# Patient Record
Sex: FEMALE | Race: WHITE | NOT HISPANIC OR LATINO | Employment: UNEMPLOYED | URBAN - METROPOLITAN AREA
[De-identification: names, ages, dates, MRNs, and addresses within clinical notes are randomized per-mention and may not be internally consistent; named-entity substitution may affect disease eponyms.]

---

## 2017-01-26 NOTE — PATIENT INSTRUCTIONS
Before Your Surgery      Call your surgeon if there is any change in your health. This includes signs of a cold or flu (such as a sore throat, runny nose, cough, rash or fever).    Do not smoke, drink alcohol or take over the counter medicine (unless your surgeon or primary care doctor tells you to) for the 24 hours before and after surgery.    If you take prescribed drugs: Follow your doctor s orders about which medicines to take and which to stop until after surgery.    Eating and drinking prior to surgery: follow the instructions from your surgeon    Take a shower or bath the night before surgery. Use the soap your surgeon gave you to gently clean your skin. If you do not have soap from your surgeon, use your regular soap. Do not shave or scrub the surgery site.  Wear clean pajamas and have clean sheets on your bed.   [facility]    If you have any questions regarding to your visit please contact your care team:       Team Purple:   Clinic Hours Telephone Number   SHERMAN William Dr., Dr.   7am-7pm  Monday - Thursday   7am-5pm  Fridays  [phone]  (Appointment scheduling available 24/7)    Questions about your Visit?   Team Line:  [phone]   Urgent Care - [place] and [facility] - 11am-9pm Monday-Friday Saturday-Sunday- 9am-5pm   [place] - 5pm-9pm Monday-Friday Saturday-Sunday- 9am-5pm  [phone] - [place]   [phone] - [place]       What options do I have for visits at the clinic other than the traditional office visit?  To expand how we care for you, many of our providers are utilizing electronic visits (e-visits) and telephone visits, when medically appropriate, for interactions with their patients rather than a visit in the clinic.   We also offer nurse visits for many medical concerns. Just like any other service, we will bill your insurance company for this type of visit based on time spent on the phone  with your provider. Not all insurance companies cover these visits. Please check with your medical insurance if this type of visit is covered. You will be responsible for any charges that are not paid by your insurance.      E-visits via [employer]:  generally incur a $35.00 fee.  Telephone visits:  Time spent on the phone: *charged based on time that is spent on the phone in increments of 10 minutes. Estimated cost:   5-10 mins $30.00   11-20 mins. $59.00   21-30 mins. $85.00     Use [employer] (secure email communication and access to your chart) to send your primary care provider a message or make an appointment. Ask someone on your Team how to sign up for [employer].  For a Price Quote for your services, please call our Consumer Price Line at [phone].  As always, Thank you for trusting us with your health care needs!    Val Du MA

## 2017-01-26 NOTE — PROGRESS NOTES
[facility]  [address] [place] [id number]  [phone]  Dept: [phone]    PRE-OP EVALUATION:  Today's date: 2017    Eden Hess (: 1970) presents for pre-operative evaluation assessment as requested by Dr. Terrance Jesus.  She requires evaluation and anesthesia risk assessment prior to undergoing surgery/procedure for treatment of Cataracts .  Proposed procedure: Cataract    Date of Surgery/ Procedure:  and 2017  Time of Surgery/ Procedure: 8 AM  Hospital/Surgical Facility:   Fax number for surgical facility:   Primary Physician: Miller Del Rosario  Type of Anesthesia Anticipated: to be determined    Patient has a Health Care Directive or Living Will:  NO    1. NO - Do you have a history of heart attack, stroke, stent, bypass or surgery on an artery in the head, neck, heart or legs?  2. NO - Do you ever have any pain or discomfort in your chest?  3. NO - Do you have a history of  Heart Failure?  4. NO - Are you troubled by shortness of breath when: walking on the level, up a slight hill or at night?  5. NO - Do you currently have a cold, bronchitis or other respiratory infection?  6. NO - Do you have a cough, shortness of breath or wheezing?  7. YES - DO YOU SOMETIMES GET PAINS IN THE CALVES OF YOUR LEGS WHEN YOU WALK? More often the shins  8. NO - Do you or anyone in your family have previous history of blood clots?  9. NO - Do you or does anyone in your family have a serious bleeding problem such as prolonged bleeding following surgeries or cuts?  10. NO - Have you ever had problems with anemia or been told to take iron pills?  11. YES - HAVE YOU HAD ANY ABNORMAL BLOOD LOSS SUCH AS BLACK, TARRY OR BLOODY STOOLS, OR ABNORMAL VAGINAL BLEEDING? Periods really heavy  12. YES - HAVE YOU EVER HAD A BLOOD TRANSFUSION? During a CS  13 years ago  13. NO - Have you or any of your relatives ever had problems with anesthesia?  14. NO - Do you have  sleep apnea, excessive snoring or daytime drowsiness?  15. NO - Do you have any prosthetic heart valves?  16. NO - Do you have prosthetic joints?  17. NO - Is there any chance that you may be pregnant?      HPI:                                                      Brief HPI related to upcoming procedure: Cataract bilateral      DIABETES - Patient has a longstanding history of DiabetesType Type I . Patient is being treated with insulin injections and denies significant side effects. Control has been good. Complicating factors include but are not limited to: None.                                                                                                             .  DEPRESSION - Patient has a long history of Depression of moderate severity requiring medication for control with recent symptoms being stable..Current symptoms of depression include none.                                                                                                                                                                                    .  LMP: 2 weeks. Vasectomy.  MEDICAL HISTORY:                                                      Patient Active Problem List    Diagnosis Date Noted     Type 1 diabetes mellitus with hyperglycemia (H) 2016     Priority: Medium     Major depression in complete remission (H) 2016     Priority: Medium      No past medical history on file.  Past Surgical History   Procedure Laterality Date      section       x2     Current Outpatient Prescriptions   Medication Sig Dispense Refill     venlafaxine (EFFEXOR-XR) 150 MG 24 hr capsule Take 150 mg by mouth 2 times daily   3     lisinopril (PRINIVIL/ZESTRIL) 20 MG tablet 20 mg daily   4     allopurinol (ZYLOPRIM) 100 MG tablet daily   4     SM ASPIRIN ADULT LOW STRENGTH 81 MG EC tablet Take 81 mg by mouth daily   2     ACCU-CHEK SMARTVIEW test strip   0     HYDROcodone-acetaminophen (NORCO) 5-325 MG per tablet as needed   0      NOVOLOG VIAL 100 UNIT/ML soln   1     LANTUS VIAL 100 UNIT/ML soln   4     blood glucose monitoring (ACCU-CHEK FASTCLIX) lancets   11     OTC products: None, except as noted above    No Known Allergies   Latex Allergy: NO    Social History   Substance Use Topics     Smoking status: Former Smoker     Quit date: 12/29/1997     Smokeless tobacco: Not on file     Alcohol Use: Yes     History   Drug Use No       REVIEW OF SYSTEMS:                                                    C: NEGATIVE for fever, chills, change in weight  E/M: NEGATIVE for ear, mouth and throat problems  R: NEGATIVE for significant cough or SOB  CV: NEGATIVE for chest pain, palpitations or peripheral edema    EXAM:                                                    There were no vitals taken for this visit.  GENERAL APPEARANCE: healthy, alert and no distress  HENT: ear canals and TM's normal and nose and mouth without ulcers or lesions  RESP: lungs clear to auscultation - no rales, rhonchi or wheezes  CV: regular rate and rhythm, normal S1 S2, no S3 or S4 and no murmur, click or rub   ABDOMEN: soft, nontender, no HSM or masses and bowel sounds normal  NEURO: Normal strength and tone, sensory exam grossly normal, mentation intact and speech normal    DIAGNOSTICS:                                                    No labs or EKG required for low risk surgery (cataract, skin procedure, breast biopsy, etc)    No results for input(s): HGB, PLT, INR, NA, POTASSIUM, CR, A1C in the last 86036 hours.     IMPRESSION:                                                    Reason for surgery/procedure: Cataract/Cataract surgery  Diagnosis/reason for consult: Cataract/Pre OP    The proposed surgical procedure is considered LOW risk.    REVISED CARDIAC RISK INDEX  The patient has the following serious cardiovascular risks for perioperative complications such as (MI, PE, VFib and 3  AV Block):  No serious cardiac risks  INTERPRETATION: 0 risks: Class I (very low  risk - 0.4% complication rate)    The patient has the following additional risks for perioperative complications:  No identified additional risks      ICD-10-CM    1. Screening for malignant neoplasm of cervix Z12.4    2. Need for prophylactic vaccination with tetanus-diphtheria (TD) Z23        RECOMMENDATIONS:                                                      --Consult hospital rounder / IM to assist post-op medical management    --Patient is to take all scheduled medications on the day of surgery EXCEPT for modifications listed below.    Diabetes Medication Use    -----Take 80% of long acting insulin (e.g. Lantus, NPH) while NPO (fasting): (will take 24 units night before the procedure).    Anticoagulant or Antiplatelet Medication Use  ASPIRIN: Discontinue ASA 7-10 days prior to procedure to reduce bleeding risk.  It should be resumed post-operatively or as recommended by the surgeon.    APPROVAL GIVEN to proceed with proposed procedure, without further diagnostic evaluation       Signed Electronically by: Miller Del Rosario MD    Copy of this evaluation report is provided to requesting physician.    [facility] Preop Guidelines

## 2017-01-31 ENCOUNTER — OFFICE VISIT (OUTPATIENT)
Dept: FAMILY MEDICINE | Facility: CLINIC | Age: 47
End: 2017-01-31
Payer: COMMERCIAL

## 2017-01-31 VITALS
SYSTOLIC BLOOD PRESSURE: 120 MMHG | HEART RATE: 94 BPM | DIASTOLIC BLOOD PRESSURE: 70 MMHG | OXYGEN SATURATION: 99 % | TEMPERATURE: 97.5 F | BODY MASS INDEX: 26.62 KG/M2 | HEIGHT: 61 IN | WEIGHT: 141 LBS

## 2017-01-31 DIAGNOSIS — Z12.4 SCREENING FOR MALIGNANT NEOPLASM OF CERVIX: Primary | ICD-10-CM

## 2017-01-31 DIAGNOSIS — Z01.818 PREOP GENERAL PHYSICAL EXAM: ICD-10-CM

## 2017-01-31 DIAGNOSIS — Z23 NEED FOR PROPHYLACTIC VACCINATION WITH TETANUS-DIPHTHERIA (TD): ICD-10-CM

## 2017-01-31 PROCEDURE — 99214 OFFICE O/P EST MOD 30 MIN: CPT | Performed by: FAMILY MEDICINE

## 2017-01-31 ASSESSMENT — ANXIETY QUESTIONNAIRES
5. BEING SO RESTLESS THAT IT IS HARD TO SIT STILL: NOT AT ALL
2. NOT BEING ABLE TO STOP OR CONTROL WORRYING: NOT AT ALL
7. FEELING AFRAID AS IF SOMETHING AWFUL MIGHT HAPPEN: NOT AT ALL
GAD7 TOTAL SCORE: 0
6. BECOMING EASILY ANNOYED OR IRRITABLE: NOT AT ALL
IF YOU CHECKED OFF ANY PROBLEMS ON THIS QUESTIONNAIRE, HOW DIFFICULT HAVE THESE PROBLEMS MADE IT FOR YOU TO DO YOUR WORK, TAKE CARE OF THINGS AT HOME, OR GET ALONG WITH OTHER PEOPLE: NOT DIFFICULT AT ALL
3. WORRYING TOO MUCH ABOUT DIFFERENT THINGS: NOT AT ALL
1. FEELING NERVOUS, ANXIOUS, OR ON EDGE: NOT AT ALL

## 2017-01-31 ASSESSMENT — PATIENT HEALTH QUESTIONNAIRE - PHQ9: 5. POOR APPETITE OR OVEREATING: NOT AT ALL

## 2017-01-31 ASSESSMENT — PAIN SCALES - GENERAL: PAINLEVEL: NO PAIN (0)

## 2017-01-31 NOTE — MR AVS SNAPSHOT
After Visit Summary   1/31/2017    Eden Hess    MRN: [id number]           Patient Information     Date Of Birth          1970        Visit Information        Provider Department      1/31/2017 11:00 AM Miller Del Rosario MD [facility]        Today's Diagnoses     Screening for malignant neoplasm of cervix    -  1     Need for prophylactic vaccination with tetanus-diphtheria (TD)         Preop general physical exam           Care Instructions      Before Your Surgery      Call your surgeon if there is any change in your health. This includes signs of a cold or flu (such as a sore throat, runny nose, cough, rash or fever).    Do not smoke, drink alcohol or take over the counter medicine (unless your surgeon or primary care doctor tells you to) for the 24 hours before and after surgery.    If you take prescribed drugs: Follow your doctor s orders about which medicines to take and which to stop until after surgery.    Eating and drinking prior to surgery: follow the instructions from your surgeon    Take a shower or bath the night before surgery. Use the soap your surgeon gave you to gently clean your skin. If you do not have soap from your surgeon, use your regular soap. Do not shave or scrub the surgery site.  Wear clean pajamas and have clean sheets on your bed.   [facility]    If you have any questions regarding to your visit please contact your care team:       Team Purple:   Clinic Hours Telephone Number   SHERMAN [address] Dr., Dr.   7am-7pm  Monday - Thursday   7am-5pm  Fridays  [phone]  (Appointment scheduling available 24/7)    Questions about your Visit?   Team Line:  [phone]   Urgent Care - [place] and [place] - 11am-9pm Monday-Friday Saturday-Sunday- 9am-5pm   [place] - 5pm-9pm Monday-Friday Saturday-Sunday- 9am-5pm  [phone] - [place]  [phone] - [place]        What options do I have for visits at the clinic other than the traditional office visit?  To expand how we care for you, many of our providers are utilizing electronic visits (e-visits) and telephone visits, when medically appropriate, for interactions with their patients rather than a visit in the clinic.   We also offer nurse visits for many medical concerns. Just like any other service, we will bill your insurance company for this type of visit based on time spent on the phone with your provider. Not all insurance companies cover these visits. Please check with your medical insurance if this type of visit is covered. You will be responsible for any charges that are not paid by your insurance.      E-visits via [employer]:  generally incur a $35.00 fee.  Telephone visits:  Time spent on the phone: *charged based on time that is spent on the phone in increments of 10 minutes. Estimated cost:   5-10 mins $30.00   11-20 mins. $59.00   21-30 mins. $85.00     Use [employer] (secure email communication and access to your chart) to send your primary care provider a message or make an appointment. Ask someone on your Team how to sign up for [employer].  For a Price Quote for your services, please call our [employer] Line at [phone].  As always, Thank you for trusting us with your health care needs!    Val Du MA          Follow-ups after your visit        Who to contact     If you have questions or need follow up information about today's clinic visit or your schedule please contact [facility] directly at [phone].  Normal or non-critical lab and imaging results will be communicated to you by MyChart, letter or phone within 4 business days after the clinic has received the results. If you do not hear from us within 7 days, please contact the clinic through [employer] or phone. If you have a critical or abnormal lab result, we will notify you by phone as soon as possible.  Submit refill requests  "through [employer] or call your pharmacy and they will forward the refill request to us. Please allow 3 business days for your refill to be completed.          Additional Information About Your Visit        [employer] Information     [employer] lets you send messages to your doctor, view your test results, renew your prescriptions, schedule appointments and more. To sign up, go to [email or web address] . Click on \"Log in\" on the left side of the screen, which will take you to the Welcome page. Then click on \"Sign up Now\" on the right side of the page.     You will be asked to enter the access code listed below, as well as some personal information. Please follow the directions to create your username and password.     Your access code is: [email or web address]  Expires: 3/29/2017  4:34 PM     Your access code will  in 90 days. If you need help or a new code, please call your [facility] clinic or [phone].        Care EveryWhere ID     This is your Care EveryWhere ID. This could be used by other organizations to access your [facility] medical records  [id number]        Your Vitals Were     Pulse Temperature Height BMI (Body Mass Index) Pulse Oximetry       94 97.5  F (36.4  C) (Oral) 5' 0.87\" (1.546 m) 26.76 kg/m2 99%        Blood Pressure from Last 3 Encounters:   17 120/70   16 142/78    Weight from Last 3 Encounters:   17 141 lb (63.957 kg)   16 141 lb (63.957 kg)              Today, you had the following     No orders found for display       Primary Care Provider Office Phone # Fax #    Miller Del Rosario [phone]       [facility] [address] [facility] [address]        Thank you!     Thank you for choosing [facility]  for your care. Our goal is always to provide you with excellent care. Hearing back from our patients is one way we can continue to improve our services. Please take a few minutes to complete the written survey that you may receive " in the mail after your visit with us. Thank you!             Your Updated Medication List - Protect others around you: Learn how to safely use, store and throw away your medicines at www.disposemymeds.org.          This list is accurate as of: 1/31/17 11:38 AM.  Always use your most recent med list.                   Brand Name Dispense Instructions for use    ACCU-CHEK SMARTVIEW test strip   Generic drug:  blood glucose monitoring          allopurinol 100 MG tablet    ZYLOPRIM     daily       blood glucose monitoring lancets          HYDROcodone-acetaminophen 5-325 MG per tablet    NORCO     as needed       LANTUS VIAL 100 UNIT/ML injection   Generic drug:  insulin glargine      30 Units At Bedtime       lisinopril 20 MG tablet    PRINIVIL/ZESTRIL     20 mg daily       NovoLOG VIAL 100 UNITS/ML injection   Generic drug:  insulin aspart          SM ASPIRIN ADULT LOW STRENGTH 81 MG EC tablet   Generic drug:  aspirin      Take 81 mg by mouth daily       venlafaxine 150 MG 24 hr capsule    EFFEXOR-XR     Take 150 mg by mouth 2 times daily

## 2017-01-31 NOTE — NURSING NOTE
"Chief Complaint   Patient presents with     Pre-Op Exam       Initial /70 mmHg  Pulse 94  Temp(Src) 97.5  F (36.4  C) (Oral)  Ht 5' 0.87\" (1.546 m)  Wt 141 lb (63.957 kg)  BMI 26.76 kg/m2  SpO2 99% Estimated body mass index is 26.76 kg/(m^2) as calculated from the following:    Height as of this encounter: 5' 0.87\" (1.546 m).    Weight as of this encounter: 141 lb (63.957 kg).  BP completed using cuff size: regular    "

## 2017-02-01 ASSESSMENT — PATIENT HEALTH QUESTIONNAIRE - PHQ9: SUM OF ALL RESPONSES TO PHQ QUESTIONS 1-9: 5

## 2017-02-01 ASSESSMENT — ANXIETY QUESTIONNAIRES: GAD7 TOTAL SCORE: 0

## 2017-02-08 ENCOUNTER — TELEPHONE (OUTPATIENT)
Dept: FAMILY MEDICINE | Facility: CLINIC | Age: 47
End: 2017-02-08

## 2017-02-08 NOTE — TELEPHONE ENCOUNTER
Patient has the diagnoses of diabetes and does not currently have a statin status listed, please update. Thank you

## 2017-07-12 ENCOUNTER — TELEPHONE (OUTPATIENT)
Dept: FAMILY MEDICINE | Facility: CLINIC | Age: 47
End: 2017-07-12

## 2017-07-12 NOTE — TELEPHONE ENCOUNTER
Panel Management Review      Patient has the following on her problem list:     Diabetes    ASA: Passed    Last A1C  No results found for: A1C  A1C tested: No results    Last LDL:    No results found for: CHOL  No results found for: HDL  No results found for: LDL  No results found for: TRIG  No results found for: CHOLHDLRATIO  No results found for: NHDL    Is the patient on a Statin? NO             Is the patient on Aspirin? YES    Medications     Salicylates    SM ASPIRIN ADULT LOW STRENGTH 81 MG EC tablet          Last three blood pressure readings:  BP Readings from Last 3 Encounters:   01/31/17 120/70   12/29/16 142/78       Date of last diabetes office visit: none     Tobacco History:     History   Smoking Status     Former Smoker     Quit date: 12/29/1997   Smokeless Tobacco     Never Used           Composite cancer screening  Chart review shows that this patient is due/due soon for the following Pap Smear  Summary:    Patient is due/failing the following:   Microalbumin,LDL, A1C, PAP and PHYSICAL    Action needed:   Patient needs office visit for Physical/Diabetic Check.    Type of outreach:    Phone, left message for patient to call back.     Questions for provider review:    None                                                                                                                                    Tamara Brooks MA       Chart routed to Care Team .

## 2017-10-11 ENCOUNTER — TELEPHONE (OUTPATIENT)
Dept: FAMILY MEDICINE | Facility: CLINIC | Age: 47
End: 2017-10-11

## 2017-10-11 NOTE — LETTER
October 11, 2017          Eden Hess,  [address]  [place] [address]        Dear Eden Hess      Monitoring and managing your preventative and chronic health conditions are very important to us. Our records indicate that you have not scheduled for Annual Female Exam and Diabetic Check  which was recommended by Dr. Del Rosario.      If you have received your health care elsewhere, please call the clinic so the information can be documented in your chart.    Please call [phone] or message us through your dotloop account to schedule an appointment or provide information for your chart.     Feel free to contact us if you have any questions or concerns!    I look forward to seeing you and working with you on your health care needs.     Sincerely,         Miller Del Rosario/ NIA

## 2017-10-11 NOTE — TELEPHONE ENCOUNTER
Panel Management Review      Patient has the following on her problem list:     Diabetes    ASA: Passed    Last A1C  No results found for: A1C  A1C tested: no records for A1c results    Last LDL:    No results found for: CHOL  No results found for: HDL  No results found for: LDL  No results found for: TRIG  No results found for: CHOLHDLRATIO  No results found for: NHDL    Is the patient on a Statin? NO             Is the patient on Aspirin? YES    Medications     Salicylates    SM ASPIRIN ADULT LOW STRENGTH 81 MG EC tablet          Last three blood pressure readings:  BP Readings from Last 3 Encounters:   01/31/17 120/70   12/29/16 142/78       Date of last diabetes office visit: none in chart     Tobacco History:     History   Smoking Status     Former Smoker     Quit date: 12/29/1997   Smokeless Tobacco     Never Used             Composite cancer screening  Chart review shows that this patient is due/due soon for the following Pap Smear  Summary:    Patient is due/failing the following:   Foot Exam, Eye Exam, Creatinine, Tsh w/free T4 Reflex, Microalbumin,Pneumovax, Tdap, A1C, LDL, PAP and STATIN    Action needed:   Patient needs office visit for physical and diabetic check.    Type of outreach:    Sent letter.    Questions for provider review:    Please add statin to patient .                                                                                                                                    Tamara Brooks MA       Chart routed to Provider .

## 2017-12-23 ENCOUNTER — TELEPHONE (OUTPATIENT)
Dept: FAMILY MEDICINE | Facility: CLINIC | Age: 47
End: 2017-12-23

## 2019-11-03 ENCOUNTER — TRANSFERRED RECORDS (OUTPATIENT)
Dept: HEALTH INFORMATION MANAGEMENT | Facility: CLINIC | Age: 49
End: 2019-11-03

## 2020-02-07 ENCOUNTER — TRANSFERRED RECORDS (OUTPATIENT)
Dept: HEALTH INFORMATION MANAGEMENT | Facility: CLINIC | Age: 50
End: 2020-02-07

## 2020-04-14 ENCOUNTER — TRANSFERRED RECORDS (OUTPATIENT)
Dept: HEALTH INFORMATION MANAGEMENT | Facility: CLINIC | Age: 50
End: 2020-04-14

## 2020-04-20 ENCOUNTER — TRANSFERRED RECORDS (OUTPATIENT)
Dept: HEALTH INFORMATION MANAGEMENT | Facility: CLINIC | Age: 50
End: 2020-04-20

## 2020-05-13 ENCOUNTER — TRANSFERRED RECORDS (OUTPATIENT)
Dept: HEALTH INFORMATION MANAGEMENT | Facility: CLINIC | Age: 50
End: 2020-05-13

## 2020-05-19 ENCOUNTER — REFERRAL (OUTPATIENT)
Dept: TRANSPLANT | Facility: CLINIC | Age: 50
End: 2020-05-19

## 2020-05-19 DIAGNOSIS — Z01.818 ENCOUNTER FOR PRE-TRANSPLANT EVALUATION FOR KIDNEY AND PANCREAS TRANSPLANT: ICD-10-CM

## 2020-05-19 DIAGNOSIS — N76.6 VULVAR ULCERATION: ICD-10-CM

## 2020-05-19 DIAGNOSIS — N18.6 ESRD (END STAGE RENAL DISEASE) (H): Primary | ICD-10-CM

## 2020-05-19 DIAGNOSIS — H35.00 RETINOPATHY: ICD-10-CM

## 2020-05-19 DIAGNOSIS — E10.9 DIABETES MELLITUS TYPE 1 (H): ICD-10-CM

## 2020-05-19 DIAGNOSIS — Z99.2 ESRD ON DIALYSIS (H): ICD-10-CM

## 2020-05-19 DIAGNOSIS — I50.9 CONGESTIVE HEART FAILURE (H): ICD-10-CM

## 2020-05-19 DIAGNOSIS — E10.9 TYPE 1 DIABETES MELLITUS (H): ICD-10-CM

## 2020-05-19 DIAGNOSIS — N18.6 ESRD ON DIALYSIS (H): ICD-10-CM

## 2020-05-19 DIAGNOSIS — I10 ESSENTIAL HYPERTENSION: ICD-10-CM

## 2020-05-19 DIAGNOSIS — N18.6 END STAGE RENAL DISEASE (H): ICD-10-CM

## 2020-05-19 DIAGNOSIS — Z76.82 ORGAN TRANSPLANT CANDIDATE: ICD-10-CM

## 2020-05-19 DIAGNOSIS — I25.10 CARDIOVASCULAR DISEASE: ICD-10-CM

## 2020-05-19 NOTE — LETTER
Eden Hess  [address]  [place] [address]                June 4, 2020        Dear Eden,       You have recently expressed interest in our Solid Organ Transplant Program and have requested to begin the evaluation process.  We have made several attempts to get in touch with you but have been unable to reach you.    If you are still interested and/or have any questions, please contact us at  [phone] or [phone]   Monday - Friday, between the hours of 8:30am and 5:00pm central time.    We look forward to hearing from you.      Regards,     Solid Organ Transplant Intake   [facility] [address]  [address], [facility] [address]  [place] [address]

## 2020-05-19 NOTE — LETTER
July 14, 2020    Eden Hess  [address]  [place] [address]    Dear Eden,    Thank you for your interest in the Transplant Center at [facility], [facility]. We look forward to being a part of your care team and assisting you through the transplant process.    As we discussed, your transplant coordinator is Kaitlyn Hurst (Pancreas, Kidney).  You may call your coordinator at any time with questions or concerns. [phone].    Please complete the following.    1. Fill out and return the enclosed forms    Authorization for Electronic Communication    Authorization to Discuss Protected Health Information    Authorization for Release of Protected Health Information    2. Sign up for:    [employer], access to your electronic medical record (see enclosed pamphlet)    Mobile [email or web address], a transplant education website    You can use these tools to learn more about your transplant, communicate with your care team, and track your medical details      Sincerely,      Solid Organ Transplant  [facility], [facility]    cc: Referring Physician

## 2020-05-19 NOTE — LETTER
"    Eden Hess  [address]  [place] [address]          Dear Eden,    Thank you for your interest in the Transplant Center at [facility], [facility]. We look forward to being a part of your care team and assisting you through the transplant process.    As we discussed, your transplant coordinator is Kaitlyn Hurst (Pancreas, Kidney).  You may call your coordinator at any time with questions or concerns, call [phone].    Please complete the following.    1. Fill out and return the enclosed forms    { SOT INTAKE ENCLOSURES:[id number]::\"Authorization for Electronic Communication\",\"Authorization to Discuss Protected Health Information\",\"Authorization for Release of Protected Health Information\"}    2. Sign up for:    [employer], access to your electronic medical record (see enclosed pamphlet)    [employer], a transplant education website    You can use these tools to learn more about your transplant, communicate with your care team, and track your medical details      Sincerely,      Solid Organ Transplant  [facility], [facility]    cc: { TRANSPLANT CC LIST:[id number]}  "

## 2020-06-12 NOTE — TELEPHONE ENCOUNTER
Patient Call: Voicemail  Date/Time: 6/12/20 @ 3:18 PM  Reason for call: Patient returned Intakes call

## 2020-06-16 ENCOUNTER — TELEPHONE (OUTPATIENT)
Dept: TRANSPLANT | Facility: CLINIC | Age: 50
End: 2020-06-16

## 2020-06-16 NOTE — TELEPHONE ENCOUNTER
Patient Call: Voicemail  Date/Time: 6/16/2020-11:56am  Reason for call: Please connect with pt regarding referral

## 2020-06-22 VITALS — WEIGHT: 140 LBS | HEIGHT: 62 IN | BODY MASS INDEX: 25.76 KG/M2

## 2020-06-22 SDOH — HEALTH STABILITY: MENTAL HEALTH: HOW OFTEN DO YOU HAVE A DRINK CONTAINING ALCOHOL?: 4 OR MORE TIMES A WEEK

## 2020-06-22 SDOH — HEALTH STABILITY: MENTAL HEALTH: HOW MANY STANDARD DRINKS CONTAINING ALCOHOL DO YOU HAVE ON A TYPICAL DAY?: 1 OR 2

## 2020-06-22 ASSESSMENT — MIFFLIN-ST. JEOR: SCORE: 1203.29

## 2020-06-22 NOTE — TELEPHONE ENCOUNTER
PCP: Kurt King   Referring Provider: Varun Parker  Referring Diagnosis: ESRD  H/o DM type 1    Is patient under the age of 65? y  Is patient diabetic? y  Is patient on insulin? y  Was patient offered a pancreas transplant referral? y    Is patient in a group home/assisted living? n  Does patient have a guardian? n    Referral intake process completed.  Patient is aware that after financial approval is received, medical records will be requested.   Patient confirmed for a callback from transplant coordinator on July 7, 2020. (within 2 weeks)  Tentative evaluation date August 13, 2020. (within 4 weeks)    Confirmed coordinator will discuss evaluation process in more detail at the time of their call.   Patient is aware of the need to arrange age appropriate cancer screening, vaccinations, and dental care.  Reminded patient to complete questionnaire, complete medical records release, and review packet prior to evaluation visit .  Assessed patient for special needs (ie--wheelchair, assistance, guardian, and ):  none   Patient instructed to call [phone] with questions.

## 2020-07-07 NOTE — TELEPHONE ENCOUNTER
Reviewed records for purpose of pre kidney/pancreas transplant evaluation planning. Pt has diabetes type 1, started dialysis this spring, history of stroke, depressive disorder, CHF.  BMI about 25-26. Does not have insurance approval to proceed with scheduling pre kidney pancreas transplant evaluation.     Called pt today and LM briefly introducing myself and that she needs to call her PFR here, [place], to resolve insurance status afterwhich we can proceed with pre kidney pancreas evaluation. Explained that her eval appts are tentatively set for August 13 but will not be confirmed until she has insur approval and after that I have spoken with her. Provided [place] and my phone numbers for patient to call.

## 2020-07-11 ENCOUNTER — TRANSFERRED RECORDS (OUTPATIENT)
Dept: HEALTH INFORMATION MANAGEMENT | Facility: CLINIC | Age: 50
End: 2020-07-11

## 2020-07-23 ENCOUNTER — DOCUMENTATION ONLY (OUTPATIENT)
Dept: TRANSPLANT | Facility: CLINIC | Age: 50
End: 2020-07-23

## 2020-07-29 PROBLEM — Z96.41 INSULIN PUMP IN PLACE: Status: ACTIVE | Noted: 2019-11-03

## 2020-07-29 PROBLEM — G45.9 TIA (TRANSIENT ISCHEMIC ATTACK): Status: ACTIVE | Noted: 2017-06-22

## 2020-07-29 PROBLEM — D63.8 ANEMIA OF CHRONIC DISEASE: Status: ACTIVE | Noted: 2018-02-28

## 2020-07-29 PROBLEM — N76.6 VULVAR ULCERATION: Status: ACTIVE | Noted: 2020-07-29

## 2020-07-29 NOTE — TELEPHONE ENCOUNTER
Pt's insurance now meets criteria to proceed with pre KP evaluations. Pt has ESRD on dialysis with start date of 04/15/2020 per 2728 form. Diabetes type 1 diagnosed at age 2 years old currently on insulin pump, follows with endocrinology - see CE. Hgb A1C 8.5 on 04/03/2020 - see CE. Other medical history includes retinopathy, CHF, TIA in 2017. Lichen sclerosus with vulvar ulceration seen by GYN 03/04/2020 and ordered treatment with clobetasol and RTC in 3 months - due now - see CE. Last echocardiogram 10/24/2018 EF 55-60 % and moderate mitral valve regurgitation - see CE. Last heart cath done 02/17/2019 for evaluation of mitral valve - Indications: mitral valve disease.Conclusions1. Moderate OM 1 disease. Otherwise no significant coronary artery disease, Recommendations * Cases referred for CV surgery for mitral valve disease. PAP and mammogram done March 2020 - see CE. No record of colonoscopy done. Remote history of smoking. Alcohol intake from 2 per day to 2 per week. BMI 27-28. All okay to proceed with kidney pancreas transplant evaluation.     Contacted patient and introduced myself as their Transplant Coordinator, also introduced the role of the Transplant Coordinator in the transplant process.  Explained the purpose of this call including reviewing next steps and answering questions.  Explained insurance now meets criteria to proceed with  evaluation - pt very agreeable to proceeding and wants to keep 08/13/2020 appt date. Pt confirmed she was diagnosed with diabetes at age 2 years old pt and is currently using a pump.  Pt stating she has not yet seen a CV surgeon for mitral valve regurge and is being monitored with echocardiograms instead. Pt stating she is over due for dental check up and has never had a colonoscopy - reviewed requirements for transplant and instructed her to make these appointments.   Confirmed Referring Provider, Dialysis Center, and Primary Care Physician. Notified patient of the  importance of continued communication with referring providers and primary care physicians.    Reviewed components of transplant evaluation process including necessary appointments, tests, and procedures.    Answered questions for patient regarding evaluation, provided my name and contact information and requested they call with any additional questions.    Determined that patient would like additional information regarding transplant by:     Drop Down choices: Mail, Email, MyChart, Phone Call   Encourage MyChart   Notified patients that they will hear from a Transplant  to schedule evaluation.  Explained we will plan for in person PKE appointments as 08/13/2020 is a Thursday. Instructed her on My Transplant Place use and to watch videos prior to PKE. Instructed her to bring someone with her to the appointments. Pt expressed very good understanding of all and was in good agreement with the plan.     Smart set orders into Epic today for pre KP evaluation - routed to

## 2020-07-31 NOTE — TELEPHONE ENCOUNTER
Called patient to schedule PKE clinic.  Reviewed equipment needed for virtual visits and she would prefer to keep her save the date in person visit.   Informed patient we would send information about appointments via MyChart or email and Transplant coordinator will call early next week to review education.

## 2020-08-12 ENCOUNTER — TELEPHONE (OUTPATIENT)
Dept: TRANSPLANT | Facility: CLINIC | Age: 50
End: 2020-08-12

## 2020-08-13 ENCOUNTER — OFFICE VISIT (OUTPATIENT)
Dept: TRANSPLANT | Facility: CLINIC | Age: 50
End: 2020-08-13
Attending: INTERNAL MEDICINE
Payer: COMMERCIAL

## 2020-08-13 ENCOUNTER — DOCUMENTATION ONLY (OUTPATIENT)
Dept: TRANSPLANT | Facility: CLINIC | Age: 50
End: 2020-08-13

## 2020-08-13 ENCOUNTER — ANCILLARY PROCEDURE (OUTPATIENT)
Dept: CT IMAGING | Facility: CLINIC | Age: 50
End: 2020-08-13
Attending: PHYSICIAN ASSISTANT
Payer: COMMERCIAL

## 2020-08-13 VITALS
BODY MASS INDEX: 26.19 KG/M2 | SYSTOLIC BLOOD PRESSURE: 161 MMHG | HEART RATE: 63 BPM | DIASTOLIC BLOOD PRESSURE: 77 MMHG | WEIGHT: 138.7 LBS | TEMPERATURE: 97.8 F | OXYGEN SATURATION: 97 % | HEIGHT: 61 IN

## 2020-08-13 VITALS
SYSTOLIC BLOOD PRESSURE: 161 MMHG | BODY MASS INDEX: 26.19 KG/M2 | HEIGHT: 61 IN | WEIGHT: 138.7 LBS | HEART RATE: 63 BPM | TEMPERATURE: 97.8 F | DIASTOLIC BLOOD PRESSURE: 77 MMHG | OXYGEN SATURATION: 97 %

## 2020-08-13 DIAGNOSIS — Z76.82 ORGAN TRANSPLANT CANDIDATE: ICD-10-CM

## 2020-08-13 DIAGNOSIS — E10.9 DIABETES MELLITUS TYPE 1 (H): ICD-10-CM

## 2020-08-13 DIAGNOSIS — Z01.818 ENCOUNTER FOR PRE-TRANSPLANT EVALUATION FOR KIDNEY AND PANCREAS TRANSPLANT: ICD-10-CM

## 2020-08-13 DIAGNOSIS — E10.9 TYPE 1 DIABETES MELLITUS (H): ICD-10-CM

## 2020-08-13 DIAGNOSIS — N18.6 END STAGE KIDNEY DISEASE (H): ICD-10-CM

## 2020-08-13 DIAGNOSIS — N18.6 ESRD (END STAGE RENAL DISEASE) (H): ICD-10-CM

## 2020-08-13 DIAGNOSIS — N18.6 TYPE 1 DIABETES MELLITUS WITH CHRONIC KIDNEY DISEASE ON CHRONIC DIALYSIS (H): ICD-10-CM

## 2020-08-13 DIAGNOSIS — I10 ESSENTIAL HYPERTENSION: ICD-10-CM

## 2020-08-13 DIAGNOSIS — N18.6 END STAGE RENAL DISEASE (H): ICD-10-CM

## 2020-08-13 DIAGNOSIS — Z99.2 TYPE 1 DIABETES MELLITUS WITH CHRONIC KIDNEY DISEASE ON CHRONIC DIALYSIS (H): ICD-10-CM

## 2020-08-13 DIAGNOSIS — H35.00 RETINOPATHY: ICD-10-CM

## 2020-08-13 DIAGNOSIS — F41.9 ANXIETY AND DEPRESSION: ICD-10-CM

## 2020-08-13 DIAGNOSIS — N18.6 ESRD ON DIALYSIS (H): ICD-10-CM

## 2020-08-13 DIAGNOSIS — E10.22 TYPE 1 DIABETES MELLITUS WITH STAGE 5 CHRONIC KIDNEY DISEASE NOT ON CHRONIC DIALYSIS (H): ICD-10-CM

## 2020-08-13 DIAGNOSIS — Z99.2 ESRD ON DIALYSIS (H): ICD-10-CM

## 2020-08-13 DIAGNOSIS — I25.10 CARDIOVASCULAR DISEASE: ICD-10-CM

## 2020-08-13 DIAGNOSIS — E10.22 TYPE 1 DIABETES MELLITUS WITH CHRONIC KIDNEY DISEASE ON CHRONIC DIALYSIS (H): ICD-10-CM

## 2020-08-13 DIAGNOSIS — I50.9 CONGESTIVE HEART FAILURE (H): ICD-10-CM

## 2020-08-13 DIAGNOSIS — F32.A ANXIETY AND DEPRESSION: ICD-10-CM

## 2020-08-13 DIAGNOSIS — N76.6 VULVAR ULCERATION: ICD-10-CM

## 2020-08-13 DIAGNOSIS — N18.5 TYPE 1 DIABETES MELLITUS WITH STAGE 5 CHRONIC KIDNEY DISEASE NOT ON CHRONIC DIALYSIS (H): ICD-10-CM

## 2020-08-13 LAB
ABO + RH BLD: NORMAL
ALBUMIN SERPL-MCNC: 3 G/DL (ref 3.4–5)
ALBUMIN UR-MCNC: >499 MG/DL
ALP SERPL-CCNC: 143 U/L (ref 40–150)
ALT SERPL W P-5'-P-CCNC: 31 U/L (ref 0–50)
ANION GAP SERPL CALCULATED.3IONS-SCNC: 10 MMOL/L (ref 3–14)
APPEARANCE UR: ABNORMAL
APTT PPP: 28 SEC (ref 22–37)
AST SERPL W P-5'-P-CCNC: 28 U/L (ref 0–45)
B-HCG SERPL-ACNC: <1 IU/L (ref 0–5)
BACTERIA #/AREA URNS HPF: ABNORMAL /HPF
BASOPHILS # BLD AUTO: 0.1 10E9/L (ref 0–0.2)
BASOPHILS NFR BLD AUTO: 1.5 %
BILIRUB SERPL-MCNC: 0.5 MG/DL (ref 0.2–1.3)
BILIRUB UR QL STRIP: NEGATIVE
BLD GP AB SCN SERPL QL: NORMAL
BLD GP AB SCN TITR SERPL: NORMAL {TITER}
BLOOD BANK CMNT PATIENT-IMP: NORMAL
BUN SERPL-MCNC: 39 MG/DL (ref 7–30)
C PEPTIDE SERPL-MCNC: <0.1 NG/ML (ref 0.9–6.9)
CALCIUM SERPL-MCNC: 8.5 MG/DL (ref 8.5–10.1)
CARDIOLIPIN ANTIBODY IGG: <1.6 GPL-U/ML (ref 0–19.9)
CARDIOLIPIN ANTIBODY IGM: <0.2 MPL-U/ML (ref 0–19.9)
CHLORIDE SERPL-SCNC: 99 MMOL/L (ref 94–109)
CMV IGG SERPL QL IA: <0.2 AI (ref 0–0.8)
CO2 SERPL-SCNC: 25 MMOL/L (ref 20–32)
COLOR UR AUTO: YELLOW
CREAT SERPL-MCNC: 3.56 MG/DL (ref 0.52–1.04)
DIFFERENTIAL METHOD BLD: ABNORMAL
EBV VCA IGG SER QL IA: >8 AI (ref 0–0.8)
EOSINOPHIL # BLD AUTO: 0.3 10E9/L (ref 0–0.7)
EOSINOPHIL NFR BLD AUTO: 4.7 %
ERYTHROCYTE [DISTWIDTH] IN BLOOD BY AUTOMATED COUNT: 16.7 % (ref 10–15)
GFR SERPL CREATININE-BSD FRML MDRD: 14 ML/MIN/{1.73_M2}
GLUCOSE SERPL-MCNC: 232 MG/DL (ref 70–99)
GLUCOSE UR STRIP-MCNC: >499 MG/DL
HBA1C MFR BLD: 7.3 % (ref 0–5.6)
HCT VFR BLD AUTO: 39.2 % (ref 35–47)
HGB BLD-MCNC: 12.8 G/DL (ref 11.7–15.7)
HGB UR QL STRIP: ABNORMAL
HYALINE CASTS #/AREA URNS LPF: 7 /LPF (ref 0–2)
IMM GRANULOCYTES # BLD: 0 10E9/L (ref 0–0.4)
IMM GRANULOCYTES NFR BLD: 0.7 %
INR PPP: 0.96 (ref 0.86–1.14)
KETONES UR STRIP-MCNC: 5 MG/DL
LEUKOCYTE ESTERASE UR QL STRIP: NEGATIVE
LYMPHOCYTES # BLD AUTO: 0.9 10E9/L (ref 0.8–5.3)
LYMPHOCYTES NFR BLD AUTO: 15.9 %
MCH RBC QN AUTO: 34.3 PG (ref 26.5–33)
MCHC RBC AUTO-ENTMCNC: 32.7 G/DL (ref 31.5–36.5)
MCV RBC AUTO: 105 FL (ref 78–100)
MONOCYTES # BLD AUTO: 0.7 10E9/L (ref 0–1.3)
MONOCYTES NFR BLD AUTO: 11.3 %
MUCOUS THREADS #/AREA URNS LPF: PRESENT /LPF
NEUTROPHILS # BLD AUTO: 3.9 10E9/L (ref 1.6–8.3)
NEUTROPHILS NFR BLD AUTO: 65.9 %
NITRATE UR QL: NEGATIVE
NRBC # BLD AUTO: 0 10*3/UL
NRBC BLD AUTO-RTO: 0 /100
PH UR STRIP: 5 PH (ref 5–7)
PLATELET # BLD AUTO: 296 10E9/L (ref 150–450)
POTASSIUM SERPL-SCNC: 4.2 MMOL/L (ref 3.4–5.3)
PROT SERPL-MCNC: 7 G/DL (ref 6.8–8.8)
RBC # BLD AUTO: 3.73 10E12/L (ref 3.8–5.2)
RBC #/AREA URNS AUTO: 3 /HPF (ref 0–2)
SODIUM SERPL-SCNC: 133 MMOL/L (ref 133–144)
SOURCE: ABNORMAL
SP GR UR STRIP: 1.02 (ref 1–1.03)
SPECIMEN EXP DATE BLD: NORMAL
SPECIMEN EXP DATE BLD: NORMAL
SQUAMOUS #/AREA URNS AUTO: 2 /HPF (ref 0–1)
T PALLIDUM AB SER QL: NONREACTIVE
UROBILINOGEN UR STRIP-MCNC: 0 MG/DL (ref 0–2)
WBC # BLD AUTO: 5.9 10E9/L (ref 4–11)
WBC #/AREA URNS AUTO: 3 /HPF (ref 0–5)

## 2020-08-13 PROCEDURE — 85025 COMPLETE CBC W/AUTO DIFF WBC: CPT | Performed by: PHYSICIAN ASSISTANT

## 2020-08-13 PROCEDURE — 86706 HEP B SURFACE ANTIBODY: CPT | Performed by: PHYSICIAN ASSISTANT

## 2020-08-13 PROCEDURE — 86147 CARDIOLIPIN ANTIBODY EA IG: CPT | Performed by: PHYSICIAN ASSISTANT

## 2020-08-13 PROCEDURE — 86780 TREPONEMA PALLIDUM: CPT | Performed by: PHYSICIAN ASSISTANT

## 2020-08-13 PROCEDURE — 85730 THROMBOPLASTIN TIME PARTIAL: CPT | Performed by: PHYSICIAN ASSISTANT

## 2020-08-13 PROCEDURE — 84681 ASSAY OF C-PEPTIDE: CPT | Performed by: PHYSICIAN ASSISTANT

## 2020-08-13 PROCEDURE — 86665 EPSTEIN-BARR CAPSID VCA: CPT | Performed by: PHYSICIAN ASSISTANT

## 2020-08-13 PROCEDURE — 85613 RUSSELL VIPER VENOM DILUTED: CPT | Performed by: PHYSICIAN ASSISTANT

## 2020-08-13 PROCEDURE — 86900 BLOOD TYPING SEROLOGIC ABO: CPT | Performed by: PHYSICIAN ASSISTANT

## 2020-08-13 PROCEDURE — 81241 F5 GENE: CPT | Performed by: PHYSICIAN ASSISTANT

## 2020-08-13 PROCEDURE — 86803 HEPATITIS C AB TEST: CPT | Performed by: PHYSICIAN ASSISTANT

## 2020-08-13 PROCEDURE — 86704 HEP B CORE ANTIBODY TOTAL: CPT | Performed by: PHYSICIAN ASSISTANT

## 2020-08-13 PROCEDURE — 81001 URINALYSIS AUTO W/SCOPE: CPT | Performed by: PHYSICIAN ASSISTANT

## 2020-08-13 PROCEDURE — 87340 HEPATITIS B SURFACE AG IA: CPT | Performed by: PHYSICIAN ASSISTANT

## 2020-08-13 PROCEDURE — 40000866 ZZHCL STATISTIC HIV 1/2 ANTIGEN/ANTIBODY PRETRANSPLANT ONLY: Performed by: PHYSICIAN ASSISTANT

## 2020-08-13 PROCEDURE — 86481 TB AG RESPONSE T-CELL SUSP: CPT | Performed by: PHYSICIAN ASSISTANT

## 2020-08-13 PROCEDURE — 86787 VARICELLA-ZOSTER ANTIBODY: CPT | Performed by: PHYSICIAN ASSISTANT

## 2020-08-13 PROCEDURE — 85610 PROTHROMBIN TIME: CPT | Performed by: PHYSICIAN ASSISTANT

## 2020-08-13 PROCEDURE — 86644 CMV ANTIBODY: CPT | Performed by: PHYSICIAN ASSISTANT

## 2020-08-13 PROCEDURE — 80053 COMPREHEN METABOLIC PANEL: CPT | Performed by: PHYSICIAN ASSISTANT

## 2020-08-13 PROCEDURE — 86905 BLOOD TYPING RBC ANTIGENS: CPT | Performed by: PHYSICIAN ASSISTANT

## 2020-08-13 PROCEDURE — 86886 COOMBS TEST INDIRECT TITER: CPT | Performed by: PHYSICIAN ASSISTANT

## 2020-08-13 PROCEDURE — 81240 F2 GENE: CPT | Performed by: PHYSICIAN ASSISTANT

## 2020-08-13 PROCEDURE — 86901 BLOOD TYPING SEROLOGIC RH(D): CPT | Performed by: PHYSICIAN ASSISTANT

## 2020-08-13 PROCEDURE — 36415 COLL VENOUS BLD VENIPUNCTURE: CPT | Performed by: PHYSICIAN ASSISTANT

## 2020-08-13 PROCEDURE — 84702 CHORIONIC GONADOTROPIN TEST: CPT | Performed by: PHYSICIAN ASSISTANT

## 2020-08-13 PROCEDURE — 85730 THROMBOPLASTIN TIME PARTIAL: CPT | Mod: 91 | Performed by: PHYSICIAN ASSISTANT

## 2020-08-13 PROCEDURE — 85670 THROMBIN TIME PLASMA: CPT | Performed by: PHYSICIAN ASSISTANT

## 2020-08-13 PROCEDURE — 86850 RBC ANTIBODY SCREEN: CPT | Performed by: PHYSICIAN ASSISTANT

## 2020-08-13 PROCEDURE — 83036 HEMOGLOBIN GLYCOSYLATED A1C: CPT | Performed by: PHYSICIAN ASSISTANT

## 2020-08-13 RX ORDER — METOPROLOL SUCCINATE 100 MG/1
100 TABLET, EXTENDED RELEASE ORAL
Status: ON HOLD | COMMUNITY
End: 2022-09-22

## 2020-08-13 RX ORDER — CALCIUM ACETATE 667 MG/1
667 CAPSULE ORAL
Status: ON HOLD | COMMUNITY
End: 2022-10-02

## 2020-08-13 RX ORDER — BUMETANIDE 2 MG/1
2 TABLET ORAL 2 TIMES DAILY
Status: ON HOLD | COMMUNITY
Start: 2019-12-03 | End: 2022-09-22

## 2020-08-13 RX ORDER — ATORVASTATIN CALCIUM 40 MG/1
40 TABLET, FILM COATED ORAL DAILY
Status: ON HOLD | COMMUNITY
Start: 2019-11-06 | End: 2022-10-02

## 2020-08-13 ASSESSMENT — MIFFLIN-ST. JEOR
SCORE: 1181.52
SCORE: 1181.52

## 2020-08-13 NOTE — NURSING NOTE
"Chief Complaint   Patient presents with     Consult     PKE EVAL     Blood pressure (!) 161/77, pulse 63, temperature 97.8  F (36.6  C), temperature source Oral, height 1.549 m (5' 1\"), weight 62.9 kg (138 lb 11.2 oz), SpO2 97 %, not currently breastfeeding.    Opal Pompa on 8/13/2020 at 7:36 AM    "

## 2020-08-13 NOTE — TELEPHONE ENCOUNTER
Spoke with pt today who said she will attend her pre kidney pancreas evaluation tomorrow. Pt stating she did receive an email from Nyasia Garcia LPN but has not received one from a  in our Office. I therefore reviewed her schedule with her and provided the address to our Clinic. Pt stating she has not yet viewed My Transplant Place - asked her to do so this doretha then. Instructed her to not sign consents in Nyasia's email for now and that her and I will sign together next week when I call her with the outcome of the Selection Committee. Pt expressed very good understanding of all and was in good agreement with the plan.

## 2020-08-13 NOTE — PROGRESS NOTES
Transplant Surgery Consult Note    Medical record number: [id number]  YOB: 1970,   Consult requested by Dr. Parker for evaluation of kidney and pancreas transplant candidacy.    Assessment and Recommendations: Ms. Hess is a good candidate for transplantation and has a good understanding of the risks and benefits of this approach to management of renal failure and diabetes. The following issues should be addressed prior to transplant:     50 yo female with type 1   On about 40-50 units/d  A1C in the 7s  No hypo unawareness  ESRD on dialysis hemo since 2020  2 C sections  Failed Fallopian tube coils with  the right one retained outside of the tube   H/O CHF on medical management  Has mild MR, being observed  Good candidate for KP   Needs detailed cardiac assessment    Risks of the surgical procedure including but not limited to the rare risk of mortality discussed in detail. Patient verbalized good understanding and had several pertinent questions which were answered satisfactorily.     Immunosuppressive regimen, management and long term risks discussed in detail.           The majority of our visit was spent in counselling, discussing the medical and surgical risks of living or  donor kidney and pancreas transplantation, either in a simultaneous or sequential fashion. I contrasted approximate wait time for SPK vs living vs  donor kidneys from normal (0-85%) or higher (%) kidney donor profile index (KDPI) donors and their associated outcomes. I would not recommend this individual to consider kidneys from high KDPI donors. The reason for this decision is best summarized as: multi-organ transplant candidate.  Access to transplant will be impacted by living donor availability and overall candidacy for SPK, as well as the influence of blood type and degree of sensitization. We discussed advantages of preemptive transplant as well as living donor kidney transplant, and graft and  patient survival outcomes associated with these options. Potential surgical complications of kidney and pancreas transplantation include bleeding, clotting, infection, wound complications, anastomotic failure and other issues such as cardiac complications, pneumonia, deep venous thrombosis, pulmonary embolism, post transplant diabetes and death. We discussed the need for protocol biopsy of the kidney and the possible need for a ureteral stent (and subsequent removal). We discussed benefits and risks associated with different approaches to exocrine drainage of pancreatic secretions. We also discussed differences in the average length of stay, recovery process, and posttransplant lab and monitoring protocol. We discussed the risk of graft rejection and recurrent diabetic nephropathy in the setting of poor glycemic control. I emphasized the need for strict immunosuppression adherence and the potential for complications of immunosuppression such as skin cancer or lymphoma, as well as a very low but not zero risk of donor-derived disease transmission risks (infection, cancer). Ms. Hess asked good questions and the patient's candidacy will be reviewed at our Multidisciplinary Selection Committee. Thank you for the opportunity to participate in Ms. Hess's care.    Total time: 45 minutes  Counselling time: 35 minutes      ----------------------------------------------------    HPI: Ms. Hess has Type 1 Diabetes. The patient has had diabetes for 47 years. Management is by Lantus per diabetic educator  Humalog per diabetic educator. The patient usually checks her blood sugar multiple times/day. Complications of diabetes include:    Retinopathy:  No  Neuropathy: No  Gastroparesis:  No    The patient is on dialysis.    Has potential kidney donors:  Doesn't know .  Interested in participation in paired exchange if a donor is willing: Doesn't know     The patient has the following pertinent history:       No    Yes  Dialysis:     []      [x] via:       Blood Transfusion                  [x]      []  Number of units:   Most recently:  Pregnancy:    []      [x] Number: 2      Previous Transplant:  [x]      [] Details:    Cancer    [x]      [] Comment:   Kidney stones   [x]      [] Comment:      Recurrent infections  [x]      []  Type:                  Bladder dysfunction  [x]      [] Cause:    Claudication   [x]      [] Distance:    Previous Amputation  [x]      [] Cause:     Chronic anticoagulation  [x]      [] Indication:  Islam  [x]      []     Past Medical History:   Diagnosis Date     Cerebral infarction (H) 2017    [facility]     Congestive heart failure (H)      Depressive disorder     [facility] [facility]     Diabetes mellitus type 1 (H)     Diagnosed at age 2 years old      ESRD on dialysis (H)      Hypertension      Retinopathy      Vulvar ulceration 2020     Past Surgical History:   Procedure Laterality Date      SECTION      x2     VASCULAR SURGERY      dialysis port, fistula-R arm     Family History   Problem Relation Age of Onset     Diabetes Type 1 Father      Hypertension Father      Cerebrovascular Disease Father      Social History     Socioeconomic History     Marital status:      Spouse name: Not on file     Number of children: Not on file     Years of education: Not on file     Highest education level: Not on file   Occupational History     Not on file   Social Needs     Financial resource strain: Not on file     Food insecurity     Worry: Not on file     Inability: Not on file     Transportation needs     Medical: Not on file     Non-medical: Not on file   Tobacco Use     Smoking status: Former Smoker     Types: Cigarettes     Last attempt to quit: 1997     Years since quittin.6     Smokeless tobacco: Never Used   Substance and Sexual Activity     Alcohol use: Yes     Alcohol/week: 0.0 standard drinks     Frequency: 4 or more times  a week     Drinks per session: 1 or 2     Drug use: No     Sexual activity: Yes     Partners: Male     Birth control/protection: Male Surgical   Lifestyle     Physical activity     Days per week: Not on file     Minutes per session: Not on file     Stress: Not on file   Relationships     Social connections     Talks on phone: Not on file     Gets together: Not on file     Attends Advent service: Not on file     Active member of club or organization: Not on file     Attends meetings of clubs or organizations: Not on file     Relationship status: Not on file     Intimate partner violence     Fear of current or ex partner: Not on file     Emotionally abused: Not on file     Physically abused: Not on file     Forced sexual activity: Not on file   Other Topics Concern     Parent/sibling w/ CABG, MI or angioplasty before 65F 55M? Not Asked   Social History Narrative     Not on file       ROS:   CONSTITUTIONAL:  No fevers or chills  EYES: negative for icterus  ENT:  negative for hearing loss, tinnitus and sore throat  RESPIRATORY:  negative for cough, sputum, dyspnea  CARDIOVASCULAR:  negative for chest pain Fatigue  GASTROINTESTINAL:  negative for nausea, vomiting, diarrhea or constipation  GENITOURINARY:  negative for incontinence, dysuria, bladder emptying problems  HEME:  No easy bruising  INTEGUMENT:  negative for rash and pruritus  NEURO:  Negative for headache, seizure disorder    Allergies:   Allergies   Allergen Reactions     Amlodipine      Other reaction(s): Edema,generalized       Medications:  Prescription Medications as of 8/13/2020       Rx Number Disp Refills Start End Last Dispensed Date Next Fill Date Owning Pharmacy    Kipo[employer]ds[employer] SMARTVIEW test strip   0 9/28/2016        Class: Historical    atorvastatin (LIPITOR) 40 MG tablet    11/6/2019    [facility][id number] - [address]    Sig: Take 40 mg by mouth    Class: Historical    Route: Oral    blood glucose monitoring  (ACCU-CHEK FASTCLIX) lancets   11 2016        Class: Historical    bumetanide (BUMEX) 0.5 MG tablet    12/3/2019    [id number] [address] [facility]    Sig: Take 2 mg by mouth    Class: Historical    Route: Oral    calcium acetate (PHOSLO) 667 MG CAPS capsule        [facility] [address] [place] [address] [address] [facility]    Sig: Take 667 mg by mouth    Class: Historical    Route: Oral    cholecalciferol (VITAMIN D3) 125 mcg (5000 units) capsule        [facility] [address] [place] [address] [address] [place] [address]    Sig: Take by mouth daily    Class: Historical    Route: Oral    LANTUS VIAL 100 UNIT/ML soln   4 2016        Si Units At Bedtime     Class: Historical    metoprolol succinate ER (TOPROL-XL) 100 MG 24 hr tablet        [facility] [address] [place] [address] [address] [facility]    Sig: Take 100 mg by mouth    Class: Historical    Route: Oral    NOVOLOG VIAL 100 UNIT/ML soln   1 2016        Class: Historical    SM ASPIRIN ADULT LOW STRENGTH 81 MG EC tablet   2 2016        Sig: Take 81 mg by mouth daily     Class: Historical    Route: Oral    venlafaxine (EFFEXOR-XR) 150 MG 24 hr capsule   3 2016        Sig: Take 150 mg by mouth 2 times daily     Class: Historical    Route: Oral    allopurinol (ZYLOPRIM) 100 MG tablet   4 2016        Sig: daily     Class: Historical          Exam:   Temp:  [97.8  F (36.6  C)] 97.8  F (36.6  C)  Pulse:  [63] 63  BP: (161)/(77) 161/77  SpO2:  [97 %] 97 %  Appearance: in no apparent distress.   Skin: normal  Head and Neck: Normal, no rashes or jaundice  Respiratory: easy respirations, no audible wheezing.  Cardiovascular: RRR  Abdomen: rounded, Surgical scars consistent with history     Diagnostics:   No results found for this or any previous visit (from the past 672 hour(s)).  No results found for: CPRA

## 2020-08-13 NOTE — PROGRESS NOTES
Outpatient MNT: Kidney Pancreas Transplant Evaluation    Current BMI: 26.2 (HT 61 in,  lbs/63 kg)  BMI is within recommendation of <35 for KP transplant    Frailty Test completed 8/13/20 (view flowsheets-->frailty score for further scoring breakdown)  Not Frail      Time Spent: 15 minutes  Visit Type: Initial  Referring Physician: Dalia   Pt accompanied by: her , Gallo     Medical dx associated with RD referral  - DM I  - ESRD     History of previous txp: none   Frequency of BG checks: has CGM, which she checks 6-8x/day   Dialysis: yes    Dialysis Modality: HD  Days/Times: MWF 6 am   Dialysis Start: 4/2020   Pt receives protein supplement with dialysis: Y    Nutrition Assessment  Pt cooks for self without added salt. She does eat some higher sodium processed foods.     Appetite: good/baseline     Vitamins, Supplements, Pertinent Meds: phoslo, vit D   Herbal Medicines/Supplements: none     Diet Recall  Breakfast HD days- chicken s/w or granola bar; non HD days- eggs and toast or a bagel    Lunch Hit or miss- may have LS canned soup or pizza    Dinner Pizza; pasta 50% of the time, has pork/chicken/steak 50% of the time, salad or veggies 3x/week    Snacks Celery or apple with PB, junk food a few times/week    Beverages 16 oz milk, water, coffee    Alcohol 7 drinks/week (wine, whiskey, or beer)    Dining out 1-2x/week      Physical Activity  Limited, but does report hiking once every 2 weeks      Anthropometrics  Height:   61 in   BMI:    26.2    Weight Status:Overweight BMI 25-29.9   Weight:  138 lbs            IBW (lb): 105  % IBW: 131 Wt Hx: Pt reports no edema. Weight typically fluctuates within 10 lbs.     Adj/dosing BW: 113 lbs/51 kg      Labs  7/1/20 K 5.1, mostly wnl  Phos 6.1, high x 6 months     Malnutrition  % Intake: No decreased intake noted  % Weight Loss: None noted  Subcutaneous Fat Loss: None  Muscle Loss: None  Fluid Accumulation/Edema: None noted  Malnutrition Diagnosis: Patient does  not meet two of the above criteria necessary for diagnosing malnutrition    Estimated Nutrition Needs  Energy  3177-2072     (25-30 kcal/kg for maintenance)     Protein  61-71    (1.2-1.4 g/kg for HD/PD needs)         Fluid  1 ml/kcal or per MD   Micronutrient   Na+: <2000 mg/day  K+: 5929-3744 mg/day  Phos: 800-1000 mg/day            Nutrition Diagnosis  Excessive Na+ intake r/t food and nutrition related knowledge deficit AEB diet recall reveals high Na+ foods.    Food and nutrition related knowledge deficit r/t pre transplant eval AEB pt verbalized not hearing pre/post transplant diet guidelines.    Nutrition Intervention  Nutrition education provided:  Discussed sodium intake (low sodium foods and drinks, seasoning food without salt and tips for low sodium diet). Reviewed wnl K level, yet elevated phos level and to take binders with snacks or beverages (ie milk) if outside of a meal or time when she takes a binder. Reviewed protein needs for dialysis as well.     Reviewed post txp diet guidelines in brief (will review in further detail post txp):  (1) Review of proper food safety measures d/t immunosuppressant therapy post-op and increased risk for food-borne illness    (2) Avoid the following post txp d/t risk for rejection, unknown effects on the organs, and/or potential interactions with immunosuppressants:  - Herbal, Chinese, holistic, chiropractic, natural, alternative medicines and supplements  - Detoxes and cleanses  - Weight loss pills  - Protein powders or other products with extracts or herbs (ie green tea extract)    (3) Med regimen and possible side effects    Patient Understanding: Pt verbalized understanding of education provided.  Expected Compliance: Good  Follow-Up Plans: PRN     Nutrition Goals  1. Limit Na+ <2000mg/day  2. Pt to verbalize understanding of 3 aspects of post txp education provided  3. Reduce intake of high phos foods and take binders consistently to reduce level to  naimal      Provided pt with contact info.   Martha Rubio RD, LD, CCTD  [facility] [phone]

## 2020-08-13 NOTE — PROGRESS NOTES
TRANSPLANT NEPHROLOGY RECIPIENT EVALUATION NOTE    Assessment and Plan:  # Kidney/Pancreas Transplant Evaluation: Patient is a good candidate overall. Benefits of a living donor transplant were discussed.    # ESKD from diabetes mellitus type 1: doing OK on dialysis since April 2020, but may benefit from a kidney transplant.    # DM Type 1: currently controlled with approximately 25 units insulin daily via pump and CGM. A1c 8.6% April 2020. Repeat pending today. Given evidence of end-organ damage, she may benefit from a pancreas transplant.    # Cardiac Risk: she will need risk assessment given mild non-obstructive CAD (angiogram February 2019), mild mitral regurgitation, HFpEF, and multiple longstanding risk factors.    # TIA 2017: will update carotid US.    # Vaginal Ulceration: thought likely lichen sclerosis and treated with topical steroids, although patient is unable to say if there has been improvement or not. Recommend follow up with OBGYN to ensure resolution.     # Malpositioned essure device: incidentally noted on recent imaging: recommend follow up with OBGYN.    # Depression: currently on venlafaxine. Appreciate social work input.    # Groundglass opacities: noted in left lung base on non-contrast abd/pelv CT. Patient asymptomatic at time of visit. Recommend follow up with PCP if symptoms develop.     # Health Maintenance: Colonoscopy: Not up to date, Mammogram: Up to date, PAP: Up to date and Dental: Up to date    Discussed the risks and benefits of a transplant, including the risk of surgery and immunosuppression medications.  Patient's overall evaluation will be discussed in the Transplant Program's regular meeting with a final recommendation on the patients suitability for transplant to be made at that time.  Patient was seen in conjunction with Dr. John Cruz as part of a shared visit.    Evaluation:  Eden Hess was seen in consultation at the request of Dr. Yousif Gómez for evaluation as a  potential kidney/pancreas transplant recipient.    Reason for Visit:  Eden Hess is a 49-year-old female with ESKD from diabetes mellitus type 1, who presents for kidney/pancreas transplant evaluation.    History of Present Illness:           Kidney Disease Hx: Type 1 diabetes since age 2, historically poorly controlled, and HTN since about 2015 with TIA in 2017 in setting of uncontrolled blood pressure. Elevated creatinine and proteinuria since at least 2017. MEERA episodes in the past few years in the setting of DKA and heart failure. No paraproteinemia identified. Renal imaging April 2020 right kidney 8.8 cm and left kidney 9.3 cm without hydro or mass. She initiated dialysis April 2020, which is going OK.        Kidney Disease Dx: Diabetic nephropathy       Biopsy Proven: No         On Dialysis: Yes, April 2020, HD       Primary Nephrologist: Dr. Parker       H/o Kidney Stones: No       H/o Recurrent/Frequent UTI: No         Diabetic Hx: Type 1        Diagnosis Date: age 2       Medication History: currently using approximately 25 units insulin daily via pump and CGM.        Diabetic Control: Borderline control (HbA1c 7-9%)   Last HbA1c: 8.6%       Hypoglycemic Unawareness: No       End-Organ Damage due to DM: Retinopathy, Nephropathy and Peripheral neuropathy          Cardiac/Vascular Disease Risk Factors:        - HFpEF       - Mitral regurgitation, mild as of late 2019 ECHO       - CAD: moderate OM1 disease on February 2019 coronary angiogram         Volume Status/Weight:        Volume status: Euvolemic       Weight:  Acceptable BMI       BMI: Body mass index is 26.21 kg/m .         Functional Capacity/Frailty:        She doesn't do anything in particular for exercise, but is able to walk several blocks and go up and down stairs without chest pain, SOB, or claudication.      Fatigue/Decreased Energy: [x] No [] Yes    Chest Pain or SOB with Exertion: [x] No [] Yes    Significant Weight Change: [x] No [] Yes     Nausea, Vomiting or Diarrhea: [x] No [] Yes    Fever, Sweats or Chills:  [x] No [] Yes    Leg Swelling [x] No [] Yes        History of Cancer: None    Other Significant Medical Issues: None    Review of Systems:  A comprehensive review of systems was obtained and negative, except as noted in the HPI or PMH.    Past Medical History:   Medical record was reviewed and PMH was discussed with patient and noted below.  Past Medical History:   Diagnosis Date     (HFpEF) heart failure with preserved ejection fraction (H)      Anxiety and depression     Adrienne and Contreras, SHERMAN Pop     Diabetes mellitus type 1 (H)     Diagnosed at age 2 years old      End stage kidney disease (H)      Hypertension      Retinopathy      TIA (transient ischemic attack)        Past Social History:   Past Surgical History:   Procedure Laterality Date      SECTION      x2     CREATE FISTULA ARTERIOVENOUS UPPER EXTREMITY      dialysis port, fistula-R arm     Personal history of bleeding or anesthesia problems: No    Family History:  Family History   Problem Relation Age of Onset     Diabetes Type 1 Father      Hypertension Father      Cerebrovascular Disease Father        Personal History:   Social History     Socioeconomic History     Marital status:      Spouse name: Not on file     Number of children: Not on file     Years of education: Not on file     Highest education level: Not on file   Occupational History     Not on file   Social Needs     Financial resource strain: Not on file     Food insecurity     Worry: Not on file     Inability: Not on file     Transportation needs     Medical: Not on file     Non-medical: Not on file   Tobacco Use     Smoking status: Former Smoker     Types: Cigarettes     Last attempt to quit: 1997     Years since quittin.6     Smokeless tobacco: Never Used   Substance and Sexual Activity     Alcohol use: Yes     Alcohol/week: 0.0 standard drinks      Frequency: 4 or more times a week     Drinks per session: 1 or 2     Drug use: No     Sexual activity: Yes     Partners: Male     Birth control/protection: Male Surgical   Lifestyle     Physical activity     Days per week: Not on file     Minutes per session: Not on file     Stress: Not on file   Relationships     Social connections     Talks on phone: Not on file     Gets together: Not on file     Attends Advent service: Not on file     Active member of club or organization: Not on file     Attends meetings of clubs or organizations: Not on file     Relationship status: Not on file     Intimate partner violence     Fear of current or ex partner: Not on file     Emotionally abused: Not on file     Physically abused: Not on file     Forced sexual activity: Not on file   Other Topics Concern     Parent/sibling w/ CABG, MI or angioplasty before 65F 55M? Not Asked   Social History Narrative     Not on file       Allergies:  Allergies   Allergen Reactions     Amlodipine      Other reaction(s): Edema,generalized       Medications:  Current Outpatient Medications   Medication Sig     ACCU-CHEK SMARTVIEW test strip      atorvastatin (LIPITOR) 40 MG tablet Take 40 mg by mouth     blood glucose monitoring (ACCU-CHEK FASTCLIX) lancets      bumetanide (BUMEX) 0.5 MG tablet Take 2 mg by mouth     calcium acetate (PHOSLO) 667 MG CAPS capsule Take 667 mg by mouth     cholecalciferol (VITAMIN D3) 125 mcg (5000 units) capsule Take by mouth daily     LANTUS VIAL 100 UNIT/ML soln 30 Units At Bedtime      metoprolol succinate ER (TOPROL-XL) 100 MG 24 hr tablet Take 100 mg by mouth     NOVOLOG VIAL 100 UNIT/ML soln      SM ASPIRIN ADULT LOW STRENGTH 81 MG EC tablet Take 81 mg by mouth daily      venlafaxine (EFFEXOR-XR) 150 MG 24 hr capsule Take 150 mg by mouth 2 times daily      allopurinol (ZYLOPRIM) 100 MG tablet daily      No current facility-administered medications for this visit.        Vitals:  BP (!) 161/77   Pulse 63    "Temp 97.8  F (36.6  C) (Oral)   Ht 1.549 m (5' 1\")   Wt 62.9 kg (138 lb 11.2 oz)   SpO2 97%   BMI 26.21 kg/m      Exam:  GENERAL APPEARANCE: alert and no distress  HENT: mouth without ulcers or lesions  LYMPHATICS: no cervical or supraclavicular nodes  RESP: lungs clear to auscultation - no rales, rhonchi or wheezes  CV: regular rhythm, normal rate, no rub, no murmur  EDEMA: no LE edema bilaterally  ABDOMEN: soft, nondistended, nontender, bowel sounds normal  MS: extremities normal - no gross deformities noted, no evidence of inflammation in joints, no muscle tenderness  SKIN: no rash  RUE AVF    Results:   Recent Results (from the past 336 hour(s))   HCG quantitative pregnancy    Collection Time: 08/13/20 10:57 AM   Result Value Ref Range    HCG Quantitative Serum <1 0 - 5 IU/L   Hemoglobin A1c [LAB90]    Collection Time: 08/13/20 10:57 AM   Result Value Ref Range    Hemoglobin A1C 7.3 (H) 0 - 5.6 %   C-peptide [ISG122]    Collection Time: 08/13/20 10:57 AM   Result Value Ref Range    C Peptide <0.1 (L) 0.9 - 6.9 ng/mL   Treponema Abs w Reflex to RPR and Titer    Collection Time: 08/13/20 10:57 AM   Result Value Ref Range    Treponema Antibodies Nonreactive NR^Nonreactive   Varicella Zoster Virus Antibody IgG [AKC2054]    Collection Time: 08/13/20 10:57 AM   Result Value Ref Range    Varicella Zoster Virus Antibody IgG 6.9 (H) 0.0 - 0.8 AI   HIV Antigen Antibody Combo Pretransplant    Collection Time: 08/13/20 10:57 AM   Result Value Ref Range    HIV Antigen Antibody Combo Pretransplant Nonreactive NR^Nonreactive   Hepatitis C antibody [IUU838]    Collection Time: 08/13/20 10:57 AM   Result Value Ref Range    Hepatitis C Antibody Nonreactive NR^Nonreactive   Hepatitis B surface antigen [JEM173]    Collection Time: 08/13/20 10:57 AM   Result Value Ref Range    Hep B Surface Agn Nonreactive NR^Nonreactive   Hepatitis B Surface Antibody [[id number]]    Collection Time: 08/13/20 10:57 AM   Result Value Ref Range    " Hepatitis B Surface Antibody >1,000.00 (H) <8.00 m[IU]/mL   Hepatitis B core antibody [[id number]]    Collection Time: 08/13/20 10:57 AM   Result Value Ref Range    Hepatitis B Core Dolores Nonreactive NR^Nonreactive   EBV Capsid Antibody IgG [[id number]]    Collection Time: 08/13/20 10:57 AM   Result Value Ref Range    EBV Capsid Antibody IgG >8.0 (H) 0.0 - 0.8 AI   CMV Antibody IgG [[id number]]    Collection Time: 08/13/20 10:57 AM   Result Value Ref Range    CMV Antibody IgG <0.2 0.0 - 0.8 AI   PRA Single Antigen IgG Antibody    Collection Time: 08/13/20 10:57 AM   Result Value Ref Range    SA1 Test Method SA FCS     SA1 Cell Class I     SA1 Hi Risk Dolores None     SA1 Mod Risk Dolores None     SA1 Comments        Test performed by modified procedure. Serum heat inactivated and tested   by a modified ([place]) protocol including fetal calf serum addition.   High-risk, mfi >3,000. Mod-risk, mfi 500-3,000.      SA2 Test Method SA FCS     SA2 Cell Class II     SA2 Hi Risk Dolores None     SA2 Mod Risk Dolores None     SA2 Comments        Test performed by modified procedure. Serum heat inactivated and tested   by a modified ([place]) protocol including fetal calf serum addition.   High-risk, mfi >3,000. Mod-risk, mfi 500-3,000.      Protocol Cutoff Plan A, 500 mfi cumulative      UNOS cPRA 0     Unacceptable Antigen None    HLA Typing Complete SOT Recipient    Collection Time: 08/13/20 10:57 AM   Result Value Ref Range    ABTest Method SSOP     A* locus A*03     A* A*32     B* locus B*07     B* B*39     C* locus C*07     Bw-1 Bw*6     Drsso Test Method SSOP     DRB1* locus DRB1*04     DRB1* DRB1*10     DRB4* locus DRB4*01     DQB1* locus DQB1*03(08)     DQB1* DQB1*05     DQA1*locus DQA1*01     DQA1* DQA1*03     DPB1* DPB1*02:01     DPB1* NMDP CDNZF     DPB1*locus DPB1*04:01     DPB1* locus NMDP CDNZH     DPA1* DPA1*01:03     DPA1* NMDP CCTGX    Thrombin time [SIQ241]    Collection Time: 08/13/20 10:57 AM   Result Value Ref Range    Thrombin Time  16.5 13.0 - 19.0 sec   Partial thromboplastin time [LAB56]    Collection Time: 08/13/20 10:57 AM   Result Value Ref Range    PTT 28 22 - 37 sec   INR [JYP2811]    Collection Time: 08/13/20 10:57 AM   Result Value Ref Range    INR 0.96 0.86 - 1.14   Lupus Anticoagulant Panel [QDF4369]    Collection Time: 08/13/20 10:57 AM   Result Value Ref Range    Lupus Result Negative NEG^Negative   Cardiolipin Dolores IgG and IgM [LAB 6836]    Collection Time: 08/13/20 10:57 AM   Result Value Ref Range    Cardiolipin Antibody IgG <1.6 0.0 - 19.9 GPL-U/mL    Cardiolipin Antibody IgM <0.2 0.0 - 19.9 MPL-U/mL   Factor 2 and 5 mutation analysis    Collection Time: 08/13/20 10:57 AM   Result Value Ref Range    Copath Report       Patient Name: SILAS HULL  MR#: [id number]  Specimen #: [id number]  Collected: 8/13/2020 10:57  Received: 8/14/2020 09:02  Reported: 8/17/2020 16:32  Ordering Phy(s): HEMAL OCONNELL  Additional Phy(s): JEANETTE DOLAN  Copy To:  Keith    For improved result formatting, select 'View Enhanced Report Format' under   Linked Documents section.  _________________________________________    TEST(S) REQUESTED:  Factor 5 Leiden and Factor 2 by PCR    SPECIMEN DESCRIPTION:  Blood    METHODOLOGY:   The regions of genomic DNA containing the [id number] Factor V   Leiden gene mutation (Factor V  Leiden) and the Factor 2(Prothrombin K68570L) gene mutation were   simultaneously amplified using the polymerase  chain reaction.  The amplified products were digested with restriction   endonuclease TaqI and products were  analyzed by gel electrophoresis.    RESULTS:    Factor V 1691G>A (Leiden)  RESULTS:  Mutation analyzed:     1691G>A  Factor V 1691G>A (Leiden)  Interpretation:      ABSENT  Factor V 1691G>A  (Leiden) mutation  genotype:      G/G    FACTOR 2/PROTHROMBIN RESULTS:  Mutation analyzed:     13912O>A  Factor 2 Mutation Interpretation:      ABSENT  Factor 2 Mutation genotype:      G/G    INTERPRETATION:  The patient is  negative for the Factor V 1691G>A (Leiden) and negative for   the Factor 2 mutation.    COMMENTS:  If a patient is the recipient of an allogeneic bone marrow transplant,   this test must be done on a  pre-transplant sample or buccal swab.  A previous allogeneic bone marrow   transplant will interfere with test  results.  Call the [facility]([phone]) for   instructions on sample collection for these  patients.    This test was developed and its performance characteristics determined by   [facility]. It has not been cleared or approved by the FDA.   The laboratory is regulated under CLIA  as qualified to perform high-complexity testing. This test is used for   clinical purposes. It should not be  regarded  as investigational or for research.    A resident/fellow in an accredited training program was involved in the   selection of testing, review of  laboratory data, and/or interpretation of this case.  I, as the senior   physician, attest that I: (i) confirmed  appropriate testing, (ii) examined the relevant raw data for the   specimen(s); and (iii) rendered or confirmed  the interpretation(s).    Electronically Signed Out By:  TRA Gray    CPT Codes:  A: 54596-H9MNHU, 40516-G3VDIJ, -JDSIEW(2)    TESTING LAB LOCATION:  [address] [id number] [phone]    COLLECTION SITE:  Client:  [facility]  Location:  [facility] (B)     CBC with platelets differential [QKO302]    Collection Time: 08/13/20 10:57 AM   Result Value Ref Range    WBC 5.9 4.0 - 11.0 10e9/L    RBC Count 3.73 (L) 3.8 - 5.2 10e12/L    Hemoglobin 12.8 11.7 - 15.7 g/dL    Hematocrit 39.2 35.0 - 47.0 %     (H) 78 - 100 fl    MCH 34.3 (H) 26.5 - 33.0 pg    MCHC 32.7 31.5 - 36.5 g/dL    RDW 16.7 (H) 10.0 - 15.0 %    Platelet Count 296 150 - 450  10e9/L    Diff Method Automated Method     % Neutrophils 65.9 %    % Lymphocytes 15.9 %    % Monocytes 11.3 %    % Eosinophils 4.7 %    % Basophils 1.5 %    % Immature Granulocytes 0.7 %    Nucleated RBCs 0 0 /100    Absolute Neutrophil 3.9 1.6 - 8.3 10e9/L    Absolute Lymphocytes 0.9 0.8 - 5.3 10e9/L    Absolute Monocytes 0.7 0.0 - 1.3 10e9/L    Absolute Eosinophils 0.3 0.0 - 0.7 10e9/L    Absolute Basophils 0.1 0.0 - 0.2 10e9/L    Abs Immature Granulocytes 0.0 0 - 0.4 10e9/L    Absolute Nucleated RBC 0.0    Quantiferon-TB Gold Plus    Collection Time: 08/13/20 10:57 AM    Specimen: Blood   Result Value Ref Range    MTB Quantiferon Result Negative NEG^Negative    TB1 Ag minus Nil 0.00 IU/mL    TB2 Ag minus Nil 0.00 IU/mL    Mitogen minus Nil 9.95 IU/mL    NIL Result 0.05 IU/mL   Comprehensive metabolic panel [LAB17]    Collection Time: 08/13/20 10:57 AM   Result Value Ref Range    Sodium 133 133 - 144 mmol/L    Potassium 4.2 3.4 - 5.3 mmol/L    Chloride 99 94 - 109 mmol/L    Carbon Dioxide 25 20 - 32 mmol/L    Anion Gap 10 3 - 14 mmol/L    Glucose 232 (H) 70 - 99 mg/dL    Urea Nitrogen 39 (H) 7 - 30 mg/dL    Creatinine 3.56 (H) 0.52 - 1.04 mg/dL    GFR Estimate 14 (L) >60 mL/min/[1.73_m2]    GFR Estimate If Black 16 (L) >60 mL/min/[1.73_m2]    Calcium 8.5 8.5 - 10.1 mg/dL    Bilirubin Total 0.5 0.2 - 1.3 mg/dL    Albumin 3.0 (L) 3.4 - 5.0 g/dL    Protein Total 7.0 6.8 - 8.8 g/dL    Alkaline Phosphatase 143 40 - 150 U/L    ALT 31 0 - 50 U/L    AST 28 0 - 45 U/L   Blood Group A Subtype [[id number]]    Collection Time: 08/13/20 10:57 AM   Result Value Ref Range    Antigen Type Canceled, Test credited     Blood Bank Comment       Patient not type A or AB. A subtyping not applicable. 8/13/20 LH   Antibody titer red cell [[id number]]    Collection Time: 08/13/20 10:57 AM   Result Value Ref Range    Antibody Titer       Anti A1: IgM >256, IgG >256  Anti B: IgM 16, IgG 64     ABO/Rh type and screen    Collection Time: 08/13/20  10:57 AM   Result Value Ref Range    ABO O     RH(D) Pos     Antibody Screen Neg     Test Valid Only At          [facility]    Specimen Expires 08/16/2020    ABO type [[id number]]    Collection Time: 08/13/20 11:04 AM   Result Value Ref Range    ABO O     RH(D) Pos     Specimen Expires 08/16/2020    UA with Microscopic reflex to Culture    Collection Time: 08/13/20 11:10 AM    Specimen: Midstream Urine   Result Value Ref Range    Color Urine Yellow     Appearance Urine Slightly Cloudy     Glucose Urine >499 (A) NEG^Negative mg/dL    Bilirubin Urine Negative NEG^Negative    Ketones Urine 5 (A) NEG^Negative mg/dL    Specific Gravity Urine 1.016 1.003 - 1.035    Blood Urine Small (A) NEG^Negative    pH Urine 5.0 5.0 - 7.0 pH    Protein Albumin Urine >499 (A) NEG^Negative mg/dL    Urobilinogen mg/dL 0.0 0.0 - 2.0 mg/dL    Nitrite Urine Negative NEG^Negative    Leukocyte Esterase Urine Negative NEG^Negative    Source Midstream Urine     WBC Urine 3 0 - 5 /HPF    RBC Urine 3 (H) 0 - 2 /HPF    Bacteria Urine Few (A) NEG^Negative /HPF    Squamous Epithelial /HPF Urine 2 (H) 0 - 1 /HPF    Mucous Urine Present (A) NEG^Negative /LPF    Hyaline Casts 7 (H) 0 - 2 /LPF       Patient was seen by myself, Dr. John Cruz, in conjunction with Emily Hernandes PA-C as part of a shared visit.    I personally reviewed past medical and surgical history, vital signs, medications and labs.  Present and past medical history, along with significant physical exam findings were all reviewed with SANDEEP.    My dyer findings:  Eden Hess is 49 year old, who presents for Kidney transplant evaluation.    Key management decisions made by me and discussed with SANDEEP:  1.  Transplant candidacy evaluation for simultaneous kidney and pancreas transplantation.  2.  Longstanding type 1 diabetes since age of 2.  3.  End-stage kidney disease is been on dialysis since April 2020.  4.  Coronary artery disease without  the need for intervention.  Patient will need formal cardiovascular risk stratification will start with a stress test and reviewing her images with 1 of our interventional cardiologist.  5.  Skin lesions in the perineum area that was not examined during our assessment but patient was encouraged to follow up with her GYN and a dermatologist for evaluation and management  6.  Age-appropriate cancer screening patient is up-to-date on the Pap smear and a mammogram but due for colonoscopy.  7.  Formal psychosocial assessment.  Discussion:  Overall Eden Hess is a delightful 49-year-old lady with longstanding diabetes who is interested in simultaneous kidney pancreas transplantation.  Patient appears to be a very reasonable candidate.  She will need to complete her work-up as delineated above.  Patient will be discussed during our multidisciplinary meeting for final candidacy decision.  Thank you for the consultation

## 2020-08-13 NOTE — PROGRESS NOTES
Summary    Team s concerns/comments: see provider consults     Candidacy category: YELLOW     Action/Plan: Continue evaluation     Expected Selection Meeting Discussion: 08-

## 2020-08-13 NOTE — NURSING NOTE
"Chief Complaint   Patient presents with     Consult     PKE EVAL     Blood pressure (!) 161/77, pulse 63, temperature 97.8  F (36.6  C), temperature source Oral, height 1.549 m (5' 1\"), weight 62.9 kg (138 lb 11.2 oz), SpO2 97 %, not currently breastfeeding.    Opal Pompa on 8/13/2020 at 7:44 AM    "

## 2020-08-13 NOTE — LETTER
8/13/2020         RE: Eden Hess  [address]  [place] [address]        Dear Colleague,    Thank you for referring your patient, Eden Hess, to the [facility]ID [facility]GAN TRANSPLANT. Please see a copy of my visit note below.    TRANSPLANT NEPHROLOGY RECIPIENT EVALUATION NOTE    Assessment and Plan:  # Kidney/Pancreas Transplant Evaluation: Patient is a good candidate overall. Benefits of a living donor transplant were discussed.    # ESKD from diabetes mellitus type 1: doing OK on dialysis since April 2020, but may benefit from a kidney transplant.    # DM Type 1: currently controlled with approximately 25 units insulin daily via pump and CGM. A1c 8.6% April 2020. Repeat pending today. Given evidence of end-organ damage, she may benefit from a pancreas transplant.    # Cardiac Risk: she will need risk assessment given mild non-obstructive CAD (angiogram February 2019), mild mitral regurgitation, HFpEF, and multiple longstanding risk factors.    # TIA 2017: will update carotid US.    # Vaginal Ulceration: thought likely lichen sclerosis and treated with topical steroids, although patient is unable to say if there has been improvement or not. Recommend follow up with OBGYN to ensure resolution.     # Malpositioned essure device: incidentally noted on recent imaging: recommend follow up with OBGYN.    # Depression: currently on venlafaxine. Appreciate social work input.    # Groundglass opacities: noted in left lung base on non-contrast abd/pelv CT. Patient asymptomatic at time of visit. Recommend follow up with PCP if symptoms develop.     # Health Maintenance: Colonoscopy: Not up to date, Mammogram: Up to date, PAP: Up to date and Dental: Up to date    Discussed the risks and benefits of a transplant, including the risk of surgery and immunosuppression medications.  Patient's overall evaluation will be discussed in the Transplant Program's regular meeting with a final recommendation on the  patients suitability for transplant to be made at that time.  Patient was seen in conjunction with Dr. John Cruz as part of a shared visit.    Evaluation:  Eden Hess was seen in consultation at the request of Dr. Yousif Gómez for evaluation as a potential kidney/pancreas transplant recipient.    Reason for Visit:  Eden Hess is a 49-year-old female with ESKD from diabetes mellitus type 1, who presents for kidney/pancreas transplant evaluation.    History of Present Illness:           Kidney Disease Hx: Type 1 diabetes since age 2, historically poorly controlled, and HTN since about 2015 with TIA in 2017 in setting of uncontrolled blood pressure. Elevated creatinine and proteinuria since at least 2017. MEERA episodes in the past few years in the setting of DKA and heart failure. No paraproteinemia identified. Renal imaging April 2020 right kidney 8.8 cm and left kidney 9.3 cm without hydro or mass. She initiated dialysis April 2020, which is going OK.        Kidney Disease Dx: Diabetic nephropathy       Biopsy Proven: No         On Dialysis: Yes, April 2020, HD       Primary Nephrologist: Dr. Parker       H/o Kidney Stones: No       H/o Recurrent/Frequent UTI: No         Diabetic Hx: Type 1        Diagnosis Date: age 2       Medication History: currently using approximately 25 units insulin daily via pump and CGM.        Diabetic Control: Borderline control (HbA1c 7-9%)   Last HbA1c: 8.6%       Hypoglycemic Unawareness: No       End-Organ Damage due to DM: Retinopathy, Nephropathy and Peripheral neuropathy          Cardiac/Vascular Disease Risk Factors:        - HFpEF       - Mitral regurgitation, mild as of late 2019 ECHO       - CAD: moderate OM1 disease on February 2019 coronary angiogram         Volume Status/Weight:        Volume status: Euvolemic       Weight:  Acceptable BMI       BMI: Body mass index is 26.21 kg/m .         Functional Capacity/Frailty:        She doesn't do anything in particular  for exercise, but is able to walk several blocks and go up and down stairs without chest pain, SOB, or claudication.      Fatigue/Decreased Energy: [x] No [] Yes    Chest Pain or SOB with Exertion: [x] No [] Yes    Significant Weight Change: [x] No [] Yes    Nausea, Vomiting or Diarrhea: [x] No [] Yes    Fever, Sweats or Chills:  [x] No [] Yes    Leg Swelling [x] No [] Yes        History of Cancer: None    Other Significant Medical Issues: None    Review of Systems:  A comprehensive review of systems was obtained and negative, except as noted in the HPI or PMH.    Past Medical History:   Medical record was reviewed and PMH was discussed with patient and noted below.  Past Medical History:   Diagnosis Date     (HFpEF) heart failure with preserved ejection fraction (H)      Anxiety and depression     [facility]ER[facility]     Diabetes mellitus type 1 (H)     Diagnosed at age 2 years old      End stage kidney disease (H)      Hypertension      Retinopathy      TIA (transient ischemic attack)        Past Social History:   Past Surgical History:   Procedure Laterality Date      SECTION      x2     CREATE FISTULA ARTERIOVENOUS UPPER EXTREMITY      dialysis port, fistula-R arm     Personal history of bleeding or anesthesia problems: No    Family History:  Family History   Problem Relation Age of Onset     Diabetes Type 1 Father      Hypertension Father      Cerebrovascular Disease Father        Personal History:   Social History     Socioeconomic History     Marital status:      Spouse name: Not on file     Number of children: Not on file     Years of education: Not on file     Highest education level: Not on file   Occupational History     Not on file   Social Needs     Financial resource strain: Not on file     Food insecurity     Worry: Not on file     Inability: Not on file     Transportation needs     Medical: Not on file     Non-medical: Not on file   Tobacco Use      Smoking status: Former Smoker     Types: Cigarettes     Last attempt to quit: 1997     Years since quittin.6     Smokeless tobacco: Never Used   Substance and Sexual Activity     Alcohol use: Yes     Alcohol/week: 0.0 standard drinks     Frequency: 4 or more times a week     Drinks per session: 1 or 2     Drug use: No     Sexual activity: Yes     Partners: Male     Birth control/protection: Male Surgical   Lifestyle     Physical activity     Days per week: Not on file     Minutes per session: Not on file     Stress: Not on file   Relationships     Social connections     Talks on phone: Not on file     Gets together: Not on file     Attends [employer] service: Not on file     Active member of club or organization: Not on file     Attends meetings of clubs or organizations: Not on file     Relationship status: Not on file     Intimate partner violence     Fear of current or ex partner: Not on file     Emotionally abused: Not on file     Physically abused: Not on file     Forced sexual activity: Not on file   Other Topics Concern     Parent/sibling w/ CABG, MI or angioplasty before 65F 55M? Not Asked   Social History Narrative     Not on file       Allergies:  Allergies   Allergen Reactions     Amlodipine      Other reaction(s): Edema,generalized       Medications:  Current Outpatient Medications   Medication Sig     ACCU-CHEK SMARTVIEW test strip      atorvastatin (LIPITOR) 40 MG tablet Take 40 mg by mouth     blood glucose monitoring (ACCU-CHEK FASTCLIX) lancets      bumetanide (BUMEX) 0.5 MG tablet Take 2 mg by mouth     calcium acetate (PHOSLO) 667 MG CAPS capsule Take 667 mg by mouth     cholecalciferol (VITAMIN D3) 125 mcg (5000 units) capsule Take by mouth daily     LANTUS VIAL 100 UNIT/ML soln 30 Units At Bedtime      metoprolol succinate ER (TOPROL-XL) 100 MG 24 hr tablet Take 100 mg by mouth     NOVOLOG VIAL 100 UNIT/ML soln      SM ASPIRIN ADULT LOW STRENGTH 81 MG EC tablet Take 81 mg by mouth  "daily      venlafaxine (EFFEXOR-XR) 150 MG 24 hr capsule Take 150 mg by mouth 2 times daily      allopurinol (ZYLOPRIM) 100 MG tablet daily      No current facility-administered medications for this visit.        Vitals:  BP (!) 161/77   Pulse 63   Temp 97.8  F (36.6  C) (Oral)   Ht 1.549 m (5' 1\")   Wt 62.9 kg (138 lb 11.2 oz)   SpO2 97%   BMI 26.21 kg/m      Exam:  GENERAL APPEARANCE: alert and no distress  HENT: mouth without ulcers or lesions  LYMPHATICS: no cervical or supraclavicular nodes  RESP: lungs clear to auscultation - no rales, rhonchi or wheezes  CV: regular rhythm, normal rate, no rub, no murmur  EDEMA: no LE edema bilaterally  ABDOMEN: soft, nondistended, nontender, bowel sounds normal  MS: extremities normal - no gross deformities noted, no evidence of inflammation in joints, no muscle tenderness  SKIN: no rash  RUE AVF    Results:   Recent Results (from the past 336 hour(s))   HCG quantitative pregnancy    Collection Time: 08/13/20 10:57 AM   Result Value Ref Range    HCG Quantitative Serum <1 0 - 5 IU/L   Hemoglobin A1c [LAB90]    Collection Time: 08/13/20 10:57 AM   Result Value Ref Range    Hemoglobin A1C 7.3 (H) 0 - 5.6 %   C-peptide [LVD342]    Collection Time: 08/13/20 10:57 AM   Result Value Ref Range    C Peptide <0.1 (L) 0.9 - 6.9 ng/mL   Treponema Abs w Reflex to RPR and Titer    Collection Time: 08/13/20 10:57 AM   Result Value Ref Range    Treponema Antibodies Nonreactive NR^Nonreactive   Varicella Zoster Virus Antibody IgG [[id number]]    Collection Time: 08/13/20 10:57 AM   Result Value Ref Range    Varicella Zoster Virus Antibody IgG 6.9 (H) 0.0 - 0.8 AI   HIV Antigen Antibody Combo Pretransplant    Collection Time: 08/13/20 10:57 AM   Result Value Ref Range    HIV Antigen Antibody Combo Pretransplant Nonreactive NR^Nonreactive   Hepatitis C antibody [VNL279]    Collection Time: 08/13/20 10:57 AM   Result Value Ref Range    Hepatitis C Antibody Nonreactive NR^Nonreactive "   Hepatitis B surface antigen [OQL174]    Collection Time: 08/13/20 10:57 AM   Result Value Ref Range    Hep B Surface Agn Nonreactive NR^Nonreactive   Hepatitis B Surface Antibody [[id number]]    Collection Time: 08/13/20 10:57 AM   Result Value Ref Range    Hepatitis B Surface Antibody >1,000.00 (H) <8.00 m[IU]/mL   Hepatitis B core antibody [[id number]]    Collection Time: 08/13/20 10:57 AM   Result Value Ref Range    Hepatitis B Core Dolores Nonreactive NR^Nonreactive   EBV Capsid Antibody IgG [[id number]]    Collection Time: 08/13/20 10:57 AM   Result Value Ref Range    EBV Capsid Antibody IgG >8.0 (H) 0.0 - 0.8 AI   CMV Antibody IgG [[id number]]    Collection Time: 08/13/20 10:57 AM   Result Value Ref Range    CMV Antibody IgG <0.2 0.0 - 0.8 AI   PRA Single Antigen IgG Antibody    Collection Time: 08/13/20 10:57 AM   Result Value Ref Range    SA1 Test Method SA FCS     SA1 Cell Class I     SA1 Hi Risk Dolores None     SA1 Mod Risk Dolores None     SA1 Comments        Test performed by modified procedure. Serum heat inactivated and tested   by a modified ([place]) protocol including fetal calf serum addition.   High-risk, mfi >3,000. Mod-risk, mfi 500-3,000.      SA2 Test Method SA FCS     SA2 Cell Class II     SA2 Hi Risk Dolores None     SA2 Mod Risk Dolores None     SA2 Comments        Test performed by modified procedure. Serum heat inactivated and tested   by a modified ([place]) protocol including fetal calf serum addition.   High-risk, mfi >3,000. Mod-risk, mfi 500-3,000.      Protocol Cutoff Plan A, 500 mfi cumulative      UNOS cPRA 0     Unacceptable Antigen None    HLA Typing Complete SOT Recipient    Collection Time: 08/13/20 10:57 AM   Result Value Ref Range    ABTest Method SSOP     A* locus A*03     A* A*32     B* locus B*07     B* B*39     C* locus C*07     Bw-1 Bw*6     Drsso Test Method SSOP     DRB1* locus DRB1*04     DRB1* DRB1*10     DRB4* locus DRB4*01     DQB1* locus DQB1*03(08)     DQB1* DQB1*05     DQA1*locus DQA1*01      DQA1* DQA1*03     DPB1* DPB1*02:01     DPB1* NMDP CDNZF     DPB1*locus DPB1*04:01     DPB1* locus NMDP CDNZH     DPA1* DPA1*01:03     DPA1* NMDP CCTGX    Thrombin time [HHU738]    Collection Time: 08/13/20 10:57 AM   Result Value Ref Range    Thrombin Time 16.5 13.0 - 19.0 sec   Partial thromboplastin time [LAB56]    Collection Time: 08/13/20 10:57 AM   Result Value Ref Range    PTT 28 22 - 37 sec   INR [[id number]]    Collection Time: 08/13/20 10:57 AM   Result Value Ref Range    INR 0.96 0.86 - 1.14   Lupus Anticoagulant Panel [[id number]]    Collection Time: 08/13/20 10:57 AM   Result Value Ref Range    Lupus Result Negative NEG^Negative   Cardiolipin Dolores IgG and IgM [LAB 6836]    Collection Time: 08/13/20 10:57 AM   Result Value Ref Range    Cardiolipin Antibody IgG <1.6 0.0 - 19.9 GPL-U/mL    Cardiolipin Antibody IgM <0.2 0.0 - 19.9 MPL-U/mL   Factor 2 and 5 mutation analysis    Collection Time: 08/13/20 10:57 AM   Result Value Ref Range    Copath Report       Patient Name: SILAS HULL  MR#: [id number]  Specimen #: [id number]  Collected: 8/13/2020 10:57  Received: 8/14/2020 09:02  Reported: 8/17/2020 16:32  Ordering Phy(s): HEMAL OCONNELL  Additional Phy(s): JEANETTE DOLAN  Copy To:  Keith    For improved result formatting, select 'View Enhanced Report Format' under   Linked Documents section.  _________________________________________    TEST(S) REQUESTED:  Factor 5 Leiden and Factor 2 by PCR    SPECIMEN DESCRIPTION:  Blood    METHODOLOGY:   The regions of genomic DNA containing the B9300Q Factor V   Leiden gene mutation (Factor V  Leiden) and the Factor 2(Prothrombin I34275T) gene mutation were   simultaneously amplified using the polymerase  chain reaction.  The amplified products were digested with restriction   endonuclease TaqI and products were  analyzed by gel electrophoresis.    RESULTS:    Factor V 1691G>A (Leiden)  RESULTS:  Mutation analyzed:     1691G>A  Factor V 1691G>A (Leiden)  Interpretation:       ABSENT  Factor V 1691G>A  (Leiden) mutation  genotype:      G/G    FACTOR 2/PROTHROMBIN RESULTS:  Mutation analyzed:     16789R>A  Factor 2 Mutation Interpretation:      ABSENT  Factor 2 Mutation genotype:      G/G    INTERPRETATION:  The patient is negative for the Factor V 1691G>A (Leiden) and negative for   the Factor 2 mutation.    COMMENTS:  If a patient is the recipient of an allogeneic bone marrow transplant,   this test must be done on a  pre-transplant sample or buccal swab.  A previous allogeneic bone marrow   transplant will interfere with test  results.  Call the [facility]([phone]) for   instructions on sample collection for these  patients.    This test was developed and its performance characteristics determined by   [facility]. It has not been cleared or approved by the FDA.   The laboratory is regulated under CLIA  as qualified to perform high-complexity testing. This test is used for   clinical purposes. It should not be  regarded  as investigational or for research.    A resident/fellow in an accredited training program was involved in the   selection of testing, review of  laboratory data, and/or interpretation of this case.  I, as the senior   physician, attest that I: (i) confirmed  appropriate testing, (ii) examined the relevant raw data for the   specimen(s); and (iii) rendered or confirmed  the interpretation(s).    Electronically Signed Out By:  TRA Gray    CPT Codes:  A: 53719-E8WULE, 20576-T7QMOK, -WZIUBF(2)    TESTING LAB LOCATION:  [address] [address] [phone]    COLLECTION SITE:  Client[facility]  [facility]  [facility]     CBC with platelets differential [[id number]]    Collection Time: 08/13/20 10:57 AM   Result Value Ref Range    WBC 5.9 4.0 - 11.0 10e9/L    RBC  Count 3.73 (L) 3.8 - 5.2 10e12/L    Hemoglobin 12.8 11.7 - 15.7 g/dL    Hematocrit 39.2 35.0 - 47.0 %     (H) 78 - 100 fl    MCH 34.3 (H) 26.5 - 33.0 pg    MCHC 32.7 31.5 - 36.5 g/dL    RDW 16.7 (H) 10.0 - 15.0 %    Platelet Count 296 150 - 450 10e9/L    Diff Method Automated Method     % Neutrophils 65.9 %    % Lymphocytes 15.9 %    % Monocytes 11.3 %    % Eosinophils 4.7 %    % Basophils 1.5 %    % Immature Granulocytes 0.7 %    Nucleated RBCs 0 0 /100    Absolute Neutrophil 3.9 1.6 - 8.3 10e9/L    Absolute Lymphocytes 0.9 0.8 - 5.3 10e9/L    Absolute Monocytes 0.7 0.0 - 1.3 10e9/L    Absolute Eosinophils 0.3 0.0 - 0.7 10e9/L    Absolute Basophils 0.1 0.0 - 0.2 10e9/L    Abs Immature Granulocytes 0.0 0 - 0.4 10e9/L    Absolute Nucleated RBC 0.0    Quantiferon-TB Gold Plus    Collection Time: 08/13/20 10:57 AM    Specimen: Blood   Result Value Ref Range    MTB Quantiferon Result Negative NEG^Negative    TB1 Ag minus Nil 0.00 IU/mL    TB2 Ag minus Nil 0.00 IU/mL    Mitogen minus Nil 9.95 IU/mL    NIL Result 0.05 IU/mL   Comprehensive metabolic panel [LAB17]    Collection Time: 08/13/20 10:57 AM   Result Value Ref Range    Sodium 133 133 - 144 mmol/L    Potassium 4.2 3.4 - 5.3 mmol/L    Chloride 99 94 - 109 mmol/L    Carbon Dioxide 25 20 - 32 mmol/L    Anion Gap 10 3 - 14 mmol/L    Glucose 232 (H) 70 - 99 mg/dL    Urea Nitrogen 39 (H) 7 - 30 mg/dL    Creatinine 3.56 (H) 0.52 - 1.04 mg/dL    GFR Estimate 14 (L) >60 mL/min/[1.73_m2]    GFR Estimate If Black 16 (L) >60 mL/min/[1.73_m2]    Calcium 8.5 8.5 - 10.1 mg/dL    Bilirubin Total 0.5 0.2 - 1.3 mg/dL    Albumin 3.0 (L) 3.4 - 5.0 g/dL    Protein Total 7.0 6.8 - 8.8 g/dL    Alkaline Phosphatase 143 40 - 150 U/L    ALT 31 0 - 50 U/L    AST 28 0 - 45 U/L   Blood Group A Subtype [[id number]]    Collection Time: 08/13/20 10:57 AM   Result Value Ref Range    Antigen Type Canceled, Test credited     Blood Bank Comment       Patient not type A or AB. A subtyping not  applicable. 8/13/20 LH   Antibody titer red cell [[id number]]    Collection Time: 08/13/20 10:57 AM   Result Value Ref Range    Antibody Titer       Anti A1: IgM >256, IgG >256  Anti B: IgM 16, IgG 64     ABO/Rh type and screen    Collection Time: 08/13/20 10:57 AM   Result Value Ref Range    ABO O     RH(D) Pos     Antibody Screen Neg     Test Valid Only At          [facility]    Specimen Expires 08/16/2020    ABO type [[id number]]    Collection Time: 08/13/20 11:04 AM   Result Value Ref Range    ABO O     RH(D) Pos     Specimen Expires 08/16/2020    UA with Microscopic reflex to Culture    Collection Time: 08/13/20 11:10 AM    Specimen: Midstream Urine   Result Value Ref Range    Color Urine Yellow     Appearance Urine Slightly Cloudy     Glucose Urine >499 (A) NEG^Negative mg/dL    Bilirubin Urine Negative NEG^Negative    Ketones Urine 5 (A) NEG^Negative mg/dL    Specific Gravity Urine 1.016 1.003 - 1.035    Blood Urine Small (A) NEG^Negative    pH Urine 5.0 5.0 - 7.0 pH    Protein Albumin Urine >499 (A) NEG^Negative mg/dL    Urobilinogen mg/dL 0.0 0.0 - 2.0 mg/dL    Nitrite Urine Negative NEG^Negative    Leukocyte Esterase Urine Negative NEG^Negative    Source Midstream Urine     WBC Urine 3 0 - 5 /HPF    RBC Urine 3 (H) 0 - 2 /HPF    Bacteria Urine Few (A) NEG^Negative /HPF    Squamous Epithelial /HPF Urine 2 (H) 0 - 1 /HPF    Mucous Urine Present (A) NEG^Negative /LPF    Hyaline Casts 7 (H) 0 - 2 /LPF       Patient was seen by myself, Dr. John Cruz, in conjunction with Emily Hernandes PA-C as part of a shared visit.    I personally reviewed past medical and surgical history, vital signs, medications and labs.  Present and past medical history, along with significant physical exam findings were all reviewed with SANDEEP.    My dyer findings:  Eden Hess is 49 year old, who presents for Kidney transplant evaluation.    Key management decisions made by me and discussed  with SANDEEP:  1.  Transplant candidacy evaluation for simultaneous kidney and pancreas transplantation.  2.  Longstanding type 1 diabetes since age of 2.  3.  End-stage kidney disease is been on dialysis since April 2020.  4.  Coronary artery disease without the need for intervention.  Patient will need formal cardiovascular risk stratification will start with a stress test and reviewing her images with 1 of our interventional cardiologist.  5.  Skin lesions in the perineum area that was not examined during our assessment but patient was encouraged to follow up with her GYN and a dermatologist for evaluation and management  6.  Age-appropriate cancer screening patient is up-to-date on the Pap smear and a mammogram but due for colonoscopy.  7.  Formal psychosocial assessment.  Discussion:  Overall Eden Hess is a delightful 49-year-old lady with longstanding diabetes who is interested in simultaneous kidney pancreas transplantation.  Patient appears to be a very reasonable candidate.  She will need to complete her work-up as delineated above.  Patient will be discussed during our multidisciplinary meeting for final candidacy decision.  Thank you for the consultation      Again, thank you for allowing me to participate in the care of your patient.        Sincerely,        PRANAV

## 2020-08-13 NOTE — PROGRESS NOTES
"Psychosocial Assessment For Kidney/Pancreas Transplantation  Patient Name/ Age: Eden \"Hermila\" Deshawn 49 year old   Medical Record #: [id number]  Duration of Interview: 30 min  Process:   Face-to-Face Interview                (counseling < 50%)   Present at Appointment: Eden and her  Gallo        : ATILIO Ferreira LICSW Date:  2020        Type of transplant: Kidney/Pancreas    Donor type:      Cadaver   Prior Transplants:    No Status of Transplant: N/A           Current Living Situation    Location:   [address] [place] [address]  [place] [facility] [address]  With Whom: lives with their family ( and 16 year old son)       Family/ Social Support:    Eden reported she has two adult children from a previous relationship and four adult stepchildren, all live in the [facility] area. She has three brothers and one sister. Two siblings live local, one lives in [place], [place] and she is not sure where one of her brothers lives at this time. She reported her parents are .    available, helpful   Committed Relationship: Eden is  to Gallo     Stable/Supportive   Other Supports: Gallo's parents and sister   available, helpful       Activities/ Functional Ability    Current Level: ambulatory, visually impaired (glasses) and independent with ADL's     Transportation drives self       Vocational/Employment/Financial     Employment   disabled   Job Description   Eden reported she applied for SSDI about 6 months ago and her case is currently under appeal. 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 Her  Gallo is employed full time.      Income   Spouse's Income    Insurance      At this time, patient can afford medication costs:  Yes  Private Insurance- BCBS through spouse employer       Medical Status    Current Mode of Treatment for ESRD Dialysis since 4/15/2020   Complications- Type 1 diabetes Eyes        Behavioral    Tobacco Use No Chemical Dependency No "   Eden denied any tobacco use.  Eden reported she has one alcoholic drink an evening, most evenings of the week. She denied any current or history of substance use or chemical dependency treatment.      Psychiatric Impairment Yes   Eden reported she is diagnosed with anxiety and depression. She is currently on medication for both, prescribed by a psychiatrist she sees every 6 months at [facility]. She reported she has seen a therapist in the past. Eden reported she feels her mental health is well managed at this time. Eden denied any other mental health concerns.     Reading Ability: Good  Education Level: Some College Recent Legal History No      Coping Style/Strategies: Listen to music, go for a walk        Ability to Adhere to Complex Medical Regime: Yes     Adherence History: Eden reported she follows her physician's recommendations, takes her medications as prescribed, and attends her appointments as scheduled.         Education  _X_ Medicare  _X_ Rehabilitation  _X_ Donor issues  _X_ Community resources  _X_ Post discharge housing  _X_ Financial resources  _X_ Medical insurance options  _X_ Psych adjustment  _X_ Family adjustment  _X_ Health Care Directive Provided Education, okay with  at this time   Psychosocial Risks of Transplant Reviewed and Discussed:  _X_ Increased stress related to emotional,            family, social, employment or financial           situation  _X_ Effect on work and/or disability benefits  _X_ Effect on future health and life           insurance  _X_ Transplant outcome expectations may           not be met  _X_ Mental Health Risks: anxiety,           depression, PTSD, guilt, grief and           chronic fatigue     Notable Items:   None noted.       Final Evaluation/Assessment   Patient seemed to process information well. Appeared well informed, motivated and able to follow post transplant requirements. Behavior was appropriate during  interview. Has adequate income and insurance coverage. Adequate social support. No major contraindications noted for transplant.  At this time patient appears to understand the risks and benefits of transplant.      Recommendation  Acceptable    Selection Criteria Met:  Plan for support Yes   Chemical Dependence Yes   Smoking Yes   Mental Health Yes   Adequate Finances Yes    Signature: ATILIO Ferreira [facility]   Title: Clinical

## 2020-08-13 NOTE — LETTER
2020         RE: Eden Hess  [address]  [place] [address]        Dear Colleague,    Thank you for referring your patient, Eden Hess, to the [facility] SOLID ORGAN TRANSPLANT. Please see a copy of my visit note below.    Transplant Surgery Consult Note    Medical record number: [id number]  YOB: 1970,   Consult requested by Dr. Parker for evaluation of kidney and pancreas transplant candidacy.    Assessment and Recommendations: Ms. Hess is a good candidate for transplantation and has a good understanding of the risks and benefits of this approach to management of renal failure and diabetes. The following issues should be addressed prior to transplant:     48 yo female with type 1   On about 40-50 units/d  A1C in the 7s  No hypo unawareness  ESRD on dialysis hemo since 2020  2 C sections  Failed Fallopian tube coils with  the right one retained outside of the tube   H/O CHF on medical management  Has mild MR, being observed  Good candidate for KP   Needs detailed cardiac assessment    Risks of the surgical procedure including but not limited to the rare risk of mortality discussed in detail. Patient verbalized good understanding and had several pertinent questions which were answered satisfactorily.     Immunosuppressive regimen, management and long term risks discussed in detail.           The majority of our visit was spent in counselling, discussing the medical and surgical risks of living or  donor kidney and pancreas transplantation, either in a simultaneous or sequential fashion. I contrasted approximate wait time for SPK vs living vs  donor kidneys from normal (0-85%) or higher (%) kidney donor profile index (KDPI) donors and their associated outcomes. I would not recommend this individual to consider kidneys from high KDPI donors. The reason for this decision is best summarized as: multi-organ transplant candidate.  Access to transplant will  be impacted by living donor availability and overall candidacy for SPK, as well as the influence of blood type and degree of sensitization. We discussed advantages of preemptive transplant as well as living donor kidney transplant, and graft and patient survival outcomes associated with these options. Potential surgical complications of kidney and pancreas transplantation include bleeding, clotting, infection, wound complications, anastomotic failure and other issues such as cardiac complications, pneumonia, deep venous thrombosis, pulmonary embolism, post transplant diabetes and death. We discussed the need for protocol biopsy of the kidney and the possible need for a ureteral stent (and subsequent removal). We discussed benefits and risks associated with different approaches to exocrine drainage of pancreatic secretions. We also discussed differences in the average length of stay, recovery process, and posttransplant lab and monitoring protocol. We discussed the risk of graft rejection and recurrent diabetic nephropathy in the setting of poor glycemic control. I emphasized the need for strict immunosuppression adherence and the potential for complications of immunosuppression such as skin cancer or lymphoma, as well as a very low but not zero risk of donor-derived disease transmission risks (infection, cancer). Ms. Hess asked good questions and the patient's candidacy will be reviewed at our Multidisciplinary Selection Committee. Thank you for the opportunity to participate in Ms. Hess's care.    Total time: 45 minutes  Counselling time: 35 minutes      ----------------------------------------------------    HPI: Ms. Hess has Type 1 Diabetes. The patient has had diabetes for 47 years. Management is by Lantus per diabetic educator  Humalog per diabetic educator. The patient usually checks her blood sugar multiple times/day. Complications of diabetes include:    Retinopathy:  No  Neuropathy: No  Gastroparesis:   No    The patient is on dialysis.    Has potential kidney donors:  Doesn't know .  Interested in participation in paired exchange if a donor is willing: Doesn't know     The patient has the following pertinent history:       No    Yes  Dialysis:    []      [x] via:       Blood Transfusion                  [x]      []  Number of units:   Most recently:  Pregnancy:    []      [x] Number: 2      Previous Transplant:  [x]      [] Details:    Cancer    [x]      [] Comment:   Kidney stones   [x]      [] Comment:      Recurrent infections  [x]      []  Type:                  Bladder dysfunction  [x]      [] Cause:    Claudication   [x]      [] Distance:    Previous Amputation  [x]      [] Cause:     Chronic anticoagulation  [x]      [] Indication:  Mandaen  [x]      []     Past Medical History:   Diagnosis Date     Cerebral infarction (H) 2017    [facility], [facility]     Congestive heart failure (H)      Depressive disorder     [facility], [facility]     Diabetes mellitus type 1 (H)     Diagnosed at age 2 years old      ESRD on dialysis (H)      Hypertension      Retinopathy      Vulvar ulceration 2020     Past Surgical History:   Procedure Laterality Date      SECTION      x2     VASCULAR SURGERY      dialysis port, fistula-R arm     Family History   Problem Relation Age of Onset     Diabetes Type 1 Father      Hypertension Father      Cerebrovascular Disease Father      Social History     Socioeconomic History     Marital status:      Spouse name: Not on file     Number of children: Not on file     Years of education: Not on file     Highest education level: Not on file   Occupational History     Not on file   Social Needs     Financial resource strain: Not on file     Food insecurity     Worry: Not on file     Inability: Not on file     Transportation needs     Medical: Not on file     Non-medical: Not on file   Tobacco Use     Smoking status: Former Smoker      Types: Cigarettes     Last attempt to quit: 1997     Years since quittin.6     Smokeless tobacco: Never Used   Substance and Sexual Activity     Alcohol use: Yes     Alcohol/week: 0.0 standard drinks     Frequency: 4 or more times a week     Drinks per session: 1 or 2     Drug use: No     Sexual activity: Yes     Partners: Male     Birth control/protection: Male Surgical   Lifestyle     Physical activity     Days per week: Not on file     Minutes per session: Not on file     Stress: Not on file   Relationships     Social connections     Talks on phone: Not on file     Gets together: Not on file     Attends Sikh service: Not on file     Active member of club or organization: Not on file     Attends meetings of clubs or organizations: Not on file     Relationship status: Not on file     Intimate partner violence     Fear of current or ex partner: Not on file     Emotionally abused: Not on file     Physically abused: Not on file     Forced sexual activity: Not on file   Other Topics Concern     Parent/sibling w/ CABG, MI or angioplasty before 65F 55M? Not Asked   Social History Narrative     Not on file       ROS:   CONSTITUTIONAL:  No fevers or chills  EYES: negative for icterus  ENT:  negative for hearing loss, tinnitus and sore throat  RESPIRATORY:  negative for cough, sputum, dyspnea  CARDIOVASCULAR:  negative for chest pain Fatigue  GASTROINTESTINAL:  negative for nausea, vomiting, diarrhea or constipation  GENITOURINARY:  negative for incontinence, dysuria, bladder emptying problems  HEME:  No easy bruising  INTEGUMENT:  negative for rash and pruritus  NEURO:  Negative for headache, seizure disorder    Allergies:   Allergies   Allergen Reactions     Amlodipine      Other reaction(s): Edema,generalized       Medications:  Prescription Medications as of 2020       Rx Number Disp Refills Start End Last Dispensed Date Next Fill Date Owning Pharmacy    Umthunzi test strip   0  2016        Class: Historical    atorvastatin (LIPITOR) 40 MG tablet    2019    [id number] [address] [facility]    Sig: Take 40 mg by mouth    Class: Historical    Route: Oral    blood glucose monitoring (ACCU-CHEK FASTCLIX) lancets   11 2016        Class: Historical    bumetanide (BUMEX) 0.5 MG tablet    12/3/2019    [facility] [address] [facility]    Sig: Take 2 mg by mouth    Class: Historical    Route: Oral    calcium acetate (PHOSLO) 667 MG CAPS capsule        [facility] [address] [facility]    Sig: Take 667 mg by mouth    Class: Historical    Route: Oral    cholecalciferol (VITAMIN D3) 125 mcg (5000 units) capsule        [facility] [address] [place] [address] [address] [facility]    Sig: Take by mouth daily    Class: Historical    Route: Oral    LANTUS VIAL 100 UNIT/ML soln   4 2016        Si Units At Bedtime     Class: Historical    metoprolol succinate ER (TOPROL-XL) 100 MG 24 hr tablet        [facility] [address] [facility]    Sig: Take 100 mg by mouth    Class: Historical    Route: Oral    NOVOLOG VIAL 100 UNIT/ML soln   1 2016        Class: Historical    SM ASPIRIN ADULT LOW STRENGTH 81 MG EC tablet   2 2016        Sig: Take 81 mg by mouth daily     Class: Historical    Route: Oral    venlafaxine (EFFEXOR-XR) 150 MG 24 hr capsule   3 2016        Sig: Take 150 mg by mouth 2 times daily     Class: Historical    Route: Oral    allopurinol (ZYLOPRIM) 100 MG tablet   4 2016        Sig: daily     Class: Historical          Exam:   Temp:  [97.8  F (36.6  C)] 97.8  F (36.6  C)  Pulse:  [63] 63  BP: (161)/(77) 161/77  SpO2:  [97 %] 97 %  Appearance: in no apparent distress.   Skin: normal  Head and Neck: Normal, no rashes or jaundice  Respiratory: easy respirations, no audible wheezing.  Cardiovascular: RRR  Abdomen: rounded, Surgical scars consistent  with history     Diagnostics:   No results found for this or any previous visit (from the past 672 hour(s)).  No results found for: CPRA    Again, thank you for allowing me to participate in the care of your patient.        Sincerely,        Yousif Gómez MD

## 2020-08-14 LAB
HBV CORE AB SERPL QL IA: NONREACTIVE
HBV SURFACE AB SERPL IA-ACNC: >1000 M[IU]/ML
HBV SURFACE AG SERPL QL IA: NONREACTIVE
HCV AB SERPL QL IA: NONREACTIVE
HIV 1+2 AB+HIV1 P24 AG SERPL QL IA: NONREACTIVE
THROMBIN TIME: 16.5 SEC (ref 13–19)
VZV IGG SER QL IA: 6.9 AI (ref 0–0.8)

## 2020-08-15 LAB — LA PPP-IMP: NEGATIVE

## 2020-08-16 LAB
GAMMA INTERFERON BACKGROUND BLD IA-ACNC: 0.05 IU/ML
M TB IFN-G CD4+ BCKGRND COR BLD-ACNC: 9.95 IU/ML
M TB TUBERC IFN-G BLD QL: NEGATIVE
MITOGEN IGNF BCKGRD COR BLD-ACNC: 0 IU/ML
MITOGEN IGNF BCKGRD COR BLD-ACNC: 0 IU/ML

## 2020-08-17 LAB
A* LOCUS: NORMAL
A*: NORMAL
ABTEST METHOD: NORMAL
B* LOCUS: NORMAL
B*: NORMAL
BW-1: NORMAL
C* LOCUS: NORMAL
COPATH REPORT: NORMAL
DPA1* NMDP: NORMAL
DPA1*: NORMAL
DPB1* LOCUS NMDP: NORMAL
DPB1* NMDP: NORMAL
DPB1*: NORMAL
DPB1*LOCUS: NORMAL
DQA1*: NORMAL
DQA1*LOCUS: NORMAL
DQB1* LOCUS: NORMAL
DQB1*: NORMAL
DRB1* LOCUS: NORMAL
DRB1*: NORMAL
DRB4* LOCUS: NORMAL
DRSSO TEST METHOD: NORMAL

## 2020-08-18 LAB
PROTOCOL CUTOFF: NORMAL
SA1 CELL: NORMAL
SA1 COMMENTS: NORMAL
SA1 HI RISK ABY: NORMAL
SA1 MOD RISK ABY: NORMAL
SA1 TEST METHOD: NORMAL
SA2 CELL: NORMAL
SA2 COMMENTS: NORMAL
SA2 HI RISK ABY UA: NORMAL
SA2 MOD RISK ABY: NORMAL
SA2 TEST METHOD: NORMAL
UNACCEPTABLE ANTIGEN: NORMAL
UNOS CPRA: 0

## 2020-08-19 PROBLEM — N76.6 VULVAR ULCERATION: Status: RESOLVED | Noted: 2020-07-29 | Resolved: 2020-08-19

## 2020-08-20 ENCOUNTER — TRANSFERRED RECORDS (OUTPATIENT)
Dept: HEALTH INFORMATION MANAGEMENT | Facility: CLINIC | Age: 50
End: 2020-08-20

## 2020-08-28 ENCOUNTER — TELEPHONE (OUTPATIENT)
Dept: TRANSPLANT | Facility: CLINIC | Age: 50
End: 2020-08-28

## 2020-08-28 DIAGNOSIS — Z01.818 PRE-TRANSPLANT EVALUATION FOR KIDNEY AND PANCREAS TRANSPLANT: Primary | ICD-10-CM

## 2020-08-28 NOTE — TELEPHONE ENCOUNTER
Contacted patient to review outcome of selection committee meeting (See selection committee encounter).  Informed patient of malpositioned essure device - pt stating she is aware that her essure device is malpositioned as it has been that way for years and is not causing problems so they are just leaving it as is.  I also informed  That she has subtle ground glass opacities in her left lung base which could represent early or resolving infection - advised her to see PCP if she has any symptoms. Pt denies symptoms today but will also proceed getting her chest x-ray done as part of her tacho;uation. Explained to patient that he/she needs to complete all components of the evaluation to be eligible for active status on the waiting list or to proceed with a live donor kidney transplant.   Reviewed next steps based on outcomes: pt will get ekg, echo, cxr done at [facility].  from our Office will call her to make appts for bilateral carotid US, aorto-iliac US, cardiologist appt. Pt to make appt with the assistance of PCP for colonoscopy. Pt will make appt for dental check up. Pt to have live donors register now to initiate their evaluations.   Patient will not be listed (patient is on dialysis and evaluation is not complete), patient will receive:    - An Evaluation Summary Letter indicating what is needed to complete evaluation-discussed with patient if they would like to have testing done with [facility] or locally  Confirmed with patient that on successful completion of outstanding components, patient is eligible for active status and they will receive a follow-up call.   Confirmed that patient has contact information for additional questions or concerns. Pt expressed excellent understanding of all and was in good agreement with the plan.   Generated letter today in Epic - routed to admin for mailing.

## 2020-08-28 NOTE — Clinical Note
Please see orders for bilateral carotid ultrasound, aorto iliac ultrasound and cardiologist appt.  Pt is scheduling cxr ,ekg and echo herself at [facility]. Pt is on dialysis. Thanks!  Kaitlyn

## 2020-08-28 NOTE — LETTER
08/28/20      Eden Hess  [address]  [place] [address]      Dear dEen,    It was a pleasure to see you recently for consideration of kidney and pancreas transplantation. Your pre-transplant evaluation results were reviewed at our Multidisciplinary Selection Committee on 08/19/2020. The Committee is requesting the following items are completed before determining your candidacy:    1. Chest x-ray, ekg and echocardiogram for pre-op assessment. These can be scheduled at [facility] if you wish. Please call [facility] to schedule at [phone].   2. Cardiologist appointment for assessment of your heart status and for a recommendation to proceed with a kidney/pancreas transplant surgery.   3. Bilateral carotid artery ultrasound to evaluate status due to your history of a TIA.   4. Aorto-iliac artery ultrasound to evaluate status for future kidney and pancreas transplant placements.   5. GYN appointment to confirm vaginal ulceration is healed. Also to confirm malposition of Essure device.   6. Health maintenance needs to remain up to date: colonoscopy, dental, PAP and mammogram. Please work with your primary care clinic to arrange for colonoscopy which is currently due. Please work with your dental clinic to arrange for a check-up if you have not had one in the last 12 months.   7. [facility] patient education review and signing of Receipt of Information and KDPI > 85 % forms.  Please let me know as soon as you have viewed the [facility] education. We can then complete all of this required review over the phone. [facility] is available at [email or web address].  The forms were sent to you in an email from Nyasia Garcia LPN in our Office.   8.  Please have the last 2 visit notes from your psychiatrist faxed to our Office at       fax # [phone].             Please call and make appointments at [facility] for item # 1.  A  will call you from  our Office to make appointments for # 2, 3 and 4.  Please follow with your current GYN provider for this appointment. Please work with your primary care provider for health maintenance appointment scheduling. Please work with your dental clinic for scheduling appointments there. Please call me when your [facility] education review is completed.     Upon successful completion of all the above items, your results will be reviewed at the Multidisciplinary Selection Committee for approval. Upon approval you will be eligible to be listed on ACTIVE status on the kidney and simultaneous kidney/pancreas transplant wait lists as well as eligible to proceed with a live donor kidney transplant. I will call you as well as send a letter with the outcome of the Committee review.     You have not been added onto the kidney or simultaneous kidney pancreas transplant wait lists at this time because you are already on dialysis. Upon listing in the future, your wait time will start with the first day of your chronic dialysis which is 04/15/2020.      Please have any potential live donors register now online with our Program to initiate their evaluations at [email or web address].  You will be notified by our Office in the event of an approved live donor.     For any questions, please contact myself at the Transplant Office at [phone].      Sincerely,      Kaitlyn Hurst RN BSN Transplant Coordinator   Solid Organ Transplant  [facility], [facility]    CC's: Dr. Kurt King, Michael WHITAKER, Katie Corinne McGrath PA, Dr. Varun Parker, [facility]

## 2020-09-09 NOTE — TELEPHONE ENCOUNTER
Action    Action Taken 9-9: requested 4 most recent EKGs from [facility]  9-9: requested echo 11-4-19 from [facility]  9-9: sent EKGs to scanning, resolved echo in PACS

## 2020-09-10 ENCOUNTER — VIRTUAL VISIT (OUTPATIENT)
Dept: CARDIOLOGY | Facility: CLINIC | Age: 50
End: 2020-09-10
Attending: INTERNAL MEDICINE
Payer: COMMERCIAL

## 2020-09-10 ENCOUNTER — PRE VISIT (OUTPATIENT)
Dept: CARDIOLOGY | Facility: CLINIC | Age: 50
End: 2020-09-10

## 2020-09-10 DIAGNOSIS — Z76.82 ORGAN TRANSPLANT CANDIDATE: Primary | ICD-10-CM

## 2020-09-10 DIAGNOSIS — Z01.810 PREOPERATIVE CARDIOVASCULAR EXAMINATION: ICD-10-CM

## 2020-09-10 DIAGNOSIS — I10 ESSENTIAL HYPERTENSION: ICD-10-CM

## 2020-09-10 PROCEDURE — 99203 OFFICE O/P NEW LOW 30 MIN: CPT | Mod: 95 | Performed by: INTERNAL MEDICINE

## 2020-09-10 NOTE — PROGRESS NOTES
"Eden Hess is a 50 year old female who is being evaluated via a billable video visit.      The patient has been notified of following:     \"This video visit will be conducted via a call between you and your physician/provider. We have found that certain health care needs can be provided without the need for an in-person physical exam.  This service lets us provide the care you need with a video conversation.  If a prescription is necessary we can send it directly to your pharmacy.  If lab work is needed we can place an order for that and you can then stop by our lab to have the test done at a later time.    Video visits are billed at different rates depending on your insurance coverage.  Please reach out to your insurance provider with any questions.    If during the course of the call the physician/provider feels a video visit is not appropriate, you will not be charged for this service.\"    Patient has given verbal consent for Video visit? Yes  How would you like to obtain your AVS? Mail a copy  If you are dropped from the video visit, the video invite should be resent to: Text to cell phone: [phone]  Will anyone else be joining your video visit? No        Video-Visit Details    Type of service:  Video Visit        "

## 2020-09-10 NOTE — LETTER
"9/10/2020      RE: Eden Hess  [address]  [place] [address]       Dear Colleague,    Thank you for the opportunity to participate in the care of your patient, Eden Hess, at the [facility] HEART [facility]. Please see a copy of my visit note below.    Eden Hess is a 50 year old female who is being evaluated via a billable video visit.      The patient has been notified of following:     \"This video visit will be conducted via a call between you and your physician/provider. We have found that certain health care needs can be provided without the need for an in-person physical exam.  This service lets us provide the care you need with a video conversation.  If a prescription is necessary we can send it directly to your pharmacy.  If lab work is needed we can place an order for that and you can then stop by our lab to have the test done at a later time.    Video visits are billed at different rates depending on your insurance coverage.  Please reach out to your insurance provider with any questions.    If during the course of the call the physician/provider feels a video visit is not appropriate, you will not be charged for this service.\"    Patient has given verbal consent for Video visit? Yes  How would you like to obtain your AVS? Mail a copy  If you are dropped from the video visit, the video invite should be resent to: Text to cell phone: [phone]  Will anyone else be joining your video visit? No        Video-Visit Details    Type of service:  Video Visit          error    Cardiology Clinic Video Virtual Visit    Eden Hess is a 50 year old female who is being evaluated via a billable video visit.      The patient has been notified of following:     \"This video visit will be conducted via a call between you and your physician/provider. We have found that certain health care needs can be provided without the need for an in-person " "physical exam.  This service lets us provide the care you need with a video conversation.  If a prescription is necessary we can send it directly to your pharmacy.  If lab work is needed we can place an order for that and you can then stop by our lab to have the test done at a later time.    If during the course of the call the physician/provider feels a video visit is not appropriate, you will not be charged for this service.\"     Physician has received verbal consent for a Video Visit from the patient? Yes    Patient would like the video invitation sent by: text    Video Start Time: 11:15 am    Video Stop Time: 11:35 am    Mode of Communication:  Doximity    Originating Location (pt. Location): Home    Distant Location (provider location): [facility]    HPI:  51 yo referred for cardiovascular evaluation in anticipation of kidney/pancreas transplant.  She has a h/o type 1 diabetes since age 4. Currently on hemodialysis. Had seen cardiology at [facility] in the past for moderate mitral regurgitation. Coronary angiogram 2/2019 showed no significant CAD. Repeat echo showed mild MR. Had prior hospitalizations for diastolic heart failure exacerbation in setting of hypertensive urgency. BP better since starting dialysis in April 2020. Some lightheadedness post dialysis upon standing but no h/o syncope.    She is , lives at home with her . No regular exercise but able to do all house hold activities, including grocery shopping, carry laundry, go up stairs, without dyspnea, chest discomfort, palpitations. She denies orthopnea, PND, has no lower extremity edema.    She took her BP just prior to our visit and it was 121/64.    Vitals  8/13/2020: /77, HR 63, BMI 26    PAST MEDICAL HISTORY:  Diabetes type I since age 2, has CGM and Medtronic insulin pump; retinopathy  Hypertension with h/o HFpEF exacerbation due to hypertensive urgency  ESRD since April 2020, hemodialysis MWF; still makes " urine. AV fistula right  TIA 2017    CURRENT MEDICATIONS:  Atorvastatin 40 every day  Bumex 2 mg  mg bid  Metoprolol succinate 100 every day  Aspirin 81 every day  Insulin  Effexor  Allopurinol      ALLERGIES:     Allergies   Allergen Reactions     Amlodipine      Other reaction(s): Edema,generalized       FAMILY HISTORY:  Family History   Problem Relation Age of Onset     Diabetes Type 1 Father      Hypertension Father      Cerebrovascular Disease Father        SOCIAL HISTORY:  Social History     Tobacco Use     Smoking status: Former Smoker     Types: Cigarettes     Last attempt to quit: 1997     Years since quittin.7     Smokeless tobacco: Never Used   Substance Use Topics     Alcohol use: Yes     Alcohol/week: 0.0 standard drinks     Frequency: 4 or more times a week     Drinks per session: 1 or 2     Drug use: No       ROS:  10 point ROS neg other than the symptoms noted above in the HPI.    Labs:  2020: K 4.2, cr 3.56, hgb 12, plt 296K, A1C 7.3    Testing/Procedures:  ECHOCARDIOGRAM: 2019 ([facility]): EF 60-65%, MARLENY 40, mild MR, normal RV and PA pressure    EK2020: sinus 80 bpm, normal intervals, nonspecific inferolateral ST depression; no change from 2020    Coronary angiogram 2019 ([facility]): moderate OM disease, otherwise normal    Exam:  The rest of a comprehensive physical examination is deferred due to public health emergency video visit restrictions.  CONSITUTIONAL: no acute distress  HEENT: no icterus, no redness or discharge, neck supple  CV: no visible edema of visualized extremities. No JVD.   RESPIRATORY: respirations nonlabored, no cough  NEURO: AA&Ox3, speech fluent/appropriate, motor grossly nonfocal  PSYCH: cooperative, affect appropriate  DERM: no rashes on visualized face/neck/upper extremities      Assessment and Plan:   1. Preoperative cardiovascular assessment. Risk factors include type 1 diabetes, hypertension, hyperlipidemia. Coronary angiogram  2/2019 with moderate OM disease. She has no symptoms at a moderate activity level. Reasonable to proceed with dobutamine stress echo prior to kidney/pancreas transplant.  2. H/o moderate mitral regurgitation. Echo less than a year ago showed mild MR.  3. Hypertension. Prior h/o HFpEF exacerbation with hypertensive urgency; BP much better now after dialysis, no recent hospitalizations.    In addition to video time documented above, I spent an additional 15 minutes on data review and documentation.    I have reviewed the note as documented above.  This accurately captures the substance of my virtual visit with the patient. The patient states understanding and is agreeable with the plan.     Moriah Harris MD  Cardiology        CC  HEMAL OCONNELL             Called and left  for patient with imaging scheduling number to schedule dobutamine stress echocardiogram: [phone].    Janna Posadas, SPENSERN, RN, PHN  Electrophysiology Nurse Coordinator

## 2020-09-10 NOTE — PROGRESS NOTES
"Cardiology Clinic Video Virtual Visit    Eden Hess is a 50 year old female who is being evaluated via a billable video visit.      The patient has been notified of following:     \"This video visit will be conducted via a call between you and your physician/provider. We have found that certain health care needs can be provided without the need for an in-person physical exam.  This service lets us provide the care you need with a video conversation.  If a prescription is necessary we can send it directly to your pharmacy.  If lab work is needed we can place an order for that and you can then stop by our lab to have the test done at a later time.    If during the course of the call the physician/provider feels a video visit is not appropriate, you will not be charged for this service.\"     Physician has received verbal consent for a Video Visit from the patient? Yes    Patient would like the video invitation sent by: text    Video Start Time: 11:15 am    Video Stop Time: 11:35 am    Mode of Communication:  Doximity    Originating Location (pt. Location): Home    Distant Location (provider location): [facility]    HPI:  49 yo referred for cardiovascular evaluation in anticipation of kidney/pancreas transplant.  She has a h/o type 1 diabetes since age 4. Currently on hemodialysis. Had seen cardiology at [facility] in the past for moderate mitral regurgitation. Coronary angiogram 2/2019 showed no significant CAD. Repeat echo showed mild MR. Had prior hospitalizations for diastolic heart failure exacerbation in setting of hypertensive urgency. BP better since starting dialysis in April 2020. Some lightheadedness post dialysis upon standing but no h/o syncope.    She is , lives at home with her . No regular exercise but able to do all house hold activities, including grocery shopping, carry laundry, go up stairs, without dyspnea, chest discomfort, palpitations. She denies orthopnea, PND, has " no lower extremity edema.    She took her BP just prior to our visit and it was 121/64.    Vitals  2020: /77, HR 63, BMI 26    PAST MEDICAL HISTORY:  Diabetes type I since age 2, has CGM and Medtronic insulin pump; retinopathy  Hypertension with h/o HFpEF exacerbation due to hypertensive urgency  ESRD since 2020, hemodialysis MWF; still makes urine. AV fistula right  TIA     CURRENT MEDICATIONS:  Atorvastatin 40 every day  Bumex 2 mg  mg bid  Metoprolol succinate 100 every day  Aspirin 81 every day  Insulin  Effexor  Allopurinol      ALLERGIES:     Allergies   Allergen Reactions     Amlodipine      Other reaction(s): Edema,generalized       FAMILY HISTORY:  Family History   Problem Relation Age of Onset     Diabetes Type 1 Father      Hypertension Father      Cerebrovascular Disease Father        SOCIAL HISTORY:  Social History     Tobacco Use     Smoking status: Former Smoker     Types: Cigarettes     Last attempt to quit: 1997     Years since quittin.7     Smokeless tobacco: Never Used   Substance Use Topics     Alcohol use: Yes     Alcohol/week: 0.0 standard drinks     Frequency: 4 or more times a week     Drinks per session: 1 or 2     Drug use: No       ROS:  10 point ROS neg other than the symptoms noted above in the HPI.    Labs:  2020: K 4.2, cr 3.56, hgb 12, plt 296K, A1C 7.3    Testing/Procedures:  ECHOCARDIOGRAM: 2019 ([facility]): EF 60-65%, MARLENY 40, mild MR, normal RV and PA pressure    EK2020: sinus 80 bpm, normal intervals, nonspecific inferolateral ST depression; no change from 2020    Coronary angiogram 2019 ([facility]): moderate OM disease, otherwise normal    Exam:  The rest of a comprehensive physical examination is deferred due to public health emergency video visit restrictions.  CONSITUTIONAL: no acute distress  HEENT: no icterus, no redness or discharge, neck supple  CV: no visible edema of visualized extremities. No JVD.   RESPIRATORY:  respirations nonlabored, no cough  NEURO: AA&Ox3, speech fluent/appropriate, motor grossly nonfocal  PSYCH: cooperative, affect appropriate  DERM: no rashes on visualized face/neck/upper extremities      Assessment and Plan:   1. Preoperative cardiovascular assessment. Risk factors include type 1 diabetes, hypertension, hyperlipidemia. Coronary angiogram 2/2019 with moderate OM disease. She has no symptoms at a moderate activity level. Reasonable to proceed with dobutamine stress echo prior to kidney/pancreas transplant.  2. H/o moderate mitral regurgitation. Echo less than a year ago showed mild MR.  3. Hypertension. Prior h/o HFpEF exacerbation with hypertensive urgency; BP much better now after dialysis, no recent hospitalizations.    In addition to video time documented above, I spent an additional 15 minutes on data review and documentation.    I have reviewed the note as documented above.  This accurately captures the substance of my virtual visit with the patient. The patient states understanding and is agreeable with the plan.     Moriah Harris MD  Cardiology        CC  HEMAL OCONNELL      Addendum:  Dobutamine stress echo 11/23/2020: baseline EF normal; only 70% MPHR achieved (to 118 bpm) - no ischemia.    Although target heart rate was unable to be achieved with maximum doses of dobutamine, at a heart rate of 118 bpm she had no symptoms, LV augmented appropriately, and there was no evidence of ischemia. Given that she had a normal coronary angiogram 2/2019, and that her current creatinine is > 3.0, I do not think repeat angiography is necessary at this time.    The patient is at moderate risk for cardiovascular events during surgery, mainly due to her diabetes and hypertension. No further cardiac testing required at this time to proceed with surgery.    Moriah Harris MD

## 2020-09-10 NOTE — PROGRESS NOTES
Called and left VM for patient with imaging scheduling number to schedule dobutamine stress echocardiogram: [phone].    Janna Posadas, SPENSERN, RN, PHN  Electrophysiology Nurse Coordinator

## 2020-09-10 NOTE — PATIENT INSTRUCTIONS
"You were evaluated today in the Cardiovascular Telemedicine Clinic at the [facility].     Cardiology Provider during your visit: Dr. Moriah Harris    Diagnosis:  Cardiovascular evaluation; hypertension    Results: discussed with patient    Orders:   None    Medication Changes:   None    Recommendations:   We will order a dobutamine stress test    Follow-up:   Pending results of stress test    Please follow up with primary care provider for medication refills         Please feel free to call me with any questions or concerns.       Janna Posadas RN     Questions and [phone].   First press #1 for the [employer] and then press #3 for \"Medical Questions\" to reach us Cardiology Nurses.      On Call Cardiologist for after hours or on weekends: [phone]   option #4 and ask to speak to the on-call Cardiologist.          If you need a medication refill please contact your pharmacy.  Please allow 3 business days for your refill to be completed.   "

## 2020-09-22 ENCOUNTER — ANCILLARY PROCEDURE (OUTPATIENT)
Dept: ULTRASOUND IMAGING | Facility: CLINIC | Age: 50
End: 2020-09-22
Attending: PHYSICIAN ASSISTANT
Payer: COMMERCIAL

## 2020-09-22 ENCOUNTER — ANCILLARY PROCEDURE (OUTPATIENT)
Dept: GENERAL RADIOLOGY | Facility: CLINIC | Age: 50
End: 2020-09-22
Attending: PHYSICIAN ASSISTANT
Payer: COMMERCIAL

## 2020-09-22 DIAGNOSIS — I10 ESSENTIAL HYPERTENSION: ICD-10-CM

## 2020-09-22 DIAGNOSIS — Z01.818 PRE-TRANSPLANT EVALUATION FOR KIDNEY AND PANCREAS TRANSPLANT: ICD-10-CM

## 2020-09-22 DIAGNOSIS — N18.6 ESRD (END STAGE RENAL DISEASE) (H): ICD-10-CM

## 2020-09-22 DIAGNOSIS — E10.9 TYPE 1 DIABETES MELLITUS (H): ICD-10-CM

## 2020-09-22 DIAGNOSIS — N18.6 ESRD ON DIALYSIS (H): ICD-10-CM

## 2020-09-22 DIAGNOSIS — H35.00 RETINOPATHY: ICD-10-CM

## 2020-09-22 DIAGNOSIS — I25.10 CARDIOVASCULAR DISEASE: ICD-10-CM

## 2020-09-22 DIAGNOSIS — Z99.2 ESRD ON DIALYSIS (H): ICD-10-CM

## 2020-09-22 DIAGNOSIS — Z01.818 ENCOUNTER FOR PRE-TRANSPLANT EVALUATION FOR KIDNEY AND PANCREAS TRANSPLANT: ICD-10-CM

## 2020-09-22 DIAGNOSIS — Z76.82 ORGAN TRANSPLANT CANDIDATE: ICD-10-CM

## 2020-09-22 DIAGNOSIS — N76.6 VULVAR ULCERATION: ICD-10-CM

## 2020-09-22 DIAGNOSIS — I50.9 CONGESTIVE HEART FAILURE (H): ICD-10-CM

## 2020-09-22 DIAGNOSIS — E10.9 DIABETES MELLITUS TYPE 1 (H): ICD-10-CM

## 2020-09-22 DIAGNOSIS — N18.6 END STAGE RENAL DISEASE (H): ICD-10-CM

## 2020-11-03 ENCOUNTER — TRANSFERRED RECORDS (OUTPATIENT)
Dept: MULTI SPECIALTY CLINIC | Facility: CLINIC | Age: 50
End: 2020-11-03

## 2020-11-23 ENCOUNTER — HOSPITAL ENCOUNTER (OUTPATIENT)
Dept: CARDIOLOGY | Facility: CLINIC | Age: 50
Discharge: HOME | End: 2020-11-23
Attending: INTERNAL MEDICINE | Admitting: INTERNAL MEDICINE
Payer: COMMERCIAL

## 2020-11-23 DIAGNOSIS — Z01.810 PREOPERATIVE CARDIOVASCULAR EXAMINATION: ICD-10-CM

## 2020-11-23 DIAGNOSIS — Z76.82 ORGAN TRANSPLANT CANDIDATE: ICD-10-CM

## 2020-11-23 DIAGNOSIS — I10 ESSENTIAL HYPERTENSION: ICD-10-CM

## 2020-11-23 PROCEDURE — 93016 CV STRESS TEST SUPVJ ONLY: CPT | Performed by: STUDENT IN AN ORGANIZED HEALTH CARE EDUCATION/TRAINING PROGRAM

## 2020-11-23 PROCEDURE — 93321 DOPPLER ECHO F-UP/LMTD STD: CPT | Mod: 26 | Performed by: STUDENT IN AN ORGANIZED HEALTH CARE EDUCATION/TRAINING PROGRAM

## 2020-11-23 PROCEDURE — 255N000002 HC RX 255 OP 636: Performed by: STUDENT IN AN ORGANIZED HEALTH CARE EDUCATION/TRAINING PROGRAM

## 2020-11-23 PROCEDURE — 93325 DOPPLER ECHO COLOR FLOW MAPG: CPT | Mod: 26 | Performed by: STUDENT IN AN ORGANIZED HEALTH CARE EDUCATION/TRAINING PROGRAM

## 2020-11-23 PROCEDURE — 93018 CV STRESS TEST I&R ONLY: CPT | Performed by: STUDENT IN AN ORGANIZED HEALTH CARE EDUCATION/TRAINING PROGRAM

## 2020-11-23 PROCEDURE — 93350 STRESS TTE ONLY: CPT | Mod: 26 | Performed by: STUDENT IN AN ORGANIZED HEALTH CARE EDUCATION/TRAINING PROGRAM

## 2020-11-23 PROCEDURE — 93350 STRESS TTE ONLY: CPT | Mod: TC

## 2020-11-23 PROCEDURE — 250N000011 HC RX IP 250 OP 636: Performed by: STUDENT IN AN ORGANIZED HEALTH CARE EDUCATION/TRAINING PROGRAM

## 2020-11-23 PROCEDURE — 250N000009 HC RX 250: Performed by: STUDENT IN AN ORGANIZED HEALTH CARE EDUCATION/TRAINING PROGRAM

## 2020-11-23 RX ORDER — DOBUTAMINE HYDROCHLORIDE 200 MG/100ML
10-50 INJECTION INTRAVENOUS CONTINUOUS
Status: SHIPPED | OUTPATIENT
Start: 2020-11-23 | End: 2020-11-23

## 2020-11-23 RX ORDER — ATROPINE SULFATE 0.4 MG/ML
.2-2 AMPUL (ML) INJECTION
Status: COMPLETED | OUTPATIENT
Start: 2020-11-23 | End: 2020-11-23

## 2020-11-23 RX ORDER — METOPROLOL TARTRATE 1 MG/ML
1-20 INJECTION, SOLUTION INTRAVENOUS
Status: ACTIVE | OUTPATIENT
Start: 2020-11-23 | End: 2020-11-23

## 2020-11-23 RX ADMIN — DOBUTAMINE HYDROCHLORIDE 10 MCG/KG/MIN: 200 INJECTION INTRAVENOUS at 15:37

## 2020-11-23 RX ADMIN — METOPROLOL TARTRATE 4 MG: 5 INJECTION INTRAVENOUS at 15:54

## 2020-11-23 RX ADMIN — PERFLUTREN 7 ML: 6.52 INJECTION, SUSPENSION INTRAVENOUS at 15:53

## 2020-11-23 RX ADMIN — ATROPINE SULFATE 2 MG: 0.4 INJECTION, SOLUTION INTRAMUSCULAR; INTRAVENOUS; SUBCUTANEOUS at 15:48

## 2020-11-23 NOTE — PROGRESS NOTES
Pt here for dobutamine stress test.  Test, meds and side effects reviewed with patient.  Did not achieve target HR due to max amount of medication given. Dobutamine was infusing at 50 mcg and a total of 2 mg IV atropine given.  Gave a total of 4 mg IV Metoprolol to bring HR back to baseline.  Post monitoring complete and VSS.  Pt escorted out to the gold waiting room.

## 2020-12-08 ENCOUNTER — TELEPHONE (OUTPATIENT)
Dept: TRANSPLANT | Facility: CLINIC | Age: 50
End: 2020-12-08

## 2020-12-13 ENCOUNTER — HEALTH MAINTENANCE LETTER (OUTPATIENT)
Age: 50
End: 2020-12-13

## 2021-02-12 ENCOUNTER — TELEPHONE (OUTPATIENT)
Dept: TRANSPLANT | Facility: CLINIC | Age: 51
End: 2021-02-12

## 2021-02-12 NOTE — TELEPHONE ENCOUNTER
Reviewed chart for completion of pre KP transplant evaluation. Remaining items are: need documentation that vulvar lesion is healed, dental work needs to be done. Will need to review abdom pelvic CT/aorto iliac artery US and cardiology clearance with Transplant Team. Need consents for KDPI > 85%, [facility] patient education review needs to be done.     Called patient today and reviewed pre tx eval status as above. Patient to make appts for GYN to check on lesion as well as dental checkup - pt instructed to call me when she is done with these and then can be reviewed by Transplant Team for approval. Patient expressed very good understanding of all and was in good agreement with the plan.

## 2021-02-21 ENCOUNTER — HEALTH MAINTENANCE LETTER (OUTPATIENT)
Age: 51
End: 2021-02-21

## 2021-06-06 ENCOUNTER — HEALTH MAINTENANCE LETTER (OUTPATIENT)
Age: 51
End: 2021-06-06

## 2021-07-29 ENCOUNTER — TELEPHONE (OUTPATIENT)
Dept: TRANSPLANT | Facility: CLINIC | Age: 51
End: 2021-07-29

## 2021-08-02 NOTE — TELEPHONE ENCOUNTER
Returned a call today to patient, reached her VM so MILEY I called and asked her to return my call.

## 2021-08-04 ENCOUNTER — DOCUMENTATION ONLY (OUTPATIENT)
Dept: TRANSPLANT | Facility: CLINIC | Age: 51
End: 2021-08-04

## 2021-08-04 NOTE — PROGRESS NOTES
Reviewed abd pelvic CT 08/13/2020, aorto iliac artery US 09/22/2020 and carotid US 09/20/2020 at the Image Review Meeting with Dr. Alexx Rader on 08/03/2021 - he said all imaging is okay to proceed with kidney/pancrea transplant surgery.

## 2021-09-26 ENCOUNTER — HEALTH MAINTENANCE LETTER (OUTPATIENT)
Age: 51
End: 2021-09-26

## 2021-10-01 ENCOUNTER — DOCUMENTATION ONLY (OUTPATIENT)
Dept: TRANSPLANT | Facility: CLINIC | Age: 51
End: 2021-10-01

## 2021-10-01 ENCOUNTER — TELEPHONE (OUTPATIENT)
Dept: TRANSPLANT | Facility: CLINIC | Age: 51
End: 2021-10-01

## 2021-10-01 DIAGNOSIS — Z76.82 AWAITING ORGAN TRANSPLANT: Primary | ICD-10-CM

## 2021-10-01 NOTE — LETTER
PHYSICIAN ORDER   ALA/PRA BLOOD    DATE & TIME ISSUED: 2021 1:47 PM  PATIENT NAME: Eden Hess   : 1970     [facility] MR#  [id number]     DIAGNOSIS/ICD-10 CODE: Awaiting Organ transplant [Z76.82}   EXPIRES: (1 YEAR AFTER DATE ISSUED)  EVERY 12 weeks / 3 months   1. Please draw 20ml of blood in red top (plain) tube for Antileukocyte Antibody (ALA or PRA).   2. Label tubes with the patient s name, complete lab slip.         3. Mailers, lab slips with instructions are sent to patient separately.      4. Call the Outreach Lab at [phone] to reorder mailers.       5. Mail blood to (this address is also on the mailers):    IMMUNOLOGY LABORATORY   [facility]   Room [phone] [address]  [address]    .

## 2021-10-01 NOTE — TELEPHONE ENCOUNTER
Called patient today and spoke with her at length. Reviewed [facility] education - see my other note. Pt signed consents for KDPI > 85% with a no response and Receipt of Info today. Pt stating her dental work is done now, instructed her to have dental clearance note sent to our Office. Explained I will ask Dr. Cruz what she needs for cardiology status - may need to update echo, will let her know. I will call dialysis to have PRA sample done. Explained once all of these items are done, will be reviewed at Multidisciplinary Selection Committee for approval. When approved will list on K, SPK. Reviewed her wait time on listing will be the same as her start date of chronic dialysis. Pt expressed very good undertanding of all and was in good agreement with the plan.     Staff message today to Dr. Cruz.     Called dialysis - requested PRA sample be drawn - faxed order to them.

## 2021-10-01 NOTE — PROGRESS NOTES
Kidney, Pancreas Transplant Evaluation - 8/13/2020  Eden Hess confirmed she has viewed pre K eval parts 1 and 2 videos on The [facility] website. Patient stating she does not have any questions at this time.   Content reviewed:    Living Donation and how to access that program    Paired exchange    Kidney Donor Profile Index (KDPI)    Waiting list issues (right to decline without penalty, high PHS risk donors, what to expect when called with an offer)    Hospital experience, length of stay, need to stay locally post-discharge (2-4 weeks)    Surgical options (with pictures)                             Post-surgery lifting and driving restrictions    Post-transplant routines, frequency of lab work and clinic visits    Need to stay locally post-discharge (2-4 weeks)    Role of Transplant Coordinator    Participants were informed of the benefits of transplant as well as potential risks such as infection, cancer, and death.  The need for total adherence with immunosuppression medications and following transplant regimens was stressed. The overall evaluation/approval/listing process was reviewed.        The patient was provided with the following documents:  What You Need to Know About a Kidney Transplant  Adult Kidney Transplant - A Guide for Patients  SRTR Data Sheet - Kidney  Brochure - Kidney Allocation  What You Need to Know About a Pancreas Transplant  Adult Pancreas Transplant-A Guide for Patients  SRTR Data Sheet - Pancreas  Brochure - Pancreas  SRTR Data Sheet - Kidney/Pancreas  Brochure - SPK  Brochure - Multiple Listing and Waiting Time Transfer  What Every Patient Needs to Know (UNOS)  UNOS Facts and Figures  Finding a Donor  [facility] - Quick Start Guide  KDPI Consent  Receipt of Information form    Patient expressed excellent understanding of all.

## 2021-10-09 ENCOUNTER — LAB (OUTPATIENT)
Dept: LAB | Facility: CLINIC | Age: 51
End: 2021-10-09
Payer: COMMERCIAL

## 2021-10-09 DIAGNOSIS — Z76.82 AWAITING ORGAN TRANSPLANT: ICD-10-CM

## 2021-10-13 ENCOUNTER — TELEPHONE (OUTPATIENT)
Dept: TRANSPLANT | Facility: CLINIC | Age: 51
End: 2021-10-13

## 2021-10-13 DIAGNOSIS — N18.6 ESRD ON DIALYSIS (H): ICD-10-CM

## 2021-10-13 DIAGNOSIS — Z01.818 PRE-TRANSPLANT EVALUATION FOR KIDNEY AND PANCREAS TRANSPLANT: Primary | ICD-10-CM

## 2021-10-13 DIAGNOSIS — E10.9 DIABETES MELLITUS TYPE 1 (H): ICD-10-CM

## 2021-10-13 DIAGNOSIS — Z99.2 ESRD ON DIALYSIS (H): ICD-10-CM

## 2021-10-13 NOTE — TELEPHONE ENCOUNTER
John Cruz MD Siers, Marci A RN  Awesome only echo then and repeat stress in 1-2 years or with symptoms      Called patient today, LM that a  will call her to make appt for echo and then just waiting for dental note.Instructed her to call me as soon as she is done with echocardiogram.

## 2021-10-14 ENCOUNTER — LAB (OUTPATIENT)
Dept: LAB | Facility: CLINIC | Age: 51
End: 2021-10-14
Payer: COMMERCIAL

## 2021-10-14 DIAGNOSIS — Z76.82 AWAITING ORGAN TRANSPLANT: ICD-10-CM

## 2021-10-14 PROCEDURE — 86832 HLA CLASS I HIGH DEFIN QUAL: CPT

## 2021-10-14 PROCEDURE — 36415 COLL VENOUS BLD VENIPUNCTURE: CPT

## 2021-10-14 PROCEDURE — 86833 HLA CLASS II HIGH DEFIN QUAL: CPT

## 2021-10-19 LAB
PROTOCOL CUTOFF: NORMAL
SA 1 CELL: NORMAL
SA 1 TEST METHOD: NORMAL
SA 2 CELL: NORMAL
SA 2 TEST METHOD: NORMAL
SA1 HI RISK ABY: NORMAL
SA1 MOD RISK ABY: NORMAL
SA2 HI RISK ABY: NORMAL
SA2 MOD RISK ABY: NORMAL
UNACCEPTABLE ANTIGENS: NORMAL
UNOS CPRA: 0
ZZZSA 1  COMMENTS: NORMAL
ZZZSA 2 COMMENTS: NORMAL

## 2021-11-03 ENCOUNTER — MEDICAL CORRESPONDENCE (OUTPATIENT)
Dept: HEALTH INFORMATION MANAGEMENT | Facility: CLINIC | Age: 51
End: 2021-11-03

## 2021-11-19 ENCOUNTER — ANCILLARY PROCEDURE (OUTPATIENT)
Dept: CARDIOLOGY | Facility: CLINIC | Age: 51
End: 2021-11-19
Attending: NURSE PRACTITIONER
Payer: COMMERCIAL

## 2021-11-19 DIAGNOSIS — Z99.2 ESRD ON DIALYSIS (H): ICD-10-CM

## 2021-11-19 DIAGNOSIS — E10.9 DIABETES MELLITUS TYPE 1 (H): ICD-10-CM

## 2021-11-19 DIAGNOSIS — Z01.818 PRE-TRANSPLANT EVALUATION FOR KIDNEY AND PANCREAS TRANSPLANT: ICD-10-CM

## 2021-11-19 DIAGNOSIS — N18.6 ESRD ON DIALYSIS (H): ICD-10-CM

## 2021-11-19 LAB — LVEF ECHO: NORMAL

## 2021-11-19 PROCEDURE — 93306 TTE W/DOPPLER COMPLETE: CPT | Performed by: INTERNAL MEDICINE

## 2022-01-13 PROCEDURE — 86832 HLA CLASS I HIGH DEFIN QUAL: CPT | Performed by: SURGERY

## 2022-01-13 PROCEDURE — 86833 HLA CLASS II HIGH DEFIN QUAL: CPT | Performed by: SURGERY

## 2022-01-16 ENCOUNTER — HEALTH MAINTENANCE LETTER (OUTPATIENT)
Age: 52
End: 2022-01-16

## 2022-01-20 ENCOUNTER — TELEPHONE (OUTPATIENT)
Dept: TRANSPLANT | Facility: CLINIC | Age: 52
End: 2022-01-20
Payer: COMMERCIAL

## 2022-01-20 NOTE — TELEPHONE ENCOUNTER
Returned a call today to patient and spoke with her. Explained I have reviewed her pre kidney pancreas transplant eval status and all appears to be completed. I will now review her status at next week's Selection Committee Meeting for decision if she can be listed on ACTIVE status on K, SPK waitlists. Explained I will call her afterwards with the outcome. Pt confirmed today she has remained in good health and has not been recently hospitalized for anything. Patient expressed very good understanding of all and was in good agreement with the plan.

## 2022-01-24 ENCOUNTER — TRANSFERRED RECORDS (OUTPATIENT)
Dept: HEALTH INFORMATION MANAGEMENT | Facility: CLINIC | Age: 52
End: 2022-01-24
Payer: COMMERCIAL

## 2022-02-03 ENCOUNTER — TELEPHONE (OUTPATIENT)
Dept: TRANSPLANT | Facility: CLINIC | Age: 52
End: 2022-02-03
Payer: COMMERCIAL

## 2022-02-03 DIAGNOSIS — N18.6 ESRD (END STAGE RENAL DISEASE) (H): ICD-10-CM

## 2022-02-03 DIAGNOSIS — E10.9 DIABETES MELLITUS TYPE 1 (H): ICD-10-CM

## 2022-02-03 DIAGNOSIS — Z01.818 PRE-TRANSPLANT EVALUATION FOR KIDNEY AND PANCREAS TRANSPLANT: Primary | ICD-10-CM

## 2022-02-03 DIAGNOSIS — Z76.82 AWAITING ORGAN TRANSPLANT: ICD-10-CM

## 2022-02-03 NOTE — LETTER
02/03/22        Eden Hess  [address]  [place] [address]        Dear Eden,    Your pre-transplant evaluation results were reviewed at our Multidisciplinary Selection Committee 01/26/2022. The Committee is requesting the following items are completed before determining your candidacy:    1. Return to clinic appointment for pre kidney pancreas transplant surgeon visit with Dr. Caden Felix. A  from our Office will call you to make this appointment.   2. Interventional cardiology at our Center to review the films of your coronary angiogram done at [facility] in 2019. I will arrange for this.     Your results from the above items will be reviewed at the Multidisciplinary Selection Committee Meeting for final approval. When approved, you will be eligible to be listed on ACTIVE status for kidney and simultaneous kidney pancreas transplant.     For any questions, please contact the Transplant Office Main Number at [phone] or at my Direct Number at [phone].      Sincerely,    Kaitlyn Hurst RN, BSN  Pre Kidney Pancreas Transplant Coordinator   [facility]  Solid Organ Transplant Care   [address] [place] [id number]  [email or web address]   Office: [phone] Direct Number: [phone]   Fax: [phone]  Employed by [employer]     CC's: Dr. Kurt King, Dr. Varun Parker, [facility] Dialysis

## 2022-02-03 NOTE — TELEPHONE ENCOUNTER
Contacted patient to review outcome of selection committee meeting (See selection committee encounter).   Explained to patient that he/she needs to complete all components of the evaluation to be eligible for active status on the waiting list or to proceed with a live donor kidney transplant.   Reviewed next steps based on outcomes:  to call patient to make a in person appt with Dr. Felix for RTC for pre KP tx surgical consult. I will have interventional cardiologist review cor angio when film arrives here - requested to be pushed on 01/27/2022. Pt expressed very good understanding of all.   Generated letter today in Epic - electronically routed to patient/providers.     Order into Press About Us for RTC surgeon appt with Dr. Felix.

## 2022-02-10 ENCOUNTER — TELEPHONE (OUTPATIENT)
Dept: TRANSPLANT | Facility: CLINIC | Age: 52
End: 2022-02-10

## 2022-02-10 NOTE — TELEPHONE ENCOUNTER
Provider Call:  Sondra Ortiz # [phone] from [facility]  Checking on status     Callback needed? Yes    Return Call Needed  Same as documented in contacts section  When to return call?: Greater than one day: Route standard priority

## 2022-02-14 NOTE — TELEPHONE ENCOUNTER
Returned call today to Sondra FOX at dialysis and LM on her VM that pt will need RTC with tx surgeon on 02/28/2022 and review of cor angiogram from 2019 to complete evaluation. Instructed to call me with further questions.

## 2022-02-28 ENCOUNTER — OFFICE VISIT (OUTPATIENT)
Dept: TRANSPLANT | Facility: CLINIC | Age: 52
End: 2022-02-28
Attending: SURGERY
Payer: COMMERCIAL

## 2022-02-28 VITALS
HEIGHT: 62 IN | OXYGEN SATURATION: 97 % | DIASTOLIC BLOOD PRESSURE: 81 MMHG | WEIGHT: 157.4 LBS | BODY MASS INDEX: 28.97 KG/M2 | HEART RATE: 90 BPM | SYSTOLIC BLOOD PRESSURE: 145 MMHG

## 2022-02-28 DIAGNOSIS — N18.6 ESRD (END STAGE RENAL DISEASE) (H): ICD-10-CM

## 2022-02-28 DIAGNOSIS — Z99.2 TYPE 1 DIABETES MELLITUS WITH CHRONIC KIDNEY DISEASE ON CHRONIC DIALYSIS (H): ICD-10-CM

## 2022-02-28 DIAGNOSIS — N18.6 TYPE 1 DIABETES MELLITUS WITH CHRONIC KIDNEY DISEASE ON CHRONIC DIALYSIS (H): ICD-10-CM

## 2022-02-28 DIAGNOSIS — E10.22 TYPE 1 DIABETES MELLITUS WITH CHRONIC KIDNEY DISEASE ON CHRONIC DIALYSIS (H): ICD-10-CM

## 2022-02-28 DIAGNOSIS — Z76.82 AWAITING ORGAN TRANSPLANT: ICD-10-CM

## 2022-02-28 DIAGNOSIS — Z01.818 PRE-TRANSPLANT EVALUATION FOR KIDNEY AND PANCREAS TRANSPLANT: ICD-10-CM

## 2022-02-28 PROCEDURE — 99203 OFFICE O/P NEW LOW 30 MIN: CPT | Performed by: SURGERY

## 2022-02-28 RX ORDER — DULAGLUTIDE 0.75 MG/.5ML
INJECTION, SOLUTION SUBCUTANEOUS
Status: ON HOLD | COMMUNITY
Start: 2022-02-16 | End: 2022-10-02

## 2022-02-28 RX ORDER — CARVEDILOL 25 MG/1
TABLET, FILM COATED ORAL
Status: ON HOLD | COMMUNITY
End: 2022-09-22

## 2022-02-28 RX ORDER — LACTOBACILLUS RHAMNOSUS GG 10B CELL
1 CAPSULE ORAL 2 TIMES DAILY
Status: ON HOLD | COMMUNITY
End: 2022-09-22

## 2022-02-28 RX ORDER — INSULIN PUMP CONTROLLER
1 EACH MISCELLANEOUS
COMMUNITY
Start: 2021-03-08 | End: 2022-11-04

## 2022-02-28 RX ORDER — MIDODRINE HYDROCHLORIDE 10 MG/1
TABLET ORAL
Status: ON HOLD | COMMUNITY
Start: 2021-11-15 | End: 2022-10-02

## 2022-02-28 RX ORDER — ONDANSETRON 4 MG/1
TABLET, ORALLY DISINTEGRATING ORAL
Status: ON HOLD | COMMUNITY
Start: 2021-05-21 | End: 2022-09-22

## 2022-02-28 NOTE — PROGRESS NOTES
Transplant Surgery Consult Note    Medical record number: [id number]  YOB: 1970,   Consult requested for evaluation of kidney and pancreas transplant candidacy.    Assessment and Recommendations: Ms. Hess is a good candidate for transplantation and has a good understanding of the risks and benefits of this approach to management of renal failure and diabetes. The following issues should be addressed prior to transplant:     Toe wound  GES  Cards clearance  Activate    She has a toe wound that she is managing right now. No spreading cellulitis or osteo. When that is healed (and below answered) we can make her active.    From a cards standpoint, she had angio in 2019 and noninvasive testing since then that has been OK. She told me she's starting to get hypotensive on dialysis and uses 10mg midodrine during her run, but is hypertensive on metoprolol otherwise. I think she needs updated noninvasive testing at a minimum, but I wouldn't mind a cath either (we can see what neph wants too).    She has some new gastroparesis type symptoms, though they also coincide with her starting dialysis. She has dry heaves a couple of mornings per week, but these pass and she is then able to keep meds down and eat and drink normally. I think it's worth getting a gastric emptying study but I wouldn't hold her up in committee or becoming active for that because it's not going to change anything right now- if she can eat/drink/take meds, she can get a transplant. The GES is more for prognosticating what's going to happen to this symptom for her in the future and since I'm not sure this is really gastroparesis. She also has some constipation and we need to keep her on bowel regimen following surgery.    Ms. Hess has End stage renal failure due to diabetes mellitus type 1 whose condition is not expected to resolve, is expected to progress, and is expected to continue to develop related comorbid conditions.  Cardiology  consult for cardiac risk stratification to be ordered: Yes  CT abdomen and pelvis without contrast to be ordered for assessment of vascular targets: Yes  Transplant listing labs ordered to include HLA, ABOx2, Cr, etc.  Dietician consult ordered: Yes  Social work consult ordered: Yes  Imaging reports reviewed:  n/a  Radiology images reviewed:n/a  Recipient suitable to move forward with work up of living donors:  Yes      The majority of our visit was spent in counselling, discussing the medical and surgical risks of living or  donor kidney and pancreas transplantation, either in a simultaneous or sequential fashion. I contrasted approximate wait time for SPK vs living vs  donor kidneys from normal (0-85%) or higher (%) kidney donor profile index (KDPI) donors and their associated outcomes. I would not recommend this individual to consider kidneys from high KDPI donors. The reason for this decision is best summarized as: multi-organ transplant candidate.  Access to transplant will be impacted by living donor availability and overall candidacy for SPK, as well as the influence of blood type and degree of sensitization. We discussed advantages of preemptive transplant as well as living donor kidney transplant, and graft and patient survival outcomes associated with these options. Potential surgical complications of kidney and pancreas transplantation include bleeding, clotting, infection, wound complications, anastomotic failure and other issues such as cardiac complications, pneumonia, deep venous thrombosis, pulmonary embolism, post transplant diabetes and death. We discussed the need for protocol biopsy of the kidney and the possible need for a ureteral stent (and subsequent removal). We discussed benefits and risks associated with different approaches to exocrine drainage of pancreatic secretions. We also discussed differences in the average length of stay, recovery process, and posttransplant  lab and monitoring protocol. We discussed the risk of graft rejection and recurrent diabetic nephropathy in the setting of poor glycemic control. I emphasized the need for strict immunosuppression adherence and the potential for complications of immunosuppression such as skin cancer or lymphoma, as well as a very low but not zero risk of donor-derived disease transmission risks (infection, cancer). Ms. Hess asked good questions and the patient's candidacy will be reviewed at our Multidisciplinary Selection Committee. Thank you for the opportunity to participate in Ms. Hess's care.    Total time: 30 minutes  Counselling time: 25 minutes      Caden Felix MD  ---------------------------------------------------------------------------------------------------    HPI: Ms. Hess has End stage renal failure due to diabetes mellitus type 1. The patient has had diabetes for 49 years. Management is by Lantus per diabetic educator, 40-50 units. The patient usually checks her blood sugar multiple times/day. Hypoglyemic unawareness is not an issue.  The diabetes is somewhat controlled.    Complications of diabetes include:    Retinopathy:  No  Neuropathy: No  Gastroparesis:  Potentially- see above    The patient is on dialysis.    Has potential kidney donors:  Doesn't know .  Interested in participation in paired exchange if a donor is willing: Doesn't know     The patient has the following pertinent history:       No    Yes  Dialysis:    []      [] via:       Blood Transfusion                  []      []  Number of units:   Most recently:  Pregnancy:    []      [] Number:       Previous Transplant:  []      [] Details:    Cancer    []      [] Comment:   Kidney stones   []      [] Comment:      Recurrent infections  []      []  Type:                  Bladder dysfunction  []      [] Cause:    Claudication   []      [] Distance:    Previous Amputation  []      [] Cause:     Chronic anticoagulation  []       [] Indication:  Hoahaoism  []      []     Past Medical History:   Diagnosis Date     (HFpEF) heart failure with preserved ejection fraction (H)      Anxiety and depression     [facility]     Diabetes mellitus type 1 (H)     Diagnosed at age 2 years old      End stage kidney disease (H)      Hypertension      Retinopathy      TIA (transient ischemic attack)      Past Surgical History:   Procedure Laterality Date      SECTION      x2     CREATE FISTULA ARTERIOVENOUS UPPER EXTREMITY      dialysis port, fistula-R arm     Family History   Problem Relation Age of Onset     Diabetes Type 1 Father      Hypertension Father      Cerebrovascular Disease Father      Social History     Socioeconomic History     Marital status:      Spouse name: Not on file     Number of children: Not on file     Years of education: Not on file     Highest education level: Not on file   Occupational History     Not on file   Tobacco Use     Smoking status: Former Smoker     Types: Cigarettes     Quit date: 1997     Years since quittin.6     Smokeless tobacco: Never Used   Substance and Sexual Activity     Alcohol use: Yes     Alcohol/week: 0.0 standard drinks     Drug use: No     Sexual activity: Yes     Partners: Male     Birth control/protection: Male Surgical   Other Topics Concern     Parent/sibling w/ CABG, MI or angioplasty before 65F 55M? Not Asked   Social History Narrative     Not on file     Social Determinants of Health     Financial Resource Strain: Not on file   Food Insecurity: Not on file   Transportation Needs: Not on file   Physical Activity: Not on file   Stress: Not on file   Social Connections: Not on file   Intimate Partner Violence: Not on file   Housing Stability: Not on file       ROS:   CONSTITUTIONAL:  No fevers or chills  EYES: negative for icterus  ENT:  negative for hearing loss, tinnitus and sore throat  RESPIRATORY:  negative for  cough, sputum, dyspnea  CARDIOVASCULAR:  negative for chest pain positive for lower extremity wounds/toe wounds  GASTROINTESTINAL:  negative for nausea, vomiting, diarrhea or constipation  GENITOURINARY:  negative for incontinence, dysuria, bladder emptying problems  HEME:  No easy bruising  INTEGUMENT:  negative for rash and pruritus  NEURO:  Negative for headache, seizure disorder    Allergies:   Allergies   Allergen Reactions     Amlodipine      Other reaction(s): Edema,generalized       Medications:  Prescription Medications as of 8/25/2022       Rx Number Disp Refills Start End Last Dispensed Date Next Fill Date Owning Pharmacy    ACCU-CHEK SMARTVIEW test strip   0 9/28/2016        Class: Historical    allopurinol (ZYLOPRIM) 100 MG tablet   4 11/23/2016        Sig: daily     Class: Historical    atorvastatin (LIPITOR) 40 MG tablet    11/6/2019    [facility] [address] [place] [address] [address] [facility]    Sig: Take 40 mg by mouth    Class: Historical    Route: Oral    blood glucose (CONTOUR TEST) test strip        [facility] [address] [place] [address] [address] [place] [address]    Sig: Use to test blood sugar PRN times daily or as directed.    Class: Historical    Route: In Vitro    blood [facility]   11 9/28/2016        Class: Historical    bumetanide (BUMEX) 2 MG tablet    12/3/2019    [facility] [address] [place] [address] [address] [facility]    Sig: Take 2 mg by mouth 2 times daily     Class: Historical    Route: Oral    calcium acetate (PHOSLO) 667 MG CAPS capsule        [facility] [address] [place] [address] [address] [facility]    Sig: Take 667 mg by mouth    Class: Historical    Route: Oral    cholecalciferol (VITAMIN D3) 125 mcg (5000 units) capsule        [facility] [address] [place] [address] [address] [place] [address]    Sig: Take by mouth daily    Class: Historical    Route: Oral    Glucagon 3 MG/DOSE POWD    5/18/2021    [facility] [address] [place] [address]  [address] [place] [address]    Sig: [place] 1 spray in nostril    Class: Historical    Route: Nasal    Insulin Disposable Pump (OMNIPOD DASH 5 PACK PODS) MISC    3/8/2021    [facility] [address] [place] [address] [address] [facility]    Sig: Inject 1 each Subcutaneous every 72 hours    Class: Historical    Route: Subcutaneous    lactobacillus rhamnosus, GG, (CULTURELL) capsule        [facility] [address] [place] [address] [address] [place] [address]    Sig: Take 1 capsule by mouth 2 times daily    Class: Historical    Route: Oral    LANTUS VIAL 100 UNIT/ML soln   4 2016        Si Units At Bedtime     Class: Historical    metoprolol succinate ER (TOPROL-XL) 100 MG 24 hr tablet        [facility] [address] [place] [address]    Sig: Take 100 mg by mouth    Class: Historical    Route: Oral    midodrine (PROAMATINE) 10 MG tablet    11/15/2021    [facility] [address] [place] [address] [address] [place] [address]    Sig: Take 1 tablet by mouth    Class: Historical    Route: Oral    NOVOLOG VIAL 100 UNIT/ML soln   1 2016        Class: Historical    ondansetron (ZOFRAN-ODT) 4 MG ODT tab    2021    [facility] [address] [place] [address]    Sig: Place 1 Tablet (4 mg) on the tongue every 6 hours if needed for Nausea/Vomiting for up to 8 doses.    Class: Historical    pyridOXINE (VITAMIN B6) 100 MG TABS    2021    [facility] [address] [place] [address]    Sig: Take 1 tablet by mouth daily    Class: Historical    Route: Oral    SM ASPIRIN ADULT LOW STRENGTH 81 MG EC tablet   2 2016        Sig: Take 81 mg by mouth daily     Class: Historical    Route: Oral    TRULICITY 0.75 MG/0.5ML pen    2022    [facility] [address] [place] [address]    Sig: Inject 0.75 mg under the skin every 7 (seven) days.    Class: Historical    venlafaxine (EFFEXOR-XR) 150 MG 24 hr capsule   3 2016        Sig: Take 150 mg by mouth 2 times daily      Class: Historical    Route: Oral          Exam:      Appearance: in no apparent distress.   Skin: normal, warm, dry  Head and Neck: Normal, no rashes or jaundice  Respiratory: easy respirations, no audible wheezing.  Cardiovascular: RRR  Abdomen: soft, rounded. No masses or hernia. Incisions c/w c section X2  Extremeties: femoral 2+/2+, Edema, none  Neuro: without deficit, cranial nerves intact     Diagnostics:   No results found for this or any previous visit (from the past 672 hour(s)).  UNOS cPRA   Date Value Ref Range Status   08/13/2020 0  Final

## 2022-02-28 NOTE — LETTER
2/28/2022         RE: Eden Hess  [address]  [place] [address]        Dear Colleague,    Thank you for referring your patient, Eden Hess, to the [facility]. Please see a copy of my visit note below.    Transplant Surgery Consult Note    Medical record number: [id number]  YOB: 1970,   Consult requested for evaluation of kidney and pancreas transplant candidacy.    Assessment and Recommendations: Ms. Hess is a good candidate for transplantation and has a good understanding of the risks and benefits of this approach to management of renal failure and diabetes. The following issues should be addressed prior to transplant:     Toe wound  GES  Cards clearance  Activate    She has a toe wound that she is managing right now. No spreading cellulitis or osteo. When that is healed (and below answered) we can make her active.    From a cards standpoint, she had angio in 2019 and noninvasive testing since then that has been OK. She told me she's starting to get hypotensive on dialysis and uses 10mg midodrine during her run, but is hypertensive on metoprolol otherwise. I think she needs updated noninvasive testing at a minimum, but I wouldn't mind a cath either (we can see what neph wants too).    She has some new gastroparesis type symptoms, though they also coincide with her starting dialysis. She has dry heaves a couple of mornings per week, but these pass and she is then able to keep meds down and eat and drink normally. I think it's worth getting a gastric emptying study but I wouldn't hold her up in committee or becoming active for that because it's not going to change anything right now- if she can eat/drink/take meds, she can get a transplant. The GES is more for prognosticating what's going to happen to this symptom for her in the future and since I'm not sure this is really gastroparesis. She also has some constipation and we need to keep her on  bowel regimen following surgery.    Ms. Hess has End stage renal failure due to diabetes mellitus type 1 whose condition is not expected to resolve, is expected to progress, and is expected to continue to develop related comorbid conditions.  Cardiology consult for cardiac risk stratification to be ordered: Yes  CT abdomen and pelvis without contrast to be ordered for assessment of vascular targets: Yes  Transplant listing labs ordered to include HLA, ABOx2, Cr, etc.  Dietician consult ordered: Yes  Social work consult ordered: Yes  Imaging reports reviewed:  n/a  Radiology images reviewed:n/a  Recipient suitable to move forward with work up of living donors:  Yes      The majority of our visit was spent in counselling, discussing the medical and surgical risks of living or  donor kidney and pancreas transplantation, either in a simultaneous or sequential fashion. I contrasted approximate wait time for SPK vs living vs  donor kidneys from normal (0-85%) or higher (%) kidney donor profile index (KDPI) donors and their associated outcomes. I would not recommend this individual to consider kidneys from high KDPI donors. The reason for this decision is best summarized as: multi-organ transplant candidate.  Access to transplant will be impacted by living donor availability and overall candidacy for SPK, as well as the influence of blood type and degree of sensitization. We discussed advantages of preemptive transplant as well as living donor kidney transplant, and graft and patient survival outcomes associated with these options. Potential surgical complications of kidney and pancreas transplantation include bleeding, clotting, infection, wound complications, anastomotic failure and other issues such as cardiac complications, pneumonia, deep venous thrombosis, pulmonary embolism, post transplant diabetes and death. We discussed the need for protocol biopsy of the kidney and the possible need for a  ureteral stent (and subsequent removal). We discussed benefits and risks associated with different approaches to exocrine drainage of pancreatic secretions. We also discussed differences in the average length of stay, recovery process, and posttransplant lab and monitoring protocol. We discussed the risk of graft rejection and recurrent diabetic nephropathy in the setting of poor glycemic control. I emphasized the need for strict immunosuppression adherence and the potential for complications of immunosuppression such as skin cancer or lymphoma, as well as a very low but not zero risk of donor-derived disease transmission risks (infection, cancer). Ms. Hess asked good questions and the patient's candidacy will be reviewed at our Multidisciplinary Selection Committee. Thank you for the opportunity to participate in Ms. Hess's care.    Total time: 30 minutes  Counselling time: 25 minutes      Caden Felix MD  ---------------------------------------------------------------------------------------------------    HPI: Ms. Hess has End stage renal failure due to diabetes mellitus type 1. The patient has had diabetes for 49 years. Management is by Lantus per diabetic educator, 40-50 units. The patient usually checks her blood sugar multiple times/day. Hypoglyemic unawareness is not an issue.  The diabetes is somewhat controlled.    Complications of diabetes include:    Retinopathy:  No  Neuropathy: No  Gastroparesis:  Potentially- see above    The patient is on dialysis.    Has potential kidney donors:  Doesn't know .  Interested in participation in paired exchange if a donor is willing: Doesn't know     The patient has the following pertinent history:       No    Yes  Dialysis:    []      [] via:       Blood Transfusion                  []      []  Number of units:   Most recently:  Pregnancy:    []      [] Number:       Previous Transplant:  []      [] Details:    Cancer    []      [] Comment:   Kidney  stones   []      [] Comment:      Recurrent infections  []      []  Type:                  Bladder dysfunction  []      [] Cause:    Claudication   []      [] Distance:    Previous Amputation  []      [] Cause:     Chronic anticoagulation  []      [] Indication:  Muslim  []      []     Past Medical History:   Diagnosis Date     (HFpEF) heart failure with preserved ejection fraction (H)      Anxiety and depression     [facility]     Diabetes mellitus type 1 (H)     Diagnosed at age 2 years old      End stage kidney disease (H)      Hypertension      Retinopathy      TIA (transient ischemic attack)      Past Surgical History:   Procedure Laterality Date      SECTION      x2     CREATE FISTULA ARTERIOVENOUS UPPER EXTREMITY      dialysis port, fistula-R arm     Family History   Problem Relation Age of Onset     Diabetes Type 1 Father      Hypertension Father      Cerebrovascular Disease Father      Social History     Socioeconomic History     Marital status:      Spouse name: Not on file     Number of children: Not on file     Years of education: Not on file     Highest education level: Not on file   Occupational History     Not on file   Tobacco Use     Smoking status: Former Smoker     Types: Cigarettes     Quit date: 1997     Years since quittin.6     Smokeless tobacco: Never Used   Substance and Sexual Activity     Alcohol use: Yes     Alcohol/week: 0.0 standard drinks     Drug use: No     Sexual activity: Yes     Partners: Male     Birth control/protection: Male Surgical   Other Topics Concern     Parent/sibling w/ CABG, MI or angioplasty before 65F 55M? Not Asked   Social History Narrative     Not on file     Social Determinants of Health     Financial Resource Strain: Not on file   Food Insecurity: Not on file   Transportation Needs: Not on file   Physical Activity: Not on file   Stress: Not on file   Social Connections: Not on  file   Intimate Partner Violence: Not on file   Housing Stability: Not on file       ROS:   CONSTITUTIONAL:  No fevers or chills  EYES: negative for icterus  ENT:  negative for hearing loss, tinnitus and sore throat  RESPIRATORY:  negative for cough, sputum, dyspnea  CARDIOVASCULAR:  negative for chest pain positive for lower extremity wounds/toe wounds  GASTROINTESTINAL:  negative for nausea, vomiting, diarrhea or constipation  GENITOURINARY:  negative for incontinence, dysuria, bladder emptying problems  HEME:  No easy bruising  INTEGUMENT:  negative for rash and pruritus  NEURO:  Negative for headache, seizure disorder    Allergies:   Allergies   Allergen Reactions     Amlodipine      Other reaction(s): Edema,generalized       Medications:  Prescription Medications as of 8/25/2022       Rx Number Disp Refills Start End Last Dispensed Date Next Fill Date Owning Pharmacy    ACCU-CHEK SMARTVIEW test strip   0 9/28/2016        Class: Historical    allopurinol (ZYLOPRIM) 100 MG tablet   4 11/23/2016        Sig: daily     Class: Historical    atorvastatin (LIPITOR) 40 MG tablet    11/6/2019    [facility] [address] [place] [address] [address] [facility]    Sig: Take 40 mg by mouth    Class: Historical    Route: Oral    blood glucose (CONTOUR TEST) test strip        [facility] [address] [place] [address] [address] [place] [address]    Sig: Use to test blood sugar PRN times daily or as directed.    Class: Historical    Route: In Vitro    blood [facility]   11 9/28/2016        Class: Historical    bumetanide (BUMEX) 2 MG tablet    12/3/2019    [facility] [address] [place] [address] [address] [facility]    Sig: Take 2 mg by mouth 2 times daily     Class: Historical    Route: Oral    calcium acetate (PHOSLO) 667 MG CAPS capsule        [facility] [address] [place] [address] [address] [facility]    Sig: Take 667 mg by mouth    Class: Historical    Route: Oral    cholecalciferol (VITAMIN D3)  125 mcg (5000 units) capsule        [facility] [address] [place] [address] [address] [facility]    Sig: Take by mouth daily    Class: Historical    Route: Oral    Glucagon 3 MG/DOSE POWD    2021    [facility] [address] [place] [address] [address] [facility]    Sig: [place] 1 spray in nostril    Class: Historical    Route: Nasal    Insulin Disposable Pump (OMNIPOD DASH 5 PACK PODS) MISC    3/8/2021    [facility] [address] [place] [address] [address] [place] [address]    Sig: Inject 1 each Subcutaneous every 72 hours    Class: Historical    Route: Subcutaneous    lactobacillus rhamnosus, GG, (CULTURELL) capsule        [facility] [address] [place] [address] [address] [place] [address]    Sig: Take 1 capsule by mouth 2 times daily    Class: Historical    Route: Oral    LANTUS VIAL 100 UNIT/ML soln   4 2016        Si Units At Bedtime     Class: Historical    metoprolol succinate ER (TOPROL-XL) 100 MG 24 hr tablet        [facility] [address] [place] [address] [address] [place] [address]    Sig: Take 100 mg by mouth    Class: Historical    Route: Oral    midodrine (PROAMATINE) 10 MG tablet    11/15/2021    [facility] [address] [place] [address] [address] [place] [address]    Sig: Take 1 tablet by mouth    Class: Historical    Route: Oral    NOVOLOG VIAL 100 UNIT/ML soln   1 2016        Class: Historical    ondansetron (ZOFRAN-ODT) 4 MG ODT tab    2021    [facility] [address] [place] [address] [address] [place] [address]    Sig: Place 1 Tablet (4 mg) on the tongue every 6 hours if needed for Nausea/Vomiting for up to 8 doses.    Class: Historical    pyridOXINE (VITAMIN B6) 100 MG TABS    2021    [facility] [address] [place] [address] [address] [place] [address]    Sig: Take 1 tablet by mouth daily    Class: Historical    Route: Oral    SM ASPIRIN ADULT LOW STRENGTH 81 MG EC tablet   2 2016        Sig: Take 81 mg by mouth daily     Class: Historical    Route: Oral    TRULICITY 0.75 MG/0.5ML pen    2022    [facility]  [id number] - [facility] [address] [facility]    Sig: Inject 0.75 mg under the skin every 7 (seven) days.    Class: Historical    venlafaxine (EFFEXOR-XR) 150 MG 24 hr capsule   3 12/27/2016        Sig: Take 150 mg by mouth 2 times daily     Class: Historical    Route: Oral          Exam:      Appearance: in no apparent distress.   Skin: normal, warm, dry  Head and Neck: Normal, no rashes or jaundice  Respiratory: easy respirations, no audible wheezing.  Cardiovascular: RRR  Abdomen: soft, rounded. No masses or hernia. Incisions c/w c section X2  Extremeties: femoral 2+/2+, Edema, none  Neuro: without deficit, cranial nerves intact     Diagnostics:   No results found for this or any previous visit (from the past 672 hour(s)).  UNOS cPRA   Date Value Ref Range Status   08/13/2020 0  Final           Again, thank you for allowing me to participate in the care of your patient.        Sincerely,        Caden Felix MD

## 2022-02-28 NOTE — NURSING NOTE
"Chief Complaint   Patient presents with     Consult     KP transplant Consult     Blood pressure (!) 145/81, pulse 90, height 1.575 m (5' 2\"), weight 71.4 kg (157 lb 6.4 oz), SpO2 97 %, not currently breastfeeding.    Opal Pompa on 2/28/2022 at 2:13 PM    "

## 2022-03-16 ENCOUNTER — TELEPHONE (OUTPATIENT)
Dept: TRANSPLANT | Facility: CLINIC | Age: 52
End: 2022-03-16
Payer: COMMERCIAL

## 2022-03-16 NOTE — TELEPHONE ENCOUNTER
Called patient today and spoke with her. Explained per Dr. Felix's consult 02/28/2022 she needs the following for pre KP eval:     1. Toe wound healed - currently has ulcer, RTC to podiatry around April 9th.   2. Gastric emptying study.   3. Cardiac status updated - getting tx nephr input at this moment to help determine most appropriate test.     Explained to pt a  from our Office will be calling her to schedule gastric emptying and cardiac testing. Instructed her to call me after she sees podiatry again. Pt expressed excellent understanding of all and was in good agreement with the plan.

## 2022-04-21 ENCOUNTER — LAB (OUTPATIENT)
Dept: LAB | Facility: CLINIC | Age: 52
End: 2022-04-21
Payer: COMMERCIAL

## 2022-04-21 DIAGNOSIS — Z76.82 AWAITING ORGAN TRANSPLANT: ICD-10-CM

## 2022-04-21 PROCEDURE — 86833 HLA CLASS II HIGH DEFIN QUAL: CPT

## 2022-04-21 PROCEDURE — 86832 HLA CLASS I HIGH DEFIN QUAL: CPT

## 2022-05-08 ENCOUNTER — HEALTH MAINTENANCE LETTER (OUTPATIENT)
Age: 52
End: 2022-05-08

## 2022-06-02 ENCOUNTER — TELEPHONE (OUTPATIENT)
Dept: TRANSPLANT | Facility: CLINIC | Age: 52
End: 2022-06-02

## 2022-06-02 NOTE — TELEPHONE ENCOUNTER
Spoke with pt today when she called, stated her foot wound is now healed. Reviewed her cardiology status needs updating and also needs gastric emptying study per Dr. Felix's consult on  02/28/2022. Explained will ask Dr. Felix what he wants for cards updating -  from our Office to call pt for both recommended cards appt and gastric emptying study ( need to place orders for both).

## 2022-06-20 ENCOUNTER — TELEPHONE (OUTPATIENT)
Dept: TRANSPLANT | Facility: CLINIC | Age: 52
End: 2022-06-20
Payer: MEDICARE

## 2022-06-20 NOTE — TELEPHONE ENCOUNTER
Called patient today to ask about her midodrine dosing. Pt explained at time she saw Dr. Felix 02/28/2022 she was taking midodrine 10 mg at start of every dialysis and then a month later added additional 10 mg during dialysis run as well. I explained to pt I will share this with Dr. Felix and get back to her with his response. Pt expressed very good understanding of all and was in good agreement with the plan.     Staff message today to Dr. Felix regarding midodrine dose as above.

## 2022-06-29 ENCOUNTER — TELEPHONE (OUTPATIENT)
Dept: TRANSPLANT | Facility: CLINIC | Age: 52
End: 2022-06-29

## 2022-06-29 DIAGNOSIS — E10.9 DIABETES MELLITUS TYPE 1 (H): ICD-10-CM

## 2022-06-29 DIAGNOSIS — N18.6 ESRD (END STAGE RENAL DISEASE) (H): ICD-10-CM

## 2022-06-29 DIAGNOSIS — Z76.82 AWAITING ORGAN TRANSPLANT: ICD-10-CM

## 2022-06-29 DIAGNOSIS — Z01.818 PRE-TRANSPLANT EVALUATION FOR KIDNEY AND PANCREAS TRANSPLANT: Primary | ICD-10-CM

## 2022-06-29 DIAGNOSIS — I10 ESSENTIAL (PRIMARY) HYPERTENSION: ICD-10-CM

## 2022-06-29 NOTE — TELEPHONE ENCOUNTER
Called patient today and explained that Dr. Caden Felix wants her to proceed with a dobutamine stress echo and gastric emptying study at this time for her pre kidney pancreas transplant evaluation. Explained a  will be calling her soon to make these appts. Instructed her to follow prep for each appt on My Chart once appts are scheduled. Instructed her to call me when appts are completed so Tx Team can review. Pt expressed excellent understanding of all and was in good agreement with the plan.     Orders into Epic today for dobutamine stress echo and gastric emptying study.

## 2022-07-03 ENCOUNTER — HEALTH MAINTENANCE LETTER (OUTPATIENT)
Age: 52
End: 2022-07-03

## 2022-07-14 ENCOUNTER — LAB (OUTPATIENT)
Dept: LAB | Facility: CLINIC | Age: 52
End: 2022-07-14

## 2022-07-14 DIAGNOSIS — Z76.82 AWAITING ORGAN TRANSPLANT: ICD-10-CM

## 2022-07-14 PROCEDURE — 86833 HLA CLASS II HIGH DEFIN QUAL: CPT | Performed by: SURGERY

## 2022-07-14 PROCEDURE — 86832 HLA CLASS I HIGH DEFIN QUAL: CPT | Performed by: SURGERY

## 2022-07-14 PROCEDURE — 36415 COLL VENOUS BLD VENIPUNCTURE: CPT

## 2022-07-15 ENCOUNTER — HOSPITAL ENCOUNTER (OUTPATIENT)
Dept: NUCLEAR MEDICINE | Facility: CLINIC | Age: 52
Setting detail: NUCLEAR MEDICINE
Discharge: HOME | End: 2022-07-15
Attending: SURGERY
Payer: COMMERCIAL

## 2022-07-15 ENCOUNTER — HOSPITAL ENCOUNTER (OUTPATIENT)
Dept: CARDIOLOGY | Facility: CLINIC | Age: 52
Discharge: HOME | End: 2022-07-15
Attending: SURGERY
Payer: COMMERCIAL

## 2022-07-15 DIAGNOSIS — E10.9 DIABETES MELLITUS TYPE 1 (H): ICD-10-CM

## 2022-07-15 DIAGNOSIS — Z01.818 PRE-TRANSPLANT EVALUATION FOR KIDNEY AND PANCREAS TRANSPLANT: ICD-10-CM

## 2022-07-15 DIAGNOSIS — Z76.82 AWAITING ORGAN TRANSPLANT: ICD-10-CM

## 2022-07-15 DIAGNOSIS — N18.6 ESRD (END STAGE RENAL DISEASE) (H): ICD-10-CM

## 2022-07-15 DIAGNOSIS — I10 ESSENTIAL (PRIMARY) HYPERTENSION: ICD-10-CM

## 2022-07-15 PROCEDURE — A9541 TC99M SULFUR COLLOID: HCPCS | Performed by: SURGERY

## 2022-07-15 PROCEDURE — 250N000009 HC RX 250: Performed by: INTERNAL MEDICINE

## 2022-07-15 PROCEDURE — G1010 CDSM STANSON: HCPCS | Mod: GC

## 2022-07-15 PROCEDURE — 93350 STRESS TTE ONLY: CPT | Mod: 26 | Performed by: INTERNAL MEDICINE

## 2022-07-15 PROCEDURE — 78264 GASTRIC EMPTYING IMG STUDY: CPT | Mod: 26

## 2022-07-15 PROCEDURE — 93321 DOPPLER ECHO F-UP/LMTD STD: CPT | Mod: 26 | Performed by: INTERNAL MEDICINE

## 2022-07-15 PROCEDURE — 250N000011 HC RX IP 250 OP 636: Performed by: INTERNAL MEDICINE

## 2022-07-15 PROCEDURE — 78264 GASTRIC EMPTYING IMG STUDY: CPT | Mod: MG

## 2022-07-15 PROCEDURE — 93018 CV STRESS TEST I&R ONLY: CPT | Performed by: INTERNAL MEDICINE

## 2022-07-15 PROCEDURE — 93016 CV STRESS TEST SUPVJ ONLY: CPT | Performed by: INTERNAL MEDICINE

## 2022-07-15 PROCEDURE — 255N000002 HC RX 255 OP 636: Performed by: INTERNAL MEDICINE

## 2022-07-15 PROCEDURE — C8928 TTE W OR W/O FOL W/CON,STRES: HCPCS

## 2022-07-15 PROCEDURE — 93325 DOPPLER ECHO COLOR FLOW MAPG: CPT | Mod: 26 | Performed by: INTERNAL MEDICINE

## 2022-07-15 PROCEDURE — 343N000001 HC RX 343: Performed by: SURGERY

## 2022-07-15 PROCEDURE — 93017 CV STRESS TEST TRACING ONLY: CPT

## 2022-07-15 RX ORDER — METOPROLOL TARTRATE 1 MG/ML
1-20 INJECTION, SOLUTION INTRAVENOUS
Status: ACTIVE | OUTPATIENT
Start: 2022-07-15 | End: 2022-07-15

## 2022-07-15 RX ORDER — DOBUTAMINE HYDROCHLORIDE 200 MG/100ML
10-50 INJECTION INTRAVENOUS CONTINUOUS
Status: SHIPPED | OUTPATIENT
Start: 2022-07-15 | End: 2022-07-15

## 2022-07-15 RX ORDER — ATROPINE SULFATE 0.4 MG/ML
.2-2 AMPUL (ML) INJECTION
Status: COMPLETED | OUTPATIENT
Start: 2022-07-15 | End: 2022-07-15

## 2022-07-15 RX ADMIN — PERFLUTREN 6 ML: 6.52 INJECTION, SUSPENSION INTRAVENOUS at 13:36

## 2022-07-15 RX ADMIN — ATROPINE SULFATE 1 MG: 0.4 INJECTION, SOLUTION INTRAMUSCULAR; INTRAVENOUS; SUBCUTANEOUS at 13:31

## 2022-07-15 RX ADMIN — METOPROLOL TARTRATE 5 MG: 5 INJECTION INTRAVENOUS at 13:36

## 2022-07-15 RX ADMIN — Medication 2 MILLICURIE: at 08:41

## 2022-07-15 RX ADMIN — DOBUTAMINE IN DEXTROSE 10 MCG/KG/MIN: 200 INJECTION, SOLUTION INTRAVENOUS at 13:21

## 2022-07-15 NOTE — PROGRESS NOTES
Pt here for dobutamine stress test.  Test, meds and side effects reviewed with patient.  Achieved target HR at 50 mcg Dobutamine and a total of 1 mg IV atropine (83%).  Gave a total of 5 mg IV Metoprolol to bring HR back to baseline.  Post monitoring complete and VSS.  Pt escorted out to the gold waiting room.

## 2022-08-01 ENCOUNTER — OFFICE VISIT (OUTPATIENT)
Dept: TRANSPLANT | Facility: CLINIC | Age: 52
End: 2022-08-01
Attending: SURGERY
Payer: COMMERCIAL

## 2022-08-01 VITALS
DIASTOLIC BLOOD PRESSURE: 72 MMHG | WEIGHT: 155 LBS | HEART RATE: 82 BPM | BODY MASS INDEX: 28.35 KG/M2 | SYSTOLIC BLOOD PRESSURE: 124 MMHG | OXYGEN SATURATION: 97 %

## 2022-08-01 DIAGNOSIS — Z76.82 AWAITING ORGAN TRANSPLANT: Primary | ICD-10-CM

## 2022-08-01 PROCEDURE — 99214 OFFICE O/P EST MOD 30 MIN: CPT | Performed by: SURGERY

## 2022-08-01 PROCEDURE — G0463 HOSPITAL OUTPT CLINIC VISIT: HCPCS | Performed by: SURGERY

## 2022-08-01 ASSESSMENT — PAIN SCALES - GENERAL: PAINLEVEL: MILD PAIN (2)

## 2022-08-01 NOTE — NURSING NOTE
Chief Complaint   Patient presents with     Pre-Op Exam     Talk about test results     Blood pressure 124/72, pulse 82, weight 70.3 kg (155 lb), SpO2 97 %, not currently breastfeeding.    Zhane Broderick, CMA

## 2022-08-01 NOTE — PROGRESS NOTES
Transplant Surgery Consult Note    Medical record number: [id number]  YOB: 1970,   Consult requested for evaluation of kidney and pancreas transplant candidacy.    Assessment and Recommendations: Ms. Hess is a good candidate for transplantation and has a good understanding of the risks and benefits of this approach to management of renal failure and diabetes. The following issues should be addressed prior to transplant:     GES- no gastroparesis. Nausea only occasionally after dialysis on non-dialysis days. Otherwise eating well    DSE- normal. Does have some fatigue when walking >4 blocks and walks slowly, but able to do without breaks. Still using midodrine during dialysis but needs antihypertensives on nondialysis days. Does not get light-headed or orthostatic but is more fatigued    Menorrhagia- fairly recent. Having increased duration and heaviness of periods. Undergoing GYN workup including US and SIS (small endometrial defect) and recently had biopsy. Will need to f/u on biopsy results. Having some ongoing anemia but trying to avoid transfusion. Pending findings and frequency of bleeds, could recommend ablation versus resection as needed if it would help avoid transfusion requirements. If getting to point of needing transfusion will need to start cellcept    Ms. Hess has End stage renal failure due to diabetes mellitus type 1 whose condition is not expected to resolve, is expected to progress, and is expected to continue to develop related comorbid conditions.  Cardiology consult for cardiac risk stratification to be ordered: Yes  CT abdomen and pelvis without contrast to be ordered for assessment of vascular targets: Yes  Transplant listing labs ordered to include HLA, ABOx2, Cr, etc.  Dietician consult ordered: Yes  Social work consult ordered: Yes  Imaging reports reviewed:  n/a  Radiology images reviewed:n/a  Recipient suitable to move forward with work up of living donors:  Yes      The  majority of our visit was spent in counselling, discussing the medical and surgical risks of living or  donor kidney and pancreas transplantation, either in a simultaneous or sequential fashion. I contrasted approximate wait time for SPK vs living vs  donor kidneys from normal (0-85%) or higher (%) kidney donor profile index (KDPI) donors and their associated outcomes. I would not recommend this individual to consider kidneys from high KDPI donors. The reason for this decision is best summarized as: multi-organ transplant candidate.  Access to transplant will be impacted by living donor availability and overall candidacy for SPK, as well as the influence of blood type and degree of sensitization. We discussed advantages of preemptive transplant as well as living donor kidney transplant, and graft and patient survival outcomes associated with these options. Potential surgical complications of kidney and pancreas transplantation include bleeding, clotting, infection, wound complications, anastomotic failure and other issues such as cardiac complications, pneumonia, deep venous thrombosis, pulmonary embolism, post transplant diabetes and death. We discussed the need for protocol biopsy of the kidney and the possible need for a ureteral stent (and subsequent removal). We discussed benefits and risks associated with different approaches to exocrine drainage of pancreatic secretions. We also discussed differences in the average length of stay, recovery process, and posttransplant lab and monitoring protocol. We discussed the risk of graft rejection and recurrent diabetic nephropathy in the setting of poor glycemic control. I emphasized the need for strict immunosuppression adherence and the potential for complications of immunosuppression such as skin cancer or lymphoma, as well as a very low but not zero risk of donor-derived disease transmission risks (infection, cancer). Ms. Hess asked good  questions and the patient's candidacy will be reviewed at our Multidisciplinary Selection Committee. Thank you for the opportunity to participate in Ms. Hess's care.    Total time: 30 minutes  Counselling time: 25 minutes      Caden Felix MD  ---------------------------------------------------------------------------------------------------    HPI: Ms. Hess has End stage renal failure due to diabetes mellitus type 1. The patient has had diabetes for 49 years. Management is by Lantus per diabetic educator, 40-50 units. The patient usually checks her blood sugar multiple times/day. Hypoglyemic unawareness is not an issue.  The diabetes is somewhat controlled.    Complications of diabetes include:    Retinopathy:  No  Neuropathy: No  Gastroparesis:  Potentially- see above    The patient is on dialysis.    Has potential kidney donors:  Doesn't know .  Interested in participation in paired exchange if a donor is willing: Doesn't know     The patient has the following pertinent history:       No    Yes  Dialysis:    []      [] via:       Blood Transfusion                  []      []  Number of units:   Most recently:  Pregnancy:    []      [] Number:       Previous Transplant:  []      [] Details:    Cancer    []      [] Comment:   Kidney stones   []      [] Comment:      Recurrent infections  []      []  Type:                  Bladder dysfunction  []      [] Cause:    Claudication   []      [] Distance:    Previous Amputation  []      [] Cause:     Chronic anticoagulation  []      [] Indication:  Muslim  []      []     Past Medical History:   Diagnosis Date     (HFpEF) heart failure with preserved ejection fraction (H)      Anxiety and depression     [facility] [place] [facility]     Diabetes mellitus type 1 (H)     Diagnosed at age 2 years old      End stage kidney disease (H)      Hypertension      Retinopathy      TIA (transient ischemic attack) 2017     Past Surgical  History:   Procedure Laterality Date     BENCH KIDNEY Left 2022    Procedure: Bench kidney;  Surgeon: Yousif Góemz MD;  Location: [facility] OR     BENCH PANCREAS N/A 2022    Procedure: Bench pancreas;  Surgeon: Yousif Gómez MD;  Location: [facility] OR      SECTION      x2     CREATE FISTULA ARTERIOVENOUS UPPER EXTREMITY      dialysis port, fistula-R arm     TRANSPLANT PANCREAS, KIDNEY  DONOR, COMBINED N/A 2022    Procedure: TRANSPLANT, KIDNEY AND PANCREAS,  DONOR;  Surgeon: Yousif Gómez MD;  Location: [facility] OR     Family History   Problem Relation Age of Onset     Diabetes Type 1 Father      Hypertension Father      Cerebrovascular Disease Father      Social History     Socioeconomic History     Marital status:      Spouse name: Not on file     Number of children: Not on file     Years of education: Not on file     Highest education level: Not on file   Occupational History     Not on file   Tobacco Use     Smoking status: Former     Types: Cigarettes     Quit date: 1997     Years since quittin.4     Smokeless tobacco: Never   Vaping Use     Vaping status: Not on file   Substance and Sexual Activity     Alcohol use: Yes     Alcohol/week: 0.0 standard drinks of alcohol     Drug use: No     Sexual activity: Yes     Partners: Male     Birth control/protection: Male Surgical   Other Topics Concern     Parent/sibling w/ CABG, MI or angioplasty before 65F 55M? Not Asked   Social History Narrative     Not on file     Social Determinants of Health     Financial Resource Strain: Not on file   Food Insecurity: Not on file   Transportation Needs: Not on file   Physical Activity: Not on file   Stress: Not on file   Social Connections: Not on file   Intimate Partner Violence: Not on file   Housing Stability: Not on file       ROS:   CONSTITUTIONAL:  No fevers or chills  EYES: negative for icterus  ENT:  negative for hearing loss, tinnitus and sore throat  RESPIRATORY:   negative for cough, sputum, dyspnea  CARDIOVASCULAR:  negative for chest pain positive for lower extremity wounds/toe wounds  GASTROINTESTINAL:  negative for nausea, vomiting, diarrhea or constipation  GENITOURINARY:  negative for incontinence, dysuria, bladder emptying problems  HEME:  No easy bruising  INTEGUMENT:  negative for rash and pruritus  NEURO:  Negative for headache, seizure disorder    Allergies:   Allergies   Allergen Reactions     Amlodipine      Other reaction(s): Edema,generalized       Medications:  Prescription Medications as of 5/22/2023       Rx Number Disp Refills Start End Last Dispensed Date Next Fill Date Owning Pharmacy    aspirin (ASA) 81 MG EC tablet  90 tablet 3 4/4/2023 7/3/2023   [id number] [address]    Sig: Take 1 tablet (81 mg) by mouth daily for 90 days    Class: E-Prescribe    Route: Oral    atorvastatin (LIPITOR) 20 MG tablet  30 tablet 11 10/2/2022    [address]    Sig: Take 1 tablet (20 mg) by mouth every evening    Class: E-Prescribe    Route: Oral    Renewals     Renewal requests to authorizing provider (Sanam Renee APRN CNP) <b>prohibited</b>          calcium carbonate-vitamin D (OS-BARBARA WITH D) 500-200 MG-UNIT tablet  60 tablet 11 10/2/2022    [address]    Sig: Take 1 tablet by mouth 2 times daily (with meals)    Class: E-Prescribe    Route: Oral    Renewals     Renewal requests to authorizing provider (Sanam Renee APRN CNP) <b>prohibited</b>          carvedilol (COREG) 12.5 MG tablet  60 tablet 11 10/2/2022    [address]    Sig: Take 1 tablet (12.5 mg) by mouth 2 times daily (with meals)    Class: E-Prescribe    Route: Oral    Renewals     Renewal requests to authorizing provider (Sanam Renee APRN CNP) <b>prohibited</b>           FIBER ADULT GUMMIES PO            Sig: Take 1 Gum by mouth daily    Class: Historical    Route: Oral    mycophenolic acid (GENERIC EQUIVALENT) 180 MG EC tablet  180 tablet 11 3/24/2023    [facility] [place] [id number]    Sig: Take 3 tablets (540 mg) by mouth 2 times daily    Class: E-Prescribe    Notes to Pharmacy: TXP DT 9/23/2022 (Kidney / Pancreas) TXP Dischg DT 10/2/2022 DX Kidney replaced by transplant Z94.0 [facility] ([place], [place])    Route: Oral    sulfamethoxazole-trimethoprim (BACTRIM) 400-80 MG tablet  45 tablet 3 4/26/2023    [facility] [place] [address]    Sig: Take 1 tablet by mouth three times a week    Class: E-Prescribe    Route: Oral    tacrolimus (GENERIC EQUIVALENT) 0.5 MG capsule  270 capsule 3 4/26/2023    [facility] [place] [address]    Sig: Take 2 capsules (1 mg) by mouth every morning AND 1 capsule (0.5 mg) every evening.    Class: E-Prescribe    Notes to Pharmacy: TXP DT 9/23/2022 (Kidney / Pancreas) TXP Dischg DT 10/2/2022 DX Kidney replaced by transplant Z94.0 [facility] ([place], [place])    Route: Oral    venlafaxine (EFFEXOR XR) 150 MG 24 hr capsule  30 capsule 0 10/4/2022    [facility] [place], [place] - [phone]    Sig: Take 1 capsule (150 mg) by mouth daily    Class: E-Prescribe    Route: Oral          Exam:      Appearance: in no apparent distress.   Skin: normal, warm, dry  Head and Neck: Normal, no rashes or jaundice  Respiratory: easy respirations, no audible wheezing.  Cardiovascular: RRR  Abdomen: soft, rounded. No masses or hernia. Incisions c/w c section X2  Extremeties: femoral 2+/2+, Edema, none  Neuro: without deficit, cranial nerves intact     Diagnostics:   Recent Results (from the past 672 hour(s))   T cell  subset profile    Collection Time: 04/25/23 10:58 AM   Result Value Ref Range    CD3% Total T Cells 50 49 - 84 %    Absolute CD3, Total T Cells 237 (L) 603 - 2,990 cells/uL    CD4% [place] T Cells 31 28 - 63 %    Absolute CD4, [place] T Cells 147 (L) 441 - 2,156 cells/uL    CD8% Suppressor T Cells 18 10 - 40 %    Absolute CD8, Suppressor T Cells 83 (L) 125 - 1,312 cells/uL    CD4:CD8 Ratio 1.77 1.40 - 2.60    T Cell Comment     Glucose 6 phosphate dehydrogenase    Collection Time: 04/25/23 10:58 AM   Result Value Ref Range    Glucose-6-PO4 Dehydrogenase 16.3 9.9 - 16.6 U/g Hb   Basic metabolic panel    Collection Time: 04/25/23 10:58 AM   Result Value Ref Range    Sodium 138 136 - 145 mmol/L    Potassium 4.4 3.4 - 5.3 mmol/L    Chloride 103 98 - 107 mmol/L    Carbon Dioxide (CO2) 23 22 - 29 mmol/L    Anion Gap 12 7 - 15 mmol/L    Urea Nitrogen 25.6 (H) 6.0 - 20.0 mg/dL    Creatinine 1.21 (H) 0.51 - 0.95 mg/dL    Calcium 9.6 8.6 - 10.0 mg/dL    Glucose 100 (H) 70 - 99 mg/dL    GFR Estimate 53 (L) >60 mL/min/1.73m2   Amylase    Collection Time: 04/25/23 10:58 AM   Result Value Ref Range    Amylase 70 28 - 100 U/L   Lipase    Collection Time: 04/25/23 10:58 AM   Result Value Ref Range    Lipase 38 13 - 60 U/L   CBC with platelets    Collection Time: 04/25/23 10:58 AM   Result Value Ref Range    WBC Count 5.2 4.0 - 11.0 10e3/uL    RBC Count 4.12 3.80 - 5.20 10e6/uL    Hemoglobin 12.7 11.7 - 15.7 g/dL    Hematocrit 40.0 35.0 - 47.0 %    MCV 97 78 - 100 fL    MCH 30.8 26.5 - 33.0 pg    MCHC 31.8 31.5 - 36.5 g/dL    RDW 14.7 10.0 - 15.0 %    Platelet Count 301 150 - 450 10e3/uL   Tacrolimus by Tandem Mass Spectrometry    Collection Time: 04/25/23 10:58 AM   Result Value Ref Range    Tacrolimus by Tandem Mass Spectrometry 9.0 5.0 - 15.0 ug/L    Tacrolimus Last Dose Date 4/24/2023     Tacrolimus Last Dose Time 10:00 PM    CBC with platelets    Collection Time: 05/11/23 10:57 AM   Result Value Ref Range    WBC Count 5.5 4.0 -  11.0 10e3/uL    RBC Count 4.13 3.80 - 5.20 10e6/uL    Hemoglobin 13.1 11.7 - 15.7 g/dL    Hematocrit 39.0 35.0 - 47.0 %    MCV 94 78 - 100 fL    MCH 31.7 26.5 - 33.0 pg    MCHC 33.6 31.5 - 36.5 g/dL    RDW 13.4 10.0 - 15.0 %    Platelet Count 279 150 - 450 10e3/uL   Basic metabolic panel    Collection Time: 05/11/23 10:57 AM   Result Value Ref Range    Sodium 137 136 - 145 mmol/L    Potassium 4.8 3.4 - 5.3 mmol/L    Chloride 105 98 - 107 mmol/L    Carbon Dioxide (CO2) 21 (L) 22 - 29 mmol/L    Anion Gap 11 7 - 15 mmol/L    Urea Nitrogen 20.1 (H) 6.0 - 20.0 mg/dL    Creatinine 1.15 (H) 0.51 - 0.95 mg/dL    Calcium 9.0 8.6 - 10.0 mg/dL    Glucose 101 (H) 70 - 99 mg/dL    GFR Estimate 56 (L) >60 mL/min/1.73m2   Amylase    Collection Time: 05/11/23 10:57 AM   Result Value Ref Range    Amylase 53 28 - 100 U/L   Lipase    Collection Time: 05/11/23 10:57 AM   Result Value Ref Range    Lipase 31 13 - 60 U/L   BK virus PCR quantitative    Collection Time: 05/11/23 10:57 AM    Specimen: Arm, Left; Blood   Result Value Ref Range    BK Virus DNA copies/mL Not Detected Not Detected copies/mL   Magnesium    Collection Time: 05/11/23 10:57 AM   Result Value Ref Range    Magnesium 1.4 (L) 1.7 - 2.3 mg/dL   Mycophenolic acid    Collection Time: 05/11/23 10:57 AM   Result Value Ref Range    Mycophenolic Acid by Tandem Mass Spectrometry 1.58 1.00 - 3.50 mg/L    MPA Glucuronide by Tandem Mass Spectrometry 50.4 30.0 - 95.0 mg/L    Mycophenolic Acid Last Dose Date 5/10/2023     Mycopheolic Acid Last Dose Time 10:00 PM    Phosphorus    Collection Time: 05/11/23 10:57 AM   Result Value Ref Range    Phosphorus 3.4 2.5 - 4.5 mg/dL   HLA Donor Specific Antibody    Collection Time: 05/11/23 10:57 AM   Result Value Ref Range    Donor Identification 09/23/2022     Organ Kidney/Pancreas     DSA Present NO     DSA Comments        Flow Single Antigen Beads assays are intended for detection/identification of IgG anti-HLA antibodies. Mfi values may  not accurately quantify donor-specific antibody levels in all instances.    DSA Test Method SA EDTA FCS    HLA Roe Class I, Single Antigen    Collection Time: 05/11/23 10:57 AM   Result Value Ref Range    SA 1 TEST METHOD SA EDTA FCS     SA 1 CELL Class I     SA1 HI RISK ROE None     SA1 MOD RISK ROE None     SA 1  COMMENTS        HLA PRA Test performed by modified testing procedure that may also include pretreatment of serum. Pretreatment may be the addition of fetal calf serum, EDTA, and/or adsorption.  High-risk, MFI > 3,000.  Mod-risk, -3,000.   HLA Roe Class II, Single Antigen    Collection Time: 05/11/23 10:57 AM   Result Value Ref Range    SA 2 TEST METHOD SA EDTA FCS     SA 2 CELL Class II     SA2 HI RISK ROE None     SA2 MOD RISK ROE None     SA 2 COMMENTS        HLA PRA Test performed by modified testing procedure that may also include pretreatment of serum. Pretreatment may be the addition of fetal calf serum, EDTA, and/or adsorption.  High-risk, MFI > 3,000.  Mod-risk, -3,000.   HLA Roe, CPRA    Collection Time: 05/11/23 10:57 AM   Result Value Ref Range    UNOS CPRA 0     UNACCEPTABLE ANTIGENS None      UNOS cPRA   Date Value Ref Range Status   08/13/2020 0  Final     UNOS CPRA   Date Value Ref Range Status   05/11/2023 0  Final

## 2022-08-01 NOTE — LETTER
8/1/2022         RE: Eden Hess  [address]  [place] [address]        Dear Colleague,    Thank you for referring your patient, Eden Hess, to the [facility]PL[facility]. Please see a copy of my visit note below.    Transplant Surgery Consult Note    Medical record number: [id number]  YOB: 1970,   Consult requested for evaluation of kidney and pancreas transplant candidacy.    Assessment and Recommendations: Ms. Hess is a good candidate for transplantation and has a good understanding of the risks and benefits of this approach to management of renal failure and diabetes. The following issues should be addressed prior to transplant:     GES- no gastroparesis. Nausea only occasionally after dialysis on non-dialysis days. Otherwise eating well    DSE- normal. Does have some fatigue when walking >4 blocks and walks slowly, but able to do without breaks. Still using midodrine during dialysis but needs antihypertensives on nondialysis days. Does not get light-headed or orthostatic but is more fatigued    Menorrhagia- fairly recent. Having increased duration and heaviness of periods. Undergoing GYN workup including US and SIS (small endometrial defect) and recently had biopsy. Will need to f/u on biopsy results. Having some ongoing anemia but trying to avoid transfusion. Pending findings and frequency of bleeds, could recommend ablation versus resection as needed if it would help avoid transfusion requirements. If getting to point of needing transfusion will need to start cellcept    Ms. Hess has End stage renal failure due to diabetes mellitus type 1 whose condition is not expected to resolve, is expected to progress, and is expected to continue to develop related comorbid conditions.  Cardiology consult for cardiac risk stratification to be ordered: Yes  CT abdomen and pelvis without contrast to be ordered for assessment of vascular targets: Yes  Transplant listing  labs ordered to include HLA, ABOx2, Cr, etc.  Dietician consult ordered: Yes  Social work consult ordered: Yes  Imaging reports reviewed:  n/a  Radiology images reviewed:n/a  Recipient suitable to move forward with work up of living donors:  Yes      The majority of our visit was spent in counselling, discussing the medical and surgical risks of living or  donor kidney and pancreas transplantation, either in a simultaneous or sequential fashion. I contrasted approximate wait time for SPK vs living vs  donor kidneys from normal (0-85%) or higher (%) kidney donor profile index (KDPI) donors and their associated outcomes. I would not recommend this individual to consider kidneys from high KDPI donors. The reason for this decision is best summarized as: multi-organ transplant candidate.  Access to transplant will be impacted by living donor availability and overall candidacy for SPK, as well as the influence of blood type and degree of sensitization. We discussed advantages of preemptive transplant as well as living donor kidney transplant, and graft and patient survival outcomes associated with these options. Potential surgical complications of kidney and pancreas transplantation include bleeding, clotting, infection, wound complications, anastomotic failure and other issues such as cardiac complications, pneumonia, deep venous thrombosis, pulmonary embolism, post transplant diabetes and death. We discussed the need for protocol biopsy of the kidney and the possible need for a ureteral stent (and subsequent removal). We discussed benefits and risks associated with different approaches to exocrine drainage of pancreatic secretions. We also discussed differences in the average length of stay, recovery process, and posttransplant lab and monitoring protocol. We discussed the risk of graft rejection and recurrent diabetic nephropathy in the setting of poor glycemic control. I emphasized the need for  strict immunosuppression adherence and the potential for complications of immunosuppression such as skin cancer or lymphoma, as well as a very low but not zero risk of donor-derived disease transmission risks (infection, cancer). Ms. Hess asked good questions and the patient's candidacy will be reviewed at our Multidisciplinary Selection Committee. Thank you for the opportunity to participate in Ms. Hess's care.    Total time: 30 minutes  Counselling time: 25 minutes      Caden Felix MD  ---------------------------------------------------------------------------------------------------    HPI: Ms. Hess has End stage renal failure due to diabetes mellitus type 1. The patient has had diabetes for 49 years. Management is by Lantus per diabetic educator, 40-50 units. The patient usually checks her blood sugar multiple times/day. Hypoglyemic unawareness is not an issue.  The diabetes is somewhat controlled.    Complications of diabetes include:    Retinopathy:  No  Neuropathy: No  Gastroparesis:  Potentially- see above    The patient is on dialysis.    Has potential kidney donors:  Doesn't know .  Interested in participation in paired exchange if a donor is willing: Doesn't know     The patient has the following pertinent history:       No    Yes  Dialysis:    []      [] via:       Blood Transfusion                  []      []  Number of units:   Most recently:  Pregnancy:    []      [] Number:       Previous Transplant:  []      [] Details:    Cancer    []      [] Comment:   Kidney stones   []      [] Comment:      Recurrent infections  []      []  Type:                  Bladder dysfunction  []      [] Cause:    Claudication   []      [] Distance:    Previous Amputation  []      [] Cause:     Chronic anticoagulation  []      [] Indication:  Faith  []      []     Past Medical History:   Diagnosis Date    (HFpEF) heart failure with preserved ejection fraction (H)     Anxiety and depression      [facility]    Diabetes mellitus type 1 (H)     Diagnosed at age 2 years old     End stage kidney disease (H)     Hypertension     Retinopathy     TIA (transient ischemic attack)      Past Surgical History:   Procedure Laterality Date    BENCH KIDNEY Left 2022    Procedure: Bench kidney;  Surgeon: Yousif Gómez MD;  Location: [facility] OR    BENCH PANCREAS N/A 2022    Procedure: Bench pancreas;  Surgeon: Yousif Gómez MD;  Location: [facility] OR     SECTION      x2    CREATE FISTULA ARTERIOVENOUS UPPER EXTREMITY      dialysis port, fistula-R arm    TRANSPLANT PANCREAS, KIDNEY  DONOR, COMBINED N/A 2022    Procedure: TRANSPLANT, KIDNEY AND PANCREAS,  DONOR;  Surgeon: Yousif Gómez MD;  Location: [facility] OR     Family History   Problem Relation Age of Onset    Diabetes Type 1 Father     Hypertension Father     Cerebrovascular Disease Father      Social History     Socioeconomic History    Marital status:      Spouse name: Not on file    Number of children: Not on file    Years of education: Not on file    Highest education level: Not on file   Occupational History    Not on file   Tobacco Use    Smoking status: Former     Types: Cigarettes     Quit date: 1997     Years since quittin.4    Smokeless tobacco: Never   Vaping Use    Vaping status: Not on file   Substance and Sexual Activity    Alcohol use: Yes     Alcohol/week: 0.0 standard drinks of alcohol    Drug use: No    Sexual activity: Yes     Partners: Male     Birth control/protection: Male Surgical   Other Topics Concern    Parent/sibling w/ CABG, MI or angioplasty before 65F 55M? Not Asked   Social History Narrative    Not on file     Social Determinants of Health     Financial Resource Strain: Not on file   Food Insecurity: Not on file   Transportation Needs: Not on file   Physical Activity: Not on file   Stress: Not on file   Social Connections: Not on file    Intimate Partner Violence: Not on file   Housing Stability: Not on file       ROS:   CONSTITUTIONAL:  No fevers or chills  EYES: negative for icterus  ENT:  negative for hearing loss, tinnitus and sore throat  RESPIRATORY:  negative for cough, sputum, dyspnea  CARDIOVASCULAR:  negative for chest pain positive for lower extremity wounds/toe wounds  GASTROINTESTINAL:  negative for nausea, vomiting, diarrhea or constipation  GENITOURINARY:  negative for incontinence, dysuria, bladder emptying problems  HEME:  No easy bruising  INTEGUMENT:  negative for rash and pruritus  NEURO:  Negative for headache, seizure disorder    Allergies:   Allergies   Allergen Reactions    Amlodipine      Other reaction(s): Edema,generalized       Medications:  Prescription Medications as of 5/22/2023         Rx Number Disp Refills Start End Last Dispensed Date Next Fill Date Owning Pharmacy    aspirin (ASA) 81 MG EC tablet  90 tablet 3 4/4/2023 7/3/2023   [id number] [address]    Sig: Take 1 tablet (81 mg) by mouth daily for 90 days    Class: E-Prescribe    Route: Oral    atorvastatin (LIPITOR) 20 MG tablet  30 tablet 11 10/2/2022    [address]    Sig: Take 1 tablet (20 mg) by mouth every evening    Class: E-Prescribe    Route: Oral    Renewals       Renewal requests to authorizing provider (Sanam Renee APRN CNP) <b>prohibited</b>            calcium carbonate-vitamin D (OS-BARBARA WITH D) 500-200 MG-UNIT tablet  60 tablet 11 10/2/2022    [address]    Sig: Take 1 tablet by mouth 2 times daily (with meals)    Class: E-Prescribe    Route: Oral    Renewals       Renewal requests to authorizing provider (Sanam Renee APRN CNP) <b>prohibited</b>            carvedilol (COREG) 12.5 MG tablet  60 tablet 11 10/2/2022    [place] [address]  [facility]    Sig: Take 1 tablet (12.5 mg) by mouth 2 times daily (with meals)    Class: E-Prescribe    Route: Oral    Renewals       Renewal requests to authorizing provider (Sanam Renee, MELINDA CNP) <b>prohibited</b>            FIBER ADULT GUMMIES PO            Sig: Take 1 Gum by mouth daily    Class: Historical    Route: Oral    mycophenolic acid (GENERIC EQUIVALENT) 180 MG EC tablet  180 tablet 11 3/24/2023    [facility] [place] [address] [address]    Sig: Take 3 tablets (540 mg) by mouth 2 times daily    Class: E-Prescribe    Notes to Pharmacy: TXP DT 9/23/2022 (Kidney / Pancreas) TXP Dischg DT 10/2/2022 DX Kidney replaced by transplant Z94.0 TX [facility] ([place], [place])    Route: Oral    sulfamethoxazole-trimethoprim (BACTRIM) 400-80 MG tablet  45 tablet 3 4/26/2023    [facility] [place] [address] [address]     Sig: Take 1 tablet by mouth three times a week    Class: E-Prescribe    Route: Oral    tacrolimus (GENERIC EQUIVALENT) 0.5 MG capsule  270 capsule 3 4/26/2023    [facility] [place] [address] [address]     Sig: Take 2 capsules (1 mg) by mouth every morning AND 1 capsule (0.5 mg) every evening.    Class: E-Prescribe    Notes to Pharmacy: TXP DT 9/23/2022 (Kidney / Pancreas) TXP Dischg DT 10/2/2022 DX Kidney replaced by transplant Z94.0 TX [facility] ([place], [place])    Route: Oral    venlafaxine (EFFEXOR XR) 150 MG 24 hr capsule  30 capsule 0 10/4/2022    [facility] [place], [place] - [address]    Sig: Take 1 capsule (150 mg) by mouth daily    Class: E-Prescribe    Route: Oral            Exam:      Appearance: in no apparent distress.   Skin: normal, warm, dry  Head and Neck: Normal, no rashes or jaundice  Respiratory: easy respirations, no audible  wheezing.  Cardiovascular: RRR  Abdomen: soft, rounded. No masses or hernia. Incisions c/w c section X2  Extremeties: femoral 2+/2+, Edema, none  Neuro: without deficit, cranial nerves intact     Diagnostics:   Recent Results (from the past 672 hour(s))   T cell subset profile    Collection Time: 04/25/23 10:58 AM   Result Value Ref Range    CD3% Total T Cells 50 49 - 84 %    Absolute CD3, Total T Cells 237 (L) 603 - 2,990 cells/uL    CD4% [place] T Cells 31 28 - 63 %    Absolute CD4, [place] T Cells 147 (L) 441 - 2,156 cells/uL    CD8% Suppressor T Cells 18 10 - 40 %    Absolute CD8, Suppressor T Cells 83 (L) 125 - 1,312 cells/uL    CD4:CD8 Ratio 1.77 1.40 - 2.60    T Cell Comment     Glucose 6 phosphate dehydrogenase    Collection Time: 04/25/23 10:58 AM   Result Value Ref Range    Glucose-6-PO4 Dehydrogenase 16.3 9.9 - 16.6 U/g Hb   Basic metabolic panel    Collection Time: 04/25/23 10:58 AM   Result Value Ref Range    Sodium 138 136 - 145 mmol/L    Potassium 4.4 3.4 - 5.3 mmol/L    Chloride 103 98 - 107 mmol/L    Carbon Dioxide (CO2) 23 22 - 29 mmol/L    Anion Gap 12 7 - 15 mmol/L    Urea Nitrogen 25.6 (H) 6.0 - 20.0 mg/dL    Creatinine 1.21 (H) 0.51 - 0.95 mg/dL    Calcium 9.6 8.6 - 10.0 mg/dL    Glucose 100 (H) 70 - 99 mg/dL    GFR Estimate 53 (L) >60 mL/min/1.73m2   Amylase    Collection Time: 04/25/23 10:58 AM   Result Value Ref Range    Amylase 70 28 - 100 U/L   Lipase    Collection Time: 04/25/23 10:58 AM   Result Value Ref Range    Lipase 38 13 - 60 U/L   CBC with platelets    Collection Time: 04/25/23 10:58 AM   Result Value Ref Range    WBC Count 5.2 4.0 - 11.0 10e3/uL    RBC Count 4.12 3.80 - 5.20 10e6/uL    Hemoglobin 12.7 11.7 - 15.7 g/dL    Hematocrit 40.0 35.0 - 47.0 %    MCV 97 78 - 100 fL    MCH 30.8 26.5 - 33.0 pg    MCHC 31.8 31.5 - 36.5 g/dL    RDW 14.7 10.0 - 15.0 %    Platelet Count 301 150 - 450 10e3/uL   Tacrolimus by Tandem Mass Spectrometry    Collection Time: 04/25/23 10:58 AM   Result  Value Ref Range    Tacrolimus by Tandem Mass Spectrometry 9.0 5.0 - 15.0 ug/L    Tacrolimus Last Dose Date 4/24/2023     Tacrolimus Last Dose Time 10:00 PM    CBC with platelets    Collection Time: 05/11/23 10:57 AM   Result Value Ref Range    WBC Count 5.5 4.0 - 11.0 10e3/uL    RBC Count 4.13 3.80 - 5.20 10e6/uL    Hemoglobin 13.1 11.7 - 15.7 g/dL    Hematocrit 39.0 35.0 - 47.0 %    MCV 94 78 - 100 fL    MCH 31.7 26.5 - 33.0 pg    MCHC 33.6 31.5 - 36.5 g/dL    RDW 13.4 10.0 - 15.0 %    Platelet Count 279 150 - 450 10e3/uL   Basic metabolic panel    Collection Time: 05/11/23 10:57 AM   Result Value Ref Range    Sodium 137 136 - 145 mmol/L    Potassium 4.8 3.4 - 5.3 mmol/L    Chloride 105 98 - 107 mmol/L    Carbon Dioxide (CO2) 21 (L) 22 - 29 mmol/L    Anion Gap 11 7 - 15 mmol/L    Urea Nitrogen 20.1 (H) 6.0 - 20.0 mg/dL    Creatinine 1.15 (H) 0.51 - 0.95 mg/dL    Calcium 9.0 8.6 - 10.0 mg/dL    Glucose 101 (H) 70 - 99 mg/dL    GFR Estimate 56 (L) >60 mL/min/1.73m2   Amylase    Collection Time: 05/11/23 10:57 AM   Result Value Ref Range    Amylase 53 28 - 100 U/L   Lipase    Collection Time: 05/11/23 10:57 AM   Result Value Ref Range    Lipase 31 13 - 60 U/L   BK virus PCR quantitative    Collection Time: 05/11/23 10:57 AM    Specimen: Arm, Left; Blood   Result Value Ref Range    BK Virus DNA copies/mL Not Detected Not Detected copies/mL   Magnesium    Collection Time: 05/11/23 10:57 AM   Result Value Ref Range    Magnesium 1.4 (L) 1.7 - 2.3 mg/dL   Mycophenolic acid    Collection Time: 05/11/23 10:57 AM   Result Value Ref Range    Mycophenolic Acid by Tandem Mass Spectrometry 1.58 1.00 - 3.50 mg/L    MPA Glucuronide by Tandem Mass Spectrometry 50.4 30.0 - 95.0 mg/L    Mycophenolic Acid Last Dose Date 5/10/2023     Mycopheolic Acid Last Dose Time 10:00 PM    Phosphorus    Collection Time: 05/11/23 10:57 AM   Result Value Ref Range    Phosphorus 3.4 2.5 - 4.5 mg/dL   HLA Donor Specific Antibody    Collection Time:  05/11/23 10:57 AM   Result Value Ref Range    Donor Identification 09/23/2022     Organ Kidney/Pancreas     DSA Present NO     DSA Comments        Flow Single Antigen Beads assays are intended for detection/identification of IgG anti-HLA antibodies. Mfi values may not accurately quantify donor-specific antibody levels in all instances.    DSA Test Method SA EDTA FCS    HLA Roe Class I, Single Antigen    Collection Time: 05/11/23 10:57 AM   Result Value Ref Range    SA 1 TEST METHOD SA EDTA FCS     SA 1 CELL Class I     SA1 HI RISK ROE None     SA1 MOD RISK ROE None     SA 1  COMMENTS        HLA PRA Test performed by modified testing procedure that may also include pretreatment of serum. Pretreatment may be the addition of fetal calf serum, EDTA, and/or adsorption.  High-risk, MFI > 3,000.  Mod-risk, -3,000.   HLA Roe Class II, Single Antigen    Collection Time: 05/11/23 10:57 AM   Result Value Ref Range    SA 2 TEST METHOD SA EDTA FCS     SA 2 CELL Class II     SA2 HI RISK ROE None     SA2 MOD RISK ROE None     SA 2 COMMENTS        HLA PRA Test performed by modified testing procedure that may also include pretreatment of serum. Pretreatment may be the addition of fetal calf serum, EDTA, and/or adsorption.  High-risk, MFI > 3,000.  Mod-risk, -3,000.   HLA Roe, CPRA    Collection Time: 05/11/23 10:57 AM   Result Value Ref Range    UNOS CPRA 0     UNACCEPTABLE ANTIGENS None      UNOS cPRA   Date Value Ref Range Status   08/13/2020 0  Final     UNOS CPRA   Date Value Ref Range Status   05/11/2023 0  Final       Caden Felix MD

## 2022-08-11 ENCOUNTER — TELEPHONE (OUTPATIENT)
Dept: TRANSPLANT | Facility: CLINIC | Age: 52
End: 2022-08-11

## 2022-08-11 NOTE — LETTER
PHYSICIAN ORDER   ALA/PRA BLOOD    DATE & TIME ISSUED: 2022 2:17 PM  PATIENT NAME: Eden Hess   : 1970     [facility] MR#  [id number]     DIAGNOSIS/ICD-10 CODE: Awaiting Organ transplant [Z76.82}   EXPIRES: (1 YEAR AFTER DATE ISSUED)  EVERY 12 weeks / 3 months   1. Please draw 20ml of blood in red top (plain) tube for Antileukocyte Antibody (ALA or PRA).   2. Label tubes with the patient s name, complete lab slip.         3. Mailers, lab slips with instructions are sent to patient separately.      4. Call the Outreach Lab at [phone] to reorder mailers.       5. Mail blood to (this address is also on the mailers):    IMMUNOLOGY LABORATORY   [facility]   Room [phone] [address]  [id number]    .

## 2022-08-11 NOTE — Clinical Note
Forgot to add my note on earlier message I sent to you both. Pt is now active listed on K, SPK waitlists. Likely to get called at any time due to long time on dialysis. Thanks, Kaitlyn

## 2022-08-11 NOTE — TELEPHONE ENCOUNTER
Called patient to inform them of eligibility of listing to active status.    -Verified contact information as documented in EPIC  -Informed patient that a  donor offer can happen at any time (24 hours/day, 7 days/week)  -Instructed patient about importance of having PRAs drawn every 3 months via: (Mailers/[facility])  -Reviewed organ offer process including request to accept or deny offer, informing coordinator of any recent infections  -Reviewed timeframe after call with organ offer (process for admission to hospital and pre-op testing and work and timeframe)  -Discussed expected length of surgical procedure, expected inpatient length of stay post transplant, and potential to stay locally post transplant.    Provided name and contact information for additional questions or concerns.      Listed pt on K, SPK waitlists active status. Generated listing letter/ PRA order in Epic - elecronically sent to pt/providers.

## 2022-08-11 NOTE — LETTER
2022    Eden Hess  [address]  [place] [address]      Dear Ms. Hess,    This letter is sent to confirm that you have completed your transplant work-up and you are a candidate in the kidney and pancreas transplant program at the [facility].  You were placed on the kidney and simultaneous kidney pancreas ACTIVE waitlist on 2022.  Your wait time start date on the lists will be the same as the start date of your chronic dialysis which is 04/15/2020.    Per our routine Office workflow, you will now be transferred to the kidney pancreas transplant waitlist transplant coordinator team. Your new coordinator is Dona LEDBETTER assisted by Nyasia MANRIQUEZ. Either can be reached by calling our Main Office Number at [phone].     You are now ACTIVE on the waitlists and an organ becomes available, a coordinator will need to speak to you immediately.  You could be contacted at any time during the day or night as an organ could become available at any time.  Please make certain our office always has your current telephone numbers and address.      Items we will need from you:      We have received approval from your insurance company for the transplant procedure.  It is critical that you notify us if there is any change in your insurance.  It is also important that you familiarize yourself with the details of your specific insurance policy.  Our patient  is available to assist you if you should have any questions regarding your coverage.      An ALA or PRA blood sample needs to be sent here every 3 months to match you with  donors or any potential living donors. Your next sample is due here by 10/14/2022.  Special mailing boxes (called mailers) will be sent to you directly from the Outreach Department.  You should take the physician order form and the  to your dialysis unit when it is time to for this testing to be done.   Additional mailers can be obtained by calling the Transplant Office and asking to speak to a kidney and pancreas .      During this waiting period, we may request additional periodic laboratory tests with your primary physician.  It will be your responsibility to remind your physician to forward your results to the Transplant Office.      We need to be kept informed of any changes in your medical condition such as:    o changes in your medications,   o significant changes in your health  o significant infections (such as pneumonia or abscesses)  o blood transfusions  o any condition which requires hospitalization  o any surgery      Remember to complete any routine cancer screening tests required before your transplant.  This includes colonoscopy; prostate screening for men, and mammogram and gynecologic testing for women, as well as dental work.  Your primary care clinic can assist you with arranging for these exams.  Remind your caregivers to forward copies of the records and final reports.    We want you to know that our program has physician and surgeon coverage 24 hours a day, 365 days a year. If this coverage changes or there are substantial program changes, you will be notified in writing by letter.     Attached is a letter from the [employer] for Organ Sharing (UNOS). It describes the services and information offered to patients by UNOS and the Organ Procurement and Transplantation Network.    We appreciate having had the opportunity to participate in your care.  If you have questions, please feel free to call the Transplant Office at [phone] or [phone].      Sincerely,    Kaitlyn Hurst, RN, BSN  Pre Kidney Pancreas Transplant Coordinator   [facility]  Solid Organ Transplant Care   [address] [place] [address] 310  [address] [address]  [email or web address]   Office: [phone] Direct Number: [phone]   Fax:  [phone]  Employed by [facility]     Enclosures: ALA/PRA Physician Order, Telephone Contact List, Travel Resources, UNOS Letter, Waitlist Information Update and While You Are Waiting  cc: Dr. Kurt King, Dr. Varun Parker, [facility]                           The Organ Procurement and Transplantation Network  Toll-free patient services line:     Your resource for organ transplant information    If you have a question regarding your own medical care, you always should call your transplant hospital first. However, for general organ transplant-related information, you can call the Organ Procurement and Transplantation Network (OPTN) toll-free patient services line at [phone]- 5303. Anyone, including potential transplant candidates, candidates, recipients, family members, friends, living donors, and donor family members, can call this number to:          Talk about organ donation, living donation, the transplant process, the donation process, and transplant policies.    Get a free patient information kit with helpful booklets, waiting list and transplant information, and a list of all transplant hospitals.    Ask questions about the OPTN website (https://optn.transplant.hrsa.gov/), the [employer] Network for Organ Sharing s (UNOS) website (https://unos.org/), or the UNOS website for living donors and transplant recipients. (https://www.transplantliving.org/).    Learn how the OPTN can help you.    Talk about any concerns that you may have with a transplant hospital.    The [facility] s transplant system, the OPTN, is managed under federal contract by the [employer] for Organ Sharing (UNOS), which is a non-profit charitable organization. The OPTN helps create and define organ sharing policies that make the best use of donated organs. This process continuously evaluating new advances and discoveries so policies can be adapted to best serve patients waiting for transplants. To do so, the OPTN  works closely with transplant professionals, transplant patients, transplant candidates, donor families, living donors, and the public. All transplant programs and organ procurement organizations throughout the country are OPTN members and are obligated to follow the policies the OPTN creates for allocating organs.    The OPTN also is responsible for:      Providing educational material for patients, the public, and professionals.    Raising awareness of the need for donated organs and tissue.    Coordinating organ procurement, matching, and placement.    Collecting information about every organ transplant and donation that occurs in the [place].    Remember, you should contact your transplant hospital directly if you have questions or concerns about your own medical care including medical records, work-up progress, and test results.    We are not your transplant hospital, and our staff will not be able to answer questions about your case, so please keep your transplant hospital s phone number handy.    However, while you research your transplant needs and learn as much as you can about transplantation and donation, we welcome your call to our toll-free patient services line at [phone].          Updated 4/1/2019

## 2022-08-12 ENCOUNTER — DOCUMENTATION ONLY (OUTPATIENT)
Dept: TRANSPLANT | Facility: CLINIC | Age: 52
End: 2022-08-12

## 2022-08-15 ENCOUNTER — TELEPHONE (OUTPATIENT)
Dept: TRANSPLANT | Facility: CLINIC | Age: 52
End: 2022-08-15

## 2022-08-15 ENCOUNTER — ORGAN (OUTPATIENT)
Dept: TRANSPLANT | Facility: CLINIC | Age: 52
End: 2022-08-15

## 2022-08-15 DIAGNOSIS — Z76.82 AWAITING ORGAN TRANSPLANT: Primary | ICD-10-CM

## 2022-08-15 NOTE — TELEPHONE ENCOUNTER
Organ Offer Encounter Information    Organ Offer Information  Organ offer date & time: 8/15/2022 12:02 PM  Coordinator/Fellow/Attending name: Catie Estrada RN   Organ(s):  Organ UNOS ID Match Run ID Comment Organ Laterality   Kidney [id number] MNOP    Pancreas [id number] MNOP       Recent infections?: No    New medications?: Yes (Comment: antidepressant drug study- awaiting records) Recent pregnancy?: No   Angicoagulation medications?: Yes (Comment: 81mg ASA daily) Recent vaccinations?: No   Recent blood transfusions?: No Recent hospitalizations?: No   Has your insurance changed in the last 6-12 months?: Neg    Patient last dialyzed: 8/15/2022 12:52 PM  Dialysis type: Hemo  Discussed organ offer with: Patient  Discussed risk category with Patient/Other: DCD, PHS Risk Criteria Met  Understood donor criteria, verbalized understanding  Patient/Other asked to speak to a surgeon?: No  Discussed program-specific outcomes: Did not have questions regarding SRTR  Right to decline organ offer without penalty, Patient/Other: Aware of option to decline without penalty  Organ offer decision status Patient/Other: Accepted Offer  Organ disposition: Case Cancelled - Other (specify)  Additional Comments: 8/15/2022 12:07 PM  Called pt regarding KP offer. Pt will call back with  on the line at 1230.   Catie Estrada RN  Donor     8/15/2022 12:08 PM  KP/Panc: primary  MD: Elvira/Nisa  OPO Contact: Rosio TUCKER Results: complete  XM Plan (FXM must be done with serum no older than 10 days from transplant):    Plan (Admission, NPO, Donor OR): Called pt &  Gallo- discussed offer, DCD & risk criteria. Pt is not interested in this offer due to risk criteria, DCD & endometrial ablation scheduled next week. Dr. Paula updated. Coordinator will update Dona Estes with this information. Pt stated that she has been involved in an antidepressant drug trial- pt will have records sent.   - - -   COVID  Screening  In the past month, have you:  Or anyone close to you had a positive COVID test or suspected to have COVID:  no  Had any COVID symptoms (Fever, Cough, Short of Breath, Loss of Taste/Smell, Rash): no     8/15/2022 3:32 PM  Pt called back. Pt stated she has changed her mind & is now interested in the offer. Dr. Paula aware.   Catie Estrada RN  Donor     8/15/2022 6:34 PM  Pt called and updated that case is being aborted. Pt is understanding. Will await next offer.   Catie Estrada RN  Donor           Attestation I have discussed all of the above with the Patient/Legal Guardian/Caregiver regarding this organ offer.: Yes  Coordinator/Fellow/Attending name: Catie Estrada, ANATOLY

## 2022-08-15 NOTE — TELEPHONE ENCOUNTER
Eden called stated she received an offer and declined it and would like to know if the offer was still available to her, and would like a call

## 2022-08-15 NOTE — TELEPHONE ENCOUNTER
"Patient reports she would like to change her mind regarding earlier organ offer and accept.  She reports although scheduled for an endometrial ablation next week, but notes that current cycle (bleeding) started on 08/09/2022, but now is \"residual\" and not heavy.  She notes if the surgeons feel she should have the ablation before transplant, she will have the ablation.  Will send update information to on-call coordinator and surgeon.   "

## 2022-08-15 NOTE — TELEPHONE ENCOUNTER
Some donors have risk factors or behaviors that increase their chance of having an infection such as human immunodeficiency virus (HIV), hepatitis B virus (HBV), and/or hepatitis C virus (HCV). This donor has met one or more of the risk criteria in the last 30 days for these infections.  This donor was tested for HIV, HBV, and HCV, which resulted as negative, but as with every test, there is a small chance that the test result was negative even if the virus is present.     A negative test might happen if the donor had a very recent infection.  For this reason, we identify and discuss donors who have potential exposures for HIV, HBV, and/or HCV in the last month that might be missed by testing. The risk for undetected infections is very low but not zero.      Studies show that transplant candidates may have a higher chance of survival by accepting organs from donors with risk factors for these infections compared to waiting for an organ from a donor without these risk factors.      All transplant recipients are tested for HIV, HCV, and HBV after transplant.  In the very rare event that transmission would occur, there are effective therapies available, your medical team would collaborate with a team of infectious disease experts to treat you accordingly.    List of Risk Criteria:    Person who has had sex (including vaginal, anal, and oral) with a person known or suspected to have HIV, HBV, or HCV infections in the preceding 30 days.    Men ho have had sex with men in the preceding 30 days    Per who has had sex in exchange for money or drugs in the preceding 30 days    Person who has had sex with a person who has sex in exchange for money or drugs in the preceding 30 days    Person who has injected drugs for non-medical reasons in the preceding 30 days    Person who has had sex with a person that has injected drugs for non-medical reasons in the preceding 30 days    Person who has been incarcerated (confined in penitentiary,  [facility], or juvenile correctional facility) for greater than/equal to 72 consecutive hours in the preceding 30 days    Child who has been  within the preceding 30 days by a mother with HIV infection    Child born in the preceding 30 days to a mother known to be infected with HIV, HBV, or HCV    Person with an unknown medical or social history for the preceding 30 days (a Donor Risk Assessment Interview - DRAI - cannot be obtained or risk factors cannot be determined)

## 2022-08-15 NOTE — TELEPHONE ENCOUNTER
Called to introduce myself and the waitlist team.  Reviewed patient will be due for return appointments with cardiology in July 2023 and transplant providers in August 2023. Patient is currently awaiting a return call from our on-call coordinator regarding an organ offer.  Encouraged patient to contact me should she have questions. Patient verbalizes agreement with this plan.

## 2022-08-15 NOTE — TELEPHONE ENCOUNTER
Organ Offer Encounter Information    Organ Offer Information  Organ offer date & time: 8/15/2022 12:02 PM  Coordinator/Fellow/Attending name: Catie Estrada RN   Organ(s):  Organ UNOS ID Match Run ID Comment Organ Laterality   Kidney [id number] [facility]OP    Pancreas [id number] MNOP       Recent infections?: No    New medications?: Yes (Comment: antidepressant drug study- awaiting records) Recent pregnancy?: No   Angicoagulation medications?: Yes (Comment: 81mg ASA daily) Recent vaccinations?: No   Recent blood transfusions?: No Recent hospitalizations?: No   Has your insurance changed in the last 6-12 months?: Neg    Patient last dialyzed: 8/15/2022 12:52 PM  Dialysis type: Hemo  Discussed organ offer with: Patient  Discussed risk category with Patient/Other: DCD, PHS Risk Criteria Met  Understood donor criteria, verbalized understanding  Patient/Other asked to speak to a surgeon?: No  Discussed program-specific outcomes: Did not have questions regarding SRTR  Right to decline organ offer without penalty, Patient/Other: Aware of option to decline without penalty  Organ offer decision status Patient/Other: Declined Offer  Organ disposition: Case Cancelled - Other (specify)  Additional Comments: 8/15/2022 12:07 PM  Called pt regarding KP offer. Pt will call back with  on the line at 1230.   Catie Estrada RN  Donor     8/15/2022 12:08 PM  KP/Panc: primary  MD: Elvira/Nisa  OPO Contact: Rosio TUCKER Results:   XM Plan (FXM must be done with serum no older than 10 days from transplant):    Plan (Admission, NPO, Donor OR): Called pt &  Gallo- discussed offer, DCD & risk criteria. Pt is not interested in this offer due to risk criteria, DCD & endometrial ablation scheduled next week. Dr. Paula updated. Coordinator will update Dona Estes with this information. Pt stated that she has been involved in an antidepressant drug trial- pt will have records sent.  - - -   COVID  Screening  In the past month, have you:  Or anyone close to you had a positive COVID test or suspected to have COVID:  no  Had any COVID symptoms (Fever, Cough, Short of Breath, Loss of Taste/Smell, Rash): no       Attestation I have discussed all of the above with the Patient/Legal Guardian/Caregiver regarding this organ offer.: Yes  Coordinator/Fellow/Attending name: Catie Estrada RN

## 2022-08-17 ENCOUNTER — TELEPHONE (OUTPATIENT)
Dept: TRANSPLANT | Facility: CLINIC | Age: 52
End: 2022-08-17

## 2022-08-17 NOTE — TELEPHONE ENCOUNTER
Update to patient that Dr. Felix noted patient can remain Active status on the kidney and kidney/pancreas lists and have the endometrial ablation that is scheduled next week.  Patient reports she has no additional questions at this time.

## 2022-09-09 DIAGNOSIS — Z76.82 AWAITING ORGAN TRANSPLANT: Primary | ICD-10-CM

## 2022-09-11 ENCOUNTER — ORGAN (OUTPATIENT)
Dept: TRANSPLANT | Facility: CLINIC | Age: 52
End: 2022-09-11

## 2022-09-11 ENCOUNTER — HOSPITAL ENCOUNTER (OUTPATIENT)
Age: 52
End: 2022-09-11
Attending: SURGERY | Admitting: SURGERY
Payer: COMMERCIAL

## 2022-09-11 ENCOUNTER — LAB (OUTPATIENT)
Dept: LAB | Facility: CLINIC | Age: 52
End: 2022-09-11
Attending: FAMILY MEDICINE
Payer: COMMERCIAL

## 2022-09-11 DIAGNOSIS — Z76.82 AWAITING ORGAN TRANSPLANT: Primary | ICD-10-CM

## 2022-09-11 DIAGNOSIS — Z76.82 AWAITING ORGAN TRANSPLANT: ICD-10-CM

## 2022-09-11 LAB — SARS-COV-2 RNA RESP QL NAA+PROBE: NEGATIVE

## 2022-09-11 PROCEDURE — 86825 HLA X-MATH NON-CYTOTOXIC: CPT

## 2022-09-11 PROCEDURE — 36415 COLL VENOUS BLD VENIPUNCTURE: CPT

## 2022-09-11 PROCEDURE — 86832 HLA CLASS I HIGH DEFIN QUAL: CPT

## 2022-09-11 PROCEDURE — 86833 HLA CLASS II HIGH DEFIN QUAL: CPT

## 2022-09-11 PROCEDURE — U0005 INFEC AGEN DETEC AMPLI PROBE: HCPCS

## 2022-09-11 NOTE — TELEPHONE ENCOUNTER
Organ Offer Encounter Information    Organ Offer Information  Organ offer date & time: 9/11/2022  6:44 AM  Coordinator/Fellow/Attending name: Sondra Saul RN   Organ(s):  Organ UNOS ID Match Run ID Comment Organ Laterality   Kidney [id number] MNOP    Pancreas [id number] MNOP       Recent infections?: No    New medications?: No Recent pregnancy?: No   Angicoagulation medications?: No Recent vaccinations?: No   Recent blood transfusions?: No Recent hospitalizations?: No   Has your insurance changed in the last 6-12 months?: Neg    Patient last dialyzed: 9/9/2022 12:30 PM  Dialysis type: Hemo  Discussed organ offer with: Patient  Patient/Caregiver name: Eden  Discussed risk category with Patient/Other: N/A  Patient/Other asked to speak to a surgeon?: No  Right to decline organ offer without penalty, Patient/Other: Aware of option to decline without penalty  Organ offer decision status Patient/Other: Accepted Offer  Organ disposition: Case Cancelled - Donor quality - Other (specify) (Comment: insulin requirements)  Additional Comments: 9/11/2022 6:45 AM  Kidney/Pancreas: local  Called patient to discuss local KP offer. Reviewed offer and also that there were some questions and concerns the MD's were still working through, so this was not 100% yet. Patient understands and is willing to be NPO until a decision can be made, which may be based on visual in OR. Contact information provided for questions/concerns.   MD: Elvira  OPO Contact: Allison TUCKER Results:   XM Plan (FXM must be done with serum no older than 10 days from transplant): FXM to be drawn at 1000 at [facility] along with covid test  Plan (Admission, NPO, Donor OR): NPO starting now, 0700  - - -   COVID Screening  In the past month, have you:  Or anyone close to you had a positive COVID test or suspected to have COVID: no  Had any COVID symptoms (Fever, Cough, Short of Breath, Loss of Taste/Smell, Rash): no  Sondra Saul RN    Donor OR Time:  1300  Procuring MD: Nisa  Contact in the OR: Rosio  Organs Being Procured: liver/KP/kidney  Flush Solution: UW  Biopsy: no  Pump: no  Special Requests (Special blood tubes, nodes, waivers): none  MD for Visualization: Elvira  Transportation Details:     7:08 AM  Appointment made at [facility] lab for HLA final XM and covid test at 1000. Eden notified of appt timing.   Sondra Saul RN    7:31 AM  Notified Taniya in immunology of recipient blood draw timing/ tentative ETA of blood for FXM.   Sondra Saul RN    7:58 AM  Unit [facility]a Bernice given heads up of potential need for admission later today. Will tentatively plan admit for 1500 and will expect an update around that time.   9:00 AM  Admissions- Jana, notified of admission with tentative ETA 1500. Patient information provided with intended unit 7a.   Sondra Saul RN    10:32 AM  After reviewing insulin requirement trends, Dr. Felix declined offer. Patient updated that case will not move forward. She is currently at the [facility] lab. Plan to have patient drawn to help evaluate other offers. Patient agreeable with plan. Patient aware she can eat and drink now. No further questions or concerns.   Sondra Saul RN    10:35 AM  Admissions, Jana, updated that admit is cancelled. claire Hernandez updated of cancelled admission.   Sondra Saul RN                Attestation I have discussed all of the above with the Patient/Legal Guardian/Caregiver regarding this organ offer.: Yes  Coordinator/Fellow/Attending name: Sondra Saul RN

## 2022-09-11 NOTE — NURSING NOTE
Chief Complaint   Patient presents with     Blood Draw     Labs drawn via  by rn in lab.     Gilbert Guajardo RN

## 2022-09-13 DIAGNOSIS — Z76.82 AWAITING ORGAN TRANSPLANT: Primary | ICD-10-CM

## 2022-09-14 LAB
CELL TYPE ALLO: NORMAL
CELL TYPE AUTO: NORMAL
CHANNELSHIFTALLOB1: 4
CHANNELSHIFTALLOB2: 9
CHANNELSHIFTALLOT1: -19
CHANNELSHIFTALLOT2: -17
CHANNELSHIFTAUTOB1: -14
CHANNELSHIFTAUTOB2: -8
CHANNELSHIFTAUTOT1: -22
CHANNELSHIFTAUTOT2: -19
CROSSMATCHDATEALLO: NORMAL
CROSSMATCHDATEAUTO: NORMAL
DONOR ALLO: NORMAL
DONOR AUTO: NORMAL
DONORCELLDATE ALLO: NORMAL
DONORCELLDATE AUTO: NORMAL
POS CUT OFF ALLO B: >93
POS CUT OFF ALLO T: >79
POS CUT OFF AUTO B: >93
POS CUT OFF AUTO T: >79
RESULT ALLO B1: NORMAL
RESULT ALLO B2: NORMAL
RESULT ALLO T1: NORMAL
RESULT ALLO T2: NORMAL
RESULT AUTO B1: NORMAL
RESULT AUTO B2: NORMAL
RESULT AUTO T1: NORMAL
RESULT AUTO T2: NORMAL
SERUM DATE ALLO B1: NORMAL
SERUM DATE ALLO B2: NORMAL
SERUM DATE ALLO T1: NORMAL
SERUM DATE ALLO T2: NORMAL
SERUM DATE AUTO B1: NORMAL
SERUM DATE AUTO B2: NORMAL
SERUM DATE AUTO T1: NORMAL
SERUM DATE AUTO T2: NORMAL
TESTMETHODALLO: NORMAL
TESTMETHODAUTO: NORMAL
TREATMENT ALLO B1: NORMAL
TREATMENT ALLO B2: NORMAL
TREATMENT ALLO T1: NORMAL
TREATMENT ALLO T2: NORMAL
TREATMENT AUTO B1: NORMAL
TREATMENT AUTO B2: NORMAL
TREATMENT AUTO T1: NORMAL
TREATMENT AUTO T2: NORMAL
ZZZCOMMENT ALLOB2: NORMAL

## 2022-09-21 ENCOUNTER — ORGAN (OUTPATIENT)
Dept: TRANSPLANT | Facility: CLINIC | Age: 52
End: 2022-09-21

## 2022-09-21 DIAGNOSIS — Z76.82 AWAITING ORGAN TRANSPLANT: Primary | ICD-10-CM

## 2022-09-22 ENCOUNTER — ANESTHESIA EVENT (OUTPATIENT)
Dept: SURGERY | Facility: CLINIC | Age: 52
DRG: 008 | End: 2022-09-22
Payer: COMMERCIAL

## 2022-09-22 ENCOUNTER — HOSPITAL ENCOUNTER (INPATIENT)
Facility: CLINIC | Age: 52
LOS: 10 days | Discharge: HOME HEALTH | DRG: 008 | End: 2022-10-02
Attending: SURGERY | Admitting: SURGERY
Payer: COMMERCIAL

## 2022-09-22 ENCOUNTER — APPOINTMENT (OUTPATIENT)
Dept: GENERAL RADIOLOGY | Facility: CLINIC | Age: 52
DRG: 008 | End: 2022-09-22
Attending: PHYSICIAN ASSISTANT
Payer: COMMERCIAL

## 2022-09-22 DIAGNOSIS — Z94.0 KIDNEY REPLACED BY TRANSPLANT: ICD-10-CM

## 2022-09-22 DIAGNOSIS — R19.7 DIARRHEA, UNSPECIFIED TYPE: ICD-10-CM

## 2022-09-22 DIAGNOSIS — Z99.2 TYPE 1 DIABETES MELLITUS WITH CHRONIC KIDNEY DISEASE ON CHRONIC DIALYSIS (H): ICD-10-CM

## 2022-09-22 DIAGNOSIS — E10.22 TYPE 1 DIABETES MELLITUS WITH CHRONIC KIDNEY DISEASE ON CHRONIC DIALYSIS (H): ICD-10-CM

## 2022-09-22 DIAGNOSIS — E83.42 HYPOMAGNESEMIA: ICD-10-CM

## 2022-09-22 DIAGNOSIS — E83.39 HYPOPHOSPHATEMIA: ICD-10-CM

## 2022-09-22 DIAGNOSIS — Z76.82 PANCREAS TRANSPLANT CANDIDATE: Primary | ICD-10-CM

## 2022-09-22 DIAGNOSIS — I10 PRIMARY HYPERTENSION: ICD-10-CM

## 2022-09-22 DIAGNOSIS — Z94.83 PANCREAS REPLACED BY TRANSPLANT (H): ICD-10-CM

## 2022-09-22 DIAGNOSIS — E87.79 OTHER HYPERVOLEMIA: ICD-10-CM

## 2022-09-22 DIAGNOSIS — N18.6 TYPE 1 DIABETES MELLITUS WITH CHRONIC KIDNEY DISEASE ON CHRONIC DIALYSIS (H): ICD-10-CM

## 2022-09-22 LAB
ABO/RH(D): NORMAL
ALBUMIN MFR UR ELPH: 124 MG/DL
ALBUMIN SERPL BCG-MCNC: 4.1 G/DL (ref 3.5–5.2)
ALBUMIN UR-MCNC: 200 MG/DL
ALP SERPL-CCNC: 89 U/L (ref 35–104)
ALT SERPL W P-5'-P-CCNC: 18 U/L (ref 10–35)
ANION GAP SERPL CALCULATED.3IONS-SCNC: 10 MMOL/L (ref 7–15)
ANTIBODY SCREEN: NEGATIVE
APPEARANCE UR: CLEAR
APTT PPP: 26 SECONDS (ref 22–38)
AST SERPL W P-5'-P-CCNC: 29 U/L (ref 10–35)
BASOPHILS # BLD AUTO: 0.1 10E3/UL (ref 0–0.2)
BASOPHILS NFR BLD AUTO: 1 %
BILIRUB SERPL-MCNC: 0.3 MG/DL
BILIRUB UR QL STRIP: NEGATIVE
BUN SERPL-MCNC: 33.4 MG/DL (ref 6–20)
CALCIUM SERPL-MCNC: 9.9 MG/DL (ref 8.6–10)
CHLORIDE SERPL-SCNC: 93 MMOL/L (ref 98–107)
CHOLEST SERPL-MCNC: 142 MG/DL
COLOR UR AUTO: ABNORMAL
CREAT SERPL-MCNC: 4 MG/DL (ref 0.51–0.95)
CREAT UR-MCNC: 73.3 MG/DL
DEPRECATED HCO3 PLAS-SCNC: 29 MMOL/L (ref 22–29)
EOSINOPHIL # BLD AUTO: 0.3 10E3/UL (ref 0–0.7)
EOSINOPHIL NFR BLD AUTO: 5 %
ERYTHROCYTE [DISTWIDTH] IN BLOOD BY AUTOMATED COUNT: 14.7 % (ref 10–15)
GFR SERPL CREATININE-BSD FRML MDRD: 13 ML/MIN/1.73M2
GLUCOSE BLDC GLUCOMTR-MCNC: 170 MG/DL (ref 70–99)
GLUCOSE SERPL-MCNC: 139 MG/DL (ref 70–99)
GLUCOSE UR STRIP-MCNC: 300 MG/DL
HBA1C MFR BLD: 6.9 %
HCG INTACT+B SERPL-ACNC: <1 MIU/ML
HCT VFR BLD AUTO: 32.5 % (ref 35–47)
HDLC SERPL-MCNC: 88 MG/DL
HGB BLD-MCNC: 10.9 G/DL (ref 11.7–15.7)
HGB UR QL STRIP: ABNORMAL
IMM GRANULOCYTES # BLD: 0 10E3/UL
IMM GRANULOCYTES NFR BLD: 0 %
INR PPP: 0.91 (ref 0.85–1.15)
KETONES UR STRIP-MCNC: NEGATIVE MG/DL
LDLC SERPL CALC-MCNC: 40 MG/DL
LEUKOCYTE ESTERASE UR QL STRIP: NEGATIVE
LYMPHOCYTES # BLD AUTO: 1.1 10E3/UL (ref 0.8–5.3)
LYMPHOCYTES NFR BLD AUTO: 20 %
MCH RBC QN AUTO: 34.8 PG (ref 26.5–33)
MCHC RBC AUTO-ENTMCNC: 33.5 G/DL (ref 31.5–36.5)
MCV RBC AUTO: 104 FL (ref 78–100)
MONOCYTES # BLD AUTO: 0.8 10E3/UL (ref 0–1.3)
MONOCYTES NFR BLD AUTO: 14 %
NEUTROPHILS # BLD AUTO: 3.3 10E3/UL (ref 1.6–8.3)
NEUTROPHILS NFR BLD AUTO: 60 %
NITRATE UR QL: NEGATIVE
NONHDLC SERPL-MCNC: 54 MG/DL
NRBC # BLD AUTO: 0 10E3/UL
NRBC BLD AUTO-RTO: 0 /100
PH UR STRIP: 8 [PH] (ref 5–7)
PLATELET # BLD AUTO: 258 10E3/UL (ref 150–450)
POTASSIUM SERPL-SCNC: 3.9 MMOL/L (ref 3.4–5.3)
PROT SERPL-MCNC: 6.9 G/DL (ref 6.4–8.3)
PROT/CREAT 24H UR: 1.69 MG/MG CR (ref 0–0.2)
RBC # BLD AUTO: 3.13 10E6/UL (ref 3.8–5.2)
RBC URINE: 1 /HPF
SARS-COV-2 RNA RESP QL NAA+PROBE: NEGATIVE
SODIUM SERPL-SCNC: 132 MMOL/L (ref 136–145)
SP GR UR STRIP: 1.01 (ref 1–1.03)
SPECIMEN EXPIRATION DATE: NORMAL
SQUAMOUS EPITHELIAL: 3 /HPF
TRANSITIONAL EPI: <1 /HPF
TRIGL SERPL-MCNC: 70 MG/DL
UROBILINOGEN UR STRIP-MCNC: NORMAL MG/DL
WBC # BLD AUTO: 5.5 10E3/UL (ref 4–11)
WBC URINE: 6 /HPF

## 2022-09-22 PROCEDURE — 81001 URINALYSIS AUTO W/SCOPE: CPT | Performed by: PHYSICIAN ASSISTANT

## 2022-09-22 PROCEDURE — 80053 COMPREHEN METABOLIC PANEL: CPT | Performed by: PHYSICIAN ASSISTANT

## 2022-09-22 PROCEDURE — 85730 THROMBOPLASTIN TIME PARTIAL: CPT | Performed by: PHYSICIAN ASSISTANT

## 2022-09-22 PROCEDURE — 86825 HLA X-MATH NON-CYTOTOXIC: CPT | Performed by: SURGERY

## 2022-09-22 PROCEDURE — 83036 HEMOGLOBIN GLYCOSYLATED A1C: CPT | Performed by: PHYSICIAN ASSISTANT

## 2022-09-22 PROCEDURE — U0003 INFECTIOUS AGENT DETECTION BY NUCLEIC ACID (DNA OR RNA); SEVERE ACUTE RESPIRATORY SYNDROME CORONAVIRUS 2 (SARS-COV-2) (CORONAVIRUS DISEASE [COVID-19]), AMPLIFIED PROBE TECHNIQUE, MAKING USE OF HIGH THROUGHPUT TECHNOLOGIES AS DESCRIBED BY CMS-2020-01-R: HCPCS | Performed by: PHYSICIAN ASSISTANT

## 2022-09-22 PROCEDURE — 86706 HEP B SURFACE ANTIBODY: CPT | Performed by: PHYSICIAN ASSISTANT

## 2022-09-22 PROCEDURE — 86923 COMPATIBILITY TEST ELECTRIC: CPT

## 2022-09-22 PROCEDURE — 71046 X-RAY EXAM CHEST 2 VIEWS: CPT | Mod: 26 | Performed by: RADIOLOGY

## 2022-09-22 PROCEDURE — 86790 VIRUS ANTIBODY NOS: CPT | Performed by: PHYSICIAN ASSISTANT

## 2022-09-22 PROCEDURE — 86665 EPSTEIN-BARR CAPSID VCA: CPT | Performed by: PHYSICIAN ASSISTANT

## 2022-09-22 PROCEDURE — 999N000128 HC STATISTIC PERIPHERAL IV START W/O US GUIDANCE

## 2022-09-22 PROCEDURE — 86704 HEP B CORE ANTIBODY TOTAL: CPT | Performed by: PHYSICIAN ASSISTANT

## 2022-09-22 PROCEDURE — 71046 X-RAY EXAM CHEST 2 VIEWS: CPT

## 2022-09-22 PROCEDURE — 82962 GLUCOSE BLOOD TEST: CPT

## 2022-09-22 PROCEDURE — 86832 HLA CLASS I HIGH DEFIN QUAL: CPT | Performed by: SURGERY

## 2022-09-22 PROCEDURE — 84702 CHORIONIC GONADOTROPIN TEST: CPT | Performed by: PHYSICIAN ASSISTANT

## 2022-09-22 PROCEDURE — 36415 COLL VENOUS BLD VENIPUNCTURE: CPT | Performed by: SURGERY

## 2022-09-22 PROCEDURE — 80061 LIPID PANEL: CPT | Performed by: PHYSICIAN ASSISTANT

## 2022-09-22 PROCEDURE — 87389 HIV-1 AG W/HIV-1&-2 AB AG IA: CPT | Performed by: PHYSICIAN ASSISTANT

## 2022-09-22 PROCEDURE — 86901 BLOOD TYPING SEROLOGIC RH(D): CPT | Performed by: SURGERY

## 2022-09-22 PROCEDURE — 86833 HLA CLASS II HIGH DEFIN QUAL: CPT | Performed by: SURGERY

## 2022-09-22 PROCEDURE — 86644 CMV ANTIBODY: CPT | Performed by: PHYSICIAN ASSISTANT

## 2022-09-22 PROCEDURE — 87340 HEPATITIS B SURFACE AG IA: CPT | Performed by: PHYSICIAN ASSISTANT

## 2022-09-22 PROCEDURE — 250N000013 HC RX MED GY IP 250 OP 250 PS 637: Performed by: PHYSICIAN ASSISTANT

## 2022-09-22 PROCEDURE — 36415 COLL VENOUS BLD VENIPUNCTURE: CPT | Performed by: PHYSICIAN ASSISTANT

## 2022-09-22 PROCEDURE — 84156 ASSAY OF PROTEIN URINE: CPT | Performed by: PHYSICIAN ASSISTANT

## 2022-09-22 PROCEDURE — 87521 HEPATITIS C PROBE&RVRS TRNSC: CPT | Performed by: PHYSICIAN ASSISTANT

## 2022-09-22 PROCEDURE — 999N000054 HC STATISTIC EKG NON-CHARGEABLE

## 2022-09-22 PROCEDURE — 85610 PROTHROMBIN TIME: CPT | Performed by: PHYSICIAN ASSISTANT

## 2022-09-22 PROCEDURE — 93010 ELECTROCARDIOGRAM REPORT: CPT | Mod: 59 | Performed by: INTERNAL MEDICINE

## 2022-09-22 PROCEDURE — 86803 HEPATITIS C AB TEST: CPT | Performed by: PHYSICIAN ASSISTANT

## 2022-09-22 PROCEDURE — 85025 COMPLETE CBC W/AUTO DIFF WBC: CPT | Performed by: PHYSICIAN ASSISTANT

## 2022-09-22 RX ORDER — LIDOCAINE 40 MG/G
CREAM TOPICAL
Status: DISCONTINUED | OUTPATIENT
Start: 2022-09-22 | End: 2022-09-23 | Stop reason: HOSPADM

## 2022-09-22 RX ORDER — AMOXICILLIN 250 MG
1-2 CAPSULE ORAL DAILY PRN
Status: DISCONTINUED | OUTPATIENT
Start: 2022-09-22 | End: 2022-09-23

## 2022-09-22 RX ORDER — CEFUROXIME SODIUM 1.5 G/16ML
1.5 INJECTION, POWDER, FOR SOLUTION INTRAVENOUS ONCE
Status: COMPLETED | OUTPATIENT
Start: 2022-09-22 | End: 2022-09-23

## 2022-09-22 RX ORDER — METOPROLOL SUCCINATE 25 MG/1
TABLET, EXTENDED RELEASE ORAL
Status: ON HOLD | COMMUNITY
End: 2022-10-02

## 2022-09-22 RX ORDER — CEFUROXIME SODIUM 1.5 G/16ML
1.5 INJECTION, POWDER, FOR SOLUTION INTRAVENOUS SEE ADMIN INSTRUCTIONS
Status: DISCONTINUED | OUTPATIENT
Start: 2022-09-22 | End: 2022-09-23 | Stop reason: HOSPADM

## 2022-09-22 RX ORDER — VENLAFAXINE HYDROCHLORIDE 150 MG/1
150 CAPSULE, EXTENDED RELEASE ORAL DAILY
Status: DISCONTINUED | OUTPATIENT
Start: 2022-09-22 | End: 2022-09-23

## 2022-09-22 RX ORDER — ONDANSETRON 4 MG/1
4 TABLET, ORALLY DISINTEGRATING ORAL EVERY 6 HOURS PRN
Status: DISCONTINUED | OUTPATIENT
Start: 2022-09-22 | End: 2022-09-23

## 2022-09-22 RX ORDER — BISACODYL 10 MG
10 SUPPOSITORY, RECTAL RECTAL DAILY PRN
Status: DISCONTINUED | OUTPATIENT
Start: 2022-09-22 | End: 2022-09-23

## 2022-09-22 RX ADMIN — Medication 12.5 MG: at 22:25

## 2022-09-22 ASSESSMENT — COLUMBIA-SUICIDE SEVERITY RATING SCALE - C-SSRS
2. HAVE YOU ACTUALLY HAD ANY THOUGHTS OF KILLING YOURSELF IN THE PAST MONTH?: NO
1. IN THE PAST MONTH, HAVE YOU WISHED YOU WERE DEAD OR WISHED YOU COULD GO TO SLEEP AND NOT WAKE UP?: NO
4. HAVE YOU HAD THESE THOUGHTS AND HAD SOME INTENTION OF ACTING ON THEM?: NO
6. HAVE YOU EVER DONE ANYTHING, STARTED TO DO ANYTHING, OR PREPARED TO DO ANYTHING TO END YOUR LIFE?: NO
3. HAVE YOU BEEN THINKING ABOUT HOW YOU MIGHT KILL YOURSELF?: NO
5. HAVE YOU STARTED TO WORK OUT OR WORKED OUT THE DETAILS OF HOW TO KILL YOURSELF? DO YOU INTEND TO CARRY OUT THIS PLAN?: NO

## 2022-09-22 ASSESSMENT — ACTIVITIES OF DAILY LIVING (ADL)
ADLS_ACUITY_SCORE: 31
ADLS_ACUITY_SCORE: 35
ADLS_ACUITY_SCORE: 31
ADLS_ACUITY_SCORE: 35
ADLS_ACUITY_SCORE: 31

## 2022-09-22 ASSESSMENT — LIFESTYLE VARIABLES: TOBACCO_USE: 1

## 2022-09-22 ASSESSMENT — COPD QUESTIONNAIRES: COPD: 0

## 2022-09-22 NOTE — H&P
[facility]    History and Physical  Solid Organ Transplant     Date of Admission: 2022      Assessment & Plan   Eden Hess is a 52 year old female with a past medical history significant for end stage kidney disease secondary to diabetic nephropathy.  Other past medical history includes diabetic neuropathy, diabetic retinopathy, hypertension, non obstructive CAD, anxiety and depression, diastolic HFpEF (prior to starting HD, in setting of hypertensive urgency, and TIA (2017)  Patient was notified as an acceptable  donor organ became available and presented for further pre-operative work-up.  Patient was informed of the risks and benefits regarding  donor kidney/pancreas transplantation, and has elected to proceed.    -[place] to outpatient for pre-op work-up  -Pre-op labs, including BMP, CBC, coag panel, viral serologies  -EKG, CXR  -COVID PCR screen  -Surgeon will sign the consent  -Renal diet, NPO after midnight   -Cardiac clearance:  7/15/22 normal dobutamine stress echo. 2019 coronary angiogram showed non obstructive CAD ([facility])    Code Status   Full Code  Disposition Plan   Expected discharge in 7-10 days to prior living arrangement once stable.     Entered: Ladi Lozano PA-C, MARCELINO 2022, 11:34 AM       Ladi Lozano PA-C    Primary Care Physician   Kurt King    Chief Complaint   Kidney and pancreas transplant candidate    History is obtained from the patient    History of Present Illness   Eden Hess is a 52 year old female with a past medical history significant for end stage kidney disease secondary to diabetic nephropathy.  Other past medical history includes diabetic neuropathy, diabetic retinopathy, hypertension, anxiety and depression, diastolic HFpEF (in setting of hypertensive urgency, and TIA (2017).     The patient is on dialysis.  Modality: HD, via LUE AV fistula.   Dialysis days: Monday,  Wednesday, Friday.  Last dialysis run: 22. Weight after HD 68kg  Makes about a cup of urine/day  prior to transplantation.    H/o blood transfusion: no  Glycemic management:  HA1c 6.9%. Ambulatory insulin pump. Pt uses ~ 25 units of insulin per day.     At the time of admission, the patient denies fever, weight loss, HA, tremor, CP, dysuria, or abdominal pain. She has dyspnea with activities which she thinks is due to deconditioning. She was able to go dancing within the last week and she tolerated this activity. Her bowel movements have been normal. She tends to have constipation intermittently. She has nausea after dialysis sometimes. Denies nausea today. She currently is constipated.    She injured her left 4th finger and was seen today by her PCP. She is wearing a splint for treatment of mallet finger.     She has not had any recent infections. She reports having at least 1 UTI in the last year. Last year she had a toe wound that has since healed.     Past Medical History    I have reviewed this patient's medical history and updated it with pertinent information if needed.   Past Medical History:   Diagnosis Date     (HFpEF) heart failure with preserved ejection fraction (H)      Anxiety and depression     [facility]     Diabetes mellitus type 1 (H)     Diagnosed at age 2 years old      End stage kidney disease (H)      Hypertension      Retinopathy      TIA (transient ischemic attack)        Past Surgical History   I have reviewed this patient's surgical history and updated it with pertinent information if needed.  Past Surgical History:   Procedure Laterality Date      SECTION      x2     CREATE FISTULA ARTERIOVENOUS UPPER EXTREMITY      dialysis port, fistula-R arm       Prior to Admission Medications   Cannot display prior to admission medications because the patient has not been admitted in this contact.     Allergies   Allergies   Allergen  Reactions     Amlodipine      Other reaction(s): Edema,generalized       Social History   I have reviewed this patient's social history and updated it with pertinent information if needed. Eden Hess  reports that she quit smoking about 24 years ago. Her smoking use included cigarettes. She has never used smokeless tobacco. She reports current alcohol use. She reports that she does not use drugs.    Family History   I have reviewed this patient's family history and updated it with pertinent information if needed.   Family History   Problem Relation Age of Onset     Diabetes Type 1 Father      Hypertension Father      Cerebrovascular Disease Father        Review of Systems   REVIEW OF SYSTEMS:  General: negative, fever, chills, night sweats  Skin: negative, lesions, rash  Eyes: negative, double vision, eye pain and photophobia  Ears/Nose/Throat: negative, sore throat, hoarseness, nasal congestion and purulent rhinorrhea  Respiratory: No cough, No hemoptysis. POSITIVE: Dyspnea with activities.  Cardiovascular: negative, negative for, as above, palpitations, tachycardia, chest pain, exertional chest pain or pressure and paroxysmal nocturnal dyspnea  : negative for dysuria, frequency or urgency. No change in urine output amount  GI: no nausea, vomiting, constipation, diarrhea, change in bowel movements, blood or mucus in stool  Neuro/psych: negative for depressed mood, anxiety, neuropathy, confusion     Physical Exam                      BP (!) 160/77 (BP Location: Left arm)   Pulse 78   Temp 98.1  F (36.7  C) (Oral)   Resp 16   SpO2 94%   Exam:  Constitutional: alert and no distress  HEENT: no ulcerations  Cardiovascular: RRR  Respiratory: BCTA  Gastrointestinal: soft, non tender, non distended  Ext: no BLE edema  Skin: no suspicious lesions or rashes.   Neurologic: CN grossly intact, no focal deficits   Psychiatric: mentation appears normal and affect normal/bright        I have reviewed history, examined  patient and discussed plan with the fellow/resident/SANDEEP.    I concur with the findings in this note.    Time spent on admission activities: 45 minutes.         '

## 2022-09-22 NOTE — TELEPHONE ENCOUNTER
"TRANSPLANT OR REPORT    Organ: Kidney/Pancreas  Laterality (if known): unknown  Organ Location: Local    UNOS ID: [id number]  Donor OR Time: 0400 9/23/22  Expected/Actual Cross Clamp Time: 0600  Expected Organ Arrival Time: 0700    Surgeon: Dr. Gómez  Time in OR: 0700  Time in 3C (N/A for [facility]): 0600    Recipient Details  Admission ETA: 1200  Unit: 7A  Isolation: none  Latex Allergy: no  : no  Diagnosis: DM     Liver Transplants  Bypass/Perfusion: na  Cellsaver: na  Hemodialysis: na  ~ \"RENAL STAFF TEACHING SERVICE MEDICINE\" : Remind [facility] Fellow to discuss with nephrology on call.  ~ [facility] Resource Nurse: bill  (Telephone Number for CRRT [phone])    Kidney/Panc Transplants  XM Status (Need to wait for XM?): yes    Liver or KP/PA Recipients - Vessel Banking:  Donor has positive serologies for HIV/HCV/HBV: no  Donor has risk criteria for HIV/HCV/HBV: no      Transplant Coordinator Contact Info: Pauline      Vessel Bank Information  Transplant hospitals must not store a donor s extra vessels if the donor has tested positive for any of the following:   - HIV by antibody, antigen, or nucleic acid test (MIGUEL)   - Hepatitis B surface antigen (HBsAg)   - Hepatitis B (HBV) by MIGUEL   - Hepatitis C (HCV) by antibody or MIGUEL     Extra vessels from donors that do not test positive for HIV, HBV, or HCV as above may be stored    "

## 2022-09-22 NOTE — ANESTHESIA PREPROCEDURE EVALUATION
Anesthesia Pre-Procedure Evaluation    Patient: Eden Hess   MRN: [id number] : 1970        Procedure : Procedure(s):  TRANSPLANT, KIDNEY AND PANCREAS,  DONOR          Past Medical History:   Diagnosis Date     (HFpEF) heart failure with preserved ejection fraction (H)      Anxiety and depression     [facility]     Diabetes mellitus type 1 (H)     Diagnosed at age 2 years old      End stage kidney disease (H)      Hypertension      Retinopathy      TIA (transient ischemic attack)       Past Surgical History:   Procedure Laterality Date      SECTION      x2     CREATE FISTULA ARTERIOVENOUS UPPER EXTREMITY      dialysis port, fistula-R arm      Allergies   Allergen Reactions     Amlodipine      Other reaction(s): Edema,generalized      Social History     Tobacco Use     Smoking status: Former Smoker     Types: Cigarettes     Quit date: 1997     Years since quittin.7     Smokeless tobacco: Never Used   Substance Use Topics     Alcohol use: Yes     Alcohol/week: 0.0 standard drinks      Wt Readings from Last 1 Encounters:   22 70.3 kg (155 lb)        Anesthesia Evaluation   Pt has had prior anesthetic. Type: MAC.    No history of anesthetic complications       ROS/MED HX  ENT/Pulmonary:     (+) tobacco use, Past use,  (-) asthma and COPD   Neurologic:     (+) TIA, date: ,     Cardiovascular:     (+) hypertension--CAD ---CHF (HFpEF prior to starting hemodialysis) Previous cardiac testing   Echo: Date: 2021 Results:  Interpretation Summary  Global and regional left ventricular function is normal with an EF of 60-65%.  Global right ventricular function is normal.  No significant valvular abnormalities present.  Pulmonary artery systolic pressure is normal.  The inferior vena cava was normal in size with preserved respiratory  variability.  No pericardial effusion is present.  Stress Test:  Date: 7/15/22  Results:  Normal, low-risk dobutamine stress echocardiogram.  The target heart rate was achieved. Normal heart rate and BP response to  dobutamine.  Normal biventricular size, thickness, and global systolic function at  baseline, LVEF=55-60%.  With dobutamine LVEF increased to >70% and LV cavity size decreased  appropriately.  No regional wall motion abnormalities are present at rest or with dobutamine.  No angina was elicited.  No ECG evidence of ischemia.  No significant valvular abnormalities are noted on screening Doppler exam.  The aortic root and visualized ascending aorta are normal.  ECG Reviewed:  Date: 9/22/22 Results:  NSR, poor r wave progression  Cath:  Date: 2/07/2019 Results:  Conclusions     1. Moderate OM 1 disease. Otherwise no significant coronary artery disease.       METS/Exercise Tolerance:     Hematologic:     (+) anemia,     Musculoskeletal:       GI/Hepatic:       Renal/Genitourinary:     (+) renal disease, type: ESRD, Pt requires dialysis, type: Hemodialysis,     Endo:     (+) type I DM, Last HgA1c: 6.9, date: 9/22/2022, Using insulin, - using insulin pump. Previously admitted for DM/DKA. Diabetic complications: nephropathy cardiac problems.     Psychiatric/Substance Use:     (+) psychiatric history depression     Infectious Disease:       Malignancy:       Other:            Physical Exam    Airway        Mallampati: II   TM distance: > 3 FB   Neck ROM: full   Mouth opening: > 3 cm    Respiratory Devices and Support         Dental  no notable dental history         Cardiovascular          Rhythm and rate: regular and normal     Pulmonary   pulmonary exam normal        breath sounds clear to auscultation           OUTSIDE LABS:  CBC:   Lab Results   Component Value Date    WBC 5.5 09/22/2022    WBC 5.9 08/13/2020    HGB 10.9 (L) 09/22/2022    HGB 12.8 08/13/2020    HCT 32.5 (L) 09/22/2022    HCT 39.2 08/13/2020     09/22/2022     08/13/2020     BMP:   Lab Results   Component  Value Date     (L) 09/22/2022     08/13/2020    POTASSIUM 3.9 09/22/2022    POTASSIUM 4.2 08/13/2020    CHLORIDE 93 (L) 09/22/2022    CHLORIDE 99 08/13/2020    CO2 29 09/22/2022    CO2 25 08/13/2020    BUN 33.4 (H) 09/22/2022    BUN 39 (H) 08/13/2020    CR 4.00 (H) 09/22/2022    CR 3.56 (H) 08/13/2020     (H) 09/22/2022     (H) 08/13/2020     COAGS:   Lab Results   Component Value Date    PTT 26 09/22/2022    INR 0.91 09/22/2022     POC: No results found for: BGM, HCG, HCGS  HEPATIC:   Lab Results   Component Value Date    ALBUMIN 4.1 09/22/2022    PROTTOTAL 6.9 09/22/2022    ALT 18 09/22/2022    AST 29 09/22/2022    ALKPHOS 89 09/22/2022    BILITOTAL 0.3 09/22/2022     OTHER:   Lab Results   Component Value Date    A1C 6.9 (H) 09/22/2022    BARBARA 9.9 09/22/2022       Anesthesia Plan    ASA Status:  3   NPO Status:  NPO Appropriate    Anesthesia Type: General.     - Airway: ETT   Induction: Intravenous.   Maintenance: Balanced.   Techniques and Equipment:     - Lines/Monitors: 2nd IV, Arterial Line, Central Line, BIS, CVP     - Blood: PRBC, T&C     Consents    Anesthesia Plan(s) and associated risks, benefits, and realistic alternatives discussed. Questions answered and patient/representative(s) expressed understanding.    - Discussed:     - Discussed with:  Patient      - Extended Intubation/Ventilatory Support Discussed: No.      - Patient is DNR/DNI Status: No    Use of blood products discussed: No .     Postoperative Care    Pain management: IV analgesics, Multi-modal analgesia.   PONV prophylaxis: Ondansetron (or other 5HT-3), Dexamethasone or Solumedrol     Comments:                Pee Cummins MD

## 2022-09-22 NOTE — CONSULTS
NEPHROLOGY TRANSPLANT NOTE    Please refer to Dr. Moore's note.     Abiola Cross MD  Nephrology Fellow   [phone]

## 2022-09-22 NOTE — PROGRESS NOTES
Admission/Transfer from: home  2 RN skin assessment completed. Yes  Significant findings include: old healed wound on R big toe, scab on L forearm, insulin pump and sensor on abdomen  WOC Nurse Consult Ordered?  No  Completed with Basia COONEY RN

## 2022-09-22 NOTE — PHARMACY-ADMISSION MEDICATION HISTORY
Admission Medication History Completed by Pharmacy    See [facility] Admission Navigator for allergy information, preferred outpatient pharmacy, prior to admission medications and immunization status.     Medication History Sources:     Patient    Dispense Report    Care Everywhere    Changes made to PTA medication list (reason):    Added:    Patient taking, per patient:  o Metoprolol succinate ER 25 mg tablet  o Benefiber Drink Mix PO    Deleted:     Patient no longer taking, per patient and dispense history:  o Allopurinol 100 mg oral tablet  o Bumetanide 2 mg tablet  o Culturelle capsule  o Ondansetron 4 mg ODT tablet    Changed:     Patient taking differently per patient:  o Calcium acetate 667 mg tablet: Take 667 mg by mouth --> Take 667 mg by mouth 3 times daily with meals  o Midodrine 10 mg tablet: Take 1 tablet by mouth --> On dialysis days, take 1 tablet by mouth before dialysis and 1 tablet 60-90 minutes before the end of dialysis.  o Novolog 100U/ml vial: No instructions --> Inject by subQ route via pump: 00:00-06:00 0.4u/hour, 06:00-12:00 0.6u/hour, 12:00-00:00 0.8u/hour. Addn'l bolus dosin.75u/15g carbs. Max 50u/day    Additional Information:    Patient is knowledgeable about her medications.    Patient reports she keeps Lantus insulin on hand in case pump fails, but is not currently taking.    Patient's last dose of Trulicity was Monday, .    Prior to Admission medications    Medication Sig Last Dose Taking? Auth Provider Long Term End Date   atorvastatin (LIPITOR) 40 MG tablet Take 40 mg by mouth daily 2022 at Unknown time Yes Reported, Patient     calcium acetate (PHOSLO) 667 MG CAPS capsule Take 667 mg by mouth 3 times daily (with meals) 2022 at AM Yes Reported, Patient     cholecalciferol (VITAMIN D3) 125 mcg (5000 units) capsule Take by mouth daily 2022 Yes Reported, Patient     Insulin Disposable Pump (OMNIPOD DASH 5 PACK PODS) MISC Inject 1 each Subcutaneous every 72 hours  2022 Yes Reported, Patient     metoprolol succinate ER (TOPROL XL) 25 MG 24 hr tablet Take one-half tablet (12.5 mg) by mouth on , Tuesday, Thursday, and Saturday (days without dialysis). Unknown Yes Reported, Patient No    midodrine (PROAMATINE) 10 MG tablet On dialysis days, take 1 tablet (10 mg) by mouth before dialysis and 1 tablet (10 mg) 60-90 minutes before the end of dialysis. Past Week at Unknown time Yes Reported, Patient     NOVOLOG VIAL 100 UNIT/ML soln Inject by subcutaneous route via pump: 00: 0.4 units/hour, 06:00-12:00 0.6 units/hour, 12:00-00:00 0.8 units/hour. Addn'l bolus dosin.75 units/15g carbs. Max 50 units/day 2022 Yes Reported, Patient Yes    pyridOXINE (VITAMIN B6) 100 MG TABS Take 1 tablet by mouth daily 2022 Yes Reported, Patient     SM ASPIRIN ADULT LOW STRENGTH 81 MG EC tablet Take 81 mg by mouth daily  2022 Yes Reported, Patient     TRULICITY 0.75 MG/0.5ML pen Inject 0.75 mg under the skin every 7 days (on Sundays). Past Week Yes Reported, Patient     venlafaxine (EFFEXOR-XR) 150 MG 24 hr capsule Take 150 mg by mouth daily 2022 at Unknown time Yes Reported, Patient Yes    Wheat Dextrin (BENEFIBER DRINK MIX PO) Take 1 teaspoonful by mouth as needed 2022 at AM Yes Reported, Patient     Glucagon 3 MG/DOSE POWD Spray 1 spray in nostril  Patient not taking: Reported on 2022 Not Taking  Reported, Patient Yes    LANTUS VIAL 100 UNIT/ML soln 30 Units At Bedtime   Patient not taking: Reported on 2022 Not Taking at Unknown time  Reported, Patient Yes          Date completed: 22    Medication history completed by: Sondra Cruz, Pharmacist Intern

## 2022-09-22 NOTE — LETTER
[facility] [address] [address]  [phone]    FACSIMILE TRANSMITTAL SHEET    TO: [facility]  COMPANY: [facility]  FAX NUMBER: [phone]       FROM: Brodie [facility]  PHONE: [phone]  DATE: 09/29/22    _____URGENT _____REVIEW ONLY _____PLEASE COMMENT____PLEASE REPLY    NOTES/COMMENTS:     Post transplant patient looking for twice weekly lab draws in the morning. Please review and let me know if you can accept.                                      IF YOU DID NOT RECEIVE THE CORRECT NUMBER OF PAGES OR THE FAX DID NOT COME THROUGH CLEARLY, PLEASE CALL THE SENDER     CONFIDENTIALITY STATEMENT: Confidential information that may accompany this transmission contains protected health information under state and federal law and is legally privileged. This information is intended only for the use of the individual or entity named above and may be used only for carrying out treatment, payment or other healthcare operations. The recipient or person responsible for delivering this information is prohibited by law from disclosing this information without proper authorization to any other party, unless required to do so by law or regulation. If you are not the intended recipient, you are hereby notified that any review, dissemination, distribution, or copying of this message is strictly prohibited. If you have received this communication in error, please destroy the materials and contact us immediately by calling the number listed above. No response indicates that the information was received by the appropriate authorized party

## 2022-09-23 ENCOUNTER — APPOINTMENT (OUTPATIENT)
Dept: ULTRASOUND IMAGING | Facility: CLINIC | Age: 52
DRG: 008 | End: 2022-09-23
Attending: SURGERY
Payer: COMMERCIAL

## 2022-09-23 ENCOUNTER — ANESTHESIA (OUTPATIENT)
Dept: SURGERY | Facility: CLINIC | Age: 52
DRG: 008 | End: 2022-09-23
Payer: COMMERCIAL

## 2022-09-23 ENCOUNTER — APPOINTMENT (OUTPATIENT)
Dept: GENERAL RADIOLOGY | Facility: CLINIC | Age: 52
DRG: 008 | End: 2022-09-23
Attending: SURGERY
Payer: COMMERCIAL

## 2022-09-23 ENCOUNTER — DOCUMENTATION ONLY (OUTPATIENT)
Dept: TRANSPLANT | Facility: CLINIC | Age: 52
End: 2022-09-23

## 2022-09-23 PROBLEM — Z76.82 PANCREAS TRANSPLANT CANDIDATE: Status: ACTIVE | Noted: 2022-09-23

## 2022-09-23 LAB
AMYLASE SERPL-CCNC: 386 U/L (ref 28–100)
ANION GAP SERPL CALCULATED.3IONS-SCNC: 12 MMOL/L (ref 7–15)
APTT PPP: 27 SECONDS (ref 22–38)
ATRIAL RATE - MUSE: 76 BPM
BASE EXCESS BLDA CALC-SCNC: -0.7 MMOL/L (ref -9.6–2)
BASE EXCESS BLDA CALC-SCNC: -5.2 MMOL/L (ref -9.6–2)
BASE EXCESS BLDA CALC-SCNC: -5.3 MMOL/L (ref -9.6–2)
BASE EXCESS BLDA CALC-SCNC: -6 MMOL/L (ref -9.6–2)
BASE EXCESS BLDA CALC-SCNC: -6.8 MMOL/L (ref -9.6–2)
BASE EXCESS BLDA CALC-SCNC: -7.4 MMOL/L (ref -9.6–2)
BASOPHILS # BLD AUTO: 0 10E3/UL (ref 0–0.2)
BASOPHILS NFR BLD AUTO: 0 %
BLD PROD TYP BPU: NORMAL
BLD PROD TYP BPU: NORMAL
BLOOD COMPONENT TYPE: NORMAL
BLOOD COMPONENT TYPE: NORMAL
BUN SERPL-MCNC: 38.1 MG/DL (ref 6–20)
CA-I BLD-MCNC: 4.5 MG/DL (ref 4.4–5.2)
CA-I BLD-MCNC: 4.6 MG/DL (ref 4.4–5.2)
CA-I BLD-MCNC: 4.7 MG/DL (ref 4.4–5.2)
CA-I BLD-MCNC: 4.9 MG/DL (ref 4.4–5.2)
CA-I BLD-MCNC: 5 MG/DL (ref 4.4–5.2)
CA-I BLD-MCNC: 5.1 MG/DL (ref 4.4–5.2)
CALCIUM SERPL-MCNC: 8.3 MG/DL (ref 8.6–10)
CHLORIDE SERPL-SCNC: 104 MMOL/L (ref 98–107)
CMV IGG SERPL IA-ACNC: <0.2 U/ML
CMV IGG SERPL IA-ACNC: NORMAL
CODING SYSTEM: NORMAL
CODING SYSTEM: NORMAL
CREAT SERPL-MCNC: 4.01 MG/DL (ref 0.51–0.95)
CROSSMATCH: NORMAL
CROSSMATCH: NORMAL
DEPRECATED HCO3 PLAS-SCNC: 20 MMOL/L (ref 22–29)
DIASTOLIC BLOOD PRESSURE - MUSE: NORMAL MMHG
EBV VCA IGG SER IA-ACNC: >750 U/ML
EBV VCA IGG SER IA-ACNC: POSITIVE
EBV VCA IGM SER IA-ACNC: <10 U/ML
EBV VCA IGM SER IA-ACNC: NORMAL
EOSINOPHIL # BLD AUTO: 0 10E3/UL (ref 0–0.7)
EOSINOPHIL NFR BLD AUTO: 0 %
ERYTHROCYTE [DISTWIDTH] IN BLOOD BY AUTOMATED COUNT: 14.9 % (ref 10–15)
ERYTHROCYTE [DISTWIDTH] IN BLOOD BY AUTOMATED COUNT: 15.1 % (ref 10–15)
GFR SERPL CREATININE-BSD FRML MDRD: 13 ML/MIN/1.73M2
GLUCOSE BLD-MCNC: 149 MG/DL (ref 70–99)
GLUCOSE BLD-MCNC: 230 MG/DL (ref 70–99)
GLUCOSE BLD-MCNC: 297 MG/DL (ref 70–99)
GLUCOSE BLD-MCNC: 398 MG/DL (ref 70–99)
GLUCOSE BLD-MCNC: 429 MG/DL (ref 70–99)
GLUCOSE BLD-MCNC: 79 MG/DL (ref 70–99)
GLUCOSE BLDC GLUCOMTR-MCNC: 118 MG/DL (ref 70–99)
GLUCOSE BLDC GLUCOMTR-MCNC: 150 MG/DL (ref 70–99)
GLUCOSE BLDC GLUCOMTR-MCNC: 172 MG/DL (ref 70–99)
GLUCOSE BLDC GLUCOMTR-MCNC: 74 MG/DL (ref 70–99)
GLUCOSE BLDC GLUCOMTR-MCNC: 83 MG/DL (ref 70–99)
GLUCOSE SERPL-MCNC: 66 MG/DL (ref 70–99)
HBV CORE AB SERPL QL IA: NONREACTIVE
HBV SURFACE AB SERPL IA-ACNC: 508.82 M[IU]/ML
HBV SURFACE AB SERPL IA-ACNC: REACTIVE M[IU]/ML
HBV SURFACE AG SERPL QL IA: NONREACTIVE
HCO3 BLDA-SCNC: 18 MMOL/L (ref 21–28)
HCO3 BLDA-SCNC: 19 MMOL/L (ref 21–28)
HCO3 BLDA-SCNC: 19 MMOL/L (ref 21–28)
HCO3 BLDA-SCNC: 20 MMOL/L (ref 21–28)
HCO3 BLDA-SCNC: 20 MMOL/L (ref 21–28)
HCO3 BLDA-SCNC: 24 MMOL/L (ref 21–28)
HCT VFR BLD AUTO: 26.8 % (ref 35–47)
HCT VFR BLD AUTO: 28.4 % (ref 35–47)
HCV AB SERPL QL IA: NONREACTIVE
HGB BLD-MCNC: 10.6 G/DL (ref 11.7–15.7)
HGB BLD-MCNC: 7.9 G/DL (ref 11.7–15.7)
HGB BLD-MCNC: 8.4 G/DL (ref 11.7–15.7)
HGB BLD-MCNC: 8.8 G/DL (ref 11.7–15.7)
HGB BLD-MCNC: 8.9 G/DL (ref 11.7–15.7)
HGB BLD-MCNC: 9.1 G/DL (ref 11.7–15.7)
HGB BLD-MCNC: 9.6 G/DL (ref 11.7–15.7)
HIV 1+2 AB+HIV1 P24 AG SERPL QL IA: NONREACTIVE
IMM GRANULOCYTES # BLD: 0 10E3/UL
IMM GRANULOCYTES NFR BLD: 0 %
INR PPP: 1.09 (ref 0.85–1.15)
INTERPRETATION ECG - MUSE: NORMAL
LACTATE BLD-SCNC: 0.5 MMOL/L
LACTATE BLD-SCNC: 1.7 MMOL/L
LACTATE BLD-SCNC: 1.7 MMOL/L
LACTATE BLD-SCNC: 2.2 MMOL/L
LACTATE BLD-SCNC: 3.2 MMOL/L
LACTATE BLD-SCNC: 3.7 MMOL/L
LIPASE SERPL-CCNC: 931 U/L (ref 13–60)
LYMPHOCYTES # BLD AUTO: 0 10E3/UL (ref 0.8–5.3)
LYMPHOCYTES NFR BLD AUTO: 0 %
MAGNESIUM SERPL-MCNC: 1.8 MG/DL (ref 1.7–2.3)
MCH RBC QN AUTO: 34.1 PG (ref 26.5–33)
MCH RBC QN AUTO: 34.5 PG (ref 26.5–33)
MCHC RBC AUTO-ENTMCNC: 31.3 G/DL (ref 31.5–36.5)
MCHC RBC AUTO-ENTMCNC: 32 G/DL (ref 31.5–36.5)
MCV RBC AUTO: 108 FL (ref 78–100)
MCV RBC AUTO: 109 FL (ref 78–100)
MONOCYTES # BLD AUTO: 0.1 10E3/UL (ref 0–1.3)
MONOCYTES NFR BLD AUTO: 1 %
NEUTROPHILS # BLD AUTO: 5.8 10E3/UL (ref 1.6–8.3)
NEUTROPHILS NFR BLD AUTO: 99 %
NRBC # BLD AUTO: 0 10E3/UL
NRBC BLD AUTO-RTO: 0 /100
O2/TOTAL GAS SETTING VFR VENT: 50 %
P AXIS - MUSE: 20 DEGREES
PCO2 BLDA: 35 MM HG (ref 35–45)
PCO2 BLDA: 35 MM HG (ref 35–45)
PCO2 BLDA: 36 MM HG (ref 35–45)
PCO2 BLDA: 37 MM HG (ref 35–45)
PCO2 BLDA: 37 MM HG (ref 35–45)
PCO2 BLDA: 38 MM HG (ref 35–45)
PH BLDA: 7.31 [PH] (ref 7.35–7.45)
PH BLDA: 7.32 [PH] (ref 7.35–7.45)
PH BLDA: 7.33 [PH] (ref 7.35–7.45)
PH BLDA: 7.34 [PH] (ref 7.35–7.45)
PH BLDA: 7.36 [PH] (ref 7.35–7.45)
PH BLDA: 7.41 [PH] (ref 7.35–7.45)
PHOSPHATE SERPL-MCNC: 1.3 MG/DL (ref 2.5–4.5)
PLATELET # BLD AUTO: 186 10E3/UL (ref 150–450)
PLATELET # BLD AUTO: 190 10E3/UL (ref 150–450)
PO2 BLDA: 113 MM HG (ref 80–105)
PO2 BLDA: 116 MM HG (ref 80–105)
PO2 BLDA: 120 MM HG (ref 80–105)
PO2 BLDA: 131 MM HG (ref 80–105)
PO2 BLDA: 134 MM HG (ref 80–105)
PO2 BLDA: 152 MM HG (ref 80–105)
POTASSIUM BLD-SCNC: 3.3 MMOL/L (ref 3.5–5)
POTASSIUM BLD-SCNC: 3.3 MMOL/L (ref 3.5–5)
POTASSIUM BLD-SCNC: 3.4 MMOL/L (ref 3.5–5)
POTASSIUM BLD-SCNC: 3.8 MMOL/L (ref 3.5–5)
POTASSIUM BLD-SCNC: 4.2 MMOL/L (ref 3.5–5)
POTASSIUM BLD-SCNC: 5.4 MMOL/L (ref 3.5–5)
POTASSIUM SERPL-SCNC: 3.8 MMOL/L (ref 3.4–5.3)
POTASSIUM SERPL-SCNC: 3.8 MMOL/L (ref 3.4–5.3)
PR INTERVAL - MUSE: 146 MS
QRS DURATION - MUSE: 80 MS
QT - MUSE: 392 MS
QTC - MUSE: 441 MS
R AXIS - MUSE: -10 DEGREES
RADIOLOGIST FLAGS: ABNORMAL
RBC # BLD AUTO: 2.46 10E6/UL (ref 3.8–5.2)
RBC # BLD AUTO: 2.64 10E6/UL (ref 3.8–5.2)
SODIUM BLD-SCNC: 129 MMOL/L (ref 133–144)
SODIUM BLD-SCNC: 131 MMOL/L (ref 133–144)
SODIUM BLD-SCNC: 132 MMOL/L (ref 133–144)
SODIUM BLD-SCNC: 133 MMOL/L (ref 133–144)
SODIUM BLD-SCNC: 134 MMOL/L (ref 133–144)
SODIUM BLD-SCNC: 135 MMOL/L (ref 133–144)
SODIUM SERPL-SCNC: 136 MMOL/L (ref 136–145)
SYSTOLIC BLOOD PRESSURE - MUSE: NORMAL MMHG
T AXIS - MUSE: 15 DEGREES
UNIT ABO/RH: NORMAL
UNIT ABO/RH: NORMAL
UNIT NUMBER: NORMAL
UNIT NUMBER: NORMAL
UNIT STATUS: NORMAL
UNIT STATUS: NORMAL
UNIT TYPE ISBT: 5100
UNIT TYPE ISBT: 5100
VENTRICULAR RATE- MUSE: 76 BPM
WBC # BLD AUTO: 5.9 10E3/UL (ref 4–11)
WBC # BLD AUTO: 7.1 10E3/UL (ref 4–11)

## 2022-09-23 PROCEDURE — 258N000003 HC RX IP 258 OP 636: Performed by: PHYSICIAN ASSISTANT

## 2022-09-23 PROCEDURE — 812N000002 HC ACQUISITION KIDNEY CADAVER OF SPK

## 2022-09-23 PROCEDURE — 82150 ASSAY OF AMYLASE: CPT | Performed by: SURGERY

## 2022-09-23 PROCEDURE — P9045 ALBUMIN (HUMAN), 5%, 250 ML: HCPCS | Performed by: SURGERY

## 2022-09-23 PROCEDURE — 250N000011 HC RX IP 250 OP 636: Performed by: SURGERY

## 2022-09-23 PROCEDURE — 258N000002 HC RX IP 258 OP 250

## 2022-09-23 PROCEDURE — 250N000011 HC RX IP 250 OP 636

## 2022-09-23 PROCEDURE — 50360 RNL ALTRNSPLJ W/O RCP NFRCT: CPT | Mod: LT | Performed by: SURGERY

## 2022-09-23 PROCEDURE — 999N000141 HC STATISTIC PRE-PROCEDURE NURSING ASSESSMENT: Performed by: SURGERY

## 2022-09-23 PROCEDURE — 93975 VASCULAR STUDY: CPT | Mod: 26 | Performed by: STUDENT IN AN ORGANIZED HEALTH CARE EDUCATION/TRAINING PROGRAM

## 2022-09-23 PROCEDURE — 93975 VASCULAR STUDY: CPT | Mod: 76

## 2022-09-23 PROCEDURE — 88304 TISSUE EXAM BY PATHOLOGIST: CPT | Mod: TC | Performed by: SURGERY

## 2022-09-23 PROCEDURE — 258N000003 HC RX IP 258 OP 636: Performed by: ANESTHESIOLOGY

## 2022-09-23 PROCEDURE — 710N000011 HC RECOVERY PHASE 1, LEVEL 3, PER MIN: Performed by: SURGERY

## 2022-09-23 PROCEDURE — 76776 US EXAM K TRANSPL W/DOPPLER: CPT | Mod: 26 | Performed by: STUDENT IN AN ORGANIZED HEALTH CARE EDUCATION/TRAINING PROGRAM

## 2022-09-23 PROCEDURE — 93975 VASCULAR STUDY: CPT

## 2022-09-23 PROCEDURE — 250N000009 HC RX 250

## 2022-09-23 PROCEDURE — 83735 ASSAY OF MAGNESIUM: CPT | Performed by: SURGERY

## 2022-09-23 PROCEDURE — 71045 X-RAY EXAM CHEST 1 VIEW: CPT | Mod: 26 | Performed by: STUDENT IN AN ORGANIZED HEALTH CARE EDUCATION/TRAINING PROGRAM

## 2022-09-23 PROCEDURE — 250N000009 HC RX 250: Performed by: PHYSICIAN ASSISTANT

## 2022-09-23 PROCEDURE — 76776 US EXAM K TRANSPL W/DOPPLER: CPT

## 2022-09-23 PROCEDURE — 0TY10Z0 TRANSPLANTATION OF LEFT KIDNEY, ALLOGENEIC, OPEN APPROACH: ICD-10-PCS | Performed by: SURGERY

## 2022-09-23 PROCEDURE — 82803 BLOOD GASES ANY COMBINATION: CPT

## 2022-09-23 PROCEDURE — 85027 COMPLETE CBC AUTOMATED: CPT | Performed by: SURGERY

## 2022-09-23 PROCEDURE — 250N000011 HC RX IP 250 OP 636: Performed by: ANESTHESIOLOGY

## 2022-09-23 PROCEDURE — 258N000003 HC RX IP 258 OP 636: Performed by: SURGERY

## 2022-09-23 PROCEDURE — 120N000003 HC R&B IMCU UMMC

## 2022-09-23 PROCEDURE — 250N000011 HC RX IP 250 OP 636: Performed by: PHYSICIAN ASSISTANT

## 2022-09-23 PROCEDURE — 85730 THROMBOPLASTIN TIME PARTIAL: CPT | Performed by: SURGERY

## 2022-09-23 PROCEDURE — 0FYG0Z0 TRANSPLANTATION OF PANCREAS, ALLOGENEIC, OPEN APPROACH: ICD-10-PCS | Performed by: SURGERY

## 2022-09-23 PROCEDURE — 82330 ASSAY OF CALCIUM: CPT

## 2022-09-23 PROCEDURE — 250N000009 HC RX 250: Performed by: ANESTHESIOLOGY

## 2022-09-23 PROCEDURE — 84295 ASSAY OF SERUM SODIUM: CPT | Performed by: SURGERY

## 2022-09-23 PROCEDURE — 250N000009 HC RX 250: Performed by: SURGERY

## 2022-09-23 PROCEDURE — 360N000078 HC SURGERY LEVEL 5, PER MIN: Performed by: SURGERY

## 2022-09-23 PROCEDURE — 370N000017 HC ANESTHESIA TECHNICAL FEE, PER MIN: Performed by: SURGERY

## 2022-09-23 PROCEDURE — 48554 TRANSPL ALLOGRAFT PANCREAS: CPT | Mod: GC | Performed by: SURGERY

## 2022-09-23 PROCEDURE — 88304 TISSUE EXAM BY PATHOLOGIST: CPT | Mod: 26 | Performed by: PATHOLOGY

## 2022-09-23 PROCEDURE — 85610 PROTHROMBIN TIME: CPT | Performed by: SURGERY

## 2022-09-23 PROCEDURE — 3E033PZ INTRODUCTION OF PLATELET INHIBITOR INTO PERIPHERAL VEIN, PERCUTANEOUS APPROACH: ICD-10-PCS | Performed by: SURGERY

## 2022-09-23 PROCEDURE — 812N000013 HC ACQUISITION PANCREAS CADAVER OF SPK

## 2022-09-23 PROCEDURE — 272N000001 HC OR GENERAL SUPPLY STERILE: Performed by: SURGERY

## 2022-09-23 PROCEDURE — 250N000025 HC SEVOFLURANE, PER MIN: Performed by: SURGERY

## 2022-09-23 PROCEDURE — 84100 ASSAY OF PHOSPHORUS: CPT | Performed by: SURGERY

## 2022-09-23 PROCEDURE — 83690 ASSAY OF LIPASE: CPT | Performed by: SURGERY

## 2022-09-23 PROCEDURE — 0DTJ0ZZ RESECTION OF APPENDIX, OPEN APPROACH: ICD-10-PCS | Performed by: SURGERY

## 2022-09-23 PROCEDURE — 999N000065 XR CHEST PORT 1 VIEW

## 2022-09-23 PROCEDURE — 84132 ASSAY OF SERUM POTASSIUM: CPT | Performed by: SURGERY

## 2022-09-23 PROCEDURE — 85025 COMPLETE CBC W/AUTO DIFF WBC: CPT | Performed by: SURGERY

## 2022-09-23 PROCEDURE — 82962 GLUCOSE BLOOD TEST: CPT

## 2022-09-23 RX ORDER — LIDOCAINE HYDROCHLORIDE 20 MG/ML
INJECTION, SOLUTION INFILTRATION; PERINEURAL PRN
Status: DISCONTINUED | OUTPATIENT
Start: 2022-09-23 | End: 2022-09-23

## 2022-09-23 RX ORDER — MIDODRINE HYDROCHLORIDE 10 MG/1
5 TABLET ORAL
Status: ON HOLD | COMMUNITY
Start: 2022-06-21 | End: 2022-09-26

## 2022-09-23 RX ORDER — ONDANSETRON 4 MG/1
4 TABLET, ORALLY DISINTEGRATING ORAL EVERY 30 MIN PRN
Status: DISCONTINUED | OUTPATIENT
Start: 2022-09-23 | End: 2022-09-23 | Stop reason: HOSPADM

## 2022-09-23 RX ORDER — OCTREOTIDE ACETATE 100 UG/ML
100 INJECTION, SOLUTION INTRAVENOUS; SUBCUTANEOUS EVERY 12 HOURS
Status: COMPLETED | OUTPATIENT
Start: 2022-09-24 | End: 2022-09-28

## 2022-09-23 RX ORDER — ONDANSETRON 2 MG/ML
4 INJECTION INTRAMUSCULAR; INTRAVENOUS EVERY 6 HOURS PRN
Status: DISCONTINUED | OUTPATIENT
Start: 2022-09-23 | End: 2022-10-02 | Stop reason: HOSPADM

## 2022-09-23 RX ORDER — MAGNESIUM OXIDE 400 MG/1
400 TABLET ORAL EVERY 24 HOURS
Status: DISCONTINUED | OUTPATIENT
Start: 2022-09-25 | End: 2022-09-28

## 2022-09-23 RX ORDER — HYDROMORPHONE HCL IN WATER/PF 6 MG/30 ML
0.4 PATIENT CONTROLLED ANALGESIA SYRINGE INTRAVENOUS
Status: DISCONTINUED | OUTPATIENT
Start: 2022-09-23 | End: 2022-09-28

## 2022-09-23 RX ORDER — HEPARIN SODIUM 1000 [USP'U]/ML
INJECTION, SOLUTION INTRAVENOUS; SUBCUTANEOUS PRN
Status: DISCONTINUED | OUTPATIENT
Start: 2022-09-23 | End: 2022-09-23

## 2022-09-23 RX ORDER — ACETAMINOPHEN 325 MG/1
650 TABLET ORAL EVERY 4 HOURS PRN
Status: DISCONTINUED | OUTPATIENT
Start: 2022-09-26 | End: 2022-10-02 | Stop reason: HOSPADM

## 2022-09-23 RX ORDER — MANNITOL 20 G/100ML
INJECTION, SOLUTION INTRAVENOUS PRN
Status: DISCONTINUED | OUTPATIENT
Start: 2022-09-23 | End: 2022-09-23

## 2022-09-23 RX ORDER — BISACODYL 10 MG
10 SUPPOSITORY, RECTAL RECTAL DAILY PRN
Status: DISCONTINUED | OUTPATIENT
Start: 2022-09-23 | End: 2022-10-02 | Stop reason: HOSPADM

## 2022-09-23 RX ORDER — DEXTROSE, SODIUM CHLORIDE, SODIUM LACTATE, POTASSIUM CHLORIDE, AND CALCIUM CHLORIDE 5; .6; .31; .03; .02 G/100ML; G/100ML; G/100ML; G/100ML; G/100ML
500 INJECTION, SOLUTION INTRAVENOUS ONCE
Status: COMPLETED | OUTPATIENT
Start: 2022-09-23 | End: 2022-09-23

## 2022-09-23 RX ORDER — ONDANSETRON 4 MG/1
4 TABLET, ORALLY DISINTEGRATING ORAL EVERY 6 HOURS PRN
Status: DISCONTINUED | OUTPATIENT
Start: 2022-09-23 | End: 2022-10-02 | Stop reason: HOSPADM

## 2022-09-23 RX ORDER — SODIUM CHLORIDE, SODIUM GLUCONATE, SODIUM ACETATE, POTASSIUM CHLORIDE AND MAGNESIUM CHLORIDE 526; 502; 368; 37; 30 MG/100ML; MG/100ML; MG/100ML; MG/100ML; MG/100ML
INJECTION, SOLUTION INTRAVENOUS CONTINUOUS PRN
Status: DISCONTINUED | OUTPATIENT
Start: 2022-09-23 | End: 2022-09-23

## 2022-09-23 RX ORDER — CALCIUM CHLORIDE 100 MG/ML
INJECTION INTRAVENOUS; INTRAVENTRICULAR PRN
Status: DISCONTINUED | OUTPATIENT
Start: 2022-09-23 | End: 2022-09-23

## 2022-09-23 RX ORDER — HYDROMORPHONE HCL IN WATER/PF 6 MG/30 ML
0.2 PATIENT CONTROLLED ANALGESIA SYRINGE INTRAVENOUS
Status: DISCONTINUED | OUTPATIENT
Start: 2022-09-23 | End: 2022-09-28

## 2022-09-23 RX ORDER — ONDANSETRON 2 MG/ML
4 INJECTION INTRAMUSCULAR; INTRAVENOUS EVERY 30 MIN PRN
Status: DISCONTINUED | OUTPATIENT
Start: 2022-09-23 | End: 2022-09-23 | Stop reason: HOSPADM

## 2022-09-23 RX ORDER — FUROSEMIDE 10 MG/ML
INJECTION INTRAMUSCULAR; INTRAVENOUS PRN
Status: DISCONTINUED | OUTPATIENT
Start: 2022-09-23 | End: 2022-09-23

## 2022-09-23 RX ORDER — MYCOPHENOLATE MOFETIL 200 MG/ML
1000 POWDER, FOR SUSPENSION ORAL
Status: DISCONTINUED | OUTPATIENT
Start: 2022-09-24 | End: 2022-09-24

## 2022-09-23 RX ORDER — HYDROMORPHONE HYDROCHLORIDE 1 MG/ML
0.4 INJECTION, SOLUTION INTRAMUSCULAR; INTRAVENOUS; SUBCUTANEOUS EVERY 5 MIN PRN
Status: DISCONTINUED | OUTPATIENT
Start: 2022-09-23 | End: 2022-09-23 | Stop reason: HOSPADM

## 2022-09-23 RX ORDER — NALOXONE HYDROCHLORIDE 0.4 MG/ML
0.2 INJECTION, SOLUTION INTRAMUSCULAR; INTRAVENOUS; SUBCUTANEOUS
Status: DISCONTINUED | OUTPATIENT
Start: 2022-09-23 | End: 2022-10-02 | Stop reason: HOSPADM

## 2022-09-23 RX ORDER — PROCHLORPERAZINE MALEATE 10 MG
10 TABLET ORAL EVERY 6 HOURS PRN
Status: DISCONTINUED | OUTPATIENT
Start: 2022-09-23 | End: 2022-10-02 | Stop reason: HOSPADM

## 2022-09-23 RX ORDER — ASPIRIN 81 MG/1
1 TABLET, CHEWABLE ORAL
Status: ON HOLD | COMMUNITY
Start: 2020-04-20 | End: 2022-10-02

## 2022-09-23 RX ORDER — POLYETHYLENE GLYCOL 3350 17 G/17G
17 POWDER, FOR SOLUTION ORAL DAILY
Status: DISCONTINUED | OUTPATIENT
Start: 2022-09-24 | End: 2022-09-28

## 2022-09-23 RX ORDER — SODIUM CHLORIDE 9 MG/ML
INJECTION, SOLUTION INTRAVENOUS CONTINUOUS PRN
Status: DISCONTINUED | OUTPATIENT
Start: 2022-09-23 | End: 2022-09-23

## 2022-09-23 RX ORDER — FENTANYL CITRATE 50 UG/ML
INJECTION, SOLUTION INTRAMUSCULAR; INTRAVENOUS PRN
Status: DISCONTINUED | OUTPATIENT
Start: 2022-09-23 | End: 2022-09-23

## 2022-09-23 RX ORDER — NALOXONE HYDROCHLORIDE 0.4 MG/ML
0.4 INJECTION, SOLUTION INTRAMUSCULAR; INTRAVENOUS; SUBCUTANEOUS
Status: DISCONTINUED | OUTPATIENT
Start: 2022-09-23 | End: 2022-10-02 | Stop reason: HOSPADM

## 2022-09-23 RX ORDER — AMOXICILLIN 250 MG
1 CAPSULE ORAL 2 TIMES DAILY
Status: DISCONTINUED | OUTPATIENT
Start: 2022-09-23 | End: 2022-09-28

## 2022-09-23 RX ORDER — OXYCODONE HYDROCHLORIDE 5 MG/1
5 TABLET ORAL EVERY 4 HOURS PRN
Status: DISCONTINUED | OUTPATIENT
Start: 2022-09-23 | End: 2022-09-23 | Stop reason: HOSPADM

## 2022-09-23 RX ORDER — ACETAMINOPHEN 325 MG/1
975 TABLET ORAL EVERY 8 HOURS
Status: COMPLETED | OUTPATIENT
Start: 2022-09-24 | End: 2022-09-26

## 2022-09-23 RX ORDER — CLOTRIMAZOLE 10 MG/1
10 LOZENGE ORAL 4 TIMES DAILY
Status: DISCONTINUED | OUTPATIENT
Start: 2022-09-30 | End: 2022-10-02 | Stop reason: HOSPADM

## 2022-09-23 RX ORDER — FENTANYL CITRATE 50 UG/ML
50 INJECTION, SOLUTION INTRAMUSCULAR; INTRAVENOUS EVERY 5 MIN PRN
Status: DISCONTINUED | OUTPATIENT
Start: 2022-09-23 | End: 2022-09-23 | Stop reason: HOSPADM

## 2022-09-23 RX ORDER — PROPOFOL 10 MG/ML
INJECTION, EMULSION INTRAVENOUS PRN
Status: DISCONTINUED | OUTPATIENT
Start: 2022-09-23 | End: 2022-09-23

## 2022-09-23 RX ORDER — ASPIRIN 81 MG/1
81 TABLET, CHEWABLE ORAL DAILY
Status: DISCONTINUED | OUTPATIENT
Start: 2022-09-24 | End: 2022-09-27

## 2022-09-23 RX ORDER — ONDANSETRON 2 MG/ML
INJECTION INTRAMUSCULAR; INTRAVENOUS PRN
Status: DISCONTINUED | OUTPATIENT
Start: 2022-09-23 | End: 2022-09-23

## 2022-09-23 RX ORDER — SODIUM CHLORIDE, SODIUM LACTATE, POTASSIUM CHLORIDE, CALCIUM CHLORIDE 600; 310; 30; 20 MG/100ML; MG/100ML; MG/100ML; MG/100ML
INJECTION, SOLUTION INTRAVENOUS CONTINUOUS PRN
Status: DISCONTINUED | OUTPATIENT
Start: 2022-09-23 | End: 2022-09-23

## 2022-09-23 RX ORDER — OXYCODONE HYDROCHLORIDE 10 MG/1
10 TABLET ORAL EVERY 4 HOURS PRN
Status: DISCONTINUED | OUTPATIENT
Start: 2022-09-23 | End: 2022-10-02 | Stop reason: HOSPADM

## 2022-09-23 RX ORDER — HEPARIN SODIUM 10000 [USP'U]/100ML
500 INJECTION, SOLUTION INTRAVENOUS CONTINUOUS
Status: DISCONTINUED | OUTPATIENT
Start: 2022-09-23 | End: 2022-09-28

## 2022-09-23 RX ORDER — OXYCODONE HYDROCHLORIDE 5 MG/1
5 TABLET ORAL EVERY 4 HOURS PRN
Status: DISCONTINUED | OUTPATIENT
Start: 2022-09-23 | End: 2022-10-02 | Stop reason: HOSPADM

## 2022-09-23 RX ORDER — SODIUM CHLORIDE 450 MG/100ML
INJECTION, SOLUTION INTRAVENOUS CONTINUOUS PRN
Status: DISCONTINUED | OUTPATIENT
Start: 2022-09-23 | End: 2022-09-23

## 2022-09-23 RX ORDER — PIPERACILLIN SODIUM, TAZOBACTAM SODIUM 2; .25 G/10ML; G/10ML
2.25 INJECTION, POWDER, LYOPHILIZED, FOR SOLUTION INTRAVENOUS EVERY 8 HOURS
Status: COMPLETED | OUTPATIENT
Start: 2022-09-24 | End: 2022-09-26

## 2022-09-23 RX ORDER — VALGANCICLOVIR 450 MG/1
450 TABLET, FILM COATED ORAL
Status: DISCONTINUED | OUTPATIENT
Start: 2022-09-26 | End: 2022-10-02 | Stop reason: HOSPADM

## 2022-09-23 RX ORDER — DEXTROSE MONOHYDRATE 25 G/50ML
25-50 INJECTION, SOLUTION INTRAVENOUS
Status: DISCONTINUED | OUTPATIENT
Start: 2022-09-23 | End: 2022-09-23

## 2022-09-23 RX ORDER — SODIUM CHLORIDE, SODIUM LACTATE, POTASSIUM CHLORIDE, CALCIUM CHLORIDE 600; 310; 30; 20 MG/100ML; MG/100ML; MG/100ML; MG/100ML
INJECTION, SOLUTION INTRAVENOUS CONTINUOUS
Status: DISCONTINUED | OUTPATIENT
Start: 2022-09-23 | End: 2022-09-23 | Stop reason: HOSPADM

## 2022-09-23 RX ORDER — NICOTINE POLACRILEX 4 MG
15-30 LOZENGE BUCCAL
Status: DISCONTINUED | OUTPATIENT
Start: 2022-09-23 | End: 2022-09-23

## 2022-09-23 RX ADMIN — CALCIUM CHLORIDE 500 MG: 100 INJECTION INTRAVENOUS; INTRAVENTRICULAR at 15:49

## 2022-09-23 RX ADMIN — FENTANYL CITRATE 50 MCG: 50 INJECTION, SOLUTION INTRAMUSCULAR; INTRAVENOUS at 15:21

## 2022-09-23 RX ADMIN — MIDAZOLAM 2 MG: 1 INJECTION INTRAMUSCULAR; INTRAVENOUS at 11:29

## 2022-09-23 RX ADMIN — SODIUM CHLORIDE: 9 INJECTION, SOLUTION INTRAVENOUS at 14:57

## 2022-09-23 RX ADMIN — CALCIUM CHLORIDE 350 MG: 100 INJECTION INTRAVENOUS; INTRAVENTRICULAR at 14:46

## 2022-09-23 RX ADMIN — CEFUROXIME 1.5 G: 1.5 INJECTION, POWDER, FOR SOLUTION INTRAVENOUS at 15:51

## 2022-09-23 RX ADMIN — CALCIUM CHLORIDE 500 MG: 100 INJECTION INTRAVENOUS; INTRAVENTRICULAR at 14:45

## 2022-09-23 RX ADMIN — HEPARIN SODIUM 400 UNITS/HR: 10000 INJECTION, SOLUTION INTRAVENOUS at 20:46

## 2022-09-23 RX ADMIN — ONDANSETRON 4 MG: 2 INJECTION INTRAMUSCULAR; INTRAVENOUS at 18:41

## 2022-09-23 RX ADMIN — SODIUM CHLORIDE, SODIUM LACTATE, POTASSIUM CHLORIDE, CALCIUM CHLORIDE AND DEXTROSE MONOHYDRATE 500 ML: 5; 600; 310; 30; 20 INJECTION, SOLUTION INTRAVENOUS at 19:27

## 2022-09-23 RX ADMIN — FUROSEMIDE 40 MG: 10 INJECTION, SOLUTION INTRAVENOUS at 14:14

## 2022-09-23 RX ADMIN — MANNITOL 25 G: 20 INJECTION, SOLUTION INTRAVENOUS at 16:25

## 2022-09-23 RX ADMIN — Medication 50 MG: at 11:43

## 2022-09-23 RX ADMIN — MANNITOL 25 G: 20 INJECTION, SOLUTION INTRAVENOUS at 14:14

## 2022-09-23 RX ADMIN — Medication 20 MG: at 12:30

## 2022-09-23 RX ADMIN — FUROSEMIDE 60 MG: 10 INJECTION, SOLUTION INTRAVENOUS at 17:35

## 2022-09-23 RX ADMIN — ONDANSETRON 4 MG: 2 INJECTION INTRAMUSCULAR; INTRAVENOUS at 17:22

## 2022-09-23 RX ADMIN — Medication 10 MG: at 16:45

## 2022-09-23 RX ADMIN — SODIUM CHLORIDE, POTASSIUM CHLORIDE, SODIUM LACTATE AND CALCIUM CHLORIDE: 600; 310; 30; 20 INJECTION, SOLUTION INTRAVENOUS at 11:37

## 2022-09-23 RX ADMIN — SODIUM CHLORIDE 500 MG: 9 INJECTION, SOLUTION INTRAVENOUS at 11:46

## 2022-09-23 RX ADMIN — CEFUROXIME 1.5 G: 1.5 INJECTION, POWDER, FOR SOLUTION INTRAVENOUS at 11:46

## 2022-09-23 RX ADMIN — SODIUM CHLORIDE, SODIUM LACTATE, POTASSIUM CHLORIDE, CALCIUM CHLORIDE AND DEXTROSE MONOHYDRATE: 5; 600; 310; 30; 20 INJECTION, SOLUTION INTRAVENOUS at 18:26

## 2022-09-23 RX ADMIN — Medication 30 MG: at 12:59

## 2022-09-23 RX ADMIN — ALBUMIN (HUMAN) 25 G: 12.5 SOLUTION INTRAVENOUS at 20:05

## 2022-09-23 RX ADMIN — SODIUM CHLORIDE, SODIUM GLUCONATE, SODIUM ACETATE, POTASSIUM CHLORIDE AND MAGNESIUM CHLORIDE: 526; 502; 368; 37; 30 INJECTION, SOLUTION INTRAVENOUS at 17:00

## 2022-09-23 RX ADMIN — SODIUM CHLORIDE: 4.5 INJECTION, SOLUTION INTRAVENOUS at 17:15

## 2022-09-23 RX ADMIN — SUGAMMADEX 200 MG: 100 INJECTION, SOLUTION INTRAVENOUS at 17:51

## 2022-09-23 RX ADMIN — LIDOCAINE HYDROCHLORIDE 60 MG: 20 INJECTION, SOLUTION INFILTRATION; PERINEURAL at 11:43

## 2022-09-23 RX ADMIN — MYCOPHENOLATE MOFETIL 1000 MG: 500 INJECTION, POWDER, LYOPHILIZED, FOR SOLUTION INTRAVENOUS at 12:43

## 2022-09-23 RX ADMIN — CALCIUM CHLORIDE 150 MG: 100 INJECTION INTRAVENOUS; INTRAVENTRICULAR at 14:17

## 2022-09-23 RX ADMIN — SODIUM CHLORIDE, POTASSIUM CHLORIDE, SODIUM LACTATE AND CALCIUM CHLORIDE: 600; 310; 30; 20 INJECTION, SOLUTION INTRAVENOUS at 17:40

## 2022-09-23 RX ADMIN — Medication 0.01 UNITS/KG/HR: at 12:55

## 2022-09-23 RX ADMIN — FENTANYL CITRATE 50 MCG: 50 INJECTION, SOLUTION INTRAMUSCULAR; INTRAVENOUS at 15:37

## 2022-09-23 RX ADMIN — SODIUM CHLORIDE, SODIUM GLUCONATE, SODIUM ACETATE, POTASSIUM CHLORIDE AND MAGNESIUM CHLORIDE: 526; 502; 368; 37; 30 INJECTION, SOLUTION INTRAVENOUS at 16:09

## 2022-09-23 RX ADMIN — FUROSEMIDE 40 MG: 10 INJECTION, SOLUTION INTRAVENOUS at 16:21

## 2022-09-23 RX ADMIN — PROPOFOL 150 MG: 10 INJECTION, EMULSION INTRAVENOUS at 11:43

## 2022-09-23 RX ADMIN — SODIUM CHLORIDE, PRESERVATIVE FREE: 5 INJECTION INTRAVENOUS at 22:23

## 2022-09-23 RX ADMIN — ANTI-THYMOCYTE GLOBULIN (RABBIT) 140.5 MG: 5 INJECTION, POWDER, LYOPHILIZED, FOR SOLUTION INTRAVENOUS at 12:40

## 2022-09-23 RX ADMIN — HEPARIN SODIUM 2500 UNITS: 1000 INJECTION INTRAVENOUS; SUBCUTANEOUS at 13:30

## 2022-09-23 RX ADMIN — FENTANYL CITRATE 100 MCG: 50 INJECTION, SOLUTION INTRAMUSCULAR; INTRAVENOUS at 12:54

## 2022-09-23 RX ADMIN — SODIUM CHLORIDE: 9 INJECTION, SOLUTION INTRAVENOUS at 11:45

## 2022-09-23 RX ADMIN — FENTANYL CITRATE 50 MCG: 50 INJECTION, SOLUTION INTRAMUSCULAR; INTRAVENOUS at 18:52

## 2022-09-23 RX ADMIN — HYDROMORPHONE HYDROCHLORIDE 0.5 MG: 1 INJECTION, SOLUTION INTRAMUSCULAR; INTRAVENOUS; SUBCUTANEOUS at 13:54

## 2022-09-23 RX ADMIN — HYDROMORPHONE HYDROCHLORIDE 0.5 MG: 1 INJECTION, SOLUTION INTRAMUSCULAR; INTRAVENOUS; SUBCUTANEOUS at 16:57

## 2022-09-23 RX ADMIN — SODIUM CHLORIDE: 4.5 INJECTION, SOLUTION INTRAVENOUS at 17:17

## 2022-09-23 RX ADMIN — PROPOFOL 30 MG: 10 INJECTION, EMULSION INTRAVENOUS at 12:57

## 2022-09-23 ASSESSMENT — ACTIVITIES OF DAILY LIVING (ADL)
ADLS_ACUITY_SCORE: 31
ADLS_ACUITY_SCORE: 18
ADLS_ACUITY_SCORE: 18
ADLS_ACUITY_SCORE: 31
ADLS_ACUITY_SCORE: 18
ADLS_ACUITY_SCORE: 31
ADLS_ACUITY_SCORE: 18
ADLS_ACUITY_SCORE: 18
ADLS_ACUITY_SCORE: 31
ADLS_ACUITY_SCORE: 18

## 2022-09-23 NOTE — PROGRESS NOTES
Patient removed from UNOS waitlist after  donor left kidney/pancreas transplant. OS ID [id number].    Donor Has Risk Criteria for Transmission of HIV/HCV/HBV: no  Recipient Notified of Risk Criteria: n/a    Sondra Saul RN

## 2022-09-23 NOTE — BRIEF OP NOTE
[facility]    Brief Operative Note    Pre-operative diagnosis: Diabetes mellitus (H) [E11.9]  Post-operative diagnosis Same as pre-operative diagnosis    Procedure: Procedure(s):  TRANSPLANT, KIDNEY AND PANCREAS,  DONOR  Bench pancreas  Bench kidney  Surgeon: Surgeon(s) and Role:     * Yousif Gómez MD - Primary     * Kaveh Ambrocio MD - Resident - Assisting     * Rodo Paula MD - Fellow - Assisting  Anesthesia: General   Estimated Blood Loss: 200 ml    Drains: None  Specimens:   ID Type Source Tests Collected by Time Destination   1 : appendix Tissue Appendix SURGICAL PATHOLOGY EXAM Yousif Gómez MD 2022  3:26 PM      Findings:   None.  Complications: None.  Implants: * No implants in log *

## 2022-09-23 NOTE — PLAN OF CARE
Goal Outcome Evaluation:    Plan of Care Reviewed With: patient     Overall Patient Progress: no change    Outcome Evaluation: Pt is up ad ricci, denies pain. Pre-op shower completed. BP elevated, metoprolol given.

## 2022-09-23 NOTE — ANESTHESIA PROCEDURE NOTES
Arterial Line Procedure Note    Pre-Procedure   Staff -        Anesthesiologist:  Jeison Pride DO       CRNA: Anitra Kruse APRN CRNA       Other Anesthesia Staff: Claudia Judd       Performed By: anesthesiologist and SRNA       Location: OR       Pre-Anesthestic Checklist: patient identified, IV checked, risks and benefits discussed, informed consent, monitors and equipment checked, pre-op evaluation and at physician/surgeon's request  Timeout:       Correct Patient: Yes        Correct Procedure: Yes        Correct Site: Yes        Correct Position: Yes   Line Placement:   This line was placed Post Induction  Procedure   Procedure: arterial line       Laterality: left       Insertion Site: brachial.  Sterile Prep        Standard elements of sterile barrier followed       Skin prep: Chloraprep  Insertion/Injection        Technique: ultrasound guided        1. Ultrasound was used to evaluate the access site.       2. Artery evaluated via ultrasound for patency/adequacy.       3. Using real-time ultrasound the needle/catheter was observed entering the artery/vein.       Catheter Type/Size: 20 G, 1.75 in/4.5 cm quick cath (integral wire)  Narrative        Tegaderm dressing used.       Complications: None apparent,        Arterial waveform: Yes        IBP within 10% of NIBP: Yes

## 2022-09-23 NOTE — PROGRESS NOTES
Transplant Surgery  Inpatient Daily Progress Note  09/23/2022    Assessment & Plan: Eden Hess is a 52 year old female with a past medical history significant for end stage kidney disease secondary to diabetic nephropathy.  Other past medical history includes DM1 c/b neuropathy and retinopathy, hypertension, non obstructive CAD, anxiety and depression, diastolic HFpEF (prior to starting HD, in setting of hypertensive urgency), and TIA (2017). She is now s/p SPK on 9/23/22 with Dr. Gómez.     S/p SPK 9/23/22: POD#1  Pancreas: Lipase 289 from 931. BG 70-140s, insulin gtt as needed. Continue Octreotide.   Post-op US:  1.  Low resistance arterial waveforms within the transplant, though  with continued increased resistive indices in the arteries outside the  pancreas. Recommend attention on follow-up.  2.  Patent venous evaluation.  3.  No appreciable fluid collections.  Kidney: Cr 4.3(from 4.0). Good UOP.   Post op US:  1. Left lower quadrant renal transplant with increased arterial  resistive indices throughout, nonspecific, though can be seen with  rejection. Attention on follow-up.  2. Patent Doppler evaluation of the renal transplant vasculature.    Immunosuppression management:   Induction: via intermediate protocol (cPRA 0) with Thymoglobulin 2 mg/kg, basiliximab POD#1 and #5, and steroid pulse with four week taper.   Maintenance:  -MMF 1000 mg BID  -Tacrolimus  2 mg BID. Goal level 8-10.     Neuro/Psych:   Acute post op pain: Scheduled Tylenol, Oxycodone prn and Dilaudid IV prn   Hx Depression: PTA Effexor, restart.     Hematology:   Anemia of chronic disease: Hgb stable ~8. Monitor.   Vascular prophylaxis: Heparin gtt, increase to 500units/hour.    Cardiorespiratory:   Hx CAD: PTA ASA 81 mg daily, atorvastatin 40 mg daily.   HTN: SBP stable.     GI/Nutrition: NPO with NG tube to LIS. Bowel regimen: Senokot-S BID, Miralax daily.     Endocrine: See above.     Fluid/Electrolytes: MIVF: D5LR@100/hr  "    : [place] to remain due to new surgical anastomosis until POD 3    Infectious disease: No acute issues. Zosyn and Lisseth per protocol.     Prophylaxis: DVT (mechanical, heparin gtt), fall, GI, fungal (Micafungin), viral (Valcyte), pneumocystis (Bactrim), periop (Zosyn)    Disposition: 6B     Medical Decision Making: Medium  Subsequent visit 64639 (moderate level decision making)    SANDEEP/Fellow/Resident Provider: Ximena Bosch PA-C    Faculty: Yousif Gómez M.D.  _________________________________________________________________  Transplant History:   9/23/2022 (Kidney / Pancreas), Postoperative day: 0     Interval History: History is obtained from the patient  Overnight events: No complaints.     ROS:   A 10-point review of systems was negative except as noted above.    Meds:    amphotericin B 10 mg in sterile water 1000 mL   Irrigation Once     cefuroxime  1.5 g Intravenous See Admin Instructions     [Auto Hold] insulin regular  0.1 Units/kg Intravenous Once     [Auto Hold] metoprolol succinate ER  12.5 mg Oral Once per day on Sun Tue Thu Sat     sodium chloride (PF)  3 mL Intracatheter Q8H     [Auto Hold] venlafaxine  150 mg Oral Daily       Physical Exam:     Admit Weight: 69.8 kg (153 lb 14.1 oz)    Current vitals:   BP (!) 167/78   Pulse 71   Temp 98.3  F (36.8  C) (Oral)   Resp 15   Ht 1.549 m (5' 1\")   Wt 69.8 kg (153 lb 14.1 oz)   LMP 09/18/2022   SpO2 96%   BMI 29.08 kg/m      Vital sign ranges:    Temp:  [98.1  F (36.7  C)-98.3  F (36.8  C)] 98.3  F (36.8  C)  Pulse:  [71-79] 71  Resp:  [15-17] 15  BP: (124-174)/(62-78) 167/78  SpO2:  [94 %-96 %] 96 %    General Appearance: in no apparent distress, somnolent   Skin: Warm, perfused  Heart: RRR  Lungs: NLB on 2 L oxygen  Abdomen: The abdomen is soft, incision covered with minimal staining  : angeles is present.  Urine with mild hematuria.  Extremities: edema: none, strength  Neurologic: A&Ox3, Tremor absent.     Data:   CMP  Recent Labs   Lab " 09/23/22  1252 09/23/22  0854 09/22/22 2237 09/22/22  1629   *  --   --  132*   POTASSIUM 5.4*  --   --  3.9   CHLORIDE  --   --   --  93*   CO2  --   --   --  29   * 172*   < > 139*   BUN  --   --   --  33.4*   CR  --   --   --  4.00*   GFRESTIMATED  --   --   --  13*   BARBARA  --   --   --  9.9   ICAW 4.7  --   --   --    ALBUMIN  --   --   --  4.1   BILITOTAL  --   --   --  0.3   ALKPHOS  --   --   --  89   AST  --   --   --  29   ALT  --   --   --  18    < > = values in this interval not displayed.     CBC  Recent Labs   Lab 09/23/22 1252 09/22/22  1629   HGB 10.6* 10.9*   WBC  --  5.5   PLT  --  258   A1C  --  6.9*

## 2022-09-23 NOTE — ANESTHESIA PROCEDURE NOTES
Central Line/PA Catheter Placement    Pre-Procedure   Staff -        Anesthesiologist:  Jeison Pride DO       Resident/Fellow: Alexx Siu MD       Performed By: resident       Location: OR       Pre-Anesthestic Checklist: patient identified, IV checked, site marked, risks and benefits discussed, informed consent, monitors and equipment checked, pre-op evaluation and at physician/surgeon's request  Timeout:       Correct Patient: Yes        Correct Procedure: Yes        Correct Site: Yes        Correct Position: Yes        Correct Laterality: Yes   Line Placement:   This line was placed Post Induction    Procedure   Procedure: central line       Laterality: left       Insertion Site: internal jugular.       Patient Position: Trendelenburg  Sterile Prep        All elements of maximal sterile barrier technique followed       Patient Prep/Sterile Barriers: draped, hand hygiene, gloves , hat , mask , draped, gown, sterile gel and probe cover       Skin prep: Chloraprep  Insertion/Injection        Technique: ultrasound guided and Seldinger Technique        1. Ultrasound was used to evaluate the access site.       2. Vein evaluated via ultrasound for patency/adequacy.       3. Using real-time ultrasound the needle/catheter was observed entering the artery/vein.       Type: CVC       Catheter Length: 20       Number of Lumens: triple lumen  Narrative         Secured by: suture and anchor securement device       Tegaderm dressing used.       Complications: None apparent,        blood aspirated from all lumens,        All lumens flushed: Yes       Verification method: Ultrasound   Comments:  First attempt on right side internal jugular, was unable to pass wire with confirmed needle placement. Successful placement in L internal jugular vein on second attempt.

## 2022-09-23 NOTE — PLAN OF CARE
Goal Outcome Evaluation:    Plan of Care Reviewed With: patient     Overall Patient Progress: no change    Outcome Evaluation: Arrived on 7A. Pre Op checklist started. here for Kidney and Pancreas transplant. A&Ox4. VSS on room air.

## 2022-09-23 NOTE — ANESTHESIA PROCEDURE NOTES
Airway       Patient location during procedure: OR       Procedure Start/Stop Times: 9/23/2022 11:45 AM  Staff -        Anesthesiologist:  Jeison Pride DO       CRNA: Anitra Kruse APRN CRNA       Other Anesthesia Staff: Claudia Judd       Performed By: SRNA  Consent for Airway        Urgency: elective  Indications and Patient Condition       Indications for airway management: elian-procedural       Induction type:intravenous       Mask difficulty assessment: 2 - vent by mask + OA or adjuvant +/- NMBA    Final Airway Details       Final airway type: endotracheal airway       Successful airway: ETT - single  Endotracheal Airway Details        ETT size (mm): 7.0       Cuffed: yes       Successful intubation technique: direct laryngoscopy       DL Blade Type: Hodgson 2       Grade View of Cords: 1       Adjucts: stylet       Position: Right       Measured from: gums/teeth       Secured at (cm): 22       Bite block used: None    Post intubation assessment        Placement verified by: capnometry, equal breath sounds and chest rise        Number of attempts at approach: 1       Number of other approaches attempted: 0       Secured with: silk tape       Ease of procedure: easy       Dentition: Intact and Unchanged    Medication(s) Administered   Medication Administration Time: 9/23/2022 11:45 AM

## 2022-09-23 NOTE — OP NOTE
Transplant Surgery  Operative Note     Procedure Date:  09/23/22    Preoperative Diagnosis:  End Stage renal failure due to diabetes mellitus type 1    Postoperative Diagnosis:  Same,     Procedure:  1. Left Kidney Donation after Brain Death Kidney transplant, Left iliac fossa, without vascular reconstruction. A J-J stent was not placed.   2. Kidney allograft preparation on Back Table   3. Appendectomy   4. Whole Pancreas Donation after Brain Death Pancreas Transplant   5. Pancreas allograft preparation on Back Table    Surgeon:  Surgeon(s) and Role:     * Yousif Gómez MD - Primary     * Kaveh Ambrocio MD - Resident - Assisting     * Rodo Paula MD - Fellow - Assisting    Fellow/Assistant:  maicol Richmond, assisted with all dictated portions of the procedure    Anesthesia:  General    Specimen:  appendix    Drains:  no drain    Urine Output:  300 mls    Estimated Blood Loss:  200 mL    Fluids Administered:        Intraoperative Events: None    Complications: None.    Findings: standard anatomy, normal pancreas      None.    Indication: The patient has Type 1 diabetes with End Stage kidney failure. The patient received an organ offer for a Donation after Brain Death kidney and Donation after Brain Death pancreas. After discussing the risks and benefits of proceeding, the patient agreed to proceed with surgery and provided informed consent.    Final ABO/Crossmatch Verification: After the donor organ arrived to the operating room and prior to anastomosis, I participated in the transplant pre-verification upon organ receipt timeout by visually verifying the donor ID, organ and laterality, donor blood type, recipient unique identifier, recipient blood type, and that the donor and recipient are blood type compatible. The crossmatch was done prospectively; and the T cell crossmatch result was negative and B cell Flow crossmatch result was negative. The patient received Thymoglobulin on induction.    Pancreas  Donor Organ Information:   Donor UNOS ID: [id number]  Donor ABO: [id number]  Donor Arterial Clamp on: 9/23/2022  8:05 AM  Vessels with organ: Yes    Ischemic time:  Total:  369  Cold: 331  Warm: 38     Pancreas Back Table Details:   Procedure:  Bench preparation of the pancreas allograft for transplantation without vascular reconstruction    Preoperative Diagnosis:  End stage renal failure due to diabetes mellitus type 1    Postoperative Diagnosis:  Same,     Surgeon:  Surgeon(s) and Role:     * Yousif Crain MD - Primary     * Kaveh Ambrocio MD - Resident - Assisting     * Rodo Paula MD - Fellow - Assisting    Faculty Co-Surgeon:  YOUSIF CRAIN    Fellow/Assistant:  Rodo Paula fellow, participated in all dictated portions of the procedure    Anesthesia:  None    Graft Injury:  No    Donor Arrival to Recipient Room:  9/23/2022 10:11 AM    Portal Venous Extension:  No    Donor Iliac Vessel Quality:  Mild atherosclerosis     Findings: as stated    Pancreas Back Table Preparation: The donor pancreas was received and inspected. It was flushed with UW. We removed the spleen by doubly ligating vessels between the tail of the pancreas and the spleen. The peripancreatic fat was ligated with 3-0 silk ties and removed. The proximal duodenum staple line was inverted and oversewn using running 4-0 Prolene suture. The bile duct and GDA were re-secured. The previously stapled root of the mesentery was oversewn using 4-0 Prolene forward and back. The third and fourth portions of the duodenum were freed away from the pancreas by ligating and dividing the intervening tissue with a series of 3-0 and 4-0 silk ties. Ganglionectomy was performed with 3-0 and 4-0 silk ties.    Y-Graft to SPA and SMA per standard technique. The pancreas was transferred to a new bowl with fresh iced cold preservation solution. Faculty was present for key portions of the procedure.    Operative Procedure:   Arterial Anastomosis Start:  9/23/2022  1:36 PM     Recipient Arterial Unclamp:  9/23/2022  2:14 PM    Extra Vessels Used:    UNOS ID Type Placement Comments   [id number] Donor Iliac Bifurcation Graft Artery         Extra Vessels Banked:  Yes     Kidney Donor Organ Information:    Donor UNOS ID: [id number]  Donor ABO: O  Donor Arterial Clamp on: 9/23/2022  8:05 AM  Vessels with organ: No    Ischemic time:  Total:  495  Cold: 466  Warm: 29     Kidney Back Table Details:    Preoperative Procedure:  Bench preparation of the kidney allograft for transplantation without vascular End stage renal failure due to diabetes mellitus type 1 reconstruction    Diagnosis:  End stage renal failure due to diabetes mellitus type 1    Postoperative Diagnosis:  Same,     Surgeon:  Surgeon(s) and Role:     * Yousif Gómez MD - Primary     * Kaveh Ambrocio MD - Resident - Assisting     * Rodo Cerda MD - Fellow - Assisting    Faculty Co-Surgeon:  RODO CERDA    Fellow/Assistant:  Rodo Cerda fellow, participated in entirety of dictated procedure    Anesthesia:  None    Donor Arrival to Recipient Room:  9/23/2022  1:24 PM      Graft Injury: No  Graft Biopsy: yes      Organ Received On: Ice  Pump Resistance:    Pump Flow:     Ureteral Anatomy: Single  Arterial Anatomy: Single  Venous Anatomy: Single      Any Reconstruction:  No    Artery:   single    Vein:   single     Findings: Normal     Kidney Back Table Preparation: The donor kidney was received and inspected. It was flushed with UW. The graft was prepared on the back table by removing perinephric fat and ligating venous tributaries and lymphatics. The ureter was also cleaned of excess tissue. If required, reconstruction was performed as detailed above. The kidney was stored in iced cold preservation solution until ready for transplantation. Faculty was present for the critical portions of the procedure.    Operative Procedure:   Arterial Anastomosis Start:  9/23/2022  3:51 PM    Recipient Arterial Unclamp:  9/23/2022  4:20 PM     Extra Vessels Used:      Extra Vessels Banked:         The patient was brought to the operating room, placed in a supine position, and a time out was performed. Sequential compression devices were placed on both lower extremities and general endotracheal anesthesia was induced. The patient was given IV antibiotics, Thymoglobulin, and a Glez catheter. A central line and arterial lines were placed by Anesthesia service. The abdomen was then shaved, prepped, and draped in the usual sterile fashion. We performed a lower midline incision, divided the linea alba and opened the peritoneum sharply under direct vision. Retractors were placed.    Pancreas Transplant: We mobilized the right colon and the ureter medially and proceeded to circumferentially dissect the right iliac vessels. The internal iliac vein was not ligated and divided. We obtained vascular control, performed a venotomy, and performed an anastomosis between the donor portal vein and the recipient's IVC. We then obtained proximal and distal control of the right common iliac artery . Arteriotomy and irrigation ensued, and the donor Y graft was anastomosed to the common iliac artery  in an end to side fashion. After both anastomoses were completed, we opened the clamps. The pancreas consistency and reperfusion quality was Pink throughout, the graft duodenum was Pink throughout. There was no pancreatitis. Overall the graft was rated [place] grade B. The exocrine secretions of the pancreas were drained via Enteric w/o [id number]-y employing  a side to side hand sewn 2-layered. The pancreas placement was Intra-Peritoneal. Faculty was present for key portions of the procedure.    Kidney Transplant: The patient was heparinized. We applied atraumatic vascular clamps and the donor kidney was brought to the operative field. We made a venotomy and the renal vein was anastomosed to the recipient left External Iliac vein in an end-to-side fashion. An arteriotomy was made  and the donor renal artery was anastomosed to the recipient left in an end to side fashion. The patient was simultaneously loaded with IV mannitol, Lasix and volume. The renal artery was protected and the clamps were removed. After several cardiac cycles, we opened the renal artery and the kidney had Good reperfusion and was soft, pink  and normal.    The transplant ureter was managed by creating a Liche (anterior multistitch) anastomosis with absorbable suture. No The kidney made Yes urine prior to implantation.    Hemostasis was obtained, the anastomoses inspected, and the kidney placed in the iliac fossa. After placement, the vessel lay was inspected and found to be acceptable. The kidney position was Intra-peritoneal. The field was irrigated with antibiotic solution. No drain was placed. The retractor was removed and the abdominal wall fascia reapproximated. Subcutaneous tissues were irrigated and hemostasis obtained. The skin was reapproximated with staples and a dry dressing was applied. Faculty was present for key portions of the procedure.    The order of the organ reperfusion was: pancreas then kidney.    The appendix was excised using standard open technique..    All needle, sponge and instrument counts were correct x 2. The patient was awakened, extubated, and transferred to the PACU for post-op monitoring.    I was present during the key portions of the procedure, and I was immediately available for the entire procedure. There was no qualified resident available to assist with the entire procedure. The fellow noted above participated as the first assistant in all parts of the dictated procedure and was the primary in the opening and closure with assistance from me as needed.

## 2022-09-23 NOTE — TELEPHONE ENCOUNTER
Organ Offer Encounter Information    Organ Offer Information  Organ offer date & time: 9/21/2022 10:32 PM  Coordinator/Fellow/Attending name: Rachel Lambert RN   Organ(s):  Organ UNOS ID Match Run ID Comment Organ Laterality   Pancreas [id number] MNOP    Kidney [id number] MNOP       Recent infections?: No    New medications?: No Recent pregnancy?: No   Angicoagulation medications?: Yes (Comment: baby aspirin) Recent vaccinations?: No   Recent blood transfusions?: No Recent hospitalizations?: No   Has your insurance changed in the last 6-12 months?: Neg    Patient last dialyzed: 9/21/2022  4:00 PM  Dialysis type: Hemo  Discussed organ offer with: Patient  Patient/Caregiver name: Hermila  Discussed risk category with Patient/Other: N/A  Understood donor criteria, verbalized understanding  Patient/Other asked to speak to a surgeon?: No  Discussed program-specific outcomes: Did not have questions regarding SRTR  Right to decline organ offer without penalty, Patient/Other: Aware of option to decline without penalty  Organ offer decision status Patient/Other: Accepted Offer  Organ disposition: Transplanted  Additional Comments: 9/21/2022 10:41 PM  KP/Panc: Primary KP, local  MD: Dalia/Nisa  OPO Contact: Dona [phone]  VXM Results: Compatible, no DSA  XM Plan (FXM must be done with serum no older than 10 days from transplant): Pt's blood will be 11 days old tomorrow, appt made for lab and COVID swab tomorrow morning, pt will go around 0930 when she's done with a clinic appt in [place].   Plan (Admission, NPO, Donor OR): Donor OR not set at this time, no need for NPO or admission. Will have patient drop off blood and get COVID swab tomorrow morning and then wait at home for plan. Earliest lab appt was for 2pm, FXM & COVID orders placed.   - - -   COVID Screening  In the past month, have you:  Or anyone close to you had a positive COVID test or suspected to have COVID: No  Had any COVID symptoms  (Fever, Cough, Short of Breath, Loss of Taste/Smell, Rash): No    9/22/2022 8:03 AM:  Called to give Hermila (Eden goes by Hermila) my contact information and confirm she was aware to go to the  [facility] lab at 0930; she had no further questions at this time and I let her know I will call her with any updates and to call me with any questions.   Pauline Caraballo RN  Donor      Admissions: Iam @ 0930  Unit: 7A ETA Noon; Fatemeh (charge nurse) aware and will have a bed available  Update Provider Entering Orders (XM Plan & COVID Testing):  Ladi Lozano @ 0930; plan for FXM and STAT covid and the usually pre-op labs; likely NPO after midnight for AM OR  Immunology: Catie @ 0930  Inpatient Lab (COVID Testing [phone], Option 2): Will order STAT  Vessel Storage Confirmation (PA/KP/LI): Ok to bank  Blood Bank: Fela @ [facility]  Research: ON HOLD  Book OR: Skye @ [facility]    Donor OR Time: 0400 9/23/22  Procuring MD: Dr. Paula and Dr. Zheng  Contact in the OR: Kiara [phone]  Organs Being Procured: Lungs, liver, pancreas, kidney  Flush Solution: UW  Biopsy: plan to biopsy kidneys   Pump: no  Special Requests (Special blood tubes, nodes, waivers): no  MD for Visualization: Dr. Gómez  Transportation Details: Pickup at [facility] at 0300    9/22/2022 10:29 PM:  Immunology called with FXM results. FXM is negative. Dr. Paula notified and results emailed to Dr. Paula and Dr. Dalia Caraballo RN  Donor      9/23/2022 5:44 AM:  Dr Paula called and ask for the recipients OR to be moved to 0800; organs look good on first visual, waiting on back table evaluation. Called Lacie in the OR to have her move the OR to 0800.  Pauline Caraballo RN  Donor      11:57 AM  Reviewed biopsy results with Dr. Paula. LEFT kidney accepted per DR. Paula.   TransNet/ABO Verification:   Add Organ: Pancreas added @ 0611, 1159 LEFT kidney added  Attestation I have discussed  all of the above with the Patient/Legal Guardian/Caregiver regarding this organ offer.: Yes  Coordinator/Fellow/Attending name: Rachel Lambert RN

## 2022-09-23 NOTE — ANESTHESIA CARE TRANSFER NOTE
Patient: Eden Hess    Procedure: Procedure(s):  TRANSPLANT, KIDNEY AND PANCREAS,  DONOR  Bench pancreas  Bench kidney       Diagnosis: Diabetes mellitus (H) [E11.9]  Diagnosis Additional Information: No value filed.    Anesthesia Type:   General     Note:    Oropharynx: oropharynx clear of all foreign objects and spontaneously breathing  Level of Consciousness: drowsy  Oxygen Supplementation: face mask  Level of Supplemental Oxygen (L/min / FiO2): 6 LPM  Independent Airway: airway patency satisfactory and stable  Dentition: dentition unchanged  Vital Signs Stable: post-procedure vital signs reviewed and stable  Report to RN Given: handoff report given  Patient transferred to: PACU    Handoff Report: Identifed the Patient, Identified the Reponsible Provider, Reviewed the pertinent medical history, Discussed the surgical course, Reviewed Intra-OP anesthesia mangement and issues during anesthesia, Set expectations for post-procedure period and Allowed opportunity for questions and acknowledgement of understanding      Vitals:  Vitals Value Taken Time   /63 22 1809   Temp     Pulse 89 22 1815   Resp 11 22 1815   SpO2 100 % 22 1815   Vitals shown include unvalidated device data.    Electronically Signed By: MELINDA Elena CRNA  2022  6:16 PM

## 2022-09-23 NOTE — PLAN OF CARE
Goal Outcome Evaluation:  Rec'd call from PACU that they will be putting in for transport. Report was given and Hermila did her last shower. Hermila had brought her medications with. She stated she will have her   her meds and that we have locked them up in the [facility] office along with her book bag that has her street clothes, eye glasses and phone. She did try x2 to reach him, but their was no answer. Was taken down to periop shortly afterwards. Also Hermila is aware that she will be going to 6B following surgery.

## 2022-09-24 ENCOUNTER — APPOINTMENT (OUTPATIENT)
Dept: ULTRASOUND IMAGING | Facility: CLINIC | Age: 52
DRG: 008 | End: 2022-09-24
Attending: PHYSICIAN ASSISTANT
Payer: COMMERCIAL

## 2022-09-24 ENCOUNTER — DOCUMENTATION ONLY (OUTPATIENT)
Dept: TRANSPLANT | Facility: CLINIC | Age: 52
End: 2022-09-24

## 2022-09-24 ENCOUNTER — APPOINTMENT (OUTPATIENT)
Dept: PHYSICAL THERAPY | Facility: CLINIC | Age: 52
DRG: 008 | End: 2022-09-24
Attending: SURGERY
Payer: COMMERCIAL

## 2022-09-24 ENCOUNTER — RESULTS ONLY (OUTPATIENT)
Dept: TRANSPLANT | Facility: CLINIC | Age: 52
End: 2022-09-24

## 2022-09-24 LAB
AMYLASE SERPL-CCNC: 297 U/L (ref 28–100)
ANION GAP SERPL CALCULATED.3IONS-SCNC: 13 MMOL/L (ref 7–15)
APTT PPP: 69 SECONDS (ref 22–38)
ATRIAL RATE - MUSE: 104 BPM
BASOPHILS # BLD AUTO: 0 10E3/UL (ref 0–0.2)
BASOPHILS NFR BLD AUTO: 0 %
BUN SERPL-MCNC: 42.7 MG/DL (ref 6–20)
CALCIUM SERPL-MCNC: 9 MG/DL (ref 8.6–10)
CHLORIDE SERPL-SCNC: 101 MMOL/L (ref 98–107)
CMV DNA SPEC NAA+PROBE-ACNC: NOT DETECTED IU/ML
CREAT SERPL-MCNC: 4.33 MG/DL (ref 0.51–0.95)
DEPRECATED HCO3 PLAS-SCNC: 22 MMOL/L (ref 22–29)
DIASTOLIC BLOOD PRESSURE - MUSE: NORMAL MMHG
EOSINOPHIL # BLD AUTO: 0 10E3/UL (ref 0–0.7)
EOSINOPHIL NFR BLD AUTO: 0 %
ERYTHROCYTE [DISTWIDTH] IN BLOOD BY AUTOMATED COUNT: 15.3 % (ref 10–15)
GFR SERPL CREATININE-BSD FRML MDRD: 11 ML/MIN/1.73M2
GLUCOSE BLDC GLUCOMTR-MCNC: 104 MG/DL (ref 70–99)
GLUCOSE BLDC GLUCOMTR-MCNC: 109 MG/DL (ref 70–99)
GLUCOSE BLDC GLUCOMTR-MCNC: 116 MG/DL (ref 70–99)
GLUCOSE BLDC GLUCOMTR-MCNC: 118 MG/DL (ref 70–99)
GLUCOSE BLDC GLUCOMTR-MCNC: 122 MG/DL (ref 70–99)
GLUCOSE BLDC GLUCOMTR-MCNC: 128 MG/DL (ref 70–99)
GLUCOSE BLDC GLUCOMTR-MCNC: 128 MG/DL (ref 70–99)
GLUCOSE BLDC GLUCOMTR-MCNC: 132 MG/DL (ref 70–99)
GLUCOSE BLDC GLUCOMTR-MCNC: 133 MG/DL (ref 70–99)
GLUCOSE BLDC GLUCOMTR-MCNC: 140 MG/DL (ref 70–99)
GLUCOSE BLDC GLUCOMTR-MCNC: 145 MG/DL (ref 70–99)
GLUCOSE BLDC GLUCOMTR-MCNC: 146 MG/DL (ref 70–99)
GLUCOSE BLDC GLUCOMTR-MCNC: 148 MG/DL (ref 70–99)
GLUCOSE BLDC GLUCOMTR-MCNC: 148 MG/DL (ref 70–99)
GLUCOSE BLDC GLUCOMTR-MCNC: 152 MG/DL (ref 70–99)
GLUCOSE BLDC GLUCOMTR-MCNC: 155 MG/DL (ref 70–99)
GLUCOSE BLDC GLUCOMTR-MCNC: 166 MG/DL (ref 70–99)
GLUCOSE BLDC GLUCOMTR-MCNC: 169 MG/DL (ref 70–99)
GLUCOSE SERPL-MCNC: 164 MG/DL (ref 70–99)
HCT VFR BLD AUTO: 26.8 % (ref 35–47)
HGB BLD-MCNC: 8 G/DL (ref 11.7–15.7)
HGB BLD-MCNC: 8.2 G/DL (ref 11.7–15.7)
HGB BLD-MCNC: 8.3 G/DL (ref 11.7–15.7)
HGB BLD-MCNC: 8.5 G/DL (ref 11.7–15.7)
HGB BLD-MCNC: 8.5 G/DL (ref 11.7–15.7)
HGB BLD-MCNC: 8.7 G/DL (ref 11.7–15.7)
IMM GRANULOCYTES # BLD: 0 10E3/UL
IMM GRANULOCYTES NFR BLD: 0 %
INTERPRETATION ECG - MUSE: NORMAL
LACTATE SERPL-SCNC: 0.5 MMOL/L (ref 0.7–2)
LDH SERPL L TO P-CCNC: 327 U/L (ref 0–250)
LIPASE SERPL-CCNC: 289 U/L (ref 13–60)
LYMPHOCYTES # BLD AUTO: 0 10E3/UL (ref 0.8–5.3)
LYMPHOCYTES NFR BLD AUTO: 1 %
MAGNESIUM SERPL-MCNC: 2 MG/DL (ref 1.7–2.3)
MCH RBC QN AUTO: 34.4 PG (ref 26.5–33)
MCHC RBC AUTO-ENTMCNC: 31.7 G/DL (ref 31.5–36.5)
MCV RBC AUTO: 109 FL (ref 78–100)
MONOCYTES # BLD AUTO: 0.4 10E3/UL (ref 0–1.3)
MONOCYTES NFR BLD AUTO: 5 %
NEUTROPHILS # BLD AUTO: 8 10E3/UL (ref 1.6–8.3)
NEUTROPHILS NFR BLD AUTO: 94 %
NRBC # BLD AUTO: 0 10E3/UL
NRBC BLD AUTO-RTO: 0 /100
P AXIS - MUSE: 48 DEGREES
PHOSPHATE SERPL-MCNC: 2.4 MG/DL (ref 2.5–4.5)
PLATELET # BLD AUTO: 200 10E3/UL (ref 150–450)
POTASSIUM SERPL-SCNC: 4.1 MMOL/L (ref 3.4–5.3)
POTASSIUM SERPL-SCNC: 4.4 MMOL/L (ref 3.4–5.3)
POTASSIUM SERPL-SCNC: 4.7 MMOL/L (ref 3.4–5.3)
POTASSIUM SERPL-SCNC: 4.8 MMOL/L (ref 3.4–5.3)
PR INTERVAL - MUSE: 150 MS
QRS DURATION - MUSE: 86 MS
QT - MUSE: 356 MS
QTC - MUSE: 468 MS
R AXIS - MUSE: -6 DEGREES
RBC # BLD AUTO: 2.47 10E6/UL (ref 3.8–5.2)
SODIUM SERPL-SCNC: 136 MMOL/L (ref 136–145)
SYSTOLIC BLOOD PRESSURE - MUSE: NORMAL MMHG
T AXIS - MUSE: -65 DEGREES
UFH PPP CHRO-ACNC: <0.1 IU/ML
VENTRICULAR RATE- MUSE: 104 BPM
WBC # BLD AUTO: 8.4 10E3/UL (ref 4–11)

## 2022-09-24 PROCEDURE — 250N000011 HC RX IP 250 OP 636: Performed by: SURGERY

## 2022-09-24 PROCEDURE — 97530 THERAPEUTIC ACTIVITIES: CPT | Mod: GP

## 2022-09-24 PROCEDURE — 258N000003 HC RX IP 258 OP 636: Performed by: PHYSICIAN ASSISTANT

## 2022-09-24 PROCEDURE — 250N000013 HC RX MED GY IP 250 OP 250 PS 637: Performed by: SURGERY

## 2022-09-24 PROCEDURE — 120N000003 HC R&B IMCU UMMC

## 2022-09-24 PROCEDURE — 84132 ASSAY OF SERUM POTASSIUM: CPT | Performed by: SURGERY

## 2022-09-24 PROCEDURE — P9045 ALBUMIN (HUMAN), 5%, 250 ML: HCPCS | Performed by: STUDENT IN AN ORGANIZED HEALTH CARE EDUCATION/TRAINING PROGRAM

## 2022-09-24 PROCEDURE — 85018 HEMOGLOBIN: CPT | Performed by: SURGERY

## 2022-09-24 PROCEDURE — 76776 US EXAM K TRANSPL W/DOPPLER: CPT | Mod: 26 | Performed by: RADIOLOGY

## 2022-09-24 PROCEDURE — 85025 COMPLETE CBC W/AUTO DIFF WBC: CPT

## 2022-09-24 PROCEDURE — 97162 PT EVAL MOD COMPLEX 30 MIN: CPT | Mod: GP

## 2022-09-24 PROCEDURE — 258N000003 HC RX IP 258 OP 636: Performed by: SURGERY

## 2022-09-24 PROCEDURE — 250N000012 HC RX MED GY IP 250 OP 636 PS 637: Performed by: SURGERY

## 2022-09-24 PROCEDURE — 83615 LACTATE (LD) (LDH) ENZYME: CPT | Performed by: PHYSICIAN ASSISTANT

## 2022-09-24 PROCEDURE — 93975 VASCULAR STUDY: CPT

## 2022-09-24 PROCEDURE — 82150 ASSAY OF AMYLASE: CPT | Performed by: SURGERY

## 2022-09-24 PROCEDURE — 83735 ASSAY OF MAGNESIUM: CPT | Performed by: SURGERY

## 2022-09-24 PROCEDURE — 250N000011 HC RX IP 250 OP 636: Performed by: PHYSICIAN ASSISTANT

## 2022-09-24 PROCEDURE — 36592 COLLECT BLOOD FROM PICC: CPT | Performed by: SURGERY

## 2022-09-24 PROCEDURE — 36592 COLLECT BLOOD FROM PICC: CPT

## 2022-09-24 PROCEDURE — 84100 ASSAY OF PHOSPHORUS: CPT | Performed by: SURGERY

## 2022-09-24 PROCEDURE — 85520 HEPARIN ASSAY: CPT | Performed by: SURGERY

## 2022-09-24 PROCEDURE — 83690 ASSAY OF LIPASE: CPT | Performed by: SURGERY

## 2022-09-24 PROCEDURE — 250N000011 HC RX IP 250 OP 636: Performed by: STUDENT IN AN ORGANIZED HEALTH CARE EDUCATION/TRAINING PROGRAM

## 2022-09-24 PROCEDURE — 85730 THROMBOPLASTIN TIME PARTIAL: CPT

## 2022-09-24 PROCEDURE — 93975 VASCULAR STUDY: CPT | Mod: 26 | Performed by: RADIOLOGY

## 2022-09-24 PROCEDURE — 83605 ASSAY OF LACTIC ACID: CPT | Performed by: PHYSICIAN ASSISTANT

## 2022-09-24 PROCEDURE — 93010 ELECTROCARDIOGRAM REPORT: CPT | Mod: 76 | Performed by: INTERNAL MEDICINE

## 2022-09-24 PROCEDURE — 250N000009 HC RX 250: Performed by: PHYSICIAN ASSISTANT

## 2022-09-24 PROCEDURE — 76776 US EXAM K TRANSPL W/DOPPLER: CPT

## 2022-09-24 PROCEDURE — 99223 1ST HOSP IP/OBS HIGH 75: CPT | Performed by: NURSE PRACTITIONER

## 2022-09-24 PROCEDURE — 250N000013 HC RX MED GY IP 250 OP 250 PS 637

## 2022-09-24 PROCEDURE — 93005 ELECTROCARDIOGRAM TRACING: CPT

## 2022-09-24 RX ORDER — DEXTROSE MONOHYDRATE 100 MG/ML
INJECTION, SOLUTION INTRAVENOUS CONTINUOUS PRN
Status: DISCONTINUED | OUTPATIENT
Start: 2022-09-24 | End: 2022-09-27

## 2022-09-24 RX ORDER — PANTOPRAZOLE SODIUM 40 MG/1
40 TABLET, DELAYED RELEASE ORAL
Status: DISCONTINUED | OUTPATIENT
Start: 2022-09-25 | End: 2022-10-02 | Stop reason: HOSPADM

## 2022-09-24 RX ORDER — DEXTROSE MONOHYDRATE 25 G/50ML
25-50 INJECTION, SOLUTION INTRAVENOUS
Status: DISCONTINUED | OUTPATIENT
Start: 2022-09-24 | End: 2022-09-27

## 2022-09-24 RX ORDER — SULFAMETHOXAZOLE AND TRIMETHOPRIM 400; 80 MG/1; MG/1
1 TABLET ORAL
Status: DISCONTINUED | OUTPATIENT
Start: 2022-09-26 | End: 2022-10-02 | Stop reason: HOSPADM

## 2022-09-24 RX ORDER — DEXTROSE MONOHYDRATE 25 G/50ML
25-50 INJECTION, SOLUTION INTRAVENOUS
Status: DISCONTINUED | OUTPATIENT
Start: 2022-09-24 | End: 2022-09-26

## 2022-09-24 RX ORDER — TACROLIMUS 1 MG/1
2 CAPSULE ORAL
Status: DISCONTINUED | OUTPATIENT
Start: 2022-09-24 | End: 2022-09-28

## 2022-09-24 RX ORDER — VENLAFAXINE HYDROCHLORIDE 150 MG/1
150 CAPSULE, EXTENDED RELEASE ORAL DAILY
Status: DISCONTINUED | OUTPATIENT
Start: 2022-09-24 | End: 2022-10-02 | Stop reason: HOSPADM

## 2022-09-24 RX ORDER — NICOTINE POLACRILEX 4 MG
15-30 LOZENGE BUCCAL
Status: DISCONTINUED | OUTPATIENT
Start: 2022-09-24 | End: 2022-09-27

## 2022-09-24 RX ORDER — MYCOPHENOLATE MOFETIL 500 MG/1
1000 TABLET, FILM COATED ORAL
Status: DISCONTINUED | OUTPATIENT
Start: 2022-09-24 | End: 2022-09-26

## 2022-09-24 RX ORDER — NICOTINE POLACRILEX 4 MG
15-30 LOZENGE BUCCAL
Status: DISCONTINUED | OUTPATIENT
Start: 2022-09-24 | End: 2022-09-26

## 2022-09-24 RX ORDER — METHYLPREDNISOLONE SODIUM SUCCINATE 125 MG/2ML
100 INJECTION, POWDER, LYOPHILIZED, FOR SOLUTION INTRAMUSCULAR; INTRAVENOUS ONCE
Status: COMPLETED | OUTPATIENT
Start: 2022-09-25 | End: 2022-09-25

## 2022-09-24 RX ADMIN — TACROLIMUS 2 MG: 1 CAPSULE ORAL at 17:11

## 2022-09-24 RX ADMIN — MICAFUNGIN 100 MG: 10 INJECTION, POWDER, LYOPHILIZED, FOR SOLUTION INTRAVENOUS at 02:22

## 2022-09-24 RX ADMIN — HUMAN INSULIN 1 UNITS/HR: 100 INJECTION, SOLUTION SUBCUTANEOUS at 18:12

## 2022-09-24 RX ADMIN — SENNOSIDES AND DOCUSATE SODIUM 1 TABLET: 50; 8.6 TABLET ORAL at 20:25

## 2022-09-24 RX ADMIN — ACETAMINOPHEN 975 MG: 325 TABLET, FILM COATED ORAL at 00:18

## 2022-09-24 RX ADMIN — OXYCODONE HYDROCHLORIDE 5 MG: 5 TABLET ORAL at 01:22

## 2022-09-24 RX ADMIN — SENNOSIDES AND DOCUSATE SODIUM 1 TABLET: 50; 8.6 TABLET ORAL at 08:09

## 2022-09-24 RX ADMIN — ACETAMINOPHEN 975 MG: 325 TABLET, FILM COATED ORAL at 17:11

## 2022-09-24 RX ADMIN — HUMAN INSULIN 0 UNITS/HR: 100 INJECTION, SOLUTION SUBCUTANEOUS at 10:09

## 2022-09-24 RX ADMIN — OXYCODONE HYDROCHLORIDE 5 MG: 5 TABLET ORAL at 05:26

## 2022-09-24 RX ADMIN — SODIUM CHLORIDE, PRESERVATIVE FREE: 5 INJECTION INTRAVENOUS at 05:53

## 2022-09-24 RX ADMIN — OXYCODONE HYDROCHLORIDE 5 MG: 5 TABLET ORAL at 00:19

## 2022-09-24 RX ADMIN — TACROLIMUS 2 MG: 5 CAPSULE ORAL at 08:11

## 2022-09-24 RX ADMIN — Medication 40 MG: at 08:11

## 2022-09-24 RX ADMIN — POLYETHYLENE GLYCOL 3350 17 G: 17 POWDER, FOR SOLUTION ORAL at 08:10

## 2022-09-24 RX ADMIN — SODIUM CHLORIDE 250 MG: 9 INJECTION, SOLUTION INTRAVENOUS at 10:57

## 2022-09-24 RX ADMIN — ACETAMINOPHEN 975 MG: 325 TABLET, FILM COATED ORAL at 08:10

## 2022-09-24 RX ADMIN — SODIUM CHLORIDE, SODIUM LACTATE, POTASSIUM CHLORIDE, CALCIUM CHLORIDE AND DEXTROSE MONOHYDRATE: 5; 600; 310; 30; 20 INJECTION, SOLUTION INTRAVENOUS at 10:01

## 2022-09-24 RX ADMIN — ASPIRIN 81 MG CHEWABLE TABLET 81 MG: 81 TABLET CHEWABLE at 08:08

## 2022-09-24 RX ADMIN — MYCOPHENOLATE MOFETIL 1000 MG: 500 TABLET, FILM COATED ORAL at 17:11

## 2022-09-24 RX ADMIN — OCTREOTIDE ACETATE 100 MCG: 100 INJECTION, SOLUTION INTRAVENOUS; SUBCUTANEOUS at 12:24

## 2022-09-24 RX ADMIN — VENLAFAXINE HYDROCHLORIDE 150 MG: 150 CAPSULE, EXTENDED RELEASE ORAL at 10:58

## 2022-09-24 RX ADMIN — PIPERACILLIN AND TAZOBACTAM 2.25 G: 2; .25 INJECTION, POWDER, LYOPHILIZED, FOR SOLUTION INTRAVENOUS at 00:19

## 2022-09-24 RX ADMIN — OXYCODONE HYDROCHLORIDE 5 MG: 5 TABLET ORAL at 15:27

## 2022-09-24 RX ADMIN — OCTREOTIDE ACETATE 100 MCG: 100 INJECTION, SOLUTION INTRAVENOUS; SUBCUTANEOUS at 01:20

## 2022-09-24 RX ADMIN — Medication 1 TABLET: at 20:25

## 2022-09-24 RX ADMIN — ALBUMIN (HUMAN) 25 G: 12.5 SOLUTION INTRAVENOUS at 20:13

## 2022-09-24 RX ADMIN — PIPERACILLIN AND TAZOBACTAM 2.25 G: 2; .25 INJECTION, POWDER, LYOPHILIZED, FOR SOLUTION INTRAVENOUS at 08:11

## 2022-09-24 RX ADMIN — OXYCODONE HYDROCHLORIDE 10 MG: 10 TABLET ORAL at 20:54

## 2022-09-24 RX ADMIN — SENNOSIDES AND DOCUSATE SODIUM 1 TABLET: 50; 8.6 TABLET ORAL at 00:19

## 2022-09-24 RX ADMIN — MYCOPHENOLATE MOFETIL 1000 MG: 200 POWDER, FOR SUSPENSION ORAL at 08:11

## 2022-09-24 RX ADMIN — SODIUM CHLORIDE, SODIUM LACTATE, POTASSIUM CHLORIDE, CALCIUM CHLORIDE AND DEXTROSE MONOHYDRATE: 5; 600; 310; 30; 20 INJECTION, SOLUTION INTRAVENOUS at 00:18

## 2022-09-24 RX ADMIN — Medication 1 TABLET: at 08:11

## 2022-09-24 RX ADMIN — SODIUM CHLORIDE 20 MG: 9 INJECTION, SOLUTION INTRAVENOUS at 12:23

## 2022-09-24 RX ADMIN — PIPERACILLIN AND TAZOBACTAM 2.25 G: 2; .25 INJECTION, POWDER, LYOPHILIZED, FOR SOLUTION INTRAVENOUS at 15:18

## 2022-09-24 ASSESSMENT — ACTIVITIES OF DAILY LIVING (ADL)
ADLS_ACUITY_SCORE: 26
ADLS_ACUITY_SCORE: 26
ADLS_ACUITY_SCORE: 20
ADLS_ACUITY_SCORE: 24
ADLS_ACUITY_SCORE: 20
ADLS_ACUITY_SCORE: 24
ADLS_ACUITY_SCORE: 26
ADLS_ACUITY_SCORE: 26
ADLS_ACUITY_SCORE: 24
ADLS_ACUITY_SCORE: 26
ADLS_ACUITY_SCORE: 20
ADLS_ACUITY_SCORE: 24

## 2022-09-24 NOTE — PROVIDER NOTIFICATION
Notified Dr. Paula of patient's low BP's and low UOP. See MAR for fluid orders.     BP's and UOP improved for 2000 hour, MD notified, MD okay with patient transferring to the floor.

## 2022-09-24 NOTE — PLAN OF CARE
Goal Outcome Evaluation:    Plan of Care Reviewed With: patient, spouse     Overall Patient Progress: improving    Outcome Evaluation: Pt remains NPO and likely will remains so for coming days given s/p SPK. Writer provided post-transplant diet handouts and brief verbal review.

## 2022-09-24 NOTE — PLAN OF CARE
Neuro: A&Ox4. Drowsy throughout the day. Arouses to voice. Tearful at times.  Cardiac: SR. VSS. CVPs 3-12.   Respiratory: Sating >95% on RA.  GI/: Glez. Hourly checks. Pink/Light/Clear color urine. NG to low continuous suction and clamped for meds. No BM this shift.  Diet/appetite: NPO except meds and ice chips. Intermittent nausea.  Activity:  Assist of 2, up to chair and walked x1 today. Refused walk in the afternoon, wanted to rest.  Pain: Intermittent incisional pain that increases with activity. Oxy x1 given this shift.  Skin: L forearm bruise. R shin scab.  LDA's: 2 L peripheral IV with the distal IV linked to the insulin drip. L triple lumen internal jugular, blue port running Heparin 500U/hr. Brown transduced. White running D5LR at 100mL/hr. R AV Fistula.    Insulin drip started at 1800 on algorithm #1.     Plan: Monitor hourly BS, urine output, CVC. Adjust insulin drip per orders. Continue with POC. Notify primary team with changes.

## 2022-09-24 NOTE — PROGRESS NOTES
"Surgery Post-Operative Check    2022    Eden Hess is a 52-year old woman with PMHx significant for ESRD 2/2 DM2 who is now POD-0 s/p  donor kidney and pancreas transplant.     Subjective: Patient seen lying in bed, holding pillow to chest. She reports moderate-severe abdominal pain, minimally improved on current pain regimen. She denies fever, chills, chest pain, SOB, nausea, vomiting, light-headedness, dizziness. She denies passing gas or BMs. She has adequate UOP with angeles in place.     BP (!) 152/76   Pulse 81   Temp 98.9  F (37.2  C) (Axillary)   Resp 14   Ht 1.549 m (5' 1\")   Wt 69.8 kg (153 lb 14.1 oz)   LMP 2022   SpO2 97%   BMI 29.08 kg/m    Body mass index is 29.08 kg/m .    Gen: NAD but appears to be in pain  Chest: NLB on minimal O2  Abdomen: Soft, Non-tender to palpation, Non-distended, Midline incision covered with gauze dressing and has minimal strike through  Extremities: WWP, Moving independently    Assessment/Plan: No acute post-op issues  - Continue q4H Hgb/K checks  - Continue monitoring I/Os, Presently making good urine  - Continue multimodal pain control  - Other cares per primary      Mariama Paredes MD  General Surgery, PGY-1    "

## 2022-09-24 NOTE — PROGRESS NOTES
Final positive donor sputum culture results have been uploaded to [facility].  Donor ID [id number].  Dr. Gómez notified of results.

## 2022-09-24 NOTE — ANESTHESIA POSTPROCEDURE EVALUATION
Patient: Eden Hess    Procedure: Procedure(s):  TRANSPLANT, KIDNEY AND PANCREAS,  DONOR  Bench pancreas  Bench kidney       Anesthesia Type:  General    Note:  Disposition: Admission   Postop Pain Control: Uneventful            Sign Out: Well controlled pain   PONV: No   Neuro/Psych: Uneventful            Sign Out: Acceptable/Baseline neuro status   Airway/Respiratory: Uneventful            Sign Out: Acceptable/Baseline resp. status   CV/Hemodynamics: Uneventful            Sign Out: Acceptable CV status; No obvious hypovolemia; No obvious fluid overload   Other NRE: NONE   DID A NON-ROUTINE EVENT OCCUR? No           Last vitals:  Vitals Value Taken Time   /66 22   Temp 37.1  C (98.7  F) 22   Pulse 81 22   Resp 14 22   SpO2 98 % 22   Vitals shown include unvalidated device data.    Electronically Signed By: Santana Robles MD  2022  10:36 PM

## 2022-09-24 NOTE — DISCHARGE INSTRUCTIONS
Nutrition  Diet: Regular diet as tolerated, or as recommended by your team/doctors  *Post-Transplant Diet Guidelines - Follow recommendations on the Guide to Your Diet after Transplant handout (summary below).    -  Maintain a healthy weight.  -  Eat a heart-healthy diet (low sodium, low saturated and trans-fat) once your appetite improves to normal.  -  Control blood sugar.  -  Limit sodium (salt).  -  Take calcium and vitamin D supplement if your doctor or team orders.  -  Eat more protein for six to eight weeks after transplant.    -  Prevent food poisoning, store and prepare foods to the proper temperature, practice good handwashing, heat all deli meat (to 165 degrees Fahrenheit), avoid raw fish and meats (including uncooked eggs, non-pasteurized or raw milk, and non-pasteurized or raw cheeses including brie, camembert, blue-veined cheese, and Mexican queso fresco), and throw out leftovers older than two days.   -  In some cases (but not in all cases), adjust potassium intake.

## 2022-09-24 NOTE — PROVIDER NOTIFICATION
ALEXANDRA Robles notified that pt's BP was 86/52. ALEXANDRA Robles said to give a 500mL bolus of maintenance fluids (D5LR)

## 2022-09-24 NOTE — PHARMACY-TRANSPLANT NOTE
Adult Kidney/Pancreas Transplant Post Operative Note    52 year old female s/p SPK  transplant on 9/23/22 for Type I diabetes.      Planned immunosuppression regimen per kidney/pancreas transplant protocol:  INDUCTION: thymoglobulin 2 mg/kg/dose IV for 1 dose on POD#0 , basiliximab 20 mg on POD #1 and #5, and methylprednisolone IV daily x 3 doses followed by prednisone taper.    MAINTENANCE:  mycophenolate and tacrolimus with goal trough levels of 8-10 mcg/L for the first 6 months post-transplant.      Opportunistic pathogen prophylaxis includes: trimethoprim/sulfamethoxazole, Clotrimazole and valganciclovir.    Post-op antibiotic/antifungal surgical prophylaxis includes: piperacillin-tazobactam for 3 days post-procedure and micafungin IV for 6 days post-procedure.    Patient is not enrolled in medication study.    Pharmacy will monitor for medication interactions and immunosuppression levels in conjunction with the team.  Medication therapy needs for discharge planning will continue to be addressed throughout the current admission via multidisciplinary rounds and order review.   Pharmacy will make recommendations as appropriate.    Sundar SmallsD., BCPS

## 2022-09-24 NOTE — PLAN OF CARE
Neuro: A&Ox4.  Drowsy most of shift.  Arouses to voice.  Will awaken for questions as needed.   Cardiac: SR.  Bps 140-150s/70s.  CVPs 6-12 throughout shift.  Respiratory: Sating above 94% on 2L NC.  Oxygen adjusted throughout the night.  GI/: Adequate pink and clear urine output via Glez.  NG to low continuous suction.  No BM this shift.  Diet/appetite: NPO except for meds and ice chips.  Denies nausea.  Activity:  Assist of 2, up standing this AM for weight.  Pt able to move self independently just needs help moving with all equipment.  Pain: Oxy given x2 for abdominal pain.  Ice packs with some improvement.  Skin: Abdominal incision with small marked drainage.  LDA's: L Triple lumen internal jugular, blue port running Heparin 400 U/hr.  Brown transduced.  White SL.  2 L PIVs.  1 PIV running D5LR at 100 mL/hr.  R AV Fistula.    Plan: Evaluate need for insulin drip.  Adjust fluids as needed and encourage activity as tolerated.

## 2022-09-24 NOTE — PROGRESS NOTES
Transfer  Transferred from: PACU  Via:bed  Reason for transfer: Pt appropriate for 6B- improved patient condition  Family: Aware of transfer  Belongings: Received with pt  Chart: Received with pt  Medications: Meds received from old unit with pt  Code Status verified on armband: yes  2 RN Skin Assessment Completed By: Michelle WINN RN and Rosita GILLETTE RN  Med rec completed: yes  Bed surface reassessed with algorithm and charted: yes  New bed surface ordered: no  Suction/Ambu bag/Flowmeter at bedside: yes

## 2022-09-24 NOTE — CONSULTS
[facility]  Transplant Nephrology Consult  Date of Admission:  9/22/2022  Today's Date: 09/24/2022  Requesting physician: Yousif Gómez MD    Recommendations:  - No new recommendations at this time.    Assessment & Plan   # DDKT (SPK): Trend up   - Baseline Creatinine: ~ TBD   - Proteinuria: Moderate (1-3 grams)   - Date DSA Last Checked: Sep/2022      Latest DSA: Not checked recently due to time from transplant   - BK Viremia: No   - Kidney Tx Biopsy: No    # Pancreas Tx (SPK):    - Pancreatic Exocrine Drainage: Enteric drained     - Blood glucose: Euglycemia      On insulin: No   - HbA1c: Stable      Latest HbA1c: 6.9%   - Pancreatic enzymes: Trend down   - Date DSA Last Checked: Sep/2022  Latest DSA: Not checked recently due to time from transplant   - Pancreas Tx Biopsy: No    # Immunosuppression: Tacrolimus immediate release (goal 8-10) and Mycophenolate mofetil (dose 1000 mg every 12 hours)   - Changes: Not at this time    # Infection Prophylaxis:   - PJP: Sulfa/TMP (Bactrim)  - CMV: Valganciclovir (Valcyte)  - Fungal: Micafungin (Mycamine)    # Hypertension: Controlled;  Goal BP: < 150/90   - Volume status: Mildly hypervolemic     - Changes: Not at this time    # Anemia in Chronic Renal Disease: Hgb: Stable      SHANEKA: No   - Iron studies: Not checked recently    # Mineral Bone Disorder:   - Secondary renal hyperparathyroidism; PTH level: Not checked recently        On treatment: None  - Vitamin D; level: Not checked recently        On supplement: Yes  - Calcium; level: Normal        On supplement: Yes  - Phosphorus; level: Low        On supplement: No    # Electrolytes:   - Potassium; level: Normal        On supplement: No  - Magnesium; level: Normal        On supplement: Yes  - Bicarbonate; level: Normal        On supplement: No  - Sodium; level: Normal    # TIA 2017: No residual deficients     # Transplant History:  Etiology of Kidney Failure: Diabetes  mellitus type 1  Tx: DDKT (SPK)  Transplant: 9/23/2022 (Kidney / Pancreas)  Crossmatch at time of Tx: negative  Significant changes in immunosuppression: None  Significant transplant-related complications: None    Recommendations were communicated to the primary team verbally.    Seen and discussed with Dr. Teresa Islas NP  Pager: [phone]    Physician Attestation     I saw and evaluated Eden Hess as part of a shared APRN/PA visit.     I personally reviewed the vital signs, medications and labs.    I personally performed the substantive portion of the medical decision making for this visit - please see the SANDEEP's documentation for full details.    Key management decisions made by me and carried out under my direction: Will continue on present immunosuppression.  Agree with kidney transplant ultrasound.  Will follow.    I personally performed the substantive portion of the history for this visit - please see the SANDEEP's documentation for full details.  Key additional history findings made by me: Feels okay.  Slight increase in creatinine, but okay urine output.  Decrease in lipase and off insulin.  No chest pain or shortness of breath.  No nausea or vomiting.  Not passing gas yet.  Did ambulate on the floor.  Some incisional pain.    Bertrand Moore MD  Date of Service (when I saw the patient): 09/24/22    REASON FOR CONSULT   Kidney/pancreas tranplant      History of Present Illness   Eden Hess is a 52 year old female with a past medical history significant for end stage kidney disease secondary to diabetic nephropathy.  Other past medical history includes DM1 c/b neuropathy and retinopathy, hypertension, non obstructive CAD, anxiety and depression, diastolic HFpEF (prior to starting HD, in setting of hypertensive urgency), and TIA (2017). She is now s/p SPK on 9/23/22 with Dr. Gómez.     States her abdomen is sore, but overall feels better.  No fevers or chills.  She has not  passed gas or had a BM.    Review of Systems    The 10 point Review of Systems is negative other than noted in the HPI or here.     Past Medical History    I have reviewed this patient's medical history and updated it with pertinent information if needed.   Past Medical History:   Diagnosis Date     (HFpEF) heart failure with preserved ejection fraction (H)      Anxiety and depression     Adrienne and Contreras, SHERMAN Pop     Diabetes mellitus type 1 (H)     Diagnosed at age 2 years old      End stage kidney disease (H)      Hypertension      Retinopathy      TIA (transient ischemic attack)        Past Surgical History   I have reviewed this patient's surgical history and updated it with pertinent information if needed.  Past Surgical History:   Procedure Laterality Date      SECTION      x2     CREATE FISTULA ARTERIOVENOUS UPPER EXTREMITY      dialysis port, fistula-R arm       Family History   I have reviewed this patient's family history and updated it with pertinent information if needed.   Family History   Problem Relation Age of Onset     Diabetes Type 1 Father      Hypertension Father      Cerebrovascular Disease Father        Social History   I have reviewed this patient's social history and updated it with pertinent information if needed. Eden Hess  reports that she quit smoking about 24 years ago. Her smoking use included cigarettes. She has never used smokeless tobacco. She reports current alcohol use. She reports that she does not use drugs.    Allergies   Allergies   Allergen Reactions     Amlodipine      Other reaction(s): Edema,generalized     Prior to Admission Medications     acetaminophen  975 mg Oral Q8H     aspirin  81 mg Oral Daily     basiliximab (SIMULECT) infusion  20 mg Intravenous Once     [START ON 2022] basiliximab (SIMULECT) infusion  20 mg Intravenous Once     calcium carbonate-vitamin D  1 tablet Oral BID w/meals     [START ON 2022]  "clotrimazole  10 mg Buccal 4x Daily     [START ON 2022] magnesium oxide  400 mg Oral Q24H     [START ON 2022] methylPREDNISolone  100 mg Intravenous Once     micafungin  100 mg Intravenous Q24H     mycophenolate  1,000 mg Oral BID IS     octreotide  100 mcg Subcutaneous Q12H     pantoprazole  40 mg Per NG tube Daily     piperacillin-tazobactam  2.25 g Intravenous Q8H     polyethylene glycol  17 g Oral Daily     senna-docusate  1 tablet Oral BID     sodium chloride (PF)  3 mL Intravenous Q8H     tacrolimus  2 mg Oral or NG Tube BID IS     [START ON 2022] valGANciclovir  450 mg Oral Once per day on      venlafaxine  150 mg Oral Daily       dextrose       dextrose       IV fluid REPLACEMENT ONLY 100 mL/hr at 22 1100     heparin 500 Units/hr (22 1002)     insulin regular 0 Units/hr (22 1116)     IV fluid REPLACEMENT ONLY Stopped (22)     IV fluid REPLACEMENT ONLY 0 mL/hr at 22 0303       Physical Exam   Temp  Av.2  F (36.8  C)  Min: 97.8  F (36.6  C)  Max: 98.9  F (37.2  C)  Arterial Line BP  Min: 39/39  Max: 153/64  Arterial Line MAP (mmHg)  Av.6 mmHg  Min: 39 mmHg  Max: 95 mmHg      Pulse  Av.3  Min: 71  Max: 89 Resp  Av.9  Min: 9  Max: 17  SpO2  Av.4 %  Min: 94 %  Max: 100 %    CVP (mmHg): 11 mmHgBP 133/66   Pulse 80   Temp 98.2  F (36.8  C)   Resp 16   Ht 1.549 m (5' 1\")   Wt 75.4 kg (166 lb 3.6 oz)   LMP 2022   SpO2 98%   BMI 31.41 kg/m     Date 22 07 - 22 0659   Shift 2492-9766 1163-6126 4072-4455 24 Hour Total   INTAKE   I.V. 426   426   NG/GT 30   30   Shift Total(mL/kg) 456(6.05)   456(6.05)   OUTPUT   Urine 235   235   Emesis/NG output 250   250   Shift Total(mL/kg) 485(6.43)   485(6.43)   Weight (kg) 75.4 75.4 75.4 75.4      Admit Weight: 69.8 kg (153 lb 14.1 oz)     GENERAL APPEARANCE: alert and no distress  HENT: mouth without ulcers or lesions  LYMPHATICS: no cervical or supraclavicular " nodes  RESP: lungs clear to auscultation - no rales, rhonchi or wheezes  CV: regular rhythm, normal rate, no rub, no murmur  EDEMA: no LE edema bilaterally  ABDOMEN: soft, nondistended, nontender, bowel sounds normal  MS: extremities normal - no gross deformities noted, no evidence of inflammation in joints, no muscle tenderness  SKIN: no rash    Data   CMP  Recent Labs   Lab 09/24/22  1115 09/24/22  1004 09/24/22  0902 09/24/22  0714 09/24/22  0525 09/24/22  0514 09/24/22  0309 09/24/22  0238 09/23/22  2311 09/23/22  1820 09/23/22  1815 09/23/22  1753 09/23/22  1703 09/22/22  2237 09/22/22  1629   NA  --   --   --   --   --  136  --   --   --  136  --  135 132*   < > 132*   POTASSIUM  --   --   --   --   --  4.8  --  4.7  --  3.8  3.8  --  3.4* 3.8   < > 3.9   CHLORIDE  --   --   --   --   --  101  --   --   --  104  --   --   --   --  93*   CO2  --   --   --   --   --  22  --   --   --  20*  --   --   --   --  29   ANIONGAP  --   --   --   --   --  13  --   --   --  12  --   --   --   --  10   * 133* 128* 145*   < > 164*   < >  --    < > 66*   < > 79 149*   < > 139*   BUN  --   --   --   --   --  42.7*  --   --   --  38.1*  --   --   --   --  33.4*   CR  --   --   --   --   --  4.33*  --   --   --  4.01*  --   --   --   --  4.00*   GFRESTIMATED  --   --   --   --   --  11*  --   --   --  13*  --   --   --   --  13*   BARBARA  --   --   --   --   --  9.0  --   --   --  8.3*  --   --   --   --  9.9   MAG  --   --   --   --   --  2.0  --   --   --  1.8  --   --   --   --   --    PHOS  --   --   --   --   --  2.4*  --   --   --  1.3*  --   --   --   --   --    PROTTOTAL  --   --   --   --   --   --   --   --   --   --   --   --   --   --  6.9   ALBUMIN  --   --   --   --   --   --   --   --   --   --   --   --   --   --  4.1   BILITOTAL  --   --   --   --   --   --   --   --   --   --   --   --   --   --  0.3   ALKPHOS  --   --   --   --   --   --   --   --   --   --   --   --   --   --  89   AST  --   --   --    --   --   --   --   --   --   --   --   --   --   --  29   ALT  --   --   --   --   --   --   --   --   --   --   --   --   --   --  18    < > = values in this interval not displayed.     CBC  Recent Labs   Lab 09/24/22  1021 09/24/22  0514 09/24/22  0235 09/23/22 2008 09/23/22  1820 09/23/22  1252 09/22/22  1629   HGB 8.2* 8.5*  8.5* 8.7* 8.4*  8.4* 9.1*   < > 10.9*   WBC  --  8.4  --  7.1 5.9  --  5.5   RBC  --  2.47*  --  2.46* 2.64*  --  3.13*   HCT  --  26.8*  --  26.8* 28.4*  --  32.5*   MCV  --  109*  --  109* 108*  --  104*   MCH  --  34.4*  --  34.1* 34.5*  --  34.8*   MCHC  --  31.7  --  31.3* 32.0  --  33.5   RDW  --  15.3*  --  14.9 15.1*  --  14.7   PLT  --  200  --  186 190  --  258    < > = values in this interval not displayed.     INR  Recent Labs   Lab 09/23/22  1820 09/22/22  1629   INR 1.09 0.91   PTT 27 26     ABG  Recent Labs   Lab 09/23/22  1753 09/23/22  1703 09/23/22  1606 09/23/22  1518   PH 7.36 7.33* 7.34* 7.32*   PCO2 35 38 36 35   PO2 152* 134* 116* 120*   HCO3 20* 20* 19* 18*   O2PER 50.0 50.0 50.0 50.0      Urine Studies  Recent Labs   Lab Test 09/22/22  1714 08/13/20  1110   COLOR Light Yellow Yellow   APPEARANCE Clear Slightly Cloudy   URINEGLC 300 * >499*   URINEBILI Negative Negative   URINEKETONE Negative 5*   SG 1.011 1.016   UBLD Small* Small*   URINEPH 8.0* 5.0   PROTEIN 200 * >499*   NITRITE Negative Negative   LEUKEST Negative Negative   RBCU 1 3*   WBCU 6* 3     No lab results found.  PTH  No lab results found.  Iron Studies  No lab results found.    IMAGING:  All imaging studies reviewed by me.

## 2022-09-24 NOTE — PROGRESS NOTES
CLINICAL NUTRITION SERVICES - ASSESSMENT NOTE     Nutrition Prescription    RECOMMENDATIONS FOR MDs/PROVIDERS TO ORDER:  Recommend diet advancement as soon as medically appropriate.     Malnutrition Status:    Patient does not meet two of the established criteria necessary for diagnosing malnutrition but is at risk for malnutrition    Recommendations already ordered by Registered Dietitian (RD):  Discharge diet order written.     Future/Additional Recommendations:  Once diet advanced and if suspect eating poorly, would offer supplements and start on kcal cnts.     Provide verbal review of post-transplant diet guidelines - pt too lethargic to meaningfully participate in education today    If TPN becomes nutrition POC, rec:  --Use dosing weight 53 kg  --Begin TPN (via CENTRAL LINE), goal volume 1320 ml/day (or per PharmD discretion) with initial 115g Dex daily (391 kcal, GIR 1.5), 105g AA daily (420 kcal), and 250 ml 20% IV lipids 5x/wk (M-F).  Micro/Rx: Infuvite + MTE   --ONLY once pt receives ~100% of initial continuous PN volume with K+/Mg++/Phos WNL, advance PN dex by 35 g every 1-2 days (pending lytes/Glu and Pharm D/RD discretion) to initial goal of 185g Dex (629 kcal) to increase provisions to 1406 kcals (27 kcal/kg/day), 1.98 g PRO/kg/day, GIR 2.4 with 25.4% kcals from Fat.    If EN support becomes nutrition POC, rec:  Vital 1.5 Min @ goal of  45ml/hr  (1080ml/day) + 1 pkt ProSource BID (2 pkts) will provide: 1620 kcals, 72 g PRO, 825 ml free H20, 201 g CHO, and 6 g fiber daily.       REASON FOR ASSESSMENT  Eden Hess is a 52 year old female seen by the dietitian for MD Order- Assess & Educate post-op SOT    NUTRITION HISTORY  Pt seen by outpatient SOT RD in 8/2020 for kidney pancreas transplant evaluation - at that time, pt did not meet criteria for malnutrition.    No food allergies noted.    Met with pt at bedside. She was very lethargic and fell asleep during visit, so majority of information was  "obtained from pt's  at bedside, Gallo. He shares that pt has been eating well PTA and has maintained her weight.    CURRENT NUTRITION ORDERS  Diet: NPO    LABS  Labs reviewed  -Phos 2.4 (L)  -TG 70 (WNL)    MEDICATIONS  Medications reviewed  -Scheduled Miralax & Senokot-S  -D5W in LR - 1000 mL infusion    ANTHROPOMETRICS  Height: 154.9 cm (5' 1\")  Most Recent Weight: 75.4 kg (166 lb 3.6 oz)    IBW: 47.7 kg   BMI: 29.09 kg/m2; Overweight BMI 25-29.9  Weight History: No significant recent wt loss noted per wt hx below.  Wt Readings from Last 20 Encounters:   09/24/22 75.4 kg (166 lb 3.6 oz)   08/01/22 70.3 kg (155 lb)   02/28/22 71.4 kg (157 lb 6.4 oz)   08/13/20 62.9 kg (138 lb 11.2 oz)   08/13/20 62.9 kg (138 lb 11.2 oz)   06/22/20 63.5 kg (140 lb)   01/31/17 64 kg (141 lb)   12/29/16 64 kg (141 lb)   Per Care Everywhere:  69.9 kg (154 lb 3.2 oz) 07/20/2022 2:32 PM CDT     68.9 kg (152 lb) 06/03/2022 3:46 PM CDT     70.8 kg (156 lb) 05/18/2022 4:30 PM CDT     68.9 kg (152 lb) 04/06/2022 2:29 PM CDT     Dosing Weight: 53 kg (adjusted, based on lowest wt this admit of 69.8 kg on 9/23 and IBW of 47.7 kg)    ASSESSED NUTRITION NEEDS:  Estimated Energy Needs: 0024-6027 kcals (30-35 Kcal/Kg)  Justification: increased needs post-op SOT  Estimated Protein Needs:  grams protein (1.5-2 gm/Kg)  Justification: increased needs post op SOT  Estimated Fluid Needs: 5791-8712 mL (25-30 mL/Kg)  Justification: maintenance, or per MD pending fluid status and adequate UOP    PHYSICAL FINDINGS  See malnutrition section below.  -NGT (CXR) to LIS  -Pt laying completely flat in bed    MALNUTRITION  % Intake: Decreased intake does not meet criteria  % Weight Loss: None noted  Subcutaneous Fat Loss: None observed per visual of upper body  Muscle Loss: None observed per visual of upper body  Fluid Accumulation/Edema: None noted per chart  Malnutrition Diagnosis: Patient does not meet two of the established criteria necessary for " diagnosing malnutrition but is at risk for malnutrition    NUTRITION DIAGNOSIS:  Food and nutrition-related knowledge deficit r/t length of time since previous post-transplant education AEB patient verbal report, review of chart record, and MD consult for nutrition education.     INTERVENTIONS  Implementation  Nutrition Education:  1. Provided brief verbal overview of post-transplant diet regarding high protein intake post-op, heart healthy diet long-term and food safety precautions to prevent food borne illness.    2. Provided handouts: Guide to Your Diet after Transplant, Sources of Protein, and Food Safety Nutrition Therapy. Patient receptive to information provided. Expected diet compliance is good.     TF and TPN recs    Goals  1. Patient will verbalize understanding of 3 important aspects of post-transplant diet guidelines.   2. PO intake >50% meals TID.    Monitoring/Evaluation  Progress toward goals will be monitored and evaluated per protocol.    Angie Xiong RD, LD  Emergency Department/Weekend Pager: [phone]

## 2022-09-24 NOTE — PROGRESS NOTES
22 1100   Quick Adds   Type of Visit Initial PT Evaluation   Living Environment   People in Home child(jaya), adult;spouse  (17 yo son, )   Current Living Arrangements house   Home Accessibility stairs to enter home;stairs within home   Number of Stairs, Main Entrance 3   Stair Railings, Main Entrance none   Number of Stairs, Within Home, Primary other (see comments)  (number not given, stairs to basement;  able to do laundry in basemenet)   Transportation Anticipated family or friend will provide   Living Environment Comments Pt lives in house with bed and bath on main level with  and 17 yo son. Stairs down to laundry but  can handle. 3 stairs to enter front, 1 step to enter via garage.   Self-Care   Usual Activity Tolerance good   Current Activity Tolerance fair   Regular Exercise No  (10 min walk 1x/week)   Equipment Currently Used at Home none   Fall history within last six months no   Activity/Exercise/Self-Care Comment Previously IND   General Information   Onset of Illness/Injury or Date of Surgery 22   Referring Physician Yousif Gómez MD   Patient/Family Therapy Goals Statement (PT) Go home   Pertinent History of Current Problem (include personal factors and/or comorbidities that impact the POC) Eden Hess is a 52-year old woman with PMHx significant for ESRD 2/2 DM2 who is now POD-0 s/p  donor kidney and pancreas transplant. Other past medical history includes diabetic neuropathy, diabetic retinopathy, hypertension, non obstructive CAD, anxiety and depression, diastolic HFpEF (prior to starting HD, in setting of hypertensive urgency, and TIA (2017)  Patient was notified as an acceptable  donor organ became available and presented for further pre-operative work-up.  Patient was informed of the risks and benefits regarding  donor kidney/pancreas transplantation, and has elected to proceed.   Existing Precautions/Restrictions  abdominal   Weight-Bearing Status - LUE full weight-bearing   Weight-Bearing Status - RUE full weight-bearing   Weight-Bearing Status - LLE full weight-bearing   Weight-Bearing Status - RLE full weight-bearing   General Observations Pt previously IND, needs Ax1 for line management   Cognition   Affect/Mental Status (Cognition) WNL   Follows Commands (Cognition) over 90% accuracy;follows multi-step commands   Cognitive Status Comments Pt on/off alert and oriented, intermittently falls asleep. Once up in bed/chair fully alert   Pain Assessment   Patient Currently in Pain Yes, see Vital Sign flowsheet  (6/10 in abdomen, most painful in transitions, STS)   Integumentary/Edema   Integumentary/Edema Comments Abd incision, no redness or signs of infection   Posture    Posture Protracted shoulders   Posture Comments Slight forward posture in standing/seated   Range of Motion (ROM)   Range of Motion ROM is WNL   Strength (Manual Muscle Testing)   Strength (Manual Muscle Testing) strength is WNL   Bed Mobility   Comment, (Bed Mobility) Pt moving fairly well, max cues to roll   Transfers   Comment, (Transfers) Ax2 supine<>sit   Gait/Stairs (Locomotion)   [place] Level (Gait) modified independence   Assistive Device (Gait) walker, front-wheeled   Distance in Feet (Required for LE Total Joints) 10'   Pattern (Gait) step-through   Deviations/Abnormal Patterns (Gait) stride length decreased;gait speed decreased   Maintains Weight-bearing Status (Gait) able to maintain   Comment, (Gait/Stairs) Amb well with walker and Ax1 for line managment   Balance   Balance Comments Seated and standing w/o concerns   Sensory Examination   Sensory Perception Comments Stated no numbess, tingling, or changes in sensation since surgery   Coordination   Coordination no deficits were identified   Muscle Tone   Muscle Tone no deficits were identified   Clinical Impression   Criteria for Skilled Therapeutic Intervention Yes, treatment indicated    PT Diagnosis (PT) Impaired functional mobility   Influenced by the following impairments abdominal pain, medical status, weakness   Functional limitations due to impairments bed mobilty, transfers, amb   Clinical Presentation (PT Evaluation Complexity) Evolving/Changing   Clinical Presentation Rationale Clinical judgment, chart review, [facility]   Clinical Decision Making (Complexity) moderate complexity   Planned Therapy Interventions (PT) bed mobility training;gait training;joint mobilization;strengthening;transfer training   Anticipated Equipment Needs at Discharge (PT) walker, rolling  (FWW)   Risk & Benefits of therapy have been explained evaluation/treatment results reviewed;care plan/treatment goals reviewed;risks/benefits reviewed;participants included;patient;participants voiced agreement with care plan;spouse/significant other   Clinical Impression Comments Pt mobilizing well despite moderate weakness and pain from surgery   PT Discharge Planning   PT Discharge Recommendation (DC Rec) home with assist;home;home with outpatient physical therapy   PT Rationale for DC Rec Pt fairly IND with modifications, able to move freely once medically cleared   PT Brief overview of current status Ax1 for line management, FWW modIND, safe to amb to bathroom bed and chair as needed   Plan of Care Review   Plan of Care Reviewed With patient;spouse   Total Evaluation Time   Total Evaluation Time (Minutes) 15   Physical Therapy Goals   PT Frequency 5x/week   PT Predicted Duration/Target Date for Goal Attainment 10/01/22   PT Goals Bed Mobility;Transfers;Gait   PT: Bed Mobility Modified independent;Independent  (with abd prec)   PT: Transfers Modified independent;Sit to/from stand;Bed to/from chair  (Ax1 for line management)   PT: Gait Modified independent;Rolling walker;Greater than 200 feet

## 2022-09-25 LAB
AMYLASE SERPL-CCNC: 113 U/L (ref 28–100)
ANION GAP SERPL CALCULATED.3IONS-SCNC: 8 MMOL/L (ref 7–15)
APTT PPP: 37 SECONDS (ref 22–38)
BASOPHILS # BLD AUTO: 0 10E3/UL (ref 0–0.2)
BASOPHILS # BLD AUTO: 0 10E3/UL (ref 0–0.2)
BASOPHILS NFR BLD AUTO: 0 %
BASOPHILS NFR BLD AUTO: 0 %
BUN SERPL-MCNC: 40.2 MG/DL (ref 6–20)
CALCIUM SERPL-MCNC: 8.6 MG/DL (ref 8.6–10)
CHLORIDE SERPL-SCNC: 106 MMOL/L (ref 98–107)
CREAT SERPL-MCNC: 4.14 MG/DL (ref 0.51–0.95)
DEPRECATED HCO3 PLAS-SCNC: 25 MMOL/L (ref 22–29)
EOSINOPHIL # BLD AUTO: 0 10E3/UL (ref 0–0.7)
EOSINOPHIL # BLD AUTO: 0 10E3/UL (ref 0–0.7)
EOSINOPHIL NFR BLD AUTO: 0 %
EOSINOPHIL NFR BLD AUTO: 0 %
ERYTHROCYTE [DISTWIDTH] IN BLOOD BY AUTOMATED COUNT: 15.3 % (ref 10–15)
ERYTHROCYTE [DISTWIDTH] IN BLOOD BY AUTOMATED COUNT: 15.7 % (ref 10–15)
GFR SERPL CREATININE-BSD FRML MDRD: 12 ML/MIN/1.73M2
GLUCOSE BLDC GLUCOMTR-MCNC: 102 MG/DL (ref 70–99)
GLUCOSE BLDC GLUCOMTR-MCNC: 107 MG/DL (ref 70–99)
GLUCOSE BLDC GLUCOMTR-MCNC: 107 MG/DL (ref 70–99)
GLUCOSE BLDC GLUCOMTR-MCNC: 114 MG/DL (ref 70–99)
GLUCOSE BLDC GLUCOMTR-MCNC: 117 MG/DL (ref 70–99)
GLUCOSE BLDC GLUCOMTR-MCNC: 125 MG/DL (ref 70–99)
GLUCOSE BLDC GLUCOMTR-MCNC: 125 MG/DL (ref 70–99)
GLUCOSE BLDC GLUCOMTR-MCNC: 130 MG/DL (ref 70–99)
GLUCOSE BLDC GLUCOMTR-MCNC: 131 MG/DL (ref 70–99)
GLUCOSE BLDC GLUCOMTR-MCNC: 134 MG/DL (ref 70–99)
GLUCOSE BLDC GLUCOMTR-MCNC: 135 MG/DL (ref 70–99)
GLUCOSE BLDC GLUCOMTR-MCNC: 135 MG/DL (ref 70–99)
GLUCOSE BLDC GLUCOMTR-MCNC: 139 MG/DL (ref 70–99)
GLUCOSE BLDC GLUCOMTR-MCNC: 143 MG/DL (ref 70–99)
GLUCOSE BLDC GLUCOMTR-MCNC: 147 MG/DL (ref 70–99)
GLUCOSE BLDC GLUCOMTR-MCNC: 153 MG/DL (ref 70–99)
GLUCOSE SERPL-MCNC: 119 MG/DL (ref 70–99)
HCT VFR BLD AUTO: 22.9 % (ref 35–47)
HCT VFR BLD AUTO: 23 % (ref 35–47)
HGB BLD-MCNC: 7.1 G/DL (ref 11.7–15.7)
HGB BLD-MCNC: 7.1 G/DL (ref 11.7–15.7)
HGB BLD-MCNC: 7.2 G/DL (ref 11.7–15.7)
HGB BLD-MCNC: 7.3 G/DL (ref 11.7–15.7)
HGB BLD-MCNC: 7.5 G/DL (ref 11.7–15.7)
IMM GRANULOCYTES # BLD: 0 10E3/UL
IMM GRANULOCYTES # BLD: 0.1 10E3/UL
IMM GRANULOCYTES NFR BLD: 0 %
IMM GRANULOCYTES NFR BLD: 1 %
LIPASE SERPL-CCNC: 76 U/L (ref 13–60)
LYMPHOCYTES # BLD AUTO: 0.1 10E3/UL (ref 0.8–5.3)
LYMPHOCYTES # BLD AUTO: 0.1 10E3/UL (ref 0.8–5.3)
LYMPHOCYTES NFR BLD AUTO: 1 %
LYMPHOCYTES NFR BLD AUTO: 1 %
MAGNESIUM SERPL-MCNC: 1.9 MG/DL (ref 1.7–2.3)
MCH RBC QN AUTO: 34.3 PG (ref 26.5–33)
MCH RBC QN AUTO: 34.6 PG (ref 26.5–33)
MCHC RBC AUTO-ENTMCNC: 30.9 G/DL (ref 31.5–36.5)
MCHC RBC AUTO-ENTMCNC: 31.4 G/DL (ref 31.5–36.5)
MCV RBC AUTO: 110 FL (ref 78–100)
MCV RBC AUTO: 111 FL (ref 78–100)
MONOCYTES # BLD AUTO: 0.3 10E3/UL (ref 0–1.3)
MONOCYTES # BLD AUTO: 0.6 10E3/UL (ref 0–1.3)
MONOCYTES NFR BLD AUTO: 4 %
MONOCYTES NFR BLD AUTO: 7 %
NEUTROPHILS # BLD AUTO: 7.7 10E3/UL (ref 1.6–8.3)
NEUTROPHILS # BLD AUTO: 8.5 10E3/UL (ref 1.6–8.3)
NEUTROPHILS NFR BLD AUTO: 91 %
NEUTROPHILS NFR BLD AUTO: 95 %
NRBC # BLD AUTO: 0 10E3/UL
NRBC # BLD AUTO: 0 10E3/UL
NRBC BLD AUTO-RTO: 0 /100
NRBC BLD AUTO-RTO: 0 /100
PHOSPHATE SERPL-MCNC: 3.2 MG/DL (ref 2.5–4.5)
PLATELET # BLD AUTO: 169 10E3/UL (ref 150–450)
PLATELET # BLD AUTO: 171 10E3/UL (ref 150–450)
POTASSIUM SERPL-SCNC: 3.4 MMOL/L (ref 3.4–5.3)
POTASSIUM SERPL-SCNC: 3.5 MMOL/L (ref 3.4–5.3)
POTASSIUM SERPL-SCNC: 3.7 MMOL/L (ref 3.4–5.3)
POTASSIUM SERPL-SCNC: 3.8 MMOL/L (ref 3.4–5.3)
POTASSIUM SERPL-SCNC: 3.8 MMOL/L (ref 3.4–5.3)
POTASSIUM SERPL-SCNC: 4.1 MMOL/L (ref 3.4–5.3)
RBC # BLD AUTO: 2.07 10E6/UL (ref 3.8–5.2)
RBC # BLD AUTO: 2.08 10E6/UL (ref 3.8–5.2)
SODIUM SERPL-SCNC: 139 MMOL/L (ref 136–145)
UFH PPP CHRO-ACNC: <0.1 IU/ML
WBC # BLD AUTO: 8.1 10E3/UL (ref 4–11)
WBC # BLD AUTO: 9.3 10E3/UL (ref 4–11)

## 2022-09-25 PROCEDURE — 250N000013 HC RX MED GY IP 250 OP 250 PS 637

## 2022-09-25 PROCEDURE — 250N000009 HC RX 250: Performed by: PHYSICIAN ASSISTANT

## 2022-09-25 PROCEDURE — 258N000003 HC RX IP 258 OP 636: Performed by: SURGERY

## 2022-09-25 PROCEDURE — 250N000011 HC RX IP 250 OP 636: Mod: JB | Performed by: SURGERY

## 2022-09-25 PROCEDURE — 85730 THROMBOPLASTIN TIME PARTIAL: CPT

## 2022-09-25 PROCEDURE — 250N000013 HC RX MED GY IP 250 OP 250 PS 637: Performed by: SURGERY

## 2022-09-25 PROCEDURE — 85520 HEPARIN ASSAY: CPT | Performed by: SURGERY

## 2022-09-25 PROCEDURE — 84100 ASSAY OF PHOSPHORUS: CPT | Performed by: SURGERY

## 2022-09-25 PROCEDURE — 250N000011 HC RX IP 250 OP 636: Performed by: PHYSICIAN ASSISTANT

## 2022-09-25 PROCEDURE — 85018 HEMOGLOBIN: CPT | Performed by: SURGERY

## 2022-09-25 PROCEDURE — 82150 ASSAY OF AMYLASE: CPT | Performed by: SURGERY

## 2022-09-25 PROCEDURE — 85018 HEMOGLOBIN: CPT | Performed by: INTERNAL MEDICINE

## 2022-09-25 PROCEDURE — 84132 ASSAY OF SERUM POTASSIUM: CPT | Performed by: SURGERY

## 2022-09-25 PROCEDURE — 36592 COLLECT BLOOD FROM PICC: CPT | Performed by: SURGERY

## 2022-09-25 PROCEDURE — 120N000003 HC R&B IMCU UMMC

## 2022-09-25 PROCEDURE — 250N000012 HC RX MED GY IP 250 OP 636 PS 637: Performed by: SURGERY

## 2022-09-25 PROCEDURE — 83690 ASSAY OF LIPASE: CPT | Performed by: SURGERY

## 2022-09-25 PROCEDURE — 83735 ASSAY OF MAGNESIUM: CPT | Performed by: SURGERY

## 2022-09-25 PROCEDURE — 99233 SBSQ HOSP IP/OBS HIGH 50: CPT | Performed by: NURSE PRACTITIONER

## 2022-09-25 RX ADMIN — HUMAN INSULIN 0.5 UNITS/HR: 100 INJECTION, SOLUTION SUBCUTANEOUS at 13:02

## 2022-09-25 RX ADMIN — METHYLPREDNISOLONE SODIUM SUCCINATE 100 MG: 125 INJECTION, POWDER, FOR SOLUTION INTRAMUSCULAR; INTRAVENOUS at 08:22

## 2022-09-25 RX ADMIN — SODIUM CHLORIDE, SODIUM LACTATE, POTASSIUM CHLORIDE, CALCIUM CHLORIDE AND DEXTROSE MONOHYDRATE: 5; 600; 310; 30; 20 INJECTION, SOLUTION INTRAVENOUS at 23:19

## 2022-09-25 RX ADMIN — MYCOPHENOLATE MOFETIL 1000 MG: 500 TABLET, FILM COATED ORAL at 17:03

## 2022-09-25 RX ADMIN — POLYETHYLENE GLYCOL 3350 17 G: 17 POWDER, FOR SOLUTION ORAL at 08:25

## 2022-09-25 RX ADMIN — SODIUM CHLORIDE, SODIUM LACTATE, POTASSIUM CHLORIDE, CALCIUM CHLORIDE AND DEXTROSE MONOHYDRATE: 5; 600; 310; 30; 20 INJECTION, SOLUTION INTRAVENOUS at 05:06

## 2022-09-25 RX ADMIN — OCTREOTIDE ACETATE 100 MCG: 100 INJECTION, SOLUTION INTRAVENOUS; SUBCUTANEOUS at 23:25

## 2022-09-25 RX ADMIN — ACETAMINOPHEN 975 MG: 325 TABLET, FILM COATED ORAL at 00:02

## 2022-09-25 RX ADMIN — TACROLIMUS 2 MG: 1 CAPSULE ORAL at 17:03

## 2022-09-25 RX ADMIN — MYCOPHENOLATE MOFETIL 1000 MG: 500 TABLET, FILM COATED ORAL at 08:43

## 2022-09-25 RX ADMIN — OXYCODONE HYDROCHLORIDE 10 MG: 10 TABLET ORAL at 20:48

## 2022-09-25 RX ADMIN — OCTREOTIDE ACETATE 100 MCG: 100 INJECTION, SOLUTION INTRAVENOUS; SUBCUTANEOUS at 00:19

## 2022-09-25 RX ADMIN — ACETAMINOPHEN 975 MG: 325 TABLET, FILM COATED ORAL at 23:20

## 2022-09-25 RX ADMIN — VENLAFAXINE HYDROCHLORIDE 150 MG: 150 CAPSULE, EXTENDED RELEASE ORAL at 08:24

## 2022-09-25 RX ADMIN — ACETAMINOPHEN 975 MG: 325 TABLET, FILM COATED ORAL at 08:24

## 2022-09-25 RX ADMIN — Medication 1 TABLET: at 13:02

## 2022-09-25 RX ADMIN — PANTOPRAZOLE SODIUM 40 MG: 40 TABLET, DELAYED RELEASE ORAL at 08:24

## 2022-09-25 RX ADMIN — SENNOSIDES AND DOCUSATE SODIUM 1 TABLET: 50; 8.6 TABLET ORAL at 20:48

## 2022-09-25 RX ADMIN — ACETAMINOPHEN 975 MG: 325 TABLET, FILM COATED ORAL at 15:05

## 2022-09-25 RX ADMIN — OCTREOTIDE ACETATE 100 MCG: 100 INJECTION, SOLUTION INTRAVENOUS; SUBCUTANEOUS at 13:02

## 2022-09-25 RX ADMIN — MICAFUNGIN 100 MG: 10 INJECTION, POWDER, LYOPHILIZED, FOR SOLUTION INTRAVENOUS at 02:03

## 2022-09-25 RX ADMIN — PIPERACILLIN AND TAZOBACTAM 2.25 G: 2; .25 INJECTION, POWDER, LYOPHILIZED, FOR SOLUTION INTRAVENOUS at 23:19

## 2022-09-25 RX ADMIN — HEPARIN SODIUM 500 UNITS/HR: 10000 INJECTION, SOLUTION INTRAVENOUS at 22:06

## 2022-09-25 RX ADMIN — PIPERACILLIN AND TAZOBACTAM 2.25 G: 2; .25 INJECTION, POWDER, LYOPHILIZED, FOR SOLUTION INTRAVENOUS at 00:03

## 2022-09-25 RX ADMIN — TACROLIMUS 2 MG: 1 CAPSULE ORAL at 08:24

## 2022-09-25 RX ADMIN — MAGNESIUM OXIDE TAB 400 MG (241.3 MG ELEMENTAL MG) 400 MG: 400 (241.3 MG) TAB at 13:02

## 2022-09-25 RX ADMIN — PIPERACILLIN AND TAZOBACTAM 2.25 G: 2; .25 INJECTION, POWDER, LYOPHILIZED, FOR SOLUTION INTRAVENOUS at 08:22

## 2022-09-25 RX ADMIN — SODIUM CHLORIDE, SODIUM LACTATE, POTASSIUM CHLORIDE, CALCIUM CHLORIDE AND DEXTROSE MONOHYDRATE: 5; 600; 310; 30; 20 INJECTION, SOLUTION INTRAVENOUS at 14:31

## 2022-09-25 RX ADMIN — PIPERACILLIN AND TAZOBACTAM 2.25 G: 2; .25 INJECTION, POWDER, LYOPHILIZED, FOR SOLUTION INTRAVENOUS at 15:06

## 2022-09-25 RX ADMIN — Medication 1 TABLET: at 20:49

## 2022-09-25 RX ADMIN — SENNOSIDES AND DOCUSATE SODIUM 1 TABLET: 50; 8.6 TABLET ORAL at 08:25

## 2022-09-25 RX ADMIN — ASPIRIN 81 MG CHEWABLE TABLET 81 MG: 81 TABLET CHEWABLE at 08:25

## 2022-09-25 ASSESSMENT — ACTIVITIES OF DAILY LIVING (ADL)
ADLS_ACUITY_SCORE: 25
ADLS_ACUITY_SCORE: 26
ADLS_ACUITY_SCORE: 25
ADLS_ACUITY_SCORE: 26
ADLS_ACUITY_SCORE: 25
ADLS_ACUITY_SCORE: 26

## 2022-09-25 NOTE — PROGRESS NOTES
Transplant Surgery  Inpatient Daily Progress Note  09/25/2022    Assessment & Plan: Eden Hess is a 52 year old female with a past medical history significant for end stage kidney disease secondary to diabetic nephropathy. Other past medical history includes DM1 c/b neuropathy and retinopathy, hypertension, non obstructive CAD, anxiety and depression, diastolic HFpEF (prior to starting HD, in setting of hypertensive urgency), and TIA (2017). She is now s/p SPK on 9/23/22 with Dr. Gómez.     S/p SPK 9/23/22: POD#2  Pancreas: Lipase 76<-289<-931. -140s, insulin gtt as needed (0.2-3 units/hour overnight). Continue Octreotide.   Post-op US:  1.  Low resistance arterial waveforms within the transplant, though  with continued increased resistive indices in the arteries outside the  pancreas. Recommend attention on follow-up.  2.  Patent venous evaluation.  3.  No appreciable fluid collections.  Kidney: Cr 4.1<-4.3<-4.0. 1.3 L UOP over last 24 hours. Low CVP overnight, improved with albumin bolus.   Post op US:  1. Left lower quadrant renal transplant with increased arterial  resistive indices throughout, nonspecific, though can be seen with  rejection. Attention on follow-up.  2. Patent Doppler evaluation of the renal transplant vasculature.    Immunosuppression management:   Induction: via intermediate protocol (cPRA 0) with Thymoglobulin 2 mg/kg, basiliximab POD#1 and #5, and steroid pulse with four week taper.   Maintenance:  -MMF 1000 mg BID  -Tacrolimus  2 mg BID. Goal level 8-10.     Neuro/Psych:   Acute post op pain: Scheduled Tylenol, Oxycodone prn and Dilaudid IV prn   Hx Depression: PTA Effexor, restarted.     Hematology:   Anemia of chronic disease: Hgb stable 7-8. Monitor.   Vascular prophylaxis: Heparin gtt, increased to 500units/hour.    Cardiorespiratory:   Hx CAD: PTA ASA 81 mg daily, atorvastatin 40 mg daily.   HTN: SBP stable.   Chest pressure: overnight, positional and resolved without  intervention. EKG WNL.     GI/Nutrition: NPO with NG tube to LIS. Bowel regimen: Senokot-S BID, Miralax daily.     Endocrine: See above.     Fluid/Electrolytes: MIVF: D5LR@100/hr     : Glez to remain due to new surgical anastomosis until POD 3    Infectious disease: No acute issues. Zosyn and Lisseth per protocol.     Prophylaxis: DVT (mechanical, heparin gtt), fall, GI, fungal (Micafungin), viral (Valcyte), pneumocystis (Bactrim), periop (Zosyn)    Disposition: 6B, transfer to 7A    Medical Decision Making: Medium  Subsequent visit 95153 (moderate level decision making)    SANDEEP/Fellow/Resident Provider: Ximena Bosch PA-C    Faculty: Yousif Gómez M.D.  _________________________________________________________________  Transplant History:   9/23/2022 (Kidney / Pancreas), Postoperative day: 2     Interval History: History is obtained from the patient  Overnight events: No complaints.     ROS:   A 10-point review of systems was negative except as noted above.    Meds:    acetaminophen  975 mg Oral Q8H     aspirin  81 mg Oral Daily     [START ON 9/28/2022] basiliximab (SIMULECT) infusion  20 mg Intravenous Once     calcium carbonate-vitamin D  1 tablet Oral BID w/meals     [START ON 9/30/2022] clotrimazole  10 mg Buccal 4x Daily     magnesium oxide  400 mg Oral Q24H     micafungin  100 mg Intravenous Q24H     mycophenolate  1,000 mg Oral BID IS     octreotide  100 mcg Subcutaneous Q12H     pantoprazole  40 mg Oral QAM AC     piperacillin-tazobactam  2.25 g Intravenous Q8H     polyethylene glycol  17 g Oral Daily     senna-docusate  1 tablet Oral BID     sodium chloride (PF)  3 mL Intravenous Q8H     [START ON 9/26/2022] sulfamethoxazole-trimethoprim  1 tablet Oral Once per day on Mon Wed Fri     tacrolimus  2 mg Oral BID IS     [START ON 9/26/2022] valGANciclovir  450 mg Oral Once per day on Mon Thu     venlafaxine  150 mg Oral Daily       Physical Exam:     Admit Weight: 69.8 kg (153 lb 14.1 oz)    Current vitals:  "  BP (!) 143/71   Pulse 82   Temp 98.5  F (36.9  C)   Resp 16   Ht 1.549 m (5' 1\")   Wt 77.1 kg (169 lb 15.6 oz)   LMP 09/18/2022   SpO2 99%   BMI 32.12 kg/m      Vital sign ranges:    Temp:  [97.7  F (36.5  C)-98.5  F (36.9  C)] 98.5  F (36.9  C)  Pulse:  [] 82  Resp:  [12-18] 16  BP: (129-169)/(60-90) 143/71  SpO2:  [92 %-100 %] 99 %    General Appearance: in no apparent distress  Skin: Warm, perfused  Heart: RRR  Lungs: NLB on 2 L oxygen  Abdomen: The abdomen is soft, incision covered with minimal staining  : angeles is present.  Urine with mild hematuria.  Extremities: edema: none, strength  Neurologic: A&Ox3, Tremor absent.     Data:   CMP  Recent Labs   Lab 09/25/22  0814 09/25/22  0654 09/25/22  0502 09/25/22  0438 09/25/22  0102 09/25/22  0001 09/24/22  0525 09/24/22  0514 09/23/22  1815 09/23/22  1753 09/23/22  1703 09/22/22  2237 09/22/22  1629   NA  --   --   --  139  --   --   --  136   < > 135 132*   < > 132*   POTASSIUM  --   --   --  3.8  --  4.1   < > 4.8   < > 3.4* 3.8   < > 3.9   CHLORIDE  --   --   --  106  --   --   --  101   < >  --   --   --  93*   CO2  --   --   --  25  --   --   --  22   < >  --   --   --  29   * 107*   < > 119*   < >  --    < > 164*   < > 79 149*   < > 139*   BUN  --   --   --  40.2*  --   --   --  42.7*   < >  --   --   --  33.4*   CR  --   --   --  4.14*  --   --   --  4.33*   < >  --   --   --  4.00*   GFRESTIMATED  --   --   --  12*  --   --   --  11*   < >  --   --   --  13*   BARBARA  --   --   --  8.6  --   --   --  9.0   < >  --   --   --  9.9   ICAW  --   --   --   --   --   --   --   --   --  4.6 5.0   < >  --    MAG  --   --   --  1.9  --   --   --  2.0   < >  --   --   --   --    PHOS  --   --   --  3.2  --   --   --  2.4*   < >  --   --   --   --    AMYLASE  --   --   --  113*  --   --   --  297*   < >  --   --   --   --    LIPASE  --   --   --  76*  --   --   --  289*   < >  --   --   --   --    ALBUMIN  --   --   --   --   --   --   --   --   " --   --   --   --  4.1   BILITOTAL  --   --   --   --   --   --   --   --   --   --   --   --  0.3   ALKPHOS  --   --   --   --   --   --   --   --   --   --   --   --  89   AST  --   --   --   --   --   --   --   --   --   --   --   --  29   ALT  --   --   --   --   --   --   --   --   --   --   --   --  18    < > = values in this interval not displayed.     CBC  Recent Labs   Lab 09/25/22  0405 09/25/22  0059 09/24/22  1021 09/24/22  0514 09/23/22  1252 09/22/22  1629   HGB 7.2* 7.5*   < > 8.5*  8.5*   < > 10.9*   WBC 8.1  --   --  8.4   < > 5.5     --   --  200   < > 258   A1C  --   --   --   --   --  6.9*    < > = values in this interval not displayed.

## 2022-09-25 NOTE — PLAN OF CARE
Neuro: A&Ox4. Labile mood.  Cardiac: SR/ST, -160. Afebrile. CVP improved after albumin 6-11  Respiratory: Sating 98% 2-3L O2. SOB w/ activities  GI/: Urine output /hr. Pink to Yellow. Abdomen soft . Tender around incision. Not passing gas. Denied nausea.   Diet/appetite: NPO.   Activity:  Assist of X2. Walked outside of room and in halls.  Pain: At acceptable level on current regimen. Pt complained of chest pain. Notified MD. EKG was done X2. Chest pain improved when she sat up and changed position.   Skin: + 2 Facial and upper extremity edema. Abdominal incision covered with dressing has dried blood. L ring finger has splint on it.   LDA's: NG to LCS. L TLC internal jugular.L PIV. R arm fistula. Heparin gtt @ 500unit/hr. Insulin gtt 0.5-3 unit/hr. -160    Plan: K+ level is in 4's. Hgb trending down to 7.2 plan to recheck at 0800 am. Weight is up       Problem: Bleeding (Kidney Transplant)  Goal: Absence of Bleeding  Outcome: Ongoing, Progressing     Problem: Bowel Motility Impaired (Kidney Transplant)  Goal: Effective Bowel Elimination  Outcome: Ongoing, Progressing     Problem: Fluid and Electrolyte Imbalance (Kidney Transplant)  Goal: Fluid and Electrolyte Balance  Outcome: Ongoing, Progressing     Problem: Glycemic Control Impaired (Kidney Transplant)  Goal: Blood Glucose Level Within Targeted Range  Outcome: Ongoing, Progressing     Problem: Pain (Kidney Transplant)  Goal: Acceptable Pain Control  Outcome: Ongoing, Progressing  Intervention: Prevent or Manage Pain  Recent Flowsheet Documentation  Taken 9/25/2022 0400 by Francisca Castorena RN  Pain Management Interventions: declines  Taken 9/24/2022 2200 by Francisca Castorena, RN  Pain Management Interventions: medication (see MAR)    Problem: Pancreas Transplantation  Goal: Pancreas Transplantation  Description: Signs and symptoms of listed problems will be absent or manageable.  Outcome: Ongoing, Progressing     Problem: Postoperative Nausea and  Vomiting (Kidney Transplant)  Goal: Nausea and Vomiting Relief  Intervention: Prevent or Manage Nausea and Vomiting  Recent Flowsheet Documentation  Taken 9/25/2022 0400 by Francisca Castorena RN  Nausea/Vomiting Interventions: stimuli minimized           Keep NG tube patent.

## 2022-09-25 NOTE — PROGRESS NOTES
"Paged by bedside RN about pt having an episode of chest pain ass'd with max HR of 114. Spoke with pt at bedside who said chest pain started after head of bed being lowered. Said chest pain was sternal and radiated \"bilaterally\". Says it felt like \"a 3 year old sitting on my chest\" and was ass'd w/ SOB. Pt stated chest pain improved with head of bed being raised up. Says had similar pain when previously hospitalized for HFpEF.     Don't suspect acute coronary syndrome given description of symptoms above. Will obtain EKG.     Pt re-examined at 2230 in which she was sleeping and denied having any chest pain or SOB.     Repeat EKG @ 23:20 w/o ROSE MARIE, STD, or new TWI.     Redd Maynard,   General Surgery Resident        "

## 2022-09-25 NOTE — PLAN OF CARE
Neuro: A&Ox4.   Cardiac: SR. VSS. CVP 10-13   Respiratory: Sating >95% on RA, needs 2 L NC when sleeping   GI/: Glez, 50 ml/hr, yellow/pink tinged   Diet/appetite: NPO, NG LCS, bile green output   Activity:  Assist of SBA and walker, up to chair and in halls.  Pain: At acceptable level on current regimen.   Skin: No new deficits noted. abd dressing removed, staples CDI, abd binder on   LDA's: PIV, CVC, insulin gtt, hep gtt  Plan: Continue with POC. Notify primary team with changes.    Transfer to  when bed available

## 2022-09-25 NOTE — PROGRESS NOTES
[facility]   Transplant Nephrology Progress Note  Date of Admission:  9/22/2022  Today's Date: 09/25/2022    Recommendations:  - No new recommendations at this time.    Assessment & Plan   # DDKT (SPK): Trend down   - Baseline Creatinine: ~ TBD   - Proteinuria: Moderate (1-3 grams)   - Date DSA Last Checked: Sep/2022      Latest DSA: Not checked recently due to time from transplant   - BK Viremia: No   - Kidney Tx Biopsy: No    # Pancreas Tx (SPK):    - Pancreatic Exocrine Drainage: Enteric drained     - Blood glucose: Euglycemia      On insulin: No   - HbA1c: Stable      Latest HbA1c: 6.9%   - Pancreatic enzymes: Trend down   - Date DSA Last Checked: Sep/2022  Latest DSA: Not checked recently due to time from transplant   - Pancreas Tx Biopsy: No    # Immunosuppression: Tacrolimus immediate release (goal 8-10) and Mycophenolate mofetil (dose 1000 mg every 12 hours)   - Changes: No    # Infection Prophylaxis:   - PJP: Sulfa/TMP (Bactrim)  - CMV: Valganciclovir (Valcyte)  - Fungal: Micafungin (Mycamine)    # Hypertension: Controlled;  Goal BP: < 150/90   - Volume status: Mildly hypervolemic     - Changes: Not at this time    # Anemia in Chronic Renal Disease: Hgb: Stable      SHANEKA: No   - Iron studies: Unknown at this time, but checked with dialysis    # Mineral Bone Disorder:   - Secondary renal hyperparathyroidism; PTH level: Not checked recently        On treatment: None  - Vitamin D; level: Not checked recently        On supplement: Yes  - Calcium; level: Normal        On supplement: Yes  - Phosphorus; level: Normal        On supplement: No    # Electrolytes:   - Potassium; level: Normal        On supplement: No  - Magnesium; level: Normal        On supplement: No  - Bicarbonate; level: Normal        On supplement: No  - Sodium; level: Normal    # TIA 2017: No residual deficients     # Transplant History:  Etiology of Kidney Failure: Diabetes mellitus type 1  Tx: DDKT  (SPK)  Transplant: 9/23/2022 (Kidney / Pancreas)  Significant changes in immunosuppression: None  Significant transplant-related complications: None    Recommendations were communicated to the primary team verbally.    Seen and discussed with Dr. Teresa Islas NP   Pager: [phone]    Physician Attestation     I saw and evaluated Eden Hess as part of a shared APRN/PA visit.     I personally reviewed the vital signs, medications and labs.    I personally performed the substantive portion of the medical decision making for this visit - please see the SANDEEP's documentation for full details.    Key management decisions made by me and carried out under my direction: Would continue present immunosuppression.  No acute indications for dialysis.  Agree with patient ambulating on floor as much as possible to help with bowel function.    Bertrand Moore MD  Date of Service (when I saw the patient): 09/25/22    Interval History   Ms. Hess's creatinine is 4.14 (09/25 0438); Trend down.  Excellent urine output.  Other significant labs/tests/vitals: VSS  No events overnight.  No chest pain or shortness of breath.  +1 leg swelling.  No nausea and vomiting.  Bowel movements are None.  Has not passed flatus.  NO fever, sweats or chills.    Review of Systems   4 point ROS was obtained and negative except as noted in the Interval History.    MEDICATIONS:    acetaminophen  975 mg Oral Q8H     aspirin  81 mg Oral Daily     [START ON 9/28/2022] basiliximab (SIMULECT) infusion  20 mg Intravenous Once     calcium carbonate-vitamin D  1 tablet Oral BID w/meals     [START ON 9/30/2022] clotrimazole  10 mg Buccal 4x Daily     magnesium oxide  400 mg Oral Q24H     micafungin  100 mg Intravenous Q24H     mycophenolate  1,000 mg Oral BID IS     octreotide  100 mcg Subcutaneous Q12H     pantoprazole  40 mg Oral QAM AC     piperacillin-tazobactam  2.25 g Intravenous Q8H     polyethylene glycol  17 g Oral Daily      "senna-docusate  1 tablet Oral BID     sodium chloride (PF)  3 mL Intravenous Q8H     [START ON 2022] sulfamethoxazole-trimethoprim  1 tablet Oral Once per day on      tacrolimus  2 mg Oral BID IS     [START ON 2022] valGANciclovir  450 mg Oral Once per day on      venlafaxine  150 mg Oral Daily       dextrose       dextrose       IV fluid REPLACEMENT ONLY 100 mL/hr at 22 0700     heparin 500 Units/hr (22 0700)     insulin regular 0.5 Units/hr (22 1017)     IV fluid REPLACEMENT ONLY Stopped (22)     IV fluid REPLACEMENT ONLY 0 mL/hr at 22 0303       Physical Exam   Temp  Av.3  F (36.8  C)  Min: 97.7  F (36.5  C)  Max: 98.9  F (37.2  C)  Arterial Line BP  Min: 39/39  Max: 153/64  Arterial Line MAP (mmHg)  Av.6 mmHg  Min: 39 mmHg  Max: 95 mmHg      Pulse  Av.1  Min: 71  Max: 106 Resp  Av.1  Min: 9  Max: 18  SpO2  Av.2 %  Min: 92 %  Max: 100 %    CVP (mmHg):  (abx infusing)BP (!) 141/76   Pulse 95   Temp 98.5  F (36.9  C)   Resp 16   Ht 1.549 m (5' 1\")   Wt 77.1 kg (169 lb 15.6 oz)   LMP 2022   SpO2 100%   BMI 32.12 kg/m     Date 22 07 - 22 0659   Shift 0835-9599 6175-9922 2601-5159 24 Hour Total   INTAKE   I.V. 108.5   108.5   NG/GT 15   15   Shift Total(mL/kg) 123.5(1.6)   123.5(1.6)   OUTPUT   Urine 195   195   Emesis/NG output 50   50   Shift Total(mL/kg) 245(3.18)   245(3.18)   Weight (kg) 77.1 77.1 77.1 77.1      Admit Weight: 69.8 kg (153 lb 14.1 oz)     GENERAL APPEARANCE: alert and no distress  HENT: mouth without ulcers or lesions  RESP: lungs clear to auscultation - no rales, rhonchi or wheezes  CV: regular rhythm, normal rate, no rub, no murmur  EDEMA: 1+ LE edema bilaterally  ABDOMEN: soft, nondistended, nontender, bowel sounds normal  MS: extremities normal - no gross deformities noted, no evidence of inflammation in joints, no muscle tenderness  SKIN: no rash    Data   All labs reviewed by " me.  [facility]  Recent Labs   Lab 09/25/22  1010 09/25/22  0912 09/25/22  0817 09/25/22  0814 09/25/22  0654 09/25/22  0502 09/25/22  0438 09/25/22  0102 09/25/22  0001 09/24/22 1956 09/24/22  1947 09/24/22  0525 09/24/22  0514 09/23/22  2311 09/23/22  1820 09/23/22  1815 09/23/22  1753 09/22/22  2237 09/22/22  1629   NA  --   --   --   --   --   --  139  --   --   --   --   --  136  --  136  --  135   < > 132*   POTASSIUM  --   --  3.8  --   --   --  3.8  --  4.1  --  4.1   < > 4.8   < > 3.8  3.8  --  3.4*   < > 3.9   CHLORIDE  --   --   --   --   --   --  106  --   --   --   --   --  101  --  104  --   --   --  93*   CO2  --   --   --   --   --   --  25  --   --   --   --   --  22  --  20*  --   --   --  29   ANIONGAP  --   --   --   --   --   --  8  --   --   --   --   --  13  --  12  --   --   --  10   * 117*  --  114* 107*   < > 119*   < >  --    < >  --    < > 164*   < > 66*   < > 79   < > 139*   BUN  --   --   --   --   --   --  40.2*  --   --   --   --   --  42.7*  --  38.1*  --   --   --  33.4*   CR  --   --   --   --   --   --  4.14*  --   --   --   --   --  4.33*  --  4.01*  --   --   --  4.00*   GFRESTIMATED  --   --   --   --   --   --  12*  --   --   --   --   --  11*  --  13*  --   --   --  13*   BARBARA  --   --   --   --   --   --  8.6  --   --   --   --   --  9.0  --  8.3*  --   --   --  9.9   MAG  --   --   --   --   --   --  1.9  --   --   --   --   --  2.0  --  1.8  --   --   --   --    PHOS  --   --   --   --   --   --  3.2  --   --   --   --   --  2.4*  --  1.3*  --   --   --   --    PROTTOTAL  --   --   --   --   --   --   --   --   --   --   --   --   --   --   --   --   --   --  6.9   ALBUMIN  --   --   --   --   --   --   --   --   --   --   --   --   --   --   --   --   --   --  4.1   BILITOTAL  --   --   --   --   --   --   --   --   --   --   --   --   --   --   --   --   --   --  0.3   ALKPHOS  --   --   --   --   --   --   --   --   --   --   --   --   --   --   --   --   --   --  89    AST  --   --   --   --   --   --   --   --   --   --   --   --   --   --   --   --   --   --  29   ALT  --   --   --   --   --   --   --   --   --   --   --   --   --   --   --   --   --   --  18    < > = values in this interval not displayed.     CBC  Recent Labs   Lab 09/25/22  0817 09/25/22  0405 09/25/22  0059 09/25/22  0001 09/24/22  1021 09/24/22  0514 09/24/22  0235 09/23/22 2008   HGB 7.1*  7.1* 7.2* 7.5* 7.3*   < > 8.5*  8.5*   < > 8.4*  8.4*   WBC 9.3 8.1  --   --   --  8.4  --  7.1   RBC 2.07* 2.08*  --   --   --  2.47*  --  2.46*   HCT 23.0* 22.9*  --   --   --  26.8*  --  26.8*   * 110*  --   --   --  109*  --  109*   MCH 34.3* 34.6*  --   --   --  34.4*  --  34.1*   MCHC 30.9* 31.4*  --   --   --  31.7  --  31.3*   RDW 15.7* 15.3*  --   --   --  15.3*  --  14.9    171  --   --   --  200  --  186    < > = values in this interval not displayed.     INR  Recent Labs   Lab 09/25/22  0405 09/24/22  1400 09/23/22  1820 09/22/22  1629   INR  --   --  1.09 0.91   PTT 37 69* 27 26     ABG  Recent Labs   Lab 09/23/22  1753 09/23/22  1703 09/23/22  1606 09/23/22  1518   PH 7.36 7.33* 7.34* 7.32*   PCO2 35 38 36 35   PO2 152* 134* 116* 120*   HCO3 20* 20* 19* 18*   O2PER 50.0 50.0 50.0 50.0      Urine Studies  Recent Labs   Lab Test 09/22/22  1714 08/13/20  1110   COLOR Light Yellow Yellow   APPEARANCE Clear Slightly Cloudy   URINEGLC 300 * >499*   URINEBILI Negative Negative   URINEKETONE Negative 5*   SG 1.011 1.016   UBLD Small* Small*   URINEPH 8.0* 5.0   PROTEIN 200 * >499*   NITRITE Negative Negative   LEUKEST Negative Negative   RBCU 1 3*   WBCU 6* 3     No lab results found.  PTH  No lab results found.  Iron Studies  No lab results found.    IMAGING:  All imaging studies reviewed by me.

## 2022-09-25 NOTE — PROVIDER NOTIFICATION
Time of notification: 2200  Notified provider regarding pt's EKG showing depressed ST segments, tachycardia, and pt previously reporting a feeling of 'heaviness' on their chest while lying flat.   Patient status: Pt pain relieved with sitting up. Currently on 3L oxygen.

## 2022-09-25 NOTE — PROVIDER NOTIFICATION
Notified Dr Dwight Maynard about trending hgb to 7.2 this morning.No signs of active bleeding. Pt hemodynamically stable. Plan to recheck hgb this morning.

## 2022-09-26 ENCOUNTER — APPOINTMENT (OUTPATIENT)
Dept: PHYSICAL THERAPY | Facility: CLINIC | Age: 52
DRG: 008 | End: 2022-09-26
Attending: SURGERY
Payer: COMMERCIAL

## 2022-09-26 ENCOUNTER — APPOINTMENT (OUTPATIENT)
Dept: ULTRASOUND IMAGING | Facility: CLINIC | Age: 52
DRG: 008 | End: 2022-09-26
Attending: PHYSICIAN ASSISTANT
Payer: COMMERCIAL

## 2022-09-26 LAB
ABO/RH(D): NORMAL
AMYLASE SERPL-CCNC: 99 U/L (ref 28–100)
ANION GAP SERPL CALCULATED.3IONS-SCNC: 7 MMOL/L (ref 7–15)
ANTIBODY SCREEN: NEGATIVE
APTT PPP: 39 SECONDS (ref 22–38)
ATRIAL RATE - MUSE: 104 BPM
ATRIAL RATE - MUSE: 89 BPM
BASOPHILS # BLD AUTO: 0 10E3/UL (ref 0–0.2)
BASOPHILS NFR BLD AUTO: 0 %
BLD PROD TYP BPU: NORMAL
BLOOD COMPONENT TYPE: NORMAL
BUN SERPL-MCNC: 36.8 MG/DL (ref 6–20)
CALCIUM SERPL-MCNC: 8.5 MG/DL (ref 8.6–10)
CHLORIDE SERPL-SCNC: 110 MMOL/L (ref 98–107)
CODING SYSTEM: NORMAL
CREAT SERPL-MCNC: 3.61 MG/DL (ref 0.51–0.95)
CROSSMATCH: NORMAL
DEPRECATED HCO3 PLAS-SCNC: 25 MMOL/L (ref 22–29)
DIASTOLIC BLOOD PRESSURE - MUSE: NORMAL MMHG
DIASTOLIC BLOOD PRESSURE - MUSE: NORMAL MMHG
EOSINOPHIL # BLD AUTO: 0 10E3/UL (ref 0–0.7)
EOSINOPHIL NFR BLD AUTO: 0 %
ERYTHROCYTE [DISTWIDTH] IN BLOOD BY AUTOMATED COUNT: 15.4 % (ref 10–15)
ERYTHROCYTE [DISTWIDTH] IN BLOOD BY AUTOMATED COUNT: 15.5 % (ref 10–15)
GFR SERPL CREATININE-BSD FRML MDRD: 14 ML/MIN/1.73M2
GLUCOSE BLDC GLUCOMTR-MCNC: 104 MG/DL (ref 70–99)
GLUCOSE BLDC GLUCOMTR-MCNC: 107 MG/DL (ref 70–99)
GLUCOSE BLDC GLUCOMTR-MCNC: 108 MG/DL (ref 70–99)
GLUCOSE BLDC GLUCOMTR-MCNC: 109 MG/DL (ref 70–99)
GLUCOSE BLDC GLUCOMTR-MCNC: 113 MG/DL (ref 70–99)
GLUCOSE BLDC GLUCOMTR-MCNC: 116 MG/DL (ref 70–99)
GLUCOSE BLDC GLUCOMTR-MCNC: 121 MG/DL (ref 70–99)
GLUCOSE BLDC GLUCOMTR-MCNC: 122 MG/DL (ref 70–99)
GLUCOSE BLDC GLUCOMTR-MCNC: 130 MG/DL (ref 70–99)
GLUCOSE BLDC GLUCOMTR-MCNC: 131 MG/DL (ref 70–99)
GLUCOSE BLDC GLUCOMTR-MCNC: 134 MG/DL (ref 70–99)
GLUCOSE BLDC GLUCOMTR-MCNC: 135 MG/DL (ref 70–99)
GLUCOSE SERPL-MCNC: 134 MG/DL (ref 70–99)
HBV DNA SERPL QL NAA+PROBE: NORMAL
HCT VFR BLD AUTO: 21.8 % (ref 35–47)
HCT VFR BLD AUTO: 22.1 % (ref 35–47)
HCV RNA SERPL QL NAA+PROBE: NORMAL
HGB BLD-MCNC: 6.8 G/DL (ref 11.7–15.7)
HGB BLD-MCNC: 6.9 G/DL (ref 11.7–15.7)
HIV1+2 RNA SERPL QL NAA+PROBE: NORMAL
IMM GRANULOCYTES # BLD: 0.1 10E3/UL
IMM GRANULOCYTES NFR BLD: 1 %
INTERPRETATION ECG - MUSE: NORMAL
INTERPRETATION ECG - MUSE: NORMAL
ISSUE DATE AND TIME: NORMAL
LIPASE SERPL-CCNC: 78 U/L (ref 13–60)
LYMPHOCYTES # BLD AUTO: 0.1 10E3/UL (ref 0.8–5.3)
LYMPHOCYTES NFR BLD AUTO: 1 %
MAGNESIUM SERPL-MCNC: 1.8 MG/DL (ref 1.7–2.3)
MCH RBC QN AUTO: 33.8 PG (ref 26.5–33)
MCH RBC QN AUTO: 34.5 PG (ref 26.5–33)
MCHC RBC AUTO-ENTMCNC: 30.8 G/DL (ref 31.5–36.5)
MCHC RBC AUTO-ENTMCNC: 31.7 G/DL (ref 31.5–36.5)
MCV RBC AUTO: 109 FL (ref 78–100)
MCV RBC AUTO: 110 FL (ref 78–100)
MONOCYTES # BLD AUTO: 0.7 10E3/UL (ref 0–1.3)
MONOCYTES NFR BLD AUTO: 7 %
NEUTROPHILS # BLD AUTO: 8.5 10E3/UL (ref 1.6–8.3)
NEUTROPHILS NFR BLD AUTO: 91 %
NRBC # BLD AUTO: 0 10E3/UL
NRBC BLD AUTO-RTO: 0 /100
P AXIS - MUSE: 49 DEGREES
P AXIS - MUSE: 52 DEGREES
PHOSPHATE SERPL-MCNC: 2.7 MG/DL (ref 2.5–4.5)
PLATELET # BLD AUTO: 168 10E3/UL (ref 150–450)
PLATELET # BLD AUTO: 191 10E3/UL (ref 150–450)
POTASSIUM SERPL-SCNC: 3.6 MMOL/L (ref 3.4–5.3)
PR INTERVAL - MUSE: 148 MS
PR INTERVAL - MUSE: 150 MS
QRS DURATION - MUSE: 88 MS
QRS DURATION - MUSE: 90 MS
QT - MUSE: 368 MS
QT - MUSE: 388 MS
QTC - MUSE: 472 MS
QTC - MUSE: 483 MS
R AXIS - MUSE: -2 DEGREES
R AXIS - MUSE: 5 DEGREES
RBC # BLD AUTO: 2 10E6/UL (ref 3.8–5.2)
RBC # BLD AUTO: 2.01 10E6/UL (ref 3.8–5.2)
SA 1 CELL: NORMAL
SA 1 TEST METHOD: NORMAL
SA 2 CELL: NORMAL
SA 2 TEST METHOD: NORMAL
SA1 HI RISK ABY: NORMAL
SA1 MOD RISK ABY: NORMAL
SA2 HI RISK ABY: NORMAL
SA2 MOD RISK ABY: NORMAL
SODIUM SERPL-SCNC: 142 MMOL/L (ref 136–145)
SPECIMEN EXPIRATION DATE: NORMAL
SYSTOLIC BLOOD PRESSURE - MUSE: NORMAL MMHG
SYSTOLIC BLOOD PRESSURE - MUSE: NORMAL MMHG
T AXIS - MUSE: 143 DEGREES
T AXIS - MUSE: 36 DEGREES
TACROLIMUS BLD-MCNC: 9.6 UG/L (ref 5–15)
TME LAST DOSE: NORMAL H
TME LAST DOSE: NORMAL H
UFH PPP CHRO-ACNC: 0.11 IU/ML
UNACCEPTABLE ANTIGENS: NORMAL
UNIT ABO/RH: NORMAL
UNIT NUMBER: NORMAL
UNIT STATUS: NORMAL
UNIT TYPE ISBT: 5100
UNOS CPRA: 0
VENTRICULAR RATE- MUSE: 104 BPM
VENTRICULAR RATE- MUSE: 89 BPM
WBC # BLD AUTO: 9.4 10E3/UL (ref 4–11)
WBC # BLD AUTO: 9.6 10E3/UL (ref 4–11)
ZZZSA 1  COMMENTS: NORMAL
ZZZSA 2 COMMENTS: NORMAL

## 2022-09-26 PROCEDURE — 93975 VASCULAR STUDY: CPT | Mod: 26 | Performed by: STUDENT IN AN ORGANIZED HEALTH CARE EDUCATION/TRAINING PROGRAM

## 2022-09-26 PROCEDURE — 99232 SBSQ HOSP IP/OBS MODERATE 35: CPT | Mod: 24 | Performed by: SURGERY

## 2022-09-26 PROCEDURE — 85027 COMPLETE CBC AUTOMATED: CPT | Performed by: SURGERY

## 2022-09-26 PROCEDURE — 99233 SBSQ HOSP IP/OBS HIGH 50: CPT | Mod: GC | Performed by: INTERNAL MEDICINE

## 2022-09-26 PROCEDURE — P9016 RBC LEUKOCYTES REDUCED: HCPCS

## 2022-09-26 PROCEDURE — 80197 ASSAY OF TACROLIMUS: CPT | Performed by: SURGERY

## 2022-09-26 PROCEDURE — 86901 BLOOD TYPING SEROLOGIC RH(D): CPT

## 2022-09-26 PROCEDURE — 250N000011 HC RX IP 250 OP 636: Performed by: PHYSICIAN ASSISTANT

## 2022-09-26 PROCEDURE — 76776 US EXAM K TRANSPL W/DOPPLER: CPT

## 2022-09-26 PROCEDURE — 250N000013 HC RX MED GY IP 250 OP 250 PS 637: Performed by: SURGERY

## 2022-09-26 PROCEDURE — 80048 BASIC METABOLIC PNL TOTAL CA: CPT | Performed by: SURGERY

## 2022-09-26 PROCEDURE — 250N000011 HC RX IP 250 OP 636: Performed by: SURGERY

## 2022-09-26 PROCEDURE — 250N000013 HC RX MED GY IP 250 OP 250 PS 637

## 2022-09-26 PROCEDURE — 86923 COMPATIBILITY TEST ELECTRIC: CPT

## 2022-09-26 PROCEDURE — 99207 US RENAL TRANSPLANT WITH DOPPLER: CPT | Mod: 26 | Performed by: RADIOLOGY

## 2022-09-26 PROCEDURE — 97116 GAIT TRAINING THERAPY: CPT | Mod: GP

## 2022-09-26 PROCEDURE — 93975 VASCULAR STUDY: CPT

## 2022-09-26 PROCEDURE — 85730 THROMBOPLASTIN TIME PARTIAL: CPT

## 2022-09-26 PROCEDURE — 85520 HEPARIN ASSAY: CPT | Performed by: SURGERY

## 2022-09-26 PROCEDURE — 83735 ASSAY OF MAGNESIUM: CPT | Performed by: SURGERY

## 2022-09-26 PROCEDURE — 120N000011 HC R&B TRANSPLANT UMMC

## 2022-09-26 PROCEDURE — 258N000003 HC RX IP 258 OP 636: Performed by: SURGERY

## 2022-09-26 PROCEDURE — 84100 ASSAY OF PHOSPHORUS: CPT | Performed by: SURGERY

## 2022-09-26 PROCEDURE — 250N000013 HC RX MED GY IP 250 OP 250 PS 637: Performed by: PHYSICIAN ASSISTANT

## 2022-09-26 PROCEDURE — 250N000012 HC RX MED GY IP 250 OP 636 PS 637: Performed by: SURGERY

## 2022-09-26 PROCEDURE — 250N000012 HC RX MED GY IP 250 OP 636 PS 637: Performed by: PHYSICIAN ASSISTANT

## 2022-09-26 PROCEDURE — 82150 ASSAY OF AMYLASE: CPT | Performed by: SURGERY

## 2022-09-26 PROCEDURE — 83690 ASSAY OF LIPASE: CPT | Performed by: SURGERY

## 2022-09-26 RX ORDER — HYDRALAZINE HYDROCHLORIDE 20 MG/ML
10 INJECTION INTRAMUSCULAR; INTRAVENOUS EVERY 6 HOURS PRN
Status: DISCONTINUED | OUTPATIENT
Start: 2022-09-26 | End: 2022-10-02 | Stop reason: HOSPADM

## 2022-09-26 RX ORDER — PREDNISONE 20 MG/1
20 TABLET ORAL DAILY
Status: DISCONTINUED | OUTPATIENT
Start: 2022-09-30 | End: 2022-10-02 | Stop reason: HOSPADM

## 2022-09-26 RX ORDER — PREDNISONE 20 MG/1
40 TABLET ORAL DAILY
Status: COMPLETED | OUTPATIENT
Start: 2022-09-28 | End: 2022-09-29

## 2022-09-26 RX ORDER — PREDNISONE 5 MG/1
5 TABLET ORAL DAILY
Status: DISCONTINUED | OUTPATIENT
Start: 2022-10-21 | End: 2022-10-02 | Stop reason: HOSPADM

## 2022-09-26 RX ORDER — MYCOPHENOLATE MOFETIL 250 MG/1
1000 CAPSULE ORAL
Status: DISCONTINUED | OUTPATIENT
Start: 2022-09-26 | End: 2022-10-02 | Stop reason: HOSPADM

## 2022-09-26 RX ORDER — PREDNISONE 10 MG/1
10 TABLET ORAL DAILY
Status: DISCONTINUED | OUTPATIENT
Start: 2022-10-14 | End: 2022-10-02 | Stop reason: HOSPADM

## 2022-09-26 RX ORDER — PREDNISONE 20 MG/1
60 TABLET ORAL DAILY
Status: COMPLETED | OUTPATIENT
Start: 2022-09-26 | End: 2022-09-27

## 2022-09-26 RX ADMIN — PANTOPRAZOLE SODIUM 40 MG: 40 TABLET, DELAYED RELEASE ORAL at 09:00

## 2022-09-26 RX ADMIN — MAGNESIUM OXIDE TAB 400 MG (241.3 MG ELEMENTAL MG) 400 MG: 400 (241.3 MG) TAB at 12:38

## 2022-09-26 RX ADMIN — PIPERACILLIN AND TAZOBACTAM 2.25 G: 2; .25 INJECTION, POWDER, LYOPHILIZED, FOR SOLUTION INTRAVENOUS at 09:04

## 2022-09-26 RX ADMIN — TACROLIMUS 2 MG: 1 CAPSULE ORAL at 18:11

## 2022-09-26 RX ADMIN — VENLAFAXINE HYDROCHLORIDE 150 MG: 150 CAPSULE, EXTENDED RELEASE ORAL at 09:00

## 2022-09-26 RX ADMIN — SENNOSIDES AND DOCUSATE SODIUM 1 TABLET: 50; 8.6 TABLET ORAL at 09:01

## 2022-09-26 RX ADMIN — PREDNISONE 60 MG: 20 TABLET ORAL at 09:02

## 2022-09-26 RX ADMIN — ACETAMINOPHEN 975 MG: 325 TABLET, FILM COATED ORAL at 16:17

## 2022-09-26 RX ADMIN — SODIUM CHLORIDE, SODIUM LACTATE, POTASSIUM CHLORIDE, CALCIUM CHLORIDE AND DEXTROSE MONOHYDRATE: 5; 600; 310; 30; 20 INJECTION, SOLUTION INTRAVENOUS at 21:54

## 2022-09-26 RX ADMIN — POLYETHYLENE GLYCOL 3350 17 G: 17 POWDER, FOR SOLUTION ORAL at 09:04

## 2022-09-26 RX ADMIN — OCTREOTIDE ACETATE 100 MCG: 100 INJECTION, SOLUTION INTRAVENOUS; SUBCUTANEOUS at 12:39

## 2022-09-26 RX ADMIN — Medication 1 TABLET: at 19:42

## 2022-09-26 RX ADMIN — SULFAMETHOXAZOLE AND TRIMETHOPRIM 1 TABLET: 400; 80 TABLET ORAL at 09:07

## 2022-09-26 RX ADMIN — MYCOPHENOLATE MOFETIL 1000 MG: 250 CAPSULE ORAL at 18:11

## 2022-09-26 RX ADMIN — Medication 1 TABLET: at 12:38

## 2022-09-26 RX ADMIN — HYDRALAZINE HYDROCHLORIDE 10 MG: 20 INJECTION INTRAMUSCULAR; INTRAVENOUS at 12:57

## 2022-09-26 RX ADMIN — ACETAMINOPHEN 975 MG: 325 TABLET, FILM COATED ORAL at 09:00

## 2022-09-26 RX ADMIN — MICAFUNGIN 100 MG: 10 INJECTION, POWDER, LYOPHILIZED, FOR SOLUTION INTRAVENOUS at 02:02

## 2022-09-26 RX ADMIN — VALGANCICLOVIR 450 MG: 450 TABLET, FILM COATED ORAL at 09:07

## 2022-09-26 RX ADMIN — TACROLIMUS 2 MG: 1 CAPSULE ORAL at 09:03

## 2022-09-26 RX ADMIN — ASPIRIN 81 MG CHEWABLE TABLET 81 MG: 81 TABLET CHEWABLE at 09:00

## 2022-09-26 RX ADMIN — SODIUM CHLORIDE, SODIUM LACTATE, POTASSIUM CHLORIDE, CALCIUM CHLORIDE AND DEXTROSE MONOHYDRATE: 5; 600; 310; 30; 20 INJECTION, SOLUTION INTRAVENOUS at 08:51

## 2022-09-26 RX ADMIN — PIPERACILLIN AND TAZOBACTAM 2.25 G: 2; .25 INJECTION, POWDER, LYOPHILIZED, FOR SOLUTION INTRAVENOUS at 16:17

## 2022-09-26 RX ADMIN — MYCOPHENOLATE MOFETIL 1000 MG: 250 CAPSULE ORAL at 09:02

## 2022-09-26 ASSESSMENT — ACTIVITIES OF DAILY LIVING (ADL)
ADLS_ACUITY_SCORE: 25

## 2022-09-26 NOTE — PROGRESS NOTES
Transplant Surgery  Inpatient Daily Progress Note  09/26/2022    Assessment & Plan: Eden Hess is a 52 year old female with a past medical history significant for end stage kidney disease secondary to diabetic nephropathy. Other past medical history includes DM1 c/b neuropathy and retinopathy, hypertension, non obstructive CAD, anxiety and depression, diastolic HFpEF (prior to starting HD, in setting of hypertensive urgency), and TIA (2017). She is now s/p SPK with no ureteral stent on 9/23/22 with Dr. Gómez.     S/p SPK 9/23/22: POD#3  Pancreas: Lipase 78<-76<-289<-931. -130s, insulin gtt as needed (0.5 units/hour ). Continue Octreotide. Repeat US today pending.   Post-op US:  1.  Low resistance arterial waveforms within the transplant, though  with continued increased resistive indices in the arteries outside the  pancreas. Recommend attention on follow-up.  2.  Patent venous evaluation.  3.  No appreciable fluid collections.  Kidney: Cr 3.6<-4.1<-4.3<-4.0. 1.3 L UOP over last 24 hours. Repeat US today pending.   Post op US:  1. Left lower quadrant renal transplant with increased arterial  resistive indices throughout, nonspecific, though can be seen with  rejection. Attention on follow-up.  2. Patent Doppler evaluation of the renal transplant vasculature.    Immunosuppression management:   Induction: via intermediate protocol (cPRA 0) with Thymoglobulin 2 mg/kg, basiliximab POD#1 and #5, and steroid pulse with four week taper.   Maintenance:  -MMF 1000 mg BID  -Tacrolimus  2 mg BID. Goal level 8-10. 9/26 level pending.     Neuro/Psych:   Acute post op pain: Scheduled Tylenol, Oxycodone prn and Dilaudid IV prn   Hx Depression: PTA Effexor, restarted.     Hematology:   Anemia of chronic disease: Hgb 6.8 this AM, will transfer 1 unit PRBC   Vascular prophylaxis: Heparin gtt, increased to 500units/hour.    Cardiorespiratory:   Hx CAD: PTA ASA 81 mg daily, atorvastatin 40 mg daily.   HTN: SBP stable  140-160.   Chest pressure: resolved without intervention. EKG WNL.     GI/Nutrition: NPO with NG tube to LIS. Bowel regimen: Senokot-S BID, Miralax daily.     Endocrine: See above.     Fluid/Electrolytes: MIVF: D5LR@ 75/hr, discontinued urine replacements     : Glez to remain due to new surgical anastomosis until POD 3, check PVR today    Infectious disease: No acute issues. Zosyn and Lisseth per protocol.     Prophylaxis: DVT (mechanical, heparin gtt), fall, GI, fungal (Micafungin), viral (Valcyte), pneumocystis (Bactrim), periop (Zosyn)    Disposition: 6B, transfer to     Medical Decision Making: Medium  Subsequent visit 37173 (moderate level decision making)    SANDEEP/Fellow/Resident Provider: Ximena Bosch PA-C    Faculty: Caden Felix M.D.    Attestation: I saw and examined the patient with Ximena Bosch PA-C, and the transplant team. I independently reviewed all pertinent laboratory and imaging information and made independent management decisions including immunosuppression adjustment. I agree with the findings and plan as documented in this note.  Caden Felix MD  _________________________________________________________________  Transplant History:   9/23/2022 (Kidney / Pancreas), Postoperative day: 3     Interval History: History is obtained from the patient  Overnight events: HGB 6.8 this AM, no flatus at this point.     ROS:   A 10-point review of systems was negative except as noted above.    Meds:    acetaminophen  975 mg Oral Q8H     aspirin  81 mg Oral Daily     [START ON 9/28/2022] basiliximab (SIMULECT) infusion  20 mg Intravenous Once     calcium carbonate-vitamin D  1 tablet Oral BID w/meals     [START ON 9/30/2022] clotrimazole  10 mg Buccal 4x Daily     magnesium oxide  400 mg Oral Q24H     micafungin  100 mg Intravenous Q24H     mycophenolate  1,000 mg Oral BID IS     octreotide  100 mcg Subcutaneous Q12H     pantoprazole  40 mg Oral QAM AC     piperacillin-tazobactam  2.25 g  "Intravenous Q8H     polyethylene glycol  17 g Oral Daily     predniSONE  60 mg Oral Daily    Followed by     [START ON 9/28/2022] predniSONE  40 mg Oral Daily    Followed by     [START ON 9/30/2022] predniSONE  20 mg Oral Daily    Followed by     [START ON 10/7/2022] predniSONE  15 mg Oral Daily    Followed by     [START ON 10/14/2022] predniSONE  10 mg Oral Daily    Followed by     [START ON 10/21/2022] predniSONE  5 mg Oral Daily     senna-docusate  1 tablet Oral BID     sodium chloride (PF)  3 mL Intravenous Q8H     sulfamethoxazole-trimethoprim  1 tablet Oral Once per day on Mon Wed Fri     tacrolimus  2 mg Oral BID IS     valGANciclovir  450 mg Oral Once per day on Mon Thu     venlafaxine  150 mg Oral Daily       Physical Exam:     Admit Weight: 69.8 kg (153 lb 14.1 oz)    Current vitals:   BP (!) 187/97   Pulse 107   Temp 97.8  F (36.6  C) (Oral)   Resp 18   Ht 1.549 m (5' 1\")   Wt 78.8 kg (173 lb 11.6 oz)   LMP 09/18/2022   SpO2 94%   BMI 32.82 kg/m      Vital sign ranges:    Temp:  [97.5  F (36.4  C)-98.5  F (36.9  C)] 97.8  F (36.6  C)  Pulse:  [] 107  Resp:  [16-18] 18  BP: (144-187)/(78-97) 187/97  SpO2:  [90 %-100 %] 94 %    General Appearance: in no apparent distress  Skin: Warm, perfused  Heart: RRR  Lungs: NLB on 2 L oxygen  Abdomen: The abdomen is soft, incision c/d/i  : angeles is present. Urine with mild hematuria.  Extremities: edema: none, strength  Neurologic: A&Ox3, Tremor absent.     Data:   CMP  Recent Labs   Lab 09/26/22  1002 09/26/22  0753 09/26/22  0411 09/26/22  0319 09/25/22  2100 09/25/22  2040 09/25/22  0502 09/25/22  0438 09/23/22  1815 09/23/22  1753 09/23/22  1703 09/22/22  2237 09/22/22  1629   NA  --   --   --  142  --   --   --  139   < > 135 132*   < > 132*   POTASSIUM  --   --   --  3.6  --  3.4   < > 3.8   < > 3.4* 3.8   < > 3.9   CHLORIDE  --   --   --  110*  --   --   --  106   < >  --   --   --  93*   CO2  --   --   --  25  --   --   --  25   < >  --   --   " --  29   *  104*  104* 113*   < > 134*   < >  --    < > 119*   < > 79 149*   < > 139*   BUN  --   --   --  36.8*  --   --   --  40.2*   < >  --   --   --  33.4*   CR  --   --   --  3.61*  --   --   --  4.14*   < >  --   --   --  4.00*   GFRESTIMATED  --   --   --  14*  --   --   --  12*   < >  --   --   --  13*   BARBARA  --   --   --  8.5*  --   --   --  8.6   < >  --   --   --  9.9   ICAW  --   --   --   --   --   --   --   --   --  4.6 5.0   < >  --    MAG  --   --   --  1.8  --   --   --  1.9   < >  --   --   --   --    PHOS  --   --   --  2.7  --   --   --  3.2   < >  --   --   --   --    AMYLASE  --   --   --  99  --   --   --  113*   < >  --   --   --   --    LIPASE  --   --   --  78*  --   --   --  76*   < >  --   --   --   --    ALBUMIN  --   --   --   --   --   --   --   --   --   --   --   --  4.1   BILITOTAL  --   --   --   --   --   --   --   --   --   --   --   --  0.3   ALKPHOS  --   --   --   --   --   --   --   --   --   --   --   --  89   AST  --   --   --   --   --   --   --   --   --   --   --   --  29   ALT  --   --   --   --   --   --   --   --   --   --   --   --  18    < > = values in this interval not displayed.     CBC  Recent Labs   Lab 09/26/22  0319 09/26/22  0012 09/23/22  1252 09/22/22  1629   HGB 6.8* 6.9*   < > 10.9*   WBC 9.4 9.6   < > 5.5    191   < > 258   A1C  --   --   --  6.9*    < > = values in this interval not displayed.

## 2022-09-26 NOTE — PROVIDER NOTIFICATION
Time of notification: 11:05 AM  Provider notified: Panc tx NP/PA  Patient status: Pt /93, do you want to order any BP meds?  Orders received: No return call received.       Time of notification: 12:19 PM  Provider notified: Panc Tx NP/PA   Patient status: Pt /86. Do you want any PRN meds? Orders are to notify team if BP greater than 160.  Temp:  [97.5  F (36.4  C)-98.5  F (36.9  C)] 97.6  F (36.4  C)  Pulse:  [] 86  Resp:  [16-20] 20  BP: (153-187)/(78-97) 178/86  SpO2:  [90 %-100 %] 95 %  Orders received:

## 2022-09-26 NOTE — PLAN OF CARE
"BP (!) 157/78 (BP Location: Left arm)   Pulse 97   Temp 97.6  F (36.4  C) (Oral)   Resp 16   Ht 1.549 m (5' 1\")   Wt 78.8 kg (173 lb 11.6 oz)   LMP 09/18/2022   SpO2 98%   BMI 32.82 kg/m     Pt was transferred to  from  around 1400  Neuro A/Ox3  VS: VSS on RA except elevated BP within parameters  Pain: Denies pain  GI: NPO, NG tube to low intermittent suction. Denies nausea. Not passing flatus. No BM yet since surgery  : Glez removed before transferring to . Pt hasn't voided yet  Skin: Abdominal incision intact VICTORINA. Head to toe skin check not completed yet  PIV SL  L internal jugular line infusing hep gtt 500 unit/h, insulin gtt Alg 1 0.2unit/h, and MIF 75ml/h  Activity - SBA with walker  Plan of Care - Continue with POC    "

## 2022-09-26 NOTE — PROGRESS NOTES
[facility]   Transplant Nephrology Progress Note  Date of Admission:  9/22/2022  Today's Date: 09/26/2022    Recommendations:  - Agree with abdominal ultrasound given drop in Hb   - No acute indication for dialysis.    Assessment & Plan   # DDKT (SPK): Trend down in creatinine.  Good urine output.  No acute indications for dialysis.   - Baseline Creatinine: ~ TBD   - Proteinuria: Moderate (1-3 grams)   - Date DSA Last Checked: Sep/2022      Latest DSA: Not checked recently due to time from transplant   - BK Viremia: No   - Kidney Tx Biopsy: No    # Pancreas Tx (SPK):    - Pancreatic Exocrine Drainage: Enteric drained     - Blood glucose: Euglycemia      On insulin: No   - HbA1c: Stable      Latest HbA1c: 6.9%   - Pancreatic enzymes: Trend down   - Date DSA Last Checked: Sep/2022  Latest DSA: Not checked recently due to time from transplant   - Pancreas Tx Biopsy: No    # Immunosuppression: Tacrolimus immediate release (goal 8-10) and Mycophenolate mofetil (dose 1000 mg every 12 hours)   - Changes: No    # Infection Prophylaxis:   - PJP: Sulfa/TMP (Bactrim)  - CMV: Valganciclovir (Valcyte)  - Fungal: Micafungin (Mycamine)    # Hypertension: Inadequate control;  Goal BP: < 150/90   - Volume status: Moderately hypervolemic     - Changes: Not at this time    # Anemia in Chronic Renal Disease: Hgb: Stable      SHANEKA: No   - Iron studies: Unknown at this time, but checked with dialysis    # Mineral Bone Disorder:   - Secondary renal hyperparathyroidism; PTH level: Not checked recently        On treatment: None  - Vitamin D; level: Not checked recently        On supplement: Yes  - Calcium; level: Normal        On supplement: Yes  - Phosphorus; level: Normal        On supplement: No    # Electrolytes:   - Potassium; level: Normal        On supplement: No  - Magnesium; level: Normal        On supplement: No  - Bicarbonate; level: Normal        On supplement: No    # TIA 2017: No  residual deficients     # Transplant History:  Etiology of Kidney Failure: Diabetes mellitus type 1  Tx: DDKT (SPK)  Transplant: 9/23/2022 (Kidney / Pancreas)  Significant changes in immunosuppression: None  Significant transplant-related complications: None    Recommendations were communicated to the primary team verbally.    Seen and discussed with Dr. Teresa Esquivel MD   Pager: [phone]    Attestation:  This patient has been seen and evaluated by me, Bertrand Moore MD.  I have reviewed the note and agree with plan of care as documented by the fellow.     Interval History   Ms. Hess's creatinine is 3.61 (09/26 0319); Trend down.  1.2 L urine output.  Other significant labs/tests/vitals: stable  No acute events overnight.  No chest pain or shortness of breath.  Positive leg swelling.  Denies nausea and vomiting.  Bowel movements not since this morning.  No fever, sweats or chills.    Review of Systems   4 point ROS was obtained and negative except as noted in the Interval History.    MEDICATIONS:    acetaminophen  975 mg Oral Q8H     aspirin  81 mg Oral Daily     [START ON 9/28/2022] basiliximab (SIMULECT) infusion  20 mg Intravenous Once     calcium carbonate-vitamin D  1 tablet Oral BID w/meals     [START ON 9/30/2022] clotrimazole  10 mg Buccal 4x Daily     magnesium oxide  400 mg Oral Q24H     micafungin  100 mg Intravenous Q24H     mycophenolate  1,000 mg Oral BID IS     octreotide  100 mcg Subcutaneous Q12H     pantoprazole  40 mg Oral QAM AC     piperacillin-tazobactam  2.25 g Intravenous Q8H     polyethylene glycol  17 g Oral Daily     predniSONE  60 mg Oral Daily    Followed by     [START ON 9/28/2022] predniSONE  40 mg Oral Daily    Followed by     [START ON 9/30/2022] predniSONE  20 mg Oral Daily    Followed by     [START ON 10/7/2022] predniSONE  15 mg Oral Daily    Followed by     [START ON 10/14/2022] predniSONE  10 mg Oral Daily    Followed by     [START ON 10/21/2022] predniSONE  5  "mg Oral Daily     senna-docusate  1 tablet Oral BID     sodium chloride (PF)  3 mL Intravenous Q8H     sulfamethoxazole-trimethoprim  1 tablet Oral Once per day on      tacrolimus  2 mg Oral BID IS     valGANciclovir  450 mg Oral Once per day on      venlafaxine  150 mg Oral Daily       dextrose       dextrose       IV fluid REPLACEMENT ONLY 75 mL/hr at 22 0851     heparin 500 Units/hr (22 0330)     insulin regular 0.2 Units/hr (22 1145)       Physical Exam   Temp  Av.3  F (36.8  C)  Min: 97.7  F (36.5  C)  Max: 98.9  F (37.2  C)  Arterial Line BP  Min: 39/39  Max: 153/64  Arterial Line MAP (mmHg)  Av.6 mmHg  Min: 39 mmHg  Max: 95 mmHg      Pulse  Av.1  Min: 71  Max: 106 Resp  Av.1  Min: 9  Max: 18  SpO2  Av.2 %  Min: 92 %  Max: 100 %    CVP (mmHg):  (abx infusing)BP (!) 172/88   Pulse 86   Temp 97.7  F (36.5  C)   Resp 20   Ht 1.549 m (5' 1\")   Wt 78.8 kg (173 lb 11.6 oz)   LMP 2022   SpO2 95%   BMI 32.82 kg/m     Date 22 0700 - 22 0659   Shift 0669-7615 1167-6891 2587-8690 24 Hour Total   INTAKE   I.V. 108.5   108.5   NG/GT 15   15   Shift Total(mL/kg) 123.5(1.6)   123.5(1.6)   OUTPUT   Urine 195   195   Emesis/NG output 50   50   Shift Total(mL/kg) 245(3.18)   245(3.18)   Weight (kg) 77.1 77.1 77.1 77.1      Admit Weight: 69.8 kg (153 lb 14.1 oz)     GENERAL APPEARANCE: alert and no distress  HENT: mouth without ulcers or lesions  RESP: lungs clear to auscultation - no rales, rhonchi or wheezes  CV: regular rhythm, normal rate, no rub, no murmur  EDEMA: 1+ LE edema bilaterally  ABDOMEN: soft, nondistended, nontender, bowel sounds normal  MS: extremities normal - no gross deformities noted, no evidence of inflammation in joints, no muscle tenderness  SKIN: no rash    Data   All labs reviewed by me.  CMP  Recent Labs   Lab 22  1144 22  1002 22  0753 22  0559 22  0411 22  0319 22  2100 " 09/25/22  2040 09/25/22  1636 09/25/22  1555 09/25/22  1402 09/25/22  1313 09/25/22  0502 09/25/22  0438 09/24/22  0525 09/24/22  0514 09/23/22  2311 09/23/22  1820 09/22/22  2237 09/22/22  1629   NA  --   --   --   --   --  142  --   --   --   --   --   --   --  139  --  136  --  136   < > 132*   POTASSIUM  --   --   --   --   --  3.6  --  3.4  --  3.5  --  3.7   < > 3.8   < > 4.8   < > 3.8  3.8   < > 3.9   CHLORIDE  --   --   --   --   --  110*  --   --   --   --   --   --   --  106  --  101  --  104  --  93*   CO2  --   --   --   --   --  25  --   --   --   --   --   --   --  25  --  22  --  20*  --  29   ANIONGAP  --   --   --   --   --  7  --   --   --   --   --   --   --  8  --  13  --  12  --  10   * 104*  104*  104* 113* 130*   < > 134*   < >  --    < >  --    < >  --    < > 119*   < > 164*   < > 66*   < > 139*   BUN  --   --   --   --   --  36.8*  --   --   --   --   --   --   --  40.2*  --  42.7*  --  38.1*  --  33.4*   CR  --   --   --   --   --  3.61*  --   --   --   --   --   --   --  4.14*  --  4.33*  --  4.01*  --  4.00*   GFRESTIMATED  --   --   --   --   --  14*  --   --   --   --   --   --   --  12*  --  11*  --  13*  --  13*   BARBARA  --   --   --   --   --  8.5*  --   --   --   --   --   --   --  8.6  --  9.0  --  8.3*  --  9.9   MAG  --   --   --   --   --  1.8  --   --   --   --   --   --   --  1.9  --  2.0  --  1.8  --   --    PHOS  --   --   --   --   --  2.7  --   --   --   --   --   --   --  3.2  --  2.4*  --  1.3*  --   --    PROTTOTAL  --   --   --   --   --   --   --   --   --   --   --   --   --   --   --   --   --   --   --  6.9   ALBUMIN  --   --   --   --   --   --   --   --   --   --   --   --   --   --   --   --   --   --   --  4.1   BILITOTAL  --   --   --   --   --   --   --   --   --   --   --   --   --   --   --   --   --   --   --  0.3   ALKPHOS  --   --   --   --   --   --   --   --   --   --   --   --   --   --   --   --   --   --   --  89   AST  --   --   --   --    --   --   --   --   --   --   --   --   --   --   --   --   --   --   --  29   ALT  --   --   --   --   --   --   --   --   --   --   --   --   --   --   --   --   --   --   --  18    < > = values in this interval not displayed.     CBC  Recent Labs   Lab 09/26/22 0319 09/26/22  0012 09/25/22  0817 09/25/22  0405   HGB 6.8* 6.9* 7.1*  7.1* 7.2*   WBC 9.4 9.6 9.3 8.1   RBC 2.01* 2.00* 2.07* 2.08*   HCT 22.1* 21.8* 23.0* 22.9*   * 109* 111* 110*   MCH 33.8* 34.5* 34.3* 34.6*   MCHC 30.8* 31.7 30.9* 31.4*   RDW 15.4* 15.5* 15.7* 15.3*    191 169 171     INR  Recent Labs   Lab 09/26/22 0319 09/25/22  0405 09/24/22  1400 09/23/22  1820 09/22/22  1629   INR  --   --   --  1.09 0.91   PTT 39* 37 69* 27 26     ABG  Recent Labs   Lab 09/23/22  1753 09/23/22  1703 09/23/22  1606 09/23/22  1518   PH 7.36 7.33* 7.34* 7.32*   PCO2 35 38 36 35   PO2 152* 134* 116* 120*   HCO3 20* 20* 19* 18*   O2PER 50.0 50.0 50.0 50.0      Urine Studies  Recent Labs   Lab Test 09/22/22  1714 08/13/20  1110   COLOR Light Yellow Yellow   APPEARANCE Clear Slightly Cloudy   URINEGLC 300 * >499*   URINEBILI Negative Negative   URINEKETONE Negative 5*   SG 1.011 1.016   UBLD Small* Small*   URINEPH 8.0* 5.0   PROTEIN 200 * >499*   NITRITE Negative Negative   LEUKEST Negative Negative   RBCU 1 3*   WBCU 6* 3     No lab results found.  PTH  No lab results found.  Iron Studies  No lab results found.    IMAGING:  All imaging studies reviewed by me.

## 2022-09-26 NOTE — PLAN OF CARE
Goal Outcome Evaluation: Ongoing, Progressing    [id number]    Neuro: A&Ox4. Calm and cooperative with neelima LOMAX.  Cardiac: Not on telemetry monitoring. HR ranging between 80-90s. BP ranging between 150-160s/70-80s.  Respiratory: Sating >95% on NC 2 LPM.  GI/: NG tube in place, on LCS. Adequate urine output via angeles catheter. No BM this shift.  Diet/appetite: NPO diet. Tolerates PO medications with small sips of water.  Activity: Stand-by Assist, up to chair and in halls.  Pain: At acceptable level on current regimen.   Skin: No new deficits noted.  LDA's: L-internal jugular triple lumen, white infusing D5LR, blue infusing NS+insulin, brown infusing Heparin. L-PIV, SL. R-AVF. NG tube on LCS. Angeles catheter in place.    Plan: Continue with POC. Notify primary team with changes. Orders in to transfer to .

## 2022-09-26 NOTE — PLAN OF CARE
Transfer  Transferred to: 7A  Via: wheelchair  Reason for transfer:Pt no longer appropriate for 6B- improved patient condition  Family: Pt updated family   Belongings: Packed and sent with pt  Chart: Delivered with pt to next unit  Medications: Meds sent to new unit with pt  Report given to: Garcia   Pt status: alert     Neuro: A&Ox4.   Cardiac: SR. VSS. BP >160, give hydralazine    Respiratory: Sating 96 on RA.  GI/: Angeles removed ~1300, monitor for retention. BM X1 9/26  Diet/appetite: NPO, ice chips & water with meds  Activity:  Stand-by assist up to chair   Pain: Intermittent pain around abdomen, pt refuses any pain meds.  Skin: No new deficits noted. Midline incision (abdominal binder on), stapled and open to air, bruising BUE  LDA's: L internal jugular triple lumen, Fistula AV Right, L PIV     Plan: Continue with POC. Notify primary team with changes. Monitor for urinary retention, angeles removed today. Monitor H&H, transfusion completed

## 2022-09-27 ENCOUNTER — APPOINTMENT (OUTPATIENT)
Dept: PHYSICAL THERAPY | Facility: CLINIC | Age: 52
DRG: 008 | End: 2022-09-27
Attending: SURGERY
Payer: COMMERCIAL

## 2022-09-27 ENCOUNTER — DOCUMENTATION ONLY (OUTPATIENT)
Dept: MEDSURG UNIT | Facility: CLINIC | Age: 52
End: 2022-09-27

## 2022-09-27 LAB
AMYLASE SERPL-CCNC: 121 U/L (ref 28–100)
ANION GAP SERPL CALCULATED.3IONS-SCNC: 8 MMOL/L (ref 7–15)
APTT PPP: 35 SECONDS (ref 22–38)
BUN SERPL-MCNC: 35.2 MG/DL (ref 6–20)
CALCIUM SERPL-MCNC: 8.9 MG/DL (ref 8.6–10)
CHLORIDE SERPL-SCNC: 110 MMOL/L (ref 98–107)
CREAT SERPL-MCNC: 3.13 MG/DL (ref 0.51–0.95)
DEPRECATED HCO3 PLAS-SCNC: 25 MMOL/L (ref 22–29)
DONOR IDENTIFICATION: NORMAL
DSA COMMENTS: NORMAL
DSA PRESENT: NO
DSA TEST METHOD: NORMAL
ERYTHROCYTE [DISTWIDTH] IN BLOOD BY AUTOMATED COUNT: 17.6 % (ref 10–15)
ERYTHROCYTE [DISTWIDTH] IN BLOOD BY AUTOMATED COUNT: 18.1 % (ref 10–15)
GFR SERPL CREATININE-BSD FRML MDRD: 17 ML/MIN/1.73M2
GLUCOSE BLDC GLUCOMTR-MCNC: 107 MG/DL (ref 70–99)
GLUCOSE BLDC GLUCOMTR-MCNC: 109 MG/DL (ref 70–99)
GLUCOSE BLDC GLUCOMTR-MCNC: 111 MG/DL (ref 70–99)
GLUCOSE BLDC GLUCOMTR-MCNC: 113 MG/DL (ref 70–99)
GLUCOSE BLDC GLUCOMTR-MCNC: 85 MG/DL (ref 70–99)
GLUCOSE BLDC GLUCOMTR-MCNC: 85 MG/DL (ref 70–99)
GLUCOSE BLDC GLUCOMTR-MCNC: 92 MG/DL (ref 70–99)
GLUCOSE BLDC GLUCOMTR-MCNC: 94 MG/DL (ref 70–99)
GLUCOSE BLDC GLUCOMTR-MCNC: 98 MG/DL (ref 70–99)
GLUCOSE BLDC GLUCOMTR-MCNC: 99 MG/DL (ref 70–99)
GLUCOSE SERPL-MCNC: 97 MG/DL (ref 70–99)
HCT VFR BLD AUTO: 25.6 % (ref 35–47)
HCT VFR BLD AUTO: 28.4 % (ref 35–47)
HGB BLD-MCNC: 8.2 G/DL (ref 11.7–15.7)
HGB BLD-MCNC: 9.2 G/DL (ref 11.7–15.7)
HOLD SPECIMEN: NORMAL
LIPASE SERPL-CCNC: 72 U/L (ref 13–60)
MAGNESIUM SERPL-MCNC: 1.7 MG/DL (ref 1.7–2.3)
MCH RBC QN AUTO: 33.3 PG (ref 26.5–33)
MCH RBC QN AUTO: 33.8 PG (ref 26.5–33)
MCHC RBC AUTO-ENTMCNC: 32 G/DL (ref 31.5–36.5)
MCHC RBC AUTO-ENTMCNC: 32.4 G/DL (ref 31.5–36.5)
MCV RBC AUTO: 104 FL (ref 78–100)
MCV RBC AUTO: 104 FL (ref 78–100)
ORGAN: NORMAL
PHOSPHATE SERPL-MCNC: 2 MG/DL (ref 2.5–4.5)
PLATELET # BLD AUTO: 183 10E3/UL (ref 150–450)
PLATELET # BLD AUTO: 221 10E3/UL (ref 150–450)
POTASSIUM SERPL-SCNC: 3.2 MMOL/L (ref 3.4–5.3)
POTASSIUM SERPL-SCNC: 4.1 MMOL/L (ref 3.4–5.3)
RBC # BLD AUTO: 2.46 10E6/UL (ref 3.8–5.2)
RBC # BLD AUTO: 2.72 10E6/UL (ref 3.8–5.2)
SODIUM SERPL-SCNC: 143 MMOL/L (ref 136–145)
UFH PPP CHRO-ACNC: 0.1 IU/ML
WBC # BLD AUTO: 10.6 10E3/UL (ref 4–11)
WBC # BLD AUTO: 9.1 10E3/UL (ref 4–11)

## 2022-09-27 PROCEDURE — 250N000011 HC RX IP 250 OP 636: Performed by: PHYSICIAN ASSISTANT

## 2022-09-27 PROCEDURE — 80048 BASIC METABOLIC PNL TOTAL CA: CPT | Performed by: SURGERY

## 2022-09-27 PROCEDURE — 120N000011 HC R&B TRANSPLANT UMMC

## 2022-09-27 PROCEDURE — 84100 ASSAY OF PHOSPHORUS: CPT | Performed by: SURGERY

## 2022-09-27 PROCEDURE — 250N000013 HC RX MED GY IP 250 OP 250 PS 637

## 2022-09-27 PROCEDURE — 250N000011 HC RX IP 250 OP 636: Performed by: SURGERY

## 2022-09-27 PROCEDURE — 250N000012 HC RX MED GY IP 250 OP 636 PS 637: Performed by: SURGERY

## 2022-09-27 PROCEDURE — 97116 GAIT TRAINING THERAPY: CPT | Mod: GP

## 2022-09-27 PROCEDURE — 97530 THERAPEUTIC ACTIVITIES: CPT | Mod: GP

## 2022-09-27 PROCEDURE — 85014 HEMATOCRIT: CPT | Performed by: PHYSICIAN ASSISTANT

## 2022-09-27 PROCEDURE — 258N000003 HC RX IP 258 OP 636: Performed by: SURGERY

## 2022-09-27 PROCEDURE — 99233 SBSQ HOSP IP/OBS HIGH 50: CPT | Mod: GC | Performed by: INTERNAL MEDICINE

## 2022-09-27 PROCEDURE — 83735 ASSAY OF MAGNESIUM: CPT | Performed by: SURGERY

## 2022-09-27 PROCEDURE — 82150 ASSAY OF AMYLASE: CPT | Performed by: SURGERY

## 2022-09-27 PROCEDURE — 85730 THROMBOPLASTIN TIME PARTIAL: CPT

## 2022-09-27 PROCEDURE — 99232 SBSQ HOSP IP/OBS MODERATE 35: CPT | Mod: 24 | Performed by: SURGERY

## 2022-09-27 PROCEDURE — 36415 COLL VENOUS BLD VENIPUNCTURE: CPT | Performed by: SURGERY

## 2022-09-27 PROCEDURE — 84132 ASSAY OF SERUM POTASSIUM: CPT | Performed by: PHYSICIAN ASSISTANT

## 2022-09-27 PROCEDURE — 250N000013 HC RX MED GY IP 250 OP 250 PS 637: Performed by: PHYSICIAN ASSISTANT

## 2022-09-27 PROCEDURE — 83690 ASSAY OF LIPASE: CPT | Performed by: SURGERY

## 2022-09-27 PROCEDURE — 85520 HEPARIN ASSAY: CPT | Performed by: SURGERY

## 2022-09-27 PROCEDURE — 250N000013 HC RX MED GY IP 250 OP 250 PS 637: Performed by: SURGERY

## 2022-09-27 PROCEDURE — 36592 COLLECT BLOOD FROM PICC: CPT | Performed by: PHYSICIAN ASSISTANT

## 2022-09-27 PROCEDURE — 250N000012 HC RX MED GY IP 250 OP 636 PS 637: Performed by: PHYSICIAN ASSISTANT

## 2022-09-27 RX ORDER — METHOCARBAMOL 500 MG/1
500 TABLET, FILM COATED ORAL EVERY 4 HOURS PRN
Status: DISCONTINUED | OUTPATIENT
Start: 2022-09-27 | End: 2022-10-02 | Stop reason: HOSPADM

## 2022-09-27 RX ORDER — ACETAMINOPHEN 325 MG/1
975 TABLET ORAL EVERY 8 HOURS
Status: DISCONTINUED | OUTPATIENT
Start: 2022-09-27 | End: 2022-09-28

## 2022-09-27 RX ORDER — CARVEDILOL 6.25 MG/1
6.25 TABLET ORAL 2 TIMES DAILY WITH MEALS
Status: DISCONTINUED | OUTPATIENT
Start: 2022-09-27 | End: 2022-09-28

## 2022-09-27 RX ORDER — NICOTINE POLACRILEX 4 MG
15-30 LOZENGE BUCCAL
Status: DISCONTINUED | OUTPATIENT
Start: 2022-09-27 | End: 2022-10-02 | Stop reason: HOSPADM

## 2022-09-27 RX ORDER — ATORVASTATIN CALCIUM 20 MG/1
20 TABLET, FILM COATED ORAL EVERY EVENING
Status: DISCONTINUED | OUTPATIENT
Start: 2022-09-27 | End: 2022-10-02 | Stop reason: HOSPADM

## 2022-09-27 RX ORDER — DEXTROSE MONOHYDRATE 25 G/50ML
25-50 INJECTION, SOLUTION INTRAVENOUS
Status: DISCONTINUED | OUTPATIENT
Start: 2022-09-27 | End: 2022-10-02 | Stop reason: HOSPADM

## 2022-09-27 RX ORDER — POTASSIUM CHLORIDE 29.8 MG/ML
20 INJECTION INTRAVENOUS
Status: COMPLETED | OUTPATIENT
Start: 2022-09-27 | End: 2022-09-27

## 2022-09-27 RX ORDER — FUROSEMIDE 10 MG/ML
40 INJECTION INTRAMUSCULAR; INTRAVENOUS ONCE
Status: COMPLETED | OUTPATIENT
Start: 2022-09-27 | End: 2022-09-27

## 2022-09-27 RX ADMIN — ACETAMINOPHEN 650 MG: 325 TABLET, FILM COATED ORAL at 22:42

## 2022-09-27 RX ADMIN — POTASSIUM CHLORIDE 20 MEQ: 29.8 INJECTION, SOLUTION INTRAVENOUS at 09:46

## 2022-09-27 RX ADMIN — METHOCARBAMOL 500 MG: 500 TABLET ORAL at 15:05

## 2022-09-27 RX ADMIN — Medication 1 TABLET: at 19:42

## 2022-09-27 RX ADMIN — OXYCODONE HYDROCHLORIDE 5 MG: 5 TABLET ORAL at 00:07

## 2022-09-27 RX ADMIN — PREDNISONE 60 MG: 20 TABLET ORAL at 08:27

## 2022-09-27 RX ADMIN — ATORVASTATIN CALCIUM 20 MG: 20 TABLET, FILM COATED ORAL at 19:43

## 2022-09-27 RX ADMIN — ACETAMINOPHEN 650 MG: 325 TABLET, FILM COATED ORAL at 00:07

## 2022-09-27 RX ADMIN — TACROLIMUS 2 MG: 1 CAPSULE ORAL at 08:28

## 2022-09-27 RX ADMIN — CARVEDILOL 6.25 MG: 6.25 TABLET, FILM COATED ORAL at 11:43

## 2022-09-27 RX ADMIN — ACETAMINOPHEN 975 MG: 325 TABLET ORAL at 15:05

## 2022-09-27 RX ADMIN — FUROSEMIDE 40 MG: 10 INJECTION, SOLUTION INTRAVENOUS at 17:40

## 2022-09-27 RX ADMIN — POTASSIUM CHLORIDE 20 MEQ: 29.8 INJECTION, SOLUTION INTRAVENOUS at 11:55

## 2022-09-27 RX ADMIN — ACETAMINOPHEN 975 MG: 325 TABLET ORAL at 07:16

## 2022-09-27 RX ADMIN — FUROSEMIDE 40 MG: 10 INJECTION, SOLUTION INTRAMUSCULAR; INTRAVENOUS at 09:46

## 2022-09-27 RX ADMIN — CARVEDILOL 6.25 MG: 6.25 TABLET, FILM COATED ORAL at 17:45

## 2022-09-27 RX ADMIN — PANTOPRAZOLE SODIUM 40 MG: 40 TABLET, DELAYED RELEASE ORAL at 08:27

## 2022-09-27 RX ADMIN — SENNOSIDES AND DOCUSATE SODIUM 1 TABLET: 50; 8.6 TABLET ORAL at 08:27

## 2022-09-27 RX ADMIN — DIBASIC SODIUM PHOSPHATE, MONOBASIC POTASSIUM PHOSPHATE AND MONOBASIC SODIUM PHOSPHATE 250 MG: 852; 155; 130 TABLET ORAL at 19:42

## 2022-09-27 RX ADMIN — TACROLIMUS 2 MG: 1 CAPSULE ORAL at 17:46

## 2022-09-27 RX ADMIN — DIBASIC SODIUM PHOSPHATE, MONOBASIC POTASSIUM PHOSPHATE AND MONOBASIC SODIUM PHOSPHATE 250 MG: 852; 155; 130 TABLET ORAL at 09:46

## 2022-09-27 RX ADMIN — VENLAFAXINE HYDROCHLORIDE 150 MG: 150 CAPSULE, EXTENDED RELEASE ORAL at 08:27

## 2022-09-27 RX ADMIN — MICAFUNGIN 100 MG: 10 INJECTION, POWDER, LYOPHILIZED, FOR SOLUTION INTRAVENOUS at 04:08

## 2022-09-27 RX ADMIN — OCTREOTIDE ACETATE 100 MCG: 100 INJECTION, SOLUTION INTRAVENOUS; SUBCUTANEOUS at 11:52

## 2022-09-27 RX ADMIN — POTASSIUM CHLORIDE 20 MEQ: 29.8 INJECTION, SOLUTION INTRAVENOUS at 10:57

## 2022-09-27 RX ADMIN — Medication 1 TABLET: at 11:43

## 2022-09-27 RX ADMIN — OXYCODONE HYDROCHLORIDE 5 MG: 5 TABLET ORAL at 07:16

## 2022-09-27 RX ADMIN — OCTREOTIDE ACETATE 100 MCG: 100 INJECTION, SOLUTION INTRAVENOUS; SUBCUTANEOUS at 00:07

## 2022-09-27 RX ADMIN — ASPIRIN 81 MG CHEWABLE TABLET 81 MG: 81 TABLET CHEWABLE at 08:27

## 2022-09-27 RX ADMIN — MYCOPHENOLATE MOFETIL 1000 MG: 250 CAPSULE ORAL at 17:46

## 2022-09-27 RX ADMIN — HYDRALAZINE HYDROCHLORIDE 10 MG: 20 INJECTION INTRAMUSCULAR; INTRAVENOUS at 11:49

## 2022-09-27 RX ADMIN — HEPARIN SODIUM 500 UNITS/HR: 10000 INJECTION, SOLUTION INTRAVENOUS at 22:23

## 2022-09-27 RX ADMIN — MAGNESIUM OXIDE TAB 400 MG (241.3 MG ELEMENTAL MG) 400 MG: 400 (241.3 MG) TAB at 11:43

## 2022-09-27 RX ADMIN — MYCOPHENOLATE MOFETIL 1000 MG: 250 CAPSULE ORAL at 08:28

## 2022-09-27 ASSESSMENT — ACTIVITIES OF DAILY LIVING (ADL)
ADLS_ACUITY_SCORE: 25
ADLS_ACUITY_SCORE: 25
ADLS_ACUITY_SCORE: 24
ADLS_ACUITY_SCORE: 24
ADLS_ACUITY_SCORE: 25
ADLS_ACUITY_SCORE: 24
ADLS_ACUITY_SCORE: 25
ADLS_ACUITY_SCORE: 24
ADLS_ACUITY_SCORE: 22
ADLS_ACUITY_SCORE: 24
ADLS_ACUITY_SCORE: 25
DEPENDENT_IADLS:: INDEPENDENT
ADLS_ACUITY_SCORE: 24

## 2022-09-27 NOTE — PROGRESS NOTES
Care Management Follow Up    Length of Stay (days): 5  Expected Discharge Date: 09/29/2022  Concerns to be Addressed:   Discharge planning  Patient plan of care discussed at interdisciplinary rounds: Yes  Anticipated Discharge Disposition:  Home  Anticipated Discharge Services:  Home care  Anticipated Discharge DME:  NA   Education Provided on the Discharge Plan:  yes  Patient/Family in Agreement with the Plan:  yes      Additional Information:  Pt s/p PKT and Ladi, Transplant PA states pt will need post transplant labs Monday and Thursday when medically ready for discharge. RNCC send following referrals below per insurance restrictions.      Pending:  [facility]  [address], [place] [address]  Ph: [phone] fx:[phone]    [facility]   [address] [place] [address]  Ph:[phone] fx:[phone]    [facility]   [address] [place], [address]  Ph:[phone] fx:[phone]    [facility]   [address]  [address], [place] [address] Ph:[phone] Fx:[phone]     [facility]  [address] [address]  Ph:[phone] fx: [phone]    [place]  [address] [address] Ph:[phone] fx:[phone]    [facility]   [address] [address]  Ph:[phone] fx:[phone]    [facility]   [address] [address]  Ph:[phone] fx:[phone]    [facility]   [address] [place] [address] Ph:[phone] fx:[phone]    Denied:  [facility]  [facility]: admissions stated out of network    Brodie Zafar, RN BSN  RN Care Coordinator [place]

## 2022-09-27 NOTE — PLAN OF CARE
"BP (!) 143/68 (BP Location: Left arm)   Pulse 80   Temp 97.8  F (36.6  C) (Oral)   Resp 16   Ht 1.549 m (5' 1\")   Wt 80 kg (176 lb 4.8 oz)   LMP 09/18/2022   SpO2 96%   BMI 33.31 kg/m     Neuro: A/Ox3   VS: Elevated BP in 187/81 treated by Hydralazine IV 10mg and Coreg 6.5mg, the rest of VSS on RA  Pain: c/o abdominal discomfort treated and Robaxin was added to pain management  GI: on clear liquid diet, NG tube removed. Denies nausea. BM4x (please hold bowel meds)   : voiding without difficulty. Pt received Lasix 40mg IV  BG: Between 86 and 99, insulin gtt stopped  Skin: Abdominal incision noted to have serosanguinous drainage and provider was notified  PIV SL  L neck CVC infusing hep gtt 500unit/h  Activity - SBA walking to bathroom frequently  Education - pt has not feeling good today and most of education not completed  Plan of Care - Continue with POC    "

## 2022-09-27 NOTE — PROGRESS NOTES
Admitted/transferred from: 6b  Time of arrival on unit 14:00  2 RN full  skin assessment completed by Gilbert RN and Oneida RN  Skin assessment finding: issues found stapled midline incision with moderate serosanguinous drainage, left IJ dressing , one left hand PIV. Pale skin.   Interventions/actions: skin interventions incision cleaned with Microcleanse and dressed with ABD pad. Ab dressing replaced     Will continue to monitor.

## 2022-09-27 NOTE — PROGRESS NOTES
Transplant Surgery  Inpatient Daily Progress Note  09/27/2022    Assessment & Plan: Eden Hess is a 52 year old female with a past medical history significant for end stage kidney disease secondary to diabetic nephropathy. Other past medical history includes DM1 c/b neuropathy and retinopathy, hypertension, non obstructive CAD, anxiety and depression, diastolic HFpEF (prior to starting HD, in setting of hypertensive urgency), and TIA (2017). She is now s/p SPK with no ureteral stent on 9/23/22 with Dr. Gómez.     S/p SPK 9/23/22: POD#4  Pancreas: Lipase 72<-78<-76<-289<-931. -130s, insulin gtt as needed (0.5 units/hour ). Continue Octreotide. Repeat US today pending.   Post-op US:  1.  Low resistance arterial waveforms within the transplant, though  with continued increased resistive indices in the arteries outside the  pancreas. Recommend attention on follow-up.  2.  Patent venous evaluation.  3.  No appreciable fluid collections.  Kidney: Cr 3.6<-4.1<-4.3<-4.0. 1.3 L UOP over last 24 hours. Repeat US today pending.   Post op US:  1. Left lower quadrant renal transplant with increased arterial  resistive indices throughout, nonspecific, though can be seen with  rejection. Attention on follow-up.  2. Patent Doppler evaluation of the renal transplant vasculature.    Immunosuppression management:   Induction: via intermediate protocol (cPRA 0) with Thymoglobulin 2 mg/kg, basiliximab POD#1 and #5, and steroid pulse with four week taper.   Maintenance:  -MMF 1000 mg BID  -Tacrolimus  2 mg BID. Goal level 8-10. 9/26 level pending.     Neuro/Psych:   Acute post op pain: Scheduled Tylenol, Oxycodone prn and Dilaudid IV prn   Hx Depression: PTA Effexor, restarted.     Hematology:   Anemia of chronic disease: Hgb 6.8 this AM, will transfer 1 unit PRBC   Vascular prophylaxis: Heparin gtt, increased to 500units/hour.    Cardiorespiratory:   Hx CAD: PTA ASA 81 mg daily, atorvastatin 40 mg daily.   HTN: SBP  stable 140-160.   Chest pressure: resolved without intervention. EKG WNL.     GI/Nutrition: NPO with NG tube to LIS. Bowel regimen: Senokot-S BID, Miralax daily.     Endocrine: See above.     Fluid/Electrolytes: MIVF: D5LR@ 75/hr, discontinued urine replacements     : Glez to remain due to new surgical anastomosis until POD 3, check PVR today    Infectious disease: No acute issues. Zosyn and Lisseth per protocol.     Prophylaxis: DVT (mechanical, heparin gtt), fall, GI, fungal (Micafungin), viral (Valcyte), pneumocystis (Bactrim), periop (Zosyn)    Disposition: 6B, transfer to     Medical Decision Making: Medium  Subsequent visit 33464 (moderate level decision making)    SANDEEP/Fellow/Resident Provider: Ximena Bosch PA-C    Faculty: Caden Felix M.D.  _________________________________________________________________  Transplant History:   9/23/2022 (Kidney / Pancreas), Postoperative day: 4     Interval History: History is obtained from the patient  Overnight events: HGB 6.8 this AM, no flatus at this point.     ROS:   A 10-point review of systems was negative except as noted above.    Meds:    acetaminophen  975 mg Oral Q8H     aspirin  81 mg Oral Daily     [START ON 9/28/2022] basiliximab (SIMULECT) infusion  20 mg Intravenous Once     calcium carbonate-vitamin D  1 tablet Oral BID w/meals     [START ON 9/30/2022] clotrimazole  10 mg Buccal 4x Daily     magnesium oxide  400 mg Oral Q24H     micafungin  100 mg Intravenous Q24H     mycophenolate  1,000 mg Oral BID IS     octreotide  100 mcg Subcutaneous Q12H     pantoprazole  40 mg Oral QAM AC     phosphorus tablet 250 mg  250 mg Oral BID     polyethylene glycol  17 g Oral Daily     potassium chloride  20 mEq Intravenous Q1H     [START ON 9/28/2022] predniSONE  40 mg Oral Daily    Followed by     [START ON 9/30/2022] predniSONE  20 mg Oral Daily    Followed by     [START ON 10/7/2022] predniSONE  15 mg Oral Daily    Followed by     [START ON 10/14/2022]  "predniSONE  10 mg Oral Daily    Followed by     [START ON 10/21/2022] predniSONE  5 mg Oral Daily     senna-docusate  1 tablet Oral BID     sodium chloride (PF)  3 mL Intravenous Q8H     sulfamethoxazole-trimethoprim  1 tablet Oral Once per day on Mon Wed Fri     tacrolimus  2 mg Oral BID IS     valGANciclovir  450 mg Oral Once per day on Mon Thu     venlafaxine  150 mg Oral Daily       Physical Exam:     Admit Weight: 69.8 kg (153 lb 14.1 oz)    Current vitals:   BP (!) 182/88 (BP Location: Left arm)   Pulse 87   Temp 97.4  F (36.3  C) (Oral)   Resp 16   Ht 1.549 m (5' 1\")   Wt 80 kg (176 lb 4.8 oz)   LMP 09/18/2022   SpO2 96%   BMI 33.31 kg/m      Vital sign ranges:    Temp:  [97.4  F (36.3  C)-98.2  F (36.8  C)] 97.4  F (36.3  C)  Pulse:  [84-97] 87  Resp:  [16-20] 16  BP: (156-182)/(78-91) 182/88  SpO2:  [91 %-98 %] 96 %    General Appearance: in no apparent distress  Skin: Warm, perfused  Heart: RRR  Lungs: NLB on 2 L oxygen  Abdomen: The abdomen is soft, incision c/d/i  : angeles is present. Urine with mild hematuria.  Extremities: edema: none, strength  Neurologic: A&Ox3, Tremor absent.     Data:   CMP  Recent Labs   Lab 09/27/22  0858 09/27/22  0741 09/27/22  0734 09/26/22  0411 09/26/22  0319 09/23/22  1815 09/23/22  1753 09/23/22  1703 09/22/22  2237 09/22/22  1629   NA  --   --  143  --  142   < > 135 132*   < > 132*   POTASSIUM  --   --  3.2*  --  3.6   < > 3.4* 3.8   < > 3.9   CHLORIDE  --   --  110*  --  110*   < >  --   --   --  93*   CO2  --   --  25  --  25   < >  --   --   --  29   GLC 85  85 92 97   < > 134*   < > 79 149*   < > 139*   BUN  --   --  35.2*  --  36.8*   < >  --   --   --  33.4*   CR  --   --  3.13*  --  3.61*   < >  --   --   --  4.00*   GFRESTIMATED  --   --  17*  --  14*   < >  --   --   --  13*   BARBARA  --   --  8.9  --  8.5*   < >  --   --   --  9.9   ICAW  --   --   --   --   --   --  4.6 5.0   < >  --    MAG  --   --  1.7  --  1.8   < >  --   --   --   --    PHOS  --   " --  2.0*  --  2.7   < >  --   --   --   --    AMYLASE  --   --  121*  --  99   < >  --   --   --   --    LIPASE  --   --  72*  --  78*   < >  --   --   --   --    ALBUMIN  --   --   --   --   --   --   --   --   --  4.1   BILITOTAL  --   --   --   --   --   --   --   --   --  0.3   ALKPHOS  --   --   --   --   --   --   --   --   --  89   AST  --   --   --   --   --   --   --   --   --  29   ALT  --   --   --   --   --   --   --   --   --  18    < > = values in this interval not displayed.     CBC  Recent Labs   Lab 09/26/22  0319 09/26/22  0012 09/23/22  1252 09/22/22  1629   HGB 6.8* 6.9*   < > 10.9*   WBC 9.4 9.6   < > 5.5    191   < > 258   A1C  --   --   --  6.9*    < > = values in this interval not displayed.

## 2022-09-27 NOTE — PROGRESS NOTES
[facility]   Transplant Nephrology Progress Note  Date of Admission:  9/22/2022  Today's Date: 09/27/2022    Recommendations:  - Start Coreg 6.25 mg BID   - Agree with lasix for hypervolemia  - Replace potassium    Assessment & Plan   # DDKT (SPK): Trend down in creatinine.  Good urine output.  No acute indications for dialysis.   - Baseline Creatinine: ~ TBD   - Proteinuria: Moderate (1-3 grams)   - Date DSA Last Checked: Sep/2022      Latest DSA: Not checked recently due to time from transplant   - BK Viremia: No   - Kidney Tx Biopsy: No    # Pancreas Tx (SPK):    - Pancreatic Exocrine Drainage: Enteric drained     - Blood glucose: Euglycemia      On insulin: No   - HbA1c: Stable      Latest HbA1c: 6.9%   - Pancreatic enzymes: Trend down   - Date DSA Last Checked: Sep/2022  Latest DSA: Not checked recently due to time from transplant   - Pancreas Tx Biopsy: No    # Immunosuppression: Tacrolimus immediate release (goal 8-10) and Mycophenolate mofetil (dose 1000 mg every 12 hours)   - Changes: No    # Infection Prophylaxis:   - PJP: Sulfa/TMP (Bactrim)  - CMV: Valganciclovir (Valcyte)  - Fungal: Micafungin (Mycamine)    # Hypertension: Inadequate control;  Goal BP: < 150/90   - Volume status: Moderately hypervolemic     - Changes: Not at this time    # Anemia in Chronic Renal Disease: Hgb: Stable      SHANEKA: No   - Iron studies: Unknown at this time, but checked with dialysis    # Mineral Bone Disorder:   - Secondary renal hyperparathyroidism; PTH level: Not checked recently        On treatment: None  - Vitamin D; level: Not checked recently        On supplement: Yes  - Calcium; level: Normal        On supplement: Yes  - Phosphorus; level: Low        On supplement: No    # Electrolytes:   - Potassium; level: Normal        On supplement: No  - Magnesium; level: Normal        On supplement: No  - Bicarbonate; level: Normal        On supplement: No    # TIA 2017: No residual  deficients     # Transplant History:  Etiology of Kidney Failure: Diabetes mellitus type 1  Tx: DDKT (SPK)  Transplant: 9/23/2022 (Kidney / Pancreas)  Significant changes in immunosuppression: None  Significant transplant-related complications: None    Recommendations were communicated to the primary team verbally.    Seen and discussed with Dr. Teresa Esquivel MD   Pager: [phone]    Attestation:  This patient has been seen and evaluated by me, Bertrand Moore MD.  I have reviewed the note and agree with plan of care as documented by the fellow.     Interval History   Ms. Hess's creatinine is 3.13 (09/27 0734); Trend down.  975 ml urine output.  Other significant labs/tests/vitals: borderline elevation in blood pressure  No acute events overnight.  No chest pain or shortness of breath.  Positive for leg swelling.  Denies nausea and vomiting.  Bowel movements are +.  No fever, sweats or chills.        Review of Systems   4 point ROS was obtained and negative except as noted in the Interval History.    MEDICATIONS:    acetaminophen  975 mg Oral Q8H     aspirin  81 mg Oral Daily     [START ON 9/28/2022] basiliximab (SIMULECT) infusion  20 mg Intravenous Once     calcium carbonate-vitamin D  1 tablet Oral BID w/meals     [START ON 9/30/2022] clotrimazole  10 mg Buccal 4x Daily     magnesium oxide  400 mg Oral Q24H     micafungin  100 mg Intravenous Q24H     mycophenolate  1,000 mg Oral BID IS     octreotide  100 mcg Subcutaneous Q12H     pantoprazole  40 mg Oral QAM AC     polyethylene glycol  17 g Oral Daily     predniSONE  60 mg Oral Daily    Followed by     [START ON 9/28/2022] predniSONE  40 mg Oral Daily    Followed by     [START ON 9/30/2022] predniSONE  20 mg Oral Daily    Followed by     [START ON 10/7/2022] predniSONE  15 mg Oral Daily    Followed by     [START ON 10/14/2022] predniSONE  10 mg Oral Daily    Followed by     [START ON 10/21/2022] predniSONE  5 mg Oral Daily     senna-docusate  1  "tablet Oral BID     sodium chloride (PF)  3 mL Intravenous Q8H     sulfamethoxazole-trimethoprim  1 tablet Oral Once per day on      tacrolimus  2 mg Oral BID IS     valGANciclovir  450 mg Oral Once per day on      venlafaxine  150 mg Oral Daily       dextrose       dextrose       IV fluid REPLACEMENT ONLY 75 mL/hr at 22 2154     heparin 500 Units/hr (22 1813)     insulin regular 0.2 Units/hr (22 0015)       Physical Exam   Temp  Av.3  F (36.8  C)  Min: 97.7  F (36.5  C)  Max: 98.9  F (37.2  C)  Arterial Line BP  Min: 39/39  Max: 153/64  Arterial Line MAP (mmHg)  Av.6 mmHg  Min: 39 mmHg  Max: 95 mmHg      Pulse  Av.1  Min: 71  Max: 106 Resp  Av.1  Min: 9  Max: 18  SpO2  Av.2 %  Min: 92 %  Max: 100 %    CVP (mmHg):  (abx infusing)BP (!) 156/80 (BP Location: Left arm)   Pulse 84   Temp 98.2  F (36.8  C) (Oral)   Resp 16   Ht 1.549 m (5' 1\")   Wt 80 kg (176 lb 4.8 oz)   LMP 2022   SpO2 93%   BMI 33.31 kg/m     Date 22 0700 - 22 0659   Shift 7565-6481 7104-5141 2057-1524 24 Hour Total   INTAKE   I.V. 108.5   108.5   NG/GT 15   15   Shift Total(mL/kg) 123.5(1.6)   123.5(1.6)   OUTPUT   Urine 195   195   Emesis/NG output 50   50   Shift Total(mL/kg) 245(3.18)   245(3.18)   Weight (kg) 77.1 77.1 77.1 77.1      Admit Weight: 69.8 kg (153 lb 14.1 oz)     GENERAL APPEARANCE: alert and no distress  HENT: mouth without ulcers or lesions  RESP: lungs clear to auscultation - no rales, rhonchi or wheezes  CV: regular rhythm, normal rate, no rub, no murmur  EDEMA: 1+ LE edema bilaterally  ABDOMEN: soft, nondistended, nontender, bowel sounds normal  MS: extremities normal - no gross deformities noted, no evidence of inflammation in joints, no muscle tenderness  SKIN: no rash    Data   All labs reviewed by me.  CMP  Recent Labs   Lab 22  0741 22  0549 22  0415 22  0149 22  0411 22  0319 22  2100 " 09/25/22  2040 09/25/22  1636 09/25/22  1555 09/25/22  1402 09/25/22  1313 09/25/22  0502 09/25/22  0438 09/24/22  0525 09/24/22  0514 09/23/22  2311 09/23/22  1820 09/22/22  2237 09/22/22  1629   NA  --   --   --   --   --  142  --   --   --   --   --   --   --  139  --  136  --  136   < > 132*   POTASSIUM  --   --   --   --   --  3.6  --  3.4  --  3.5  --  3.7   < > 3.8   < > 4.8   < > 3.8  3.8   < > 3.9   CHLORIDE  --   --   --   --   --  110*  --   --   --   --   --   --   --  106  --  101  --  104  --  93*   CO2  --   --   --   --   --  25  --   --   --   --   --   --   --  25  --  22  --  20*  --  29   ANIONGAP  --   --   --   --   --  7  --   --   --   --   --   --   --  8  --  13  --  12  --  10   GLC 92 99 113* 109*   < > 134*   < >  --    < >  --    < >  --    < > 119*   < > 164*   < > 66*   < > 139*   BUN  --   --   --   --   --  36.8*  --   --   --   --   --   --   --  40.2*  --  42.7*  --  38.1*  --  33.4*   CR  --   --   --   --   --  3.61*  --   --   --   --   --   --   --  4.14*  --  4.33*  --  4.01*  --  4.00*   GFRESTIMATED  --   --   --   --   --  14*  --   --   --   --   --   --   --  12*  --  11*  --  13*  --  13*   BARBARA  --   --   --   --   --  8.5*  --   --   --   --   --   --   --  8.6  --  9.0  --  8.3*  --  9.9   MAG  --   --   --   --   --  1.8  --   --   --   --   --   --   --  1.9  --  2.0  --  1.8  --   --    PHOS  --   --   --   --   --  2.7  --   --   --   --   --   --   --  3.2  --  2.4*  --  1.3*  --   --    PROTTOTAL  --   --   --   --   --   --   --   --   --   --   --   --   --   --   --   --   --   --   --  6.9   ALBUMIN  --   --   --   --   --   --   --   --   --   --   --   --   --   --   --   --   --   --   --  4.1   BILITOTAL  --   --   --   --   --   --   --   --   --   --   --   --   --   --   --   --   --   --   --  0.3   ALKPHOS  --   --   --   --   --   --   --   --   --   --   --   --   --   --   --   --   --   --   --  89   AST  --   --   --   --   --   --   --   --    --   --   --   --   --   --   --   --   --   --   --  29   ALT  --   --   --   --   --   --   --   --   --   --   --   --   --   --   --   --   --   --   --  18    < > = values in this interval not displayed.     CBC  Recent Labs   Lab 09/26/22 0319 09/26/22  0012 09/25/22  0817 09/25/22  0405   HGB 6.8* 6.9* 7.1*  7.1* 7.2*   WBC 9.4 9.6 9.3 8.1   RBC 2.01* 2.00* 2.07* 2.08*   HCT 22.1* 21.8* 23.0* 22.9*   * 109* 111* 110*   MCH 33.8* 34.5* 34.3* 34.6*   MCHC 30.8* 31.7 30.9* 31.4*   RDW 15.4* 15.5* 15.7* 15.3*    191 169 171     INR  Recent Labs   Lab 09/26/22 0319 09/25/22  0405 09/24/22  1400 09/23/22  1820 09/22/22  1629   INR  --   --   --  1.09 0.91   PTT 39* 37 69* 27 26     ABG  Recent Labs   Lab 09/23/22  1753 09/23/22  1703 09/23/22  1606 09/23/22  1518   PH 7.36 7.33* 7.34* 7.32*   PCO2 35 38 36 35   PO2 152* 134* 116* 120*   HCO3 20* 20* 19* 18*   O2PER 50.0 50.0 50.0 50.0      Urine Studies  Recent Labs   Lab Test 09/22/22  1714 08/13/20  1110   COLOR Light Yellow Yellow   APPEARANCE Clear Slightly Cloudy   URINEGLC 300 * >499*   URINEBILI Negative Negative   URINEKETONE Negative 5*   SG 1.011 1.016   UBLD Small* Small*   URINEPH 8.0* 5.0   PROTEIN 200 * >499*   NITRITE Negative Negative   LEUKEST Negative Negative   RBCU 1 3*   WBCU 6* 3     No lab results found.  PTH  No lab results found.  Iron Studies  No lab results found.    IMAGING:  All imaging studies reviewed by me.

## 2022-09-27 NOTE — DISCHARGE SUMMARY
[facility]    Discharge Summary  Transplant Surgery    Date of Admission:  2022  Date of Discharge:  10/2/2022  Discharging Provider: Caden Felix MD / Sanam Renee NP  Date of Service (when I saw the patient): 10/02/22    Attestation: I saw and examined the patient with Sanam Renee NP, and the transplant team. I independently reviewed all pertinent laboratory and imaging information and made independent management decisions including immunosuppression adjustment. I agree with the findings and plan as documented in this note.  Caden Felix MD    Discharge Diagnoses     Principal Problem:    Pancreas replaced by transplant (H)  Active Problems:    Hypertension    Kidney replaced by transplant    Hypophosphatemia    Hypomagnesemia    Hypervolemia    Diarrhea    Immunosuppressed status (H)      Procedure/Surgery Information   Procedure: Procedure(s):  TRANSPLANT, KIDNEY AND PANCREAS,  DONOR  Bench pancreas  Bench kidney   Surgeon(s): Surgeon(s) and Role:     * Yousif Gómez MD - Primary     * Kaveh Ambrocio MD - Resident - Assisting     * Rodo Paula MD - Fellow - Assisting             History of Present Illness   Eden Hess is a 52 year old female with a past medical history significant for end stage kidney disease secondary to diabetic nephropathy. Other past medical history includes DM1 c/b neuropathy and retinopathy, hypertension, non obstructive CAD, anxiety and depression, diastolic HFpEF (prior to starting HD, in setting of hypertensive urgency), and TIA (2017). She is now s/p pancreas transplant, kidney transplant without ureteral stent, and open appendectomy on 22 with Dr. Gómez.     Hospital Course   Kidney transplant: Creatinine slow to trend down. US with patent flow, elevated RI up to 1 decreased to ~ 0.8 on follow up US. 10/1 renogram consistent with graft dysfunction. Creatinine 3.2 on the day of discharge.  Urine output 1850ml over the past 24 hours. Remains hypervolemic. Discharge on lasix.    Pancreas transplant: Lipase 95, trending down. Octreotide stopped 9/29.      Immunosuppression:  Induction immunosuppression with thymoglobulin 2mg/kg, basiliximab x2 doses, and steroid taper. Maintenance immunosuppression with tacrolimus and mycophenolate.  Tacrolimus goal level 8-10 (12 hour trough).  Infectious prophylaxis with valganciclovir (x 12 weeks) and bactrim (indefinitely).     Transplant coordinator: Codie Kovacs, phone: [phone]  Donor type: DBD  DSA at time of transplant: No  Ureteral stent: No  CMV:  Donor - / Recipient -  EBV:  Donor + / Recipient +  Induction: Intermediate risk protocol induction. cPRA 0.     CAD: PTA ASA 81 mg daily, atorvastatin 40 mg daily. Continue  mg daily and atorvastatin 20 mg daily.    HTN: Continue carvedilol 12.5 mg BID. BP should improve with diuresis as well.    Chest pressure: Post-op. Resolved without intervention. EKG WNL.     Diarrhea: Improved. Discharge on fiber. Bowel regimen changed to PRN.       Fluid/Electrolytes:    Hypervolemia: Wt up ~ 10 kg. Lasix BID on discharge.  Hypomagnesemia: Secondary to tacrolimus. Continue Mg Oxide supplement.  Hypophosphatemia: Discharge on Phospha.    Discharge Disposition   Discharged to home   Condition at discharge: Stable    Primary Care Physician   Kurt King    Physical Exam   Temp: 97.2  F (36.2  C) Temp src: Oral BP: (!) 151/85 Pulse: 84   Resp: 16 SpO2: 96 % O2 Device: None (Room air)    Vitals:    09/30/22 0557 10/01/22 0554 10/02/22 0117   Weight: 80.2 kg (176 lb 12.8 oz) 80.3 kg (177 lb) 79.8 kg (175 lb 14.4 oz)     Vital Signs with Ranges  Temp:  [97.2  F (36.2  C)-99  F (37.2  C)] 97.2  F (36.2  C)  Pulse:  [72-84] 84  Resp:  [16-18] 16  BP: (135-152)/(66-85) 151/85  SpO2:  [96 %-98 %] 96 %  I/O last 3 completed shifts:  In: 300 [P.O.:300]  Out: 2750 [Urine:2750]    General Appearance: in no apparent distress.  General edema  Skin: Warm, dry  Heart: well perfused  Lungs: NLB on RA  Abdomen: soft, non tender.  incision c/d/i, serous drainage on briefs, ecchymosis. No drain.   : no angeles  Extremities: generalized edema +2  Neurologic: A&Ox4, tremor absent.     Consultations This Hospital Stay   NEPHROLOGY KIDNEY/PANCREAS TRANSPLANT ADULT IP CONSULT  NURSING TO CONSULT FOR VASCULAR ACCESS CARE IP CONSULT  NEPHROLOGY KIDNEY/PANCREAS TRANSPLANT ADULT IP CONSULT  SOCIAL WORK IP CONSULT  PHARMACY IP CONSULT  SOT MEDICATION HISTORY IP PHARMACY CONSULT  NUTRITION SERVICES ADULT IP CONSULT  PHYSICAL THERAPY ADULT IP CONSULT  PHARMACY IP CONSULT  PHARMACY IP CONSULT    Time Spent on this Encounter   I have spent greater than 30 minutes on this discharge.    Discharge Orders       Medication Therapy Management Referral      Reason for your hospital stay    Kidney & pancreas transplant     Activity    Your activity upon discharge: No lifting greater than 10 pounds for at least 8 weeks, no driving while taking narcotic pain medications, avoid hot tubs and swimming for at least 3 weeks.     [facility]ult [facility] Follow-up and recommended labs and tests    1. [facility] ([address] [place] [address])  Starting Monday 10/3.  Please bring all medications, lab book, and medication card with you.  Do not take your morning dose of tacrolimus until after labs are drawn.     2. Labs daily in AT[facility] and then every Monday, Wednesday, Friday: BMP, CBC, Mag, Phos, amylase, lipase, tacrolimus level  3. Dr. Gómez, Transplant Clinic, 1-2 weeks.     Monitor and record    weight every day     When to contact your care team    Notify your coordinator if you have pain over your kidney or pancreas, fever greater than 100.5F, redness or drainage at incision, new or worsening blood in urine, or decreased urine output.    Notify your coordinator immediately if you are ever unable to take your immunosuppressive medications for any  reason.    Transplant Coordinator Codie Kovacs [phone]  If you have an urgent need and are unable to reach anyone at the Transplant Center, please call [phone] and ask to have the Pancreas Transplant Surgery fellow paged.     Wound care and dressings    Instructions to care for your wound at home: keep wound clean and dry and may get incision wet in shower but do not soak or scrub.     Walker Order    I, the undersigned, certify that the above prescribed supplies are medically necessary for this patient and is both reasonable and necessary in reference to accepted standards of medical and necessary in reference to accepted standards of medical practice in the treatment of this patient's condition and is not prescribed as a convenience.      Diet    Follow this diet upon discharge: Regular. Avoid undercooked meat or fish.     Discharge Medications   Current Discharge Medication List      START taking these medications    Details   acetaminophen (TYLENOL) 325 MG tablet Take 2 tablets (650 mg) by mouth every 4 hours as needed for pain  Qty: 100 tablet, Refills: 0    Associated Diagnoses: Pancreas replaced by transplant (H)      calcium carbonate-vitamin D (OS-BARBARA WITH D) 500-200 MG-UNIT tablet Take 1 tablet by mouth 2 times daily (with meals)  Qty: 60 tablet, Refills: 11    Associated Diagnoses: Pancreas replaced by transplant (H)      carvedilol (COREG) 12.5 MG tablet Take 1 tablet (12.5 mg) by mouth 2 times daily (with meals)  Qty: 60 tablet, Refills: 11    Associated Diagnoses: Primary hypertension      clotrimazole (MYCELEX) 10 MG lozenge Place 1 lozenge (10 mg) inside cheek 4 times daily for 77 days  Qty: 308 lozenge, Refills: 0    Associated Diagnoses: Pancreas replaced by transplant (H)      furosemide (LASIX) 40 MG tablet Take 1 tablet (40 mg) by mouth 2 times daily  Qty: 30 tablet, Refills: 0    Associated Diagnoses: Other hypervolemia      Guar Gum (FIBER MODULAR, NUTRISOURCE FIBER,) packet Take  1 packet by mouth 2 times daily  Qty: 60 packet, Refills: 0    Associated Diagnoses: Diarrhea, unspecified type      magnesium oxide (MAG-OX) 400 MG tablet Take 1 tablet (400 mg) by mouth 2 times daily  Qty: 60 tablet, Refills: 11    Associated Diagnoses: Hypomagnesemia      mycophenolate (GENERIC EQUIVALENT) 250 MG capsule Take 4 capsules (1,000 mg) by mouth 2 times daily  Qty: 240 capsule, Refills: 11    Associated Diagnoses: Pancreas replaced by transplant (H)      pantoprazole (PROTONIX) 40 MG EC tablet Take 1 tablet (40 mg) by mouth every morning (before breakfast)  Qty: 30 tablet, Refills: 0    Associated Diagnoses: Pancreas replaced by transplant (H)      phosphorus tablet 250 mg (PHOSPHA 250 NEUTRAL) 250 MG per tablet Take 2 tablets (500 mg) by mouth 2 times daily  Qty: 60 tablet, Refills: 1    Associated Diagnoses: Hypophosphatemia      predniSONE (DELTASONE) 10 MG tablet Take 2 tablets (20 mg) by mouth daily for 4 days, THEN 1.5 tablets (15 mg) daily for 7 days, THEN 1 tablet (10 mg) daily for 7 days, THEN 0.5 tablets (5 mg) daily for 7 days.  Qty: 29 tablet, Refills: 0    Associated Diagnoses: Pancreas replaced by transplant (H); Kidney replaced by transplant      sulfamethoxazole-trimethoprim (BACTRIM) 400-80 MG tablet Take 1 tablet by mouth three times a week  Qty: 13 tablet, Refills: 11    Associated Diagnoses: Pancreas replaced by transplant (H); Kidney replaced by transplant      tacrolimus (GENERIC EQUIVALENT) 0.5 MG capsule Take 1 cap by mouth twice daily as directed by Transplant Center for dose changes  Qty: 60 capsule, Refills: 0    Associated Diagnoses: Pancreas replaced by transplant (H); Kidney replaced by transplant      tacrolimus (GENERIC EQUIVALENT) 1 MG capsule Take 1 capsule (1 mg) by mouth 2 times daily  Qty: 60 capsule, Refills: 11    Associated Diagnoses: Pancreas replaced by transplant (H); Kidney replaced by transplant      valGANciclovir (VALCYTE) 450 MG tablet Take 450mg (1 tab)  twice weekly. Increase dose to maximum 900mg daily as directed by Transplant Center.  Qty: 60 tablet, Refills: 2    Associated Diagnoses: Pancreas replaced by transplant (H); Kidney replaced by transplant         CONTINUE these medications which have CHANGED    Details   aspirin (ASA) 325 MG EC tablet Take 1 tablet (325 mg) by mouth daily  Qty: 30 tablet, Refills: 11    Associated Diagnoses: Pancreas replaced by transplant (H)      atorvastatin (LIPITOR) 20 MG tablet Take 1 tablet (20 mg) by mouth every evening  Qty: 30 tablet, Refills: 11    Associated Diagnoses: Type 1 diabetes mellitus with chronic kidney disease on chronic dialysis (H)         CONTINUE these medications which have NOT CHANGED    Details   Insulin Disposable Pump (OMNIPOD DASH 5 PACK PODS) MISC Inject 1 each Subcutaneous every 72 hours      study - methoxy PEG-epoetin beta, MIRCERA, (IDS# 5254) 100 MCG/0.3ML injection 75 mcg      venlafaxine (EFFEXOR-XR) 150 MG 24 hr capsule Take 150 mg by mouth daily  Refills: 3      Wheat Dextrin (BENEFIBER DRINK MIX PO) Take 1 teaspoonful by mouth as needed         STOP taking these medications       aspirin (ASA) 81 MG chewable tablet Comments:   Reason for Stopping:         CALCITRIOL PO Comments:   Reason for Stopping:         calcium acetate (PHOSLO) 667 MG CAPS capsule Comments:   Reason for Stopping:         cholecalciferol (VITAMIN D3) 125 mcg (5000 units) capsule Comments:   Reason for Stopping:         Glucagon 3 MG/DOSE POWD Comments:   Reason for Stopping:         Heparin, Porcine, in NaCl (HEPARIN 1000 UNITS IN 1000 ML NS ADULT CCL) Comments:   Reason for Stopping:         iron sucrose Comments:   Reason for Stopping:         LANTUS VIAL 100 UNIT/ML soln Comments:   Reason for Stopping:         metoprolol succinate ER (TOPROL XL) 25 MG 24 hr tablet Comments:   Reason for Stopping:         midodrine (PROAMATINE) 10 MG tablet Comments:   Reason for Stopping:         NOVOLOG VIAL 100 UNIT/ML soln  Comments:   Reason for Stopping:         pyridOXINE (VITAMIN B6) 100 MG TABS Comments:   Reason for Stopping:         TRULICITY 0.75 MG/0.5ML pen Comments:   Reason for Stopping:         UNKNOWN TO PATIENT Comments:   Reason for Stopping:         UNKNOWN TO PATIENT Comments:   Reason for Stopping:                     Data   Most Recent 3 CBC's:Recent Labs   Lab Test 10/02/22  0543 10/01/22  0550 09/30/22  0540   WBC 9.1 10.2 7.9   HGB 8.0* 8.5* 7.9*   * 104* 102*    268 221     Most Recent 3 BMP's:Recent Labs   Lab Test 10/02/22  0800 10/02/22  0543 10/02/22  0115 10/01/22  0752 10/01/22  0550 09/30/22 2223 09/30/22 2043 09/30/22  0559 09/30/22  0540   NA  --  139  --   --  139  --   --   --  136   POTASSIUM  --  3.7  --   --  4.0  --   --   --  4.0   CHLORIDE  --  105  --   --  106  --   --   --  105   CO2  --  26  --   --  24  --   --   --  24   BUN  --  62.5*  --   --  59.0*  --   --   --  46.6*   CR  --  3.15*  --   --  3.43*  --  3.49*  --  3.26*   ANIONGAP  --  8  --   --  9  --   --   --  7   BARBARA  --  8.4*  --   --  8.8  --   --   --  8.2*   GLC 94 107* 106*   < > 100*   < >  --    < > 101*    < > = values in this interval not displayed.       Lab Results   Component Value Date    LIPASE 72 (H) 09/27/2022    LIPASE 78 (H) 09/26/2022    LIPASE 76 (H) 09/25/2022    LIPASE 289 (H) 09/24/2022    LIPASE 931 (H) 09/23/2022     Lab Results   Component Value Date    AMYLASE 121 (H) 09/27/2022    AMYLASE 99 09/26/2022    AMYLASE 113 (H) 09/25/2022    AMYLASE 297 (H) 09/24/2022    AMYLASE 386 (H) 09/23/2022

## 2022-09-27 NOTE — CONSULTS
Care Management Initial Consult    General Information  Assessment completed with: Patient,    Type of CM/SW Visit: Other (comment) (Post transplant assessment)    Primary Care Provider verified and updated as needed:     Readmission within the last 30 days:           Advance Care Planning: Advance Care Planning Reviewed: no concerns identified          Communication Assessment  Patient's communication style: spoken language (English or Bilingual)    Hearing Difficulty or Deaf: no   Wear Glasses or Blind: no    Cognitive  Cognitive/Neuro/Behavioral: WDL  Level of Consciousness: alert        Mood/Behavior: anxious (at times)          Living Environment:   People in home: child(jaya), adult, spouse  Gallo and 18 year old son  Current living Arrangements: house      Able to return to prior arrangements: yes       Family/Social Support:  Care provided by: self  Provides care for: no one  Marital Status:   , Children  Gallo       Description of Support System: Supportive    Support Assessment: Adequate family and caregiver support    Current Resources:   Patient receiving home care services: No     Community Resources: None  Equipment currently used at home: none  Supplies currently used at home: None    Employment/Financial:  Employment Status: disabled        Financial Concerns: No concerns identified           Lifestyle & Psychosocial Needs:  Social Determinants of Health     Tobacco Use: Medium Risk     Smoking Tobacco Use: Former Smoker     Smokeless Tobacco Use: Never Used   Alcohol Use: Not on file   Financial Resource Strain: Not on file   Food Insecurity: Not on file   Transportation Needs: Not on file   Physical Activity: Not on file   Stress: Not on file   Social Connections: Not on file   Intimate Partner Violence: Not on file   Depression: Not at risk     PHQ-2 Score: 2   Housing Stability: Not on file       Functional Status:  Prior to admission patient needed assistance:   Dependent ADLs::  Independent  Dependent IADLs:: Independent  Assesssment of Functional Status: At functional baseline    Mental Health Status:  Mental Health Status: No Current Concerns       Chemical Dependency Status:  Chemical Dependency Status: No Current Concerns           Additional Information:  Patient underwent DD Kidney/Pancreas transplant on 9/23/2022.  Met with patient to update psychosocial assessment and provide brief education about SW role while inpatient, as well as expectations/requirements and follow up needs post-transplant. SW also provided education about need for compliance with transplant medications, and explained ESRD Medicare benefits and medication coverage under Medicare part B.  Medicare 2728 forms completed and signed by patient.    Patient's post transplant caregivers:  and adult children  If not local, plans for short term stay post transplant:  N/A    Financial concerns/resources provided: None indicated at this time.    Insurance and Medication: Medicare Parts A and B and [employer] through 's employer.  HEALTH BENEFIT: Frequency   ID# [id number] (EFFECTIVE (01/01/22) )      PHARMACY BENEFIT: [employer]                  PROCESSING INFO: ID# [id number] [facility]# [id number] PCN# [id number] BIN# [id number](EFFECTIVE (01/01/22) ).   DEDUCTIBLE (0) & MAX OUT-OF-POCKET ($3,000)  COPAY STRUCTURE:  $10 min & $125 max FOR GENERIC  $25min & $375 max FOR BRAND    TEST CLAIM SPECIALTY #28  MYCOPHENOLATE 250mg (#240/30DS)---$0  PROGRAF 1mg (#180/30DS)--Prior Auth Required  TACROLIMUS 1mg (#60/30DS)--$0  CYCLOSPORINE 100mg (#60/30DS)--$0  VALGANCICLOVIR 450mg (#60/30DS)--Prior Auth Required  VALACYCLOVIR 1gm (#90=30DS) - $0      Mental and chemical health services needed: None indicated at this time.    Education provided by SW: Social Work role inpatient setting, availability of support groups, parking information and provided this writer's contact this information.    Assessment and recommendations and  plan:  Patient was willing to talk to this writer.  She was tearful at times due to the pain and discomfort.  Discussed her feelings and she indicated she was grateful both of her transplanted organs were working well but was currently feeling uncomfortable.  Encouraged patient to contact this writer with any concerns or questions.      Writer informed bedside nurse of patient's feelings.      Phyllis Goldberg, [facility] Psych[facility] CenterSW

## 2022-09-27 NOTE — PLAN OF CARE
Goal Outcome Evaluation:  [id number] Nursing Shift Report:  Eden at start of shift had been moving back and forth between her bed and the chair and standing as well. Did encourage her to take oxycodone along with her scheduled tylenol and in about an hour she did lay down in her bed and get a couple of hours sleep. Did have 1 stool tonight and has been up to the bathroom x3 voiding each time. Ngt to low continuous suction draining a bile green. Seems to be tolerating without difficulty. Just noted an order to pull her angeles and will do by the end of shift. Abdominal incision CDI, wearing binder when up. Finger sticks have been  and on a consistent 0.2u throughout the night. Will continue to monitor and report any significant changes.

## 2022-09-27 NOTE — PLAN OF CARE
Goal Outcome Evaluation:    Hypertensive (150s/70-90s), other vital signs within normal limits. Up with assist of one to bathroom, 2 looser BMs this shift, evening senokot held. 200 mL measured void this shift, one post-void residual measured zero. Blood sugars ranged from 109-122 on insulin drip using algorithm #1.

## 2022-09-28 ENCOUNTER — APPOINTMENT (OUTPATIENT)
Dept: PHYSICAL THERAPY | Facility: CLINIC | Age: 52
DRG: 008 | End: 2022-09-28
Attending: SURGERY
Payer: COMMERCIAL

## 2022-09-28 ENCOUNTER — APPOINTMENT (OUTPATIENT)
Dept: ULTRASOUND IMAGING | Facility: CLINIC | Age: 52
DRG: 008 | End: 2022-09-28
Attending: PHYSICIAN ASSISTANT
Payer: COMMERCIAL

## 2022-09-28 LAB
AMYLASE SERPL-CCNC: 93 U/L (ref 28–100)
ANION GAP SERPL CALCULATED.3IONS-SCNC: 9 MMOL/L (ref 7–15)
APTT PPP: 46 SECONDS (ref 22–38)
BASOPHILS # BLD AUTO: 0 10E3/UL (ref 0–0.2)
BASOPHILS NFR BLD AUTO: 0 %
BK VIRUS IGG ANTIBODY: ABNORMAL
BUN SERPL-MCNC: 36.8 MG/DL (ref 6–20)
CALCIUM SERPL-MCNC: 8.5 MG/DL (ref 8.6–10)
CELL TYPE ALLO: NORMAL
CELL TYPE AUTO: NORMAL
CHANNELSHIFTALLOB1: -2
CHANNELSHIFTALLOB2: 1
CHANNELSHIFTALLOT1: -23
CHANNELSHIFTALLOT2: -22
CHANNELSHIFTAUTOB1: -21
CHANNELSHIFTAUTOB2: -15
CHANNELSHIFTAUTOT1: -19
CHANNELSHIFTAUTOT2: -20
CHLORIDE SERPL-SCNC: 108 MMOL/L (ref 98–107)
CREAT SERPL-MCNC: 3.16 MG/DL (ref 0.51–0.95)
CROSSMATCHDATEALLO: NORMAL
CROSSMATCHDATEAUTO: NORMAL
DEPRECATED HCO3 PLAS-SCNC: 23 MMOL/L (ref 22–29)
DONOR ALLO: NORMAL
DONOR AUTO: NORMAL
DONORCELLDATE ALLO: NORMAL
DONORCELLDATE AUTO: NORMAL
EOSINOPHIL # BLD AUTO: 0.1 10E3/UL (ref 0–0.7)
EOSINOPHIL NFR BLD AUTO: 1 %
ERYTHROCYTE [DISTWIDTH] IN BLOOD BY AUTOMATED COUNT: 17.5 % (ref 10–15)
GFR SERPL CREATININE-BSD FRML MDRD: 17 ML/MIN/1.73M2
GLUCOSE BLDC GLUCOMTR-MCNC: 120 MG/DL (ref 70–99)
GLUCOSE BLDC GLUCOMTR-MCNC: 124 MG/DL (ref 70–99)
GLUCOSE BLDC GLUCOMTR-MCNC: 129 MG/DL (ref 70–99)
GLUCOSE BLDC GLUCOMTR-MCNC: 89 MG/DL (ref 70–99)
GLUCOSE BLDC GLUCOMTR-MCNC: 99 MG/DL (ref 70–99)
GLUCOSE SERPL-MCNC: 96 MG/DL (ref 70–99)
HCT VFR BLD AUTO: 24.5 % (ref 35–47)
HGB BLD-MCNC: 8 G/DL (ref 11.7–15.7)
IMM GRANULOCYTES # BLD: 0.1 10E3/UL
IMM GRANULOCYTES NFR BLD: 2 %
LIPASE SERPL-CCNC: 51 U/L (ref 13–60)
LYMPHOCYTES # BLD AUTO: 0.2 10E3/UL (ref 0.8–5.3)
LYMPHOCYTES NFR BLD AUTO: 4 %
MAGNESIUM SERPL-MCNC: 1.6 MG/DL (ref 1.7–2.3)
MCH RBC QN AUTO: 33.6 PG (ref 26.5–33)
MCHC RBC AUTO-ENTMCNC: 32.7 G/DL (ref 31.5–36.5)
MCV RBC AUTO: 103 FL (ref 78–100)
MONOCYTES # BLD AUTO: 0.6 10E3/UL (ref 0–1.3)
MONOCYTES NFR BLD AUTO: 9 %
NEUTROPHILS # BLD AUTO: 5.7 10E3/UL (ref 1.6–8.3)
NEUTROPHILS NFR BLD AUTO: 84 %
NRBC # BLD AUTO: 0 10E3/UL
NRBC BLD AUTO-RTO: 0 /100
PATH REPORT.COMMENTS IMP SPEC: NORMAL
PATH REPORT.COMMENTS IMP SPEC: NORMAL
PATH REPORT.FINAL DX SPEC: NORMAL
PATH REPORT.GROSS SPEC: NORMAL
PATH REPORT.MICROSCOPIC SPEC OTHER STN: NORMAL
PATH REPORT.RELEVANT HX SPEC: NORMAL
PHOSPHATE SERPL-MCNC: 2.6 MG/DL (ref 2.5–4.5)
PHOTO IMAGE: NORMAL
PLATELET # BLD AUTO: 180 10E3/UL (ref 150–450)
POS CUT OFF ALLO B: >93
POS CUT OFF ALLO T: >79
POS CUT OFF AUTO B: >93
POS CUT OFF AUTO T: >79
POTASSIUM SERPL-SCNC: 4 MMOL/L (ref 3.4–5.3)
RBC # BLD AUTO: 2.38 10E6/UL (ref 3.8–5.2)
RESULT ALLO B1: NORMAL
RESULT ALLO B2: NORMAL
RESULT ALLO T1: NORMAL
RESULT ALLO T2: NORMAL
RESULT AUTO B1: NORMAL
RESULT AUTO B2: NORMAL
RESULT AUTO T1: NORMAL
RESULT AUTO T2: NORMAL
SERUM DATE ALLO B1: NORMAL
SERUM DATE ALLO B2: NORMAL
SERUM DATE ALLO T1: NORMAL
SERUM DATE ALLO T2: NORMAL
SERUM DATE AUTO B1: NORMAL
SERUM DATE AUTO B2: NORMAL
SERUM DATE AUTO T1: NORMAL
SERUM DATE AUTO T2: NORMAL
SODIUM SERPL-SCNC: 140 MMOL/L (ref 136–145)
TACROLIMUS BLD-MCNC: 11.4 UG/L (ref 5–15)
TESTMETHODALLO: NORMAL
TESTMETHODAUTO: NORMAL
TME LAST DOSE: NORMAL H
TME LAST DOSE: NORMAL H
TREATMENT ALLO B1: NORMAL
TREATMENT ALLO B2: NORMAL
TREATMENT ALLO T1: NORMAL
TREATMENT ALLO T2: NORMAL
TREATMENT AUTO B1: NORMAL
TREATMENT AUTO B2: NORMAL
TREATMENT AUTO T1: NORMAL
TREATMENT AUTO T2: NORMAL
UFH PPP CHRO-ACNC: 0.13 IU/ML
WBC # BLD AUTO: 6.7 10E3/UL (ref 4–11)
ZZZCOMMENT ALLOB2: NORMAL

## 2022-09-28 PROCEDURE — 97530 THERAPEUTIC ACTIVITIES: CPT | Mod: GP | Performed by: REHABILITATION PRACTITIONER

## 2022-09-28 PROCEDURE — 99232 SBSQ HOSP IP/OBS MODERATE 35: CPT | Mod: 24 | Performed by: SURGERY

## 2022-09-28 PROCEDURE — 258N000003 HC RX IP 258 OP 636: Performed by: PHYSICIAN ASSISTANT

## 2022-09-28 PROCEDURE — 85520 HEPARIN ASSAY: CPT | Performed by: SURGERY

## 2022-09-28 PROCEDURE — 82150 ASSAY OF AMYLASE: CPT | Performed by: SURGERY

## 2022-09-28 PROCEDURE — 80197 ASSAY OF TACROLIMUS: CPT | Performed by: PHYSICIAN ASSISTANT

## 2022-09-28 PROCEDURE — 36592 COLLECT BLOOD FROM PICC: CPT | Performed by: SURGERY

## 2022-09-28 PROCEDURE — 76776 US EXAM K TRANSPL W/DOPPLER: CPT | Mod: 26 | Performed by: RADIOLOGY

## 2022-09-28 PROCEDURE — 258N000003 HC RX IP 258 OP 636: Performed by: SURGERY

## 2022-09-28 PROCEDURE — 83735 ASSAY OF MAGNESIUM: CPT | Performed by: SURGERY

## 2022-09-28 PROCEDURE — 250N000011 HC RX IP 250 OP 636: Performed by: PHYSICIAN ASSISTANT

## 2022-09-28 PROCEDURE — 120N000011 HC R&B TRANSPLANT UMMC

## 2022-09-28 PROCEDURE — 80048 BASIC METABOLIC PNL TOTAL CA: CPT | Performed by: SURGERY

## 2022-09-28 PROCEDURE — 250N000013 HC RX MED GY IP 250 OP 250 PS 637: Performed by: PHYSICIAN ASSISTANT

## 2022-09-28 PROCEDURE — 250N000011 HC RX IP 250 OP 636: Mod: JB | Performed by: SURGERY

## 2022-09-28 PROCEDURE — 250N000013 HC RX MED GY IP 250 OP 250 PS 637: Performed by: SURGERY

## 2022-09-28 PROCEDURE — 99233 SBSQ HOSP IP/OBS HIGH 50: CPT | Mod: GC | Performed by: INTERNAL MEDICINE

## 2022-09-28 PROCEDURE — 84100 ASSAY OF PHOSPHORUS: CPT | Performed by: SURGERY

## 2022-09-28 PROCEDURE — 83690 ASSAY OF LIPASE: CPT | Performed by: SURGERY

## 2022-09-28 PROCEDURE — 250N000012 HC RX MED GY IP 250 OP 636 PS 637: Performed by: PHYSICIAN ASSISTANT

## 2022-09-28 PROCEDURE — 85730 THROMBOPLASTIN TIME PARTIAL: CPT

## 2022-09-28 PROCEDURE — 250N000013 HC RX MED GY IP 250 OP 250 PS 637

## 2022-09-28 PROCEDURE — 250N000012 HC RX MED GY IP 250 OP 636 PS 637: Performed by: SURGERY

## 2022-09-28 PROCEDURE — 85025 COMPLETE CBC W/AUTO DIFF WBC: CPT | Performed by: PHYSICIAN ASSISTANT

## 2022-09-28 PROCEDURE — 76776 US EXAM K TRANSPL W/DOPPLER: CPT

## 2022-09-28 PROCEDURE — 97116 GAIT TRAINING THERAPY: CPT | Mod: GP | Performed by: REHABILITATION PRACTITIONER

## 2022-09-28 RX ORDER — MAGNESIUM OXIDE 400 MG/1
400 TABLET ORAL 2 TIMES DAILY
Status: DISCONTINUED | OUTPATIENT
Start: 2022-09-28 | End: 2022-10-02 | Stop reason: HOSPADM

## 2022-09-28 RX ORDER — POLYETHYLENE GLYCOL 3350 17 G/17G
17 POWDER, FOR SOLUTION ORAL DAILY PRN
Status: DISCONTINUED | OUTPATIENT
Start: 2022-09-28 | End: 2022-10-02 | Stop reason: HOSPADM

## 2022-09-28 RX ORDER — CARVEDILOL 12.5 MG/1
12.5 TABLET ORAL 2 TIMES DAILY WITH MEALS
Status: DISCONTINUED | OUTPATIENT
Start: 2022-09-28 | End: 2022-10-02 | Stop reason: HOSPADM

## 2022-09-28 RX ORDER — HEPARIN SODIUM 10000 [USP'U]/100ML
500 INJECTION, SOLUTION INTRAVENOUS CONTINUOUS
Status: DISCONTINUED | OUTPATIENT
Start: 2022-09-28 | End: 2022-09-28

## 2022-09-28 RX ORDER — AMOXICILLIN 250 MG
1 CAPSULE ORAL 2 TIMES DAILY PRN
Status: DISCONTINUED | OUTPATIENT
Start: 2022-09-28 | End: 2022-10-02 | Stop reason: HOSPADM

## 2022-09-28 RX ORDER — GUAR GUM
1 PACKET (EA) ORAL 2 TIMES DAILY
Status: DISCONTINUED | OUTPATIENT
Start: 2022-09-28 | End: 2022-10-02 | Stop reason: HOSPADM

## 2022-09-28 RX ADMIN — Medication 1 TABLET: at 19:53

## 2022-09-28 RX ADMIN — SODIUM CHLORIDE 20 MG: 9 INJECTION, SOLUTION INTRAVENOUS at 10:29

## 2022-09-28 RX ADMIN — VENLAFAXINE HYDROCHLORIDE 150 MG: 150 CAPSULE, EXTENDED RELEASE ORAL at 08:28

## 2022-09-28 RX ADMIN — TACROLIMUS 1.5 MG: 1 CAPSULE ORAL at 18:17

## 2022-09-28 RX ADMIN — TACROLIMUS 2 MG: 1 CAPSULE ORAL at 08:28

## 2022-09-28 RX ADMIN — OCTREOTIDE ACETATE 100 MCG: 100 INJECTION, SOLUTION INTRAVENOUS; SUBCUTANEOUS at 00:41

## 2022-09-28 RX ADMIN — MYCOPHENOLATE MOFETIL 1000 MG: 250 CAPSULE ORAL at 08:28

## 2022-09-28 RX ADMIN — MICAFUNGIN 100 MG: 10 INJECTION, POWDER, LYOPHILIZED, FOR SOLUTION INTRAVENOUS at 02:15

## 2022-09-28 RX ADMIN — MAGNESIUM OXIDE TAB 400 MG (241.3 MG ELEMENTAL MG) 400 MG: 400 (241.3 MG) TAB at 19:53

## 2022-09-28 RX ADMIN — OCTREOTIDE ACETATE 100 MCG: 100 INJECTION, SOLUTION INTRAVENOUS; SUBCUTANEOUS at 12:17

## 2022-09-28 RX ADMIN — ATORVASTATIN CALCIUM 20 MG: 20 TABLET, FILM COATED ORAL at 19:53

## 2022-09-28 RX ADMIN — SULFAMETHOXAZOLE AND TRIMETHOPRIM 1 TABLET: 400; 80 TABLET ORAL at 08:26

## 2022-09-28 RX ADMIN — MAGNESIUM OXIDE TAB 400 MG (241.3 MG ELEMENTAL MG) 400 MG: 400 (241.3 MG) TAB at 08:28

## 2022-09-28 RX ADMIN — PREDNISONE 40 MG: 20 TABLET ORAL at 08:28

## 2022-09-28 RX ADMIN — ACETAMINOPHEN 975 MG: 325 TABLET ORAL at 05:36

## 2022-09-28 RX ADMIN — CARVEDILOL 12.5 MG: 12.5 TABLET, FILM COATED ORAL at 18:17

## 2022-09-28 RX ADMIN — PANTOPRAZOLE SODIUM 40 MG: 40 TABLET, DELAYED RELEASE ORAL at 08:26

## 2022-09-28 RX ADMIN — Medication 1 TABLET: at 12:17

## 2022-09-28 RX ADMIN — CARVEDILOL 12.5 MG: 12.5 TABLET, FILM COATED ORAL at 08:26

## 2022-09-28 RX ADMIN — ASPIRIN 325 MG: 325 TABLET, COATED ORAL at 08:26

## 2022-09-28 RX ADMIN — HYDRALAZINE HYDROCHLORIDE 10 MG: 20 INJECTION INTRAMUSCULAR; INTRAVENOUS at 22:20

## 2022-09-28 RX ADMIN — MYCOPHENOLATE MOFETIL 1000 MG: 250 CAPSULE ORAL at 18:17

## 2022-09-28 RX ADMIN — DIBASIC SODIUM PHOSPHATE, MONOBASIC POTASSIUM PHOSPHATE AND MONOBASIC SODIUM PHOSPHATE 250 MG: 852; 155; 130 TABLET ORAL at 08:26

## 2022-09-28 ASSESSMENT — ACTIVITIES OF DAILY LIVING (ADL)
ADLS_ACUITY_SCORE: 22

## 2022-09-28 NOTE — PROGRESS NOTES
Transplant Surgery  Inpatient Daily Progress Note  09/28/2022    Assessment & Plan: Eden Hess is a 52 year old female with a past medical history significant for end stage kidney disease secondary to diabetic nephropathy. Other past medical history includes DM1 c/b neuropathy and retinopathy, hypertension, non obstructive CAD, anxiety and depression, diastolic HFpEF (prior to starting HD, in setting of hypertensive urgency), and TIA (2017). She is now s/p SPK with no ureteral stent on 9/23/22 with Dr. Gómez.     S/p SPK 9/23/22: POD#5. No drain or stent placed.  Pancreas: Lipase 51. Trending down from 931 POD 0. BG . No exogenous insulin given. Stop Octreotide today.     POD 0 US:                                                              Impression:   1.  No evidence of arterial or venous stenosis. Stable, persistent  elevated resistive indices external to the pancreas transplant.  Attention on follow-up.  2.  No abnormal peripancreatic fluid collection.   POD 3 US  IMPRESSION:   1. Normal grayscale appearance of the renal transplant without fluid  collection or hydronephrosis.  2. Decreasing though persistently elevated resistive indices, up to  0.89 previously up to 1.0.  3. Patent renal artery with improved forward diastolic flow compared  to the previous study.    Kidney: Cr 3.2 (3.1<-3.6<-4.1<-4.3<-4.0). Good UO. Repeat US kidney. Tacrolimus level in process. Recheck weight. Hold lasix until US and Tac level reviewed.     POD 0 US:  1. Left lower quadrant renal transplant with increased arterial  resistive indices throughout, nonspecific, though can be seen with  rejection. Attention on follow-up.  2. Patent Doppler evaluation of the renal transplant vasculature.  POD 3 US:  IMPRESSION:   1. Normal grayscale appearance of the renal transplant without fluid  collection or hydronephrosis.  2. Decreasing though persistently elevated resistive indices, up to  0.89 previously up to 1.0.  3.  Patent renal artery with improved forward diastolic flow compared  to the previous study.    Immunosuppression management:   Induction: via intermediate protocol (cPRA 0) with Thymoglobulin 2 mg/kg, basiliximab POD#1 and #5, and steroid pulse with four week taper.   Maintenance:  -MMF 1000 mg BID  -Tacrolimus  2 mg BID. Goal level 8-10. 9/26 level 9.6 (10 hour trough). Check level every 48 hours. Level in process.     Neuro/Psych:   Acute post op pain: Oxycodone prn - minimal use. Change acetaminophen to PRN. Stop IV dilaudid.    Hx Depression: PTA Effexor, restarted.     Hematology:   Anemia of chronic disease: Tranfused 1 unit PRBC on 9/26. No signs of bleeding. Hgb 8, stable.   Vascular prophylaxis: Heparin gtt, continue 500units/hour x 5 days, stop today. ASA increase to 325 mg daily today. Continue  mg daily x 6 months then reduce to 81 mg daily.     Cardiorespiratory:   Hx CAD: PTA ASA 81 mg daily, atorvastatin 40 mg daily.  mg daily and atorvastatin 20 mg daily ordered.  HTN: SBP 150s-180s. Start carvedilol 6.25 mg BID. Will get lasix x 1 dose today.  Chest pressure: resolved without intervention. EKG WNL.     GI/Nutrition:   Diet: NGT removed POD 4. Tolerating CLD. Advance to regular diet.   Diarrhea: Bowel regimen changed to PRN yesterday AM. Start fiber BID today.      Endocrine: See above.     Fluid/Electrolytes:    Hypervolemia: Wt up ~ 10 kg. Stopped MIV. Received Lasix 40 mg IV x 2 doses yesterday. AM we Recheck weight this AM.   Hypokalemia: Resolved. K 4.   Hypomagnesemia: Mg 1.6. Increase Mg Oxide 400 mg BID.   Hypophosphatemia: Resolved with oral phos supplement. No scheduled supplement started. Monitor phos daily.     : Glez removed POD 3. PVR 0.    Infectious disease: No acute issues. Zosyn and Lisseth per protocol.     Prophylaxis: DVT (mechanical, heparin gtt), fall, GI, fungal (Micafungin x 7 days), viral (Valcyte x 12 weeks), pneumocystis (Bactrim), periop (Zosyn  completed)    Disposition: 7A. Possible discharge end of this week.     Medical Decision Making: Medium  Subsequent visit 19285 (moderate level decision making)    SANDEEP/Fellow/Resident Provider: Ladi Lozano PA-C [id number]    Faculty: Caden Felix M.D.    Attestation: I saw and examined the patient with Ladi Lozano PA-C, and the transplant team. I independently reviewed all pertinent laboratory and imaging information and made independent management decisions including immunosuppression adjustment. I agree with the findings and plan as documented in this note.  Caden Felix MD  _________________________________________________________________  Transplant History:   9/23/2022 (Kidney / Pancreas), Postoperative day: 5     Interval History: History is obtained from the patient  Overnight events: Uncomfortable due to edema. Denies nausea. Tolerated clear liquid diet. Continues to have multiple loose stools. Bowel regimen held since yesterday AM. Pain control adequate - not requiring oxycodone.     ROS:   A 10-point review of systems was negative except as noted above.    Meds:    aspirin  325 mg Oral Daily     atorvastatin  20 mg Oral QPM     basiliximab (SIMULECT) infusion  20 mg Intravenous Once     calcium carbonate-vitamin D  1 tablet Oral BID w/meals     carvedilol  12.5 mg Oral BID w/meals     [START ON 9/30/2022] clotrimazole  10 mg Buccal 4x Daily     fiber modular (NUTRISOURCE FIBER)  1 packet Per Feeding Tube BID     insulin aspart  1-7 Units Subcutaneous TID AC     insulin aspart  1-5 Units Subcutaneous At Bedtime     magnesium oxide  400 mg Oral BID     micafungin  100 mg Intravenous Q24H     mycophenolate  1,000 mg Oral BID IS     octreotide  100 mcg Subcutaneous Q12H     pantoprazole  40 mg Oral QAM AC     phosphorus tablet 250 mg  250 mg Oral BID     predniSONE  40 mg Oral Daily    Followed by     [START ON 9/30/2022] predniSONE  20 mg Oral Daily    Followed by     [START ON 10/7/2022] predniSONE  " 15 mg Oral Daily    Followed by     [START ON 10/14/2022] predniSONE  10 mg Oral Daily    Followed by     [START ON 10/21/2022] predniSONE  5 mg Oral Daily     sodium chloride (PF)  3 mL Intravenous Q8H     sulfamethoxazole-trimethoprim  1 tablet Oral Once per day on Mon Wed Fri     tacrolimus  2 mg Oral BID IS     valGANciclovir  450 mg Oral Once per day on Mon Thu     venlafaxine  150 mg Oral Daily       Physical Exam:     Admit Weight: 69.8 kg (153 lb 14.1 oz)    Current vitals:   BP (!) 153/85   Pulse 78   Temp 97.8  F (36.6  C) (Oral)   Resp 16   Ht 1.549 m (5' 1\")   Wt 72.5 kg (159 lb 13.3 oz)   LMP 09/18/2022   SpO2 97%   BMI 30.20 kg/m      Vital sign ranges:    Temp:  [97.4  F (36.3  C)-97.8  F (36.6  C)] 97.8  F (36.6  C)  Pulse:  [76-87] 78  Resp:  [16] 16  BP: (143-187)/(68-88) 153/85  SpO2:  [96 %-98 %] 97 %    General Appearance: in no apparent distress  Skin: Warm, dry  Heart: well perfused  Lungs: NLB on RA  Abdomen: soft, non tender.  incision c/d/i, serous drainage on gown. No drain.   : no angeles  Extremities: BLE edema, BLE NT.   Neurologic: A&Ox3, tremor absent.     Data:   CMP  Recent Labs   Lab 09/28/22  0825 09/28/22  0623 09/27/22  1652 09/27/22  1339 09/27/22  0741 09/27/22  0734 09/23/22  1815 09/23/22  1753 09/23/22  1703 09/22/22  2237 09/22/22  1629   NA  --  140  --   --   --  143   < > 135 132*   < > 132*   POTASSIUM  --  4.0  --  4.1  --  3.2*   < > 3.4* 3.8   < > 3.9   CHLORIDE  --  108*  --   --   --  110*   < >  --   --   --  93*   CO2  --  23  --   --   --  25   < >  --   --   --  29   GLC 89 96   < >  --    < > 97   < > 79 149*   < > 139*   BUN  --  36.8*  --   --   --  35.2*   < >  --   --   --  33.4*   CR  --  3.16*  --   --   --  3.13*   < >  --   --   --  4.00*   GFRESTIMATED  --  17*  --   --   --  17*   < >  --   --   --  13*   BARBARA  --  8.5*  --   --   --  8.9   < >  --   --   --  9.9   ICAW  --   --   --   --   --   --   --  4.6 5.0   < >  --    MAG  --  1.6*  " --   --   --  1.7   < >  --   --   --   --    PHOS  --  2.6  --   --   --  2.0*   < >  --   --   --   --    AMYLASE  --  93  --   --   --  121*   < >  --   --   --   --    LIPASE  --  51  --   --   --  72*   < >  --   --   --   --    ALBUMIN  --   --   --   --   --   --   --   --   --   --  4.1   BILITOTAL  --   --   --   --   --   --   --   --   --   --  0.3   ALKPHOS  --   --   --   --   --   --   --   --   --   --  89   AST  --   --   --   --   --   --   --   --   --   --  29   ALT  --   --   --   --   --   --   --   --   --   --  18    < > = values in this interval not displayed.     CBC  Recent Labs   Lab 09/28/22  0623 09/27/22  1453 09/23/22  1252 09/22/22  1629   HGB 8.0* 8.2*   < > 10.9*   WBC 6.7 9.1   < > 5.5    183   < > 258   A1C  --   --   --  6.9*    < > = values in this interval not displayed.

## 2022-09-28 NOTE — PLAN OF CARE
Goal Outcome Evaluation:     VS: hypertensive 157/78. On room Air   BG: Q 4 H. 99    Pain/Nausea/PRNs: no complaints. Scheduled tylenol given  GI/: one  loose BM this shift. Voiding without difficulty    Nutrition: clear liquid diet    Lines: Left PIV SL. Tripple Lumen internal jugular    Skin: Abdominal incision open to air, no drainage at site   Activity: stand by assist with walker      Plan of Care Reviewed With: patient

## 2022-09-28 NOTE — PROGRESS NOTES
Care Management Follow Up    Length of Stay (days): 6  Expected Discharge Date: 2022  Concerns to be Addressed:   Discharge planning  Patient plan of care discussed at interdisciplinary rounds: Yes  Anticipated Discharge Disposition:  Home  Anticipated Discharge Services:  Home care  Anticipated Discharge DME:  ZUHAIR   Education Provided on the Discharge Plan:  yes  Patient/Family in Agreement with the Plan:  yes      Additional Information:  RNCC followed up on current home care referrals and sent new home care referrals.       Pending:  [facility]   [address] [place] [address]  Ph:[phone] fx:[phone]  : Left message for admissions for return call    [facility]   [address]  [address], [facility] [address] Ph:[phone] Fx:[phone]  : Left message for admissions for return call     [facility]   [address], [facility] [address]  Ph: [phone] fx:[phone]  : Left message for admissions for return call     [facility]  [address] [address]  Ph:[phone]   Fax:[phone]  : sent referral     [facility]  [address] [address]  Ph:[phone]   Fax: [phone]  : sent referral    [facility]  [address] [address]   Phone: [phone]  Fax: [phone]  : sent referral    [facility]  [address] [facility] [address] [address]  Phone: [phone]  Fax: [phone]  : sent referral    [facility]  [address] [facility] [address] [facility] [address] Ph:[phone]   Fax: [phone]  : sent referral    [facility]   [address] [address]  Ph:[phone]  Fax:[phone]  : sent referral    Denied:  [facility]/28: [facility]  [facility]: : Not taking referrals currently  [facility]: denied, no availability  [facility] : denied OON  [facility]: admissions stated out of network  [facility]: : denied, does not do lab draws  [facility]:  denied, cant take lab draws now  [facility]: : denied  [facility]: : denied, no availability  [facility] [place]: : denied due to staffing  [facility]: : ONN      Brodie Zafar, RN BSN  RN Care Coordinator [facility]

## 2022-09-28 NOTE — PLAN OF CARE
"BP (!) 153/74 (BP Location: Left arm)   Pulse 77   Temp 98.3  F (36.8  C) (Oral)   Resp 16   Ht 1.549 m (5' 1\")   Wt 80.5 kg (177 lb 6.4 oz)   LMP 09/18/2022   SpO2 99%   BMI 33.52 kg/m     Neuro: A/Ox3  VS: VSS on RA except elevated BP within parameters  Pain: c/o abdominal discomfort tolerable and declined pain meds  GI: On regular diet and tolerating good. Denies nausea. BMx3 this shift and no bowel meds was given  : Voiding without difficulty  BG 89 and 124 no insuline  Skin: Abdominal incision intact VICTORINA  PIV SL  L CVC line SL  Activity - SBA  Education - Watching videos on my transplant place. Specialty pharmacy education scheduled tomorrow 10:30am and pt will contact her  to ask him to be available  Plan of Care - Continue with POC    "

## 2022-09-28 NOTE — PLAN OF CARE
Goal Outcome Evaluation:    Blood glucose monitoring Q4H ranged from  covered with sliding scale Novolog. BP in the 150s/70s-80s, Coreg started today. Urinary output 950 mL total void during shift and Lasix given per orders. Several small loose bowel movements and Senokot withheld. Up with standby assist. Fair appetite and on clear liquid diet. Triple lumen internal jugular and left PIV.

## 2022-09-28 NOTE — PROGRESS NOTES
Prior Authorization Approval    valGANciclovir 450mg tabs  Date Initiated: 9/28/2022  Date Completed: 9/28/2022  Prior Auth Type: Clinical                Status: Approved    Effective Date: 09/28/2022 - 01/27/2023  Copay: 0.00     Filling Pharmacy: [facility] [place], [place] - [address] [place] [address]    Insurance: [employer] - Phone [phone] Fax [phone]  ID: [id number]  Submitted Via: Fax    Sondra Jung  [facility] Liaison  Ph: [phone] Pager: [phone]

## 2022-09-28 NOTE — PROGRESS NOTES
[id number]:  Neuro: A/Ox3  VS: VSS on RA except elevated BP-above parameters at 1800, scheduled carvedilol given. Will reassess.   Pain: c/o abdominal discomfort tolerable and declined pain meds  GI: On regular diet and tolerating good. Denies nausea. BMx3 this shift and no bowel meds was given  : Voiding without difficulty  BG  no insulin needed  Skin: Abdominal incision intact VICTORINA, abd binder on  PIV SL  L CVC line SL  Activity - SBA  Plan of Care - Continue with POC

## 2022-09-28 NOTE — PROGRESS NOTES
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  Good urine output.  No acute indications for dialysis.   - Baseline Creatinine: ~ TBD   - Proteinuria: Moderate (1-3 grams)   - Date DSA Last Checked: Sep/2022      Latest DSA: Not checked recently due to time from transplant   - BK Viremia: No   - Kidney Tx Biopsy: No    # Pancreas Tx (SPK):    - Pancreatic Exocrine Drainage: Enteric drained     - Blood glucose: Euglycemia      On insulin: No   - HbA1c: Stable      Latest HbA1c: 6.9%   - Pancreatic enzymes: Trend down   - Date DSA Last Checked: Sep/2022  Latest DSA: Not checked recently due to time from transplant   - Pancreas Tx Biopsy: No    # Immunosuppression: Tacrolimus immediate release (goal 8-10) and Mycophenolate mofetil (dose 1000 mg every 12 hours)   - Changes: No    # Infection Prophylaxis:   - PJP: Sulfa/TMP (Bactrim)  - CMV: Valganciclovir (Valcyte)  - Fungal: Micafungin (Mycamine)    # Hypertension: Inadequate control;  Goal BP: < 150/90   - Volume status: Moderately hypervolemic     - Changes: Not at this time    # Anemia in Chronic Renal Disease: Hgb: Stable      SHANEKA: No   - Iron studies: Unknown at this time, but checked with dialysis    # Mineral Bone Disorder:   - Secondary renal hyperparathyroidism; PTH level: Not checked recently        On treatment: None  - Vitamin D; level: Not checked recently        On supplement: Yes  - Calcium; level: Normal        On supplement: Yes  - Phosphorus; level: Low        On supplement: No    # Electrolytes:   - Potassium; level: Normal        On supplement: No  - Magnesium; level: Normal        On supplement: No  - Bicarbonate; level: Normal        On supplement: No    # TIA 2017: No residual deficients     #  Transplant History:  Etiology of Kidney Failure: Diabetes mellitus type 1  Tx: DDKT (SPK)  Transplant: 9/23/2022 (Kidney / Pancreas)  Significant changes in immunosuppression: None  Significant transplant-related complications: None    Recommendations were communicated to the primary team verbally.    Seen and discussed with Dr. Teresa Esquivel MD   Pager: [phone]    Attestation:  This patient has been seen and evaluated by me, Bertrand Moore MD.  I have reviewed the note and agree with plan of care as documented by the fellow.       Interval History   Ms. Hess's creatinine is 3.16 (09/28 0623); Stable.  1.8  L urine output.  Other significant labs/tests/vitals: borderline blood pressure control  No acute events overnight.  No chest pain or shortness of breath.  Positive for leg swelling.  Denies nausea and vomiting.  Bowel movements are regular.  No fever, sweats or chills.    Review of Systems   4 point ROS was obtained and negative except as noted in the Interval History.    MEDICATIONS:    aspirin  325 mg Oral Daily     atorvastatin  20 mg Oral QPM     calcium carbonate-vitamin D  1 tablet Oral BID w/meals     carvedilol  12.5 mg Oral BID w/meals     [START ON 9/30/2022] clotrimazole  10 mg Buccal 4x Daily     fiber modular (NUTRISOURCE FIBER)  1 packet Per Feeding Tube BID     insulin aspart  1-7 Units Subcutaneous TID AC     insulin aspart  1-5 Units Subcutaneous At Bedtime     magnesium oxide  400 mg Oral BID     micafungin  100 mg Intravenous Q24H     mycophenolate  1,000 mg Oral BID IS     pantoprazole  40 mg Oral QAM AC     predniSONE  40 mg Oral Daily    Followed by     [START ON 9/30/2022] predniSONE  20 mg Oral Daily    Followed by     [START ON 10/7/2022] predniSONE  15 mg Oral Daily    Followed by     [START ON 10/14/2022] predniSONE  10 mg Oral Daily    Followed by     [START ON 10/21/2022] predniSONE  5 mg Oral Daily     sodium chloride (PF)  3 mL Intravenous Q8H      "sulfamethoxazole-trimethoprim  1 tablet Oral Once per day on      tacrolimus  1.5 mg Oral BID IS     valGANciclovir  450 mg Oral Once per day on      venlafaxine  150 mg Oral Daily         Physical Exam   Temp  Av.3  F (36.8  C)  Min: 97.7  F (36.5  C)  Max: 98.9  F (37.2  C)  Arterial Line BP  Min: 39/39  Max: 153/64  Arterial Line MAP (mmHg)  Av.6 mmHg  Min: 39 mmHg  Max: 95 mmHg      Pulse  Av.1  Min: 71  Max: 106 Resp  Av.1  Min: 9  Max: 18  SpO2  Av.2 %  Min: 92 %  Max: 100 %    CVP (mmHg):  (abx infusing)BP (!) 153/74 (BP Location: Left arm)   Pulse 77   Temp 98.3  F (36.8  C) (Oral)   Resp 16   Ht 1.549 m (5' 1\")   Wt 80.5 kg (177 lb 6.4 oz)   LMP 2022   SpO2 99%   BMI 33.52 kg/m     Date 22 0700 - 22 0659   Shift 5029-6822 4236-5736 0000-2740 24 Hour Total   INTAKE   I.V. 108.5   108.5   NG/GT 15   15   Shift Total(mL/kg) 123.5(1.6)   123.5(1.6)   OUTPUT   Urine 195   195   Emesis/NG output 50   50   Shift Total(mL/kg) 245(3.18)   245(3.18)   Weight (kg) 77.1 77.1 77.1 77.1      Admit Weight: 69.8 kg (153 lb 14.1 oz)     GENERAL APPEARANCE: alert and no distress  HENT: mouth without ulcers or lesions  RESP: lungs clear to auscultation - no rales, rhonchi or wheezes  CV: regular rhythm, normal rate, no rub, no murmur  EDEMA: 1+ LE edema bilaterally  ABDOMEN: soft, nondistended, nontender, bowel sounds normal  MS: extremities normal - no gross deformities noted, no evidence of inflammation in joints, no muscle tenderness  SKIN: no rash    Data   All labs reviewed by me.  CMP  Recent Labs   Lab 22  1221 22  0825 22  0623 22  0433 22  1652 22  1339 22  0741 22  0734 22  0411 22  0319 22  0502 22  0438 22  2237 22  1629   NA  --   --  140  --   --   --   --  143  --  142  --  139   < > 132*   POTASSIUM  --   --  4.0  --   --  4.1  --  3.2*  --  3.6   < > 3.8   < " > 3.9   CHLORIDE  --   --  108*  --   --   --   --  110*  --  110*  --  106   < > 93*   CO2  --   --  23  --   --   --   --  25  --  25  --  25   < > 29   ANIONGAP  --   --  9  --   --   --   --  8  --  7  --  8   < > 10   * 89 96 99   < >  --    < > 97   < > 134*   < > 119*   < > 139*   BUN  --   --  36.8*  --   --   --   --  35.2*  --  36.8*  --  40.2*   < > 33.4*   CR  --   --  3.16*  --   --   --   --  3.13*  --  3.61*  --  4.14*   < > 4.00*   GFRESTIMATED  --   --  17*  --   --   --   --  17*  --  14*  --  12*   < > 13*   BARBARA  --   --  8.5*  --   --   --   --  8.9  --  8.5*  --  8.6   < > 9.9   MAG  --   --  1.6*  --   --   --   --  1.7  --  1.8  --  1.9   < >  --    PHOS  --   --  2.6  --   --   --   --  2.0*  --  2.7  --  3.2   < >  --    PROTTOTAL  --   --   --   --   --   --   --   --   --   --   --   --   --  6.9   ALBUMIN  --   --   --   --   --   --   --   --   --   --   --   --   --  4.1   BILITOTAL  --   --   --   --   --   --   --   --   --   --   --   --   --  0.3   ALKPHOS  --   --   --   --   --   --   --   --   --   --   --   --   --  89   AST  --   --   --   --   --   --   --   --   --   --   --   --   --  29   ALT  --   --   --   --   --   --   --   --   --   --   --   --   --  18    < > = values in this interval not displayed.     CBC  Recent Labs   Lab 09/28/22  0623 09/27/22  1453 09/27/22  0734 09/26/22  0319   HGB 8.0* 8.2* 9.2* 6.8*   WBC 6.7 9.1 10.6 9.4   RBC 2.38* 2.46* 2.72* 2.01*   HCT 24.5* 25.6* 28.4* 22.1*   * 104* 104* 110*   MCH 33.6* 33.3* 33.8* 33.8*   MCHC 32.7 32.0 32.4 30.8*   RDW 17.5* 17.6* 18.1* 15.4*    183 221 168     INR  Recent Labs   Lab 09/28/22  0623 09/27/22  0734 09/26/22  0319 09/25/22  0405 09/24/22  1400 09/23/22  1820 09/22/22  1629   INR  --   --   --   --   --  1.09 0.91   PTT 46* 35 39* 37   < > 27 26    < > = values in this interval not displayed.     ABG  Recent Labs   Lab 09/23/22  1753 09/23/22  1703 09/23/22  1606 09/23/22  1518    PH 7.36 7.33* 7.34* 7.32*   PCO2 35 38 36 35   PO2 152* 134* 116* 120*   HCO3 20* 20* 19* 18*   O2PER 50.0 50.0 50.0 50.0      Urine Studies  Recent Labs   Lab Test 09/22/22  1714 08/13/20  1110   COLOR Light Yellow Yellow   APPEARANCE Clear Slightly Cloudy   URINEGLC 300 * >499*   URINEBILI Negative Negative   URINEKETONE Negative 5*   SG 1.011 1.016   UBLD Small* Small*   URINEPH 8.0* 5.0   PROTEIN 200 * >499*   NITRITE Negative Negative   LEUKEST Negative Negative   RBCU 1 3*   WBCU 6* 3     No lab results found.  PTH  No lab results found.  Iron Studies  No lab results found.    IMAGING:  All imaging studies reviewed by me.

## 2022-09-29 ENCOUNTER — APPOINTMENT (OUTPATIENT)
Dept: ULTRASOUND IMAGING | Facility: CLINIC | Age: 52
DRG: 008 | End: 2022-09-29
Attending: PHYSICIAN ASSISTANT
Payer: COMMERCIAL

## 2022-09-29 LAB
AMYLASE SERPL-CCNC: 116 U/L (ref 28–100)
ANION GAP SERPL CALCULATED.3IONS-SCNC: 11 MMOL/L (ref 7–15)
APTT PPP: 39 SECONDS (ref 22–38)
BASOPHILS # BLD MANUAL: 0 10E3/UL (ref 0–0.2)
BASOPHILS NFR BLD MANUAL: 0 %
BUN SERPL-MCNC: 40.4 MG/DL (ref 6–20)
CALCIUM SERPL-MCNC: 8.7 MG/DL (ref 8.6–10)
CHLORIDE SERPL-SCNC: 106 MMOL/L (ref 98–107)
CREAT SERPL-MCNC: 3.28 MG/DL (ref 0.51–0.95)
DEPRECATED HCO3 PLAS-SCNC: 23 MMOL/L (ref 22–29)
EOSINOPHIL # BLD MANUAL: 0.4 10E3/UL (ref 0–0.7)
EOSINOPHIL NFR BLD MANUAL: 4 %
ERYTHROCYTE [DISTWIDTH] IN BLOOD BY AUTOMATED COUNT: 17 % (ref 10–15)
GFR SERPL CREATININE-BSD FRML MDRD: 16 ML/MIN/1.73M2
GLUCOSE BLDC GLUCOMTR-MCNC: 141 MG/DL (ref 70–99)
GLUCOSE BLDC GLUCOMTR-MCNC: 146 MG/DL (ref 70–99)
GLUCOSE BLDC GLUCOMTR-MCNC: 97 MG/DL (ref 70–99)
GLUCOSE SERPL-MCNC: 96 MG/DL (ref 70–99)
HCT VFR BLD AUTO: 26.8 % (ref 35–47)
HGB BLD-MCNC: 8.7 G/DL (ref 11.7–15.7)
LIPASE SERPL-CCNC: 86 U/L (ref 13–60)
LYMPHOCYTES # BLD MANUAL: 0.3 10E3/UL (ref 0.8–5.3)
LYMPHOCYTES NFR BLD MANUAL: 3 %
MAGNESIUM SERPL-MCNC: 1.7 MG/DL (ref 1.7–2.3)
MCH RBC QN AUTO: 33.3 PG (ref 26.5–33)
MCHC RBC AUTO-ENTMCNC: 32.5 G/DL (ref 31.5–36.5)
MCV RBC AUTO: 103 FL (ref 78–100)
MONOCYTES # BLD MANUAL: 0.5 10E3/UL (ref 0–1.3)
MONOCYTES NFR BLD MANUAL: 5 %
MYELOCYTES # BLD MANUAL: 0.5 10E3/UL
MYELOCYTES NFR BLD MANUAL: 5 %
NEUTROPHILS # BLD MANUAL: 7.6 10E3/UL (ref 1.6–8.3)
NEUTROPHILS NFR BLD MANUAL: 83 %
NRBC # BLD AUTO: 0.2 10E3/UL
NRBC BLD MANUAL-RTO: 2 %
PHOSPHATE SERPL-MCNC: 2.2 MG/DL (ref 2.5–4.5)
PLAT MORPH BLD: ABNORMAL
PLATELET # BLD AUTO: 241 10E3/UL (ref 150–450)
POTASSIUM SERPL-SCNC: 3.6 MMOL/L (ref 3.4–5.3)
RBC # BLD AUTO: 2.61 10E6/UL (ref 3.8–5.2)
RBC MORPH BLD: ABNORMAL
SODIUM SERPL-SCNC: 140 MMOL/L (ref 136–145)
TACROLIMUS BLD-MCNC: 12.8 UG/L (ref 5–15)
TME LAST DOSE: NORMAL H
TME LAST DOSE: NORMAL H
WBC # BLD AUTO: 9.2 10E3/UL (ref 4–11)

## 2022-09-29 PROCEDURE — 82435 ASSAY OF BLOOD CHLORIDE: CPT | Performed by: SURGERY

## 2022-09-29 PROCEDURE — 99233 SBSQ HOSP IP/OBS HIGH 50: CPT | Mod: GC | Performed by: INTERNAL MEDICINE

## 2022-09-29 PROCEDURE — 80197 ASSAY OF TACROLIMUS: CPT | Performed by: SURGERY

## 2022-09-29 PROCEDURE — 76776 US EXAM K TRANSPL W/DOPPLER: CPT | Mod: 26 | Performed by: RADIOLOGY

## 2022-09-29 PROCEDURE — 76776 US EXAM K TRANSPL W/DOPPLER: CPT

## 2022-09-29 PROCEDURE — 250N000013 HC RX MED GY IP 250 OP 250 PS 637

## 2022-09-29 PROCEDURE — 250N000013 HC RX MED GY IP 250 OP 250 PS 637: Performed by: SURGERY

## 2022-09-29 PROCEDURE — 250N000013 HC RX MED GY IP 250 OP 250 PS 637: Performed by: PHYSICIAN ASSISTANT

## 2022-09-29 PROCEDURE — 120N000011 HC R&B TRANSPLANT UMMC

## 2022-09-29 PROCEDURE — 85730 THROMBOPLASTIN TIME PARTIAL: CPT | Performed by: SURGERY

## 2022-09-29 PROCEDURE — 85014 HEMATOCRIT: CPT | Performed by: PHYSICIAN ASSISTANT

## 2022-09-29 PROCEDURE — 85007 BL SMEAR W/DIFF WBC COUNT: CPT | Performed by: PHYSICIAN ASSISTANT

## 2022-09-29 PROCEDURE — 250N000012 HC RX MED GY IP 250 OP 636 PS 637: Performed by: SURGERY

## 2022-09-29 PROCEDURE — 250N000011 HC RX IP 250 OP 636: Performed by: SURGERY

## 2022-09-29 PROCEDURE — 250N000012 HC RX MED GY IP 250 OP 636 PS 637: Performed by: PHYSICIAN ASSISTANT

## 2022-09-29 PROCEDURE — 84100 ASSAY OF PHOSPHORUS: CPT | Performed by: SURGERY

## 2022-09-29 PROCEDURE — 83735 ASSAY OF MAGNESIUM: CPT | Performed by: SURGERY

## 2022-09-29 PROCEDURE — 258N000003 HC RX IP 258 OP 636: Performed by: SURGERY

## 2022-09-29 PROCEDURE — 36592 COLLECT BLOOD FROM PICC: CPT | Performed by: SURGERY

## 2022-09-29 PROCEDURE — 82150 ASSAY OF AMYLASE: CPT | Performed by: SURGERY

## 2022-09-29 PROCEDURE — 250N000011 HC RX IP 250 OP 636: Performed by: PHYSICIAN ASSISTANT

## 2022-09-29 PROCEDURE — 83690 ASSAY OF LIPASE: CPT | Performed by: SURGERY

## 2022-09-29 RX ORDER — FUROSEMIDE 10 MG/ML
60 INJECTION INTRAMUSCULAR; INTRAVENOUS ONCE
Status: DISCONTINUED | OUTPATIENT
Start: 2022-09-29 | End: 2022-09-29

## 2022-09-29 RX ORDER — FUROSEMIDE 10 MG/ML
80 INJECTION INTRAMUSCULAR; INTRAVENOUS ONCE
Status: COMPLETED | OUTPATIENT
Start: 2022-09-29 | End: 2022-09-29

## 2022-09-29 RX ORDER — TACROLIMUS 0.5 MG/1
0.5 CAPSULE ORAL ONCE
Status: COMPLETED | OUTPATIENT
Start: 2022-09-29 | End: 2022-09-29

## 2022-09-29 RX ORDER — TACROLIMUS 1 MG/1
1 CAPSULE ORAL
Status: DISCONTINUED | OUTPATIENT
Start: 2022-09-30 | End: 2022-10-02 | Stop reason: HOSPADM

## 2022-09-29 RX ADMIN — MYCOPHENOLATE MOFETIL 1000 MG: 250 CAPSULE ORAL at 17:36

## 2022-09-29 RX ADMIN — VALGANCICLOVIR 450 MG: 450 TABLET, FILM COATED ORAL at 07:55

## 2022-09-29 RX ADMIN — HYDRALAZINE HYDROCHLORIDE 10 MG: 20 INJECTION INTRAMUSCULAR; INTRAVENOUS at 06:11

## 2022-09-29 RX ADMIN — TACROLIMUS 0.5 MG: 0.5 CAPSULE ORAL at 17:36

## 2022-09-29 RX ADMIN — Medication 1 TABLET: at 19:48

## 2022-09-29 RX ADMIN — INSULIN ASPART 1 UNITS: 100 INJECTION, SOLUTION INTRAVENOUS; SUBCUTANEOUS at 17:23

## 2022-09-29 RX ADMIN — MAGNESIUM OXIDE TAB 400 MG (241.3 MG ELEMENTAL MG) 400 MG: 400 (241.3 MG) TAB at 07:50

## 2022-09-29 RX ADMIN — Medication 1 TABLET: at 11:46

## 2022-09-29 RX ADMIN — CARVEDILOL 12.5 MG: 12.5 TABLET, FILM COATED ORAL at 07:50

## 2022-09-29 RX ADMIN — FUROSEMIDE 80 MG: 10 INJECTION, SOLUTION INTRAMUSCULAR; INTRAVENOUS at 07:55

## 2022-09-29 RX ADMIN — PREDNISONE 40 MG: 20 TABLET ORAL at 07:50

## 2022-09-29 RX ADMIN — CARVEDILOL 12.5 MG: 12.5 TABLET, FILM COATED ORAL at 17:36

## 2022-09-29 RX ADMIN — TACROLIMUS 1.5 MG: 1 CAPSULE ORAL at 07:50

## 2022-09-29 RX ADMIN — VENLAFAXINE HYDROCHLORIDE 150 MG: 150 CAPSULE, EXTENDED RELEASE ORAL at 07:50

## 2022-09-29 RX ADMIN — MYCOPHENOLATE MOFETIL 1000 MG: 250 CAPSULE ORAL at 07:55

## 2022-09-29 RX ADMIN — ASPIRIN 325 MG: 325 TABLET, COATED ORAL at 07:50

## 2022-09-29 RX ADMIN — MICAFUNGIN 100 MG: 10 INJECTION, POWDER, LYOPHILIZED, FOR SOLUTION INTRAVENOUS at 02:01

## 2022-09-29 RX ADMIN — PANTOPRAZOLE SODIUM 40 MG: 40 TABLET, DELAYED RELEASE ORAL at 07:50

## 2022-09-29 RX ADMIN — MAGNESIUM OXIDE TAB 400 MG (241.3 MG ELEMENTAL MG) 400 MG: 400 (241.3 MG) TAB at 19:48

## 2022-09-29 RX ADMIN — ATORVASTATIN CALCIUM 20 MG: 20 TABLET, FILM COATED ORAL at 19:48

## 2022-09-29 ASSESSMENT — ACTIVITIES OF DAILY LIVING (ADL)
ADLS_ACUITY_SCORE: 22
ADLS_ACUITY_SCORE: 21
ADLS_ACUITY_SCORE: 22
ADLS_ACUITY_SCORE: 21
ADLS_ACUITY_SCORE: 22
ADLS_ACUITY_SCORE: 21
ADLS_ACUITY_SCORE: 22
ADLS_ACUITY_SCORE: 22
ADLS_ACUITY_SCORE: 21
ADLS_ACUITY_SCORE: 21

## 2022-09-29 NOTE — PROGRESS NOTES
Care Management Follow Up    Length of Stay (days): 7  Expected Discharge Date: 09/29/2022  Concerns to be Addressed:   Discharge planning  Patient plan of care discussed at interdisciplinary rounds: Yes  Anticipated Discharge Disposition:  Home  Anticipated Discharge Services:  Home care  Anticipated Discharge DME:  [facility]   Education Provided on the Discharge Plan:  yes  Patient/Family in Agreement with the Plan:  yes      Additional Information:  RNCC spoke with pt due to most home care referrals being rejected. Pt states that she would like home care if able but that she would be willing to drive to clinic twice weekly to get lab draws if needed. RNCC reached out to OP transplant CC to assist with this if pt does not get accepted to a home care agency. RNCC followed up on current home care referrals and sent new home care referrals. RNCC to notify OP transplant CC if no home care agency at discharge.       Pending:  [facility]   [address], [facility] [address] Ph:[phone]   Fax:[phone]    [facility]   [address], [facility] [address] Ph:[phone] Fx:[phone]  9/28: Reviewing, will call back with decision    [facility]   [address] [facility] [address] Ph:[phone]   Fax:[phone]    [facility]   [address] [facility] [address]  Ph:[phone]  Fax: [phone]    [facility]   [address] [facility] [address]  Ph:[phone]   Fax:[phone]    [facility]   [address] [facility] [address]  Ph:[phone]   Fax:[phone]    [facility]  [address] [facility] [address] Ph:[phone]   Fax: [phone]  9/28: sent referral  9/29: Left message for admissions for call back    [facility]   [address] [place] [address]  Ph:[phone]  Fax:[phone]  9/28: sent referral  9/29: Left message for admissions for call  back      Denied:  [facility] L/28: OON  [facility]  [facility]: : Not taking referrals currently  [facility]: denied, no availability  [facility] : denied OON  [facility]: admissions stated out of network  [facility]: : denied, does not do lab draws  [facility]:  denied, cant take lab draws now  [facility]: : denied  [facility]: : denied, no availability  [facility]: : denied due to staffing  [facility]: : ONN  [facility]: : denied, OON  [facility]: : SN does not do lab draws  [facility]: : Thuy, admissions, stating not taking any new referrals.  [facility]: : denied, OON  [facility]: : denied  [facility]: : denied  [facility]: : denied OON  [facility]  : denied, not able to do lab draws  [facility]: no labs through rest of )    Brodie Zafar, RN BSN  RN Care Coordinator [facility]

## 2022-09-29 NOTE — PLAN OF CARE
"Shift: 1937-1682  BP (!) 168/84 (BP Location: Left arm)   Pulse 82   Temp 97.8  F (36.6  C) (Oral)   Resp 18   Ht 1.549 m (5' 1\")   Wt 80.5 kg (177 lb 6.4 oz)   LMP 09/18/2022   SpO2 97%   BMI 33.52 kg/m       VS: hypertensive, 171/85 prn hydralazine given  Given. on room Air    Pain/nausea:no complaints this shift   GI/: 1 small loose BM this shift. urinating without difficulties    Activity: stand by assist to bathroom w/ walker    Plan of Care Reviewed With: patient                 "

## 2022-09-29 NOTE — PROGRESS NOTES
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  Good urine output.  No acute indications for dialysis.   - Baseline Creatinine: ~ TBD   - Proteinuria: Moderate (1-3 grams)   - Date DSA Last Checked: Sep/2022      Latest DSA: Not checked recently due to time from transplant   - BK Viremia: No   - Kidney Tx Biopsy: No    # Pancreas Tx (SPK):    - Pancreatic Exocrine Drainage: Enteric drained     - Blood glucose: Euglycemia      On insulin: No   - HbA1c: Stable      Latest HbA1c: 6.9%   - Pancreatic enzymes: Trend down   - Date DSA Last Checked: Sep/2022  Latest DSA: Not checked recently due to time from transplant   - Pancreas Tx Biopsy: No    # Immunosuppression: Tacrolimus immediate release (goal 8-10) and Mycophenolate mofetil (dose 1000 mg every 12 hours)   - Changes: No    # Infection Prophylaxis:   - PJP: Sulfa/TMP (Bactrim)  - CMV: Valganciclovir (Valcyte)  - Fungal: Micafungin (Mycamine)    # Hypertension: Inadequate control;  Goal BP: < 150/90   - Volume status: Moderately hypervolemic     - Changes: Not at this time    # Anemia in Chronic Renal Disease: Hgb: Stable      SHANEKA: No   - Iron studies: Unknown at this time, but checked with dialysis    # Mineral Bone Disorder:   - Secondary renal hyperparathyroidism; PTH level: Not checked recently        On treatment: None  - Vitamin D; level: Not checked recently        On supplement: Yes  - Calcium; level: Normal        On supplement: Yes  - Phosphorus; level: Low        On supplement: No    # Electrolytes:   - Potassium; level: Normal        On supplement: No  - Magnesium; level: Normal        On supplement:  No  - Bicarbonate; level: Normal        On supplement: No    # TIA 2017: No residual deficients     # Transplant History:  Etiology of Kidney Failure: Diabetes mellitus type 1  Tx: DDKT (SPK)  Transplant: 9/23/2022 (Kidney / Pancreas)  Significant changes in immunosuppression: None  Significant transplant-related complications: None    Recommendations were communicated to the primary team verbally.    Seen and discussed with Dr. Teresa Esquivel MD   Pager: [phone]    Attestation:  This patient has been seen and evaluated by me, Bertrand Moore MD.  I have reviewed the note and agree with plan of care as documented by the fellow.     Interval History   Ms. Hess's creatinine is 3.28 (09/29 0602); Trend up.  900 ml urine output.  Other significant labs/tests/vitals: elevated blood pressure  No acute events overnight.  No chest pain or shortness of breath.  Positive for leg swelling.  Denies nausea and vomiting.  Bowel movements are present.  No fever, sweats or chills.      Review of Systems   4 point ROS was obtained and negative except as noted in the Interval History.    MEDICATIONS:    aspirin  325 mg Oral Daily     atorvastatin  20 mg Oral QPM     calcium carbonate-vitamin D  1 tablet Oral BID w/meals     carvedilol  12.5 mg Oral BID w/meals     [START ON 9/30/2022] clotrimazole  10 mg Buccal 4x Daily     fiber modular (NUTRISOURCE FIBER)  1 packet Per Feeding Tube BID     insulin aspart  1-7 Units Subcutaneous TID AC     insulin aspart  1-5 Units Subcutaneous At Bedtime     magnesium oxide  400 mg Oral BID     mycophenolate  1,000 mg Oral BID IS     pantoprazole  40 mg Oral QAM AC     [START ON 9/30/2022] predniSONE  20 mg Oral Daily    Followed by     [START ON 10/7/2022] predniSONE  15 mg Oral Daily    Followed by     [START ON 10/14/2022] predniSONE  10 mg Oral Daily    Followed by     [START ON 10/21/2022] predniSONE  5 mg Oral Daily     sodium chloride (PF)  3 mL Intravenous Q8H      "sulfamethoxazole-trimethoprim  1 tablet Oral Once per day on      tacrolimus  0.5 mg Oral Once     [START ON 2022] tacrolimus  1 mg Oral BID IS     valGANciclovir  450 mg Oral Once per day on      venlafaxine  150 mg Oral Daily         Physical Exam   Temp  Av.3  F (36.8  C)  Min: 97.7  F (36.5  C)  Max: 98.9  F (37.2  C)  Arterial Line BP  Min: 39/39  Max: 153/64  Arterial Line MAP (mmHg)  Av.6 mmHg  Min: 39 mmHg  Max: 95 mmHg      Pulse  Av.1  Min: 71  Max: 106 Resp  Av.1  Min: 9  Max: 18  SpO2  Av.2 %  Min: 92 %  Max: 100 %    CVP (mmHg):  (abx infusing)BP (!) 149/73 (BP Location: Left arm)   Pulse 72   Temp 97.4  F (36.3  C) (Oral)   Resp 18   Ht 1.549 m (5' 1\")   Wt 81.3 kg (179 lb 3.2 oz)   LMP 2022   SpO2 98%   BMI 33.86 kg/m     Date 22 0700 - 22 0659   Shift 3156-9388 1389-1043 6706-4136 24 Hour Total   INTAKE   I.V. 108.5   108.5   NG/GT 15   15   Shift Total(mL/kg) 123.5(1.6)   123.5(1.6)   OUTPUT   Urine 195   195   Emesis/NG output 50   50   Shift Total(mL/kg) 245(3.18)   245(3.18)   Weight (kg) 77.1 77.1 77.1 77.1      Admit Weight: 69.8 kg (153 lb 14.1 oz)     GENERAL APPEARANCE: alert and no distress  HENT: mouth without ulcers or lesions  RESP: lungs clear to auscultation - no rales, rhonchi or wheezes  CV: regular rhythm, normal rate, no rub, no murmur  EDEMA: 1+ LE edema bilaterally  ABDOMEN: soft, nondistended, nontender, bowel sounds normal  MS: extremities normal - no gross deformities noted, no evidence of inflammation in joints, no muscle tenderness  SKIN: no rash    Data   All labs reviewed by me.  CMP  Recent Labs   Lab 22  0746 22  0602 22  2156 22  1731 22  0825 22  0623 22  1652 22  1339 22  0741 22  0734 22  0411 22  0319 22  2237 22  1629   NA  --  140  --   --   --  140  --   --   --  143  --  142   < > 132*   POTASSIUM  --  3.6  " --   --   --  4.0  --  4.1  --  3.2*  --  3.6   < > 3.9   CHLORIDE  --  106  --   --   --  108*  --   --   --  110*  --  110*   < > 93*   CO2  --  23  --   --   --  23  --   --   --  25  --  25   < > 29   ANIONGAP  --  11  --   --   --  9  --   --   --  8  --  7   < > 10   GLC 97 96 120* 129*   < > 96   < >  --    < > 97   < > 134*   < > 139*   BUN  --  40.4*  --   --   --  36.8*  --   --   --  35.2*  --  36.8*   < > 33.4*   CR  --  3.28*  --   --   --  3.16*  --   --   --  3.13*  --  3.61*   < > 4.00*   GFRESTIMATED  --  16*  --   --   --  17*  --   --   --  17*  --  14*   < > 13*   BARBARA  --  8.7  --   --   --  8.5*  --   --   --  8.9  --  8.5*   < > 9.9   MAG  --  1.7  --   --   --  1.6*  --   --   --  1.7  --  1.8   < >  --    PHOS  --  2.2*  --   --   --  2.6  --   --   --  2.0*  --  2.7   < >  --    PROTTOTAL  --   --   --   --   --   --   --   --   --   --   --   --   --  6.9   ALBUMIN  --   --   --   --   --   --   --   --   --   --   --   --   --  4.1   BILITOTAL  --   --   --   --   --   --   --   --   --   --   --   --   --  0.3   ALKPHOS  --   --   --   --   --   --   --   --   --   --   --   --   --  89   AST  --   --   --   --   --   --   --   --   --   --   --   --   --  29   ALT  --   --   --   --   --   --   --   --   --   --   --   --   --  18    < > = values in this interval not displayed.     CBC  Recent Labs   Lab 09/29/22  0602 09/28/22  0623 09/27/22  1453 09/27/22  0734   HGB 8.7* 8.0* 8.2* 9.2*   WBC 9.2 6.7 9.1 10.6   RBC 2.61* 2.38* 2.46* 2.72*   HCT 26.8* 24.5* 25.6* 28.4*   * 103* 104* 104*   MCH 33.3* 33.6* 33.3* 33.8*   MCHC 32.5 32.7 32.0 32.4   RDW 17.0* 17.5* 17.6* 18.1*    180 183 221     INR  Recent Labs   Lab 09/29/22  0602 09/28/22  0623 09/27/22  0734 09/26/22  0319 09/24/22  1400 09/23/22  1820 09/22/22  1629   INR  --   --   --   --   --  1.09 0.91   PTT 39* 46* 35 39*   < > 27 26    < > = values in this interval not displayed.     ABG  Recent Labs   Lab  09/23/22  1753 09/23/22  1703 09/23/22  1606 09/23/22  1518   PH 7.36 7.33* 7.34* 7.32*   PCO2 35 38 36 35   PO2 152* 134* 116* 120*   HCO3 20* 20* 19* 18*   O2PER 50.0 50.0 50.0 50.0      Urine Studies  Recent Labs   Lab Test 09/22/22  1714 08/13/20  1110   COLOR Light Yellow Yellow   APPEARANCE Clear Slightly Cloudy   URINEGLC 300 * >499*   URINEBILI Negative Negative   URINEKETONE Negative 5*   SG 1.011 1.016   UBLD Small* Small*   URINEPH 8.0* 5.0   PROTEIN 200 * >499*   NITRITE Negative Negative   LEUKEST Negative Negative   RBCU 1 3*   WBCU 6* 3     No lab results found.  PTH  No lab results found.  Iron Studies  No lab results found.    IMAGING:  All imaging studies reviewed by me.

## 2022-09-29 NOTE — PLAN OF CARE
Vitals: stable room air, 150/85.   Blood glucose: achs 97, 146. Sliding scale.   Pain/nausea: denies.   Diet: regular, fair appetite.   Lines: PIV L SL. AVF R WDL. TLIJ SL.   : voiding well, lasix x 1.   GI: bm x 1.   Drains: NA  Skin: incision staples VICTORINA.   Mobility: up ad ricci.   Education : med/lab book updated. Needs SPT. MTP Started.

## 2022-09-30 ENCOUNTER — TELEPHONE (OUTPATIENT)
Dept: TRANSPLANT | Facility: CLINIC | Age: 52
End: 2022-09-30

## 2022-09-30 ENCOUNTER — APPOINTMENT (OUTPATIENT)
Dept: PHYSICAL THERAPY | Facility: CLINIC | Age: 52
DRG: 008 | End: 2022-09-30
Attending: SURGERY
Payer: COMMERCIAL

## 2022-09-30 LAB
AMYLASE SERPL-CCNC: 101 U/L (ref 28–100)
ANION GAP SERPL CALCULATED.3IONS-SCNC: 7 MMOL/L (ref 7–15)
BASOPHILS # BLD MANUAL: 0 10E3/UL (ref 0–0.2)
BASOPHILS NFR BLD MANUAL: 0 %
BUN SERPL-MCNC: 46.6 MG/DL (ref 6–20)
CALCIUM SERPL-MCNC: 8.2 MG/DL (ref 8.6–10)
CHLORIDE SERPL-SCNC: 105 MMOL/L (ref 98–107)
CREAT SERPL-MCNC: 3.26 MG/DL (ref 0.51–0.95)
CREAT SERPL-MCNC: 3.49 MG/DL (ref 0.51–0.95)
DEPRECATED HCO3 PLAS-SCNC: 24 MMOL/L (ref 22–29)
EOSINOPHIL # BLD MANUAL: 0.1 10E3/UL (ref 0–0.7)
EOSINOPHIL NFR BLD MANUAL: 1 %
ERYTHROCYTE [DISTWIDTH] IN BLOOD BY AUTOMATED COUNT: 17.2 % (ref 10–15)
GFR SERPL CREATININE-BSD FRML MDRD: 15 ML/MIN/1.73M2
GFR SERPL CREATININE-BSD FRML MDRD: 16 ML/MIN/1.73M2
GLUCOSE BLDC GLUCOMTR-MCNC: 102 MG/DL (ref 70–99)
GLUCOSE BLDC GLUCOMTR-MCNC: 108 MG/DL (ref 70–99)
GLUCOSE BLDC GLUCOMTR-MCNC: 112 MG/DL (ref 70–99)
GLUCOSE BLDC GLUCOMTR-MCNC: 128 MG/DL (ref 70–99)
GLUCOSE BLDC GLUCOMTR-MCNC: 142 MG/DL (ref 70–99)
GLUCOSE BLDC GLUCOMTR-MCNC: 99 MG/DL (ref 70–99)
GLUCOSE SERPL-MCNC: 101 MG/DL (ref 70–99)
HCT VFR BLD AUTO: 23.8 % (ref 35–47)
HGB BLD-MCNC: 7.9 G/DL (ref 11.7–15.7)
LIPASE SERPL-CCNC: 75 U/L (ref 13–60)
LYMPHOCYTES # BLD MANUAL: 0 10E3/UL (ref 0.8–5.3)
LYMPHOCYTES NFR BLD MANUAL: 0 %
MAGNESIUM SERPL-MCNC: 1.7 MG/DL (ref 1.7–2.3)
MCH RBC QN AUTO: 33.8 PG (ref 26.5–33)
MCHC RBC AUTO-ENTMCNC: 33.2 G/DL (ref 31.5–36.5)
MCV RBC AUTO: 102 FL (ref 78–100)
METAMYELOCYTES # BLD MANUAL: 0.1 10E3/UL
METAMYELOCYTES NFR BLD MANUAL: 1 %
MONOCYTES # BLD MANUAL: 0.7 10E3/UL (ref 0–1.3)
MONOCYTES NFR BLD MANUAL: 9 %
NEUTROPHILS # BLD MANUAL: 7 10E3/UL (ref 1.6–8.3)
NEUTROPHILS NFR BLD MANUAL: 89 %
PHOSPHATE SERPL-MCNC: 2.4 MG/DL (ref 2.5–4.5)
PLAT MORPH BLD: ABNORMAL
PLATELET # BLD AUTO: 221 10E3/UL (ref 150–450)
POTASSIUM SERPL-SCNC: 4 MMOL/L (ref 3.4–5.3)
RBC # BLD AUTO: 2.34 10E6/UL (ref 3.8–5.2)
RBC MORPH BLD: ABNORMAL
SODIUM SERPL-SCNC: 136 MMOL/L (ref 136–145)
TACROLIMUS BLD-MCNC: 8.4 UG/L (ref 5–15)
TME LAST DOSE: NORMAL H
TME LAST DOSE: NORMAL H
WBC # BLD AUTO: 7.9 10E3/UL (ref 4–11)

## 2022-09-30 PROCEDURE — 250N000013 HC RX MED GY IP 250 OP 250 PS 637

## 2022-09-30 PROCEDURE — 250N000013 HC RX MED GY IP 250 OP 250 PS 637: Performed by: PHYSICIAN ASSISTANT

## 2022-09-30 PROCEDURE — 83690 ASSAY OF LIPASE: CPT | Performed by: SURGERY

## 2022-09-30 PROCEDURE — 85007 BL SMEAR W/DIFF WBC COUNT: CPT | Performed by: PHYSICIAN ASSISTANT

## 2022-09-30 PROCEDURE — 250N000012 HC RX MED GY IP 250 OP 636 PS 637: Performed by: SURGERY

## 2022-09-30 PROCEDURE — 80197 ASSAY OF TACROLIMUS: CPT | Performed by: PHYSICIAN ASSISTANT

## 2022-09-30 PROCEDURE — 84100 ASSAY OF PHOSPHORUS: CPT | Performed by: SURGERY

## 2022-09-30 PROCEDURE — 83735 ASSAY OF MAGNESIUM: CPT | Performed by: SURGERY

## 2022-09-30 PROCEDURE — 250N000012 HC RX MED GY IP 250 OP 636 PS 637: Performed by: PHYSICIAN ASSISTANT

## 2022-09-30 PROCEDURE — 36592 COLLECT BLOOD FROM PICC: CPT | Performed by: PHYSICIAN ASSISTANT

## 2022-09-30 PROCEDURE — 250N000011 HC RX IP 250 OP 636: Performed by: PHYSICIAN ASSISTANT

## 2022-09-30 PROCEDURE — 80048 BASIC METABOLIC PNL TOTAL CA: CPT | Performed by: SURGERY

## 2022-09-30 PROCEDURE — 85027 COMPLETE CBC AUTOMATED: CPT | Performed by: PHYSICIAN ASSISTANT

## 2022-09-30 PROCEDURE — 97530 THERAPEUTIC ACTIVITIES: CPT | Mod: GP

## 2022-09-30 PROCEDURE — 82150 ASSAY OF AMYLASE: CPT | Performed by: SURGERY

## 2022-09-30 PROCEDURE — 250N000013 HC RX MED GY IP 250 OP 250 PS 637: Performed by: SURGERY

## 2022-09-30 PROCEDURE — 36592 COLLECT BLOOD FROM PICC: CPT

## 2022-09-30 PROCEDURE — 99232 SBSQ HOSP IP/OBS MODERATE 35: CPT | Mod: 24 | Performed by: SURGERY

## 2022-09-30 PROCEDURE — 120N000011 HC R&B TRANSPLANT UMMC

## 2022-09-30 PROCEDURE — 82565 ASSAY OF CREATININE: CPT

## 2022-09-30 PROCEDURE — 99233 SBSQ HOSP IP/OBS HIGH 50: CPT | Mod: GC | Performed by: INTERNAL MEDICINE

## 2022-09-30 PROCEDURE — 97116 GAIT TRAINING THERAPY: CPT | Mod: GP

## 2022-09-30 RX ORDER — FUROSEMIDE 10 MG/ML
40 INJECTION INTRAMUSCULAR; INTRAVENOUS ONCE
Status: COMPLETED | OUTPATIENT
Start: 2022-09-30 | End: 2022-09-30

## 2022-09-30 RX ADMIN — ATORVASTATIN CALCIUM 20 MG: 20 TABLET, FILM COATED ORAL at 20:13

## 2022-09-30 RX ADMIN — INSULIN ASPART 1 UNITS: 100 INJECTION, SOLUTION INTRAVENOUS; SUBCUTANEOUS at 12:38

## 2022-09-30 RX ADMIN — SULFAMETHOXAZOLE AND TRIMETHOPRIM 1 TABLET: 400; 80 TABLET ORAL at 07:30

## 2022-09-30 RX ADMIN — MYCOPHENOLATE MOFETIL 1000 MG: 250 CAPSULE ORAL at 07:26

## 2022-09-30 RX ADMIN — TACROLIMUS 1 MG: 1 CAPSULE ORAL at 17:59

## 2022-09-30 RX ADMIN — CLOTRIMAZOLE 10 MG: 10 LOZENGE ORAL at 11:11

## 2022-09-30 RX ADMIN — VENLAFAXINE HYDROCHLORIDE 150 MG: 150 CAPSULE, EXTENDED RELEASE ORAL at 07:27

## 2022-09-30 RX ADMIN — Medication 1 TABLET: at 11:11

## 2022-09-30 RX ADMIN — PREDNISONE 20 MG: 20 TABLET ORAL at 07:27

## 2022-09-30 RX ADMIN — CLOTRIMAZOLE 10 MG: 10 LOZENGE ORAL at 20:14

## 2022-09-30 RX ADMIN — PANTOPRAZOLE SODIUM 40 MG: 40 TABLET, DELAYED RELEASE ORAL at 07:28

## 2022-09-30 RX ADMIN — MAGNESIUM OXIDE TAB 400 MG (241.3 MG ELEMENTAL MG) 400 MG: 400 (241.3 MG) TAB at 07:27

## 2022-09-30 RX ADMIN — FUROSEMIDE 40 MG: 10 INJECTION, SOLUTION INTRAMUSCULAR; INTRAVENOUS at 09:13

## 2022-09-30 RX ADMIN — TACROLIMUS 1 MG: 1 CAPSULE ORAL at 07:26

## 2022-09-30 RX ADMIN — Medication 1 TABLET: at 20:13

## 2022-09-30 RX ADMIN — CLOTRIMAZOLE 10 MG: 10 LOZENGE ORAL at 17:59

## 2022-09-30 RX ADMIN — MAGNESIUM OXIDE TAB 400 MG (241.3 MG ELEMENTAL MG) 400 MG: 400 (241.3 MG) TAB at 20:14

## 2022-09-30 RX ADMIN — CLOTRIMAZOLE 10 MG: 10 LOZENGE ORAL at 07:27

## 2022-09-30 RX ADMIN — MYCOPHENOLATE MOFETIL 1000 MG: 250 CAPSULE ORAL at 17:59

## 2022-09-30 RX ADMIN — CARVEDILOL 12.5 MG: 12.5 TABLET, FILM COATED ORAL at 07:27

## 2022-09-30 RX ADMIN — ASPIRIN 325 MG: 325 TABLET, COATED ORAL at 07:28

## 2022-09-30 RX ADMIN — CARVEDILOL 12.5 MG: 12.5 TABLET, FILM COATED ORAL at 17:59

## 2022-09-30 ASSESSMENT — ACTIVITIES OF DAILY LIVING (ADL)
ADLS_ACUITY_SCORE: 21

## 2022-09-30 NOTE — PROGRESS NOTES
Transplant Surgery  Inpatient Daily Progress Note  09/30/2022    Assessment & Plan: Eden Hess is a 52 year old female with a past medical history significant for end stage kidney disease secondary to diabetic nephropathy. Other past medical history includes DM1 c/b neuropathy and retinopathy, hypertension, non obstructive CAD, anxiety and depression, diastolic HFpEF (prior to starting HD, in setting of hypertensive urgency), and TIA (2017). She is now s/p SPK with no ureteral stent on 9/23/22 with Dr. Gómez.     S/p SPK 9/23/22: POD#7. No drain or stent placed.  Pancreas: Lipase 75 (86). Trended down from 931 on POD 0. Octreotide stopped 9/29. BG . FBG 99. 1 unit of Novolog insulin given yesterday.      POD 0 US:                                                              Impression:   1.  No evidence of arterial or venous stenosis. Stable, persistent  elevated resistive indices external to the pancreas transplant.  Attention on follow-up.  2.  No abnormal peripancreatic fluid collection.   POD 3 US  IMPRESSION:   1. Normal grayscale appearance of the renal transplant without fluid  collection or hydronephrosis.  2. Decreasing though persistently elevated resistive indices, up to  0.89 previously up to 1.0.  3. Patent renal artery with improved forward diastolic flow compared  to the previous study.    Kidney: Cr slowly trending down. Slight increase with elevated tac level. US with patent flow, elevated RI up to 1 decreased to ~ 0.8 on follow up US. Good UO after lasix given. Repeat lasix x 1 dose today.    POD 0 US:  1. Left lower quadrant renal transplant with increased arterial  resistive indices throughout, nonspecific, though can be seen with  rejection. Attention on follow-up.  2. Patent Doppler evaluation of the renal transplant vasculature.    POD 3 US:  IMPRESSION:   1. Normal grayscale appearance of the renal transplant without fluid  collection or hydronephrosis.  2. Decreasing though  persistently elevated resistive indices, up to  0.89 previously up to 1.0.  3. Patent renal artery with improved forward diastolic flow compared  to the previous study.    POD 6 US  IMPRESSION:   1. No renal transplant arterial or venous stenosis demonstrated.  2. Arcuate artery resistive indices remain elevated.  3. Perinephric fluid collections. Differential includes hematoma,  seroma, and urinoma.  4. Otherwise negative grayscale appearance of renal transplant.       Immunosuppression management:   Induction: via intermediate protocol (cPRA 0) with Thymoglobulin 2 mg/kg, basiliximab POD#1 and #5, and steroid pulse with four week taper.   Maintenance:  -MMF 1000 mg BID  -Tacrolimus 1 mg BID. Goal level 8-10. Dose decreased 9/29. Repeat level tomorrow.     Neuro/Psych:   Acute post op pain: Not needed pain medications. acetaminophen to PRN. Oxycodone PRN - not using.    Hx Depression: PTA Effexor, restarted.     Hematology:   Anemia of chronic disease: Tranfused 1 unit PRBC on 9/26. No signs of bleeding.  Hgb ~8, stable.   Vascular prophylaxis: Heparin gtt, continue 500units/hour x 5 days. Continue  mg daily x 6 months then reduce to 81 mg daily.     Cardiorespiratory:   Hx CAD: PTA ASA 81 mg daily, atorvastatin 40 mg daily.  mg daily and atorvastatin 20 mg daily ordered.  HTN: SBP 130s-150s. Carvedilol 12.5 mg BID. Will get lasix x 1 dose today.  Chest pressure: resolved without intervention. EKG WNL.     GI/Nutrition:   Diet: NGT removed POD 4. Regular diet. Good appetite  Diarrhea: Improved. Bowel regimen changed to PRN.      Endocrine: See above.     Fluid/Electrolytes:    Hypervolemia: Wt up ~ 10 kg. Repeat lasix 40 mg IV x 1.   Hypokalemia: Resolved. .   Hypomagnesemia: Continue Mg Oxide 400 mg BID.   Hypophosphatemia: Phos 2.4. Not on supplement.     : Glez removed POD 3. PVR 0.    Infectious disease: No acute issues. Zosyn and Lisseth per protocol.     Prophylaxis: DVT (mechanical, heparin  gtt), fall, GI, fungal (Micafungin x 7 days, completed), viral (Valcyte x 12 weeks), pneumocystis (Bactrim), periop (Zosyn completed)    Disposition: 7A. Discharge delayed today due to recent increase in Cr. Possible discharge end of this weekend.     Medical Decision Making: Medium  Subsequent visit 94471 (moderate level decision making)    SANDEEP/Fellow/Resident Provider: Ladi Lozano PA-C [id number]    Faculty: Caden Felix M.D.    Attestation: I saw and examined the patient with Ladi Lozano PA-C, and the transplant team. I independently reviewed all pertinent laboratory and imaging information and made independent management decisions including immunosuppression adjustment. I agree with the findings and plan as documented in this note.  Caden Felix MD  _________________________________________________________________  Transplant History:   9/23/2022 (Kidney / Pancreas), Postoperative day: 7     Interval History: History is obtained from the patient  Overnight events: Uncomfortable with edema. Good intake. BMs less frequent. No significant incisional pain, not needing pain medications. Mostly uncomfortable d/t general edema.      ROS:   A 10-point review of systems was negative except as noted above.    Meds:    aspirin  325 mg Oral Daily     atorvastatin  20 mg Oral QPM     calcium carbonate-vitamin D  1 tablet Oral BID w/meals     carvedilol  12.5 mg Oral BID w/meals     clotrimazole  10 mg Buccal 4x Daily     fiber modular (NUTRISOURCE FIBER)  1 packet Per Feeding Tube BID     insulin aspart  1-7 Units Subcutaneous TID AC     insulin aspart  1-5 Units Subcutaneous At Bedtime     magnesium oxide  400 mg Oral BID     mycophenolate  1,000 mg Oral BID IS     pantoprazole  40 mg Oral QAM AC     predniSONE  20 mg Oral Daily    Followed by     [START ON 10/7/2022] predniSONE  15 mg Oral Daily    Followed by     [START ON 10/14/2022] predniSONE  10 mg Oral Daily    Followed by     [START ON 10/21/2022]  "predniSONE  5 mg Oral Daily     sodium chloride (PF)  3 mL Intravenous Q8H     sulfamethoxazole-trimethoprim  1 tablet Oral Once per day on Mon Wed Fri     tacrolimus  1 mg Oral BID IS     valGANciclovir  450 mg Oral Once per day on Mon Thu     venlafaxine  150 mg Oral Daily       Physical Exam:     Admit Weight: 69.8 kg (153 lb 14.1 oz)    Current vitals:   /72 (BP Location: Left arm)   Pulse 71   Temp 97.4  F (36.3  C) (Oral)   Resp 18   Ht 1.549 m (5' 1\")   Wt 80.2 kg (176 lb 12.8 oz)   LMP 09/18/2022   SpO2 99%   BMI 33.41 kg/m      Vital sign ranges:    Temp:  [97.4  F (36.3  C)-97.9  F (36.6  C)] 97.4  F (36.3  C)  Pulse:  [71-75] 71  Resp:  [18] 18  BP: (134-159)/(72-85) 134/72  SpO2:  [97 %-99 %] 99 %    General Appearance: in no apparent distress. General edema  Skin: Warm, dry  Heart: well perfused  Lungs: NLB on RA  Abdomen: soft, non tender.  incision c/d/i, serous drainage on briefs. No drain.   : no angeles  Extremities: BLE edema, BLE NT.   Neurologic: A&Ox3, tremor absent.     Data:   CMP  Recent Labs   Lab 09/30/22  0733 09/30/22  0559 09/30/22  0540 09/29/22  0746 09/29/22  0602 09/23/22  1815 09/23/22  1753 09/23/22  1703   NA  --   --  136  --  140   < > 135 132*   POTASSIUM  --   --  4.0  --  3.6   < > 3.4* 3.8   CHLORIDE  --   --  105  --  106   < >  --   --    CO2  --   --  24  --  23   < >  --   --    * 99 101*   < > 96   < > 79 149*   BUN  --   --  46.6*  --  40.4*   < >  --   --    CR  --   --  3.26*  --  3.28*   < >  --   --    GFRESTIMATED  --   --  16*  --  16*   < >  --   --    BARBARA  --   --  8.2*  --  8.7   < >  --   --    ICAW  --   --   --   --   --   --  4.6 5.0   MAG  --   --  1.7  --  1.7   < >  --   --    PHOS  --   --  2.4*  --  2.2*   < >  --   --    AMYLASE  --   --  101*  --  116*   < >  --   --    LIPASE  --   --  75*  --  86*   < >  --   --     < > = values in this interval not displayed.     CBC  Recent Labs   Lab 09/30/22  0540 09/29/22  0602   HGB " 7.9* 8.7*   WBC 7.9 9.2    241

## 2022-09-30 NOTE — TELEPHONE ENCOUNTER
A pharmacist spent 30 minutes providing medication teaching with Eden Hess for discharge with a focus on new medications/dose changes.  The discharge medication list was reviewed with the patient/family and the following points were discussed, as applicable: Name, description, purpose, dose/strength, duration of medications, common side effects, food/medications to avoid, action to be taken if dose is missed and how to obtain refills.  The patient will be responsible for managing medications. Additionally, the following transplant related education was covered: Purpose of medication card, Timing of medications and day of lab draw considerations , Emesis or missed dose, Prescription Insurance  and Discharge process for receiving meds   Patient will  transplant supplies including 7 day pill organizer, thermometer, and BP monitor at the discharge pharmacy along with medications.  Patient chooses to receive medications from [facility].   Clinical Pharmacy Consult:                                                      Transplant Specific:   Date of Transplant: 09/23/2022  Type of Transplant: kidney and pancreas  First Transplant: yes  History of rejection: no    Immunosuppression Regimen   TAC 1 mg qAM & 1 mg qPM and MMF 1,000 mg qAM & 1,000 mg qPMl; prednisone taper  Patient specific goal: Tac 8 to 10  Most recent level: 8.4, date 09/30  Immunosuppressant Levels:  Therapeutic  Pt adherent to lab draws: yes  Scr:   Lab Results   Component Value Date    CR 3.26 09/30/2022    CR 3.56 08/13/2020     Side effects: no side effects    Prophylactic Medications  Antibacterial:  Bactrim 400/80 mg three times weekly   Scheduled Discontinue Date: Lifelong    Antifungal: Clotrimazole 10 mg four times daily  Scheduled Discontinue Date: 3 months    Antiviral: CrCl 10 to 24 mL/minute: Valcyte 450 mg twice weekly   Scheduled Discontinue Date: 3 months    Acid Reducer: Protonix (pantoprazole) 40 mg once  daily  Scheduled Reviewed Date: review in clinic    Thrombosis Prevention: Aspirin 325 mg PO daily  Scheduled Discontinue Date: review in clinic    Blood Pressure Management  Frequency of home Blood Pressure checks: once daily  Most recent home BP: 115/62  Patient Blood pressure goal: <150/90  Patient blood pressure at goal:  yes  Hospitalizations/ER visits since last assessment: 0      Med rec/DUR performed: yes  Med Rec Discrepancies: no    Reminders:    1. Bring to first clinic appt: med box, med card, bp monitor, all medications being taken, and lab book.  2.   MTM pharmacist visit on first clinic appt and if ok, again in 3 to 4 months during follow up appt.  3.   Avoid Grapefruit and Grapefruit juice.   4.   Avoid herbal supplements. If wish to take other medications or supplements, call your coordinator.   5.   Keep lab appts.   6.   Can use apps on phone like [employer] to help manage medication lists and reminders.   7.   Make sure you are protecting your skin by wearing long sleeves and applying sunscreen to exposed skin, for any significant time in the sun.     Transplant Coordinator is Codie Vegas, Pharm.D.  [facility]  [phone]

## 2022-09-30 NOTE — PROGRESS NOTES
Care Management Follow Up    Length of Stay (days): 8    Expected Discharge Date: 10/01/2022     Concerns to be Addressed:     Home Care  Patient plan of care discussed at interdisciplinary rounds: Yes    Anticipated Discharge Disposition:  Home     Anticipated Discharge Services:  Home Care  Anticipated Discharge DME:  N/A    Patient/family educated on Medicare website which has current facility and service quality ratings:  Yes  Education Provided on the Discharge Plan:  Yes  Patient/Family in Agreement with the Plan:    Patient    Referrals Placed by CM/SW:  Home Care      Additional Information:  Per discussion with [facility] anticipate discharge over the weekend.   ATC appointments requested.    Writer following up on home care referrals:    Pending:  [facility]   [address] [facility] [address]  Ph:[phone]  Fax: [phone]  : Agency reviewing      [facility]   [address] [place] [address]  Ph:[phone]  Fax:[phone]  : sent referral  : Left message for admissions for call back   VM left requesting return call        Denied:  [facility] L/28: OON  [facility]  [facility]: : Not taking referrals currently  [facility]: denied, no availability  [facility] : denied OON  [facility]: admissions stated out of network  [facility]: : denied, does not do lab draws  [facility]:  denied, cant take lab draws now  [facility]: : denied  [facility]: : denied, no availability  [facility]: : denied due to staffing  [facility]: : ONN  [facility]: : denied, OON  [facility]: : SN does not do lab draws  [facility]: : Thuy, admissions, stating not taking any new referrals.  [facility]: : denied, OON  [facility]:  9/28: denied  [facility]: 9/29: denied  [facility]: 9/28: denied OON  [facility]  9/29: denied, not able to do lab draws  [facility]: no labs through rest of 2022)  [facility]   Unable to accept as out of network with pt insurance.  [facility] unable to accept d/t capacity  [facility] unable to accept d/t capacity  [facility] unable to accept d/t capacity  [facility] Unable to accept d/t pt insurance  [facility] Unable to accept d/t capacity        1350:  Met with pt.  Reviewed anticipated plan for discharge.  Reviewed ATC appointments.  Reviewed/discussed that home care has not been confirmed.  Awaiting response from  Three agencies.  Pt voiced understanding that if we are unable to secure home care plan would be for her to go into clinic for transplant labs. Pt notes no concerns regarding anticipated plan for discharge.  Per pt her spouse, adult children and in laws are available to assist her as needed.    Hermila Archer RN BSN, PHN, ACM-RN  7A RN Care Coordinator  Phone: [phone]  Pager [phone]    To contact the weekend RNCC  [place] (0800 - 1630) Saturday and Sunday    Units: 4A, 4C, 4E, 5A and 5B- Pager 1: [phone]    Units: 6A, 6B, 6C, 6D- Pager 2: [phone]    Units: 7A, 7B, 7C, 7D, and 5C-Pager 3: [phone]    [facility] ([phone]) Saturday and Sunday    Units: 5 Ortho, 8A, 10 ICU, & Pediatric Units-Pager 4: [phone]    9/30/2022 1:56 PM

## 2022-09-30 NOTE — PLAN OF CARE
"/72 (BP Location: Left arm)   Pulse 71   Temp 97.4  F (36.3  C) (Oral)   Resp 18   Ht 1.549 m (5' 1\")   Wt 80.2 kg (176 lb 12.8 oz)   LMP 2022   SpO2 99%   BMI 33.41 kg/m      Shift: 4537-0411  Isolation Status: NA  Diet: Regular diet  LDA: R IJ SL, PIV SL, R Fistula.  Infusion(s): NA.  VS: AVSS on RA. HTN reported on previous shifts. PRN Hydralazine ordered for SBPs > 160.  Pain/Nausea/PRN: soreness reported in lower back, nothing requested for it.  Lab: 1. No renal transplant arterial or venous stenosis demonstrated.  2. Arcuate artery resistive indices remain elevated.  3. Perinephric fluid collections. Differential includes hematoma,  seroma, and urinoma.  4. Otherwise negative grayscale appearance of renal transplant.  B @ bedtime. 102 @ 0200.  Neuro: A&O x4. Calls appropriately.  Behaviors: Calm, cooperative, and pleasant  Respiratory: Regular depth and pattern; unlabored; expansion symmetrical; breath sounds clear and equal bilaterally; no cough  Cardiac: Regular rhythm, S1, S2; no reported chest pain  GI/: LBM: . Voiding adequately and without difficulty.  Skin: Abdominal incision stapled w/ no drainage.  Mobility: UAL.  Plan: - Continue current antihypertensives   - Agree with diuresis to help with volume overload and elevated blood pressure  - Will follow renal ultrasound, can pursue renogram      "

## 2022-09-30 NOTE — PLAN OF CARE
Vitals: stable room air.  Blood glucose: achs, , 142. Sliding scale.   Pain/nausea: denies.   Diet: regular, fair appetite.   Lines: PIV L SL. AVF R WDL. TLIJ SL.   : voiding well, lasix x 1.   GI: bm x 1.   Drains: NA  Skin: incision staples VICTORINA.   Mobility: up ad ricci.   Education : med/lab book updated. seen SPT. MTP Started.

## 2022-09-30 NOTE — PROVIDER NOTIFICATION
7a 8922 Eden roa   pt requesting a one time dose of Lasix, feels extra bloated and diff breathing when layng down.   [phone] RN. damien       On call added creatinine and will go make after result of creatinine.

## 2022-09-30 NOTE — PROGRESS NOTES
CLINICAL NUTRITION SERVICES - REASSESSMENT/DISCHARGE NOTE     Nutrition Prescription    RECOMMENDATIONS FOR MDs/PROVIDERS TO ORDER:  -None at this time.     Malnutrition Status:    -Patient does not meet two of the established criteria necessary for diagnosing malnutrition but is at risk for malnutrition    Recommendations already ordered by Registered Dietitian (RD):  -Providing SF Gelatein @ 8pm, and Special K bar @ 2pm. Also provided pt with supplements list and allowing additional snacks / supplements PRN.   -Provided menu with protein content listed and discussed appropriate protein intake for pt at this time.   -Discussed post-txp diet education and provided relevant handouts.      Future/Additional Recommendations:  Minimize diet restrictions as able d/t high calorie/protein needs post-transplant.  Oral supplements as needed to help meet nutritional needs.     High protein food choices with meals to help meet high needs post-transplant over the next 6-8 weeks.     Heart-healthy diet (low saturated fat, low sodium, high fiber) and food safety precautions long term due to immunosuppression regimen post-transplant         EVALUATION OF THE PROGRESS TOWARD GOALS   Diet: Regular  Nutrition Support:  Had been receiving EN, NGT pulled POD 4.   Intake: Per [employer], ordering 2-3 meals / day with good intake / tolerance.     NEW FINDINGS   -Pt states PO intake is going well - appetite not yet back to baseline, but is continuously increasing. Pt tolerating meals well, had just finished all of her lunch tray while writer was in room with her ([place], corn, SF Jello, meal total of 575 kcal and 23g protein)    -Patient s discharge needs assessed and discharge planning has been conducted with the multidisciplinary transplant care team including physicians, pharmacy, social work and transplant coordinator.    -Wt trends this admission:   09/30/22 0557 80.2 kg (176 lb 12.8 oz) Standing scale   09/29/22 0523 81.3 kg  (179 lb 3.2 oz) --   09/28/22 1106 80.5 kg (177 lb 6.4 oz) Standing scale   09/28/22 0553 72.5 kg (159 lb 13.3 oz) Standing scale   09/27/22 0013 80 kg (176 lb 4.8 oz) Standing scale   09/26/22 0330 78.8 kg (173 lb 11.6 oz) Standing scale   09/25/22 0600 77.1 kg (169 lb 15.6 oz) Standing scale   09/24/22 0500 75.4 kg (166 lb 3.6 oz) Standing scale   09/23/22 0800 69.8 kg (153 lb 14.1 oz)      MALNUTRITION  % Intake: </= 50% for >/= 5 days (severe), had been NPO from 9/23-9/27, CLD from 9/27-9/28.   % Weight Loss: None noted  Subcutaneous Fat Loss: None observed  Muscle Loss: None observed  Fluid Accumulation/Edema: None noted  Malnutrition Diagnosis: Patient does not meet two of the established criteria necessary for diagnosing malnutrition but is at risk for malnutrition    Previous Goals   1. Patient will verbalize understanding of 3 important aspects of post-transplant diet guidelines.   2. PO intake >50% meals TID.  Evaluation: Met    Previous Nutrition Diagnosis  Food and nutrition-related knowledge deficit r/t length of time since previous post-transplant education AEB patient verbal report, review of chart record, and MD consult for nutrition education.   Evaluation: Resolved    CURRENT NUTRITION DIAGNOSIS  Increased nutrient needs (kcal, protein) related to recent SOT as evidenced by kidney / pancreas txp on 9/23/22.       INTERVENTIONS  Implementation  Nutrition Education: Provided education on post-SOT diet modifications.    Medical food supplement therapy  Nutrition education for nutrition relationship to health/disease     Goals  Patient to consume % of nutritionally adequate meal trays TID, or the equivalent with supplements/snacks.    Monitoring/Evaluation  Progress toward goals will be monitored and evaluated per protocol.    Once discharged, place outpatient nutrition consult via the transplant team if nutrition concerns arise.    Kevin Carlos, RD, LD  7A/Obs RD pager:  [phone]  Weekend/Holiday RD pager: [phone]

## 2022-09-30 NOTE — PROGRESS NOTES
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  Good urine output.  No acute indications for dialysis.   - Baseline Creatinine: ~ TBD   - Proteinuria: Moderate (1-3 grams)   - Date DSA Last Checked: Sep/2022      Latest DSA: Not checked recently due to time from transplant   - BK Viremia: No   - Kidney Tx Biopsy: No    # Pancreas Tx (SPK):    - Pancreatic Exocrine Drainage: Enteric drained     - Blood glucose: Euglycemia      On insulin: No   - HbA1c: Stable      Latest HbA1c: 6.9%   - Pancreatic enzymes: Trend down   - Date DSA Last Checked: Sep/2022  Latest DSA: Not checked recently due to time from transplant   - Pancreas Tx Biopsy: No    # Immunosuppression: Tacrolimus immediate release (goal 8-10) and Mycophenolate mofetil (dose 1000 mg every 12 hours)   - Changes: No    # Infection Prophylaxis:   - PJP: Sulfa/TMP (Bactrim)  - CMV: Valganciclovir (Valcyte)  - Fungal: Micafungin (Mycamine)    # Hypertension: Inadequate control;  Goal BP: < 150/90   - Volume status: Moderately hypervolemic     - Changes: Not at this time    # Anemia in Chronic Renal Disease: Hgb: Stable      SHANEKA: No   - Iron studies: Unknown at this time, but checked with dialysis    # Mineral Bone Disorder:   - Secondary renal hyperparathyroidism; PTH level: Not checked recently        On treatment: None  - Vitamin D; level: Not checked recently        On supplement: Yes  - Calcium; level: Normal        On supplement: Yes  - Phosphorus; level: Low        On supplement: No    # Electrolytes:   - Potassium; level: Normal        On supplement: No  - Magnesium; level: Normal        On supplement: No  - Bicarbonate; level: Normal        On supplement:  No    # TIA 2017: No residual deficients     # Transplant History:  Etiology of Kidney Failure: Diabetes mellitus type 1  Tx: DDKT (SPK)  Transplant: 9/23/2022 (Kidney / Pancreas)  Significant changes in immunosuppression: None  Significant transplant-related complications: None    Recommendations were communicated to the primary team verbally.    Seen and discussed with Dr. Teresa Esquivel MD   Pager: [phone]    Attestation:  This patient has been seen and evaluated by me, Bertrand Moore MD.  I have reviewed the note and agree with plan of care as documented by the fellow.     Interval History   Ms. Hinson creatinine is 3.26 (09/30); Stable, elevated.  1.3 L urine output.  Other significant labs/tests/vitals: elevated blood pressure  No acute events overnight.  No chest pain or shortness of breath.  Positive for leg swelling.  Denies nausea and vomiting.  Bowel movements are present.  No fever, sweats or chills.      Review of Systems   4 point ROS was obtained and negative except as noted in the Interval History.    MEDICATIONS:    aspirin  325 mg Oral Daily     atorvastatin  20 mg Oral QPM     calcium carbonate-vitamin D  1 tablet Oral BID w/meals     carvedilol  12.5 mg Oral BID w/meals     clotrimazole  10 mg Buccal 4x Daily     fiber modular (NUTRISOURCE FIBER)  1 packet Per Feeding Tube BID     insulin aspart  1-7 Units Subcutaneous TID AC     insulin aspart  1-5 Units Subcutaneous At Bedtime     magnesium oxide  400 mg Oral BID     mycophenolate  1,000 mg Oral BID IS     pantoprazole  40 mg Oral QAM AC     predniSONE  20 mg Oral Daily    Followed by     [START ON 10/7/2022] predniSONE  15 mg Oral Daily    Followed by     [START ON 10/14/2022] predniSONE  10 mg Oral Daily    Followed by     [START ON 10/21/2022] predniSONE  5 mg Oral Daily     sodium chloride (PF)  3 mL Intravenous Q8H     sulfamethoxazole-trimethoprim  1 tablet Oral Once per day on Mon Wed Fri     tacrolimus  1 mg Oral BID IS  "    valGANciclovir  450 mg Oral Once per day on      venlafaxine  150 mg Oral Daily         Physical Exam   Temp  Av.3  F (36.8  C)  Min: 97.7  F (36.5  C)  Max: 98.9  F (37.2  C)  Arterial Line BP  Min: 39/39  Max: 153/64  Arterial Line MAP (mmHg)  Av.6 mmHg  Min: 39 mmHg  Max: 95 mmHg      Pulse  Av.1  Min: 71  Max: 106 Resp  Av.1  Min: 9  Max: 18  SpO2  Av.2 %  Min: 92 %  Max: 100 %    CVP (mmHg):  (abx infusing)/62 (BP Location: Left arm)   Pulse 70   Temp 97.8  F (36.6  C) (Oral)   Resp 18   Ht 1.549 m (5' 1\")   Wt 80.2 kg (176 lb 12.8 oz)   LMP 2022   SpO2 99%   BMI 33.41 kg/m     Date 22 07 - 22 0659   Shift 2817-3609 9088-0283 2668-5627 24 Hour Total   INTAKE   I.V. 108.5   108.5   NG/GT 15   15   Shift Total(mL/kg) 123.5(1.6)   123.5(1.6)   OUTPUT   Urine 195   195   Emesis/NG output 50   50   Shift Total(mL/kg) 245(3.18)   245(3.18)   Weight (kg) 77.1 77.1 77.1 77.1      Admit Weight: 69.8 kg (153 lb 14.1 oz)     GENERAL APPEARANCE: alert and no distress  HENT: mouth without ulcers or lesions  RESP: lungs clear to auscultation - no rales, rhonchi or wheezes  CV: regular rhythm, normal rate, no rub, no murmur  EDEMA: 1+ LE edema bilaterally  ABDOMEN: soft, nondistended, nontender, bowel sounds normal  MS: extremities normal - no gross deformities noted, no evidence of inflammation in joints, no muscle tenderness  SKIN: no rash    Data   All labs reviewed by me.  CMP  Recent Labs   Lab 22  1237 22  0733 22  0559 22  0540 22  0746 22  0602 22  0825 22  0623 22  1652 22  1339 22  0741 22  0734   NA  --   --   --  136  --  140  --  140  --   --   --  143   POTASSIUM  --   --   --  4.0  --  3.6  --  4.0  --  4.1  --  3.2*   CHLORIDE  --   --   --  105  --  106  --  108*  --   --   --  110*   CO2  --   --   --  24  --  23  --  23  --   --   --  25   ANIONGAP  --   --   --  7  " --  11  --  9  --   --   --  8   * 108* 99 101*   < > 96   < > 96   < >  --    < > 97   BUN  --   --   --  46.6*  --  40.4*  --  36.8*  --   --   --  35.2*   CR  --   --   --  3.26*  --  3.28*  --  3.16*  --   --   --  3.13*   GFRESTIMATED  --   --   --  16*  --  16*  --  17*  --   --   --  17*   BARBARA  --   --   --  8.2*  --  8.7  --  8.5*  --   --   --  8.9   MAG  --   --   --  1.7  --  1.7  --  1.6*  --   --   --  1.7   PHOS  --   --   --  2.4*  --  2.2*  --  2.6  --   --   --  2.0*    < > = values in this interval not displayed.     CBC  Recent Labs   Lab 09/30/22  0540 09/29/22  0602 09/28/22  0623 09/27/22  1453   HGB 7.9* 8.7* 8.0* 8.2*   WBC 7.9 9.2 6.7 9.1   RBC 2.34* 2.61* 2.38* 2.46*   HCT 23.8* 26.8* 24.5* 25.6*   * 103* 103* 104*   MCH 33.8* 33.3* 33.6* 33.3*   MCHC 33.2 32.5 32.7 32.0   RDW 17.2* 17.0* 17.5* 17.6*    241 180 183     INR  Recent Labs   Lab 09/29/22  0602 09/28/22  0623 09/27/22  0734 09/26/22  0319 09/24/22  1400 09/23/22  1820   INR  --   --   --   --   --  1.09   PTT 39* 46* 35 39*   < > 27    < > = values in this interval not displayed.     ABG  Recent Labs   Lab 09/23/22  1753 09/23/22  1703   PH 7.36 7.33*   PCO2 35 38   PO2 152* 134*   HCO3 20* 20*   O2PER 50.0 50.0      Urine Studies  Recent Labs   Lab Test 09/22/22  1714 08/13/20  1110   COLOR Light Yellow Yellow   APPEARANCE Clear Slightly Cloudy   URINEGLC 300 * >499*   URINEBILI Negative Negative   URINEKETONE Negative 5*   SG 1.011 1.016   UBLD Small* Small*   URINEPH 8.0* 5.0   PROTEIN 200 * >499*   NITRITE Negative Negative   LEUKEST Negative Negative   RBCU 1 3*   WBCU 6* 3     No lab results found.  PTH  No lab results found.  Iron Studies  No lab results found.    IMAGING:  All imaging studies reviewed by me.

## 2022-09-30 NOTE — PLAN OF CARE
Problem: Plan of Care - These are the overarching goals to be used throughout the patient stay.    Goal: Plan of Care Review/Shift Note  Outcome: Ongoing, Progressing  Flowsheets (Taken 9/30/2022 1514)  Plan of Care Reviewed With: patient  Overall Patient Progress: improving     Problem: Oral Intake Inadequate (Kidney Transplant)  Goal: Optimal Nutrition Intake  Outcome: Ongoing, Progressing   Goal Outcome Evaluation:  Pt tolerating PO diet well, appetite continues to improve. Agreeable to take ONS BID. Discussed post-txp diet education today, pt acknowledges understanding.  Plan of Care Reviewed With: patient     Overall Patient Progress: improving

## 2022-10-01 ENCOUNTER — APPOINTMENT (OUTPATIENT)
Dept: NUCLEAR MEDICINE | Facility: CLINIC | Age: 52
DRG: 008 | End: 2022-10-01
Attending: NURSE PRACTITIONER
Payer: COMMERCIAL

## 2022-10-01 LAB
AMYLASE SERPL-CCNC: 142 U/L (ref 28–100)
ANION GAP SERPL CALCULATED.3IONS-SCNC: 9 MMOL/L (ref 7–15)
BASOPHILS # BLD MANUAL: 0 10E3/UL (ref 0–0.2)
BASOPHILS NFR BLD MANUAL: 0 %
BUN SERPL-MCNC: 59 MG/DL (ref 6–20)
CALCIUM SERPL-MCNC: 8.8 MG/DL (ref 8.6–10)
CHLORIDE SERPL-SCNC: 106 MMOL/L (ref 98–107)
CREAT SERPL-MCNC: 3.43 MG/DL (ref 0.51–0.95)
DEPRECATED HCO3 PLAS-SCNC: 24 MMOL/L (ref 22–29)
EOSINOPHIL # BLD MANUAL: 0.4 10E3/UL (ref 0–0.7)
EOSINOPHIL NFR BLD MANUAL: 4 %
ERYTHROCYTE [DISTWIDTH] IN BLOOD BY AUTOMATED COUNT: 17.1 % (ref 10–15)
GFR SERPL CREATININE-BSD FRML MDRD: 15 ML/MIN/1.73M2
GLUCOSE BLDC GLUCOMTR-MCNC: 113 MG/DL (ref 70–99)
GLUCOSE BLDC GLUCOMTR-MCNC: 114 MG/DL (ref 70–99)
GLUCOSE BLDC GLUCOMTR-MCNC: 136 MG/DL (ref 70–99)
GLUCOSE BLDC GLUCOMTR-MCNC: 91 MG/DL (ref 70–99)
GLUCOSE SERPL-MCNC: 100 MG/DL (ref 70–99)
HCT VFR BLD AUTO: 26.2 % (ref 35–47)
HGB BLD-MCNC: 8.5 G/DL (ref 11.7–15.7)
LIPASE SERPL-CCNC: 110 U/L (ref 13–60)
LYMPHOCYTES # BLD MANUAL: 0 10E3/UL (ref 0.8–5.3)
LYMPHOCYTES NFR BLD MANUAL: 0 %
MAGNESIUM SERPL-MCNC: 1.9 MG/DL (ref 1.7–2.3)
MCH RBC QN AUTO: 33.6 PG (ref 26.5–33)
MCHC RBC AUTO-ENTMCNC: 32.4 G/DL (ref 31.5–36.5)
MCV RBC AUTO: 104 FL (ref 78–100)
MONOCYTES # BLD MANUAL: 0.6 10E3/UL (ref 0–1.3)
MONOCYTES NFR BLD MANUAL: 6 %
NEUTROPHILS # BLD MANUAL: 9.2 10E3/UL (ref 1.6–8.3)
NEUTROPHILS NFR BLD MANUAL: 90 %
NEUTS HYPERSEG BLD QL SMEAR: PRESENT
NRBC # BLD AUTO: 0.1 10E3/UL
NRBC BLD MANUAL-RTO: 1 %
PHOSPHATE SERPL-MCNC: 2 MG/DL (ref 2.5–4.5)
PLAT MORPH BLD: ABNORMAL
PLATELET # BLD AUTO: 268 10E3/UL (ref 150–450)
POTASSIUM SERPL-SCNC: 4 MMOL/L (ref 3.4–5.3)
RBC # BLD AUTO: 2.53 10E6/UL (ref 3.8–5.2)
RBC MORPH BLD: ABNORMAL
SODIUM SERPL-SCNC: 139 MMOL/L (ref 136–145)
TACROLIMUS BLD-MCNC: 10.3 UG/L (ref 5–15)
TME LAST DOSE: NORMAL H
TME LAST DOSE: NORMAL H
WBC # BLD AUTO: 10.2 10E3/UL (ref 4–11)

## 2022-10-01 PROCEDURE — 250N000012 HC RX MED GY IP 250 OP 636 PS 637: Performed by: PHYSICIAN ASSISTANT

## 2022-10-01 PROCEDURE — 99233 SBSQ HOSP IP/OBS HIGH 50: CPT | Performed by: INTERNAL MEDICINE

## 2022-10-01 PROCEDURE — 36592 COLLECT BLOOD FROM PICC: CPT | Performed by: SURGERY

## 2022-10-01 PROCEDURE — 250N000013 HC RX MED GY IP 250 OP 250 PS 637: Performed by: PHYSICIAN ASSISTANT

## 2022-10-01 PROCEDURE — 83735 ASSAY OF MAGNESIUM: CPT | Performed by: SURGERY

## 2022-10-01 PROCEDURE — 250N000011 HC RX IP 250 OP 636: Performed by: NURSE PRACTITIONER

## 2022-10-01 PROCEDURE — 85007 BL SMEAR W/DIFF WBC COUNT: CPT | Performed by: PHYSICIAN ASSISTANT

## 2022-10-01 PROCEDURE — 250N000013 HC RX MED GY IP 250 OP 250 PS 637: Performed by: NURSE PRACTITIONER

## 2022-10-01 PROCEDURE — 250N000013 HC RX MED GY IP 250 OP 250 PS 637: Performed by: SURGERY

## 2022-10-01 PROCEDURE — 250N000012 HC RX MED GY IP 250 OP 636 PS 637: Performed by: SURGERY

## 2022-10-01 PROCEDURE — 80197 ASSAY OF TACROLIMUS: CPT | Performed by: SURGERY

## 2022-10-01 PROCEDURE — 120N000011 HC R&B TRANSPLANT UMMC

## 2022-10-01 PROCEDURE — 78707 K FLOW/FUNCT IMAGE W/O DRUG: CPT | Mod: 26 | Performed by: RADIOLOGY

## 2022-10-01 PROCEDURE — 82150 ASSAY OF AMYLASE: CPT | Performed by: SURGERY

## 2022-10-01 PROCEDURE — 82310 ASSAY OF CALCIUM: CPT | Performed by: SURGERY

## 2022-10-01 PROCEDURE — A9562 TC99M MERTIATIDE: HCPCS | Performed by: SURGERY

## 2022-10-01 PROCEDURE — 250N000013 HC RX MED GY IP 250 OP 250 PS 637

## 2022-10-01 PROCEDURE — 83690 ASSAY OF LIPASE: CPT | Performed by: SURGERY

## 2022-10-01 PROCEDURE — 343N000001 HC RX 343: Performed by: SURGERY

## 2022-10-01 PROCEDURE — 99232 SBSQ HOSP IP/OBS MODERATE 35: CPT | Mod: 24 | Performed by: SURGERY

## 2022-10-01 PROCEDURE — 84100 ASSAY OF PHOSPHORUS: CPT | Performed by: SURGERY

## 2022-10-01 PROCEDURE — 78707 K FLOW/FUNCT IMAGE W/O DRUG: CPT

## 2022-10-01 PROCEDURE — 85027 COMPLETE CBC AUTOMATED: CPT | Performed by: PHYSICIAN ASSISTANT

## 2022-10-01 RX ORDER — FUROSEMIDE 10 MG/ML
40 INJECTION INTRAMUSCULAR; INTRAVENOUS ONCE
Status: COMPLETED | OUTPATIENT
Start: 2022-10-01 | End: 2022-10-01

## 2022-10-01 RX ADMIN — MAGNESIUM OXIDE TAB 400 MG (241.3 MG ELEMENTAL MG) 400 MG: 400 (241.3 MG) TAB at 19:57

## 2022-10-01 RX ADMIN — CLOTRIMAZOLE 10 MG: 10 LOZENGE ORAL at 08:15

## 2022-10-01 RX ADMIN — ASPIRIN 325 MG: 325 TABLET, COATED ORAL at 08:15

## 2022-10-01 RX ADMIN — ACETAMINOPHEN 650 MG: 325 TABLET, FILM COATED ORAL at 06:57

## 2022-10-01 RX ADMIN — PANTOPRAZOLE SODIUM 40 MG: 40 TABLET, DELAYED RELEASE ORAL at 06:57

## 2022-10-01 RX ADMIN — TECHNESCAN TC 99M MERTIATIDE 10.1 MILLICURIE: 1 INJECTION, POWDER, LYOPHILIZED, FOR SOLUTION INTRAVENOUS at 15:01

## 2022-10-01 RX ADMIN — Medication 1 TABLET: at 19:57

## 2022-10-01 RX ADMIN — CLOTRIMAZOLE 10 MG: 10 LOZENGE ORAL at 11:49

## 2022-10-01 RX ADMIN — CLOTRIMAZOLE 10 MG: 10 LOZENGE ORAL at 19:57

## 2022-10-01 RX ADMIN — CLOTRIMAZOLE 10 MG: 10 LOZENGE ORAL at 15:47

## 2022-10-01 RX ADMIN — MYCOPHENOLATE MOFETIL 1000 MG: 250 CAPSULE ORAL at 08:19

## 2022-10-01 RX ADMIN — TACROLIMUS 1 MG: 1 CAPSULE ORAL at 08:15

## 2022-10-01 RX ADMIN — CARVEDILOL 12.5 MG: 12.5 TABLET, FILM COATED ORAL at 08:15

## 2022-10-01 RX ADMIN — SODIUM PHOSPHATE, DIBASIC, ANHYDROUS, POTASSIUM PHOSPHATE, MONOBASIC, AND SODIUM PHOSPHATE, MONOBASIC, MONOHYDRATE 250 MG: 852; 155; 130 TABLET, COATED ORAL at 19:57

## 2022-10-01 RX ADMIN — SODIUM PHOSPHATE, DIBASIC, ANHYDROUS, POTASSIUM PHOSPHATE, MONOBASIC, AND SODIUM PHOSPHATE, MONOBASIC, MONOHYDRATE 250 MG: 852; 155; 130 TABLET, COATED ORAL at 11:49

## 2022-10-01 RX ADMIN — VENLAFAXINE HYDROCHLORIDE 150 MG: 150 CAPSULE, EXTENDED RELEASE ORAL at 08:15

## 2022-10-01 RX ADMIN — Medication 1 TABLET: at 11:49

## 2022-10-01 RX ADMIN — TACROLIMUS 1 MG: 1 CAPSULE ORAL at 17:50

## 2022-10-01 RX ADMIN — CARVEDILOL 12.5 MG: 12.5 TABLET, FILM COATED ORAL at 17:50

## 2022-10-01 RX ADMIN — MYCOPHENOLATE MOFETIL 1000 MG: 250 CAPSULE ORAL at 17:50

## 2022-10-01 RX ADMIN — FUROSEMIDE 40 MG: 10 INJECTION, SOLUTION INTRAMUSCULAR; INTRAVENOUS at 13:26

## 2022-10-01 RX ADMIN — ATORVASTATIN CALCIUM 20 MG: 20 TABLET, FILM COATED ORAL at 19:57

## 2022-10-01 RX ADMIN — PREDNISONE 20 MG: 20 TABLET ORAL at 08:15

## 2022-10-01 RX ADMIN — MAGNESIUM OXIDE TAB 400 MG (241.3 MG ELEMENTAL MG) 400 MG: 400 (241.3 MG) TAB at 11:49

## 2022-10-01 ASSESSMENT — ACTIVITIES OF DAILY LIVING (ADL)
ADLS_ACUITY_SCORE: 21

## 2022-10-01 NOTE — PLAN OF CARE
"BP (!) 144/72 (BP Location: Left arm)   Pulse 56   Temp 98.1  F (36.7  C) (Oral)   Resp 16   Ht 1.549 m (5' 1\")   Wt 80.2 kg (176 lb 12.8 oz)   LMP 2022   SpO2 100%   BMI 33.41 kg/m      Shift: 6934-7401  Isolation Status: NA  Diet: Regular diet.  LDA: PIV SL  Infusion(s): NA  VS: AVSS on RA.  Pain/Nausea/PRN: No reported pain or nausea.  Lab: Cr of 3.49  B @ bedtime, 113 @ 0200  Neuro: A&O x4. Calls appropriately.  Behaviors: Calm, cooperative, and pleasant  Respiratory: Regular depth and pattern; unlabored; expansion symmetrical; breath sounds clear and equal bilaterally; no cough  Cardiac: Regular rhythm, S1, S2; no reported chest pain  GI/: LBM: . Voiding adequately and without difficulty.  Skin: Incision stapled and VICTORINA  Mobility: UAL  Plan: - Continue current antihypertensives   - Agree with diuresis to help with volume overload and elevated blood pressure      "

## 2022-10-01 NOTE — PROGRESS NOTES
[facility]   Transplant Nephrology Progress Note  Date of Admission:  9/22/2022  Today's Date: 10/01/2022    Recommendations:  - Recommend diuresis with furosemide 80 mg bid.   - Would consider renogram.  - If no improvement in function in the next couple of days, would consider kidney transplant biopsy.    Assessment & Plan   # DDKT (SPK): Stable creatinine.  Good urine output.  No acute indications for dialysis.   - Baseline Creatinine: ~ TBD   - Proteinuria: Not checked post transplant   - Date DSA Last Checked: Sep/2022      Latest DSA: No DSA at time of transplant   - BK Viremia: Not checked post transplant   - Kidney Tx Biopsy: No    # Pancreas Tx (SPK):    - Pancreatic Exocrine Drainage: Enteric drained     - Blood glucose: Euglycemia      On insulin: No   - HbA1c: Last checked at time of transplant      Latest HbA1c: 6.9%   - Pancreatic enzymes: Trend up   - Date DSA Last Checked: Sep/2022  Latest DSA: No DSA at time of transplant   - Pancreas Tx Biopsy: No    # Immunosuppression: Tacrolimus immediate release (goal 8-10), Mycophenolate mofetil (dose 1000 mg every 12 hours) and Prednisone (dose taper)    - Induction with Recent Transplant:  High Intensity   - Changes: No    # Infection Prophylaxis:   - PJP: Sulfa/TMP (Bactrim)  - CMV: Valganciclovir (Valcyte); CMV IgG Ab recipient and donor negative  - Thrush: Clotrimazole gene (Mycelex) and Micafungin (Mycamine)    # Hypertension: Borderline control;  Goal BP: < 150/90   - Volume status: Moderately hypervolemic   - Changes: Yes - Will continue with diuresis.    # Anemia in Chronic Renal Disease: Hgb: Stable      SHANEKA: No   - Iron studies: Unknown at this time, but checked with dialysis    # Mineral Bone Disorder:   - Secondary renal hyperparathyroidism; PTH level: Unknown at this time, but checked with dialysis        On treatment: None  - Vitamin D; level: Unknown at this time, but checked with dialysis         On supplement: Yes  - Calcium; level: Normal        On supplement: Yes  - Phosphorus; level: Stable low        On supplement: No    # Electrolytes:   - Potassium; level: Normal        On supplement: No  - Magnesium; level: Normal        On supplement: Yes  - Bicarbonate; level: Normal        On supplement: No     # GERD: Occasional symptoms on PPI.    # H/o TIA (2017): No residual deficients     # Transplant History:  Etiology of Kidney Failure: Diabetes mellitus type 1  Tx: DDKT (SPK)  Transplant: 9/23/2022 (Kidney / Pancreas)  Significant changes in immunosuppression: None  Significant transplant-related complications: None    Recommendations were communicated to the primary team via this note.    Bertrand Moore MD   Pager: [phone]    Interval History   Ms. Hinson creatinine is 3.43 (10/01 0550); Stable.  Slight trend up in lipase.  Good urine output.  Other significant labs/tests/vitals: Stable electrolytes.  Stable hemoglobin.  No new events overnight.  No chest pain, but some shortness of breath, both with exertion and lying down.  Continued leg swelling, maybe slightly better.  No nausea and vomiting.  Bowel movements are loose to soft, but firming up.  Some heartburn symptoms for awhile yesterday.  No fever, sweats or chills, although did feel warm for a period.    Review of Systems   4 point ROS was obtained and negative except as noted in the Interval History.    MEDICATIONS:    aspirin  325 mg Oral Daily     atorvastatin  20 mg Oral QPM     calcium carbonate-vitamin D  1 tablet Oral BID w/meals     carvedilol  12.5 mg Oral BID w/meals     clotrimazole  10 mg Buccal 4x Daily     fiber modular (NUTRISOURCE FIBER)  1 packet Per Feeding Tube BID     insulin aspart  1-7 Units Subcutaneous TID AC     insulin aspart  1-5 Units Subcutaneous At Bedtime     magnesium oxide  400 mg Oral BID     mycophenolate  1,000 mg Oral BID IS     pantoprazole  40 mg Oral QAM AC     predniSONE  20 mg Oral Daily     "Followed by     [START ON 10/7/2022] predniSONE  15 mg Oral Daily    Followed by     [START ON 10/14/2022] predniSONE  10 mg Oral Daily    Followed by     [START ON 10/21/2022] predniSONE  5 mg Oral Daily     sodium chloride (PF)  3 mL Intravenous Q8H     sulfamethoxazole-trimethoprim  1 tablet Oral Once per day on      tacrolimus  1 mg Oral BID IS     valGANciclovir  450 mg Oral Once per day on      venlafaxine  150 mg Oral Daily         Physical Exam   Temp  Av.3  F (36.8  C)  Min: 97.7  F (36.5  C)  Max: 98.9  F (37.2  C)  Arterial Line BP  Min: 39/39  Max: 153/64  Arterial Line MAP (mmHg)  Av.6 mmHg  Min: 39 mmHg  Max: 95 mmHg      Pulse  Av.1  Min: 71  Max: 106 Resp  Av.1  Min: 9  Max: 18  SpO2  Av.2 %  Min: 92 %  Max: 100 %    CVP (mmHg):  (abx infusing)BP (!) 157/76 (BP Location: Left arm)   Pulse 82   Temp 98.1  F (36.7  C) (Oral)   Resp 16   Ht 1.549 m (5' 1\")   Wt 80.3 kg (177 lb)   LMP 2022   SpO2 97%   BMI 33.44 kg/m     Date 22 0700 - 22 0659   Shift 3562-4482 0313-5298 2179-2386 24 Hour Total   INTAKE   I.V. 108.5   108.5   NG/GT 15   15   Shift Total(mL/kg) 123.5(1.6)   123.5(1.6)   OUTPUT   Urine 195   195   Emesis/NG output 50   50   Shift Total(mL/kg) 245(3.18)   245(3.18)   Weight (kg) 77.1 77.1 77.1 77.1      Admit Weight: 69.8 kg (153 lb 14.1 oz)     GENERAL APPEARANCE: alert and no distress  HENT: mouth without ulcers or lesions  RESP: rare fine crackles, otherwise lungs clear to auscultation - no rales, rhonchi or wheezes  CV: regular rhythm, normal rate, no rub, no murmur  EDEMA: 1+ LE edema bilaterally  ABDOMEN: soft, nondistended, nontender, bowel sounds normal  MS: extremities normal - no gross deformities noted, no evidence of inflammation in joints, no muscle tenderness  SKIN: no rash  TX KIDNEY: minimal TTP  DIALYSIS ACCESS:  RUE AV fistula with good thrill    Data   All labs reviewed by me.  CMP  Recent Labs   Lab " 10/01/22  0752 10/01/22  0550 10/01/22  0202 09/30/22  2223 09/30/22  2043 09/30/22  0559 09/30/22  0540 09/29/22  0746 09/29/22  0602 09/28/22  0825 09/28/22 0623   NA  --  139  --   --   --   --  136  --  140  --  140   POTASSIUM  --  4.0  --   --   --   --  4.0  --  3.6  --  4.0   CHLORIDE  --  106  --   --   --   --  105  --  106  --  108*   CO2  --  24  --   --   --   --  24  --  23  --  23   ANIONGAP  --  9  --   --   --   --  7  --  11  --  9   GLC 91 100* 113* 112*  --    < > 101*   < > 96   < > 96   BUN  --  59.0*  --   --   --   --  46.6*  --  40.4*  --  36.8*   CR  --  3.43*  --   --  3.49*  --  3.26*  --  3.28*  --  3.16*   GFRESTIMATED  --  15*  --   --  15*  --  16*  --  16*  --  17*   BARBARA  --  8.8  --   --   --   --  8.2*  --  8.7  --  8.5*   MAG  --  1.9  --   --   --   --  1.7  --  1.7  --  1.6*   PHOS  --  2.0*  --   --   --   --  2.4*  --  2.2*  --  2.6    < > = values in this interval not displayed.     CBC  Recent Labs   Lab 10/01/22  0550 09/30/22  0540 09/29/22  0602 09/28/22 0623   HGB 8.5* 7.9* 8.7* 8.0*   WBC 10.2 7.9 9.2 6.7   RBC 2.53* 2.34* 2.61* 2.38*   HCT 26.2* 23.8* 26.8* 24.5*   * 102* 103* 103*   MCH 33.6* 33.8* 33.3* 33.6*   MCHC 32.4 33.2 32.5 32.7   RDW 17.1* 17.2* 17.0* 17.5*    221 241 180     INR  Recent Labs   Lab 09/29/22  0602 09/28/22  0623 09/27/22  0734 09/26/22  0319   PTT 39* 46* 35 39*     ABG  No lab results found in last 7 days.   Urine Studies  Recent Labs   Lab Test 09/22/22  1714 08/13/20  1110   COLOR Light Yellow Yellow   APPEARANCE Clear Slightly Cloudy   URINEGLC 300 * >499*   URINEBILI Negative Negative   URINEKETONE Negative 5*   SG 1.011 1.016   UBLD Small* Small*   URINEPH 8.0* 5.0   PROTEIN 200 * >499*   NITRITE Negative Negative   LEUKEST Negative Negative   RBCU 1 3*   WBCU 6* 3     No lab results found.  PTH  No lab results found.  Iron Studies  No lab results found.    IMAGING:  All imaging studies reviewed by me.

## 2022-10-01 NOTE — PROGRESS NOTES
Transplant Surgery  Inpatient Daily Progress Note  10/01/2022    Assessment & Plan: Eden Hess is a 52 year old female with a past medical history significant for end stage kidney disease secondary to diabetic nephropathy. Other past medical history includes DM1 c/b neuropathy and retinopathy, hypertension, non obstructive CAD, anxiety and depression, diastolic HFpEF (prior to starting HD, in setting of hypertensive urgency), and TIA (2017). She is now s/p SPK with no ureteral stent on 9/23/22 with Dr. Gómez.     S/p SPK 9/23/22: POD#8. No drain or stent placed.  Pancreas: Lipase 75->110. Trended down from 931 on POD 0. Octreotide stopped 9/29. -142.     POD 0 US:                                                              Impression:   1.  No evidence of arterial or venous stenosis. Stable, persistent  elevated resistive indices external to the pancreas transplant.  Attention on follow-up.  2.  No abnormal peripancreatic fluid collection.   POD 3 US  IMPRESSION:   1. Normal grayscale appearance of the renal transplant without fluid  collection or hydronephrosis.  2. Decreasing though persistently elevated resistive indices, up to  0.89 previously up to 1.0.  3. Patent renal artery with improved forward diastolic flow compared  to the previous study.    Kidney: Cr 3.4, stable. Slight increase with elevated tac level. US with patent flow, elevated RI up to 1 decreased to ~ 0.8 on follow up US. Good UO after lasix given. Renogram today. Possible biopsy tomorrow.    POD 0 US:  1. Left lower quadrant renal transplant with increased arterial  resistive indices throughout, nonspecific, though can be seen with  rejection. Attention on follow-up.  2. Patent Doppler evaluation of the renal transplant vasculature.    POD 3 US:  IMPRESSION:   1. Normal grayscale appearance of the renal transplant without fluid  collection or hydronephrosis.  2. Decreasing though persistently elevated resistive indices, up  to  0.89 previously up to 1.0.  3. Patent renal artery with improved forward diastolic flow compared  to the previous study.    POD 6 US  IMPRESSION:   1. No renal transplant arterial or venous stenosis demonstrated.  2. Arcuate artery resistive indices remain elevated.  3. Perinephric fluid collections. Differential includes hematoma,  seroma, and urinoma.  4. Otherwise negative grayscale appearance of renal transplant.     Immunosuppression management:   Induction: via intermediate protocol (cPRA 0) with Thymoglobulin 2 mg/kg, basiliximab POD#1 and #5, and steroid pulse with four week taper.   Maintenance:  -MMF 1000 mg BID  -Tacrolimus 1 mg BID. Goal level 8-10. Level 10.3.    Neuro/Psych:   Acute post op pain: Not needed pain medications. acetaminophen to PRN. Oxycodone PRN - not using.    Hx Depression: PTA Effexor, restarted.     Hematology:   Anemia of chronic disease: Tranfused 1 unit PRBC on 9/26. No signs of bleeding.  Hgb ~8, stable.   Vascular prophylaxis: Heparin gtt, 500units/hour x 5 days completed. Continue  mg daily x 6 months then reduce to 81 mg daily.     Cardiorespiratory:   Hx CAD: PTA ASA 81 mg daily, atorvastatin 40 mg daily.  mg daily and atorvastatin 20 mg daily ordered.  HTN: SBP 110s-150s. Carvedilol 12.5 mg BID. Will get lasix x 1 dose today.  Chest pressure: Resolved without intervention. EKG WNL.     GI/Nutrition:   Diet: NGT removed POD 4. Regular diet. Good appetite  Diarrhea: Improved. Bowel regimen changed to PRN.      Endocrine: See above.     Fluid/Electrolytes:    Hypervolemia: Wt up ~ 10 kg. Repeat lasix 40 mg IV x 1.   Hypokalemia: Resolved. .   Hypomagnesemia: Continue Mg Oxide 400 mg BID.   Hypophosphatemia: Phos 2. Start Phospha.    : Glez removed POD 3. PVR 0.    Infectious disease: No acute issues. Zosyn and Lisseth per protocol.     Prophylaxis: DVT (mechanical, heparin gtt), fall, GI, fungal (Micafungin x 7 days, completed), viral (Valcyte x 12 weeks),  pneumocystis (Bactrim), periop (Zosyn completed)    Disposition: 7A. Discharge delayed today due to recent increase in Cr.    Medical Decision Making: Medium  Subsequent visit 80804 (moderate level decision making)    SANDEEP/Fellow/Resident Provider: Sanam Renee NP     Faculty: Caden Felix M.D.    Attestation: I saw and examined the patient with Sanam Renee NP, and the transplant team. I independently reviewed all pertinent laboratory and imaging information and made independent management decisions including immunosuppression adjustment. I agree with the findings and plan as documented in this note.  Caden Felix MD  _________________________________________________________________  Transplant History:   9/23/2022 (Kidney / Pancreas), Postoperative day: 8     Interval History: History is obtained from the patient  Overnight events: Uncomfortable with edema.     ROS:   A 10-point review of systems was negative except as noted above.    Meds:    aspirin  325 mg Oral Daily     atorvastatin  20 mg Oral QPM     calcium carbonate-vitamin D  1 tablet Oral BID w/meals     carvedilol  12.5 mg Oral BID w/meals     clotrimazole  10 mg Buccal 4x Daily     fiber modular (NUTRISOURCE FIBER)  1 packet Per Feeding Tube BID     insulin aspart  1-7 Units Subcutaneous TID AC     insulin aspart  1-5 Units Subcutaneous At Bedtime     magnesium oxide  400 mg Oral BID     mycophenolate  1,000 mg Oral BID IS     pantoprazole  40 mg Oral QAM AC     phosphorus tablet 250 mg  250 mg Oral BID     predniSONE  20 mg Oral Daily    Followed by     [START ON 10/7/2022] predniSONE  15 mg Oral Daily    Followed by     [START ON 10/14/2022] predniSONE  10 mg Oral Daily    Followed by     [START ON 10/21/2022] predniSONE  5 mg Oral Daily     sodium chloride (PF)  3 mL Intravenous Q8H     sulfamethoxazole-trimethoprim  1 tablet Oral Once per day on Mon Wed Fri     tacrolimus  1 mg Oral BID IS     valGANciclovir  450 mg Oral Once per day  "on Mon Thu     venlafaxine  150 mg Oral Daily       Physical Exam:     Admit Weight: 69.8 kg (153 lb 14.1 oz)    Current vitals:   BP (!) 153/73 (BP Location: Left arm)   Pulse 77   Temp 98.6  F (37  C) (Oral)   Resp 18   Ht 1.549 m (5' 1\")   Wt 80.3 kg (177 lb)   LMP 09/18/2022   SpO2 97%   BMI 33.44 kg/m      Vital sign ranges:    Temp:  [97.8  F (36.6  C)-98.6  F (37  C)] 98.6  F (37  C)  Pulse:  [56-82] 77  Resp:  [16-18] 18  BP: (115-157)/(62-76) 153/73  SpO2:  [96 %-100 %] 97 %    General Appearance: in no apparent distress. General edema  Skin: Warm, dry  Heart: well perfused  Lungs: NLB on RA  Abdomen: soft, non tender.  incision c/d/i, serous drainage on briefs, echhymosis. No drain.   : no angeles  Extremities: BLE edema +2  Neurologic: A&Ox4, tremor absent.     Data:   CMP  Recent Labs   Lab 10/01/22  0752 10/01/22  0550 09/30/22  2223 09/30/22  2043 09/30/22  0559 09/30/22  0540   NA  --  139  --   --   --  136   POTASSIUM  --  4.0  --   --   --  4.0   CHLORIDE  --  106  --   --   --  105   CO2  --  24  --   --   --  24   GLC 91 100*   < >  --    < > 101*   BUN  --  59.0*  --   --   --  46.6*   CR  --  3.43*  --  3.49*  --  3.26*   GFRESTIMATED  --  15*  --  15*  --  16*   BARBARA  --  8.8  --   --   --  8.2*   MAG  --  1.9  --   --   --  1.7   PHOS  --  2.0*  --   --   --  2.4*   AMYLASE  --  142*  --   --   --  101*   LIPASE  --  110*  --   --   --  75*    < > = values in this interval not displayed.     CBC  Recent Labs   Lab 10/01/22  0550 09/30/22  0540   HGB 8.5* 7.9*   WBC 10.2 7.9    221       "

## 2022-10-01 NOTE — PROGRESS NOTES
Follow up on the pending Home care agencies below-    [facility]   [address]  [facility] [address]  Ph:[phone]  Fax: [phone]  9/30: Agency reviewing      [facility]   [address] [facility] [address]  Ph:[phone]  Fax:[phone]  9/28: sent referral  9/29: Left message for admissions for call back  9/30 VM left requesting return call    VM were left with weekend pager for return calls.    Lisa RN Care Coordinator- Kiara MILLS RN 10/1/2022 11:19 AM    Unit RNCC pager: [phone]     For Weekend & Holiday on call RN Care Coordinator:  (Tasks: Home care, home infusion, medical equipment/oxygen, transportation, IMM & MOON forms, etc.)     Text Paging in [employer] Smart Web is the preferred method of contact for these teams     [place] [employer] [place] ([id number]) Saturday & Sunday; ([phone])  Recognized Holidays  Pager #1: [phone] Units: 4A, 4C, 4E, 5A & 5B   Pager #2: [phone] Units: 6A, 6B, 6C, 6D  Pager #3: [phone] Units: 7A, 7B, 7C, 7D & 5C   Pager #4: [phone] Units: 5 Ortho, 5 Med/Surg, 6 Med/Surg, 8A, 10 ICU, & [facility]      After hours for all units everyday- (only the  is available after hours until midnight)  Pager [phone]

## 2022-10-01 NOTE — PLAN OF CARE
Vitals: stable room air.  Labs : creatinine 3.43 from 3.49.   Blood glucose: achs, BG 92. Sliding scale.   Pain/nausea: denies.   Diet: NPO @ MN for possible renal biopsy.    Lines: PIV L SL. AVF R WDL. TLIJ SL.   : voiding well, lasix x 1.   GI: bm x 1.   Drains: NA  Skin: incision staples VICTORINA.   Mobility: up ad ricci.   Education : med/lab book updated. seen SPT. MTP Started.   Renogram pending.

## 2022-10-02 VITALS
SYSTOLIC BLOOD PRESSURE: 103 MMHG | HEIGHT: 61 IN | OXYGEN SATURATION: 97 % | RESPIRATION RATE: 16 BRPM | WEIGHT: 175.9 LBS | HEART RATE: 72 BPM | DIASTOLIC BLOOD PRESSURE: 55 MMHG | BODY MASS INDEX: 33.21 KG/M2 | TEMPERATURE: 98 F

## 2022-10-02 PROBLEM — Z94.0 KIDNEY REPLACED BY TRANSPLANT: Status: ACTIVE | Noted: 2022-10-02

## 2022-10-02 PROBLEM — D84.9 IMMUNOSUPPRESSED STATUS (H): Chronic | Status: ACTIVE | Noted: 2022-10-02

## 2022-10-02 PROBLEM — E87.70 HYPERVOLEMIA: Status: ACTIVE | Noted: 2022-10-02

## 2022-10-02 PROBLEM — E83.39 HYPOPHOSPHATEMIA: Status: ACTIVE | Noted: 2022-10-02

## 2022-10-02 PROBLEM — D84.9 IMMUNOSUPPRESSED STATUS (H): Status: ACTIVE | Noted: 2022-10-02

## 2022-10-02 PROBLEM — Z76.82 PANCREAS TRANSPLANT CANDIDATE: Status: RESOLVED | Noted: 2022-09-23 | Resolved: 2022-10-02

## 2022-10-02 PROBLEM — Z94.83 PANCREAS REPLACED BY TRANSPLANT (H): Status: ACTIVE | Noted: 2022-10-02

## 2022-10-02 PROBLEM — R19.7 DIARRHEA: Status: ACTIVE | Noted: 2022-10-02

## 2022-10-02 PROBLEM — Z94.0 KIDNEY REPLACED BY TRANSPLANT: Chronic | Status: ACTIVE | Noted: 2022-10-02

## 2022-10-02 PROBLEM — Z94.83 PANCREAS REPLACED BY TRANSPLANT (H): Chronic | Status: ACTIVE | Noted: 2022-10-02

## 2022-10-02 PROBLEM — E83.42 HYPOMAGNESEMIA: Status: ACTIVE | Noted: 2022-10-02

## 2022-10-02 LAB
AMYLASE SERPL-CCNC: 138 U/L (ref 28–100)
ANION GAP SERPL CALCULATED.3IONS-SCNC: 8 MMOL/L (ref 7–15)
BASOPHILS # BLD AUTO: 0 10E3/UL (ref 0–0.2)
BASOPHILS NFR BLD AUTO: 0 %
BUN SERPL-MCNC: 62.5 MG/DL (ref 6–20)
CALCIUM SERPL-MCNC: 8.4 MG/DL (ref 8.6–10)
CHLORIDE SERPL-SCNC: 105 MMOL/L (ref 98–107)
CREAT SERPL-MCNC: 3.15 MG/DL (ref 0.51–0.95)
DEPRECATED HCO3 PLAS-SCNC: 26 MMOL/L (ref 22–29)
EOSINOPHIL # BLD AUTO: 0.2 10E3/UL (ref 0–0.7)
EOSINOPHIL NFR BLD AUTO: 2 %
ERYTHROCYTE [DISTWIDTH] IN BLOOD BY AUTOMATED COUNT: 17.1 % (ref 10–15)
GFR SERPL CREATININE-BSD FRML MDRD: 17 ML/MIN/1.73M2
GLUCOSE BLDC GLUCOMTR-MCNC: 106 MG/DL (ref 70–99)
GLUCOSE BLDC GLUCOMTR-MCNC: 112 MG/DL (ref 70–99)
GLUCOSE BLDC GLUCOMTR-MCNC: 94 MG/DL (ref 70–99)
GLUCOSE SERPL-MCNC: 107 MG/DL (ref 70–99)
HCT VFR BLD AUTO: 24.5 % (ref 35–47)
HGB BLD-MCNC: 8 G/DL (ref 11.7–15.7)
IMM GRANULOCYTES # BLD: 0.2 10E3/UL
IMM GRANULOCYTES NFR BLD: 2 %
LIPASE SERPL-CCNC: 95 U/L (ref 13–60)
LYMPHOCYTES # BLD AUTO: 0.3 10E3/UL (ref 0.8–5.3)
LYMPHOCYTES NFR BLD AUTO: 3 %
MAGNESIUM SERPL-MCNC: 1.9 MG/DL (ref 1.7–2.3)
MCH RBC QN AUTO: 33.3 PG (ref 26.5–33)
MCHC RBC AUTO-ENTMCNC: 32.7 G/DL (ref 31.5–36.5)
MCV RBC AUTO: 102 FL (ref 78–100)
MONOCYTES # BLD AUTO: 0.9 10E3/UL (ref 0–1.3)
MONOCYTES NFR BLD AUTO: 10 %
NEUTROPHILS # BLD AUTO: 7.5 10E3/UL (ref 1.6–8.3)
NEUTROPHILS NFR BLD AUTO: 83 %
NRBC # BLD AUTO: 0 10E3/UL
NRBC BLD AUTO-RTO: 0 /100
PHOSPHATE SERPL-MCNC: 1.9 MG/DL (ref 2.5–4.5)
PLATELET # BLD AUTO: 251 10E3/UL (ref 150–450)
POTASSIUM SERPL-SCNC: 3.7 MMOL/L (ref 3.4–5.3)
RBC # BLD AUTO: 2.4 10E6/UL (ref 3.8–5.2)
SODIUM SERPL-SCNC: 139 MMOL/L (ref 136–145)
TACROLIMUS BLD-MCNC: 11 UG/L (ref 5–15)
TME LAST DOSE: NORMAL H
TME LAST DOSE: NORMAL H
WBC # BLD AUTO: 9.1 10E3/UL (ref 4–11)

## 2022-10-02 PROCEDURE — 99024 POSTOP FOLLOW-UP VISIT: CPT | Performed by: SURGERY

## 2022-10-02 PROCEDURE — 250N000011 HC RX IP 250 OP 636: Performed by: NURSE PRACTITIONER

## 2022-10-02 PROCEDURE — 83735 ASSAY OF MAGNESIUM: CPT | Performed by: SURGERY

## 2022-10-02 PROCEDURE — 250N000012 HC RX MED GY IP 250 OP 636 PS 637: Performed by: PHYSICIAN ASSISTANT

## 2022-10-02 PROCEDURE — 250N000013 HC RX MED GY IP 250 OP 250 PS 637: Performed by: PHYSICIAN ASSISTANT

## 2022-10-02 PROCEDURE — 83690 ASSAY OF LIPASE: CPT | Performed by: SURGERY

## 2022-10-02 PROCEDURE — 36592 COLLECT BLOOD FROM PICC: CPT | Performed by: PHYSICIAN ASSISTANT

## 2022-10-02 PROCEDURE — 84100 ASSAY OF PHOSPHORUS: CPT | Performed by: SURGERY

## 2022-10-02 PROCEDURE — 250N000013 HC RX MED GY IP 250 OP 250 PS 637: Performed by: SURGERY

## 2022-10-02 PROCEDURE — 85025 COMPLETE CBC W/AUTO DIFF WBC: CPT | Performed by: PHYSICIAN ASSISTANT

## 2022-10-02 PROCEDURE — 250N000013 HC RX MED GY IP 250 OP 250 PS 637

## 2022-10-02 PROCEDURE — 80048 BASIC METABOLIC PNL TOTAL CA: CPT | Performed by: SURGERY

## 2022-10-02 PROCEDURE — 80197 ASSAY OF TACROLIMUS: CPT | Performed by: PHYSICIAN ASSISTANT

## 2022-10-02 PROCEDURE — 99233 SBSQ HOSP IP/OBS HIGH 50: CPT | Performed by: INTERNAL MEDICINE

## 2022-10-02 PROCEDURE — 82150 ASSAY OF AMYLASE: CPT | Performed by: SURGERY

## 2022-10-02 PROCEDURE — 250N000013 HC RX MED GY IP 250 OP 250 PS 637: Performed by: NURSE PRACTITIONER

## 2022-10-02 PROCEDURE — 250N000012 HC RX MED GY IP 250 OP 636 PS 637: Performed by: SURGERY

## 2022-10-02 RX ORDER — PREDNISONE 10 MG/1
TABLET ORAL
Qty: 29 TABLET | Refills: 0 | Status: SHIPPED | OUTPATIENT
Start: 2022-10-03 | End: 2022-10-21

## 2022-10-02 RX ORDER — MYCOPHENOLATE MOFETIL 250 MG/1
1000 CAPSULE ORAL 2 TIMES DAILY
Qty: 240 CAPSULE | Refills: 11 | Status: SHIPPED | OUTPATIENT
Start: 2022-10-02 | End: 2022-10-21

## 2022-10-02 RX ORDER — CARVEDILOL 12.5 MG/1
12.5 TABLET ORAL 2 TIMES DAILY WITH MEALS
Qty: 60 TABLET | Refills: 11 | Status: SHIPPED | OUTPATIENT
Start: 2022-10-02 | End: 2023-09-20

## 2022-10-02 RX ORDER — TACROLIMUS 0.5 MG/1
CAPSULE ORAL
Qty: 60 CAPSULE | Refills: 0 | Status: SHIPPED | OUTPATIENT
Start: 2022-10-02 | End: 2022-10-14

## 2022-10-02 RX ORDER — TACROLIMUS 1 MG/1
1 CAPSULE ORAL 2 TIMES DAILY
Qty: 60 CAPSULE | Refills: 11 | Status: SHIPPED | OUTPATIENT
Start: 2022-10-02 | End: 2022-10-14

## 2022-10-02 RX ORDER — VALGANCICLOVIR 450 MG/1
TABLET, FILM COATED ORAL
Qty: 60 TABLET | Refills: 2 | Status: SHIPPED | OUTPATIENT
Start: 2022-10-02 | End: 2022-10-14

## 2022-10-02 RX ORDER — ATORVASTATIN CALCIUM 20 MG/1
20 TABLET, FILM COATED ORAL EVERY EVENING
Qty: 30 TABLET | Refills: 11 | Status: SHIPPED | OUTPATIENT
Start: 2022-10-02 | End: 2023-09-20

## 2022-10-02 RX ORDER — MAGNESIUM OXIDE 400 MG/1
400 TABLET ORAL 2 TIMES DAILY
Qty: 60 TABLET | Refills: 11 | Status: SHIPPED | OUTPATIENT
Start: 2022-10-02 | End: 2023-02-06

## 2022-10-02 RX ORDER — ACETAMINOPHEN 325 MG/1
650 TABLET ORAL EVERY 4 HOURS PRN
Qty: 100 TABLET | Refills: 0 | Status: SHIPPED | OUTPATIENT
Start: 2022-10-02 | End: 2023-02-06

## 2022-10-02 RX ORDER — FUROSEMIDE 40 MG
40 TABLET ORAL
Qty: 30 TABLET | Refills: 0 | Status: SHIPPED | OUTPATIENT
Start: 2022-10-02 | End: 2022-10-07

## 2022-10-02 RX ORDER — ASPIRIN 325 MG
325 TABLET, DELAYED RELEASE (ENTERIC COATED) ORAL DAILY
Qty: 30 TABLET | Refills: 11 | Status: SHIPPED | OUTPATIENT
Start: 2022-10-03 | End: 2023-04-04

## 2022-10-02 RX ORDER — FUROSEMIDE 10 MG/ML
40 INJECTION INTRAMUSCULAR; INTRAVENOUS ONCE
Status: COMPLETED | OUTPATIENT
Start: 2022-10-02 | End: 2022-10-02

## 2022-10-02 RX ORDER — FUROSEMIDE 40 MG
40 TABLET ORAL
Status: DISCONTINUED | OUTPATIENT
Start: 2022-10-02 | End: 2022-10-02 | Stop reason: HOSPADM

## 2022-10-02 RX ORDER — CLOTRIMAZOLE 10 MG/1
10 LOZENGE ORAL 4 TIMES DAILY
Qty: 308 LOZENGE | Refills: 0 | Status: SHIPPED | OUTPATIENT
Start: 2022-10-02 | End: 2022-12-18

## 2022-10-02 RX ORDER — SULFAMETHOXAZOLE AND TRIMETHOPRIM 400; 80 MG/1; MG/1
1 TABLET ORAL
Qty: 13 TABLET | Refills: 11 | Status: SHIPPED | OUTPATIENT
Start: 2022-10-03 | End: 2022-10-27

## 2022-10-02 RX ORDER — PANTOPRAZOLE SODIUM 40 MG/1
40 TABLET, DELAYED RELEASE ORAL
Qty: 30 TABLET | Refills: 0 | Status: SHIPPED | OUTPATIENT
Start: 2022-10-03 | End: 2022-11-01

## 2022-10-02 RX ORDER — GUAR GUM
1 PACKET (EA) ORAL 2 TIMES DAILY
Qty: 60 PACKET | Refills: 0 | Status: SHIPPED | OUTPATIENT
Start: 2022-10-02 | End: 2022-11-04

## 2022-10-02 RX ADMIN — TACROLIMUS 1 MG: 1 CAPSULE ORAL at 08:53

## 2022-10-02 RX ADMIN — VENLAFAXINE HYDROCHLORIDE 150 MG: 150 CAPSULE, EXTENDED RELEASE ORAL at 08:50

## 2022-10-02 RX ADMIN — PANTOPRAZOLE SODIUM 40 MG: 40 TABLET, DELAYED RELEASE ORAL at 08:51

## 2022-10-02 RX ADMIN — CARVEDILOL 12.5 MG: 12.5 TABLET, FILM COATED ORAL at 08:51

## 2022-10-02 RX ADMIN — PREDNISONE 20 MG: 20 TABLET ORAL at 08:52

## 2022-10-02 RX ADMIN — MAGNESIUM OXIDE TAB 400 MG (241.3 MG ELEMENTAL MG) 400 MG: 400 (241.3 MG) TAB at 13:33

## 2022-10-02 RX ADMIN — MYCOPHENOLATE MOFETIL 1000 MG: 250 CAPSULE ORAL at 08:52

## 2022-10-02 RX ADMIN — Medication 1 TABLET: at 13:34

## 2022-10-02 RX ADMIN — MAGNESIUM HYDROXIDE 30 ML: 400 SUSPENSION ORAL at 01:54

## 2022-10-02 RX ADMIN — FUROSEMIDE 40 MG: 10 INJECTION, SOLUTION INTRAMUSCULAR; INTRAVENOUS at 11:08

## 2022-10-02 RX ADMIN — CLOTRIMAZOLE 10 MG: 10 LOZENGE ORAL at 13:33

## 2022-10-02 RX ADMIN — SODIUM PHOSPHATE, DIBASIC, ANHYDROUS, POTASSIUM PHOSPHATE, MONOBASIC, AND SODIUM PHOSPHATE, MONOBASIC, MONOHYDRATE 500 MG: 852; 155; 130 TABLET, COATED ORAL at 08:50

## 2022-10-02 RX ADMIN — CLOTRIMAZOLE 10 MG: 10 LOZENGE ORAL at 08:51

## 2022-10-02 RX ADMIN — ASPIRIN 325 MG: 325 TABLET, COATED ORAL at 08:51

## 2022-10-02 ASSESSMENT — ACTIVITIES OF DAILY LIVING (ADL)
ADLS_ACUITY_SCORE: 21

## 2022-10-02 NOTE — PLAN OF CARE
Physical Therapy Discharge Summary    Reason for therapy discharge:    Discharged to home.    Progress towards therapy goal(s). See goals on Care Plan in [facility] electronic health record for goal details.  Goals partially met.  Barriers to achieving goals:   discharge from facility.    Therapy recommendation(s):    No further therapy is recommended. FWW vended for discharge.

## 2022-10-02 NOTE — PROGRESS NOTES
"VS: BP (!) 142/73 (BP Location: Left arm, Patient Position: Semi-Alejandro's, Cuff Size: Adult Regular)   Pulse 72   Temp 99  F (37.2  C) (Oral)   Resp 18   Ht 1.549 m (5' 1\")   Wt 80.3 kg (177 lb)   LMP 09/18/2022   SpO2 98%   BMI 33.44 kg/m      Cares: 1900 - 2300     Current condition: stable and lying in bed  Neuro: AOx4  Cardio: slightly hypertensive in the 140/70s   Respiratory: dyspnea on exertion, LS - clear on RA  GI/: voiding adequately without issues, LBM 10-1   Skin: midline abdominal incision - staple, approximated, VICTORINA  - bruising around incision site  Diet: NPO @ 0000 for possible renal biopsy 10-2   Labs: creatinine: 3.43, phosphorus: 2.0, amylase and lipase labs elevated   BG: ACHS (114), sliding scale insulin   LDA: Left PIV - SL, triple lumen internal jugular - SL, Right fistula   Mobility: Ind.   Pain: reports mild pain at ankles - denied intervention  PRN medications: none given   Plan of Care: plan for possible renal biospy 10-2, continue with current POC and update MD with any changes      "

## 2022-10-02 NOTE — PLAN OF CARE
Goal Outcome Evaluation:    Plan of Care Reviewed With: patient     Overall Patient Progress: improving     Vitals stable. On room air. Voided adequately. Small bowel movement. Ambulating independently. Regular diet. Eating adequate. BG stable and within defined parameters. Not requiring insulin. Discharge education and teaching. Discharge orders written and reviewed by patient. Updated lab book and Medcard. Left facility via foot with walker at 3:30pm accompanied by . To follow up in the morning with ATC.

## 2022-10-02 NOTE — PLAN OF CARE
"BP (!) 143/66 (BP Location: Left arm, Patient Position: Sitting, Cuff Size: Adult Regular)   Pulse 77   Temp 97.3  F (36.3  C) (Oral)   Resp 16   Ht 1.549 m (5' 1\")   Wt 79.8 kg (175 lb 14.4 oz)   LMP 09/18/2022   SpO2 98%   BMI 33.24 kg/m      Independent in room. Vitally stable. NPO for procedure today. Denies pain/nausea. Small BM with little relief. Voiding adequately.                             "

## 2022-10-02 NOTE — CONSULTS
Care Management Follow Up    Length of Stay (days): 10    Expected Discharge Date: 10/02/2022     Concerns to be Addressed: Home Care       Patient plan of care discussed at interdisciplinary rounds: Yes    Anticipated Discharge Disposition:  Home with labs at infusion center      Anticipated Discharge Services:  Infusion Center (ATC)  Anticipated Discharge DME:  Walker       Education Provided on the Discharge Plan:  Yes  Patient/Family in Agreement with the Plan:  Yes    Referrals Placed by CM/SW:  Home Health       Additional Information:  Pending referral for the following home health agencies:     [facility]   [address] [facility] [address] [facility] [address]  Ph:[phone]  Fax: [phone]  9/30: Agency reviewing      [facility]   [address] [facility] [address]  Ph:[phone]  Fax:[phone]  9/28: sent referral  9/29: Left message for admissions for call back  9/30 VM left requesting return call    [facility]   [phone] ()  Has referral from 9/27  Re-assessing staffing capacity on 10/3.       Patient will discharge with pending referrals and if they call and get set up, RNCC weekday can get this arranged. Patient will, at this time, get labs at ATC and DME at store after discharge. Patient is understanding and agreeable to this plan as well as provider at this time.     SPENSER Alcocer., BSN., RN  Nurse Care Coordinator    [address] [place] [phone]  [email or web address]  [email or web address]  Employed by [facility]    To get in touch with the Weekend & Holiday on call RN Care Coordinator:  Pager:  [phone] [place] Care Coordinator job code/pager [phone]  To contact the weekend RNCC  [place] (0800 - 1630) Saturday and Sunday    Units: 4A, 4C, 4E, 5A and 5B- Pager 1: [phone]    Units: 6A, 6B, 6C, 6D- Pager 2: [phone]    Units: 7A, 7B, 7C, 7D, and 5C-Pager 3:  [phone]

## 2022-10-02 NOTE — PHARMACY-TRANSPLANT NOTE
Solid Organ Transplant Recipient Prior to Discharge Note    52 year old female s/p SPK transplant on 9/23/2022.    Immunosuppression plan at discharge:  -Mycophenolate mofetil 1000mg by mouth twice daily about 12 hours apart  -Tacrolimus 1mg by mouth twice daily about 12 hours apart, goal trough level 8-10 mcg/L   Tacrolimus level on 10/2 was 11 mcg/L, 12 hour trough on a regimen of 1mg by mouth twice daily for 3 days.   -Prednisone taper: Take 2 tablets (20 mg) by mouth daily for 4 days (10/3-10/6), THEN 1.5 tablets (15 mg) daily for 7 days (10/7-10/13), THEN 1 tablet (10 mg) daily for 7 days (10/14-10/20), THEN 0.5 tablets (5 mg) daily for 7 days (10/21-10/27).    Prophylaxis:  -Clotrimazole 10mg trohe four times daily for 3 months  -Sulfamethoxazole-trimethoprim 400-80mg by mouth three times weekly for lifetime (adjust to 400-80mg by mouth daily when creatinine clearance > 30 ml/min)  -Valganciclovir 450mg by mouth twice weekly for 3 months (CMV: donor- recipient - ; adjust to 900mg by mouth daily when creatinine clearance > 60 ml/min)    Pharmacy has monitored for medication interactions and immunosuppression levels in conjunction with the multidisciplinary team. In anticipation for discharge, medication therapy needs have been addressed daily throughout the current admission via multidisciplinary rounds and/or discussions, order verification, daily clinical pharmacy review, and communication with prescribers.  Mame Phillips, Pharm.D., [facility], B[facility]P  Pager [phone]

## 2022-10-02 NOTE — PROGRESS NOTES
[facility]   Transplant Nephrology Progress Note  Date of Admission:  9/22/2022  Today's Date: 10/02/2022    Recommendations:  - Recommend continued diuresis with furosemide 40 mg bid.    Assessment & Plan   # DDKT (SPK): Trend down in creatinine after a plateau for several days.  Good urine output.  Renogram showed good flow, but likely ATN.  No acute indications for dialysis.   - Baseline Creatinine: ~ TBD   - Proteinuria: Not checked post transplant   - Date DSA Last Checked: Sep/2022      Latest DSA: No DSA at time of transplant   - BK Viremia: Not checked post transplant   - Kidney Tx Biopsy: No    # Pancreas Tx (SPK):    - Pancreatic Exocrine Drainage: Enteric drained     - Blood glucose: Euglycemia      On insulin: No   - HbA1c: Last checked at time of transplant      Latest HbA1c: 6.9%   - Pancreatic enzymes: Trend down   - Date DSA Last Checked: Sep/2022  Latest DSA: No DSA at time of transplant   - Pancreas Tx Biopsy: No    # Immunosuppression: Tacrolimus immediate release (goal 8-10), Mycophenolate mofetil (dose 1000 mg every 12 hours) and Prednisone (dose taper)    - Induction with Recent Transplant:  High Intensity   - Changes: No    # Infection Prophylaxis:   - PJP: Sulfa/TMP (Bactrim)  - CMV: Valganciclovir (Valcyte); CMV IgG Ab recipient and donor negative  - Thrush: Clotrimazole gene (Mycelex) and Micafungin (Mycamine)    # Hypertension: Controlled;  Goal BP: < 150/90   - Volume status: Moderately hypervolemic   - Changes: Yes - Will continue with diuresis.    # Anemia in Chronic Renal Disease: Hgb: Stable      SHANEKA: No   - Iron studies: Unknown at this time, but checked with dialysis    # Mineral Bone Disorder:   - Secondary renal hyperparathyroidism; PTH level: Unknown at this time, but checked with dialysis        On treatment: None  - Vitamin D; level: Unknown at this time, but checked with dialysis        On supplement: Yes  - Calcium; level:  Normal        On supplement: Yes  - Phosphorus; level: Stable low        On supplement: No    # Electrolytes:   - Potassium; level: Normal        On supplement: No  - Magnesium; level: Normal        On supplement: Yes  - Bicarbonate; level: Normal        On supplement: No     # GERD: Occasional symptoms on PPI.    # H/o TIA (2017): No residual deficients     # Transplant History:  Etiology of Kidney Failure: Diabetes mellitus type 1  Tx: DDKT (SPK)  Transplant: 9/23/2022 (Kidney / Pancreas)  Significant changes in immunosuppression: None  Significant transplant-related complications: None    Recommendations were communicated to the primary team via this note.    Bertrand Moore MD   Pager: [phone]    Interval History   Ms. Hess's creatinine is 3.15 (10/02 0543); Trend down.  Serum lipase trend down.  Good urine output and weight is down ~ 1 lb.  Other significant labs/tests/vitals: Stable electrolytes.  Stable hemoglobin.  No new events overnight.  No chest pain, but still some shortness of breath.  Stable leg swelling.  No nausea and vomiting.  Bowel movements are loose.  No fever, sweats or chills.    Review of Systems   4 point ROS was obtained and negative except as noted in the Interval History.    MEDICATIONS:    aspirin  325 mg Oral Daily     atorvastatin  20 mg Oral QPM     calcium carbonate-vitamin D  1 tablet Oral BID w/meals     carvedilol  12.5 mg Oral BID w/meals     clotrimazole  10 mg Buccal 4x Daily     fiber modular (NUTRISOURCE FIBER)  1 packet Per Feeding Tube BID     insulin aspart  1-7 Units Subcutaneous TID AC     insulin aspart  1-5 Units Subcutaneous At Bedtime     magnesium oxide  400 mg Oral BID     mycophenolate  1,000 mg Oral BID IS     pantoprazole  40 mg Oral QAM AC     phosphorus tablet 250 mg  500 mg Oral BID     predniSONE  20 mg Oral Daily    Followed by     [START ON 10/7/2022] predniSONE  15 mg Oral Daily    Followed by     [START ON 10/14/2022] predniSONE  10 mg Oral  "Daily    Followed by     [START ON 10/21/2022] predniSONE  5 mg Oral Daily     sodium chloride (PF)  3 mL Intravenous Q8H     sulfamethoxazole-trimethoprim  1 tablet Oral Once per day on      tacrolimus  1 mg Oral BID IS     valGANciclovir  450 mg Oral Once per day on      venlafaxine  150 mg Oral Daily         Physical Exam   Temp  Av.3  F (36.8  C)  Min: 97.7  F (36.5  C)  Max: 98.9  F (37.2  C)  Arterial Line BP  Min: 39/39  Max: 153/64  Arterial Line MAP (mmHg)  Av.6 mmHg  Min: 39 mmHg  Max: 95 mmHg      Pulse  Av.1  Min: 71  Max: 106 Resp  Av.1  Min: 9  Max: 18  SpO2  Av.2 %  Min: 92 %  Max: 100 %    CVP (mmHg):  (abx infusing)BP (!) 151/85 (BP Location: Left arm, Patient Position: Left side, Cuff Size: Adult Regular)   Pulse 84   Temp 97.2  F (36.2  C) (Oral)   Resp 16   Ht 1.549 m (5' 1\")   Wt 79.8 kg (175 lb 14.4 oz)   LMP 2022   SpO2 96%   BMI 33.24 kg/m     Date 22 07 - 22 0659   Shift 0892-1838 7347-3645 1326-9966 24 Hour Total   INTAKE   I.V. 108.5   108.5   NG/GT 15   15   Shift Total(mL/kg) 123.5(1.6)   123.5(1.6)   OUTPUT   Urine 195   195   Emesis/NG output 50   50   Shift Total(mL/kg) 245(3.18)   245(3.18)   Weight (kg) 77.1 77.1 77.1 77.1      Admit Weight: 69.8 kg (153 lb 14.1 oz)     GENERAL APPEARANCE: alert and no distress  HENT: mouth without ulcers or lesions  RESP: lungs clear to auscultation - no rales, rhonchi or wheezes  CV: regular rhythm, normal rate, no rub, no murmur  EDEMA: 1+ LE edema bilaterally  ABDOMEN: soft, nondistended, nontender, bowel sounds normal  MS: extremities normal - no gross deformities noted, no evidence of inflammation in joints, no muscle tenderness  SKIN: no rash  TX KIDNEY: minimal TTP  DIALYSIS ACCESS:  RUE AV fistula with good thrill    Data   All labs reviewed by me.  CMP  Recent Labs   Lab 10/02/22  0800 10/02/22  0543 10/02/22  0115 10/01/22  2159 10/01/22  0752 10/01/22  0550 " 09/30/22  2223 09/30/22  2043 09/30/22  0559 09/30/22  0540 09/29/22  0746 09/29/22  0602   NA  --  139  --   --   --  139  --   --   --  136  --  140   POTASSIUM  --  3.7  --   --   --  4.0  --   --   --  4.0  --  3.6   CHLORIDE  --  105  --   --   --  106  --   --   --  105  --  106   CO2  --  26  --   --   --  24  --   --   --  24  --  23   ANIONGAP  --  8  --   --   --  9  --   --   --  7  --  11   GLC 94 107* 106* 114*   < > 100*   < >  --    < > 101*   < > 96   BUN  --  62.5*  --   --   --  59.0*  --   --   --  46.6*  --  40.4*   CR  --  3.15*  --   --   --  3.43*  --  3.49*  --  3.26*  --  3.28*   GFRESTIMATED  --  17*  --   --   --  15*  --  15*  --  16*  --  16*   BARBARA  --  8.4*  --   --   --  8.8  --   --   --  8.2*  --  8.7   MAG  --  1.9  --   --   --  1.9  --   --   --  1.7  --  1.7   PHOS  --  1.9*  --   --   --  2.0*  --   --   --  2.4*  --  2.2*    < > = values in this interval not displayed.     CBC  Recent Labs   Lab 10/02/22  0543 10/01/22  0550 09/30/22  0540 09/29/22  0602   HGB 8.0* 8.5* 7.9* 8.7*   WBC 9.1 10.2 7.9 9.2   RBC 2.40* 2.53* 2.34* 2.61*   HCT 24.5* 26.2* 23.8* 26.8*   * 104* 102* 103*   MCH 33.3* 33.6* 33.8* 33.3*   MCHC 32.7 32.4 33.2 32.5   RDW 17.1* 17.1* 17.2* 17.0*    268 221 241     INR  Recent Labs   Lab 09/29/22  0602 09/28/22  0623 09/27/22  0734 09/26/22  0319   PTT 39* 46* 35 39*     ABG  No lab results found in last 7 days.   Urine Studies  Recent Labs   Lab Test 09/22/22  1714 08/13/20  1110   COLOR Light Yellow Yellow   APPEARANCE Clear Slightly Cloudy   URINEGLC 300 * >499*   URINEBILI Negative Negative   URINEKETONE Negative 5*   SG 1.011 1.016   UBLD Small* Small*   URINEPH 8.0* 5.0   PROTEIN 200 * >499*   NITRITE Negative Negative   LEUKEST Negative Negative   RBCU 1 3*   WBCU 6* 3     No lab results found.  PTH  No lab results found.  Iron Studies  No lab results found.    IMAGING:  All imaging studies reviewed by me.

## 2022-10-03 ENCOUNTER — APPOINTMENT (OUTPATIENT)
Dept: LAB | Facility: CLINIC | Age: 52
End: 2022-10-03
Attending: NURSE PRACTITIONER
Payer: COMMERCIAL

## 2022-10-03 ENCOUNTER — TELEPHONE (OUTPATIENT)
Dept: TRANSPLANT | Facility: CLINIC | Age: 52
End: 2022-10-03

## 2022-10-03 ENCOUNTER — TELEPHONE (OUTPATIENT)
Dept: PHARMACY | Facility: OTHER | Age: 52
End: 2022-10-03

## 2022-10-03 ENCOUNTER — INFUSION THERAPY VISIT (OUTPATIENT)
Dept: INFUSION THERAPY | Facility: CLINIC | Age: 52
End: 2022-10-03
Attending: NURSE PRACTITIONER
Payer: COMMERCIAL

## 2022-10-03 VITALS
SYSTOLIC BLOOD PRESSURE: 146 MMHG | HEART RATE: 90 BPM | TEMPERATURE: 97.9 F | RESPIRATION RATE: 16 BRPM | WEIGHT: 174.2 LBS | BODY MASS INDEX: 32.91 KG/M2 | DIASTOLIC BLOOD PRESSURE: 69 MMHG

## 2022-10-03 DIAGNOSIS — Z94.83 PANCREAS REPLACED BY TRANSPLANT (H): Primary | ICD-10-CM

## 2022-10-03 DIAGNOSIS — N18.6 END STAGE RENAL DISEASE (H): ICD-10-CM

## 2022-10-03 DIAGNOSIS — Z48.298 AFTERCARE FOLLOWING ORGAN TRANSPLANT: Primary | ICD-10-CM

## 2022-10-03 DIAGNOSIS — N19 UNSPECIFIED KIDNEY FAILURE: ICD-10-CM

## 2022-10-03 LAB
AMYLASE SERPL-CCNC: 136 U/L (ref 28–100)
ANION GAP SERPL CALCULATED.3IONS-SCNC: 11 MMOL/L (ref 7–15)
BASOPHILS # BLD AUTO: 0 10E3/UL (ref 0–0.2)
BASOPHILS NFR BLD AUTO: 0 %
BUN SERPL-MCNC: 58.5 MG/DL (ref 6–20)
CALCIUM SERPL-MCNC: 8.9 MG/DL (ref 8.6–10)
CHLORIDE SERPL-SCNC: 105 MMOL/L (ref 98–107)
CREAT SERPL-MCNC: 2.95 MG/DL (ref 0.51–0.95)
DEPRECATED CALCIDIOL+CALCIFEROL SERPL-MC: 60 UG/L (ref 20–75)
DEPRECATED HCO3 PLAS-SCNC: 25 MMOL/L (ref 22–29)
EOSINOPHIL # BLD AUTO: 0.2 10E3/UL (ref 0–0.7)
EOSINOPHIL NFR BLD AUTO: 2 %
ERYTHROCYTE [DISTWIDTH] IN BLOOD BY AUTOMATED COUNT: 17 % (ref 10–15)
FERRITIN SERPL-MCNC: 923 NG/ML (ref 11–328)
GFR SERPL CREATININE-BSD FRML MDRD: 18 ML/MIN/1.73M2
GLUCOSE SERPL-MCNC: 93 MG/DL (ref 70–99)
HCT VFR BLD AUTO: 23 % (ref 35–47)
HGB BLD-MCNC: 7.4 G/DL (ref 11.7–15.7)
IMM GRANULOCYTES # BLD: 0.2 10E3/UL
IMM GRANULOCYTES NFR BLD: 2 %
IRON BINDING CAPACITY (ROCHE): 197 UG/DL (ref 240–430)
IRON SATN MFR SERPL: 17 % (ref 15–46)
IRON SERPL-MCNC: 34 UG/DL (ref 37–145)
LIPASE SERPL-CCNC: 118 U/L (ref 13–60)
LYMPHOCYTES # BLD AUTO: 0.3 10E3/UL (ref 0.8–5.3)
LYMPHOCYTES NFR BLD AUTO: 3 %
MAGNESIUM SERPL-MCNC: 1.8 MG/DL (ref 1.7–2.3)
MCH RBC QN AUTO: 32.6 PG (ref 26.5–33)
MCHC RBC AUTO-ENTMCNC: 32.2 G/DL (ref 31.5–36.5)
MCV RBC AUTO: 101 FL (ref 78–100)
MONOCYTES # BLD AUTO: 1 10E3/UL (ref 0–1.3)
MONOCYTES NFR BLD AUTO: 10 %
NEUTROPHILS # BLD AUTO: 8.4 10E3/UL (ref 1.6–8.3)
NEUTROPHILS NFR BLD AUTO: 83 %
NRBC # BLD AUTO: 0 10E3/UL
NRBC BLD AUTO-RTO: 0 /100
PHOSPHATE SERPL-MCNC: 2.7 MG/DL (ref 2.5–4.5)
PLATELET # BLD AUTO: 243 10E3/UL (ref 150–450)
POTASSIUM SERPL-SCNC: 3.7 MMOL/L (ref 3.4–5.3)
PTH-INTACT SERPL-MCNC: 268 PG/ML (ref 15–65)
RBC # BLD AUTO: 2.27 10E6/UL (ref 3.8–5.2)
SODIUM SERPL-SCNC: 141 MMOL/L (ref 136–145)
TACROLIMUS BLD-MCNC: 10.1 UG/L (ref 5–15)
TME LAST DOSE: NORMAL H
TME LAST DOSE: NORMAL H
WBC # BLD AUTO: 10.1 10E3/UL (ref 4–11)

## 2022-10-03 PROCEDURE — 82728 ASSAY OF FERRITIN: CPT

## 2022-10-03 PROCEDURE — 83550 IRON BINDING TEST: CPT

## 2022-10-03 PROCEDURE — G0463 HOSPITAL OUTPT CLINIC VISIT: HCPCS

## 2022-10-03 PROCEDURE — 85025 COMPLETE CBC W/AUTO DIFF WBC: CPT

## 2022-10-03 PROCEDURE — 82150 ASSAY OF AMYLASE: CPT

## 2022-10-03 PROCEDURE — 36415 COLL VENOUS BLD VENIPUNCTURE: CPT

## 2022-10-03 PROCEDURE — 82306 VITAMIN D 25 HYDROXY: CPT

## 2022-10-03 PROCEDURE — 83735 ASSAY OF MAGNESIUM: CPT

## 2022-10-03 PROCEDURE — 83970 ASSAY OF PARATHORMONE: CPT

## 2022-10-03 PROCEDURE — 999N000248 HC STATISTIC IV INSERT WITH US BY RN

## 2022-10-03 PROCEDURE — 80051 ELECTROLYTE PANEL: CPT

## 2022-10-03 PROCEDURE — 82947 ASSAY GLUCOSE BLOOD QUANT: CPT

## 2022-10-03 PROCEDURE — 99215 OFFICE O/P EST HI 40 MIN: CPT | Mod: 24 | Performed by: NURSE PRACTITIONER

## 2022-10-03 PROCEDURE — 84100 ASSAY OF PHOSPHORUS: CPT

## 2022-10-03 PROCEDURE — 83690 ASSAY OF LIPASE: CPT

## 2022-10-03 PROCEDURE — 80197 ASSAY OF TACROLIMUS: CPT

## 2022-10-03 NOTE — TELEPHONE ENCOUNTER
Eden is a recent kidney/pancreas transplant who discharged on 10/02/2022.    The patient will be responsible for managing medications.    Patient will  transplant supplies including 7 day pill organizer, thermometer, and BP monitor at the discharge pharmacy along with medications.      Patient chooses to receive medications from  specialty pharmacy.     **CAN PATIENT FILL AT [facility] FOR MEDICATIONS LISTED? Yes    PHARMACY BENEFIT: [facility]  PROCESSING INFO: ID# [id number] [facility]# [id number] PCN# [id number] BIN# [id number](EFFECTIVE (01/01/22)    DEDUCTIBLE (0) & MAX OUT-OF-POCKET ($3,000)  COPAY STRUCTURE:  $10 min & $125 max FOR GENERIC  $25min & $375 max FOR BRAND    TEST CLAIM SPECIALTY #28  MYCOPHENOLATE 250mg (#240/30DS)---$0  PROGRAF 1mg (#180/30DS)--Prior Auth Required  TACROLIMUS 1mg (#60/30DS)--$0  CYCLOSPORINE 100mg (#60/30DS)--$0  VALGANCICLOVIR 450mg (#60/30DS)--Prior Auth Required  VALACYCLOVIR 1gm (#90=30DS) - $0          Yaniv Vegas, Pharm.D.  [facility]  [phone]

## 2022-10-03 NOTE — PROGRESS NOTES
"Eden Hess came to [facility] today for a lab and assess following a kidney/pancreas transplant on 9/23/22.      Discharge date: 10/2/22  Transplant coordinator: Codie Kovacs RN  Phone number patient can be reached at: [phone]      Physical Assessment:  See physical assessment located under \"Document Flowsheets\".  Incision site: Bruised, staples, small amount of drainage  Lines: NA  Glez: NA  Urine clarity: Clear, no specks  Hydration: 2-3 liters education done on importance of drinking  Nutrition: slowly starting to get appetite   Last BM: diarrhea, started in the last few days, maybe check CDiff if still persistent on Tuesday  Pain: 4-5 lowback, incision, declined narcotics, will use the tylenol  My transplant place videos watched: I did not ask, please check on Tuesday  Labs drawn by [facility] staff: yes, patient was late, PIV remained in due to difficulty in getting in PIV, [facility]    Plan of care for today:   1. [facility] due to patient being late  2. Medication box filled with patient and SO Gallo, both Hermila and Gallo were engaged and asking great questions    Medication changes: none    Medications administered: none    Patient education: The following teaching topics were addressed: Importance of drinking 2L of non-caffeinated fluids daily, Incisional care, Signs/symptoms of infection, Good handwashing and Medications (purposes, doses and times of administration)   Patient and and SO Gallo verbalized understanding and all questions answered.    Drug level: Patient took 1mg of prograf/tacrolimus last evening at 2130.  Care coordinator to follow up with the result.    Face to face time: 60 minutes    Discharge Plan  Pt will follow up with [facility] on Tuesday morning at 0700, no lab appt, patient had PIV placed by vascular, received verbal order from Jacqueline Garcia NP to leave in overnight today  Discharge instructions reviewed with patient: YES  Patient/Representative " verbalized understanding, all questions answered: YES    Discharged from unit at 1000 with whom: Gallo to home.    Kary Wells, RN, BSN, CRNI

## 2022-10-03 NOTE — LETTER
10/3/2022         RE: Eden Hess  [address]  [place] [address]        Dear Colleague,    Thank you for referring your patient, Eden Hess, to the [facility]. Please see a copy of my visit note below.    TRANSPLANT NEPHROLOGY EARLY POST TRANSPLANT VISIT    Assessment & Plan   # DDKT (SPK): Trend down   - Baseline Creatinine: ~ tbd   - Proteinuria: Not checked post transplant   - Date DSA Last Checked: Sep/2022      Latest DSA: No DSA at time of transplant   - BK Viremia: Not checked recently   - Kidney Tx Biopsy: No   - Transplant Ureteral Stent: No     # Pancreas Tx (SPK):    - Pancreatic Exocrine Drainage: Enteric drained     - Blood glucose: Euglycemia      On insulin: No   - HbA1c: Last checked at time of transplant      Latest HbA1c: 6.9%   - Pancreatic enzymes: Increased    - Date DSA Last Checked: Sep/2022  Latest DSA: No DSA at time of transplant   - Pancreas Tx Biopsy: No    # Immunosuppression: Tacrolimus immediate release (goal 8-10), Mycophenolate mofetil (dose 1000 mg every 12 hours) and Prednisone (dose taper)   - Induction with Recent Transplant:  High Intensity   - Continue with intensive monitoring of immunosuppression for efficacy and toxicity.   - Changes: No    # Infection Prophylaxis:   - PJP: Sulfa/TMP (Bactrim)  - CMV: Valganciclovir (Valcyte)  - Fungal: Clotrimazole gene (Mycelex) and Micafungin (Mycamine)    # Hypertension: Controlled;  Goal BP: < 150/90   - Volume status: Mildly hypervolemic     - Changes: No changes, continue lasix.     # Diabetes: Controlled (HbA1c <7%) Last HbA1c: 6.9%   - Management as per primary team.    # Anemia in Chronic Renal Disease: Hgb: Decreased  -asymptomatic.     SHANEKA: No   - Iron studies: Low iron saturation, but high ferritin    # Mineral Bone Disorder:   - Secondary renal hyperparathyroidism; PTH level: Mildly elevated (151-300 pg/ml)        On treatment: None  - Vitamin D; level:  Normal        On supplement: No  - Calcium; level: Normal        On supplement: No  - Phosphorus; level: Normal        On supplement: Yes    # Electrolytes:   - Potassium; level: Normal        On supplement: No  - Magnesium; level: Normal        On supplement: Yes  - Bicarbonate; level: Normal        On supplement: No  - Sodium; level: Normal    # GERD: Occasional symptoms on PPI.     # H/o TIA (2017): No residual deficients     # COVID-19 Virus Review: Discussed COVID-19 virus and the potential medical risks.  Reviewed preventative health recommendations, including wearing a mask where appropriate.  Recommended COVID vaccination should be up to date with either an initial vaccination or booster shot when appropriate.  Asked the patient to inform the transplant center if they are exposed or diagnosed with this virus.    # Transplant History:  Etiology of Kidney Failure: Diabetes mellitus type 1  Tx: DDKT (SPK)  Transplant: 9/23/2022 (Kidney / Pancreas)  Donor Type: Donation after Brain Death Donor Class: Standard Criteria Donor  Crossmatch at time of Tx: negative  DSA at time of Tx: No  Significant changes in immunosuppression: None  CMV IgG Ab High Risk Discordance (D+/R-): No  EBV IgG Ab High Risk Discordance (D+/R-): No  Significant transplant-related complications: None    Transplant Office Phone Number: [phone]    Assessment and plan was discussed with the patient and she voiced her understanding and agreement.    Return visit: [facility] for labs and wound check tomorrow.   MELINDA Overton CNP    Chief Complaint   Ms. Hess is a 52 year old here for hospital follow up after kidney transplant.     History of Present Illness    Eden Hess is a 52 year old female with a past medical history significant for end stage kidney disease secondary to diabetic nephropathy. Other past medical history includes DM1 c/b neuropathy and retinopathy, hypertension, non obstructive CAD, anxiety and depression,  diastolic HFpEF (prior to starting HD, in setting of hypertensive urgency), and TIA (2017). She is now s/p pancreas transplant, kidney transplant without ureteral stent, and open appendectomy on 9/23/22 with Dr. Gómez.     Doing well. C/O loose stool x 1 day. Admits her fluid intake could be better. Not measuring urine output but feels it has been adequate. Fasting blood glucose 93 this AM.        Problem List   Patient Active Problem List   Diagnosis     Major depression in complete remission (H)     Diabetes mellitus type 1 (H)     Anemia of chronic disease     Insulin pump in place     TIA (transient ischemic attack)     Hypertension     End stage kidney disease (H)     Anxiety and depression     (HFpEF) heart failure with preserved ejection fraction (H)     Pancreas replaced by transplant (H)     Kidney replaced by transplant     Hypophosphatemia     Hypomagnesemia     Hypervolemia     Diarrhea     Immunosuppressed status (H)       Allergies   Allergies   Allergen Reactions     Amlodipine      Other reaction(s): Edema,generalized       Medications   Current Outpatient Medications   Medication Sig     acetaminophen (TYLENOL) 325 MG tablet Take 2 tablets (650 mg) by mouth every 4 hours as needed for pain     aspirin (ASA) 325 MG EC tablet Take 1 tablet (325 mg) by mouth daily     atorvastatin (LIPITOR) 20 MG tablet Take 1 tablet (20 mg) by mouth every evening     calcium carbonate-vitamin D (OS-BARBARA WITH D) 500-200 MG-UNIT tablet Take 1 tablet by mouth 2 times daily (with meals)     carvedilol (COREG) 12.5 MG tablet Take 1 tablet (12.5 mg) by mouth 2 times daily (with meals)     clotrimazole (MYCELEX) 10 MG lozenge Place 1 lozenge (10 mg) inside cheek 4 times daily for 77 days     furosemide (LASIX) 40 MG tablet Take 1 tablet (40 mg) by mouth 2 times daily     Guar Gum (FIBER MODULAR, NUTRISOURCE FIBER,) packet Take 1 packet by mouth 2 times daily     Insulin Disposable Pump (OMNIPOD DASH 5 PACK PODS) MISC  Inject 1 each Subcutaneous every 72 hours     magnesium oxide (MAG-OX) 400 MG tablet Take 1 tablet (400 mg) by mouth 2 times daily     mycophenolate (GENERIC EQUIVALENT) 250 MG capsule Take 4 capsules (1,000 mg) by mouth 2 times daily     pantoprazole (PROTONIX) 40 MG EC tablet Take 1 tablet (40 mg) by mouth every morning (before breakfast)     phosphorus tablet 250 mg (PHOSPHA 250 NEUTRAL) 250 MG per tablet Take 2 tablets (500 mg) by mouth 2 times daily     predniSONE (DELTASONE) 10 MG tablet Take 2 tablets (20 mg) by mouth daily for 4 days, THEN 1.5 tablets (15 mg) daily for 7 days, THEN 1 tablet (10 mg) daily for 7 days, THEN 0.5 tablets (5 mg) daily for 7 days.     study - methoxy PEG-epoetin beta, MIRCERA, (IDS# 5254) 100 MCG/0.3ML injection 75 mcg     sulfamethoxazole-trimethoprim (BACTRIM) 400-80 MG tablet Take 1 tablet by mouth three times a week     tacrolimus (GENERIC EQUIVALENT) 0.5 MG capsule Take 1 cap by mouth twice daily as directed by Transplant Center for dose changes     tacrolimus (GENERIC EQUIVALENT) 1 MG capsule Take 1 capsule (1 mg) by mouth 2 times daily     valGANciclovir (VALCYTE) 450 MG tablet Take 450mg (1 tab) twice weekly. Increase dose to maximum 900mg daily as directed by Transplant Center.     venlafaxine (EFFEXOR-XR) 150 MG 24 hr capsule Take 150 mg by mouth daily     Wheat Dextrin (BENEFIBER DRINK MIX PO) Take 1 teaspoonful by mouth as needed     No current facility-administered medications for this visit.     There are no discontinued medications.    Physical Exam   Vital Signs: [facility] 09/18/2022     GENERAL APPEARANCE: alert and no distress  HENT: mouth without ulcers or lesions  LYMPHATICS: no cervical or supraclavicular nodes  RESP: lungs clear to auscultation - no rales, rhonchi or wheezes  CV: regular rhythm, normal rate, no rub, no murmur  EDEMA: no LE edema bilaterally  ABDOMEN: soft, nondistended, nontender, bowel sounds normal  MS: extremities normal - no gross deformities  noted, no evidence of inflammation in joints, no muscle tenderness  SKIN: no rash  SKIN: abdomen incision C/D/I. No erythema or drainage.     Data     Renal Latest Ref Rng & Units 10/3/2022 10/2/2022 10/2/2022   Na 136 - 145 mmol/L 141 - -   K 3.4 - 5.3 mmol/L 3.7 - -   Cl 98 - 107 mmol/L 105 - -   CO2 22 - 29 mmol/L 25 - -   BUN 6.0 - 20.0 mg/dL 58.5(H) - -   Cr 0.51 - 0.95 mg/dL 2.95(H) - -   Glucose 70 - 99 mg/dL 93 112(H) 94   Ca  8.6 - 10.0 mg/dL 8.9 - -   Mg 1.7 - 2.3 mg/dL 1.8 - -     Bone Health Latest Ref Rng & Units 10/3/2022 10/2/2022 10/1/2022   Phos 2.5 - 4.5 mg/dL 2.7 1.9(L) 2.0(L)   PTHi 15 - 65 pg/mL 268(H) - -   Vit D Def 20 - 75 ug/L 60 - -     Heme Latest Ref Rng & Units 10/3/2022 10/2/2022 10/1/2022   WBC 4.0 - 11.0 10e3/uL 10.1 9.1 10.2   Hgb 11.7 - 15.7 g/dL 7.4(L) 8.0(L) 8.5(L)   Plt 150 - 450 10e3/uL 243 251 268   ABSOLUTE NEUTROPHIL 1.6 - 8.3 10e3/uL - - 9.2(H)   ABSOLUTE LYMPHOCYTES 0.8 - 5.3 10e3/uL - - 0.0(L)   ABSOLUTE MONOCYTES 0.0 - 1.3 10e3/uL - - 0.6   ABSOLUTE EOSINOPHILS 0.0 - 0.7 10e3/uL - - 0.4   ABSOLUTE BASOPHILS 0.0 - 0.2 10e9/L - - -   ABS IMMATURE GRANULOCYTES 0 - 0.4 10e9/L - - -   ABSOLUTE NUCLEATED RBC - - - -     Liver Latest Ref Rng & Units 9/22/2022 8/13/2020   AP 35 - 104 U/L 89 143   TBili <=1.2 mg/dL 0.3 0.5   ALT 10 - 35 U/L 18 31   AST 10 - 35 U/L 29 28   Tot Protein 6.4 - 8.3 g/dL 6.9 7.0   Albumin 3.5 - 5.2 g/dL 4.1 3.0(L)     Pancreas Latest Ref Rng & Units 10/3/2022 10/2/2022 10/1/2022   A1C <5.7 % - - -   Amylase 28 - 100 U/L 136(H) 138(H) 142(H)   Lipase 13 - 60 U/L 118(H) 95(H) 110(H)     Iron studies Latest Ref Rng & Units 10/3/2022   Iron 37 - 145 ug/dL 34(L)   Iron sat 15 - 46 % 17   Ferritin 11 - 328 ng/mL 923(H)     [facility] Txp Virology Latest Ref Rng & Units 9/22/2022 8/13/2020   CMV QUANT IU/ML Not Detected IU/mL Not Detected -   EBV CAPSID ANTIBODY IGG No detectable antibody. Positive(A) >8.0(H)   Hep B Core NR:Nonreactive - Nonreactive        Recent  Labs   Lab Test 09/26/22  0319 09/28/22  0623 09/30/22  0540 10/01/22  0550 10/02/22  0543   DOSTAC 9/25/2022  --   --   --   --    TACROL 9.6   < > 8.4 10.3 11.0    < > = values in this interval not displayed.           Again, thank you for allowing me to participate in the care of your patient.      Sincerely,    MELINDA Overton CNP

## 2022-10-03 NOTE — PROGRESS NOTES
TRANSPLANT NEPHROLOGY EARLY POST TRANSPLANT VISIT    Assessment & Plan   # DDKT (SPK): Trend down   - Baseline Creatinine: ~ tbd   - Proteinuria: Not checked post transplant   - Date DSA Last Checked: Sep/2022      Latest DSA: No DSA at time of transplant   - BK Viremia: Not checked recently   - Kidney Tx Biopsy: No   - Transplant Ureteral Stent: No     # Pancreas Tx (SPK):    - Pancreatic Exocrine Drainage: Enteric drained     - Blood glucose: Euglycemia      On insulin: No   - HbA1c: Last checked at time of transplant      Latest HbA1c: 6.9%   - Pancreatic enzymes: Increased    - Date DSA Last Checked: Sep/2022  Latest DSA: No DSA at time of transplant   - Pancreas Tx Biopsy: No    # Immunosuppression: Tacrolimus immediate release (goal 8-10), Mycophenolate mofetil (dose 1000 mg every 12 hours) and Prednisone (dose taper)   - Induction with Recent Transplant:  High Intensity   - Continue with intensive monitoring of immunosuppression for efficacy and toxicity.   - Changes: No    # Infection Prophylaxis:   - PJP: Sulfa/TMP (Bactrim)  - CMV: Valganciclovir (Valcyte)  - Fungal: Clotrimazole gene (Mycelex) and Micafungin (Mycamine)    # Hypertension: Controlled;  Goal BP: < 150/90   - Volume status: Mildly hypervolemic     - Changes: No changes, continue lasix.     # Diabetes: Controlled (HbA1c <7%) Last HbA1c: 6.9%   - Management as per primary team.    # Anemia in Chronic Renal Disease: Hgb: Decreased  -asymptomatic.     SHANEKA: No   - Iron studies: Low iron saturation, but high ferritin    # Mineral Bone Disorder:   - Secondary renal hyperparathyroidism; PTH level: Mildly elevated (151-300 pg/ml)        On treatment: None  - Vitamin D; level: Normal        On supplement: No  - Calcium; level: Normal        On supplement: No  - Phosphorus; level: Normal        On supplement: Yes    # Electrolytes:   - Potassium; level: Normal        On supplement: No  - Magnesium; level: Normal        On supplement: Yes  -  Bicarbonate; level: Normal        On supplement: No  - Sodium; level: Normal    # GERD: Occasional symptoms on PPI.     # H/o TIA (2017): No residual deficients     # COVID-19 Virus Review: Discussed COVID-19 virus and the potential medical risks.  Reviewed preventative health recommendations, including wearing a mask where appropriate.  Recommended COVID vaccination should be up to date with either an initial vaccination or booster shot when appropriate.  Asked the patient to inform the transplant center if they are exposed or diagnosed with this virus.    # Transplant History:  Etiology of Kidney Failure: Diabetes mellitus type 1  Tx: DDKT (SPK)  Transplant: 9/23/2022 (Kidney / Pancreas)  Donor Type: Donation after Brain Death Donor Class: Standard Criteria Donor  Crossmatch at time of Tx: negative  DSA at time of Tx: No  Significant changes in immunosuppression: None  CMV IgG Ab High Risk Discordance (D+/R-): No  EBV IgG Ab High Risk Discordance (D+/R-): No  Significant transplant-related complications: None    Transplant Office Phone Number: [phone]    Assessment and plan was discussed with the patient and she voiced her understanding and agreement.    Return visit: [facility] for labs and wound check tomorrow.   MELINDA Overton CNP    Chief Complaint   Ms. Hess is a 52 year old here for hospital follow up after kidney transplant.     History of Present Illness    Eden Hess is a 52 year old female with a past medical history significant for end stage kidney disease secondary to diabetic nephropathy. Other past medical history includes DM1 c/b neuropathy and retinopathy, hypertension, non obstructive CAD, anxiety and depression, diastolic HFpEF (prior to starting HD, in setting of hypertensive urgency), and TIA (2017). She is now s/p pancreas transplant, kidney transplant without ureteral stent, and open appendectomy on 9/23/22 with Dr. Gómez.     Doing well. C/O loose stool x 1 day. Admits her  fluid intake could be better. Not measuring urine output but feels it has been adequate. Fasting blood glucose 93 this AM.        Problem List   Patient Active Problem List   Diagnosis     Major depression in complete remission (H)     Diabetes mellitus type 1 (H)     Anemia of chronic disease     Insulin pump in place     TIA (transient ischemic attack)     Hypertension     End stage kidney disease (H)     Anxiety and depression     (HFpEF) heart failure with preserved ejection fraction (H)     Pancreas replaced by transplant (H)     Kidney replaced by transplant     Hypophosphatemia     Hypomagnesemia     Hypervolemia     Diarrhea     Immunosuppressed status (H)       Allergies   Allergies   Allergen Reactions     Amlodipine      Other reaction(s): Edema,generalized       Medications   Current Outpatient Medications   Medication Sig     acetaminophen (TYLENOL) 325 MG tablet Take 2 tablets (650 mg) by mouth every 4 hours as needed for pain     aspirin (ASA) 325 MG EC tablet Take 1 tablet (325 mg) by mouth daily     atorvastatin (LIPITOR) 20 MG tablet Take 1 tablet (20 mg) by mouth every evening     calcium carbonate-vitamin D (OS-BARBARA WITH D) 500-200 MG-UNIT tablet Take 1 tablet by mouth 2 times daily (with meals)     carvedilol (COREG) 12.5 MG tablet Take 1 tablet (12.5 mg) by mouth 2 times daily (with meals)     clotrimazole (MYCELEX) 10 MG lozenge Place 1 lozenge (10 mg) inside cheek 4 times daily for 77 days     furosemide (LASIX) 40 MG tablet Take 1 tablet (40 mg) by mouth 2 times daily     Guar Gum (FIBER MODULAR, NUTRISOURCE FIBER,) packet Take 1 packet by mouth 2 times daily     Insulin Disposable Pump (OMNIPOD DASH 5 PACK PODS) MISC Inject 1 each Subcutaneous every 72 hours     magnesium oxide (MAG-OX) 400 MG tablet Take 1 tablet (400 mg) by mouth 2 times daily     mycophenolate (GENERIC EQUIVALENT) 250 MG capsule Take 4 capsules (1,000 mg) by mouth 2 times daily     pantoprazole (PROTONIX) 40 MG EC tablet  Take 1 tablet (40 mg) by mouth every morning (before breakfast)     phosphorus tablet 250 mg (PHOSPHA 250 NEUTRAL) 250 MG per tablet Take 2 tablets (500 mg) by mouth 2 times daily     predniSONE (DELTASONE) 10 MG tablet Take 2 tablets (20 mg) by mouth daily for 4 days, THEN 1.5 tablets (15 mg) daily for 7 days, THEN 1 tablet (10 mg) daily for 7 days, THEN 0.5 tablets (5 mg) daily for 7 days.     study - methoxy PEG-epoetin beta, MIRCERA, (IDS# 5254) 100 MCG/0.3ML injection 75 mcg     sulfamethoxazole-trimethoprim (BACTRIM) 400-80 MG tablet Take 1 tablet by mouth three times a week     tacrolimus (GENERIC EQUIVALENT) 0.5 MG capsule Take 1 cap by mouth twice daily as directed by Transplant Center for dose changes     tacrolimus (GENERIC EQUIVALENT) 1 MG capsule Take 1 capsule (1 mg) by mouth 2 times daily     valGANciclovir (VALCYTE) 450 MG tablet Take 450mg (1 tab) twice weekly. Increase dose to maximum 900mg daily as directed by Transplant Center.     venlafaxine (EFFEXOR-XR) 150 MG 24 hr capsule Take 150 mg by mouth daily     Wheat Dextrin (BENEFIBER DRINK MIX PO) Take 1 teaspoonful by mouth as needed     No current facility-administered medications for this visit.     There are no discontinued medications.    Physical Exam   Vital Signs: LMP 09/18/2022     GENERAL APPEARANCE: alert and no distress  HENT: mouth without ulcers or lesions  LYMPHATICS: no cervical or supraclavicular nodes  RESP: lungs clear to auscultation - no rales, rhonchi or wheezes  CV: regular rhythm, normal rate, no rub, no murmur  EDEMA: no LE edema bilaterally  ABDOMEN: soft, nondistended, nontender, bowel sounds normal  MS: extremities normal - no gross deformities noted, no evidence of inflammation in joints, no muscle tenderness  SKIN: no rash  SKIN: abdomen incision C/D/I. No erythema or drainage.     Data     Renal Latest Ref Rng & Units 10/3/2022 10/2/2022 10/2/2022   Na 136 - 145 mmol/L 141 - -   K 3.4 - 5.3 mmol/L 3.7 - -   Cl 98 -  107 mmol/L 105 - -   CO2 22 - 29 mmol/L 25 - -   BUN 6.0 - 20.0 mg/dL 58.5(H) - -   Cr 0.51 - 0.95 mg/dL 2.95(H) - -   Glucose 70 - 99 mg/dL 93 112(H) 94   Ca  8.6 - 10.0 mg/dL 8.9 - -   Mg 1.7 - 2.3 mg/dL 1.8 - -     Bone Health Latest Ref Rng & Units 10/3/2022 10/2/2022 10/1/2022   Phos 2.5 - 4.5 mg/dL 2.7 1.9(L) 2.0(L)   PTHi 15 - 65 pg/mL 268(H) - -   Vit D Def 20 - 75 ug/L 60 - -     Heme Latest Ref Rng & Units 10/3/2022 10/2/2022 10/1/2022   WBC 4.0 - 11.0 10e3/uL 10.1 9.1 10.2   Hgb 11.7 - 15.7 g/dL 7.4(L) 8.0(L) 8.5(L)   Plt 150 - 450 10e3/uL 243 251 268   ABSOLUTE NEUTROPHIL 1.6 - 8.3 10e3/uL - - 9.2(H)   ABSOLUTE LYMPHOCYTES 0.8 - 5.3 10e3/uL - - 0.0(L)   ABSOLUTE MONOCYTES 0.0 - 1.3 10e3/uL - - 0.6   ABSOLUTE EOSINOPHILS 0.0 - 0.7 10e3/uL - - 0.4   ABSOLUTE BASOPHILS 0.0 - 0.2 10e9/L - - -   ABS IMMATURE GRANULOCYTES 0 - 0.4 10e9/L - - -   ABSOLUTE NUCLEATED RBC - - - -     Liver Latest Ref Rng & Units 9/22/2022 8/13/2020   AP 35 - 104 U/L 89 143   TBili <=1.2 mg/dL 0.3 0.5   ALT 10 - 35 U/L 18 31   AST 10 - 35 U/L 29 28   Tot Protein 6.4 - 8.3 g/dL 6.9 7.0   Albumin 3.5 - 5.2 g/dL 4.1 3.0(L)     Pancreas Latest Ref Rng & Units 10/3/2022 10/2/2022 10/1/2022   A1C <5.7 % - - -   Amylase 28 - 100 U/L 136(H) 138(H) 142(H)   Lipase 13 - 60 U/L 118(H) 95(H) 110(H)     Iron studies Latest Ref Rng & Units 10/3/2022   Iron 37 - 145 ug/dL 34(L)   Iron sat 15 - 46 % 17   Ferritin 11 - 328 ng/mL 923(H)     [facility] Txp Virology Latest Ref Rng & Units 9/22/2022 8/13/2020   CMV QUANT IU/ML Not Detected IU/mL Not Detected -   EBV CAPSID ANTIBODY IGG No detectable antibody. Positive(A) >8.0(H)   Hep B Core NR:Nonreactive - Nonreactive        Recent Labs   Lab Test 09/26/22  0319 09/28/22  0623 09/30/22  0540 10/01/22  0550 10/02/22  0543   DOSTAC 9/25/2022  --   --   --   --    TACROL 9.6   < > 8.4 10.3 11.0    < > = values in this interval not displayed.

## 2022-10-03 NOTE — TELEPHONE ENCOUNTER
MTM referral from: Transitions of Care (recent hospital discharge or ED visit)    MTM referral outreach attempt on October 3, 2022 at 12:23 PM      Outcome: Patient is not interested at this time because she had a follow up with provider and medication review were done, will route to MTM Pharmacist/Provider as an FYI. Thank you for the referral.     [facility]

## 2022-10-03 NOTE — PATIENT INSTRUCTIONS
Dear Eden Hess    Thank you for choosing [facility] ([facility]) for your transplant cares.  The following information is a summary of our appointment as well as important reminders.      Please make sure your phone is available today because your coordinator will call to update you with your anti-rejection drug levels and possibly make changes to your anti-rejection dosages., Please refer to your hospital discharge instructions for details on home care services, future appointments, phone numbers, and diet/activity levels.    We look forward in seeing you on your next appointment here at [facility] ([facility]).  Please don t hesitate to call us at [phone] to reschedule any of your appointments or to speak with one of the [facility] registered nurses.  It was a pleasure taking care of you today.    Sincerely,    [facility]  [address] [place] [address]  [address]  Phone:  [phone]     Return to [facility] tomorrow morning at 0655, labs will be drawn here  Drink 2-3 liters daily ( oz)  Bring pill box and pill bottles one more day, also bring BP cuff if they gave you one  Rest, rest rest

## 2022-10-03 NOTE — LETTER
"    10/3/2022         RE: Eden Hess  [address]  [place] [address]        Dear Colleague,    Thank you for referring your patient, Eden Hess, to the [facility]. Please see a copy of my visit note below.    Eden Hess came to [facility] today for a lab and assess following a kidney/pancreas transplant on 9/23/22.      Discharge date: 10/2/22  Transplant coordinator: Codie Kovacs RN  Phone number patient can be reached at: [phone]      Physical Assessment:  See physical assessment located under \"Document Flowsheets\".  Incision site: Bruised, staples, small amount of drainage  Lines: NA  Glez: NA  Urine clarity: Clear, no specks  Hydration: 2-3 liters education done on importance of drinking  Nutrition: slowly starting to get appetite   Last BM: diarrhea, started in the last few days, maybe check CDiff if still persistent on Tuesday  Pain: 4-5 lowback, incision, declined narcotics, will use the tylenol  My transplant place videos watched: I did not ask, please check on Tuesday  Labs drawn by [facility] staff: yes, patient was late, PIV remained in due to difficulty in getting in PIV, [facility]    Plan of care for today:   1. [facility] due to [facility] being [facility]  2. Medication box filled with patient and SO Gallo, both Hermila and Gallo were engaged and asking great questions    Medication changes: none    Medications administered: none    Patient education: The following teaching topics were addressed: Importance of drinking 2L of non-caffeinated fluids daily, Incisional care, Signs/symptoms of infection, Good handwashing and Medications (purposes, doses and times of administration)   Patient and and SO Gallo verbalized understanding and all questions answered.    Drug level: Patient took 1mg of prograf/tacrolimus last evening at 2130.  Care coordinator to follow up with the result.    Face to face time: 60 " minutes    Discharge Plan  [facility], no lab appt, patient had PIV placed by vascular, received verbal order from Jacqueline Garcia NP to leave in overnight today  Discharge instructions reviewed with patient: YES  Patient/Representative verbalized understanding, all questions answered: YES    Discharged from unit at 1000 with whom: Gallo to home.    Kary Wells, RN, BSN, CRNI      Again, thank you for allowing me to participate in the care of your patient.        Sincerely,        [facility]

## 2022-10-04 ENCOUNTER — INFUSION THERAPY VISIT (OUTPATIENT)
Dept: INFUSION THERAPY | Facility: CLINIC | Age: 52
End: 2022-10-04
Attending: NURSE PRACTITIONER
Payer: COMMERCIAL

## 2022-10-04 ENCOUNTER — TELEPHONE (OUTPATIENT)
Dept: TRANSPLANT | Facility: CLINIC | Age: 52
End: 2022-10-04

## 2022-10-04 VITALS
SYSTOLIC BLOOD PRESSURE: 154 MMHG | HEART RATE: 78 BPM | BODY MASS INDEX: 32.48 KG/M2 | WEIGHT: 171.9 LBS | TEMPERATURE: 98.4 F | DIASTOLIC BLOOD PRESSURE: 79 MMHG | RESPIRATION RATE: 18 BRPM | OXYGEN SATURATION: 98 %

## 2022-10-04 DIAGNOSIS — R79.89 ELEVATED SERUM CREATININE: ICD-10-CM

## 2022-10-04 DIAGNOSIS — Z94.0 KIDNEY REPLACED BY TRANSPLANT: Primary | ICD-10-CM

## 2022-10-04 DIAGNOSIS — F32.A ANXIETY AND DEPRESSION: Primary | ICD-10-CM

## 2022-10-04 DIAGNOSIS — R74.8 ELEVATED LIPASE: Primary | ICD-10-CM

## 2022-10-04 DIAGNOSIS — Z94.0 KIDNEY TRANSPLANT STATUS: ICD-10-CM

## 2022-10-04 DIAGNOSIS — Z94.83 PANCREAS REPLACED BY TRANSPLANT (H): Primary | ICD-10-CM

## 2022-10-04 DIAGNOSIS — Z20.828 CONTACT WITH AND (SUSPECTED) EXPOSURE TO OTHER VIRAL COMMUNICABLE DISEASES: ICD-10-CM

## 2022-10-04 DIAGNOSIS — Z48.298 AFTERCARE FOLLOWING ORGAN TRANSPLANT: ICD-10-CM

## 2022-10-04 DIAGNOSIS — R19.7 DIARRHEA: ICD-10-CM

## 2022-10-04 DIAGNOSIS — Z94.83 PANCREAS TRANSPLANTED (H): ICD-10-CM

## 2022-10-04 DIAGNOSIS — F41.9 ANXIETY AND DEPRESSION: Primary | ICD-10-CM

## 2022-10-04 DIAGNOSIS — Z79.899 ENCOUNTER FOR LONG-TERM CURRENT USE OF MEDICATION: ICD-10-CM

## 2022-10-04 LAB
AMYLASE SERPL-CCNC: 142 U/L (ref 28–100)
ANION GAP SERPL CALCULATED.3IONS-SCNC: 11 MMOL/L (ref 7–15)
BASOPHILS # BLD AUTO: 0 10E3/UL (ref 0–0.2)
BASOPHILS NFR BLD AUTO: 0 %
BUN SERPL-MCNC: 56 MG/DL (ref 6–20)
CALCIUM SERPL-MCNC: 8.9 MG/DL (ref 8.6–10)
CHLORIDE SERPL-SCNC: 102 MMOL/L (ref 98–107)
CREAT SERPL-MCNC: 2.88 MG/DL (ref 0.51–0.95)
DEPRECATED HCO3 PLAS-SCNC: 25 MMOL/L (ref 22–29)
EOSINOPHIL # BLD AUTO: 0.2 10E3/UL (ref 0–0.7)
EOSINOPHIL NFR BLD AUTO: 2 %
ERYTHROCYTE [DISTWIDTH] IN BLOOD BY AUTOMATED COUNT: 16.9 % (ref 10–15)
GFR SERPL CREATININE-BSD FRML MDRD: 19 ML/MIN/1.73M2
GLUCOSE SERPL-MCNC: 100 MG/DL (ref 70–99)
HCT VFR BLD AUTO: 23.3 % (ref 35–47)
HGB BLD-MCNC: 7.8 G/DL (ref 11.7–15.7)
IMM GRANULOCYTES # BLD: 0.1 10E3/UL
IMM GRANULOCYTES NFR BLD: 1 %
LIPASE SERPL-CCNC: 134 U/L (ref 13–60)
LYMPHOCYTES # BLD AUTO: 0.5 10E3/UL (ref 0.8–5.3)
LYMPHOCYTES NFR BLD AUTO: 4 %
MAGNESIUM SERPL-MCNC: 1.7 MG/DL (ref 1.7–2.3)
MCH RBC QN AUTO: 33.8 PG (ref 26.5–33)
MCHC RBC AUTO-ENTMCNC: 33.5 G/DL (ref 31.5–36.5)
MCV RBC AUTO: 101 FL (ref 78–100)
MONOCYTES # BLD AUTO: 1 10E3/UL (ref 0–1.3)
MONOCYTES NFR BLD AUTO: 8 %
NEUTROPHILS # BLD AUTO: 10.8 10E3/UL (ref 1.6–8.3)
NEUTROPHILS NFR BLD AUTO: 85 %
NRBC # BLD AUTO: 0 10E3/UL
NRBC BLD AUTO-RTO: 0 /100
PHOSPHATE SERPL-MCNC: 2.7 MG/DL (ref 2.5–4.5)
PLATELET # BLD AUTO: 286 10E3/UL (ref 150–450)
POTASSIUM SERPL-SCNC: 3.9 MMOL/L (ref 3.4–5.3)
RBC # BLD AUTO: 2.31 10E6/UL (ref 3.8–5.2)
SODIUM SERPL-SCNC: 138 MMOL/L (ref 136–145)
TACROLIMUS BLD-MCNC: 8.5 UG/L (ref 5–15)
TME LAST DOSE: NORMAL H
TME LAST DOSE: NORMAL H
WBC # BLD AUTO: 12.6 10E3/UL (ref 4–11)

## 2022-10-04 PROCEDURE — G0463 HOSPITAL OUTPT CLINIC VISIT: HCPCS

## 2022-10-04 PROCEDURE — 83690 ASSAY OF LIPASE: CPT

## 2022-10-04 PROCEDURE — 83735 ASSAY OF MAGNESIUM: CPT

## 2022-10-04 PROCEDURE — 80197 ASSAY OF TACROLIMUS: CPT

## 2022-10-04 PROCEDURE — 99215 OFFICE O/P EST HI 40 MIN: CPT | Mod: 24 | Performed by: NURSE PRACTITIONER

## 2022-10-04 PROCEDURE — 85025 COMPLETE CBC W/AUTO DIFF WBC: CPT

## 2022-10-04 PROCEDURE — 82150 ASSAY OF AMYLASE: CPT

## 2022-10-04 PROCEDURE — 36415 COLL VENOUS BLD VENIPUNCTURE: CPT

## 2022-10-04 PROCEDURE — 84100 ASSAY OF PHOSPHORUS: CPT

## 2022-10-04 PROCEDURE — 80048 BASIC METABOLIC PNL TOTAL CA: CPT

## 2022-10-04 RX ORDER — VENLAFAXINE HYDROCHLORIDE 150 MG/1
150 CAPSULE, EXTENDED RELEASE ORAL DAILY
Qty: 30 CAPSULE | Refills: 0 | Status: SHIPPED | OUTPATIENT
Start: 2022-10-04

## 2022-10-04 NOTE — PATIENT INSTRUCTIONS
Dear Eden Hess    Thank you for choosing [facility] ([facility]) for your transplant cares.  The following information is a summary of our appointment as well as important reminders.      Please make sure your phone is available today because your coordinator will call to update you with your anti-rejection drug levels and possibly make changes to your anti-rejection dosages.    Transplant Coordinator: Codie Kovacs  Transplant Office:  [phone]  [facility]:  [phone]  Ask for physician on call for kidney transplant.  Unit [facility] (Transplant Unit):  [phone]  [facility]:  [phone]    We look forward in seeing you on your next appointment here at [facility] ([facility]).  Please don t hesitate to call us at [phone] to reschedule any of your appointments or to speak with one of the [facility] registered nurses.  It was a pleasure taking care of you today.    Sincerely,    [facility]  [address] [place] [address]  [address]  Phone:  [phone]

## 2022-10-04 NOTE — LETTER
"    10/4/2022         RE: Eden Hess  [address]  [place] [address]        Dear Colleague,    Thank you for referring your patient, Eden Hess, to the [facility]. Please see a copy of my visit note below.    Chief Complaint   Patient presents with     Eval/Assessment     LBA day 2- kidney/ pancreas txp     Eden Hess came to [facility] today for a lab and assess following a kidney/pancreas transplant on 9/23/22.       Discharge date: 10/2/22  Transplant coordinator: Codie Kovacs, RN  Phone number patient can be reached at: [phone]       Physical Assessment:  See physical assessment located under \"Document Flowsheets\".  Incision site: midline abdominal incision with staples. CDI  Lines: n/a  Glez: n/a  Urine clarity: clear, yellow  Hydration: at least 1.5 liters of water   Nutrition: good appetie, denies n/v   Last BM: still having numerous BM's daily however states it is improving and bulking up.    Pain: 5/10 abdominal. Took home Tylenol during appt.    My transplant place videos watched: yes  Sitting 184/81  81, standing 174/100  82.  Rechecked laying 154/79  78      Labs drawn by [facility] staff Yes- PIV placed yesterday patent. Copy of labs given to patient.     Plan of care for today:   -Labs and assessment reviewed with Jacqueline Garcia NP.  -Care completed in [facility].  -Reiterated 2 L of water daily. To call transplant coordinator if BP unstable, increase in BM's daily or any changes.   Medication changes: n/a    Medications administered: n/a      Patient education:  The following teaching topics were addressed: Importance of drinking 2L of non-caffeinated fluids daily, Incisional care, Signs/symptoms of infection, Good handwashing, Medications (purposes, doses and times of administration), Phone numbers to call with ladariuss (Transplant coordinator, Unit 6- and [facility]) and Plan of care   Patient and spouse verbalized understanding " "and all questions answered.    Drug level:  Patient took 1 mg of Tacrolimus last evening at 9 PM.  Care coordinator to follow up with the result.    Face to face time: 30 minutes    Discharge Plan:  Pt will follow up with - labs Thursday at [facility], follow up with transplant coordinator.   Discharge instructions reviewed with patient: YES  Patient/Representative verbalized understanding, all questions answered: YES    Discharged from unit at 1005 with whom:  to home.    Vital signs:  Temp: 98.4  F (36.9  C) Temp src: Oral       Resp: 18 SpO2: 98 % O2 Device: None (Room air)     Weight: 78 kg (171 lb 14.4 oz)  Estimated body mass index is 32.48 kg/m  as calculated from the following:    Height as of 9/23/22: 1.549 m (5' 1\").    Weight as of this encounter: 78 kg (171 lb 14.4 oz).              Again, thank you for allowing me to participate in the care of your patient.        Sincerely,        [facility]    "

## 2022-10-04 NOTE — LETTER
10/4/2022         RE: Eden Hess  [address]  [place] [address]        Dear Colleague,    Thank you for referring your patient, Eden Hess, to the [facility]. Please see a copy of my visit note below.    TRANSPLANT NEPHROLOGY EARLY POST TRANSPLANT VISIT    Assessment & Plan   # DDKT (SPK): Trend down   - Baseline Creatinine: ~ tbd   - Proteinuria: Not checked post transplant   - Date DSA Last Checked: Sep/2022      Latest DSA: No DSA at time of transplant   - BK Viremia: Not checked recently   - Kidney Tx Biopsy: No   - Transplant Ureteral Stent: No      # Pancreas Tx (SPK):    - Pancreatic Exocrine Drainage: Enteric drained     - Blood glucose: Euglycemia      On insulin: No   - HbA1c: Last checked at time of transplant      Latest HbA1c: 6.9%   - Pancreatic enzymes: Increased    - Date DSA Last Checked: Sep/2022  Latest DSA: No DSA at time of transplant   - Pancreas Tx Biopsy: No    # Immunosuppression: Tacrolimus immediate release (goal 8-10), Mycophenolate mofetil (dose 1000 mg every 12 hours) and Prednisone (dose taper)   - Induction with Recent Transplant:  High Intensity   - Continue with intensive monitoring of immunosuppression for efficacy and toxicity.   - Changes: No    # Infection Prophylaxis:   - PJP: Sulfa/TMP (Bactrim)  - CMV: Valganciclovir (Valcyte)  - Fungal: Clotrimazole gene (Mycelex) and Micafungin (Mycamine)    # Hypertension: Controlled;  Goal BP: < 150/90   - Volume status: Mildly hypervolemic     - Changes: No changes, continue lasix.     # Diabetes: Controlled (HbA1c <7%) Last HbA1c: 6.9%   - Management as per primary team.    # Anemia in Chronic Renal Disease: Hgb: Increased  -asymptomatic.     SHANEKA: No   - Iron studies: Low iron saturation, but high ferritin    # Mineral Bone Disorder:   - Secondary renal hyperparathyroidism; PTH level: Mildly elevated (151-300 pg/ml)        On treatment: None  - Vitamin D; level:  Normal        On supplement: No  - Calcium; level: Normal        On supplement: No  - Phosphorus; level: Normal        On supplement: Yes    # Electrolytes:   - Potassium; level: Normal        On supplement: No  - Magnesium; level: Normal        On supplement: Yes  - Bicarbonate; level: Normal        On supplement: No  - Sodium; level: Normal    # GERD: Occasional symptoms on PPI.     # H/o TIA (2017): No residual deficients     # COVID-19 Virus Review: Discussed COVID-19 virus and the potential medical risks.  Reviewed preventative health recommendations, including wearing a mask where appropriate.  Recommended COVID vaccination should be up to date with either an initial vaccination or booster shot when appropriate.  Asked the patient to inform the transplant center if they are exposed or diagnosed with this virus.    # Transplant History:  Etiology of Kidney Failure: Diabetes mellitus type 1  Tx: DDKT (SPK)  Transplant: 9/23/2022 (Kidney / Pancreas)  Donor Type: Donation after Brain Death Donor Class: Standard Criteria Donor  Crossmatch at time of Tx: negative  DSA at time of Tx: No  Significant changes in immunosuppression: None  CMV IgG Ab High Risk Discordance (D+/R-): No  EBV IgG Ab High Risk Discordance (D+/R-): No  Significant transplant-related complications: None    Transplant Office Phone Number: [phone]    Assessment and plan was discussed with the patient and she voiced her understanding and agreement.    Return visit: Labs Thursday.   MELINDA Overton CNP    Chief Complaint   Ms. Hess is a 52 year old here for hospital follow up after kidney transplant.     History of Present Illness    Eden Hess is a 52 year old female with a past medical history significant for end stage kidney disease secondary to diabetic nephropathy. Other past medical history includes DM1 c/b neuropathy and retinopathy, hypertension, non obstructive CAD, anxiety and depression, diastolic HFpEF (prior to  starting HD, in setting of hypertensive urgency), and TIA (2017). She is now s/p pancreas transplant, kidney transplant without ureteral stent, and open appendectomy on 9/23/22 with Dr. Gómez.     Doing well. Had 3 loose stools overnight but consistency improving. Drank close to 2 liters of fluid yesterday. No F/C. No N/V. No urinary s/s.        Problem List   Patient Active Problem List   Diagnosis     Major depression in complete remission (H)     Diabetes mellitus type 1 (H)     Anemia of chronic disease     Insulin pump in place     TIA (transient ischemic attack)     Hypertension     End stage kidney disease (H)     Anxiety and depression     (HFpEF) heart failure with preserved ejection fraction (H)     Pancreas replaced by transplant (H)     Kidney replaced by transplant     Hypophosphatemia     Hypomagnesemia     Hypervolemia     Diarrhea     Immunosuppressed status (H)       Allergies   Allergies   Allergen Reactions     Amlodipine      Other reaction(s): Edema,generalized       Medications   Current Outpatient Medications   Medication Sig     venlafaxine (EFFEXOR XR) 150 MG 24 hr capsule Take 1 capsule (150 mg) by mouth daily     acetaminophen (TYLENOL) 325 MG tablet Take 2 tablets (650 mg) by mouth every 4 hours as needed for pain     aspirin (ASA) 325 MG EC tablet Take 1 tablet (325 mg) by mouth daily     atorvastatin (LIPITOR) 20 MG tablet Take 1 tablet (20 mg) by mouth every evening     calcium carbonate-vitamin D (OS-BABRARA WITH D) 500-200 MG-UNIT tablet Take 1 tablet by mouth 2 times daily (with meals)     carvedilol (COREG) 12.5 MG tablet Take 1 tablet (12.5 mg) by mouth 2 times daily (with meals)     clotrimazole (MYCELEX) 10 MG lozenge Place 1 lozenge (10 mg) inside cheek 4 times daily for 77 days     furosemide (LASIX) 40 MG tablet Take 1 tablet (40 mg) by mouth 2 times daily     Guar Gum (FIBER MODULAR, NUTRISOURCE FIBER,) packet Take 1 packet by mouth 2 times daily     Insulin Disposable Pump  (OMNIPOD DASH 5 PACK PODS) MISC Inject 1 each Subcutaneous every 72 hours     magnesium oxide (MAG-OX) 400 MG tablet Take 1 tablet (400 mg) by mouth 2 times daily     mycophenolate (GENERIC EQUIVALENT) 250 MG capsule Take 4 capsules (1,000 mg) by mouth 2 times daily     pantoprazole (PROTONIX) 40 MG EC tablet Take 1 tablet (40 mg) by mouth every morning (before breakfast)     phosphorus tablet 250 mg (PHOSPHA 250 NEUTRAL) 250 MG per tablet Take 2 tablets (500 mg) by mouth 2 times daily     predniSONE (DELTASONE) 10 MG tablet Take 2 tablets (20 mg) by mouth daily for 4 days, THEN 1.5 tablets (15 mg) daily for 7 days, THEN 1 tablet (10 mg) daily for 7 days, THEN 0.5 tablets (5 mg) daily for 7 days.     study - methoxy PEG-epoetin beta, MIRCERA, (IDS# [id number]) 100 MCG/0.3ML injection 75 mcg     sulfamethoxazole-trimethoprim (BACTRIM) 400-80 MG tablet Take 1 tablet by mouth three times a week     tacrolimus (GENERIC EQUIVALENT) 0.5 MG capsule Take 1 cap by mouth twice daily as directed by Transplant Center for dose changes     tacrolimus (GENERIC EQUIVALENT) 1 MG capsule Take 1 capsule (1 mg) by mouth 2 times daily     valGANciclovir (VALCYTE) 450 MG tablet Take 450mg (1 tab) twice weekly. Increase dose to maximum 900mg daily as directed by Transplant Center.     Wheat Dextrin (BENEFIBER DRINK MIX PO) Take 1 teaspoonful by mouth as needed     No current facility-administered medications for this visit.     Medications Discontinued During This Encounter   Medication Reason     venlafaxine (EFFEXOR-XR) 150 MG 24 hr capsule Reorder       Physical Exam   Vital Signs: [facility] 09/18/2022     GENERAL APPEARANCE: alert and no distress  HENT: mouth without ulcers or lesions  LYMPHATICS: no cervical or supraclavicular nodes  RESP: lungs clear to auscultation - no rales, rhonchi or wheezes  CV: regular rhythm, normal rate, no rub, no murmur  EDEMA: no LE edema bilaterally  ABDOMEN: soft, nondistended, nontender, bowel sounds normal  MS:  extremities normal - no gross deformities noted, no evidence of inflammation in joints, no muscle tenderness  SKIN: no rash  SKIN: abdomen incision C/D/I. No erythema or drainage.     Data     Renal Latest Ref Rng & Units 10/4/2022 10/3/2022 10/2/2022   Na 136 - 145 mmol/L 138 141 -   K 3.4 - 5.3 mmol/L 3.9 3.7 -   Cl 98 - 107 mmol/L 102 105 -   CO2 22 - 29 mmol/L 25 25 -   BUN 6.0 - 20.0 mg/dL 56.0(H) 58.5(H) -   Cr 0.51 - 0.95 mg/dL 2.88(H) 2.95(H) -   Glucose 70 - 99 mg/dL 100(H) 93 112(H)   Ca  8.6 - 10.0 mg/dL 8.9 8.9 -   Mg 1.7 - 2.3 mg/dL 1.7 1.8 -     Bone Health Latest Ref Rng & Units 10/4/2022 10/3/2022 10/2/2022   Phos 2.5 - 4.5 mg/dL 2.7 2.7 1.9(L)   PTHi 15 - 65 pg/mL - 268(H) -   Vit D Def 20 - 75 ug/L - 60 -     Heme Latest Ref Rng & Units 10/4/2022 10/3/2022 10/2/2022   WBC 4.0 - 11.0 10e3/uL 12.6(H) 10.1 9.1   Hgb 11.7 - 15.7 g/dL 7.8(L) 7.4(L) 8.0(L)   Plt 150 - 450 10e3/uL 286 243 251   ABSOLUTE NEUTROPHIL 1.6 - 8.3 10e3/uL - - -   ABSOLUTE LYMPHOCYTES 0.8 - 5.3 10e3/uL - - -   ABSOLUTE MONOCYTES 0.0 - 1.3 10e3/uL - - -   ABSOLUTE EOSINOPHILS 0.0 - 0.7 10e3/uL - - -   ABSOLUTE BASOPHILS 0.0 - 0.2 10e9/L - - -   ABS IMMATURE GRANULOCYTES 0 - 0.4 10e9/L - - -   ABSOLUTE NUCLEATED RBC - - - -     Liver Latest Ref Rng & Units 9/22/2022 8/13/2020   AP 35 - 104 U/L 89 143   TBili <=1.2 mg/dL 0.3 0.5   ALT 10 - 35 U/L 18 31   AST 10 - 35 U/L 29 28   Tot Protein 6.4 - 8.3 g/dL 6.9 7.0   Albumin 3.5 - 5.2 g/dL 4.1 3.0(L)     Pancreas Latest Ref Rng & Units 10/4/2022 10/3/2022 10/2/2022   A1C <5.7 % - - -   Amylase 28 - 100 U/L 142(H) 136(H) 138(H)   Lipase (Roche) 13 - 60 U/L 134(H) 118(H) 95(H)     Iron studies Latest Ref Rng & Units 10/3/2022   Iron 37 - 145 ug/dL 34(L)   Iron sat 15 - 46 % 17   Ferritin 11 - 328 ng/mL 923(H)     [facility] Txp Virology Latest Ref Rng & Units 9/22/2022 8/13/2020   CMV QUANT IU/ML Not Detected IU/mL Not Detected -   EBV CAPSID ANTIBODY IGG No detectable antibody. Positive(A)  >8.0(H)   Hep B Core NR:Nonreactive - Nonreactive        Recent Labs   Lab Test 09/26/22  0319 09/28/22  0623 10/01/22  0550 10/02/22  0543 10/03/22  0808   DOSTAC 9/25/2022  --   --   --   --    TACROL 9.6   < > 10.3 11.0 10.1    < > = values in this interval not displayed.             Again, thank you for allowing me to participate in the care of your patient.      Sincerely,    MELINDA Overton CNP

## 2022-10-04 NOTE — PROGRESS NOTES
"Chief Complaint   Patient presents with     Eval/Assessment     LBA day 2- kidney/ pancreas txp     Eden Hess came to [facility] today for a lab and assess following a kidney/pancreas transplant on 9/23/22.       Discharge date: 10/2/22  Transplant coordinator: Codie Kovacs RN  Phone number patient can be reached at: [phone]       Physical Assessment:  See physical assessment located under \"Document Flowsheets\".  Incision site: midline abdominal incision with staples. CDI  Lines: n/a  Glez: n/a  Urine clarity: clear, yellow  Hydration: at least 1.5 liters of water   Nutrition: good appetie, denies n/v   Last BM: still having numerous BM's daily however states it is improving and bulking up.    Pain: 5/10 abdominal. Took home Tylenol during appt.    My transplant place videos watched: yes  Sitting 184/81  81, standing 174/100  82.  Rechecked laying 154/79  78      Labs drawn by [facility] staff Yes- PIV placed yesterday patent. Copy of labs given to patient.     Plan of care for today:   -Labs and assessment reviewed with Jacqueline Garcia NP.  -Care completed in [facility].  -Reiterated 2 L of water daily. To call transplant coordinator if BP unstable, increase in BM's daily or any changes.   Medication changes: n/a    Medications administered: n/a      Patient education:  The following teaching topics were addressed: Importance of drinking 2L of non-caffeinated fluids daily, Incisional care, Signs/symptoms of infection, Good handwashing, Medications (purposes, doses and times of administration), Phone numbers to call with concenrs (Transplant coordinator, Unit [facility]D and [facility]) and Plan of care   Patient and spouse verbalized understanding and all questions answered.    Drug level:  Patient took 1 mg of Tacrolimus last evening at 9 PM.  Care coordinator to follow up with the result.    Face to face time: 30 minutes    Discharge Plan:  Pt will follow up with - labs Thursday at [facility], follow up with " "transplant coordinator.   Discharge instructions reviewed with patient: YES  Patient/Representative verbalized understanding, all questions answered: YES    Discharged from unit at 1005 with whom:  to home.    Vital signs:  Temp: 98.4  F (36.9  C) Temp src: Oral       Resp: 18 SpO2: 98 % O2 Device: None (Room air)     Weight: 78 kg (171 lb 14.4 oz)  Estimated body mass index is 32.48 kg/m  as calculated from the following:    Height as of 9/23/22: 1.549 m (5' 1\").    Weight as of this encounter: 78 kg (171 lb 14.4 oz).          "

## 2022-10-05 NOTE — TELEPHONE ENCOUNTER
Reviewed labs with acute pancreas review meeting, plan for stool samples, UA/UC, kidney and pancreas US. Hermila verbalized understanding of plan, scheduled for 10/6.

## 2022-10-06 ENCOUNTER — LAB (OUTPATIENT)
Dept: LAB | Facility: CLINIC | Age: 52
End: 2022-10-06
Payer: COMMERCIAL

## 2022-10-06 ENCOUNTER — VIRTUAL VISIT (OUTPATIENT)
Dept: PHARMACY | Facility: CLINIC | Age: 52
End: 2022-10-06
Payer: COMMERCIAL

## 2022-10-06 ENCOUNTER — HOSPITAL ENCOUNTER (OUTPATIENT)
Dept: ULTRASOUND IMAGING | Facility: CLINIC | Age: 52
Discharge: HOME | End: 2022-10-06
Attending: INTERNAL MEDICINE
Payer: COMMERCIAL

## 2022-10-06 DIAGNOSIS — R79.89 ELEVATED SERUM CREATININE: ICD-10-CM

## 2022-10-06 DIAGNOSIS — Z94.83 PANCREAS REPLACED BY TRANSPLANT (H): ICD-10-CM

## 2022-10-06 DIAGNOSIS — Z94.0 HISTORY OF SIMULTANEOUS KIDNEY AND PANCREAS TRANSPLANT (H): Primary | ICD-10-CM

## 2022-10-06 DIAGNOSIS — Z94.0 HTN, KIDNEY TRANSPLANT RELATED: ICD-10-CM

## 2022-10-06 DIAGNOSIS — E78.5 DYSLIPIDEMIA: ICD-10-CM

## 2022-10-06 DIAGNOSIS — I15.1 HTN, KIDNEY TRANSPLANT RELATED: ICD-10-CM

## 2022-10-06 DIAGNOSIS — Z94.83 HISTORY OF SIMULTANEOUS KIDNEY AND PANCREAS TRANSPLANT (H): Primary | ICD-10-CM

## 2022-10-06 DIAGNOSIS — Z94.83 PANCREAS TRANSPLANTED (H): ICD-10-CM

## 2022-10-06 DIAGNOSIS — Z94.0 KIDNEY TRANSPLANT STATUS: ICD-10-CM

## 2022-10-06 DIAGNOSIS — R74.8 ELEVATED LIPASE: ICD-10-CM

## 2022-10-06 DIAGNOSIS — R19.7 DIARRHEA: ICD-10-CM

## 2022-10-06 DIAGNOSIS — R19.7 DIARRHEA, UNSPECIFIED TYPE: ICD-10-CM

## 2022-10-06 LAB
AMYLASE SERPL-CCNC: 143 U/L (ref 28–100)
ANION GAP SERPL CALCULATED.3IONS-SCNC: 14 MMOL/L (ref 7–15)
BASOPHILS # BLD AUTO: 0 10E3/UL (ref 0–0.2)
BASOPHILS NFR BLD AUTO: 0 %
BUN SERPL-MCNC: 52.5 MG/DL (ref 6–20)
CALCIUM SERPL-MCNC: 9.3 MG/DL (ref 8.6–10)
CHLORIDE SERPL-SCNC: 100 MMOL/L (ref 98–107)
CREAT SERPL-MCNC: 2.64 MG/DL (ref 0.51–0.95)
DEPRECATED HCO3 PLAS-SCNC: 25 MMOL/L (ref 22–29)
EOSINOPHIL # BLD AUTO: 0.2 10E3/UL (ref 0–0.7)
EOSINOPHIL NFR BLD AUTO: 2 %
ERYTHROCYTE [DISTWIDTH] IN BLOOD BY AUTOMATED COUNT: 16.7 % (ref 10–15)
GFR SERPL CREATININE-BSD FRML MDRD: 21 ML/MIN/1.73M2
GLUCOSE SERPL-MCNC: 93 MG/DL (ref 70–99)
HCT VFR BLD AUTO: 24.7 % (ref 35–47)
HGB BLD-MCNC: 8.2 G/DL (ref 11.7–15.7)
IMM GRANULOCYTES # BLD: 0.2 10E3/UL
IMM GRANULOCYTES NFR BLD: 1 %
LIPASE SERPL-CCNC: 149 U/L (ref 13–60)
LYMPHOCYTES # BLD AUTO: 0.4 10E3/UL (ref 0.8–5.3)
LYMPHOCYTES NFR BLD AUTO: 3 %
MAGNESIUM SERPL-MCNC: 1.6 MG/DL (ref 1.7–2.3)
MCH RBC QN AUTO: 33.2 PG (ref 26.5–33)
MCHC RBC AUTO-ENTMCNC: 33.2 G/DL (ref 31.5–36.5)
MCV RBC AUTO: 100 FL (ref 78–100)
MONOCYTES # BLD AUTO: 0.9 10E3/UL (ref 0–1.3)
MONOCYTES NFR BLD AUTO: 8 %
NEUTROPHILS # BLD AUTO: 9.9 10E3/UL (ref 1.6–8.3)
NEUTROPHILS NFR BLD AUTO: 86 %
NRBC # BLD AUTO: 0 10E3/UL
NRBC BLD AUTO-RTO: 0 /100
PHOSPHATE SERPL-MCNC: 3.3 MG/DL (ref 2.5–4.5)
PLATELET # BLD AUTO: 298 10E3/UL (ref 150–450)
POTASSIUM SERPL-SCNC: 4.2 MMOL/L (ref 3.4–5.3)
RBC # BLD AUTO: 2.47 10E6/UL (ref 3.8–5.2)
SODIUM SERPL-SCNC: 139 MMOL/L (ref 136–145)
WBC # BLD AUTO: 11.6 10E3/UL (ref 4–11)

## 2022-10-06 PROCEDURE — 93975 VASCULAR STUDY: CPT | Mod: 26 | Performed by: RADIOLOGY

## 2022-10-06 PROCEDURE — 99605 MTMS BY PHARM NP 15 MIN: CPT | Performed by: PHARMACIST

## 2022-10-06 PROCEDURE — 83735 ASSAY OF MAGNESIUM: CPT | Performed by: PATHOLOGY

## 2022-10-06 PROCEDURE — 85025 COMPLETE CBC W/AUTO DIFF WBC: CPT | Performed by: PATHOLOGY

## 2022-10-06 PROCEDURE — 82150 ASSAY OF AMYLASE: CPT | Performed by: PATHOLOGY

## 2022-10-06 PROCEDURE — 76776 US EXAM K TRANSPL W/DOPPLER: CPT | Mod: 26 | Performed by: RADIOLOGY

## 2022-10-06 PROCEDURE — 84100 ASSAY OF PHOSPHORUS: CPT | Performed by: PATHOLOGY

## 2022-10-06 PROCEDURE — 83690 ASSAY OF LIPASE: CPT | Performed by: PATHOLOGY

## 2022-10-06 PROCEDURE — 76776 US EXAM K TRANSPL W/DOPPLER: CPT

## 2022-10-06 PROCEDURE — 99607 MTMS BY PHARM ADDL 15 MIN: CPT | Performed by: PHARMACIST

## 2022-10-06 PROCEDURE — 80048 BASIC METABOLIC PNL TOTAL CA: CPT | Performed by: PATHOLOGY

## 2022-10-06 PROCEDURE — 36415 COLL VENOUS BLD VENIPUNCTURE: CPT | Performed by: PATHOLOGY

## 2022-10-06 PROCEDURE — 93975 VASCULAR STUDY: CPT

## 2022-10-06 RX ORDER — MYCOPHENOLIC ACID 180 MG/1
720 TABLET, DELAYED RELEASE ORAL 2 TIMES DAILY
Qty: 240 TABLET | Refills: 1 | Status: SHIPPED | OUTPATIENT
Start: 2022-10-06 | End: 2022-10-21

## 2022-10-06 NOTE — PROGRESS NOTES
Rodo Zuniga MD Griffin, Peter Alberto, [facility]; Codie Kovacs, ANATOLY  Sure, ok to try MPA 720mg bid but please send enteric panel and C.diff as well Giovanna. thanks             Previous Messages       ----- Message -----   From: Miller Salas [facility]   Sent: 10/6/2022   1:33 PM CDT   To: Rodo Zuniga MD, Codie Kovacs, RN     Patient has been taking Metamucil once to twice daily, but is still having 4-5 loose bowel movements a day.     Thoughts on transitioning to Myfortic?

## 2022-10-06 NOTE — PROGRESS NOTES
"Medication Therapy Management (MTM) Encounter    ASSESSMENT:                            Medication Adherence/Access: No issues identified    Renal/ Pancreas Transplant: Phosphorus over 2, patient may reduce her phosphorus dose.  She is taking a variable amount of clotrimazole every day, this can affect her tacrolimus levels.  Recommended she keep to 4 times daily consistently.  Additionally she said she thought tacrolimus was increased to 1.5 twice a day, but talking with the coordinator this should stay at 1 mg twice daily as levels are at goal between 8 and 10.    Diarrhea: Patient having diarrhea since transplant and taking fiber currently.  Will increase fiber 2-3 times daily.  Diarrhea may be from mycophenolate, will discuss with team about switching to the enteric-coated version called Myfortic.    Hyperlipidemia: Stable.    Hypertension: BP not at goal, but following up with team on Monday. No changes by MTM today.     PLAN:                            1. [facility] order will call you three weeks post discharge, but if your dose changes, call the number on the back of the pill box to talk to them earlier, [phone]. If your doctor sends over a new prescription to the mail order pharmacy you will have to call them to set up the order. They have an Thomas called \"My [facility]\" as well.   2. Take Metamucil/ fiber 2-3 doses per day for diarrhea.   3. Continue the Tacrolimus 1mg twice daily.  4. Keep Clotrimazole at 4 x daily every day. There is a drug interaction with Tacrolimus so consistency is important.    5. Reduce Phosphorus to 250mg (1 tablet) twice daily.     Txp team consider...  1. Switching Mycophenolate Mofetil to Myfortic or getting a Mycophenolate Mofetil level. Patient having 4-5 loose stools daily.     Follow-up: 2 months    SUBJECTIVE/OBJECTIVE:                          Eden Hess is a 52 year old female called for a transitions of care visit. She was discharged from [facility] on sunday for " Kidney and pancreas transplant.    Reason for visit: initial post transplant.    Allergies/ADRs: Reviewed in chart  Past Medical History: Reviewed in chart  Tobacco: She reports that she quit smoking about 24 years ago. Her smoking use included cigarettes. She has never used smokeless tobacco.  Alcohol: not currently using    Medication Adherence/Access: Patient uses pill box(es) and has assistance with medication administration: family member, . Using alarms.  Patient takes medications 3 time(s) per day.  9am, 9pm, 12pm  Per patient, misses medication 0 times per week.    The patient fills medications at [facility]: YES.     Renal/ Pancreas Transplant:  Current immunosuppressants include Tacrolimus 1.5mg twice daily (pt increased this today), Mycophenolate Mofetil 1000mg twice daily, and Prednisone 20mg every morning (reducing tomorrow).  Pt reports Diarrhea, Tremors  Transplant date: 9/23/22  Estimated Creatinine Clearance: 23 mL/min (A) (based on SCr of 2.64 mg/dL (H)).   Vascular prophylaxis: Aspirin 325mg daily. Denies GIB sx.   CMV prophylaxis: CrCl 10 to 24 mL/minute: Valcyte 450 mg twice weekly Treat 3 months post tx   PJP prophylaxis: Bactrim S S daily  Antifungal Prophylaxis: Clotrimazole 1 lozenge QID.   PPI use: Pantoprazole 40mg daily.   Supplements: Mag Oxide 400mg twice daily ( 2 hours from MMF), Calcium Carbonate/D twice daily, and Phos 500mg twice daily .  Tx Coordinator: Silvio Mancilla Med Card: Yes  Immunizations: annual flu shot 2021; Pneumovax 23:  2010, 2020; Prevnar 13/15/20: 2020; TDaP:  2010; Shingrix: unknown, HBV: immunity per last titer, COVID: Moderna x3   Latest Reference Range & Units 10/02/22 05:43 10/03/22 08:09 10/04/22 07:18   Magnesium 1.7 - 2.3 mg/dL 1.9 1.8 1.7   Phosphorus 2.5 - 4.5 mg/dL 1.9 (L) 2.7 2.7   (L): Data is abnormally low    Diarrhea: Pt has been having constant diarrhea 4-5x daily- ever since transplant. Started Metamucil fiber once to twice  daily in the last 4 days. This might have helped a little.     Hyperlipidemia: Current therapy includes Atoravastatin 20mg daily.  Patient reports no significant myalgias or other side effects.  The 10-year ASCVD risk score (Pukwanafredis CRAMER Jr., et al., 2013) is: 2.9%    Values used to calculate the score:      Age: 52 years      Sex: Female      Is Non- : No      Diabetic: Yes      Tobacco smoker: No      Systolic Blood Pressure: 154 mmHg      Is BP treated: Yes      HDL Cholesterol: 88 mg/dL      Total Cholesterol: 142 mg/dL  Recent Labs   Lab Test 09/22/22  1629   CHOL 142   HDL 88   LDL 40   TRIG 70     Hypertension: Current medications include Carvedilol 12.5mg twice daily, Furosemide 40mg twice daily.  Patient does self-monitor blood pressure. Patient reports no current medication side effects. Some swelling in ankles, but improved on Lasix.   BP Readings from Last 3 Encounters:   10/04/22 (!) 154/79   10/03/22 (!) 146/69   10/02/22 103/55     Today's Vitals: LMP 09/18/2022   ----------------  Post Discharge Medication Reconciliation Status: discharge medications reconciled and changed, per note/orders.    I spent 45 minutes with this patient today. All changes were made via collaborative practice agreement with Dr. Zuniga. A copy of the visit note was provided to the patient's provider(s).    The patient was sent via [employer] a summary of these recommendations.     Miller Salas PharmD  [facility] Pharmacist    Phone: [phone]     Telemedicine Visit Details  Type of service:  Telephone visit  Start Time: First was 10am, but patient had many interruptions. Called her 3 times during the dya.  End Time: 1:22 PM  Originating Location (patient location): Home  Distant Location (provider location):  [facility]     Medication Therapy Recommendations  Diarrhea    Current Medication: Guar Gum (FIBER MODULAR, NUTRISOURCE FIBER,) packet   Rationale: Dose too low - Dosage too low -  Effectiveness   Recommendation: Increase Frequency   Status: Accepted - no CPA Needed         History of simultaneous kidney and pancreas transplant (H)    Current Medication: clotrimazole (MYCELEX) 10 MG lozenge   Rationale: Medication interaction - Adverse medication event - Safety   Recommendation: Provide Education   Status: Patient Agreed - Adherence/Education          Current Medication: mycophenolate (GENERIC EQUIVALENT) 250 MG capsule   Rationale: Undesirable effect - Adverse medication event - Safety   Recommendation: Change Medication Formulation  - MYFORTIC PO   Status: Contact Provider - Awaiting Response          Current Medication: phosphorus tablet 250 mg (PHOSPHA 250 NEUTRAL) 250 MG per tablet   Rationale: Dose too high - Dosage too high - Safety   Recommendation: Decrease Dose   Status: Accepted per CPA          Current Medication: tacrolimus (GENERIC EQUIVALENT) 1 MG capsule   Rationale: Does not understand instructions - Adherence - Adherence   Recommendation: Provide Education   Status: Patient Agreed - Adherence/Education

## 2022-10-06 NOTE — PATIENT INSTRUCTIONS
"Recommendations from today's MTM visit:                                                       Dr. Zuniga approved changing Mycophenolate to Mycophenolic acid 4 tablets (720mg) twice daily. This should help your diarrhea. They may also want to complete a stool sample.    1. [facility] order will call you three weeks post discharge, but if your dose changes, call the number on the back of the pill box to talk to them earlier, [phone]. If your doctor sends over a new prescription to the mail order pharmacy you will have to call them to set up the order. They have an Thomas called \"My [facility]\" as well.   2. Take Metamucil/ fiber 2-3 doses per day for diarrhea.   3. Continue the Tacrolimus 1mg twice daily.  4. Keep Clotrimazole at 4 x daily every day. There is a drug interaction with Tacrolimus so consistency is important.    5. Reduce Phosphorus to 250mg (1 tablet) twice daily.     Follow-up: 2 months    It was great speaking with you today.  I value your experience and would be very thankful for your time in providing feedback in our clinic survey. In the next few days, you may receive an email or text message from [employer] with a link to a survey related to your  clinical pharmacist.\"     To schedule another MTM appointment, please call the clinic directly or you may call the MTM scheduling line at [phone] or toll-free at [phone].     My Clinical Pharmacist's contact information:                                                      Please feel free to contact me with any questions or concerns you have.      Miller Salas, Ratna  MTM Pharmacist    Phone: [phone]     "

## 2022-10-07 ENCOUNTER — LAB (OUTPATIENT)
Dept: LAB | Facility: CLINIC | Age: 52
End: 2022-10-07
Payer: COMMERCIAL

## 2022-10-07 ENCOUNTER — TELEPHONE (OUTPATIENT)
Dept: TRANSPLANT | Facility: CLINIC | Age: 52
End: 2022-10-07

## 2022-10-07 DIAGNOSIS — R79.89 ELEVATED SERUM CREATININE: ICD-10-CM

## 2022-10-07 DIAGNOSIS — A49.8 CLOSTRIDIUM DIFFICILE INFECTION: Primary | ICD-10-CM

## 2022-10-07 DIAGNOSIS — E87.79 OTHER HYPERVOLEMIA: ICD-10-CM

## 2022-10-07 LAB
ALBUMIN UR-MCNC: NEGATIVE MG/DL
APPEARANCE UR: CLEAR
BILIRUB UR QL STRIP: NEGATIVE
C DIFF TOX B STL QL: POSITIVE
COLOR UR AUTO: YELLOW
GLUCOSE UR STRIP-MCNC: NEGATIVE MG/DL
HGB UR QL STRIP: NEGATIVE
KETONES UR STRIP-MCNC: NEGATIVE MG/DL
LEUKOCYTE ESTERASE UR QL STRIP: NEGATIVE
NITRATE UR QL: NEGATIVE
PH UR STRIP: 7 [PH] (ref 5–7)
RBC #/AREA URNS AUTO: NORMAL /HPF
SP GR UR STRIP: 1.01 (ref 1–1.03)
UROBILINOGEN UR STRIP-ACNC: 0.2 E.U./DL
WBC #/AREA URNS AUTO: NORMAL /HPF

## 2022-10-07 PROCEDURE — 87506 IADNA-DNA/RNA PROBE TQ 6-11: CPT | Performed by: INTERNAL MEDICINE

## 2022-10-07 PROCEDURE — 81001 URINALYSIS AUTO W/SCOPE: CPT

## 2022-10-07 PROCEDURE — 87493 C DIFF AMPLIFIED PROBE: CPT | Mod: XU | Performed by: INTERNAL MEDICINE

## 2022-10-07 RX ORDER — VANCOMYCIN HYDROCHLORIDE 125 MG/1
125 CAPSULE ORAL 4 TIMES DAILY
Qty: 56 CAPSULE | Refills: 0 | Status: SHIPPED | OUTPATIENT
Start: 2022-10-07 | End: 2022-10-27

## 2022-10-07 RX ORDER — FUROSEMIDE 40 MG
40 TABLET ORAL DAILY
Qty: 30 TABLET | Refills: 0
Start: 2022-10-07 | End: 2022-10-27

## 2022-10-07 NOTE — TELEPHONE ENCOUNTER
ISSUE:  + c.diff    Will plan to start vanco 125 mg qid x2 weeks. Hermila verbalized understanding of plan.     Weight down to 163 lbs from 171 a few days ago. Will reduce lasix from 40 mg bid to just 40 mg daily. Monitor weight over weekend.

## 2022-10-12 ENCOUNTER — DOCUMENTATION ONLY (OUTPATIENT)
Dept: TRANSPLANT | Facility: CLINIC | Age: 52
End: 2022-10-12
Payer: MEDICARE

## 2022-10-12 DIAGNOSIS — Z53.9 ERRONEOUS ENCOUNTER--DISREGARD: ICD-10-CM

## 2022-10-12 DIAGNOSIS — R26.89 IMPAIRED GAIT AND MOBILITY: Primary | ICD-10-CM

## 2022-10-13 ENCOUNTER — TELEPHONE (OUTPATIENT)
Dept: TRANSPLANT | Facility: CLINIC | Age: 52
End: 2022-10-13

## 2022-10-13 ENCOUNTER — HOSPITAL ENCOUNTER (OUTPATIENT)
Facility: CLINIC | Age: 52
Discharge: HOME | End: 2022-10-13
Attending: INTERNAL MEDICINE | Admitting: FAMILY MEDICINE
Payer: COMMERCIAL

## 2022-10-13 ENCOUNTER — LAB (OUTPATIENT)
Dept: LAB | Facility: CLINIC | Age: 52
End: 2022-10-13
Payer: COMMERCIAL

## 2022-10-13 DIAGNOSIS — Z94.0 KIDNEY REPLACED BY TRANSPLANT: ICD-10-CM

## 2022-10-13 DIAGNOSIS — Z48.298 AFTERCARE FOLLOWING ORGAN TRANSPLANT: ICD-10-CM

## 2022-10-13 DIAGNOSIS — Z94.83 PANCREAS TRANSPLANTED (H): ICD-10-CM

## 2022-10-13 DIAGNOSIS — Z94.83 PANCREAS TRANSPLANTED (H): Primary | ICD-10-CM

## 2022-10-13 DIAGNOSIS — Z79.899 ENCOUNTER FOR LONG-TERM CURRENT USE OF MEDICATION: ICD-10-CM

## 2022-10-13 LAB
ALBUMIN MFR UR ELPH: 34.9 MG/DL
CREAT UR-MCNC: 65.9 MG/DL
ERYTHROCYTE [DISTWIDTH] IN BLOOD BY AUTOMATED COUNT: 15.7 % (ref 10–15)
HBA1C MFR BLD: 6 % (ref 0–5.6)
HCT VFR BLD AUTO: 25.7 % (ref 35–47)
HGB BLD-MCNC: 8 G/DL (ref 11.7–15.7)
MCH RBC QN AUTO: 32.3 PG (ref 26.5–33)
MCHC RBC AUTO-ENTMCNC: 31.1 G/DL (ref 31.5–36.5)
MCV RBC AUTO: 104 FL (ref 78–100)
PLATELET # BLD AUTO: 342 10E3/UL (ref 150–450)
PROT/CREAT 24H UR: 0.53 MG/MG CR (ref 0–0.2)
PTH-INTACT SERPL-MCNC: 259 PG/ML (ref 15–65)
RBC # BLD AUTO: 2.48 10E6/UL (ref 3.8–5.2)
WBC # BLD AUTO: 5.9 10E3/UL (ref 4–11)

## 2022-10-13 PROCEDURE — 80061 LIPID PANEL: CPT

## 2022-10-13 PROCEDURE — 84550 ASSAY OF BLOOD/URIC ACID: CPT

## 2022-10-13 PROCEDURE — 82150 ASSAY OF AMYLASE: CPT

## 2022-10-13 PROCEDURE — 80053 COMPREHEN METABOLIC PANEL: CPT

## 2022-10-13 PROCEDURE — 84156 ASSAY OF PROTEIN URINE: CPT

## 2022-10-13 PROCEDURE — 82248 BILIRUBIN DIRECT: CPT

## 2022-10-13 PROCEDURE — 36415 COLL VENOUS BLD VENIPUNCTURE: CPT

## 2022-10-13 PROCEDURE — 83970 ASSAY OF PARATHORMONE: CPT

## 2022-10-13 PROCEDURE — 83690 ASSAY OF LIPASE: CPT

## 2022-10-13 PROCEDURE — 82306 VITAMIN D 25 HYDROXY: CPT

## 2022-10-13 PROCEDURE — 85027 COMPLETE CBC AUTOMATED: CPT

## 2022-10-13 PROCEDURE — 83036 HEMOGLOBIN GLYCOSYLATED A1C: CPT

## 2022-10-13 PROCEDURE — 80197 ASSAY OF TACROLIMUS: CPT

## 2022-10-14 ENCOUNTER — TELEPHONE (OUTPATIENT)
Dept: TRANSPLANT | Facility: CLINIC | Age: 52
End: 2022-10-14

## 2022-10-14 DIAGNOSIS — Z94.0 KIDNEY REPLACED BY TRANSPLANT: ICD-10-CM

## 2022-10-14 DIAGNOSIS — A49.8 CLOSTRIDIUM DIFFICILE INFECTION: ICD-10-CM

## 2022-10-14 DIAGNOSIS — Z94.83 PANCREAS REPLACED BY TRANSPLANT (H): ICD-10-CM

## 2022-10-14 LAB
ALBUMIN SERPL-MCNC: 3.3 G/DL (ref 3.4–5)
ALP SERPL-CCNC: 161 U/L (ref 40–150)
ALT SERPL W P-5'-P-CCNC: 40 U/L (ref 0–50)
AMYLASE SERPL-CCNC: 108 U/L (ref 30–110)
ANION GAP SERPL CALCULATED.3IONS-SCNC: 7 MMOL/L (ref 3–14)
AST SERPL W P-5'-P-CCNC: 21 U/L (ref 0–45)
BILIRUB DIRECT SERPL-MCNC: <0.1 MG/DL (ref 0–0.2)
BILIRUB SERPL-MCNC: 0.3 MG/DL (ref 0.2–1.3)
BUN SERPL-MCNC: 36 MG/DL (ref 7–30)
CALCIUM SERPL-MCNC: 9.1 MG/DL (ref 8.5–10.1)
CHLORIDE BLD-SCNC: 105 MMOL/L (ref 94–109)
CHOLEST SERPL-MCNC: 135 MG/DL
CO2 SERPL-SCNC: 27 MMOL/L (ref 20–32)
CREAT SERPL-MCNC: 2.07 MG/DL (ref 0.52–1.04)
DEPRECATED CALCIDIOL+CALCIFEROL SERPL-MC: 70 UG/L (ref 20–75)
FASTING STATUS PATIENT QL REPORTED: NO
GFR SERPL CREATININE-BSD FRML MDRD: 28 ML/MIN/1.73M2
GLUCOSE BLD-MCNC: 87 MG/DL (ref 70–99)
HDLC SERPL-MCNC: 63 MG/DL
LDLC SERPL CALC-MCNC: 50 MG/DL
LIPASE SERPL-CCNC: 513 U/L (ref 73–393)
NONHDLC SERPL-MCNC: 72 MG/DL
POTASSIUM BLD-SCNC: 4 MMOL/L (ref 3.4–5.3)
PROT SERPL-MCNC: 6.6 G/DL (ref 6.8–8.8)
SODIUM SERPL-SCNC: 139 MMOL/L (ref 133–144)
TACROLIMUS BLD-MCNC: 7 UG/L (ref 5–15)
TME LAST DOSE: NORMAL H
TME LAST DOSE: NORMAL H
TRIGL SERPL-MCNC: 110 MG/DL
URATE SERPL-MCNC: 6.3 MG/DL (ref 2.6–6)

## 2022-10-14 RX ORDER — TACROLIMUS 1 MG/1
1 CAPSULE ORAL 2 TIMES DAILY
Qty: 60 CAPSULE | Refills: 11 | Status: SHIPPED | OUTPATIENT
Start: 2022-10-14 | End: 2022-10-21

## 2022-10-14 RX ORDER — TACROLIMUS 0.5 MG/1
0.5 CAPSULE ORAL 2 TIMES DAILY
Qty: 60 CAPSULE | Refills: 11 | Status: SHIPPED | OUTPATIENT
Start: 2022-10-14 | End: 2022-10-21

## 2022-10-14 RX ORDER — VALGANCICLOVIR 450 MG/1
450 TABLET, FILM COATED ORAL EVERY OTHER DAY
Qty: 60 TABLET | Refills: 0 | Status: SHIPPED | OUTPATIENT
Start: 2022-10-14 | End: 2022-12-20

## 2022-10-14 NOTE — TELEPHONE ENCOUNTER
ISSUE:  Lipase elevated   Tac below goal (long trough?)    Spoke with Hermila who feels well. Diarrhea has improved significantly since starting po vancomycin. Drinking 2-3 liters daily. Denies abdominal pain or fevers.     Lipase on different scales. Appears to be improving from last week.     Estimated Creatinine Clearance: 29.4 mL/min (A) (based on SCr of 2.07 mg/dL (H)).    Ok to increase po Vaclyte to 450 mg every other day. Stay on Bactrim 3x/week for now.     ISSUE:   Tacrolimus IR level 7 on 10/13, goal 8-10, dose 1 mg BID.    PLAN:   Please call patient and confirm this was an accurate 12-hour trough. Verify Tacrolimus IR dose 1 mg BID. Confirm no new medications or illness. Confirm no missed doses. If accurate trough and accurate dose, increase Tacrolimus IR dose to 1.5 mg BID and repeat labs on Monday.    OUTCOME:   Spoke with patient, they confirm accurate trough level and current dose 1 mg BID. Patient confirmed dose change to 1.5 mg BID and to repeat labs on Monday. Orders sent to preferred pharmacy for dose change and lab for repeat labs. Patient voiced understanding of plan.

## 2022-10-17 ENCOUNTER — LAB (OUTPATIENT)
Dept: LAB | Facility: CLINIC | Age: 52
End: 2022-10-17
Payer: COMMERCIAL

## 2022-10-17 ENCOUNTER — OFFICE VISIT (OUTPATIENT)
Dept: TRANSPLANT | Facility: CLINIC | Age: 52
End: 2022-10-17
Attending: SURGERY
Payer: COMMERCIAL

## 2022-10-17 VITALS
DIASTOLIC BLOOD PRESSURE: 71 MMHG | WEIGHT: 155 LBS | BODY MASS INDEX: 29.29 KG/M2 | OXYGEN SATURATION: 99 % | HEART RATE: 76 BPM | SYSTOLIC BLOOD PRESSURE: 117 MMHG

## 2022-10-17 DIAGNOSIS — R74.8 ELEVATED LIPASE: ICD-10-CM

## 2022-10-17 DIAGNOSIS — Z48.298 AFTERCARE FOLLOWING ORGAN TRANSPLANT: ICD-10-CM

## 2022-10-17 DIAGNOSIS — R74.8 ELEVATED LIPASE: Primary | ICD-10-CM

## 2022-10-17 DIAGNOSIS — Z79.899 ENCOUNTER FOR LONG-TERM CURRENT USE OF MEDICATION: ICD-10-CM

## 2022-10-17 DIAGNOSIS — Z94.0 KIDNEY REPLACED BY TRANSPLANT: ICD-10-CM

## 2022-10-17 LAB
AMYLASE SERPL-CCNC: 115 U/L (ref 28–100)
ANION GAP SERPL CALCULATED.3IONS-SCNC: 11 MMOL/L (ref 7–15)
BASOPHILS # BLD AUTO: 0.1 10E3/UL (ref 0–0.2)
BASOPHILS NFR BLD AUTO: 1 %
BUN SERPL-MCNC: 40.4 MG/DL (ref 6–20)
CALCIUM SERPL-MCNC: 10 MG/DL (ref 8.6–10)
CHLORIDE SERPL-SCNC: 101 MMOL/L (ref 98–107)
CREAT SERPL-MCNC: 2 MG/DL (ref 0.51–0.95)
DEPRECATED HCO3 PLAS-SCNC: 26 MMOL/L (ref 22–29)
EOSINOPHIL # BLD AUTO: 0.1 10E3/UL (ref 0–0.7)
EOSINOPHIL NFR BLD AUTO: 2 %
ERYTHROCYTE [DISTWIDTH] IN BLOOD BY AUTOMATED COUNT: 15.4 % (ref 10–15)
GFR SERPL CREATININE-BSD FRML MDRD: 29 ML/MIN/1.73M2
GLUCOSE SERPL-MCNC: 120 MG/DL (ref 70–99)
HCT VFR BLD AUTO: 31.2 % (ref 35–47)
HGB BLD-MCNC: 9.9 G/DL (ref 11.7–15.7)
IMM GRANULOCYTES # BLD: 0.1 10E3/UL
IMM GRANULOCYTES NFR BLD: 1 %
LIPASE SERPL-CCNC: 96 U/L (ref 13–60)
LYMPHOCYTES # BLD AUTO: 0.3 10E3/UL (ref 0.8–5.3)
LYMPHOCYTES NFR BLD AUTO: 5 %
MAGNESIUM SERPL-MCNC: 1.5 MG/DL (ref 1.7–2.3)
MCH RBC QN AUTO: 32.5 PG (ref 26.5–33)
MCHC RBC AUTO-ENTMCNC: 31.7 G/DL (ref 31.5–36.5)
MCV RBC AUTO: 102 FL (ref 78–100)
MONOCYTES # BLD AUTO: 0.4 10E3/UL (ref 0–1.3)
MONOCYTES NFR BLD AUTO: 6 %
NEUTROPHILS # BLD AUTO: 5.4 10E3/UL (ref 1.6–8.3)
NEUTROPHILS NFR BLD AUTO: 85 %
NRBC # BLD AUTO: 0 10E3/UL
NRBC BLD AUTO-RTO: 0 /100
PHOSPHATE SERPL-MCNC: 3.5 MG/DL (ref 2.5–4.5)
PLATELET # BLD AUTO: 385 10E3/UL (ref 150–450)
POTASSIUM SERPL-SCNC: 5 MMOL/L (ref 3.4–5.3)
RBC # BLD AUTO: 3.05 10E6/UL (ref 3.8–5.2)
SODIUM SERPL-SCNC: 138 MMOL/L (ref 136–145)
WBC # BLD AUTO: 6.3 10E3/UL (ref 4–11)

## 2022-10-17 PROCEDURE — 85025 COMPLETE CBC W/AUTO DIFF WBC: CPT | Performed by: PATHOLOGY

## 2022-10-17 PROCEDURE — 36415 COLL VENOUS BLD VENIPUNCTURE: CPT | Performed by: PATHOLOGY

## 2022-10-17 PROCEDURE — 99024 POSTOP FOLLOW-UP VISIT: CPT | Performed by: NURSE PRACTITIONER

## 2022-10-17 PROCEDURE — 83735 ASSAY OF MAGNESIUM: CPT | Performed by: PATHOLOGY

## 2022-10-17 PROCEDURE — 83690 ASSAY OF LIPASE: CPT | Performed by: PATHOLOGY

## 2022-10-17 PROCEDURE — 80048 BASIC METABOLIC PNL TOTAL CA: CPT | Performed by: PATHOLOGY

## 2022-10-17 PROCEDURE — 99000 SPECIMEN HANDLING OFFICE-LAB: CPT | Performed by: PATHOLOGY

## 2022-10-17 PROCEDURE — 80180 DRUG SCRN QUAN MYCOPHENOLATE: CPT | Mod: 90 | Performed by: PATHOLOGY

## 2022-10-17 PROCEDURE — 84100 ASSAY OF PHOSPHORUS: CPT | Performed by: PATHOLOGY

## 2022-10-17 PROCEDURE — 82150 ASSAY OF AMYLASE: CPT | Performed by: PATHOLOGY

## 2022-10-17 ASSESSMENT — PAIN SCALES - GENERAL: PAINLEVEL: NO PAIN (0)

## 2022-10-17 NOTE — LETTER
10/17/2022         RE: Eden Hess  [address]  [place] [address]        Dear Colleague,    Thank you for referring your patient, Eden Hess, to the [facility]. Please see a copy of my visit note below.    Transplant Surgery Progress Note    Transplants:  9/23/2022 (Kidney / Pancreas); Postoperative day:  24  S: Feeling well.  Slight tremors in hands but mild. Forgot her lab appt. This AM.   Not on any insulin, FBS 90s, -200s.    Urinating wnl. No urinary s/s. No F/C.   Home BP average 120/60s.    Appetite is good.   Denies constipation. On oral vanco for Cdiff. Stools are lessening.       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   Transplant Date:  9/23/2022 (Kidney / Pancreas)   Ureteral Stent:  No   Crossmatch:  negative   DSA at Tx:  No  Baseline Cr: TBD     Acute Rejection Hx:  No    Present Maintenance Immunosuppression:  Tacrolimus, Mycophenolate mofetil and Prednisone    CMV IgG Ab Discordance:  No  EBV IgG Ab Discordance:  No    BK Viremia:  No  EBV Viremia:  No    Transplant Coordinator: Codie Kovacs     Transplant Office Phone Number: [phone]     Immunosuppressant Medications     Immunosuppressive Agents Disp Start End     mycophenolate (GENERIC EQUIVALENT) 250 MG capsule    240 capsule 10/2/2022     Sig - Route: Take 4 capsules (1,000 mg) by mouth 2 times daily - Oral    Class: E-Prescribe    Renewals     Renewal requests to authorizing provider (Sanam Renee, APRN CNP) <b>prohibited</b>           MYFORTIC (BRAND) 180 MG EC tablet    240 tablet 10/6/2022     Sig - Route: Take 4 tablets (720 mg) by mouth 2 times daily - Oral    Class: E-Prescribe     tacrolimus (GENERIC EQUIVALENT) 0.5 MG capsule    60 capsule 10/14/2022     Sig - Route: Take 1 capsule (0.5 mg) by mouth 2 times daily Total dose = 1.5 mg twice daily - Oral    Class: E-Prescribe    Notes to Pharmacy: TXP DT 9/23/2022  (Kidney / Pancreas) TXP Dischg DT 10/2/2022 DX Kidney replaced by transplant Z94.0 TX [facility] ([place], [place])     tacrolimus (GENERIC EQUIVALENT) 1 MG capsule    60 capsule 10/14/2022     Sig - Route: Take 1 capsule (1 mg) by mouth 2 times daily Total dose = 1.5 mg twice daily - Oral    Class: E-Prescribe    Notes to Pharmacy: TXP DT 9/23/2022 (Kidney / Pancreas) TXP Dischg DT 10/2/2022 DX Kidney replaced by transplant Z94.0 TX [facility] ([place], [place])          Possible Immunosuppression-related side effects:   []             headache  []             vivid dreams  []             irritability  []             cognitive difficuties  [x]             fine tremor  []             nausea  []             diarrhea  []             neuropathy      []             edema  []             renal calcineurin toxicity  []             hyperkalemia  []             post-transplant diabetes  []             decreased appetite  []             increased appetite  []             other:  []             none    Prescription Medications as of 10/17/2022       Rx Number Disp Refills Start End Last Dispensed Date Next Fill Date Owning Pharmacy    acetaminophen (TYLENOL) 325 MG tablet  100 tablet 0 10/2/2022    [address]    Sig: Take 2 tablets (650 mg) by mouth every 4 hours as needed for pain    Class: E-Prescribe    Route: Oral    aspirin (ASA) 325 MG EC tablet  30 tablet 11 10/3/2022    [address]    Sig: Take 1 tablet (325 mg) by mouth daily    Class: E-Prescribe    Route: Oral    Renewals     Renewal requests to authorizing provider (Sanam Renee, APRN CNP) <b>prohibited</b>          atorvastatin (LIPITOR) 20 MG tablet  30 tablet 11 10/2/2022    [address]     Sig: Take 1 tablet (20 mg) by mouth every evening    Class: E-Prescribe    Route: Oral    Renewals     Renewal requests to authorizing provider (Sanam Renee APRN CNP) <b>prohibited</b>          calcium carbonate-vitamin D (OS-BARBARA WITH D) 500-200 MG-UNIT tablet  60 tablet 11 10/2/2022    [address]    Sig: Take 1 tablet by mouth 2 times daily (with meals)    Class: E-Prescribe    Route: Oral    Renewals     Renewal requests to authorizing provider (Sanam Renee APRN CNP) <b>prohibited</b>          carvedilol (COREG) 12.5 MG tablet  60 tablet 11 10/2/2022    [address]    Sig: Take 1 tablet (12.5 mg) by mouth 2 times daily (with meals)    Class: E-Prescribe    Route: Oral    Renewals     Renewal requests to authorizing provider (Sanam Renee APRN CNP) <b>prohibited</b>          clotrimazole (MYCELEX) 10 MG lozenge  308 lozenge 0 10/2/2022 12/18/2022   [address]    Sig: Place 1 lozenge (10 mg) inside cheek 4 times daily for 77 days    Class: E-Prescribe    Route: Buccal    Renewals     Renewal requests to authorizing provider (Sanam Renee APRN CNP) <b>prohibited</b>          furosemide (LASIX) 40 MG tablet  30 tablet 0 10/7/2022    [id number] [address] [facility]    Sig: Take 1 tablet (40 mg) by mouth daily    Class: No Print Out    Route: Oral    Guar Gum (FIBER MODULAR, NUTRISOURCE FIBER,) packet  60 packet 0 10/2/2022    [address]    Sig: Take 1 packet by mouth 2 times daily    Class: E-Prescribe    Route: Oral    Insulin Disposable Pump (OMNIPOD DASH 5 PACK PODS) MISC    3/8/2021    [facility][id number] [address] [facility]    Sig: Inject 1 each Subcutaneous every 72 hours    Class: Historical     Route: Subcutaneous    magnesium oxide (MAG-OX) 400 MG tablet  60 tablet 11 10/2/2022    [address]    Sig: Take 1 tablet (400 mg) by mouth 2 times daily    Class: E-Prescribe    Route: Oral    Renewals     Renewal requests to authorizing provider (Sanam Renee APRN CNP) <b>prohibited</b>          mycophenolate (GENERIC EQUIVALENT) 250 MG capsule  240 capsule 11 10/2/2022    [address]    Sig: Take 4 capsules (1,000 mg) by mouth 2 times daily    Class: E-Prescribe    Route: Oral    Renewals     Renewal requests to authorizing provider (Sanam Renee APRN CNP) <b>prohibited</b>          MYFORTIC (BRAND) 180 MG EC tablet  240 tablet 1 10/6/2022    [id number] [address] [place] [address]    Sig: Take 4 tablets (720 mg) by mouth 2 times daily    Class: E-Prescribe    Route: Oral    pantoprazole (PROTONIX) 40 MG EC tablet  30 tablet 0 10/3/2022    [address]    Sig: Take 1 tablet (40 mg) by mouth every morning (before breakfast)    Class: E-Prescribe    Route: Oral    Renewals     Renewal requests to authorizing provider (Sanam Renee APRN CNP) <b>prohibited</b>          phosphorus tablet 250 mg (PHOSPHA 250 NEUTRAL) 250 MG per tablet  60 tablet 1 10/2/2022    [address]    Sig: Take 2 tablets (500 mg) by mouth 2 times daily    Class: E-Prescribe    Route: Oral    predniSONE (DELTASONE) 10 MG tablet  29 tablet 0 10/3/2022 10/28/2022   [address]    Sig: Take 2 tablets (20 mg) by mouth daily for 4 days, THEN 1.5 tablets (15 mg) daily for 7 days, THEN 1 tablet (10 mg) daily for 7 days, THEN 0.5 tablets (5 mg) daily for 7 days.    Class: E-Prescribe    Route: Oral     sulfamethoxazole-trimethoprim (BACTRIM) 400-80 MG tablet  13 tablet 11 10/3/2022    [facility] - [place], [place] - [address]    Sig: Take 1 tablet by mouth three times a week    Class: E-Prescribe    Route: Oral    Renewals     Renewal requests to authorizing provider (Sanam Renee, MELINDA MIX) <b>prohibited</b>          tacrolimus (GENERIC EQUIVALENT) 0.5 MG capsule  60 capsule 11 10/14/2022    [facility] - [place], [place] - [address]    Sig: Take 1 capsule (0.5 mg) by mouth 2 times daily Total dose = 1.5 mg twice daily    Class: E-Prescribe    Notes to Pharmacy: TXP DT 9/23/2022 (Kidney / Pancreas) TXP Dischg DT 10/2/2022 DX Kidney replaced by transplant Z94.0 TX [facility] ([place], [place])    Route: Oral    tacrolimus (GENERIC EQUIVALENT) 1 MG capsule  60 capsule 11 10/14/2022    [facility] - [place], [place] - [address]    Sig: Take 1 capsule (1 mg) by mouth 2 times daily Total dose = 1.5 mg twice daily    Class: E-Prescribe    Notes to Pharmacy: TXP DT 9/23/2022 (Kidney / Pancreas) TXP Dischg DT 10/2/2022 DX Kidney replaced by transplant Z94.0 TX [facility] ([place], [place])    Route: Oral    valGANciclovir (VALCYTE) 450 MG tablet  60 tablet 0 10/14/2022    [facility] - [place], [place] - [address]    Sig: Take 1 tablet (450 mg) by mouth every other day    Class: E-Prescribe    Route: Oral    vancomycin (VANCOCIN) 125 MG capsule  56 capsule 0 10/7/2022    [id number] - [place], [place] - [address] [facility]    Sig: Take 1 capsule (125 mg) by mouth 4 times daily    Class: E-Prescribe    Route: Oral    venlafaxine (EFFEXOR XR) 150 MG 24 hr capsule  30 capsule 0 10/4/2022    [facility] - [place], [place] - [id number]    Sig: Take 1 capsule (150 mg) by  mouth daily    Class: E-Prescribe    Route: Oral    Wheat Dextrin (BENEFIBER DRINK MIX PO)            Sig: Take 1 teaspoonful by mouth 2 times daily    Class: Historical    Route: Oral          O:   Pulse:  [76] 76  BP: (117)/(71) 117/71  SpO2:  [99 %] 99 %  General Appearance: in no apparent distress.   Skin: Normal, no rashes or jaundice  Heart: regular rate and rhythm  Lungs: easy respirations, no audible wheezing.  Abdomen: rounded, The wound is dry and intact, without hernia. The abdomen is non-tender. The kidney and pancreas graft is not tender.  There is no ascites.  Extremities: Tremor present bilateral - mild.   Edema: absent.     Transplant Immunosuppression Labs Latest Ref Rng & Units 10/13/2022 10/6/2022 10/4/2022 10/3/2022 10/2/2022   Creat 0.52 - 1.04 mg/dL 2.07(H) 2.64(H) 2.88(H) 2.95(H) 3.15(H)   BUN 7 - 30 mg/dL 36(H) 52.5(H) 56.0(H) 58.5(H) 62.5(H)   WBC 4.0 - 11.0 10e3/uL 5.9 11.6(H) 12.6(H) 10.1 9.1   Neutrophil % - 86 85 83 83   ANEU 1.6 - 8.3 10e3/uL - - - - -       Chemistries:   Recent Labs   Lab Test 10/13/22  1102   BUN 36*   CR 2.07*   GFRESTIMATED 28*   GLC 87     Lab Results   Component Value Date    A1C 6.0 10/13/2022    A1C 7.3 08/13/2020    CPEPT <0.1 08/13/2020     Recent Labs   Lab Test 10/13/22  1102   ALBUMIN 3.3*   BILITOTAL 0.3   ALKPHOS 161*   AST 21   ALT 40     Urine Studies:  Recent Labs   Lab Test 10/07/22  1213   COLOR Yellow   APPEARANCE Clear   URINEGLC Negative   URINEBILI Negative   URINEKETONE Negative   SG 1.015   UBLD Negative   URINEPH 7.0   PROTEIN Negative   NITRITE Negative   LEUKEST Negative   RBCU 0-2   WBCU 0-5     No lab results found.  Hematology:   Recent Labs   Lab Test 10/13/22  1054 10/06/22  1059 10/04/22  0718   HGB 8.0* 8.2* 7.8*    298 286   WBC 5.9 11.6* 12.6*     Coags:   Recent Labs   Lab Test 09/23/22  1820 09/22/22  1629   INR 1.09 0.91     HLA antibodies:   SA1 Hi Risk Dolores   Date Value Ref Range Status   08/13/2020 None  Final     SA1  HI RISK ROE   Date Value Ref Range Status   09/22/2022 None  Final     SA1 Mod Risk Roe   Date Value Ref Range Status   08/13/2020 None  Final     SA1 MOD RISK ROE   Date Value Ref Range Status   09/22/2022 None  Final     SA2 Hi Risk Roe   Date Value Ref Range Status   08/13/2020 None  Final     SA2 HI RISK ROE   Date Value Ref Range Status   09/22/2022 None  Final     SA2 Mod Risk Roe   Date Value Ref Range Status   08/13/2020 None  Final     SA2 MOD RISK ROE   Date Value Ref Range Status   09/22/2022 None  Final       Assessment: Eden Hess is doing well s/p DDKT (SPK):  Issues we addressed during her visit include:    Plan:    1. Graft function: stable  2. Immunosuppression Management: Continue current regimen.   .  Complexity of management:Low.   3. Incision: staples removed without complication.   Followup: Counseled on lab schedule and importance of keeping lab appointments. Wants labs done in [place], will discuss with coordinator.     -11/4/22 Nephrology  -11/14/22 Surgeon        Total Time: 20 min,   Counselling Time: 10 min.      MELINDA Overton.

## 2022-10-17 NOTE — PROGRESS NOTES
Transplant Surgery Progress Note    Transplants:  9/23/2022 (Kidney / Pancreas); Postoperative day:  24  S: Feeling well.  Slight tremors in hands but mild. Forgot her lab appt. This AM.   Not on any insulin, FBS 90s, -200s.    Urinating wnl. No urinary s/s. No F/C.   Home BP average 120/60s.    Appetite is good.   Denies constipation. On oral vanco for Cdiff. Stools are lessening.       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   Transplant Date:  9/23/2022 (Kidney / Pancreas)   Ureteral Stent:  No   Crossmatch:  negative   DSA at Tx:  No  Baseline Cr: TBD     Acute Rejection Hx:  No    Present Maintenance Immunosuppression:  Tacrolimus, Mycophenolate mofetil and Prednisone    CMV IgG Ab Discordance:  No  EBV IgG Ab Discordance:  No    BK Viremia:  No  EBV Viremia:  No    Transplant Coordinator: Codie Kovacs     Transplant Office Phone Number: [phone]     Immunosuppressant Medications     Immunosuppressive Agents Disp Start End     mycophenolate (GENERIC EQUIVALENT) 250 MG capsule    240 capsule 10/2/2022     Sig - Route: Take 4 capsules (1,000 mg) by mouth 2 times daily - Oral    Class: E-Prescribe    Renewals     Renewal requests to authorizing provider (Sanam Renee, MELINDA MIX) <b>prohibited</b>           MYFORTIC (BRAND) 180 MG EC tablet    240 tablet 10/6/2022     Sig - Route: Take 4 tablets (720 mg) by mouth 2 times daily - Oral    Class: E-Prescribe     tacrolimus (GENERIC EQUIVALENT) 0.5 MG capsule    60 capsule 10/14/2022     Sig - Route: Take 1 capsule (0.5 mg) by mouth 2 times daily Total dose = 1.5 mg twice daily - Oral    Class: E-Prescribe    Notes to Pharmacy: TXP DT 9/23/2022 (Kidney / Pancreas) TXP Dischg DT 10/2/2022 DX Kidney replaced by transplant Z94.0 TX [facility] ([place], [place])     tacrolimus (GENERIC EQUIVALENT) 1 MG capsule    60 capsule 10/14/2022     Sig - Route: Take 1  capsule (1 mg) by mouth 2 times daily Total dose = 1.5 mg twice daily - Oral    Class: E-Prescribe    Notes to Pharmacy: TXP DT 9/23/2022 (Kidney / Pancreas) TXP Dischg DT 10/2/2022 DX Kidney replaced by transplant Z94.0 TX Center [facility] ([place], [place])          Possible Immunosuppression-related side effects:   []             headache  []             vivid dreams  []             irritability  []             cognitive difficuties  [x]             fine tremor  []             nausea  []             diarrhea  []             neuropathy      []             edema  []             renal calcineurin toxicity  []             hyperkalemia  []             post-transplant diabetes  []             decreased appetite  []             increased appetite  []             other:  []             none    Prescription Medications as of 10/17/2022       Rx Number Disp Refills Start End Last Dispensed Date Next Fill Date Owning Pharmacy    acetaminophen (TYLENOL) 325 MG tablet  100 tablet 0 10/2/2022    [address]    Sig: Take 2 tablets (650 mg) by mouth every 4 hours as needed for pain    Class: E-Prescribe    Route: Oral    aspirin (ASA) 325 MG EC tablet  30 tablet 11 10/3/2022    [address]    Sig: Take 1 tablet (325 mg) by mouth daily    Class: E-Prescribe    Route: Oral    Renewals     Renewal requests to authorizing provider (Sanam Renee APRN CNP) <b>prohibited</b>          atorvastatin (LIPITOR) 20 MG tablet  30 tablet 11 10/2/2022    [address]    Sig: Take 1 tablet (20 mg) by mouth every evening    Class: E-Prescribe    Route: Oral    Renewals     Renewal requests to authorizing provider (Sanam Renee APRN CNP) <b>prohibited</b>          calcium carbonate-vitamin D (OS-BARBARA WITH D) 500-200  MG-UNIT tablet  60 tablet 11 10/2/2022    [address]    Sig: Take 1 tablet by mouth 2 times daily (with meals)    Class: E-Prescribe    Route: Oral    Renewals     Renewal requests to authorizing provider (Sanam Renee APRN CNP) <b>prohibited</b>          carvedilol (COREG) 12.5 MG tablet  60 tablet 11 10/2/2022    [address]    Sig: Take 1 tablet (12.5 mg) by mouth 2 times daily (with meals)    Class: E-Prescribe    Route: Oral    Renewals     Renewal requests to authorizing provider (Sanam Renee APRN CNP) <b>prohibited</b>          clotrimazole (MYCELEX) 10 MG lozenge  308 lozenge 0 10/2/2022 12/18/2022   [address]    Sig: Place 1 lozenge (10 mg) inside cheek 4 times daily for 77 days    Class: E-Prescribe    Route: Buccal    Renewals     Renewal requests to authorizing provider (Sanam Renee APRN CNP) <b>prohibited</b>          furosemide (LASIX) 40 MG tablet  30 tablet 0 10/7/2022    [id number] [address]    Sig: Take 1 tablet (40 mg) by mouth daily    Class: No Print Out    Route: Oral    Guar Gum (FIBER MODULAR, NUTRISOURCE FIBER,) packet  60 packet 0 10/2/2022    [address]    Sig: Take 1 packet by mouth 2 times daily    Class: E-Prescribe    Route: Oral    Insulin Disposable Pump (OMNIPOD DASH 5 PACK PODS) MISC    3/8/2021    [id number] [address]    Sig: Inject 1 each Subcutaneous every 72 hours    Class: Historical    Route: Subcutaneous    magnesium oxide (MAG-OX) 400 MG tablet  60 tablet 11 10/2/2022    [address]    Sig: Take 1 tablet (400 mg) by mouth 2 times daily    Class: E-Prescribe    Route: Oral     Renewals     Renewal requests to authorizing provider (Sanam Renee APRN CNP) <b>prohibited</b>          mycophenolate (GENERIC EQUIVALENT) 250 MG capsule  240 capsule 11 10/2/2022    [address]    Sig: Take 4 capsules (1,000 mg) by mouth 2 times daily    Class: E-Prescribe    Route: Oral    Renewals     Renewal requests to authorizing provider (Sanam Renee APRN CNP) <b>prohibited</b>          MYFORTIC (BRAND) 180 MG EC tablet  240 tablet 1 10/6/2022    [id number] [address]    Sig: Take 4 tablets (720 mg) by mouth 2 times daily    Class: E-Prescribe    Route: Oral    pantoprazole (PROTONIX) 40 MG EC tablet  30 tablet 0 10/3/2022    [address]    Sig: Take 1 tablet (40 mg) by mouth every morning (before breakfast)    Class: E-Prescribe    Route: Oral    Renewals     Renewal requests to authorizing provider (Sanam Renee APRN CNP) <b>prohibited</b>          phosphorus tablet 250 mg (PHOSPHA 250 NEUTRAL) 250 MG per tablet  60 tablet 1 10/2/2022    [address]    Sig: Take 2 tablets (500 mg) by mouth 2 times daily    Class: E-Prescribe    Route: Oral    predniSONE (DELTASONE) 10 MG tablet  29 tablet 0 10/3/2022 10/28/2022   [address]    Sig: Take 2 tablets (20 mg) by mouth daily for 4 days, THEN 1.5 tablets (15 mg) daily for 7 days, THEN 1 tablet (10 mg) daily for 7 days, THEN 0.5 tablets (5 mg) daily for 7 days.    Class: E-Prescribe    Route: Oral    sulfamethoxazole-trimethoprim (BACTRIM) 400-80 MG tablet  13 tablet 11 10/3/2022    [address]    Sig: Take 1 tablet by mouth three times a week    Class: E-Prescribe    Route: Oral    Renewals     Renewal  requests to authorizing provider (Sanam Renee, APRN CNP) <b>prohibited</b>          tacrolimus (GENERIC EQUIVALENT) 0.5 MG capsule  60 capsule 11 10/14/2022    [facility] - [place], [place] - [facility] [address]    Sig: Take 1 capsule (0.5 mg) by mouth 2 times daily Total dose = 1.5 mg twice daily    Class: E-Prescribe    Notes to Pharmacy: TXP DT 9/23/2022 (Kidney / Pancreas) TXP Dischg DT 10/2/2022 DX Kidney replaced by transplant Z94.0 TX [facility] ([place], [place])    Route: Oral    tacrolimus (GENERIC EQUIVALENT) 1 MG capsule  60 capsule 11 10/14/2022    [facility] [place] [address] [address]     Sig: Take 1 capsule (1 mg) by mouth 2 times daily Total dose = 1.5 mg twice daily    Class: E-Prescribe    Notes to Pharmacy: TXP DT 9/23/2022 (Kidney / Pancreas) TXP Dischg DT 10/2/2022 DX Kidney replaced by transplant Z94.0 TX [facility] ([place], [place])    Route: Oral    valGANciclovir (VALCYTE) 450 MG tablet  60 tablet 0 10/14/2022    [facility] - [place], [place] - [facility] [address]    Sig: Take 1 tablet (450 mg) by mouth every other day    Class: E-Prescribe    Route: Oral    vancomycin (VANCOCIN) 125 MG capsule  56 capsule 0 10/7/2022    [id number] - [facility] [address] [facility]    Sig: Take 1 capsule (125 mg) by mouth 4 times daily    Class: E-Prescribe    Route: Oral    venlafaxine (EFFEXOR XR) 150 MG 24 hr capsule  30 capsule 0 10/4/2022    [facility] [place], [place] - [address] [facility] [id number]    Sig: Take 1 capsule (150 mg) by mouth daily    Class: E-Prescribe    Route: Oral    Wheat Dextrin (BENEFIBER DRINK MIX PO)            Sig: Take 1 teaspoonful by mouth 2 times daily    Class: Historical    Route: Oral          O:   Pulse:  [76] 76  BP: (117)/(71) 117/71  SpO2:  [99 %] 99 %  General  Appearance: in no apparent distress.   Skin: Normal, no rashes or jaundice  Heart: regular rate and rhythm  Lungs: easy respirations, no audible wheezing.  Abdomen: rounded, The wound is dry and intact, without hernia. The abdomen is non-tender. The kidney and pancreas graft is not tender.  There is no ascites.  Extremities: Tremor present bilateral - mild.   Edema: absent.     Transplant Immunosuppression Labs Latest Ref Rng & Units 10/13/2022 10/6/2022 10/4/2022 10/3/2022 10/2/2022   Creat 0.52 - 1.04 mg/dL 2.07(H) 2.64(H) 2.88(H) 2.95(H) 3.15(H)   BUN 7 - 30 mg/dL 36(H) 52.5(H) 56.0(H) 58.5(H) 62.5(H)   WBC 4.0 - 11.0 10e3/uL 5.9 11.6(H) 12.6(H) 10.1 9.1   Neutrophil % - 86 85 83 83   ANEU 1.6 - 8.3 10e3/uL - - - - -       Chemistries:   Recent Labs   Lab Test 10/13/22  1102   BUN 36*   CR 2.07*   GFRESTIMATED 28*   GLC 87     Lab Results   Component Value Date    A1C 6.0 10/13/2022    A1C 7.3 08/13/2020    CPEPT <0.1 08/13/2020     Recent Labs   Lab Test 10/13/22  1102   ALBUMIN 3.3*   BILITOTAL 0.3   ALKPHOS 161*   AST 21   ALT 40     Urine Studies:  Recent Labs   Lab Test 10/07/22  1213   COLOR Yellow   APPEARANCE Clear   URINEGLC Negative   URINEBILI Negative   URINEKETONE Negative   SG 1.015   UBLD Negative   URINEPH 7.0   PROTEIN Negative   NITRITE Negative   LEUKEST Negative   RBCU 0-2   WBCU 0-5     No lab results found.  Hematology:   Recent Labs   Lab Test 10/13/22  1054 10/06/22  1059 10/04/22  0718   HGB 8.0* 8.2* 7.8*    298 286   WBC 5.9 11.6* 12.6*     Coags:   Recent Labs   Lab Test 09/23/22  1820 09/22/22  1629   INR 1.09 0.91     HLA antibodies:   SA1 Hi Risk Roe   Date Value Ref Range Status   08/13/2020 None  Final     SA1 HI RISK ROE   Date Value Ref Range Status   09/22/2022 None  Final     SA1 Mod Risk Roe   Date Value Ref Range Status   08/13/2020 None  Final     SA1 MOD RISK ROE   Date Value Ref Range Status   09/22/2022 None  Final     SA2 Hi Risk Roe   Date Value Ref Range Status    08/13/2020 None  Final     SA2 HI RISK ROE   Date Value Ref Range Status   09/22/2022 None  Final     SA2 Mod Risk Roe   Date Value Ref Range Status   08/13/2020 None  Final     SA2 MOD RISK ROE   Date Value Ref Range Status   09/22/2022 None  Final       Assessment: Eden Hess is doing well s/p DDKT (SPK):  Issues we addressed during her visit include:    Plan:    1. Graft function: stable  2. Immunosuppression Management: Continue current regimen.   .  Complexity of management:Low.   3. Incision: staples removed without complication.   Followup: Counseled on lab schedule and importance of keeping lab appointments. Wants labs done in [place], will discuss with coordinator.     -11/4/22 Nephrology  -11/14/22 Surgeon        Total Time: 20 min,   Counselling Time: 10 min.    MELINDA Overton.

## 2022-10-18 ENCOUNTER — TELEPHONE (OUTPATIENT)
Dept: FAMILY MEDICINE | Facility: CLINIC | Age: 52
End: 2022-10-18

## 2022-10-18 ENCOUNTER — TELEPHONE (OUTPATIENT)
Dept: TRANSPLANT | Facility: CLINIC | Age: 52
End: 2022-10-18

## 2022-10-18 LAB
MYCOPHENOLATE SERPL LC/MS/MS-MCNC: 1.8 MG/L (ref 1–3.5)
MYCOPHENOLATE-G SERPL LC/MS/MS-MCNC: 81.7 MG/L (ref 30–95)
TME LAST DOSE: NORMAL H
TME LAST DOSE: NORMAL H

## 2022-10-18 NOTE — TELEPHONE ENCOUNTER
Received call from patient. She verbalized concern regarding elevated lab values post-transplant and is wondering how to follow up on this. Writer reached out to patient's transplant coordinator for further instruction and patient will be contacted. Notified patient of this. Closing encounter.    SPENSER GarridoN ANATOLY  [facility], [facility]

## 2022-10-18 NOTE — TELEPHONE ENCOUNTER
Spoke with patient regarding confusion with lab schedule, reviewed Monday/Thursday scheduled until 3 months post-transplant. Will need tacrolimus level asap as labs were drawn late yesterday.     Prefers to have labs at [facility] and will call for standing appointments.

## 2022-10-19 ENCOUNTER — TELEPHONE (OUTPATIENT)
Dept: TRANSPLANT | Facility: CLINIC | Age: 52
End: 2022-10-19

## 2022-10-19 NOTE — TELEPHONE ENCOUNTER
Post Kidney and Pancreas Transplant Team Conference  Date: 10/19/2022  Transplant Coordinator:Giovanna Kovacs    Attendees:  []  Dr. Moore [x] Allie Dooley, RN [x] Maggie Cardenas LPN     []  Dr. Cruz [] Kiara Beth, ANATOLY [] Arlene Coronado LPN   [x]  Dr. Fernandez [x] Michelle Lott, ANATOLY    [x]  Dr. Zuniga [x] Denise Hamilton RN [] Miller Salas, PharmD   [] Dr. Gunderson [] Francisca Camacho RN    [] Dr. Gómez [] Taran Ledesma RN    [x] Dr. Almonte [x] Fariha Gordon RN [] Ginna Caballero RN   [] Dr. Kirkland [] Nyasia More RN    []  Dr. Felix [] Sondra Spears RN    [] Dr. Rader [x] Codie Kovacs RN    [] Sarah Islas NP [x] Martha Klein RN        Verbal Plan Read Back:   Continue current treatment plan    Routed to RN Coordinator   Maggie Cardenas LPN

## 2022-10-20 ENCOUNTER — LAB (OUTPATIENT)
Dept: LAB | Facility: CLINIC | Age: 52
End: 2022-10-20
Payer: COMMERCIAL

## 2022-10-20 DIAGNOSIS — Z48.298 AFTERCARE FOLLOWING ORGAN TRANSPLANT: ICD-10-CM

## 2022-10-20 DIAGNOSIS — Z94.0 KIDNEY REPLACED BY TRANSPLANT: ICD-10-CM

## 2022-10-20 DIAGNOSIS — Z79.899 ENCOUNTER FOR LONG-TERM CURRENT USE OF MEDICATION: ICD-10-CM

## 2022-10-20 LAB
AMYLASE SERPL-CCNC: 78 U/L (ref 30–110)
ANION GAP SERPL CALCULATED.3IONS-SCNC: 6 MMOL/L (ref 3–14)
BUN SERPL-MCNC: 30 MG/DL (ref 7–30)
CALCIUM SERPL-MCNC: 9.3 MG/DL (ref 8.5–10.1)
CHLORIDE BLD-SCNC: 106 MMOL/L (ref 94–109)
CO2 SERPL-SCNC: 28 MMOL/L (ref 20–32)
CREAT SERPL-MCNC: 1.87 MG/DL (ref 0.52–1.04)
ERYTHROCYTE [DISTWIDTH] IN BLOOD BY AUTOMATED COUNT: 15 % (ref 10–15)
GFR SERPL CREATININE-BSD FRML MDRD: 31 ML/MIN/1.73M2
GLUCOSE BLD-MCNC: 93 MG/DL (ref 70–99)
HCT VFR BLD AUTO: 28.4 % (ref 35–47)
HGB BLD-MCNC: 9 G/DL (ref 11.7–15.7)
LIPASE SERPL-CCNC: 52 U/L (ref 13–60)
MAGNESIUM SERPL-MCNC: 2 MG/DL (ref 1.6–2.3)
MCH RBC QN AUTO: 32.6 PG (ref 26.5–33)
MCHC RBC AUTO-ENTMCNC: 31.7 G/DL (ref 31.5–36.5)
MCV RBC AUTO: 103 FL (ref 78–100)
PHOSPHATE SERPL-MCNC: 3.2 MG/DL (ref 2.5–4.5)
PLATELET # BLD AUTO: 400 10E3/UL (ref 150–450)
POTASSIUM BLD-SCNC: 4.2 MMOL/L (ref 3.4–5.3)
RBC # BLD AUTO: 2.76 10E6/UL (ref 3.8–5.2)
SODIUM SERPL-SCNC: 140 MMOL/L (ref 133–144)
WBC # BLD AUTO: 4.5 10E3/UL (ref 4–11)

## 2022-10-20 PROCEDURE — 82150 ASSAY OF AMYLASE: CPT

## 2022-10-20 PROCEDURE — 80048 BASIC METABOLIC PNL TOTAL CA: CPT

## 2022-10-20 PROCEDURE — 80197 ASSAY OF TACROLIMUS: CPT

## 2022-10-20 PROCEDURE — 83690 ASSAY OF LIPASE: CPT

## 2022-10-20 PROCEDURE — 80180 DRUG SCRN QUAN MYCOPHENOLATE: CPT

## 2022-10-20 PROCEDURE — 36415 COLL VENOUS BLD VENIPUNCTURE: CPT

## 2022-10-20 PROCEDURE — 84100 ASSAY OF PHOSPHORUS: CPT

## 2022-10-20 PROCEDURE — 83735 ASSAY OF MAGNESIUM: CPT

## 2022-10-20 PROCEDURE — 85027 COMPLETE CBC AUTOMATED: CPT

## 2022-10-21 ENCOUNTER — TELEPHONE (OUTPATIENT)
Dept: TRANSPLANT | Facility: CLINIC | Age: 52
End: 2022-10-21

## 2022-10-21 DIAGNOSIS — Z94.0 KIDNEY REPLACED BY TRANSPLANT: ICD-10-CM

## 2022-10-21 DIAGNOSIS — Z94.83 PANCREAS REPLACED BY TRANSPLANT (H): ICD-10-CM

## 2022-10-21 LAB
MYCOPHENOLATE SERPL LC/MS/MS-MCNC: 0.6 MG/L (ref 1–3.5)
MYCOPHENOLATE-G SERPL LC/MS/MS-MCNC: 39.2 MG/L (ref 30–95)
TACROLIMUS BLD-MCNC: 5.4 UG/L (ref 5–15)
TME LAST DOSE: ABNORMAL H
TME LAST DOSE: ABNORMAL H
TME LAST DOSE: NORMAL H
TME LAST DOSE: NORMAL H

## 2022-10-21 RX ORDER — MYCOPHENOLATE MOFETIL 250 MG/1
1250 CAPSULE ORAL 2 TIMES DAILY
Qty: 300 CAPSULE | Refills: 11 | Status: SHIPPED | OUTPATIENT
Start: 2022-10-21 | End: 2022-11-01 | Stop reason: ALTCHOICE

## 2022-10-21 RX ORDER — TACROLIMUS 1 MG/1
2 CAPSULE ORAL 2 TIMES DAILY
Qty: 120 CAPSULE | Refills: 11 | Status: SHIPPED | OUTPATIENT
Start: 2022-10-21 | End: 2022-11-01

## 2022-10-21 RX ORDER — TACROLIMUS 0.5 MG/1
0.5 CAPSULE ORAL 2 TIMES DAILY
Qty: 60 CAPSULE | Refills: 11 | Status: SHIPPED | OUTPATIENT
Start: 2022-10-21 | End: 2022-11-01

## 2022-10-21 NOTE — TELEPHONE ENCOUNTER
ISSUE:   Tacrolimus IR level 5.4 on 10/21, goal 8-10, dose 1.5 mg BID.  On clotrimazole troches.     PLAN:   Please call patient and confirm this was an accurate 12-hour trough. Verify Tacrolimus IR dose 1.5 mg BID. Confirm no new medications or illness. Confirm no missed doses. If accurate trough and accurate dose, increase Tacrolimus IR dose to 2.5 mg BID and repeat labs in 3 days    Admitted to missing some clotrimazole troches, discussed importance of consistency.     OUTCOME:   Spoke with patient, they confirm accurate trough level and current dose 1.5 mg BID. Patient confirmed dose change to 2.5 mg BID and to repeat labs in 3 days. Orders sent to preferred pharmacy for dose change and lab for repeat labs. Patient voiced understanding of plan.     ISSUE  MPA 0.6 (goal 1-3.5)     Rodo Zuniga MD Poucher, Jessica, RN  Please increase MMF to 1250mg bid      no

## 2022-10-24 ENCOUNTER — LAB (OUTPATIENT)
Dept: LAB | Facility: CLINIC | Age: 52
End: 2022-10-24
Payer: COMMERCIAL

## 2022-10-24 DIAGNOSIS — Z48.298 AFTERCARE FOLLOWING ORGAN TRANSPLANT: ICD-10-CM

## 2022-10-24 DIAGNOSIS — Z79.899 ENCOUNTER FOR LONG-TERM CURRENT USE OF MEDICATION: ICD-10-CM

## 2022-10-24 DIAGNOSIS — Z94.0 KIDNEY REPLACED BY TRANSPLANT: ICD-10-CM

## 2022-10-24 LAB
TACROLIMUS BLD-MCNC: 13.1 UG/L (ref 5–15)
TME LAST DOSE: NORMAL H
TME LAST DOSE: NORMAL H

## 2022-10-24 PROCEDURE — 36415 COLL VENOUS BLD VENIPUNCTURE: CPT

## 2022-10-24 PROCEDURE — 80197 ASSAY OF TACROLIMUS: CPT

## 2022-10-26 ENCOUNTER — TELEPHONE (OUTPATIENT)
Dept: TRANSPLANT | Facility: CLINIC | Age: 52
End: 2022-10-26

## 2022-10-26 ENCOUNTER — DOCUMENTATION ONLY (OUTPATIENT)
Facility: CLINIC | Age: 52
End: 2022-10-26

## 2022-10-26 DIAGNOSIS — A49.8 CLOSTRIDIUM DIFFICILE INFECTION: ICD-10-CM

## 2022-10-26 DIAGNOSIS — Z94.0 KIDNEY REPLACED BY TRANSPLANT: ICD-10-CM

## 2022-10-26 DIAGNOSIS — R26.89 IMPAIRED GAIT AND MOBILITY: Primary | ICD-10-CM

## 2022-10-26 DIAGNOSIS — Z94.83 PANCREAS REPLACED BY TRANSPLANT (H): ICD-10-CM

## 2022-10-26 DIAGNOSIS — E87.79 OTHER HYPERVOLEMIA: ICD-10-CM

## 2022-10-26 NOTE — TELEPHONE ENCOUNTER
Post Kidney and Pancreas Transplant Team Conference  Date: 10/26/2022  Transplant Coordinator: Codie Kovacs     Attendees:  [x]  Dr. Moore [x] Allie Dooley, RN [x] Maggie Cardenas LPN     []  Dr. Cruz [] Kiara Beth RN [] Arlene Coronado LPN   [x]  Dr. Fernandez [x] Michelle Lott RN    [x]  Dr. Zuniga [] Denise Hamilton RN [] Miller Salas, PharmD   [] Dr. Gunderson [x] Francisca Camacho, ANATOLY    [x] Dr. Gómez [x] Taran Ledesma RN    [] Dr. Almonte [x] Fariha Gordon RN [] Ginna Caballero RN   [] Dr. Kirkland [] Nyasia More RN    []  Dr. Felix [] Sondra Spears RN    [] Dr. Rader [x] Codie Kovacs RN    [] Sarah Islas NP [x] Martha Klein RN        Verbal Plan Read Back:   Increase Lasix to BID  Encourage low sodium diet  Decrease MMF to 720 mg BID, repeat MPA level  Start vancomyacin 125 mg every 6 hours X 2 weeks for C-diff.  125 mg BID X 1 week, 125 mg daily X 1 erik    Routed to RN Coordinator   Maggie Cardenas LPN

## 2022-10-27 ENCOUNTER — LAB (OUTPATIENT)
Dept: LAB | Facility: CLINIC | Age: 52
End: 2022-10-27
Payer: COMMERCIAL

## 2022-10-27 DIAGNOSIS — Z94.0 KIDNEY REPLACED BY TRANSPLANT: ICD-10-CM

## 2022-10-27 DIAGNOSIS — Z79.899 ENCOUNTER FOR LONG-TERM CURRENT USE OF MEDICATION: ICD-10-CM

## 2022-10-27 DIAGNOSIS — Z48.298 AFTERCARE FOLLOWING ORGAN TRANSPLANT: ICD-10-CM

## 2022-10-27 LAB
TACROLIMUS BLD-MCNC: 12 UG/L (ref 5–15)
TME LAST DOSE: NORMAL H
TME LAST DOSE: NORMAL H

## 2022-10-27 PROCEDURE — 86828 HLA CLASS I&II ANTIBODY QUAL: CPT

## 2022-10-27 PROCEDURE — 80197 ASSAY OF TACROLIMUS: CPT

## 2022-10-27 PROCEDURE — 86833 HLA CLASS II HIGH DEFIN QUAL: CPT

## 2022-10-27 PROCEDURE — 87799 DETECT AGENT NOS DNA QUANT: CPT

## 2022-10-27 PROCEDURE — 86832 HLA CLASS I HIGH DEFIN QUAL: CPT

## 2022-10-27 PROCEDURE — 36415 COLL VENOUS BLD VENIPUNCTURE: CPT

## 2022-10-27 RX ORDER — VANCOMYCIN HYDROCHLORIDE 125 MG/1
CAPSULE ORAL
Qty: 91 CAPSULE | Refills: 0 | Status: SHIPPED | OUTPATIENT
Start: 2022-10-27 | End: 2022-10-28

## 2022-10-27 RX ORDER — FUROSEMIDE 40 MG
40 TABLET ORAL DAILY
Qty: 30 TABLET | Refills: 0 | Status: SHIPPED | OUTPATIENT
Start: 2022-10-27 | End: 2022-10-28

## 2022-10-27 RX ORDER — SULFAMETHOXAZOLE AND TRIMETHOPRIM 400; 80 MG/1; MG/1
TABLET ORAL
Qty: 13 TABLET | Refills: 11
Start: 2022-10-27 | End: 2022-11-01

## 2022-10-27 RX ORDER — MYCOPHENOLIC ACID 180 MG/1
720 TABLET, DELAYED RELEASE ORAL 2 TIMES DAILY
Qty: 240 TABLET | Refills: 11 | Status: SHIPPED | OUTPATIENT
Start: 2022-10-27 | End: 2022-10-28

## 2022-10-27 NOTE — TELEPHONE ENCOUNTER
Reviewed the following issues with MDs are pancreas review meeting:     -c/o weight gain to 160 lbs,  lbs. Feels like majority of fluid is in her abdomen, feels bloated and uncomfortable. Having regular BMs. Ok to increase lasix to 40 mg bid, but Hremila states she ran out of lasix and has not been taking it. Will renew script for 40 mg daily.     -diarrhea continues after stopping treatment for c diff. MMF increased to 1250 mg bid for low MPA levels. Will plan to switch MMF from 1250 mg bid to  mg bid. Start extended po vanco taper: 125 mg qid x 14 days, 125 mg bid x 14 days, 125 mg daily x1 week.     -ok to hold Bactrim, check G6PD to see if itching improves.     Called patient, left detailed VM and sent [employer] message.

## 2022-10-28 DIAGNOSIS — Z94.83 PANCREAS REPLACED BY TRANSPLANT (H): ICD-10-CM

## 2022-10-28 DIAGNOSIS — Z94.0 KIDNEY REPLACED BY TRANSPLANT: ICD-10-CM

## 2022-10-28 DIAGNOSIS — A49.8 CLOSTRIDIUM DIFFICILE INFECTION: ICD-10-CM

## 2022-10-28 DIAGNOSIS — E87.79 OTHER HYPERVOLEMIA: ICD-10-CM

## 2022-10-28 LAB
BKV DNA # SPEC NAA+PROBE: NOT DETECTED COPIES/ML
DONOR IDENTIFICATION: NORMAL
DSA COMMENTS: NORMAL
DSA PRESENT: NO
DSA TEST METHOD: NORMAL
INT SUB RESULT: NORMAL
INTERF SUBSTANCE: NORMAL
INTSUB TEST METHOD: NORMAL
ORGAN: NORMAL
SA 1 CELL: NORMAL
SA 1 TEST METHOD: NORMAL
SA 2 CELL: NORMAL
SA 2 TEST METHOD: NORMAL
SA1 HI RISK ABY: NORMAL
SA1 MOD RISK ABY: NORMAL
SA2 HI RISK ABY: NORMAL
SA2 MOD RISK ABY: NORMAL
UNACCEPTABLE ANTIGENS: NORMAL
UNOS CPRA: 0
ZZZINT SUB COMMENTS: NORMAL
ZZZSA 1  COMMENTS: NORMAL
ZZZSA 2 COMMENTS: NORMAL

## 2022-10-28 RX ORDER — FUROSEMIDE 40 MG
40 TABLET ORAL DAILY
Qty: 30 TABLET | Refills: 0 | Status: SHIPPED | OUTPATIENT
Start: 2022-10-28 | End: 2022-12-05

## 2022-10-28 RX ORDER — MYCOPHENOLIC ACID 180 MG/1
720 TABLET, DELAYED RELEASE ORAL 2 TIMES DAILY
Qty: 240 TABLET | Refills: 11 | Status: SHIPPED | OUTPATIENT
Start: 2022-10-28 | End: 2022-12-20

## 2022-10-28 RX ORDER — VANCOMYCIN HYDROCHLORIDE 125 MG/1
CAPSULE ORAL
Qty: 91 CAPSULE | Refills: 0 | Status: SHIPPED | OUTPATIENT
Start: 2022-10-28 | End: 2022-12-02

## 2022-10-31 ENCOUNTER — HOSPITAL ENCOUNTER (OUTPATIENT)
Facility: CLINIC | Age: 52
Discharge: HOME | End: 2022-10-31
Attending: INTERNAL MEDICINE | Admitting: FAMILY MEDICINE
Payer: COMMERCIAL

## 2022-10-31 ENCOUNTER — LAB (OUTPATIENT)
Dept: LAB | Facility: CLINIC | Age: 52
End: 2022-10-31
Payer: COMMERCIAL

## 2022-10-31 DIAGNOSIS — Z79.899 ENCOUNTER FOR LONG-TERM CURRENT USE OF MEDICATION: ICD-10-CM

## 2022-10-31 DIAGNOSIS — Z48.298 AFTERCARE FOLLOWING ORGAN TRANSPLANT: ICD-10-CM

## 2022-10-31 DIAGNOSIS — Z94.0 KIDNEY REPLACED BY TRANSPLANT: ICD-10-CM

## 2022-10-31 PROCEDURE — 36415 COLL VENOUS BLD VENIPUNCTURE: CPT

## 2022-10-31 PROCEDURE — 80197 ASSAY OF TACROLIMUS: CPT

## 2022-11-01 ENCOUNTER — TELEPHONE (OUTPATIENT)
Dept: TRANSPLANT | Facility: CLINIC | Age: 52
End: 2022-11-01

## 2022-11-01 DIAGNOSIS — Z94.83 PANCREAS REPLACED BY TRANSPLANT (H): ICD-10-CM

## 2022-11-01 DIAGNOSIS — Z94.0 KIDNEY REPLACED BY TRANSPLANT: ICD-10-CM

## 2022-11-01 LAB
TACROLIMUS BLD-MCNC: 25.4 UG/L (ref 5–15)
TME LAST DOSE: ABNORMAL H
TME LAST DOSE: ABNORMAL H

## 2022-11-01 RX ORDER — SULFAMETHOXAZOLE AND TRIMETHOPRIM 400; 80 MG/1; MG/1
1 TABLET ORAL
Qty: 13 TABLET | Refills: 11 | Status: SHIPPED | OUTPATIENT
Start: 2022-11-02 | End: 2023-01-04

## 2022-11-01 RX ORDER — TACROLIMUS 1 MG/1
2 CAPSULE ORAL 2 TIMES DAILY
Qty: 120 CAPSULE | Refills: 11
Start: 2022-11-01 | End: 2022-11-03

## 2022-11-01 RX ORDER — TACROLIMUS 0.5 MG/1
CAPSULE ORAL
Qty: 60 CAPSULE | Refills: 11
Start: 2022-11-01 | End: 2022-11-29

## 2022-11-01 NOTE — TELEPHONE ENCOUNTER
ISSUE:  Tac 25.5 (goal 8-10)     Spoke with Hermila, confirmed current tac dose of 2.5 mg bid. Says this was ~11 hour trough. She will reduce to 2 mg bid and repeat level tomorrow.     Diarrhea has significantly improved today but had loose stools over the weekend. Confirmed she restarted the vancomycin and switched from MMF to MPA.     Local labs has been missing BMP, CBC, amylase, lipase. She will come to [facility] tomorrow for repeat labs. Will mail printed lab letter.

## 2022-11-01 NOTE — TELEPHONE ENCOUNTER
DATE:  11/1/2022     TIME OF RECEIPT FROM LAB:  1143    ORDERING PROVIDER: Dr. Bertrand Moore     LAB TEST:  Tac     LAB VALUE:  25.4    RESULTS GIVEN WITH READ-BACK TO (PROVIDER):  Codie Kovacs RN     TIME LAB VALUE REPORTED TO PROVIDER:   1144    MIKE Shah, LPN   Transplant

## 2022-11-02 ENCOUNTER — LAB (OUTPATIENT)
Dept: LAB | Facility: CLINIC | Age: 52
End: 2022-11-02
Payer: COMMERCIAL

## 2022-11-02 DIAGNOSIS — Z48.298 AFTERCARE FOLLOWING ORGAN TRANSPLANT: ICD-10-CM

## 2022-11-02 DIAGNOSIS — Z20.828 CONTACT WITH AND (SUSPECTED) EXPOSURE TO OTHER VIRAL COMMUNICABLE DISEASES: ICD-10-CM

## 2022-11-02 DIAGNOSIS — Z94.0 KIDNEY REPLACED BY TRANSPLANT: ICD-10-CM

## 2022-11-02 DIAGNOSIS — Z79.899 ENCOUNTER FOR LONG-TERM CURRENT USE OF MEDICATION: ICD-10-CM

## 2022-11-02 LAB
ALBUMIN MFR UR ELPH: 19.5 MG/DL
AMYLASE SERPL-CCNC: 41 U/L (ref 28–100)
ANION GAP SERPL CALCULATED.3IONS-SCNC: 11 MMOL/L (ref 7–15)
BUN SERPL-MCNC: 30.9 MG/DL (ref 6–20)
CALCIUM SERPL-MCNC: 9.7 MG/DL (ref 8.6–10)
CHLORIDE SERPL-SCNC: 102 MMOL/L (ref 98–107)
CREAT SERPL-MCNC: 2.14 MG/DL (ref 0.51–0.95)
CREAT UR-MCNC: 67.3 MG/DL
DEPRECATED HCO3 PLAS-SCNC: 24 MMOL/L (ref 22–29)
ERYTHROCYTE [DISTWIDTH] IN BLOOD BY AUTOMATED COUNT: 14.9 % (ref 10–15)
GFR SERPL CREATININE-BSD FRML MDRD: 27 ML/MIN/1.73M2
GLUCOSE SERPL-MCNC: 102 MG/DL (ref 70–99)
HCT VFR BLD AUTO: 28 % (ref 35–47)
HGB BLD-MCNC: 9 G/DL (ref 11.7–15.7)
LIPASE SERPL-CCNC: 27 U/L (ref 13–60)
MAGNESIUM SERPL-MCNC: 1.6 MG/DL (ref 1.7–2.3)
MCH RBC QN AUTO: 32.1 PG (ref 26.5–33)
MCHC RBC AUTO-ENTMCNC: 32.1 G/DL (ref 31.5–36.5)
MCV RBC AUTO: 100 FL (ref 78–100)
PHOSPHATE SERPL-MCNC: 3 MG/DL (ref 2.5–4.5)
PLATELET # BLD AUTO: 247 10E3/UL (ref 150–450)
POTASSIUM SERPL-SCNC: 4.7 MMOL/L (ref 3.4–5.3)
PROT/CREAT 24H UR: 0.29 MG/MG CR (ref 0–0.2)
RBC # BLD AUTO: 2.8 10E6/UL (ref 3.8–5.2)
SODIUM SERPL-SCNC: 137 MMOL/L (ref 136–145)
WBC # BLD AUTO: 3.4 10E3/UL (ref 4–11)

## 2022-11-02 PROCEDURE — 36415 COLL VENOUS BLD VENIPUNCTURE: CPT | Performed by: PATHOLOGY

## 2022-11-02 PROCEDURE — 82150 ASSAY OF AMYLASE: CPT | Performed by: PATHOLOGY

## 2022-11-02 PROCEDURE — 83690 ASSAY OF LIPASE: CPT | Performed by: PATHOLOGY

## 2022-11-02 PROCEDURE — 84156 ASSAY OF PROTEIN URINE: CPT | Performed by: PATHOLOGY

## 2022-11-02 PROCEDURE — 80197 ASSAY OF TACROLIMUS: CPT | Performed by: INTERNAL MEDICINE

## 2022-11-02 PROCEDURE — 87521 HEPATITIS C PROBE&RVRS TRNSC: CPT | Performed by: INTERNAL MEDICINE

## 2022-11-02 PROCEDURE — 80048 BASIC METABOLIC PNL TOTAL CA: CPT | Performed by: PATHOLOGY

## 2022-11-02 PROCEDURE — 84100 ASSAY OF PHOSPHORUS: CPT | Performed by: PATHOLOGY

## 2022-11-02 PROCEDURE — 80180 DRUG SCRN QUAN MYCOPHENOLATE: CPT | Performed by: INTERNAL MEDICINE

## 2022-11-02 PROCEDURE — 83735 ASSAY OF MAGNESIUM: CPT | Performed by: PATHOLOGY

## 2022-11-02 PROCEDURE — 85027 COMPLETE CBC AUTOMATED: CPT | Performed by: PATHOLOGY

## 2022-11-03 ENCOUNTER — TELEPHONE (OUTPATIENT)
Dept: TRANSPLANT | Facility: CLINIC | Age: 52
End: 2022-11-03

## 2022-11-03 ENCOUNTER — TELEPHONE (OUTPATIENT)
Dept: PHARMACY | Facility: CLINIC | Age: 52
End: 2022-11-03

## 2022-11-03 ENCOUNTER — TEAM CONFERENCE (OUTPATIENT)
Dept: TRANSPLANT | Facility: CLINIC | Age: 52
End: 2022-11-03

## 2022-11-03 DIAGNOSIS — Z94.0 KIDNEY REPLACED BY TRANSPLANT: ICD-10-CM

## 2022-11-03 DIAGNOSIS — Z94.83 PANCREAS REPLACED BY TRANSPLANT (H): ICD-10-CM

## 2022-11-03 LAB
MYCOPHENOLATE SERPL LC/MS/MS-MCNC: 1.35 MG/L (ref 1–3.5)
MYCOPHENOLATE-G SERPL LC/MS/MS-MCNC: 96.1 MG/L (ref 30–95)
TACROLIMUS BLD-MCNC: 21 UG/L (ref 5–15)
TME LAST DOSE: ABNORMAL H

## 2022-11-03 RX ORDER — TACROLIMUS 1 MG/1
1 CAPSULE ORAL 2 TIMES DAILY
Qty: 60 CAPSULE | Refills: 11 | Status: SHIPPED | OUTPATIENT
Start: 2022-11-03 | End: 2022-11-29

## 2022-11-03 NOTE — TELEPHONE ENCOUNTER
Weight down from 162 yesterday to 156.8 lbs this morning. Feels dizzy. Has not taking BPs recently but will start. Abdominal edema improved after restarting 40 mg lasix daily last week, will hold lasix this morning and focus on drinking 3 liters today. Follow up in clinic tomorrow.      ISSUE:   Tacrolimus IR level 21 on 11/2, goal 8-10, dose 2 mg BID.    PLAN:   Please call patient and confirm this was an accurate 12-hour trough. Verify Tacrolimus IR dose 2 mg BID. Confirm no new medications or illness. Confirm no missed doses. If accurate trough and accurate dose, decrease Tacrolimus IR dose to 1 mg BID and repeat labs in 1 days    OUTCOME:   Spoke with patient, they confirm accurate trough level and current dose 2 mg BID. Patient confirmed dose change to 1 mg BID and to repeat labs in 1 days. Orders sent to preferred pharmacy for dose change and lab for repeat labs. Patient voiced understanding of plan.

## 2022-11-03 NOTE — TELEPHONE ENCOUNTER
DATE:  11/3/2022     TIME OF RECEIPT FROM LAB:  9:38    ORDERING PROVIDER:     LAB TEST:  TAC    LAB VALUE:  21    RESULTS GIVEN WITH READ-BACK TO (PROVIDER):  Codie Kovacs    TIME LAB VALUE REPORTED TO PROVIDER:   9:40

## 2022-11-03 NOTE — TELEPHONE ENCOUNTER
Post Kidney and Pancreas Transplant Team Conference  Date: 11/3/2022  Transplant Coordinator: Codie Kovacs     Attendees:  []  Dr. Moore [] Allie Dooley, RN [] Maggie Cardenas LPN     []  Dr. Cruz [] Kiara Beth, ANATOLY [] Arlene Coronado LPN   []  Dr. Fernandez [] Michelle Lott RN    []  Dr. Zuniga [] Denise Hamilton RN [] Miller Salas, PharmD   [] Dr. Gunderson [] Francisca Camacho RN    [] Dr. Gómez [] Taran Ledesma RN    [] Dr. Almonte [] Fariha Gordon RN [] Ginna Caballero RN   [] Dr. Kirkland [] Nyasia More RN    []  Dr. Felix [] Sondra Spears RN    [] Dr. Rader [] Codie Kovacs, ANATOLY    [] Sarah Islas, NP [] Martha Klein RN        Verbal Plan Read Back:   Patient to see pharmacist Miller @ 1 month checkup   MPA Level    Routed to RN Coordinator   Arlene Coronado LPN

## 2022-11-03 NOTE — TELEPHONE ENCOUNTER
Clinical Pharmacy Consult:                                                      Transplant Specific: 1 month post transplant medication review  Date of Transplant: 09/23/2022  Type of Transplant: kidney and pancreas  First Transplant: yes  History of rejection: no    Immunosuppression Regimen   TAC 1mg qAM & 1mg qPM and Myforitic 720mg qAM & 720mg qPM  Patient specific goal: 8-10  Most recent level: 21, date 11/2/22  Immunosuppressant Levels:  Supratherapeutic  Dose decreased  Pt adherent to lab draws: yes  Scr:   Lab Results   Component Value Date    CR 2.14 11/02/2022    CR 3.56 08/13/2020     Side effects: Fatigue and dizziness    Prophylactic Medications  Antibacterial:  Bactrim ss 3 times a week  Scheduled Discontinue Date: Lifelong    Antifungal: Clotrimazole   Scheduled Discontinue Date: 3 months    Antiviral: CrCl 25 to 39 mL/minute: Valcyte 450 mg every other day   Scheduled Discontinue Date: 3 months    Acid Reducer: not ob  Scheduled Reviewed Date: N/A    Thrombosis Prevention: Aspirin 325 mg PO daily  Scheduled Discontinue Date: managed by clinic    Blood Pressure Management  Frequency of home Blood Pressure checks: twice daily  Most recent home BP: 134/68  Patient Blood pressure goal: <140/90  Patient blood pressure at goal:  Yes    Hospitalizations/ER visits since last assessment: 0    Med rec/DUR performed: yes  Med Rec Discrepancies: no    Medication adherence flowsheet 10/6/2022   Patient medication administration: Responsible for own medications   Patient estimated adherence level: 89-76%   Pharmacist assessment of adherence: Fair   Patient reported doses missed per week: 0-1   Pharmacy MPR: -   Facilitators to medication adherence  Medication dosing chart;Caregiver assistance;Pill box;Cell phone;Alarm   Patient reported barriers to medication adherence  No issues identified   Adherence intervention(s): Management of adverse effects; on importance of adherence;Provide education on  therapeutic rationale      Medication access flowsheet 10/6/2022   Number of pharmacies used: 1   Pharmacy: [facility]   Enrolled in [facility] pharmacy? Yes   Patient reported barriers to accessing medications: -   Medication access interventions: -      Eden reports feeling tired and dizzy.She has a visit tomorrow to discuss these.    She uses a med list, pill box and alarms on phone to manage her meds.  Reports no missed doses.  Last tacro level was very high. Dose decreased.    Takes blood pressure twice zeinab. This morning it was 134/68.    folow up in 1 month    Nathan Leos [facility]

## 2022-11-04 ENCOUNTER — ANCILLARY PROCEDURE (OUTPATIENT)
Dept: GENERAL RADIOLOGY | Facility: CLINIC | Age: 52
End: 2022-11-04
Attending: INTERNAL MEDICINE
Payer: COMMERCIAL

## 2022-11-04 ENCOUNTER — OFFICE VISIT (OUTPATIENT)
Dept: NEPHROLOGY | Facility: CLINIC | Age: 52
End: 2022-11-04
Attending: INTERNAL MEDICINE
Payer: COMMERCIAL

## 2022-11-04 ENCOUNTER — LAB (OUTPATIENT)
Dept: LAB | Facility: CLINIC | Age: 52
End: 2022-11-04
Payer: COMMERCIAL

## 2022-11-04 ENCOUNTER — TELEPHONE (OUTPATIENT)
Dept: TRANSPLANT | Facility: CLINIC | Age: 52
End: 2022-11-04

## 2022-11-04 VITALS
SYSTOLIC BLOOD PRESSURE: 154 MMHG | HEART RATE: 68 BPM | OXYGEN SATURATION: 98 % | TEMPERATURE: 98.4 F | DIASTOLIC BLOOD PRESSURE: 85 MMHG | WEIGHT: 159.3 LBS | BODY MASS INDEX: 30.1 KG/M2

## 2022-11-04 DIAGNOSIS — D84.9 IMMUNOSUPPRESSION (H): ICD-10-CM

## 2022-11-04 DIAGNOSIS — Z48.298 AFTERCARE FOLLOWING ORGAN TRANSPLANT: ICD-10-CM

## 2022-11-04 DIAGNOSIS — Z94.0 KIDNEY REPLACED BY TRANSPLANT: Primary | ICD-10-CM

## 2022-11-04 DIAGNOSIS — Z94.0 KIDNEY REPLACED BY TRANSPLANT: ICD-10-CM

## 2022-11-04 DIAGNOSIS — Z94.0 HTN, KIDNEY TRANSPLANT RELATED: ICD-10-CM

## 2022-11-04 DIAGNOSIS — Z79.899 ENCOUNTER FOR LONG-TERM CURRENT USE OF MEDICATION: ICD-10-CM

## 2022-11-04 DIAGNOSIS — I15.1 HTN, KIDNEY TRANSPLANT RELATED: ICD-10-CM

## 2022-11-04 DIAGNOSIS — Z94.83 PANCREAS REPLACED BY TRANSPLANT (H): Chronic | ICD-10-CM

## 2022-11-04 PROBLEM — Z96.41 INSULIN PUMP IN PLACE: Status: RESOLVED | Noted: 2019-11-03 | Resolved: 2022-11-04

## 2022-11-04 PROBLEM — E83.42 HYPOMAGNESEMIA: Status: RESOLVED | Noted: 2022-10-02 | Resolved: 2022-11-04

## 2022-11-04 PROBLEM — N18.32 ANEMIA IN STAGE 3B CHRONIC KIDNEY DISEASE (H): Status: ACTIVE | Noted: 2018-02-28

## 2022-11-04 PROBLEM — E87.70 HYPERVOLEMIA: Status: RESOLVED | Noted: 2022-10-02 | Resolved: 2022-11-04

## 2022-11-04 PROBLEM — R19.7 DIARRHEA: Status: RESOLVED | Noted: 2022-10-02 | Resolved: 2022-11-04

## 2022-11-04 PROBLEM — E83.39 HYPOPHOSPHATEMIA: Status: RESOLVED | Noted: 2022-10-02 | Resolved: 2022-11-04

## 2022-11-04 LAB
AMYLASE SERPL-CCNC: 41 U/L (ref 28–100)
ANION GAP SERPL CALCULATED.3IONS-SCNC: 7 MMOL/L (ref 7–15)
BUN SERPL-MCNC: 31.2 MG/DL (ref 6–20)
CALCIUM SERPL-MCNC: 9.9 MG/DL (ref 8.6–10)
CHLORIDE SERPL-SCNC: 105 MMOL/L (ref 98–107)
CREAT SERPL-MCNC: 2.2 MG/DL (ref 0.51–0.95)
DEPRECATED HCO3 PLAS-SCNC: 26 MMOL/L (ref 22–29)
ERYTHROCYTE [DISTWIDTH] IN BLOOD BY AUTOMATED COUNT: 14.7 % (ref 10–15)
GFR SERPL CREATININE-BSD FRML MDRD: 26 ML/MIN/1.73M2
GLUCOSE SERPL-MCNC: 109 MG/DL (ref 70–99)
HCT VFR BLD AUTO: 29.8 % (ref 35–47)
HGB BLD-MCNC: 9.1 G/DL (ref 11.7–15.7)
LIPASE SERPL-CCNC: 32 U/L (ref 13–60)
MCH RBC QN AUTO: 31.7 PG (ref 26.5–33)
MCHC RBC AUTO-ENTMCNC: 30.5 G/DL (ref 31.5–36.5)
MCV RBC AUTO: 104 FL (ref 78–100)
MYCOPHENOLATE SERPL LC/MS/MS-MCNC: <0.25 MG/L (ref 1–3.5)
MYCOPHENOLATE-G SERPL LC/MS/MS-MCNC: 39 MG/L (ref 30–95)
PLATELET # BLD AUTO: 249 10E3/UL (ref 150–450)
POTASSIUM SERPL-SCNC: 5.1 MMOL/L (ref 3.4–5.3)
RBC # BLD AUTO: 2.87 10E6/UL (ref 3.8–5.2)
SODIUM SERPL-SCNC: 138 MMOL/L (ref 136–145)
TACROLIMUS BLD-MCNC: 17.5 UG/L (ref 5–15)
TME LAST DOSE: ABNORMAL H
WBC # BLD AUTO: 3.5 10E3/UL (ref 4–11)

## 2022-11-04 PROCEDURE — 87799 DETECT AGENT NOS DNA QUANT: CPT | Performed by: INTERNAL MEDICINE

## 2022-11-04 PROCEDURE — 82150 ASSAY OF AMYLASE: CPT | Performed by: PATHOLOGY

## 2022-11-04 PROCEDURE — M0220 HC INJECTION TIXAGEVIMAB & CILGAVIMAB (EVUSHELD): HCPCS | Performed by: INTERNAL MEDICINE

## 2022-11-04 PROCEDURE — 80197 ASSAY OF TACROLIMUS: CPT | Performed by: INTERNAL MEDICINE

## 2022-11-04 PROCEDURE — 250N000011 HC RX IP 250 OP 636: Performed by: INTERNAL MEDICINE

## 2022-11-04 PROCEDURE — 80048 BASIC METABOLIC PNL TOTAL CA: CPT | Performed by: PATHOLOGY

## 2022-11-04 PROCEDURE — 86828 HLA CLASS I&II ANTIBODY QUAL: CPT | Performed by: PATHOLOGY

## 2022-11-04 PROCEDURE — 36415 COLL VENOUS BLD VENIPUNCTURE: CPT | Performed by: PATHOLOGY

## 2022-11-04 PROCEDURE — 83690 ASSAY OF LIPASE: CPT | Performed by: PATHOLOGY

## 2022-11-04 PROCEDURE — 86832 HLA CLASS I HIGH DEFIN QUAL: CPT | Performed by: PATHOLOGY

## 2022-11-04 PROCEDURE — G0463 HOSPITAL OUTPT CLINIC VISIT: HCPCS

## 2022-11-04 PROCEDURE — 86833 HLA CLASS II HIGH DEFIN QUAL: CPT | Performed by: PATHOLOGY

## 2022-11-04 PROCEDURE — 85027 COMPLETE CBC AUTOMATED: CPT | Performed by: PATHOLOGY

## 2022-11-04 PROCEDURE — 80180 DRUG SCRN QUAN MYCOPHENOLATE: CPT | Performed by: INTERNAL MEDICINE

## 2022-11-04 PROCEDURE — 74018 RADEX ABDOMEN 1 VIEW: CPT | Performed by: RADIOLOGY

## 2022-11-04 PROCEDURE — 99215 OFFICE O/P EST HI 40 MIN: CPT | Mod: 24 | Performed by: INTERNAL MEDICINE

## 2022-11-04 RX ADMIN — AZD7442 6 ML: KIT at 09:16

## 2022-11-04 ASSESSMENT — PAIN SCALES - GENERAL: PAINLEVEL: NO PAIN (0)

## 2022-11-04 NOTE — NURSING NOTE
Chief Complaint   Patient presents with     RECHECK     Return visit.     Blood pressure (!) 154/85, pulse 68, temperature 98.4  F (36.9  C), weight 72.3 kg (159 lb 4.8 oz), last menstrual period 09/18/2022, SpO2 98 %, not currently breastfeeding.    YOANNA HELLER

## 2022-11-04 NOTE — NURSING NOTE
EVUSHELD Administration Note:  Eden Hess presents today for EVUSHELD.   present during visit today: Not Applicable.    Consent:   Informed Consent confirmed in chart, obtained on this date: 11/4/22    Post Injection Assessment:   Patient tolerated injection without incident.      Patient was observed in the room for a minimum of 10 minutes after injection per standard, then remained in the buidling for a total 60 minute observation period.         Discharge Plan:    Patient and/or family verbalized understanding of discharge instructions and all questions answered.     Rain Purdy, CMA

## 2022-11-04 NOTE — PATIENT INSTRUCTIONS
Patient Recommendations:  -Stop lasix for now  -Restart the fiber in water    Transplant Patient Information  Your Post Transplant Coordinator is: Giovanna Kovacs  For non urgent items, we encourage you to contact your coordinator/care team online via [email or web address]  You and your care team can also contact your transplant coordinator Monday - Friday, 8am - 5pm at [phone] (Option 2 to reach the coordinator or Option 4 to schedule an appointment).  After hours for urgent matters, please call [facility] at [phone].

## 2022-11-04 NOTE — PROGRESS NOTES
TRANSPLANT NEPHROLOGY EARLY POST TRANSPLANT VISIT    Assessment & Plan   # DDKT (SPK): Trend up, likely 2/2 high tac levels and diuresis   - Baseline Creatinine: ~ TBD. Ruslan Scr post txp was 1.87 on 10/20/22   - Proteinuria: Minimal (0.2-0.5 grams)   - Date DSA Last Checked: Oct/2022      Latest DSA: No   - BK Viremia: No   - Kidney Tx Biopsy: No   - Transplant Ureteral Stent: No     # Pancreas Tx (SPK):    - Pancreatic Exocrine Drainage: Enteric drained     - Blood glucose: Near euglycemia      On insulin: No   - HbA1c: Stable      Latest HbA1c: 6%   - Pancreatic enzymes: Trend down   - Date DSA Last Checked: Oct/2022  Latest DSA: No   - Pancreas Tx Biopsy: No    # Immunosuppression: Tacrolimus immediate release (goal 8-10) and Mycophenolic acid (dose 720 mg every 12 hours)   - Induction with Recent Transplant:  Intermediate Intensity   - Continue with intensive monitoring of immunosuppression for efficacy and toxicity.   - Changes: Not at this time. MPA level at goal on 11/2 on MPA 720mg bid. Clotrimazole was restarted leading to increase in tac level. Tac dose was decreased on 11/2    # Infection Prophylaxis:   - PJP: Sulfa/TMP (Bactrim)  MWF  - CMV: Valganciclovir (Valcyte) x3m  - Thrush: Clotrimazole gene (Mycelex) x3m       # Hypertension: Borderline control;  Goal BP: < 140/90   - Volume status: Total body volume up, but intravascularly hypovolemic  EDW ~ 153lbs prior to txp   - Changes: Yes - hold lasix due to decrease in weight, intravascular hypovolemia, increased SCr. She wasn't taking lasix for about 2 weeks and then restarted 40mg daily on ~11/2. We can use lasix intermittently if weight increases by >3lbs    # Anemia in Chronic Renal Disease: Hgb: Stable      SHANEKA: No   - Iron studies: Replete    # Mineral Bone Disorder:   - Secondary renal hyperparathyroidism; PTH level: Mildly elevated (151-300 pg/ml)        On treatment: None  - Vitamin D; level: High normal        On supplement: Yes  - Calcium;  level: Normal        On supplement: Yes  - Phosphorus; level: Normal        On supplement: No    # Electrolytes:   - Potassium; level: Normal        On supplement: No  - Magnesium; level: Normal        On supplement: No  - Bicarbonate; level: Normal        On supplement: No      # C.diff colitis:   -Diagnosed 10/7/22   -Currently on prolonged PO vanc taper due to relapse after completion of 14d course of PO vanc    -PO vanc 125mg q6h restarted on 10/27/22 for 14d followed by 125mg bid x14d, 125mg daily x7d, then stop.    -Stool is still a bit loose. Start fiber      # GERD: Occasional symptoms on PPI.     # H/o TIA (2017): No residual deficients     # Medical Compliance: Yes    # COVID-19 Virus Review: Discussed COVID-19 virus and the potential medical risks.  Reviewed preventative health recommendations, including wearing a mask where appropriate.  Recommended COVID vaccination should be up to date with either an initial vaccination or booster shot when appropriate.  Asked the patient to inform the transplant center if they are exposed or diagnosed with this virus.    # COVID Vaccination Up To Date: No, due for next dose at 3m post txp    Talked to patient and evaluated by me. We discussed the risks and benefits of receiving EVUSHELD, as well as alternative treatments. The Emergency Use Administration (EUA) was provided to the patient for review and an opportunity for questions was provided. Patient wishes to proceed with EVUSHELD.    Evusheld Consent   Confirmed patient received the Emergency Use Authorization Fact Sheet for Patients, Parents and Caregivers prior to receiving medication. We discussed the following risks and benefits of receiving EVUSHELD, as well as alternative treatments and the patient wished to proceed with EVUSHELD.     Providers believe the benefits of Evusheld outweigh the risks for all moderately to severely immunocompromised patients.      Benefits:   Evusheld is a synthetic antibody that  provides protection against COVID-19 for 6 months and is recommended for patients that have a weakened immune system that may not be able to make antibodies themselves.     Risks:    There is a very small risk of allergic reactions and you should notify us if you have any symptoms of an allergic reaction after the injection. There were also some extremely rare cardiac events reported in the initial trials in people that already had heart disease, but experts do not think these were caused by the medication. We will observe you for any chest pain or trouble breathing after the injections and you can reach out to your provider if any of these symptoms develop.     Alternatives:   Vaccines to prevent COVID-19 are approved or available under Emergency Use Authorization. Use of EVUSHELD does not replace vaccination against COVID-19. You can continue to mask and isolate to avoid infections. It is your choice to receive or not receive EVUSHELD. Should you decide not to receive EVUSHELD, it will not change your standard medical care.     Patient does consent to the injection.      # Transplant History:  Etiology of Kidney Failure: Diabetes mellitus type 1  Tx: SPK  Transplant: 9/23/2022 (Kidney / Pancreas)  Donor Type: Donation after Brain Death Donor Class: Standard Criteria Donor  Crossmatch at time of Tx: negative  DSA at time of Tx: No  Significant changes in immunosuppression: None  CMV IgG Ab High Risk Discordance (D+/R-): No  EBV IgG Ab High Risk Discordance (D+/R-): No  Significant transplant-related complications: None    Transplant Office Phone Number: [phone]    Assessment and plan was discussed with the patient and she voiced her understanding and agreement.    Return visit: Return in 31 days (on 12/5/2022) for 2 month post transplant visit.    Rodo Zuniga MD    Chief Complaint   Ms. Hess is a 52 year old here for kidney transplant, pancreas transplant, immunosuppression management and hospital follow up  "after kidney/pancreas transplant.     History of Present Illness    Eden feels like her stool has firmed up a bit since restarting the PO vancomycin. She is eating but has no appetite. She is eating 2-3 meals per day. She checks her BG at home and it is usually in the low 100s. She denies vomiting, early satiety, nausea. She denies fever, chills. She denies chest pain but felt \"panicky\". She may feel a bit short of breath laying down but denies cough. She has been having strange dreams. She denies dysuria, frequency, abdominal pain. Her energy has been lower than prior to transplant but yesterday was very low and she spent all day in bed.     Home BP: 130s systolic    Problem List   Patient Active Problem List   Diagnosis     Major depression in complete remission (H)     Diabetes mellitus type 1 (H)     Anemia of chronic disease     Insulin pump in place     TIA (transient ischemic attack)     Hypertension     End stage kidney disease (H)     Anxiety and depression     (HFpEF) heart failure with preserved ejection fraction (H)     Pancreas replaced by transplant (H)     Kidney replaced by transplant     Hypophosphatemia     Hypomagnesemia     Hypervolemia     Diarrhea     Immunosuppressed status (H)       Allergies   Allergies   Allergen Reactions     Amlodipine      Other reaction(s): Edema,generalized       Medications   Current Outpatient Medications   Medication Sig     acetaminophen (TYLENOL) 325 MG tablet Take 2 tablets (650 mg) by mouth every 4 hours as needed for pain     aspirin (ASA) 325 MG EC tablet Take 1 tablet (325 mg) by mouth daily     atorvastatin (LIPITOR) 20 MG tablet Take 1 tablet (20 mg) by mouth every evening     calcium carbonate-vitamin D (OS-BARBARA WITH D) 500-200 MG-UNIT tablet Take 1 tablet by mouth 2 times daily (with meals)     carvedilol (COREG) 12.5 MG tablet Take 1 tablet (12.5 mg) by mouth 2 times daily (with meals)     clotrimazole (MYCELEX) 10 MG lozenge Place 1 lozenge (10 mg) " inside cheek 4 times daily for 77 days     furosemide (LASIX) 40 MG tablet Take 1 tablet (40 mg) by mouth daily     magnesium oxide (MAG-OX) 400 MG tablet Take 1 tablet (400 mg) by mouth 2 times daily     mycophenolic acid (GENERIC EQUIVALENT) 180 MG EC tablet Take 4 tablets (720 mg) by mouth 2 times daily Take in place of mycophenolate.     sulfamethoxazole-trimethoprim (BACTRIM) 400-80 MG tablet Take 1 tablet by mouth Every Mon, Wed, Fri Morning     tacrolimus (GENERIC EQUIVALENT) 0.5 MG capsule HOLD for dose change.     tacrolimus (GENERIC EQUIVALENT) 1 MG capsule Take 1 capsule (1 mg) by mouth 2 times daily     valGANciclovir (VALCYTE) 450 MG tablet Take 1 tablet (450 mg) by mouth every other day     vancomycin (VANCOCIN) 125 MG capsule Take 1 capsule (125 mg) by mouth 4 times daily for 14 days, THEN 1 capsule (125 mg) 2 times daily for 14 days, THEN 1 capsule (125 mg) daily for 7 days.     venlafaxine (EFFEXOR XR) 150 MG 24 hr capsule Take 1 capsule (150 mg) by mouth daily     Wheat Dextrin (BENEFIBER DRINK MIX PO) Take 1 teaspoonful by mouth 2 times daily     No current facility-administered medications for this visit.     Medications Discontinued During This Encounter   Medication Reason     Insulin Disposable Pump (OMNIPOD DASH 5 PACK PODS) MISC      Guar Gum (FIBER MODULAR, NUTRISOURCE FIBER,) packet        Physical Exam   Vital Signs: BP (!) 154/85   Pulse 68   Temp 98.4  F (36.9  C)   Wt 72.3 kg (159 lb 4.8 oz)   LMP 09/18/2022   SpO2 98%   BMI 30.10 kg/m      GENERAL APPEARANCE: alert and no distress, some periorbital edema  HENT: mouth without ulcers or lesions  LYMPHATICS: no cervical or supraclavicular nodes  RESP: lungs clear to auscultation - no rales, rhonchi or wheezes  CV: regular rhythm, normal rate, no rub, no murmur  EDEMA: no LE edema bilaterally  ABDOMEN: soft, nondistended, nontender, bowel sounds normal  MS: extremities normal - no gross deformities noted, no evidence of inflammation  in joints, no muscle tenderness  SKIN: no rash  NEURO: normal strength and tone, sensory exam grossly normal, mentation intact and speech normal  PSYCH: mentation appears normal and affect normal/bright  TX KIDNEY: normal  DIALYSIS ACCESS:  RUE AV fistula with good thrill and bruit    Data     Renal Latest Ref Rng & Units 11/2/2022 10/20/2022 10/17/2022   Na 136 - 145 mmol/L 137 140 138   K 3.4 - 5.3 mmol/L 4.7 4.2 5.0   Cl 98 - 107 mmol/L 102 106 101   CO2 22 - 29 mmol/L 24 28 26   BUN 6.0 - 20.0 mg/dL 30.9(H) 30 40.4(H)   Cr 0.51 - 0.95 mg/dL 2.14(H) 1.87(H) 2.00(H)   Glucose 70 - 99 mg/dL 102(H) 93 120(H)   Ca  8.6 - 10.0 mg/dL 9.7 9.3 10.0   Mg 1.7 - 2.3 mg/dL 1.6(L) 2.0 1.5(L)     Bone Health Latest Ref Rng & Units 11/2/2022 10/20/2022 10/17/2022   Phos 2.5 - 4.5 mg/dL 3.0 3.2 3.5   PTHi 15 - 65 pg/mL - - -   Vit D Def 20 - 75 ug/L - - -     Heme Latest Ref Rng & Units 11/2/2022 10/20/2022 10/17/2022   WBC 4.0 - 11.0 10e3/uL 3.4(L) 4.5 6.3   Hgb 11.7 - 15.7 g/dL 9.0(L) 9.0(L) 9.9(L)   Plt 150 - 450 10e3/uL 247 400 385   ABSOLUTE NEUTROPHIL 1.6 - 8.3 10e3/uL - - -   ABSOLUTE LYMPHOCYTES 0.8 - 5.3 10e3/uL - - -   ABSOLUTE MONOCYTES 0.0 - 1.3 10e3/uL - - -   ABSOLUTE EOSINOPHILS 0.0 - 0.7 10e3/uL - - -   ABSOLUTE BASOPHILS 0.0 - 0.2 10e9/L - - -   ABS IMMATURE GRANULOCYTES 0 - 0.4 10e9/L - - -   ABSOLUTE NUCLEATED RBC - - - -     Liver Latest Ref Rng & Units 10/13/2022 9/22/2022 8/13/2020   AP 40 - 150 U/L 161(H) 89 143   TBili 0.2 - 1.3 mg/dL 0.3 0.3 0.5   DBili 0.0 - 0.2 mg/dL <0.1 - -   ALT 0 - 50 U/L 40 18 31   AST 0 - 45 U/L 21 29 28   Tot Protein 6.8 - 8.8 g/dL 6.6(L) 6.9 7.0   Albumin 3.4 - 5.0 g/dL 3.3(L) 4.1 3.0(L)     Pancreas Latest Ref Rng & Units 11/2/2022 10/20/2022 10/17/2022   A1C 0.0 - 5.6 % - - -   Amylase 28 - 100 U/L 41 78 115(H)   Lipase 73 - 393 U/L - - -   Lipase (Roche) 13 - 60 U/L 27 52 96(H)     Iron studies Latest Ref Rng & Units 10/3/2022   Iron 37 - 145 ug/dL 34(L)   Iron sat 15 - 46 %  17   Ferritin 11 - 328 ng/mL 923(H)     [facility] Txp Virology Latest Ref Rng & Units 9/22/2022 8/13/2020   CMV QUANT IU/ML Not Detected IU/mL Not Detected -   EBV CAPSID ANTIBODY IGG No detectable antibody. Positive(A) >8.0(H)   Hep B Core NR:Nonreactive - Nonreactive        Recent Labs   Lab Test 10/27/22  1054 10/31/22  1008 11/02/22  1118   DOSTAC 10/26/2022 10/30/2022 11/1/2022   TACROL 12.0 25.4* 21.0*     Recent Labs   Lab Test 10/17/22  1447 10/20/22  1103 11/02/22  1117   DOSMPA  --  10/19/2022  10:30 PM  --    MPACID 1.80 0.60* 1.35   MPAG 81.7 39.2 96.1*

## 2022-11-04 NOTE — TELEPHONE ENCOUNTER
ISSUE:  MPA <0.25    Currently on mycophenolic acid 720 mg bid.     Reviewed with Dr. Zuniga:   Rodo Zuniga MD Poucher, Jessica, RN  Would repeat next week. Her level was fine 2 days ago.

## 2022-11-04 NOTE — LETTER
11/4/2022       RE: Eden Hess  [address]  [place] [address]     Dear Colleague,    Thank you for referring your patient, Eden Hess, to the [facility] [place] [facility]. Please see a copy of my visit note below.    TRANSPLANT NEPHROLOGY EARLY POST TRANSPLANT VISIT    Assessment & Plan   # DDKT (SPK): Trend up, likely 2/2 high tac levels and diuresis   - Baseline Creatinine: ~ TBD. Ruslan Scr post txp was 1.87 on 10/20/22   - Proteinuria: Minimal (0.2-0.5 grams)   - Date DSA Last Checked: Oct/2022      Latest DSA: No   - BK Viremia: No   - Kidney Tx Biopsy: No   - Transplant Ureteral Stent: No     # Pancreas Tx (SPK):    - Pancreatic Exocrine Drainage: Enteric drained     - Blood glucose: Near euglycemia      On insulin: No   - HbA1c: Stable      Latest HbA1c: 6%   - Pancreatic enzymes: Trend down   - Date DSA Last Checked: Oct/2022  Latest DSA: No   - Pancreas Tx Biopsy: No    # Immunosuppression: Tacrolimus immediate release (goal 8-10) and Mycophenolic acid (dose 720 mg every 12 hours)   - Induction with Recent Transplant:  Intermediate Intensity   - Continue with intensive monitoring of immunosuppression for efficacy and toxicity.   - Changes: Not at this time. MPA level at goal on 11/2 on MPA 720mg bid. Clotrimazole was restarted leading to increase in tac level. Tac dose was decreased on 11/2    # Infection Prophylaxis:   - PJP: Sulfa/TMP (Bactrim)  MWF  - CMV: Valganciclovir (Valcyte) x3m  - Thrush: Clotrimazole gene (Mycelex) x3m       # Hypertension: Borderline control;  Goal BP: < 140/90   - Volume status: Total body volume up, but intravascularly hypovolemic  EDW ~ 153lbs prior to txp   - Changes: Yes - hold lasix due to decrease in weight, intravascular hypovolemia, increased SCr. She wasn't taking lasix for about 2 weeks and then restarted 40mg daily on ~11/2. We can use lasix intermittently if  weight increases by >3lbs    # Anemia in Chronic Renal Disease: Hgb: Stable      SHANEKA: No   - Iron studies: Replete    # Mineral Bone Disorder:   - Secondary renal hyperparathyroidism; PTH level: Mildly elevated (151-300 pg/ml)        On treatment: None  - Vitamin D; level: High normal        On supplement: Yes  - Calcium; level: Normal        On supplement: Yes  - Phosphorus; level: Normal        On supplement: No    # Electrolytes:   - Potassium; level: Normal        On supplement: No  - Magnesium; level: Normal        On supplement: No  - Bicarbonate; level: Normal        On supplement: No      # C.diff colitis:   -Diagnosed 10/7/22   -Currently on prolonged PO vanc taper due to relapse after completion of 14d course of PO vanc    -PO vanc 125mg q6h restarted on 10/27/22 for 14d followed by 125mg bid x14d, 125mg daily x7d, then stop.    -Stool is still a bit loose. Start fiber      # GERD: Occasional symptoms on PPI.     # H/o TIA (2017): No residual deficients     # Medical Compliance: Yes    # COVID-19 Virus Review: Discussed COVID-19 virus and the potential medical risks.  Reviewed preventative health recommendations, including wearing a mask where appropriate.  Recommended COVID vaccination should be up to date with either an initial vaccination or booster shot when appropriate.  Asked the patient to inform the transplant center if they are exposed or diagnosed with this virus.    # COVID Vaccination Up To Date: No, due for next dose at 3m post txp    Talked to patient and evaluated by me. We discussed the risks and benefits of receiving EVUSHELD, as well as alternative treatments. The Emergency Use Administration (EUA) was provided to the patient for review and an opportunity for questions was provided. Patient wishes to proceed with EVUSHELD.    Evusheld Consent   Confirmed patient received the Emergency Use Authorization Fact Sheet for Patients, Parents and Caregivers prior to receiving medication. We  discussed the following risks and benefits of receiving EVUSHELD, as well as alternative treatments and the patient wished to proceed with EVUSHELD.     Providers believe the benefits of Evusheld outweigh the risks for all moderately to severely immunocompromised patients.      Benefits:   Evusheld is a synthetic antibody that provides protection against COVID-19 for 6 months and is recommended for patients that have a weakened immune system that may not be able to make antibodies themselves.     Risks:    There is a very small risk of allergic reactions and you should notify us if you have any symptoms of an allergic reaction after the injection. There were also some extremely rare cardiac events reported in the initial trials in people that already had heart disease, but experts do not think these were caused by the medication. We will observe you for any chest pain or trouble breathing after the injections and you can reach out to your provider if any of these symptoms develop.     Alternatives:   Vaccines to prevent COVID-19 are approved or available under Emergency Use Authorization. Use of EVUSHELD does not replace vaccination against COVID-19. You can continue to mask and isolate to avoid infections. It is your choice to receive or not receive EVUSHELD. Should you decide not to receive EVUSHELD, it will not change your standard medical care.     Patient does consent to the injection.      # Transplant History:  Etiology of Kidney Failure: Diabetes mellitus type 1  Tx: SPK  Transplant: 9/23/2022 (Kidney / Pancreas)  Donor Type: Donation after Brain Death Donor Class: Standard Criteria Donor  Crossmatch at time of Tx: negative  DSA at time of Tx: No  Significant changes in immunosuppression: None  CMV IgG Ab High Risk Discordance (D+/R-): No  EBV IgG Ab High Risk Discordance (D+/R-): No  Significant transplant-related complications: None    Transplant Office Phone Number: [phone]    Assessment and plan was  "discussed with the patient and she voiced her understanding and agreement.    Return visit: Return in 31 days (on 12/5/2022) for 2 month post transplant visit.    Rodo Zuniga MD    Chief Complaint   Ms. Hess is a 52 year old here for kidney transplant, pancreas transplant, immunosuppression management and hospital follow up after kidney/pancreas transplant.     History of Present Illness    Eden feels like her stool has firmed up a bit since restarting the PO vancomycin. She is eating but has no appetite. She is eating 2-3 meals per day. She checks her BG at home and it is usually in the low 100s. She denies vomiting, early satiety, nausea. She denies fever, chills. She denies chest pain but felt \"panicky\". She may feel a bit short of breath laying down but denies cough. She has been having strange dreams. She denies dysuria, frequency, abdominal pain. Her energy has been lower than prior to transplant but yesterday was very low and she spent all day in bed.     Home BP: 130s systolic    Problem List   Patient Active Problem List   Diagnosis     Major depression in complete remission (H)     Diabetes mellitus type 1 (H)     Anemia of chronic disease     Insulin pump in place     TIA (transient ischemic attack)     Hypertension     End stage kidney disease (H)     Anxiety and depression     (HFpEF) heart failure with preserved ejection fraction (H)     Pancreas replaced by transplant (H)     Kidney replaced by transplant     Hypophosphatemia     Hypomagnesemia     Hypervolemia     Diarrhea     Immunosuppressed status (H)       Allergies   Allergies   Allergen Reactions     Amlodipine      Other reaction(s): Edema,generalized       Medications   Current Outpatient Medications   Medication Sig     acetaminophen (TYLENOL) 325 MG tablet Take 2 tablets (650 mg) by mouth every 4 hours as needed for pain     aspirin (ASA) 325 MG EC tablet Take 1 tablet (325 mg) by mouth daily     atorvastatin (LIPITOR) 20 MG tablet " Take 1 tablet (20 mg) by mouth every evening     calcium carbonate-vitamin D (OS-BARBARA WITH D) 500-200 MG-UNIT tablet Take 1 tablet by mouth 2 times daily (with meals)     carvedilol (COREG) 12.5 MG tablet Take 1 tablet (12.5 mg) by mouth 2 times daily (with meals)     clotrimazole (MYCELEX) 10 MG lozenge Place 1 lozenge (10 mg) inside cheek 4 times daily for 77 days     furosemide (LASIX) 40 MG tablet Take 1 tablet (40 mg) by mouth daily     magnesium oxide (MAG-OX) 400 MG tablet Take 1 tablet (400 mg) by mouth 2 times daily     mycophenolic acid (GENERIC EQUIVALENT) 180 MG EC tablet Take 4 tablets (720 mg) by mouth 2 times daily Take in place of mycophenolate.     sulfamethoxazole-trimethoprim (BACTRIM) 400-80 MG tablet Take 1 tablet by mouth Every Mon, Wed, Fri Morning     tacrolimus (GENERIC EQUIVALENT) 0.5 MG capsule HOLD for dose change.     tacrolimus (GENERIC EQUIVALENT) 1 MG capsule Take 1 capsule (1 mg) by mouth 2 times daily     valGANciclovir (VALCYTE) 450 MG tablet Take 1 tablet (450 mg) by mouth every other day     vancomycin (VANCOCIN) 125 MG capsule Take 1 capsule (125 mg) by mouth 4 times daily for 14 days, THEN 1 capsule (125 mg) 2 times daily for 14 days, THEN 1 capsule (125 mg) daily for 7 days.     venlafaxine (EFFEXOR XR) 150 MG 24 hr capsule Take 1 capsule (150 mg) by mouth daily     Wheat Dextrin (BENEFIBER DRINK MIX PO) Take 1 teaspoonful by mouth 2 times daily     No current facility-administered medications for this visit.     Medications Discontinued During This Encounter   Medication Reason     Insulin Disposable Pump (OMNIPOD DASH 5 PACK PODS) MISC      Guar Gum (FIBER MODULAR, NUTRISOURCE FIBER,) packet        Physical Exam   Vital Signs: BP (!) 154/85   Pulse 68   Temp 98.4  F (36.9  C)   Wt 72.3 kg (159 lb 4.8 oz)   LMP 09/18/2022   SpO2 98%   BMI 30.10 kg/m      GENERAL APPEARANCE: alert and no distress, some periorbital edema  HENT: mouth without ulcers or lesions  LYMPHATICS:  no cervical or supraclavicular nodes  RESP: lungs clear to auscultation - no rales, rhonchi or wheezes  CV: regular rhythm, normal rate, no rub, no murmur  EDEMA: no LE edema bilaterally  ABDOMEN: soft, nondistended, nontender, bowel sounds normal  MS: extremities normal - no gross deformities noted, no evidence of inflammation in joints, no muscle tenderness  SKIN: no rash  NEURO: normal strength and tone, sensory exam grossly normal, mentation intact and speech normal  PSYCH: mentation appears normal and affect normal/bright  TX KIDNEY: normal  DIALYSIS ACCESS:  RUE AV fistula with good thrill and bruit    Data     Renal Latest Ref Rng & Units 11/2/2022 10/20/2022 10/17/2022   Na 136 - 145 mmol/L 137 140 138   K 3.4 - 5.3 mmol/L 4.7 4.2 5.0   Cl 98 - 107 mmol/L 102 106 101   CO2 22 - 29 mmol/L 24 28 26   BUN 6.0 - 20.0 mg/dL 30.9(H) 30 40.4(H)   Cr 0.51 - 0.95 mg/dL 2.14(H) 1.87(H) 2.00(H)   Glucose 70 - 99 mg/dL 102(H) 93 120(H)   Ca  8.6 - 10.0 mg/dL 9.7 9.3 10.0   Mg 1.7 - 2.3 mg/dL 1.6(L) 2.0 1.5(L)     Bone Health Latest Ref Rng & Units 11/2/2022 10/20/2022 10/17/2022   Phos 2.5 - 4.5 mg/dL 3.0 3.2 3.5   PTHi 15 - 65 pg/mL - - -   Vit D Def 20 - 75 ug/L - - -     Heme Latest Ref Rng & Units 11/2/2022 10/20/2022 10/17/2022   WBC 4.0 - 11.0 10e3/uL 3.4(L) 4.5 6.3   Hgb 11.7 - 15.7 g/dL 9.0(L) 9.0(L) 9.9(L)   Plt 150 - 450 10e3/uL 247 400 385   ABSOLUTE NEUTROPHIL 1.6 - 8.3 10e3/uL - - -   ABSOLUTE LYMPHOCYTES 0.8 - 5.3 10e3/uL - - -   ABSOLUTE MONOCYTES 0.0 - 1.3 10e3/uL - - -   ABSOLUTE EOSINOPHILS 0.0 - 0.7 10e3/uL - - -   ABSOLUTE BASOPHILS 0.0 - 0.2 10e9/L - - -   ABS IMMATURE GRANULOCYTES 0 - 0.4 10e9/L - - -   ABSOLUTE NUCLEATED RBC - - - -     Liver Latest Ref Rng & Units 10/13/2022 9/22/2022 8/13/2020   AP 40 - 150 U/L 161(H) 89 143   TBili 0.2 - 1.3 mg/dL 0.3 0.3 0.5   DBili 0.0 - 0.2 mg/dL <0.1 - -   ALT 0 - 50 U/L 40 18 31   AST 0 - 45 U/L 21 29 28   Tot Protein 6.8 - 8.8 g/dL 6.6(L) 6.9 7.0    Albumin 3.4 - 5.0 g/dL 3.3(L) 4.1 3.0(L)     Pancreas Latest Ref Rng & Units 11/2/2022 10/20/2022 10/17/2022   A1C 0.0 - 5.6 % - - -   Amylase 28 - 100 U/L 41 78 115(H)   Lipase 73 - 393 U/L - - -   Lipase (Roche) 13 - 60 U/L 27 52 96(H)     Iron studies Latest Ref Rng & Units 10/3/2022   Iron 37 - 145 ug/dL 34(L)   Iron sat 15 - 46 % 17   Ferritin 11 - 328 ng/mL 923(H)     [facility] Txp Virology Latest Ref Rng & Units 9/22/2022 8/13/2020   CMV QUANT IU/ML Not Detected IU/mL Not Detected -   EBV CAPSID ANTIBODY IGG No detectable antibody. Positive(A) >8.0(H)   Hep B Core NR:Nonreactive - Nonreactive        Recent Labs   Lab Test 10/27/22  1054 10/31/22  1008 11/02/22  1118   DOSTAC 10/26/2022 10/30/2022 11/1/2022   TACROL 12.0 25.4* 21.0*     Recent Labs   Lab Test 10/17/22  1447 10/20/22  1103 11/02/22  1117   DOSMPA  --  10/19/2022  10:30 PM  --    MPACID 1.80 0.60* 1.35   MPAG 81.7 39.2 96.1*       Again, thank you for allowing me to participate in the care of your patient.      Sincerely,    Rodo Zuniga MD

## 2022-11-07 ENCOUNTER — TELEPHONE (OUTPATIENT)
Dept: TRANSPLANT | Facility: CLINIC | Age: 52
End: 2022-11-07

## 2022-11-07 ENCOUNTER — LAB (OUTPATIENT)
Dept: LAB | Facility: CLINIC | Age: 52
End: 2022-11-07
Payer: COMMERCIAL

## 2022-11-07 ENCOUNTER — HOSPITAL ENCOUNTER (OUTPATIENT)
Facility: CLINIC | Age: 52
Discharge: HOME | End: 2022-11-07
Attending: INTERNAL MEDICINE | Admitting: FAMILY MEDICINE
Payer: COMMERCIAL

## 2022-11-07 DIAGNOSIS — Z94.0 KIDNEY REPLACED BY TRANSPLANT: ICD-10-CM

## 2022-11-07 DIAGNOSIS — Z94.83 STATUS POST SIMULTANEOUS KIDNEY AND PANCREAS TRANSPLANT (H): Primary | ICD-10-CM

## 2022-11-07 DIAGNOSIS — Z79.899 ENCOUNTER FOR LONG-TERM CURRENT USE OF MEDICATION: ICD-10-CM

## 2022-11-07 DIAGNOSIS — Z48.298 AFTERCARE FOLLOWING ORGAN TRANSPLANT: ICD-10-CM

## 2022-11-07 DIAGNOSIS — Z94.0 STATUS POST SIMULTANEOUS KIDNEY AND PANCREAS TRANSPLANT (H): Primary | ICD-10-CM

## 2022-11-07 LAB
AMYLASE SERPL-CCNC: 42 U/L (ref 30–110)
ANION GAP SERPL CALCULATED.3IONS-SCNC: 3 MMOL/L (ref 3–14)
BKV DNA # SPEC NAA+PROBE: NOT DETECTED COPIES/ML
BUN SERPL-MCNC: 35 MG/DL (ref 7–30)
CALCIUM SERPL-MCNC: 9.3 MG/DL (ref 8.5–10.1)
CHLORIDE BLD-SCNC: 111 MMOL/L (ref 94–109)
CO2 SERPL-SCNC: 26 MMOL/L (ref 20–32)
CREAT SERPL-MCNC: 1.72 MG/DL (ref 0.52–1.04)
ERYTHROCYTE [DISTWIDTH] IN BLOOD BY AUTOMATED COUNT: 14.3 % (ref 10–15)
GFR SERPL CREATININE-BSD FRML MDRD: 35 ML/MIN/1.73M2
GLUCOSE BLD-MCNC: 95 MG/DL (ref 70–99)
HBV DNA SERPL QL NAA+PROBE: NORMAL
HCT VFR BLD AUTO: 30.5 % (ref 35–47)
HCV RNA SERPL QL NAA+PROBE: NORMAL
HGB BLD-MCNC: 9.4 G/DL (ref 11.7–15.7)
HIV1+2 RNA SERPL QL NAA+PROBE: NORMAL
LIPASE SERPL-CCNC: 156 U/L (ref 73–393)
MCH RBC QN AUTO: 32 PG (ref 26.5–33)
MCHC RBC AUTO-ENTMCNC: 30.8 G/DL (ref 31.5–36.5)
MCV RBC AUTO: 104 FL (ref 78–100)
PLATELET # BLD AUTO: 279 10E3/UL (ref 150–450)
POTASSIUM BLD-SCNC: 5.8 MMOL/L (ref 3.4–5.3)
RBC # BLD AUTO: 2.94 10E6/UL (ref 3.8–5.2)
SCANNED LAB RESULT: NORMAL
SODIUM SERPL-SCNC: 140 MMOL/L (ref 133–144)
TACROLIMUS BLD-MCNC: 12.6 UG/L (ref 5–15)
TME LAST DOSE: NORMAL H
TME LAST DOSE: NORMAL H
WBC # BLD AUTO: 4.3 10E3/UL (ref 4–11)

## 2022-11-07 PROCEDURE — 83690 ASSAY OF LIPASE: CPT

## 2022-11-07 PROCEDURE — 80048 BASIC METABOLIC PNL TOTAL CA: CPT

## 2022-11-07 PROCEDURE — 80197 ASSAY OF TACROLIMUS: CPT

## 2022-11-07 PROCEDURE — 85027 COMPLETE CBC AUTOMATED: CPT

## 2022-11-07 PROCEDURE — 82150 ASSAY OF AMYLASE: CPT

## 2022-11-07 PROCEDURE — 36415 COLL VENOUS BLD VENIPUNCTURE: CPT

## 2022-11-07 NOTE — TELEPHONE ENCOUNTER
ISSUE:  Potassium 5.8    Bertrand Moore MD Poucher, Jessica, RN  Improved kidney function, but elevated serum potassium.  Recommend low potassium diet and would consider holding Bactrim if potassium level remains elevated.  Would check G6PD level in case need to change to dapsone.     Check message sent to patient, list of high and low potassium foods provided.

## 2022-11-09 ENCOUNTER — VIRTUAL VISIT (OUTPATIENT)
Dept: PHARMACY | Facility: CLINIC | Age: 52
End: 2022-11-09
Payer: COMMERCIAL

## 2022-11-09 ENCOUNTER — TELEPHONE (OUTPATIENT)
Dept: TRANSPLANT | Facility: CLINIC | Age: 52
End: 2022-11-09

## 2022-11-09 DIAGNOSIS — Z94.83 HISTORY OF SIMULTANEOUS KIDNEY AND PANCREAS TRANSPLANT (H): Primary | ICD-10-CM

## 2022-11-09 DIAGNOSIS — Z94.0 HISTORY OF SIMULTANEOUS KIDNEY AND PANCREAS TRANSPLANT (H): Primary | ICD-10-CM

## 2022-11-09 DIAGNOSIS — A04.72 C. DIFFICILE COLITIS: ICD-10-CM

## 2022-11-09 LAB
DONOR IDENTIFICATION: NORMAL
DSA COMMENTS: NORMAL
DSA PRESENT: NO
DSA TEST METHOD: NORMAL
INT SUB RESULT: NORMAL
INTERF SUBSTANCE: NORMAL
INTSUB TEST METHOD: NORMAL
ORGAN: NORMAL
SA 1 CELL: NORMAL
SA 1 TEST METHOD: NORMAL
SA 2 CELL: NORMAL
SA 2 TEST METHOD: NORMAL
SA1 HI RISK ABY: NORMAL
SA1 MOD RISK ABY: NORMAL
SA2 HI RISK ABY: NORMAL
SA2 MOD RISK ABY: NORMAL
UNACCEPTABLE ANTIGENS: NORMAL
UNOS CPRA: 0
ZZZINT SUB COMMENTS: NORMAL
ZZZSA 1  COMMENTS: NORMAL
ZZZSA 2 COMMENTS: NORMAL

## 2022-11-09 PROCEDURE — 99207 PR NO CHARGE LOS: CPT | Performed by: PHARMACIST

## 2022-11-09 NOTE — TELEPHONE ENCOUNTER
Spoke with patient, encouraged her to have labs drawn locally Monday morning before her afternoon appointment with Dr. Gómez. She will call for an appointment.

## 2022-11-09 NOTE — PROGRESS NOTES
Medication Therapy Management (MTM) Encounter    ASSESSMENT:                            Medication Adherence/Access: No issues identified    Renal/ Pancreas Transplant:  Patient is taking correct doses of tacrolimus and mycophenolate acid.  Confirmed that her 0.5 mg tablets were correctly filled from the pharmacy.  Levels over 20 to use over the past week may have been from diarrhea from C. Difficile.  Now near goal at 12.    CDIF: diarrhea likely due to C. difficile although she is nearing the end of her 14-day 4 times daily course.  Reasonable to reduce other diarrhea causing medications.  We will try another formulation of magnesium and try Metamucil in place of Benefiber as it is a more effective bulking agent.    PLAN:                            1. Stop magnesium oxide, start Doctor's Best High Absorption Magnesium 1 tablet twice daily. See if this improves stools.   2. Consider trying Metamucil in place of Benefiber for loose stools too.     Follow-up: 12/1 at 11:30 AM    SUBJECTIVE/OBJECTIVE:                          Eden Hess is a 52 year old female called for a follow-up visit.  Today's visit is a follow-up MTM visit from 10/6     Reason for visit: double checking med doses due to high Tacrolimus levels.     Allergies/ADRs: Reviewed in chart  Past Medical History: Reviewed in chart  Tobacco: She reports that she quit smoking about 24 years ago. Her smoking use included cigarettes. She has never used smokeless tobacco.  Alcohol: not currently using    Medication Adherence/Access: no issues reported    Renal/ Pancreas Transplant:  Current immunosuppressants include Tacrolimus 1.5mg twice daily (pt increased this today), Mycophenolic acid 720mg twice daily.  Pt reports Diarrhea, Tremors  Transplant date: 9/23/22  Estimated Creatinine Clearance: 34 mL/min (A) (based on SCr of 1.72 mg/dL (H)).  Vascular prophylaxis: Aspirin 325mg daily. Denies GIB sx.   CMV prophylaxis: Valcyte 450 mg every other day.  Treat 3 months post tx   PJP prophylaxis: Bactrim S S three times per week  Antifungal Prophylaxis: Clotrimazole 1 lozenge 3 TIMES DAILY taking this consistently.   Supplements: Mag Oxide 400mg twice daily ( 2 hours from MMF), Calcium Carbonate/D twice daily.  Tx Coordinator: Silvio Mancilla Med Card: Yes  Immunizations: annual flu shot 2021; Pneumovax 23:  2010, 2020; Prevnar 13/15/20: 2020; TDaP:  2010; Shingrix: unknown, HBV: immunity per last titer, COVID: Moderna x3   Latest Reference Range & Units 10/27/22 10:54 10/31/22 10:08 11/02/22 11:18 11/04/22 07:20 11/07/22 10:08   Tacrolimus by Tandem Mass Spectrometry 5.0 - 15.0 ug/L 12.0 25.4 (HH) 21.0 (HH) 17.5 (H) 12.6   (HH): Data is critically high  (H): Data is abnormally high    CDIF: Pt is taking Vancomycin 125mg 4 TIMES DAILY x 14 days, then 125mg twice daily for 7 days. Patient is having watery stools, twice daily. Pt is taking Benefiber 1 teaspoonful twice daily.     Today's Vitals: LMP 09/18/2022   ----------------    I spent 17 minutes with this patient today. All changes were made via collaborative practice agreement with Dr. Zuniga. A copy of the visit note was provided to the patient's provider(s).    The patient was sent via [employer] a summary of these recommendations.     Miller Salas, PharmD  [facility] Pharmacist    Phone: [phone]     Telemedicine Visit Details  Type of service:  Telephone visit  Start Time: 12:41 PM  End Time: 12:58 PM  Originating Location (pt. Location): Home      Distant Location (provider location):  Off-site  Provider has received verbal consent for a visit from the patient? Yes     Medication Therapy Recommendations  History of simultaneous kidney and pancreas transplant (H)    Current Medication: Wheat Dextrin (BENEFIBER DRINK MIX PO)   Rationale: More effective medication available - Ineffective medication - Effectiveness   Recommendation: Change Medication Formulation  - Metamucil 48.57 % Powd   Status:  Accepted - no CPA Needed          Current Medication: magnesium oxide (MAG-OX) 400 MG tablet   Rationale: Undesirable effect - Adverse medication event - Safety   Recommendation: Change Medication Formulation  -  magnesium glycinate lysinate chelate 100 MG Tabs tablet   Status: Accepted per CPA

## 2022-11-09 NOTE — PATIENT INSTRUCTIONS
"Recommendations from today's MTM visit:                                                       1. Stop magnesium oxide, start Doctor's Best High Absorption Magnesium 1 tablet twice daily. See if this improves stools.   2. Consider trying Metamucil in place of Benefiber for loose stools too.     Follow-up: 12/1 at 11:30 AM    It was great speaking with you today.  I value your experience and would be very thankful for your time in providing feedback in our clinic survey. In the next few days, you may receive an email or text message from [employer] with a link to a survey related to your  clinical pharmacist.\"     To schedule another MTM appointment, please call the clinic directly or you may call the MTM scheduling line at [phone] or toll-free at [phone].     My Clinical Pharmacist's contact information:                                                      Please feel free to contact me with any questions or concerns you have.      Miller Salas, PharmD  MTM Pharmacist    Phone: [phone]     "

## 2022-11-09 NOTE — Clinical Note
Confirmed doses today, changed her Magnesium formulation to assure it is not contributing to her loose stools.  She is taking Tacrolimus correctly (1mg twice daily), unclear what caused her highs, maybe loose stools?

## 2022-11-09 NOTE — TELEPHONE ENCOUNTER
Voicemail    Date/Time: 11/9/2022 @ 2:07PM    Reason for call: calling in regards of having a doctor's appointment on Monday Nov 14th. Patient is curious to know if anyone on the team needs them to get labs results prior to her appointment with the doctor. If labs are needed prior, patient wanted to know if they should hold off on taking their normal medications before their 1:15 pm appointment. Patient would like to follow up and discuss things.

## 2022-11-14 ENCOUNTER — LAB (OUTPATIENT)
Dept: LAB | Facility: CLINIC | Age: 52
End: 2022-11-14
Payer: COMMERCIAL

## 2022-11-14 ENCOUNTER — OFFICE VISIT (OUTPATIENT)
Dept: TRANSPLANT | Facility: CLINIC | Age: 52
End: 2022-11-14
Attending: SURGERY
Payer: COMMERCIAL

## 2022-11-14 VITALS
DIASTOLIC BLOOD PRESSURE: 76 MMHG | SYSTOLIC BLOOD PRESSURE: 159 MMHG | WEIGHT: 159.5 LBS | HEART RATE: 69 BPM | BODY MASS INDEX: 30.14 KG/M2 | OXYGEN SATURATION: 98 %

## 2022-11-14 DIAGNOSIS — Z48.298 AFTERCARE FOLLOWING ORGAN TRANSPLANT: ICD-10-CM

## 2022-11-14 DIAGNOSIS — Z94.0 STATUS POST SIMULTANEOUS KIDNEY AND PANCREAS TRANSPLANT (H): ICD-10-CM

## 2022-11-14 DIAGNOSIS — Z94.0 KIDNEY REPLACED BY TRANSPLANT: ICD-10-CM

## 2022-11-14 DIAGNOSIS — Z94.83 STATUS POST SIMULTANEOUS KIDNEY AND PANCREAS TRANSPLANT (H): ICD-10-CM

## 2022-11-14 DIAGNOSIS — Z79.899 ENCOUNTER FOR LONG-TERM CURRENT USE OF MEDICATION: ICD-10-CM

## 2022-11-14 DIAGNOSIS — Z76.82 AWAITING ORGAN TRANSPLANT: ICD-10-CM

## 2022-11-14 LAB
AMYLASE SERPL-CCNC: 46 U/L (ref 30–110)
ANION GAP SERPL CALCULATED.3IONS-SCNC: 3 MMOL/L (ref 3–14)
BUN SERPL-MCNC: 24 MG/DL (ref 7–30)
CALCIUM SERPL-MCNC: 9.2 MG/DL (ref 8.5–10.1)
CHLORIDE BLD-SCNC: 111 MMOL/L (ref 94–109)
CO2 SERPL-SCNC: 28 MMOL/L (ref 20–32)
CREAT SERPL-MCNC: 1.69 MG/DL (ref 0.52–1.04)
ERYTHROCYTE [DISTWIDTH] IN BLOOD BY AUTOMATED COUNT: 14.2 % (ref 10–15)
GFR SERPL CREATININE-BSD FRML MDRD: 35 ML/MIN/1.73M2
GLUCOSE BLD-MCNC: 115 MG/DL (ref 70–99)
HCT VFR BLD AUTO: 32.8 % (ref 35–47)
HGB BLD-MCNC: 10 G/DL (ref 11.7–15.7)
LIPASE SERPL-CCNC: 143 U/L (ref 73–393)
MAGNESIUM SERPL-MCNC: 2.2 MG/DL (ref 1.6–2.3)
MCH RBC QN AUTO: 31.3 PG (ref 26.5–33)
MCHC RBC AUTO-ENTMCNC: 30.5 G/DL (ref 31.5–36.5)
MCV RBC AUTO: 103 FL (ref 78–100)
PHOSPHATE SERPL-MCNC: 2.7 MG/DL (ref 2.5–4.5)
PLATELET # BLD AUTO: 302 10E3/UL (ref 150–450)
POTASSIUM BLD-SCNC: 4.9 MMOL/L (ref 3.4–5.3)
RBC # BLD AUTO: 3.19 10E6/UL (ref 3.8–5.2)
SODIUM SERPL-SCNC: 142 MMOL/L (ref 133–144)
TACROLIMUS BLD-MCNC: 12.9 UG/L (ref 5–15)
TME LAST DOSE: NORMAL H
TME LAST DOSE: NORMAL H
WBC # BLD AUTO: 3.6 10E3/UL (ref 4–11)

## 2022-11-14 PROCEDURE — 83735 ASSAY OF MAGNESIUM: CPT

## 2022-11-14 PROCEDURE — 80048 BASIC METABOLIC PNL TOTAL CA: CPT

## 2022-11-14 PROCEDURE — 36415 COLL VENOUS BLD VENIPUNCTURE: CPT

## 2022-11-14 PROCEDURE — 86832 HLA CLASS I HIGH DEFIN QUAL: CPT

## 2022-11-14 PROCEDURE — 86833 HLA CLASS II HIGH DEFIN QUAL: CPT

## 2022-11-14 PROCEDURE — 80197 ASSAY OF TACROLIMUS: CPT

## 2022-11-14 PROCEDURE — 83690 ASSAY OF LIPASE: CPT

## 2022-11-14 PROCEDURE — 82150 ASSAY OF AMYLASE: CPT

## 2022-11-14 PROCEDURE — 80180 DRUG SCRN QUAN MYCOPHENOLATE: CPT

## 2022-11-14 PROCEDURE — 99213 OFFICE O/P EST LOW 20 MIN: CPT | Performed by: SURGERY

## 2022-11-14 PROCEDURE — 84100 ASSAY OF PHOSPHORUS: CPT

## 2022-11-14 PROCEDURE — 82955 ASSAY OF G6PD ENZYME: CPT | Mod: 90

## 2022-11-14 PROCEDURE — 99000 SPECIMEN HANDLING OFFICE-LAB: CPT

## 2022-11-14 PROCEDURE — 85027 COMPLETE CBC AUTOMATED: CPT

## 2022-11-14 PROCEDURE — 86828 HLA CLASS I&II ANTIBODY QUAL: CPT | Mod: 59

## 2022-11-14 NOTE — LETTER
11/14/2022         RE: Eden Hess  [address]  [place] [address]        Dear Colleague,    Thank you for referring your patient, Eden Hess, to the [facility]. Please see a copy of my visit note below.    Transplant Surgery Progress Note    Transplants:  9/23/2022 (Kidney / Pancreas); Postoperative day:  52  S: Presents for follow up from  in late September no issues, healing expectedly.  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   Transplant Date:  9/23/2022 (Kidney / Pancreas)   Ureteral Stent:  No   Crossmatch:  negative   DSA at Tx:  No  Baseline Cr: latest 1.6   DeNovo DSA: No    Acute Rejection Hx:  No    Present Maintenance Immunosuppression:  Tacrolimus and Mycophenolate mofetil    CMV IgG Ab Discordance:  No  EBV IgG Ab Discordance:  No    BK Viremia:  No  EBV Viremia:  No    Transplant Coordinator: Codie Kovacs     Transplant Office Phone Number: [phone]     Immunosuppressant Medications     Immunosuppressive Agents Disp Start End     mycophenolic acid (GENERIC EQUIVALENT) 180 MG EC tablet    240 tablet 10/28/2022     Sig - Route: Take 4 tablets (720 mg) by mouth 2 times daily Take in place of mycophenolate. - Oral    Class: E-Prescribe    Notes to Pharmacy: TXP DT 9/23/2022 (Kidney / Pancreas) TXP Dischg DT 10/2/2022 DX Kidney replaced by transplant Z94.0 TX [facility] ([place], [place])     tacrolimus (GENERIC EQUIVALENT) 1 MG capsule    60 capsule 11/3/2022     Sig - Route: Take 1 capsule (1 mg) by mouth 2 times daily - Oral    Class: E-Prescribe    Notes to Pharmacy: TXP DT 9/23/2022 (Kidney / Pancreas) TXP Dischg DT 10/2/2022 DX Kidney replaced by transplant Z94.0 TX [facility] ([place], [place])     tacrolimus (GENERIC EQUIVALENT) 0.5 MG capsule    60 capsule 11/1/2022     Sig: HOLD for dose change.    Patient  not taking: Reported on 11/9/2022       Class: No Print Out    Notes to Pharmacy: TXP DT 9/23/2022 (Kidney / Pancreas) TXP Dischg DT 10/2/2022 DX Kidney replaced by transplant Z94.0 TX [facility] ([place], [place])          Possible Immunosuppression-related side effects:   []             headache  []             vivid dreams  []             irritability  []             cognitive difficuties  []             fine tremor  []             nausea  []             diarrhea  []             neuropathy      []             edema  []             renal calcineurin toxicity  []             hyperkalemia  []             post-transplant diabetes  []             decreased appetite  []             increased appetite  []             other:  []             none    Prescription Medications as of 11/14/2022       Rx Number Disp Refills Start End Last Dispensed Date Next Fill Date Owning Pharmacy     magnesium glycinate lysinate chelate (DOCTOR'S BEST) 100 MG TABS tablet  60 tablet 3 11/9/2022    [place] - [address]    Sig: Take 1 tablet (100 mg) by mouth 2 times daily    Class: E-Prescribe    Route: Oral    acetaminophen (TYLENOL) 325 MG tablet  100 tablet 0 10/2/2022    [place], [place] - [address] [place] [address]    Sig: Take 2 tablets (650 mg) by mouth every 4 hours as needed for pain    Class: E-Prescribe    Route: Oral    aspirin (ASA) 325 MG EC tablet  30 tablet 11 10/3/2022    [address]    Sig: Take 1 tablet (325 mg) by mouth daily    Class: E-Prescribe    Route: Oral    Renewals     Renewal requests to authorizing provider (Sanam Renee, APRN CNP) <b>prohibited</b>          atorvastatin (LIPITOR) 20 MG tablet  30 tablet 11 10/2/2022    [address]    Sig: Take 1 tablet (20 mg) by mouth  every evening    Class: E-Prescribe    Route: Oral    Renewals     Renewal requests to authorizing provider (Sanam Renee APRN CNP) <b>prohibited</b>          calcium carbonate-vitamin D (OS-BARBARA WITH D) 500-200 MG-UNIT tablet  60 tablet 11 10/2/2022    [address]    Sig: Take 1 tablet by mouth 2 times daily (with meals)    Class: E-Prescribe    Route: Oral    Renewals     Renewal requests to authorizing provider (Sanam Renee APRN CNP) <b>prohibited</b>          carvedilol (COREG) 12.5 MG tablet  60 tablet 11 10/2/2022    [address]    Sig: Take 1 tablet (12.5 mg) by mouth 2 times daily (with meals)    Class: E-Prescribe    Route: Oral    Renewals     Renewal requests to authorizing provider (Sanam Renee APRN CNP) <b>prohibited</b>          clotrimazole (MYCELEX) 10 MG lozenge  308 lozenge 0 10/2/2022 12/18/2022   [address]    Sig: Place 1 lozenge (10 mg) inside cheek 4 times daily for 77 days    Class: E-Prescribe    Route: Buccal    Renewals     Renewal requests to authorizing provider (Sanam Renee APRN CNP) <b>prohibited</b>          furosemide (LASIX) 40 MG tablet  30 tablet 0 10/28/2022    [facility] [address] [place] [address] [address] [facility]    Sig: Take 1 tablet (40 mg) by mouth daily    Class: E-Prescribe    Route: Oral    magnesium oxide (MAG-OX) 400 MG tablet  60 tablet 11 10/2/2022    [address]    Sig: Take 1 tablet (400 mg) by mouth 2 times daily    Class: E-Prescribe    Route: Oral    Renewals     Renewal requests to authorizing provider (Sanam Renee APRN CNP) <b>prohibited</b>          mycophenolic acid (GENERIC EQUIVALENT) 180 MG EC tablet  240 tablet 11 10/28/2022    [id number] [facility]  [address] [facility]    Sig: Take 4 tablets (720 mg) by mouth 2 times daily Take in place of mycophenolate.    Class: E-Prescribe    Notes to Pharmacy: TXP DT 9/23/2022 (Kidney / Pancreas) TXP Dischg DT 10/2/2022 DX Kidney replaced by transplant Z94.0 [facility] ([place], [place])    Route: Oral    sulfamethoxazole-trimethoprim (BACTRIM) 400-80 MG tablet  13 tablet 11 11/2/2022    [place] - [place], [place] - [facility] [address]    Sig: Take 1 tablet by mouth Every Mon, Wed, Fri Morning    Class: E-Prescribe    Route: Oral    tacrolimus (GENERIC EQUIVALENT) 1 MG capsule  60 capsule 11 11/3/2022    [place] [facility] [place] [address] [address]    Sig: Take 1 capsule (1 mg) by mouth 2 times daily    Class: E-Prescribe    Notes to Pharmacy: TXP DT 9/23/2022 (Kidney / Pancreas) TXP Dischg DT 10/2/2022 DX Kidney replaced by transplant Z94.0 [facility] ([place], [place])    Route: Oral    valGANciclovir (VALCYTE) 450 MG tablet  60 tablet 0 10/14/2022    [facility] - [place] [address] [address]    Sig: Take 1 tablet (450 mg) by mouth every other day    Class: E-Prescribe    Route: Oral    vancomycin (VANCOCIN) 125 MG capsule  91 capsule 0 10/28/2022 12/2/2022   [id number] - [facility] [address] [facility]    Sig: Take 1 capsule (125 mg) by mouth 4 times daily for 14 days, THEN 1 capsule (125 mg) 2 times daily for 14 days, THEN 1 capsule (125 mg) daily for 7 days.    Class: E-Prescribe    Route: Oral    venlafaxine (EFFEXOR XR) 150 MG 24 hr capsule  30 capsule 0 10/4/2022    [facility] [place], [place] - [address] [facility] [phone]    Sig: Take 1 capsule (150 mg) by mouth daily    Class: E-Prescribe    Route: Oral    Wheat Dextrin (BENEFIBER DRINK MIX PO)            Sig: Take 1 teaspoonful by mouth 2 times daily     Class: Historical    Route: Oral    tacrolimus (GENERIC EQUIVALENT) 0.5 MG capsule  60 capsule 11 11/1/2022        Sig: HOLD for dose change.    Class: No Print Out    Notes to Pharmacy: TXP DT 9/23/2022 (Kidney / Pancreas) TXP Dischg DT 10/2/2022 DX Kidney replaced by transplant Z94.0 TX Center [facility] ([place], [place])          O:   Pulse:  [69] 69  BP: (159)/(76) 159/76  SpO2:  [98 %] 98 %  General Appearance: in no apparent distress.   Skin: Normal, no rashes or jaundice  Heart: regular rate and rhythm  Lungs: easy respirations, no audible wheezing.  Abdomen: abdomen with healed surgical scar, no palpable hernia present  Extremities: Tremor absent.   Edema: absent. 1+    Transplant Immunosuppression Labs Latest Ref Rng & Units 11/14/2022 11/7/2022 11/4/2022 11/2/2022 10/20/2022   Creat 0.52 - 1.04 mg/dL 1.69(H) 1.72(H) 2.20(H) 2.14(H) 1.87(H)   BUN 7 - 30 mg/dL 24 35(H) 31.2(H) 30.9(H) 30   WBC 4.0 - 11.0 10e3/uL 3.6(L) 4.3 3.5(L) 3.4(L) 4.5   Neutrophil % - - - - -   ANEU 1.6 - 8.3 10e3/uL - - - - -       Chemistries:   Recent Labs   Lab Test 11/14/22  0830   BUN 24   CR 1.69*   GFRESTIMATED 35*   *     Lab Results   Component Value Date    A1C 6.0 10/13/2022    A1C 7.3 08/13/2020    CPEPT <0.1 08/13/2020     Recent Labs   Lab Test 10/13/22  1102   ALBUMIN 3.3*   BILITOTAL 0.3   ALKPHOS 161*   AST 21   ALT 40     Urine Studies:  Recent Labs   Lab Test 10/07/22  1213   COLOR Yellow   APPEARANCE Clear   URINEGLC Negative   URINEBILI Negative   URINEKETONE Negative   SG 1.015   UBLD Negative   URINEPH 7.0   PROTEIN Negative   NITRITE Negative   LEUKEST Negative   RBCU 0-2   WBCU 0-5     No lab results found.  Hematology:   Recent Labs   Lab Test 11/14/22  0830 11/07/22  1008 11/04/22  0720   HGB 10.0* 9.4* 9.1*    279 249   WBC 3.6* 4.3 3.5*     Coags:   Recent Labs   Lab Test 09/23/22  1820 09/22/22  1629   INR 1.09 0.91     HLA antibodies:   SA1 Hi Risk Dolores    Date Value Ref Range Status   08/13/2020 None  Final     SA1 HI RISK ROE   Date Value Ref Range Status   11/04/2022 None  Final     SA1 Mod Risk Roe   Date Value Ref Range Status   08/13/2020 None  Final     SA1 MOD RISK ROE   Date Value Ref Range Status   11/04/2022 None  Final     SA2 Hi Risk Roe   Date Value Ref Range Status   08/13/2020 None  Final     SA2 HI RISK ROE   Date Value Ref Range Status   11/04/2022 None  Final     SA2 Mod Risk Roe   Date Value Ref Range Status   08/13/2020 None  Final     SA2 MOD RISK ROE   Date Value Ref Range Status   11/04/2022 None  Final       Assessment: Eden Hess is doing well s/p SPK:  Issues we addressed during her visit include:    Plan:    1. Graft function: stable, improving  2. Immunosuppression Management: No change as is .  Complexity of management:Medium.  Contributing factors: post-op  3. Continue scheduled follow up  Followup: as scheduled    Rodo Paula MD    I have reviewed history, examined patient and discussed plan with the fellow/resident/SANDEEP.  I concur with the findings in this note.     Immunosuppressive regimen, management and long term risks discussed in detail. Changes, when applicable made as per orders.        Total Time: 20 min,   Counselling Time: 10 min.    .

## 2022-11-14 NOTE — PROGRESS NOTES
Transplant Surgery Progress Note    Transplants:  9/23/2022 (Kidney / Pancreas); Postoperative day:  52  S: Presents for follow up from  in late September no issues, healing expectedly.  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   Transplant Date:  9/23/2022 (Kidney / Pancreas)   Ureteral Stent:  No   Crossmatch:  negative   DSA at Tx:  No  Baseline Cr: latest 1.6   DeNovo DSA: No    Acute Rejection Hx:  No    Present Maintenance Immunosuppression:  Tacrolimus and Mycophenolate mofetil    CMV IgG Ab Discordance:  No  EBV IgG Ab Discordance:  No    BK Viremia:  No  EBV Viremia:  No    Transplant Coordinator: Codie Kovacs     Transplant Office Phone Number: [phone]     Immunosuppressant Medications     Immunosuppressive Agents Disp Start End     mycophenolic acid (GENERIC EQUIVALENT) 180 MG EC tablet    240 tablet 10/28/2022     Sig - Route: Take 4 tablets (720 mg) by mouth 2 times daily Take in place of mycophenolate. - Oral    Class: E-Prescribe    Notes to Pharmacy: TXP DT 9/23/2022 (Kidney / Pancreas) TXP Dischg DT 10/2/2022 DX Kidney replaced by transplant Z94.0 TX [facility] ([place], [place])     tacrolimus (GENERIC EQUIVALENT) 1 MG capsule    60 capsule 11/3/2022     Sig - Route: Take 1 capsule (1 mg) by mouth 2 times daily - Oral    Class: E-Prescribe    Notes to Pharmacy: TXP DT 9/23/2022 (Kidney / Pancreas) TXP Dischg DT 10/2/2022 DX Kidney replaced by transplant Z94.0 TX [facility] ([place], [place])     tacrolimus (GENERIC EQUIVALENT) 0.5 MG capsule    60 capsule 11/1/2022     Sig: HOLD for dose change.    Patient not taking: Reported on 11/9/2022       Class: No Print Out    Notes to Pharmacy: TXP DT 9/23/2022 (Kidney / Pancreas) TXP Dischg DT 10/2/2022 DX Kidney replaced by transplant Z94.0 TX [facility] ([facility]  [place])          Possible Immunosuppression-related side effects:   []             headache  []             vivid dreams  []             irritability  []             cognitive difficuties  []             fine tremor  []             nausea  []             diarrhea  []             neuropathy      []             edema  []             renal calcineurin toxicity  []             hyperkalemia  []             post-transplant diabetes  []             decreased appetite  []             increased appetite  []             other:  []             none    Prescription Medications as of 11/14/2022       Rx Number Disp Refills Start End Last Dispensed Date Next Fill Date Owning Pharmacy     magnesium glycinate lysinate chelate (DOCTOR'S BEST) 100 MG TABS tablet  60 tablet 3 11/9/2022    [facility] [facility] [address] [place] [address]    Sig: Take 1 tablet (100 mg) by mouth 2 times daily    Class: E-Prescribe    Route: Oral    acetaminophen (TYLENOL) 325 MG tablet  100 tablet 0 10/2/2022    [address]    Sig: Take 2 tablets (650 mg) by mouth every 4 hours as needed for pain    Class: E-Prescribe    Route: Oral    aspirin (ASA) 325 MG EC tablet  30 tablet 11 10/3/2022    [address]    Sig: Take 1 tablet (325 mg) by mouth daily    Class: E-Prescribe    Route: Oral    Renewals     Renewal requests to authorizing provider (Sanam Renee APRN CNP) <b>prohibited</b>          atorvastatin (LIPITOR) 20 MG tablet  30 tablet 11 10/2/2022    [address]    Sig: Take 1 tablet (20 mg) by mouth every evening    Class: E-Prescribe    Route: Oral    Renewals     Renewal requests to authorizing provider (Sanam Renee APRN CNP) <b>prohibited</b>          calcium carbonate-vitamin D (OS-BARBARA WITH D) 500-200 MG-UNIT tablet  60 tablet 11  10/2/2022    [address]    Sig: Take 1 tablet by mouth 2 times daily (with meals)    Class: E-Prescribe    Route: Oral    Renewals     Renewal requests to authorizing provider (Sanam Renee APRN CNP) <b>prohibited</b>          carvedilol (COREG) 12.5 MG tablet  60 tablet 11 10/2/2022    [address]    Sig: Take 1 tablet (12.5 mg) by mouth 2 times daily (with meals)    Class: E-Prescribe    Route: Oral    Renewals     Renewal requests to authorizing provider (Sanam Renee APRN CNP) <b>prohibited</b>          clotrimazole (MYCELEX) 10 MG lozenge  308 lozenge 0 10/2/2022 12/18/2022   [address]    Sig: Place 1 lozenge (10 mg) inside cheek 4 times daily for 77 days    Class: E-Prescribe    Route: Buccal    Renewals     Renewal requests to authorizing provider (Sanam Renee APRN CNP) <b>prohibited</b>          furosemide (LASIX) 40 MG tablet  30 tablet 0 10/28/2022    [id number] [address] [facility]    Sig: Take 1 tablet (40 mg) by mouth daily    Class: E-Prescribe    Route: Oral    magnesium oxide (MAG-OX) 400 MG tablet  60 tablet 11 10/2/2022    [address]    Sig: Take 1 tablet (400 mg) by mouth 2 times daily    Class: E-Prescribe    Route: Oral    Renewals     Renewal requests to authorizing provider (Sanam Renee APRN CNP) <b>prohibited</b>          mycophenolic acid (GENERIC EQUIVALENT) 180 MG EC tablet  240 tablet 11 10/28/2022    [id number] [address] [facility]    Sig: Take 4 tablets (720 mg) by mouth 2 times daily Take in place of mycophenolate.    Class: E-Prescribe    Notes to Pharmacy: TXP DT 9/23/2022 (Kidney / Pancreas) TXP Dischg DT 10/2/2022 DX Kidney replaced by transplant Z94.0  [facility] ([place], [place])    Route: Oral    sulfamethoxazole-trimethoprim (BACTRIM) 400-80 MG tablet  13 tablet 11 11/2/2022    [facility] - [place], [place] - [facility] [address]    Sig: Take 1 tablet by mouth Every Mon, Wed, Fri Morning    Class: E-Prescribe    Route: Oral    tacrolimus (GENERIC EQUIVALENT) 1 MG capsule  60 capsule 11 11/3/2022    [facility] - [place], [place] - [facility] [address]    Sig: Take 1 capsule (1 mg) by mouth 2 times daily    Class: E-Prescribe    Notes to Pharmacy: TXP DT 9/23/2022 (Kidney / Pancreas) TXP Dischg DT 10/2/2022 DX Kidney replaced by transplant Z94.0 TX [facility] ([place], [place])    Route: Oral    valGANciclovir (VALCYTE) 450 MG tablet  60 tablet 0 10/14/2022    [facility] - [place], [place] - [facility] [address]    Sig: Take 1 tablet (450 mg) by mouth every other day    Class: E-Prescribe    Route: Oral    vancomycin (VANCOCIN) 125 MG capsule  91 capsule 0 10/28/2022 12/2/2022   [facility] #[id number] - [place], [place] - [address] [facility]    Sig: Take 1 capsule (125 mg) by mouth 4 times daily for 14 days, THEN 1 capsule (125 mg) 2 times daily for 14 days, THEN 1 capsule (125 mg) daily for 7 days.    Class: E-Prescribe    Route: Oral    venlafaxine (EFFEXOR XR) 150 MG 24 hr capsule  30 capsule 0 10/4/2022    [facility] [place], [place] - [address] [facility] [phone]    Sig: Take 1 capsule (150 mg) by mouth daily    Class: E-Prescribe    Route: Oral    Wheat Dextrin (BENEFIBER DRINK MIX PO)            Sig: Take 1 teaspoonful by mouth 2 times daily    Class: Historical    Route: Oral    tacrolimus (GENERIC EQUIVALENT) 0.5 MG capsule  60 capsule 11 11/1/2022        Sig: HOLD for dose change.    Class: No Print Out    Notes to Pharmacy: TXP DT 9/23/2022 (Kidney / Pancreas) TXP Dischg DT 10/2/2022 DX  Kidney replaced by transplant Z94.0 [facility] ([place], [place])          O:   Pulse:  [69] 69  BP: (159)/(76) 159/76  SpO2:  [98 %] 98 %  General Appearance: in no apparent distress.   Skin: Normal, no rashes or jaundice  Heart: regular rate and rhythm  Lungs: easy respirations, no audible wheezing.  Abdomen: abdomen with healed surgical scar, no palpable hernia present  Extremities: Tremor absent.   Edema: absent. 1+    Transplant Immunosuppression Labs Latest Ref Rng & Units 11/14/2022 11/7/2022 11/4/2022 11/2/2022 10/20/2022   Creat 0.52 - 1.04 mg/dL 1.69(H) 1.72(H) 2.20(H) 2.14(H) 1.87(H)   BUN 7 - 30 mg/dL 24 35(H) 31.2(H) 30.9(H) 30   WBC 4.0 - 11.0 10e3/uL 3.6(L) 4.3 3.5(L) 3.4(L) 4.5   Neutrophil % - - - - -   ANEU 1.6 - 8.3 10e3/uL - - - - -       Chemistries:   Recent Labs   Lab Test 11/14/22  0830   BUN 24   CR 1.69*   GFRESTIMATED 35*   *     Lab Results   Component Value Date    A1C 6.0 10/13/2022    A1C 7.3 08/13/2020    CPEPT <0.1 08/13/2020     Recent Labs   Lab Test 10/13/22  1102   ALBUMIN 3.3*   BILITOTAL 0.3   ALKPHOS 161*   AST 21   ALT 40     Urine Studies:  Recent Labs   Lab Test 10/07/22  1213   COLOR Yellow   APPEARANCE Clear   URINEGLC Negative   URINEBILI Negative   URINEKETONE Negative   SG 1.015   UBLD Negative   URINEPH 7.0   PROTEIN Negative   NITRITE Negative   LEUKEST Negative   RBCU 0-2   WBCU 0-5     No lab results found.  Hematology:   Recent Labs   Lab Test 11/14/22  0830 11/07/22  1008 11/04/22  0720   HGB 10.0* 9.4* 9.1*    279 249   WBC 3.6* 4.3 3.5*     Coags:   Recent Labs   Lab Test 09/23/22  1820 09/22/22  1629   INR 1.09 0.91     HLA antibodies:   SA1 Hi Risk Roe   Date Value Ref Range Status   08/13/2020 None  Final     SA1 HI RISK ROE   Date Value Ref Range Status   11/04/2022 None  Final     SA1 Mod Risk Roe   Date Value Ref Range Status   08/13/2020 None  Final     SA1 MOD RISK ROE   Date Value Ref Range  Status   11/04/2022 None  Final     SA2 Hi Risk Roe   Date Value Ref Range Status   08/13/2020 None  Final     SA2 HI RISK ROE   Date Value Ref Range Status   11/04/2022 None  Final     SA2 Mod Risk Roe   Date Value Ref Range Status   08/13/2020 None  Final     SA2 MOD RISK ROE   Date Value Ref Range Status   11/04/2022 None  Final       Assessment: Eden Hess is doing well s/p SPK:  Issues we addressed during her visit include:    Plan:    1. Graft function: stable, improving  2. Immunosuppression Management: No change as is .  Complexity of management:Medium.  Contributing factors: post-op  3. Continue scheduled follow up  Followup: as scheduled    Rodo Paula MD    I have reviewed history, examined patient and discussed plan with the fellow/resident/SANDEEP.  I concur with the findings in this note.     Immunosuppressive regimen, management and long term risks discussed in detail. Changes, when applicable made as per orders.        Total Time: 20 min,   Counselling Time: 10 min.    .

## 2022-11-15 ENCOUNTER — TELEPHONE (OUTPATIENT)
Dept: TRANSPLANT | Facility: CLINIC | Age: 52
End: 2022-11-15

## 2022-11-15 LAB
G6PD RBC-CCNT: 18.8 U/G HB
MYCOPHENOLATE SERPL LC/MS/MS-MCNC: 1.78 MG/L (ref 1–3.5)
MYCOPHENOLATE-G SERPL LC/MS/MS-MCNC: 125.2 MG/L (ref 30–95)
TME LAST DOSE: ABNORMAL H
TME LAST DOSE: ABNORMAL H

## 2022-11-15 NOTE — TELEPHONE ENCOUNTER
ISSUE:   Tacrolimus IR level 12.9 on 11/14, goal 8-10, dose 1 mg BID.    PLAN:   Please call patient and confirm this was an accurate 12-hour trough. Verify Tacrolimus IR dose 1 mg BID. Confirm no new medications or illness. Confirm no missed doses. If accurate trough and accurate dose, stay on same dose Tacrolimus IR and repeat labs in 2 days    OUTCOME:   Spoke with patient, they confirm accurate trough level and current dose 1 mg BID. Patient confirmed no dose change and to repeat labs in 2 days. Orders sent to preferred pharmacy for dose change and lab for repeat labs. Patient voiced understanding of plan.

## 2022-11-21 ENCOUNTER — LAB (OUTPATIENT)
Dept: LAB | Facility: CLINIC | Age: 52
End: 2022-11-21
Payer: COMMERCIAL

## 2022-11-21 ENCOUNTER — TELEPHONE (OUTPATIENT)
Dept: TRANSPLANT | Facility: CLINIC | Age: 52
End: 2022-11-21

## 2022-11-21 DIAGNOSIS — Z94.0 KIDNEY REPLACED BY TRANSPLANT: ICD-10-CM

## 2022-11-21 DIAGNOSIS — Z79.899 ENCOUNTER FOR LONG-TERM CURRENT USE OF MEDICATION: ICD-10-CM

## 2022-11-21 DIAGNOSIS — Z48.298 AFTERCARE FOLLOWING ORGAN TRANSPLANT: ICD-10-CM

## 2022-11-21 LAB
AMYLASE SERPL-CCNC: 76 U/L (ref 28–100)
ANION GAP SERPL CALCULATED.3IONS-SCNC: 12 MMOL/L (ref 7–15)
BUN SERPL-MCNC: 36.5 MG/DL (ref 6–20)
CALCIUM SERPL-MCNC: 9.6 MG/DL (ref 8.6–10)
CHLORIDE SERPL-SCNC: 103 MMOL/L (ref 98–107)
CREAT SERPL-MCNC: 1.75 MG/DL (ref 0.51–0.95)
DEPRECATED HCO3 PLAS-SCNC: 23 MMOL/L (ref 22–29)
ERYTHROCYTE [DISTWIDTH] IN BLOOD BY AUTOMATED COUNT: 14.5 % (ref 10–15)
GFR SERPL CREATININE-BSD FRML MDRD: 34 ML/MIN/1.73M2
GLUCOSE SERPL-MCNC: 99 MG/DL (ref 70–99)
HCT VFR BLD AUTO: 37.7 % (ref 35–47)
HGB BLD-MCNC: 12 G/DL (ref 11.7–15.7)
LIPASE SERPL-CCNC: 78 U/L (ref 13–60)
MAGNESIUM SERPL-MCNC: 1.8 MG/DL (ref 1.7–2.3)
MCH RBC QN AUTO: 31.5 PG (ref 26.5–33)
MCHC RBC AUTO-ENTMCNC: 31.8 G/DL (ref 31.5–36.5)
MCV RBC AUTO: 99 FL (ref 78–100)
PHOSPHATE SERPL-MCNC: 3.9 MG/DL (ref 2.5–4.5)
PLATELET # BLD AUTO: 369 10E3/UL (ref 150–450)
POTASSIUM SERPL-SCNC: 4.2 MMOL/L (ref 3.4–5.3)
RBC # BLD AUTO: 3.81 10E6/UL (ref 3.8–5.2)
SODIUM SERPL-SCNC: 138 MMOL/L (ref 136–145)
TACROLIMUS BLD-MCNC: 8.7 UG/L (ref 5–15)
TME LAST DOSE: NORMAL H
TME LAST DOSE: NORMAL H
WBC # BLD AUTO: 4.4 10E3/UL (ref 4–11)

## 2022-11-21 PROCEDURE — 85027 COMPLETE CBC AUTOMATED: CPT

## 2022-11-21 PROCEDURE — 80180 DRUG SCRN QUAN MYCOPHENOLATE: CPT

## 2022-11-21 PROCEDURE — 80197 ASSAY OF TACROLIMUS: CPT

## 2022-11-21 PROCEDURE — 83690 ASSAY OF LIPASE: CPT

## 2022-11-21 PROCEDURE — 36415 COLL VENOUS BLD VENIPUNCTURE: CPT

## 2022-11-21 PROCEDURE — 84100 ASSAY OF PHOSPHORUS: CPT

## 2022-11-21 PROCEDURE — 82150 ASSAY OF AMYLASE: CPT

## 2022-11-21 PROCEDURE — 80048 BASIC METABOLIC PNL TOTAL CA: CPT

## 2022-11-21 PROCEDURE — 87799 DETECT AGENT NOS DNA QUANT: CPT

## 2022-11-21 PROCEDURE — 83735 ASSAY OF MAGNESIUM: CPT

## 2022-11-21 NOTE — TELEPHONE ENCOUNTER
ISSUE:  Lipase elevated to 78    Bertrand Moore MD Poucher, Jessica, RN  Elevated serum lipase and recommend usual assessment for pain over pancreas allograft, constipation, high fat diet and volume status, as well as would just follow for now and repeat labs.     [employer] message sent to patient.

## 2022-11-22 LAB
BKV DNA # SPEC NAA+PROBE: NOT DETECTED COPIES/ML
MYCOPHENOLATE SERPL LC/MS/MS-MCNC: 0.62 MG/L (ref 1–3.5)
MYCOPHENOLATE-G SERPL LC/MS/MS-MCNC: 48.2 MG/L (ref 30–95)
TME LAST DOSE: ABNORMAL H
TME LAST DOSE: ABNORMAL H

## 2022-11-23 ENCOUNTER — TELEPHONE (OUTPATIENT)
Dept: TRANSPLANT | Facility: CLINIC | Age: 52
End: 2022-11-23

## 2022-11-23 DIAGNOSIS — R19.7 DIARRHEA: Primary | ICD-10-CM

## 2022-11-23 DIAGNOSIS — Z94.83 STATUS POST SIMULTANEOUS KIDNEY AND PANCREAS TRANSPLANT (H): ICD-10-CM

## 2022-11-23 DIAGNOSIS — Z94.0 STATUS POST SIMULTANEOUS KIDNEY AND PANCREAS TRANSPLANT (H): ICD-10-CM

## 2022-11-23 NOTE — TELEPHONE ENCOUNTER
ISSUE:  MPA 0.6  Elevated lipase    Currently on  mg bid.     Spoke with Hermila, has diarrhea. 2-3 watery stools per day, sometimes she is afraid to eat because the diarrhea comes on so quickly. Denies fevers or abdominal pain. Denies N/V. Hydrating well with 2-3 liters daily.     Currently taking vancomycin for extended c diff treatment, on 125 mg bid. Due to drop to once daily on Friday. Did not notice an improvement in diarrhea when she restarted the vancomycin and did not notice any different when she dropped by qid to bid dosing.     Is taking fiber occasionally but has not been consistent with this.     Will review with MDs.

## 2022-11-23 NOTE — TELEPHONE ENCOUNTER
Post Kidney and Pancreas Transplant Team Conference  Date: 11/23/2022  Transplant Coordinator: Giovanna Kovacs     Attendees:  [x]  Dr. Moore [] Allie Dooley, RN [x] Maggie Cardenas LPN     []  Dr. Cruz [x] Kiara Beth RN [] Arlene Coronado LPN   []  Dr. Fernandez [] Michelle Lott RN    [x]  Dr. Zuniga [] Denise Hamilton RN [] Miller Salas, PharmD   [] Dr. Gunderson [] Francisca Camacho, ANATOLY    [] Dr. Gómez [x] Taran Ledesma RN    [] Dr. Almonte [] Fariha Gordon RN [x] Ginna Caballero RN   [] Dr. Kirkland [] Nyasia More RN    [x]  Dr. Felix [] Sondra Spears RN    [] Dr. Rader [x] Codie Kovacs RN    [] Sarah Islas, NP [] Martha Klein RN        Verbal Plan Read Back:   Check cmv and enteric panel      Routed to RN Coordinator   Maggie Cardenas LPN

## 2022-11-25 ENCOUNTER — LAB (OUTPATIENT)
Dept: LAB | Facility: CLINIC | Age: 52
End: 2022-11-25
Payer: COMMERCIAL

## 2022-11-25 DIAGNOSIS — Z94.0 KIDNEY REPLACED BY TRANSPLANT: ICD-10-CM

## 2022-11-25 DIAGNOSIS — Z48.298 AFTERCARE FOLLOWING ORGAN TRANSPLANT: ICD-10-CM

## 2022-11-25 DIAGNOSIS — Z76.82 AWAITING ORGAN TRANSPLANT: ICD-10-CM

## 2022-11-25 DIAGNOSIS — Z79.899 ENCOUNTER FOR LONG-TERM CURRENT USE OF MEDICATION: ICD-10-CM

## 2022-11-25 LAB
AMYLASE SERPL-CCNC: 88 U/L (ref 30–110)
ANION GAP SERPL CALCULATED.3IONS-SCNC: 3 MMOL/L (ref 3–14)
BUN SERPL-MCNC: 37 MG/DL (ref 7–30)
CALCIUM SERPL-MCNC: 9.9 MG/DL (ref 8.5–10.1)
CHLORIDE BLD-SCNC: 106 MMOL/L (ref 94–109)
CO2 SERPL-SCNC: 29 MMOL/L (ref 20–32)
CREAT SERPL-MCNC: 1.88 MG/DL (ref 0.52–1.04)
ERYTHROCYTE [DISTWIDTH] IN BLOOD BY AUTOMATED COUNT: 13.9 % (ref 10–15)
GFR SERPL CREATININE-BSD FRML MDRD: 31 ML/MIN/1.73M2
GLUCOSE BLD-MCNC: 103 MG/DL (ref 70–99)
HCT VFR BLD AUTO: 37.8 % (ref 35–47)
HGB BLD-MCNC: 11.7 G/DL (ref 11.7–15.7)
LIPASE SERPL-CCNC: 389 U/L (ref 73–393)
MAGNESIUM SERPL-MCNC: 2.1 MG/DL (ref 1.6–2.3)
MCH RBC QN AUTO: 31 PG (ref 26.5–33)
MCHC RBC AUTO-ENTMCNC: 31 G/DL (ref 31.5–36.5)
MCV RBC AUTO: 100 FL (ref 78–100)
PHOSPHATE SERPL-MCNC: 3.8 MG/DL (ref 2.5–4.5)
PLATELET # BLD AUTO: 339 10E3/UL (ref 150–450)
POTASSIUM BLD-SCNC: 5.2 MMOL/L (ref 3.4–5.3)
RBC # BLD AUTO: 3.77 10E6/UL (ref 3.8–5.2)
SODIUM SERPL-SCNC: 138 MMOL/L (ref 133–144)
TACROLIMUS BLD-MCNC: 14.7 UG/L (ref 5–15)
TME LAST DOSE: NORMAL H
TME LAST DOSE: NORMAL H
WBC # BLD AUTO: 5.1 10E3/UL (ref 4–11)

## 2022-11-25 PROCEDURE — 83690 ASSAY OF LIPASE: CPT

## 2022-11-25 PROCEDURE — 83735 ASSAY OF MAGNESIUM: CPT

## 2022-11-25 PROCEDURE — 86832 HLA CLASS I HIGH DEFIN QUAL: CPT

## 2022-11-25 PROCEDURE — 84100 ASSAY OF PHOSPHORUS: CPT

## 2022-11-25 PROCEDURE — 82150 ASSAY OF AMYLASE: CPT

## 2022-11-25 PROCEDURE — 86828 HLA CLASS I&II ANTIBODY QUAL: CPT | Mod: 59

## 2022-11-25 PROCEDURE — 85027 COMPLETE CBC AUTOMATED: CPT

## 2022-11-25 PROCEDURE — 36415 COLL VENOUS BLD VENIPUNCTURE: CPT

## 2022-11-25 PROCEDURE — 80197 ASSAY OF TACROLIMUS: CPT

## 2022-11-25 PROCEDURE — 86833 HLA CLASS II HIGH DEFIN QUAL: CPT

## 2022-11-25 PROCEDURE — 80048 BASIC METABOLIC PNL TOTAL CA: CPT

## 2022-11-25 NOTE — TELEPHONE ENCOUNTER
Reviewed at pancreas review meeting, for diarrhea: check CMV, enteric panel, start fiber bid. Left detailed VM.

## 2022-11-28 ENCOUNTER — LAB (OUTPATIENT)
Dept: LAB | Facility: CLINIC | Age: 52
End: 2022-11-28
Payer: COMMERCIAL

## 2022-11-28 DIAGNOSIS — Z94.83 STATUS POST SIMULTANEOUS KIDNEY AND PANCREAS TRANSPLANT (H): ICD-10-CM

## 2022-11-28 DIAGNOSIS — Z79.899 ENCOUNTER FOR LONG-TERM CURRENT USE OF MEDICATION: ICD-10-CM

## 2022-11-28 DIAGNOSIS — R19.7 DIARRHEA: ICD-10-CM

## 2022-11-28 DIAGNOSIS — Z94.0 KIDNEY REPLACED BY TRANSPLANT: ICD-10-CM

## 2022-11-28 DIAGNOSIS — Z94.0 STATUS POST SIMULTANEOUS KIDNEY AND PANCREAS TRANSPLANT (H): ICD-10-CM

## 2022-11-28 DIAGNOSIS — Z48.298 AFTERCARE FOLLOWING ORGAN TRANSPLANT: ICD-10-CM

## 2022-11-28 LAB
AMYLASE SERPL-CCNC: 56 U/L (ref 30–110)
ANION GAP SERPL CALCULATED.3IONS-SCNC: 2 MMOL/L (ref 3–14)
BUN SERPL-MCNC: 41 MG/DL (ref 7–30)
CALCIUM SERPL-MCNC: 9.9 MG/DL (ref 8.5–10.1)
CHLORIDE BLD-SCNC: 106 MMOL/L (ref 94–109)
CO2 SERPL-SCNC: 29 MMOL/L (ref 20–32)
CREAT SERPL-MCNC: 1.82 MG/DL (ref 0.52–1.04)
DONOR IDENTIFICATION: NORMAL
DSA COMMENTS: NORMAL
DSA PRESENT: NO
DSA TEST METHOD: NORMAL
ERYTHROCYTE [DISTWIDTH] IN BLOOD BY AUTOMATED COUNT: 13.6 % (ref 10–15)
GFR SERPL CREATININE-BSD FRML MDRD: 32 ML/MIN/1.73M2
GLUCOSE BLD-MCNC: 100 MG/DL (ref 70–99)
HCT VFR BLD AUTO: 36.8 % (ref 35–47)
HGB BLD-MCNC: 11.4 G/DL (ref 11.7–15.7)
INT SUB RESULT: NORMAL
INTERF SUBSTANCE: NORMAL
INTSUB TEST METHOD: NORMAL
LIPASE SERPL-CCNC: 189 U/L (ref 73–393)
MCH RBC QN AUTO: 31.1 PG (ref 26.5–33)
MCHC RBC AUTO-ENTMCNC: 31 G/DL (ref 31.5–36.5)
MCV RBC AUTO: 100 FL (ref 78–100)
ORGAN: NORMAL
PLATELET # BLD AUTO: 325 10E3/UL (ref 150–450)
POTASSIUM BLD-SCNC: 5.6 MMOL/L (ref 3.4–5.3)
RBC # BLD AUTO: 3.67 10E6/UL (ref 3.8–5.2)
SA 1 CELL: NORMAL
SA 1 TEST METHOD: NORMAL
SA 2 CELL: NORMAL
SA 2 TEST METHOD: NORMAL
SA1 HI RISK ABY: NORMAL
SA1 MOD RISK ABY: NORMAL
SA2 HI RISK ABY: NORMAL
SA2 MOD RISK ABY: NORMAL
SODIUM SERPL-SCNC: 137 MMOL/L (ref 133–144)
TACROLIMUS BLD-MCNC: 20.7 UG/L (ref 5–15)
TME LAST DOSE: ABNORMAL H
TME LAST DOSE: ABNORMAL H
UNACCEPTABLE ANTIGENS: NORMAL
UNOS CPRA: 0
WBC # BLD AUTO: 4.9 10E3/UL (ref 4–11)
ZZZINT SUB COMMENTS: NORMAL
ZZZSA 1  COMMENTS: NORMAL
ZZZSA 2 COMMENTS: NORMAL

## 2022-11-28 PROCEDURE — 83690 ASSAY OF LIPASE: CPT

## 2022-11-28 PROCEDURE — 80197 ASSAY OF TACROLIMUS: CPT

## 2022-11-28 PROCEDURE — 87506 IADNA-DNA/RNA PROBE TQ 6-11: CPT

## 2022-11-28 PROCEDURE — 36415 COLL VENOUS BLD VENIPUNCTURE: CPT

## 2022-11-28 PROCEDURE — 85027 COMPLETE CBC AUTOMATED: CPT

## 2022-11-28 PROCEDURE — 82150 ASSAY OF AMYLASE: CPT

## 2022-11-28 PROCEDURE — 80048 BASIC METABOLIC PNL TOTAL CA: CPT

## 2022-11-29 ENCOUNTER — TELEPHONE (OUTPATIENT)
Dept: TRANSPLANT | Facility: CLINIC | Age: 52
End: 2022-11-29

## 2022-11-29 DIAGNOSIS — Z94.0 KIDNEY REPLACED BY TRANSPLANT: ICD-10-CM

## 2022-11-29 DIAGNOSIS — Z94.83 PANCREAS REPLACED BY TRANSPLANT (H): ICD-10-CM

## 2022-11-29 LAB
C COLI+JEJUNI+LARI FUSA STL QL NAA+PROBE: NOT DETECTED
CMV DNA SPEC NAA+PROBE-ACNC: NOT DETECTED IU/ML
EC STX1 GENE STL QL NAA+PROBE: NOT DETECTED
EC STX2 GENE STL QL NAA+PROBE: NOT DETECTED
NOROV GI+II ORF1-ORF2 JNC STL QL NAA+PR: NOT DETECTED
RVA NSP5 STL QL NAA+PROBE: NOT DETECTED
SALMONELLA SP RPOD STL QL NAA+PROBE: NOT DETECTED
SHIGELLA SP+EIEC IPAH STL QL NAA+PROBE: NOT DETECTED
V CHOL+PARA RFBL+TRKH+TNAA STL QL NAA+PR: NOT DETECTED
Y ENTERO RECN STL QL NAA+PROBE: NOT DETECTED

## 2022-11-29 RX ORDER — TACROLIMUS 1 MG/1
1 CAPSULE ORAL EVERY MORNING
Qty: 90 CAPSULE | Refills: 3 | Status: SHIPPED | OUTPATIENT
Start: 2022-11-29 | End: 2022-12-09

## 2022-11-29 RX ORDER — TACROLIMUS 0.5 MG/1
0.5 CAPSULE ORAL EVERY EVENING
Qty: 90 CAPSULE | Refills: 3 | Status: SHIPPED | OUTPATIENT
Start: 2022-11-29 | End: 2022-12-09 | Stop reason: ALTCHOICE

## 2022-11-29 NOTE — TELEPHONE ENCOUNTER
ISSUE:   Tacrolimus IR level 20.7 on 11/28, goal 8-10, dose 1 mg BID.    PLAN:   Please call patient and confirm this was an accurate 12-hour trough. Verify Tacrolimus IR dose 1 mg BID. Confirm no new medications or illness. Confirm no missed doses. If accurate trough and accurate dose, decrease Tacrolimus IR dose to 1/0.5 mg BID and repeat labs in 2 days    OUTCOME:   Spoke with patient, they confirm accurate trough level and current dose 1 mg BID. Patient confirmed dose change to 1/0.5 mg BID and to repeat labs in 2 days. Orders sent to preferred pharmacy for dose change and lab for repeat labs. Patient voiced understanding of plan.     Diarrhea is sporadic, says she had loose stools last evening but nothing over the weekend. Enteric panel and CMV pending.

## 2022-12-02 ENCOUNTER — TELEPHONE (OUTPATIENT)
Dept: PHARMACY | Facility: CLINIC | Age: 52
End: 2022-12-02

## 2022-12-02 NOTE — TELEPHONE ENCOUNTER
Clinical Pharmacy Consult:                                                      Transplant Specific: 2 month post transplant medication review  Date of Transplant: 09/23/2022  Type of Transplant: kidney and pancreas  First Transplant: yes  History of rejection: no    Immunosuppression Regimen   TAC 1mg qAM & 0.5mg qPM and Myforitic 720mg qAM & 720mg qPM  Patient specific goal: 8-10  Most recent level: 20.7, date 11/28/22  Immunosuppressant Levels:  Supratherapeutic  Dose decreased  Pt adherent to lab draws: yes  Scr:   Lab Results   Component Value Date    CR 2.14 11/02/2022    CR 3.56 08/13/2020     Side effects: diarhea    Prophylactic Medications  Antibacterial:  Bactrim ss 3 times a week  Scheduled Discontinue Date: Lifelong    Antifungal: Clotrimazole   Scheduled Discontinue Date: 3 months    Antiviral: CrCl 25 to 39 mL/minute: Valcyte 450 mg every other day   Scheduled Discontinue Date: 3 months    Acid Reducer: not ob  Scheduled Reviewed Date: N/A    Thrombosis Prevention: Aspirin 325 mg PO daily  Scheduled Discontinue Date: managed by clinic    Blood Pressure Management  Frequency of home Blood Pressure checks: not taking encouraged her to take at least a few times a week  Most recent home BP:  Did not know last in clinic 159/76  Patient Blood pressure goal: <140/90  Patient blood pressure at goal:  No    Hospitalizations/ER visits since last assessment: 0    Med rec/DUR performed: yes  Med Rec Discrepancies: no    Medication adherence flowsheet 12/2/2022   Patient medication administration: Responsible for own medications   Patient estimated adherence level: %   Pharmacist assessment of adherence: Good   Patient reported doses missed per week: 0-1   Pharmacy MPR: -   Facilitators to medication adherence  Cell phone;Medication dosing chart;Pill box;Schedule/routine   Patient reported barriers to medication adherence  -   Adherence intervention(s): -      Medication access flowsheet 12/2/2022   Number  of pharmacies used: 2   Pharmacy: [facility];Other   Other pharmacy: local retail   Enrolled in [facility] pharmacy? Yes   Patient reported barriers to accessing medications: -   Medication access interventions: -        Eden reports feeling pretty well.  She has diarrhea not all of the time.  Was treated with vanco which helped a little.  Discussed the new magnesium and fiber and probiotics.    She uses a med list, pill box and alarms on phone to manage her meds.  Reports no missed doses.  Last tacro level was very high. Dose decreased.    Not taking blood pressure.  Last in clinic was high.  not taking encouraged her to take at least a few times a week    follow up in 1 month    Nathan Leos [facility]

## 2022-12-05 ENCOUNTER — LAB (OUTPATIENT)
Dept: LAB | Facility: CLINIC | Age: 52
End: 2022-12-05
Payer: COMMERCIAL

## 2022-12-05 ENCOUNTER — OFFICE VISIT (OUTPATIENT)
Dept: NEPHROLOGY | Facility: CLINIC | Age: 52
End: 2022-12-05
Attending: INTERNAL MEDICINE
Payer: COMMERCIAL

## 2022-12-05 VITALS — HEART RATE: 65 BPM | OXYGEN SATURATION: 99 % | DIASTOLIC BLOOD PRESSURE: 65 MMHG | SYSTOLIC BLOOD PRESSURE: 122 MMHG

## 2022-12-05 DIAGNOSIS — Z94.83 PANCREAS REPLACED BY TRANSPLANT (H): ICD-10-CM

## 2022-12-05 DIAGNOSIS — D84.9 IMMUNOSUPPRESSION (H): Primary | ICD-10-CM

## 2022-12-05 DIAGNOSIS — Z94.0 KIDNEY REPLACED BY TRANSPLANT: ICD-10-CM

## 2022-12-05 DIAGNOSIS — Z48.298 AFTERCARE FOLLOWING ORGAN TRANSPLANT: ICD-10-CM

## 2022-12-05 DIAGNOSIS — Z94.0 HTN, KIDNEY TRANSPLANT RELATED: ICD-10-CM

## 2022-12-05 DIAGNOSIS — I15.1 HTN, KIDNEY TRANSPLANT RELATED: ICD-10-CM

## 2022-12-05 DIAGNOSIS — Z79.899 ENCOUNTER FOR LONG-TERM CURRENT USE OF MEDICATION: ICD-10-CM

## 2022-12-05 LAB
ALBUMIN MFR UR ELPH: 17.7 MG/DL
AMYLASE SERPL-CCNC: 62 U/L (ref 28–100)
ANION GAP SERPL CALCULATED.3IONS-SCNC: 8 MMOL/L (ref 7–15)
BUN SERPL-MCNC: 25.5 MG/DL (ref 6–20)
CALCIUM SERPL-MCNC: 9.9 MG/DL (ref 8.6–10)
CHLORIDE SERPL-SCNC: 102 MMOL/L (ref 98–107)
CREAT SERPL-MCNC: 1.64 MG/DL (ref 0.51–0.95)
CREAT UR-MCNC: 191 MG/DL
DEPRECATED HCO3 PLAS-SCNC: 28 MMOL/L (ref 22–29)
ERYTHROCYTE [DISTWIDTH] IN BLOOD BY AUTOMATED COUNT: 13.8 % (ref 10–15)
GFR SERPL CREATININE-BSD FRML MDRD: 37 ML/MIN/1.73M2
GLUCOSE SERPL-MCNC: 122 MG/DL (ref 70–99)
HCT VFR BLD AUTO: 35.7 % (ref 35–47)
HGB BLD-MCNC: 11.4 G/DL (ref 11.7–15.7)
LIPASE SERPL-CCNC: 47 U/L (ref 13–60)
MAGNESIUM SERPL-MCNC: 1.8 MG/DL (ref 1.7–2.3)
MCH RBC QN AUTO: 31.1 PG (ref 26.5–33)
MCHC RBC AUTO-ENTMCNC: 31.9 G/DL (ref 31.5–36.5)
MCV RBC AUTO: 98 FL (ref 78–100)
MYCOPHENOLATE SERPL LC/MS/MS-MCNC: 1.61 MG/L (ref 1–3.5)
MYCOPHENOLATE-G SERPL LC/MS/MS-MCNC: 37.7 MG/L (ref 30–95)
PHOSPHATE SERPL-MCNC: 3 MG/DL (ref 2.5–4.5)
PLATELET # BLD AUTO: 266 10E3/UL (ref 150–450)
POTASSIUM SERPL-SCNC: 4.3 MMOL/L (ref 3.4–5.3)
PROT/CREAT 24H UR: 0.09 MG/MG CR (ref 0–0.2)
RBC # BLD AUTO: 3.66 10E6/UL (ref 3.8–5.2)
SODIUM SERPL-SCNC: 138 MMOL/L (ref 136–145)
TACROLIMUS BLD-MCNC: 9.6 UG/L (ref 5–15)
TME LAST DOSE: NORMAL H
WBC # BLD AUTO: 5.1 10E3/UL (ref 4–11)

## 2022-12-05 PROCEDURE — 83735 ASSAY OF MAGNESIUM: CPT | Performed by: PATHOLOGY

## 2022-12-05 PROCEDURE — 36415 COLL VENOUS BLD VENIPUNCTURE: CPT | Performed by: PATHOLOGY

## 2022-12-05 PROCEDURE — 85027 COMPLETE CBC AUTOMATED: CPT | Performed by: PATHOLOGY

## 2022-12-05 PROCEDURE — 84100 ASSAY OF PHOSPHORUS: CPT | Performed by: PATHOLOGY

## 2022-12-05 PROCEDURE — 84156 ASSAY OF PROTEIN URINE: CPT | Performed by: PATHOLOGY

## 2022-12-05 PROCEDURE — 82150 ASSAY OF AMYLASE: CPT | Performed by: PATHOLOGY

## 2022-12-05 PROCEDURE — 87799 DETECT AGENT NOS DNA QUANT: CPT | Performed by: INTERNAL MEDICINE

## 2022-12-05 PROCEDURE — 80048 BASIC METABOLIC PNL TOTAL CA: CPT | Performed by: PATHOLOGY

## 2022-12-05 PROCEDURE — 80197 ASSAY OF TACROLIMUS: CPT | Performed by: INTERNAL MEDICINE

## 2022-12-05 PROCEDURE — 83690 ASSAY OF LIPASE: CPT | Performed by: PATHOLOGY

## 2022-12-05 PROCEDURE — 99215 OFFICE O/P EST HI 40 MIN: CPT | Mod: 24 | Performed by: INTERNAL MEDICINE

## 2022-12-05 PROCEDURE — 80180 DRUG SCRN QUAN MYCOPHENOLATE: CPT | Performed by: INTERNAL MEDICINE

## 2022-12-05 NOTE — PATIENT INSTRUCTIONS
Patient Recommendations:    Stop furosemide , use only as needed, contact office if increased swelling or weight gain>3 lbs in 2 days      Use fibers daily    Monitor home BP readings daily, contact office if BP<100    You can schedule COVID/Flu shots last week of Dec    If your kidney function improves, we may need to increase valcyte dosing    Transplant Patient Information  Your Post Transplant Coordinator is: Giovanna Kovacs  You and your care team can contact your transplant coordinator Monday - Friday, 8am - 5pm at [phone] (Option 2 to reach the coordinator or Option 4 to schedule an appointment).  You can also reach your care team online via [employer].  After hours for urgent matters, please call [facility] at [phone].

## 2022-12-05 NOTE — LETTER
12/5/2022       RE: Eden Hess  [address]  [place] [address]     Dear Colleague,    Thank you for referring your patient, Eden Hess, to the [facility] [place] at [facility]. Please see a copy of my visit note below.    TRANSPLANT NEPHROLOGY EARLY POST TRANSPLANT VISIT    Assessment & Plan   # DDKT (SPK): downtrend Cr down to 1.6 today, FK pending-last trough supraTx   - Baseline Creatinine: ~ TBD.    - Proteinuria: Minimal (0.2-0.5 grams)   - Date DSA Last Checked: Oct/2022      Latest DSA: No   - BK Viremia: No   - Kidney Tx Biopsy: No   - Transplant Ureteral Stent: No     # Pancreas Tx (SPK):    - Pancreatic Exocrine Drainage: Enteric drained     - Blood glucose: Near euglycemia      On insulin: No   - HbA1c: Stable      Latest HbA1c: 6%   - Pancreatic enzymes: Trend down   - Date DSA Last Checked: Oct/2022  Latest DSA: No   - Pancreas Tx Biopsy: No    # Immunosuppression: Tacrolimus immediate release (goal 8-10) and Mycophenolic acid (dose 720 mg every 12 hours)   - Induction with Recent Transplant:  Intermediate Intensity   - Continue with intensive monitoring of immunosuppression for efficacy and toxicity.   - Changes: yes awaiting FK trough, recent dose reduction, remains on mycelex now tid, will be off end of Dec so would be cautious with tac dose reduction    # Infection Prophylaxis:   - PJP: Sulfa/TMP (Bactrim)  MWF  - CMV: Valganciclovir (Valcyte) y3d--nl eCrCl>60 ml/min will increase valcyte dosing to 900 mg po every day then stop at 3 m post Tx  - Thrush: Clotrimazole gene (Mycelex) tid x3m     Estimated Creatinine Clearance: 35.7 mL/min (A) (based on SCr of 1.64 mg/dL (H)).        # Hypertension: good control Goal BP: < 140/90   - Volume status:euvoelmic    - changes: dizziness episode, home BP-120s/80, stop lasix    # Anemia in Chronic Renal Disease: Hgb: Stable      SHANEKA: No   - Iron studies:  Replete    # Mineral Bone Disorder:   - Secondary renal hyperparathyroidism; PTH level: Mildly elevated (151-300 pg/ml)        On treatment: None  - Vitamin D; level: High normal        On supplement: Yes  - Calcium; level: Normal        On supplement: Yes  - Phosphorus; level: Normal        On supplement: No    # Electrolytes:   - Potassium; level: Normal        On supplement: No  - Magnesium; level: Normal        On supplement: No  - Bicarbonate; level: Normal        On supplement: No      # hx of C.diff colitis: neg on last check   -Diagnosed 10/7/22   -completed prolonged PO vanc taper due to relapse after completion of 14d course of PO vanc    -loose stools only intermittent. Use fibers daily      # GERD: Occasional symptoms on PPI.     # H/o TIA (2017): No residual deficients     # Medical Compliance: Yes    # COVID-19 Virus Review: Discussed COVID-19 virus and the potential medical risks.  Reviewed preventative health recommendations, including wearing a mask where appropriate.  Recommended COVID vaccination should be up to date with either an initial vaccination or booster shot when appropriate.  Asked the patient to inform the transplant center if they are exposed or diagnosed with this virus.    # COVID Vaccination Up To Date: No, due for next dose at 3m post txp 12/23, reminded, received evusheld      # Transplant History:  Etiology of Kidney Failure: Diabetes mellitus type 1  Tx: SPK  Transplant: 9/23/2022 (Kidney / Pancreas)  Donor Type: Donation after Brain Death Donor Class: Standard Criteria Donor  Crossmatch at time of Tx: negative  DSA at time of Tx: No  Significant changes in immunosuppression: None  CMV IgG Ab High Risk Discordance (D+/R-): No  EBV IgG Ab High Risk Discordance (D+/R-): No  Significant transplant-related complications: None    Transplant Office Phone Number: [phone]    Assessment and plan was discussed with the patient and she voiced her understanding and agreement.    Return  visit: 2 months for 4 month visit  Leida Fernandez MD    Chief Complaint   Ms. Hess is a 52 year old here for kidney transplant, pancreas transplant, immunosuppression management and hospital follow up after kidney/pancreas transplant.     History of Present Illness    Feels good overall  On/off watery  recent stool studies, twice daily , occasionally watery, takes fibers not consistently  Drinks about 2 liters/d  Energy level is good, no fevers, chills  No cough, sob, leg swelling  No graft pain, or urinary symptoms  Denies any Tremors/headache  1 episode of dizziness while walking, but BP not checked, remains on lasix 40 mg po every day, coreg 12.5 mg po bid    Current IS: FK 1/0.5, /720  Home BP: 120s/80s    Problem List   Patient Active Problem List   Diagnosis     Major depression in complete remission (H)     Diabetes mellitus type 1 (H)     Anemia in stage 3b chronic kidney disease (H)     TIA (transient ischemic attack)     HTN, kidney transplant related     Anxiety and depression     (HFpEF) heart failure with preserved ejection fraction (H)     Pancreas replaced by transplant (H)     Kidney replaced by transplant     Immunosuppression (H)     Aftercare following organ transplant       Allergies   Allergies   Allergen Reactions     Amlodipine      Other reaction(s): Edema,generalized       Medications   Current Outpatient Medications   Medication Sig      magnesium glycinate lysinate chelate (DOCTOR'S BEST) 100 MG TABS tablet Take 1 tablet (100 mg) by mouth 2 times daily     acetaminophen (TYLENOL) 325 MG tablet Take 2 tablets (650 mg) by mouth every 4 hours as needed for pain     aspirin (ASA) 325 MG EC tablet Take 1 tablet (325 mg) by mouth daily     atorvastatin (LIPITOR) 20 MG tablet Take 1 tablet (20 mg) by mouth every evening     calcium carbonate-vitamin D (OS-BARBARA WITH D) 500-200 MG-UNIT tablet Take 1 tablet by mouth 2 times daily (with meals)     carvedilol (COREG) 12.5 MG tablet Take 1  tablet (12.5 mg) by mouth 2 times daily (with meals)     clotrimazole (MYCELEX) 10 MG lozenge Place 1 lozenge (10 mg) inside cheek 4 times daily for 77 days     furosemide (LASIX) 40 MG tablet Take 1 tablet (40 mg) by mouth daily     magnesium oxide (MAG-OX) 400 MG tablet Take 1 tablet (400 mg) by mouth 2 times daily     mycophenolic acid (GENERIC EQUIVALENT) 180 MG EC tablet Take 4 tablets (720 mg) by mouth 2 times daily Take in place of mycophenolate.     psyllium (METAMUCIL/KONSYL) 58.6 % powder Take 18 g (1 Tablespoonful) by mouth 2 times daily as needed for constipation (loose stools)     sulfamethoxazole-trimethoprim (BACTRIM) 400-80 MG tablet Take 1 tablet by mouth Every Mon, Wed, Fri Morning     tacrolimus (GENERIC EQUIVALENT) 0.5 MG capsule Take 1 capsule (0.5 mg) by mouth every evening     tacrolimus (GENERIC EQUIVALENT) 1 MG capsule Take 1 capsule (1 mg) by mouth every morning     valGANciclovir (VALCYTE) 450 MG tablet Take 1 tablet (450 mg) by mouth every other day     venlafaxine (EFFEXOR XR) 150 MG 24 hr capsule Take 1 capsule (150 mg) by mouth daily     Wheat Dextrin (BENEFIBER DRINK MIX PO) Take 1 teaspoonful by mouth 2 times daily     No current facility-administered medications for this visit.     There are no discontinued medications.    Physical Exam   Vital Signs: There were no vitals taken for this visit.    GENERAL APPEARANCE: alert and no distress, some periorbital edema  HENT: mouth without ulcers or lesions  LYMPHATICS: no cervical or supraclavicular nodes  RESP: lungs clear to auscultation - no rales, rhonchi or wheezes  CV: regular rhythm, normal rate, no rub, no murmur  EDEMA: no LE edema bilaterally  ABDOMEN: soft, nondistended, nontender, bowel sounds normal  MS: extremities normal - no gross deformities noted, no evidence of inflammation in joints, no muscle tenderness  SKIN: no rash  NEURO: normal strength and tone, sensory exam grossly normal, mentation intact and speech  normal  PSYCH: mentation appears normal and affect normal/bright  TX KIDNEY: normal  DIALYSIS ACCESS:  RUE AV fistula with good thrill and bruit    Data     Renal Latest Ref Rng & Units 12/5/2022 11/28/2022 11/25/2022   Na 136 - 145 mmol/L 138 137 138   K 3.4 - 5.3 mmol/L 4.3 5.6(H) 5.2   Cl 98 - 107 mmol/L 102 106 106   CO2 22 - 29 mmol/L 28 29 29   BUN 6.0 - 20.0 mg/dL 25.5(H) 41(H) 37(H)   Cr 0.51 - 0.95 mg/dL 1.64(H) 1.82(H) 1.88(H)   Glucose 70 - 99 mg/dL 122(H) 100(H) 103(H)   Ca  8.6 - 10.0 mg/dL 9.9 9.9 9.9   Mg 1.7 - 2.3 mg/dL 1.8 - 2.1     Bone Health Latest Ref Rng & Units 12/5/2022 11/25/2022 11/21/2022   Phos 2.5 - 4.5 mg/dL 3.0 3.8 3.9   PTHi 15 - 65 pg/mL - - -   Vit D Def 20 - 75 ug/L - - -     Heme Latest Ref Rng & Units 12/5/2022 11/28/2022 11/25/2022   WBC 4.0 - 11.0 10e3/uL 5.1 4.9 5.1   Hgb 11.7 - 15.7 g/dL 11.4(L) 11.4(L) 11.7   Plt 150 - 450 10e3/uL 266 325 339   ABSOLUTE NEUTROPHIL 1.6 - 8.3 10e3/uL - - -   ABSOLUTE LYMPHOCYTES 0.8 - 5.3 10e3/uL - - -   ABSOLUTE MONOCYTES 0.0 - 1.3 10e3/uL - - -   ABSOLUTE EOSINOPHILS 0.0 - 0.7 10e3/uL - - -   ABSOLUTE BASOPHILS 0.0 - 0.2 10e9/L - - -   ABS IMMATURE GRANULOCYTES 0 - 0.4 10e9/L - - -   ABSOLUTE NUCLEATED RBC - - - -     Liver Latest Ref Rng & Units 10/13/2022 9/22/2022 8/13/2020   AP 40 - 150 U/L 161(H) 89 143   TBili 0.2 - 1.3 mg/dL 0.3 0.3 0.5   DBili 0.0 - 0.2 mg/dL <0.1 - -   ALT 0 - 50 U/L 40 18 31   AST 0 - 45 U/L 21 29 28   Tot Protein 6.8 - 8.8 g/dL 6.6(L) 6.9 7.0   Albumin 3.4 - 5.0 g/dL 3.3(L) 4.1 3.0(L)     Pancreas Latest Ref Rng & Units 12/5/2022 11/28/2022 11/25/2022   A1C 0.0 - 5.6 % - - -   Amylase 28 - 100 U/L 62 56 88   Lipase 73 - 393 U/L - 189 389   Lipase (Roche) 13 - 60 U/L 47 - -     Iron studies Latest Ref Rng & Units 10/3/2022   Iron 37 - 145 ug/dL 34(L)   Iron sat 15 - 46 % 17   Ferritin 11 - 328 ng/mL 923(H)     [facility] Txp Virology Latest Ref Rng & Units 11/28/2022 9/22/2022 8/13/2020   CMV QUANT IU/ML Not Detected  IU/mL Not Detected Not Detected -   EBV CAPSID ANTIBODY IGG No detectable antibody. - Positive(A) >8.0(H)   Hep B Core NR:Nonreactive - - Nonreactive        Recent Labs   Lab Test 11/14/22  0830 11/21/22  0856 11/25/22  0934 11/28/22  0830   DOSTAC 11/13/2022 11/20/2022 11/24/2022  --    TACROL 12.9 8.7 14.7 20.7*     Recent Labs   Lab Test 11/04/22  0720 11/14/22  0830 11/21/22  0856   DOSMPA 11/3/2022  11:15 PM  --  11/20/2022  11:45 AM   MPACID <0.25* 1.78 0.62*   MPAG 39.0 125.2* 48.2       Again, thank you for allowing me to participate in the care of your patient.      Sincerely,    Leida Fernandez MD

## 2022-12-05 NOTE — PROGRESS NOTES
TRANSPLANT NEPHROLOGY EARLY POST TRANSPLANT VISIT    Assessment & Plan   # DDKT (SPK): downtrend Cr down to 1.6 today, FK pending-last trough supraTx   - Baseline Creatinine: ~ TBD.    - Proteinuria: Minimal (0.2-0.5 grams)   - Date DSA Last Checked: Oct/2022      Latest DSA: No   - BK Viremia: No   - Kidney Tx Biopsy: No   - Transplant Ureteral Stent: No     # Pancreas Tx (SPK):    - Pancreatic Exocrine Drainage: Enteric drained     - Blood glucose: Near euglycemia      On insulin: No   - HbA1c: Stable      Latest HbA1c: 6%   - Pancreatic enzymes: Trend down   - Date DSA Last Checked: Oct/2022  Latest DSA: No   - Pancreas Tx Biopsy: No    # Immunosuppression: Tacrolimus immediate release (goal 8-10) and Mycophenolic acid (dose 720 mg every 12 hours)   - Induction with Recent Transplant:  Intermediate Intensity   - Continue with intensive monitoring of immunosuppression for efficacy and toxicity.   - Changes: yes awaiting FK trough, recent dose reduction, remains on mycelex now tid, will be off end of Dec so would be cautious with tac dose reduction    # Infection Prophylaxis:   - PJP: Sulfa/TMP (Bactrim)  MWF  - CMV: Valganciclovir (Valcyte) i2c--rl eCrCl>60 ml/min will increase valcyte dosing to 900 mg po every day then stop at 3 m post Tx  - Thrush: Clotrimazole gene (Mycelex) tid x3m     Estimated Creatinine Clearance: 35.7 mL/min (A) (based on SCr of 1.64 mg/dL (H)).        # Hypertension: good control Goal BP: < 140/90   - Volume status:euvoelmic    - changes: dizziness episode, home BP-120s/80, stop lasix    # Anemia in Chronic Renal Disease: Hgb: Stable      SHANEKA: No   - Iron studies: Replete    # Mineral Bone Disorder:   - Secondary renal hyperparathyroidism; PTH level: Mildly elevated (151-300 pg/ml)        On treatment: None  - Vitamin D; level: High normal        On supplement: Yes  - Calcium; level: Normal        On supplement: Yes  - Phosphorus; level: Normal        On supplement: No    #  Electrolytes:   - Potassium; level: Normal        On supplement: No  - Magnesium; level: Normal        On supplement: No  - Bicarbonate; level: Normal        On supplement: No      # hx of C.diff colitis: neg on last check   -Diagnosed 10/7/22   -completed prolonged PO vanc taper due to relapse after completion of 14d course of PO vanc    -loose stools only intermittent. Use fibers daily      # GERD: Occasional symptoms on PPI.     # H/o TIA (2017): No residual deficients     # Medical Compliance: Yes    # COVID-19 Virus Review: Discussed COVID-19 virus and the potential medical risks.  Reviewed preventative health recommendations, including wearing a mask where appropriate.  Recommended COVID vaccination should be up to date with either an initial vaccination or booster shot when appropriate.  Asked the patient to inform the transplant center if they are exposed or diagnosed with this virus.    # COVID Vaccination Up To Date: No, due for next dose at 3m post txp 12/23, reminded, received evusheld      # Transplant History:  Etiology of Kidney Failure: Diabetes mellitus type 1  Tx: SPK  Transplant: 9/23/2022 (Kidney / Pancreas)  Donor Type: Donation after Brain Death Donor Class: Standard Criteria Donor  Crossmatch at time of Tx: negative  DSA at time of Tx: No  Significant changes in immunosuppression: None  CMV IgG Ab High Risk Discordance (D+/R-): No  EBV IgG Ab High Risk Discordance (D+/R-): No  Significant transplant-related complications: None    Transplant Office Phone Number: [phone]    Assessment and plan was discussed with the patient and she voiced her understanding and agreement.    Return visit: 2 months for 4 month visit  Leida Fernandez MD    Chief Complaint   Ms. Hess is a 52 year old here for kidney transplant, pancreas transplant, immunosuppression management and hospital follow up after kidney/pancreas transplant.     History of Present Illness    Feels good overall  On/off watery  recent  stool studies, twice daily , occasionally watery, takes fibers not consistently  Drinks about 2 liters/d  Energy level is good, no fevers, chills  No cough, sob, leg swelling  No graft pain, or urinary symptoms  Denies any Tremors/headache  1 episode of dizziness while walking, but BP not checked, remains on lasix 40 mg po every day, coreg 12.5 mg po bid    Current IS: FK 1/0.5, /720  Home BP: 120s/80s    Problem List   Patient Active Problem List   Diagnosis     Major depression in complete remission (H)     Diabetes mellitus type 1 (H)     Anemia in stage 3b chronic kidney disease (H)     TIA (transient ischemic attack)     HTN, kidney transplant related     Anxiety and depression     (HFpEF) heart failure with preserved ejection fraction (H)     Pancreas replaced by transplant (H)     Kidney replaced by transplant     Immunosuppression (H)     Aftercare following organ transplant       Allergies   Allergies   Allergen Reactions     Amlodipine      Other reaction(s): Edema,generalized       Medications   Current Outpatient Medications   Medication Sig      magnesium glycinate lysinate chelate (DOCTOR'S BEST) 100 MG TABS tablet Take 1 tablet (100 mg) by mouth 2 times daily     acetaminophen (TYLENOL) 325 MG tablet Take 2 tablets (650 mg) by mouth every 4 hours as needed for pain     aspirin (ASA) 325 MG EC tablet Take 1 tablet (325 mg) by mouth daily     atorvastatin (LIPITOR) 20 MG tablet Take 1 tablet (20 mg) by mouth every evening     calcium carbonate-vitamin D (OS-BARBARA WITH D) 500-200 MG-UNIT tablet Take 1 tablet by mouth 2 times daily (with meals)     carvedilol (COREG) 12.5 MG tablet Take 1 tablet (12.5 mg) by mouth 2 times daily (with meals)     clotrimazole (MYCELEX) 10 MG lozenge Place 1 lozenge (10 mg) inside cheek 4 times daily for 77 days     furosemide (LASIX) 40 MG tablet Take 1 tablet (40 mg) by mouth daily     magnesium oxide (MAG-OX) 400 MG tablet Take 1 tablet (400 mg) by mouth 2 times  daily     mycophenolic acid (GENERIC EQUIVALENT) 180 MG EC tablet Take 4 tablets (720 mg) by mouth 2 times daily Take in place of mycophenolate.     psyllium (METAMUCIL/KONSYL) 58.6 % powder Take 18 g (1 Tablespoonful) by mouth 2 times daily as needed for constipation (loose stools)     sulfamethoxazole-trimethoprim (BACTRIM) 400-80 MG tablet Take 1 tablet by mouth Every Mon, Wed, Fri Morning     tacrolimus (GENERIC EQUIVALENT) 0.5 MG capsule Take 1 capsule (0.5 mg) by mouth every evening     tacrolimus (GENERIC EQUIVALENT) 1 MG capsule Take 1 capsule (1 mg) by mouth every morning     valGANciclovir (VALCYTE) 450 MG tablet Take 1 tablet (450 mg) by mouth every other day     venlafaxine (EFFEXOR XR) 150 MG 24 hr capsule Take 1 capsule (150 mg) by mouth daily     Wheat Dextrin (BENEFIBER DRINK MIX PO) Take 1 teaspoonful by mouth 2 times daily     No current facility-administered medications for this visit.     There are no discontinued medications.    Physical Exam   Vital Signs: There were no vitals taken for this visit.    GENERAL APPEARANCE: alert and no distress, some periorbital edema  HENT: mouth without ulcers or lesions  LYMPHATICS: no cervical or supraclavicular nodes  RESP: lungs clear to auscultation - no rales, rhonchi or wheezes  CV: regular rhythm, normal rate, no rub, no murmur  EDEMA: no LE edema bilaterally  ABDOMEN: soft, nondistended, nontender, bowel sounds normal  MS: extremities normal - no gross deformities noted, no evidence of inflammation in joints, no muscle tenderness  SKIN: no rash  NEURO: normal strength and tone, sensory exam grossly normal, mentation intact and speech normal  PSYCH: mentation appears normal and affect normal/bright  TX KIDNEY: normal  DIALYSIS ACCESS:  RUE AV fistula with good thrill and bruit    Data     Renal Latest Ref Rng & Units 12/5/2022 11/28/2022 11/25/2022   Na 136 - 145 mmol/L 138 137 138   K 3.4 - 5.3 mmol/L 4.3 5.6(H) 5.2   Cl 98 - 107 mmol/L 102 106 106    CO2 22 - 29 mmol/L 28 29 29   BUN 6.0 - 20.0 mg/dL 25.5(H) 41(H) 37(H)   Cr 0.51 - 0.95 mg/dL 1.64(H) 1.82(H) 1.88(H)   Glucose 70 - 99 mg/dL 122(H) 100(H) 103(H)   Ca  8.6 - 10.0 mg/dL 9.9 9.9 9.9   Mg 1.7 - 2.3 mg/dL 1.8 - 2.1     Bone Health Latest Ref Rng & Units 12/5/2022 11/25/2022 11/21/2022   Phos 2.5 - 4.5 mg/dL 3.0 3.8 3.9   PTHi 15 - 65 pg/mL - - -   Vit D Def 20 - 75 ug/L - - -     Heme Latest Ref Rng & Units 12/5/2022 11/28/2022 11/25/2022   WBC 4.0 - 11.0 10e3/uL 5.1 4.9 5.1   Hgb 11.7 - 15.7 g/dL 11.4(L) 11.4(L) 11.7   Plt 150 - 450 10e3/uL 266 325 339   ABSOLUTE NEUTROPHIL 1.6 - 8.3 10e3/uL - - -   ABSOLUTE LYMPHOCYTES 0.8 - 5.3 10e3/uL - - -   ABSOLUTE MONOCYTES 0.0 - 1.3 10e3/uL - - -   ABSOLUTE EOSINOPHILS 0.0 - 0.7 10e3/uL - - -   ABSOLUTE BASOPHILS 0.0 - 0.2 10e9/L - - -   ABS IMMATURE GRANULOCYTES 0 - 0.4 10e9/L - - -   ABSOLUTE NUCLEATED RBC - - - -     Liver Latest Ref Rng & Units 10/13/2022 9/22/2022 8/13/2020   AP 40 - 150 U/L 161(H) 89 143   TBili 0.2 - 1.3 mg/dL 0.3 0.3 0.5   DBili 0.0 - 0.2 mg/dL <0.1 - -   ALT 0 - 50 U/L 40 18 31   AST 0 - 45 U/L 21 29 28   Tot Protein 6.8 - 8.8 g/dL 6.6(L) 6.9 7.0   Albumin 3.4 - 5.0 g/dL 3.3(L) 4.1 3.0(L)     Pancreas Latest Ref Rng & Units 12/5/2022 11/28/2022 11/25/2022   A1C 0.0 - 5.6 % - - -   Amylase 28 - 100 U/L 62 56 88   Lipase 73 - 393 U/L - 189 389   Lipase (Roche) 13 - 60 U/L 47 - -     Iron studies Latest Ref Rng & Units 10/3/2022   Iron 37 - 145 ug/dL 34(L)   Iron sat 15 - 46 % 17   Ferritin 11 - 328 ng/mL 923(H)     [facility] Txp Virology Latest Ref Rng & Units 11/28/2022 9/22/2022 8/13/2020   CMV QUANT IU/ML Not Detected IU/mL Not Detected Not Detected -   EBV CAPSID ANTIBODY IGG No detectable antibody. - Positive(A) >8.0(H)   Hep B Core NR:Nonreactive - - Nonreactive        Recent Labs   Lab Test 11/14/22  0830 11/21/22  0856 11/25/22  0934 11/28/22  0830   DOSTAC 11/13/2022 11/20/2022 11/24/2022  --    TACROL 12.9 8.7 14.7 20.7*      Recent Labs   Lab Test 11/04/22  0720 11/14/22  0830 11/21/22  0856   DOSMPA 11/3/2022  11:15 PM  --  11/20/2022  11:45 AM   MPACID <0.25* 1.78 0.62*   MPAG 39.0 125.2* 48.2

## 2022-12-06 LAB — BKV DNA # SPEC NAA+PROBE: NOT DETECTED COPIES/ML

## 2022-12-08 ENCOUNTER — LAB (OUTPATIENT)
Dept: LAB | Facility: CLINIC | Age: 52
End: 2022-12-08
Payer: COMMERCIAL

## 2022-12-08 ENCOUNTER — HOSPITAL ENCOUNTER (OUTPATIENT)
Facility: CLINIC | Age: 52
Discharge: HOME | End: 2022-12-08
Attending: INTERNAL MEDICINE | Admitting: FAMILY MEDICINE
Payer: COMMERCIAL

## 2022-12-08 DIAGNOSIS — Z79.899 ENCOUNTER FOR LONG-TERM CURRENT USE OF MEDICATION: ICD-10-CM

## 2022-12-08 DIAGNOSIS — Z94.0 KIDNEY REPLACED BY TRANSPLANT: ICD-10-CM

## 2022-12-08 DIAGNOSIS — Z48.298 AFTERCARE FOLLOWING ORGAN TRANSPLANT: ICD-10-CM

## 2022-12-08 LAB
AMYLASE SERPL-CCNC: 51 U/L (ref 30–110)
ANION GAP SERPL CALCULATED.3IONS-SCNC: 5 MMOL/L (ref 3–14)
BUN SERPL-MCNC: 30 MG/DL (ref 7–30)
CALCIUM SERPL-MCNC: 9.3 MG/DL (ref 8.5–10.1)
CHLORIDE BLD-SCNC: 111 MMOL/L (ref 94–109)
CO2 SERPL-SCNC: 24 MMOL/L (ref 20–32)
CREAT SERPL-MCNC: 1.36 MG/DL (ref 0.52–1.04)
ERYTHROCYTE [DISTWIDTH] IN BLOOD BY AUTOMATED COUNT: 14 % (ref 10–15)
GFR SERPL CREATININE-BSD FRML MDRD: 46 ML/MIN/1.73M2
GLUCOSE BLD-MCNC: 95 MG/DL (ref 70–99)
HCT VFR BLD AUTO: 34.3 % (ref 35–47)
HGB BLD-MCNC: 10.6 G/DL (ref 11.7–15.7)
LIPASE SERPL-CCNC: 171 U/L (ref 73–393)
MCH RBC QN AUTO: 30.8 PG (ref 26.5–33)
MCHC RBC AUTO-ENTMCNC: 30.9 G/DL (ref 31.5–36.5)
MCV RBC AUTO: 100 FL (ref 78–100)
PLATELET # BLD AUTO: 261 10E3/UL (ref 150–450)
POTASSIUM BLD-SCNC: 4.8 MMOL/L (ref 3.4–5.3)
RBC # BLD AUTO: 3.44 10E6/UL (ref 3.8–5.2)
SODIUM SERPL-SCNC: 140 MMOL/L (ref 133–144)
TACROLIMUS BLD-MCNC: 6.6 UG/L (ref 5–15)
TME LAST DOSE: NORMAL H
TME LAST DOSE: NORMAL H
WBC # BLD AUTO: 5.4 10E3/UL (ref 4–11)

## 2022-12-08 PROCEDURE — 83690 ASSAY OF LIPASE: CPT

## 2022-12-08 PROCEDURE — 82150 ASSAY OF AMYLASE: CPT

## 2022-12-08 PROCEDURE — 36415 COLL VENOUS BLD VENIPUNCTURE: CPT

## 2022-12-08 PROCEDURE — 85027 COMPLETE CBC AUTOMATED: CPT

## 2022-12-08 PROCEDURE — 80048 BASIC METABOLIC PNL TOTAL CA: CPT

## 2022-12-08 PROCEDURE — 80197 ASSAY OF TACROLIMUS: CPT

## 2022-12-09 ENCOUNTER — TELEPHONE (OUTPATIENT)
Dept: TRANSPLANT | Facility: CLINIC | Age: 52
End: 2022-12-09

## 2022-12-09 DIAGNOSIS — Z94.83 PANCREAS REPLACED BY TRANSPLANT (H): ICD-10-CM

## 2022-12-09 DIAGNOSIS — Z94.0 KIDNEY REPLACED BY TRANSPLANT: ICD-10-CM

## 2022-12-09 RX ORDER — TACROLIMUS 1 MG/1
1 CAPSULE ORAL 2 TIMES DAILY
Qty: 180 CAPSULE | Refills: 3 | Status: SHIPPED | OUTPATIENT
Start: 2022-12-09 | End: 2022-12-20

## 2022-12-09 NOTE — TELEPHONE ENCOUNTER
ISSUE:   Tacrolimus IR level 6.6 on 12/8, goal 8-10, dose 1/0.5 mg BID.    PLAN:   Please call patient and confirm this was an accurate 12-hour trough. Verify Tacrolimus IR dose 1/0.5 mg BID. Confirm no new medications or illness. Confirm no missed doses. If accurate trough and accurate dose, increase Tacrolimus IR dose to 1 mg BID and repeat labs in 1 weeks    OUTCOME:   Left detailed VM for patient requesting a return phone call confirming dose change.

## 2022-12-13 ENCOUNTER — LAB (OUTPATIENT)
Dept: LAB | Facility: CLINIC | Age: 52
End: 2022-12-13
Payer: COMMERCIAL

## 2022-12-13 DIAGNOSIS — Z79.899 ENCOUNTER FOR LONG-TERM CURRENT USE OF MEDICATION: ICD-10-CM

## 2022-12-13 DIAGNOSIS — Z48.298 AFTERCARE FOLLOWING ORGAN TRANSPLANT: ICD-10-CM

## 2022-12-13 DIAGNOSIS — Z94.0 KIDNEY REPLACED BY TRANSPLANT: ICD-10-CM

## 2022-12-13 LAB
ANION GAP SERPL CALCULATED.3IONS-SCNC: 10 MMOL/L (ref 7–15)
BUN SERPL-MCNC: 22.6 MG/DL (ref 6–20)
CALCIUM SERPL-MCNC: 9.5 MG/DL (ref 8.6–10)
CHLORIDE SERPL-SCNC: 102 MMOL/L (ref 98–107)
CREAT SERPL-MCNC: 1.37 MG/DL (ref 0.51–0.95)
DEPRECATED HCO3 PLAS-SCNC: 23 MMOL/L (ref 22–29)
ERYTHROCYTE [DISTWIDTH] IN BLOOD BY AUTOMATED COUNT: 14.2 % (ref 10–15)
GFR SERPL CREATININE-BSD FRML MDRD: 46 ML/MIN/1.73M2
GLUCOSE SERPL-MCNC: 92 MG/DL (ref 70–99)
HCT VFR BLD AUTO: 35.1 % (ref 35–47)
HGB BLD-MCNC: 10.9 G/DL (ref 11.7–15.7)
MCH RBC QN AUTO: 30.8 PG (ref 26.5–33)
MCHC RBC AUTO-ENTMCNC: 31.1 G/DL (ref 31.5–36.5)
MCV RBC AUTO: 99 FL (ref 78–100)
PLATELET # BLD AUTO: 265 10E3/UL (ref 150–450)
POTASSIUM SERPL-SCNC: 4.8 MMOL/L (ref 3.4–5.3)
RBC # BLD AUTO: 3.54 10E6/UL (ref 3.8–5.2)
SODIUM SERPL-SCNC: 135 MMOL/L (ref 136–145)
TACROLIMUS BLD-MCNC: 7.9 UG/L (ref 5–15)
TME LAST DOSE: NORMAL H
TME LAST DOSE: NORMAL H
WBC # BLD AUTO: 4.5 10E3/UL (ref 4–11)

## 2022-12-13 PROCEDURE — 85027 COMPLETE CBC AUTOMATED: CPT

## 2022-12-13 PROCEDURE — 80197 ASSAY OF TACROLIMUS: CPT

## 2022-12-13 PROCEDURE — 87799 DETECT AGENT NOS DNA QUANT: CPT

## 2022-12-13 PROCEDURE — 36415 COLL VENOUS BLD VENIPUNCTURE: CPT

## 2022-12-13 PROCEDURE — 80048 BASIC METABOLIC PNL TOTAL CA: CPT

## 2022-12-14 LAB — BKV DNA # SPEC NAA+PROBE: NOT DETECTED COPIES/ML

## 2022-12-16 ENCOUNTER — LAB (OUTPATIENT)
Dept: LAB | Facility: CLINIC | Age: 52
End: 2022-12-16
Payer: COMMERCIAL

## 2022-12-16 DIAGNOSIS — Z94.0 KIDNEY REPLACED BY TRANSPLANT: ICD-10-CM

## 2022-12-16 DIAGNOSIS — Z79.899 ENCOUNTER FOR LONG-TERM CURRENT USE OF MEDICATION: ICD-10-CM

## 2022-12-16 DIAGNOSIS — Z48.298 AFTERCARE FOLLOWING ORGAN TRANSPLANT: ICD-10-CM

## 2022-12-16 LAB
AMYLASE SERPL-CCNC: 55 U/L (ref 28–100)
ANION GAP SERPL CALCULATED.3IONS-SCNC: 5 MMOL/L (ref 7–15)
BUN SERPL-MCNC: 22.5 MG/DL (ref 6–20)
CALCIUM SERPL-MCNC: 9 MG/DL (ref 8.6–10)
CHLORIDE SERPL-SCNC: 108 MMOL/L (ref 98–107)
CREAT SERPL-MCNC: 1.45 MG/DL (ref 0.51–0.95)
DEPRECATED HCO3 PLAS-SCNC: 26 MMOL/L (ref 22–29)
ERYTHROCYTE [DISTWIDTH] IN BLOOD BY AUTOMATED COUNT: 14.3 % (ref 10–15)
GFR SERPL CREATININE-BSD FRML MDRD: 43 ML/MIN/1.73M2
GLUCOSE SERPL-MCNC: 96 MG/DL (ref 70–99)
HCT VFR BLD AUTO: 32.8 % (ref 35–47)
HGB BLD-MCNC: 10.1 G/DL (ref 11.7–15.7)
LIPASE SERPL-CCNC: 37 U/L (ref 13–60)
MAGNESIUM SERPL-MCNC: 1.8 MG/DL (ref 1.7–2.3)
MCH RBC QN AUTO: 30.8 PG (ref 26.5–33)
MCHC RBC AUTO-ENTMCNC: 30.8 G/DL (ref 31.5–36.5)
MCV RBC AUTO: 100 FL (ref 78–100)
MYCOPHENOLATE SERPL LC/MS/MS-MCNC: 2.36 MG/L (ref 1–3.5)
MYCOPHENOLATE-G SERPL LC/MS/MS-MCNC: 166.2 MG/L (ref 30–95)
PHOSPHATE SERPL-MCNC: 3.3 MG/DL (ref 2.5–4.5)
PLATELET # BLD AUTO: 229 10E3/UL (ref 150–450)
POTASSIUM SERPL-SCNC: 5.7 MMOL/L (ref 3.4–5.3)
RBC # BLD AUTO: 3.28 10E6/UL (ref 3.8–5.2)
SODIUM SERPL-SCNC: 139 MMOL/L (ref 136–145)
TACROLIMUS BLD-MCNC: 10.1 UG/L (ref 5–15)
TME LAST DOSE: ABNORMAL H
TME LAST DOSE: ABNORMAL H
TME LAST DOSE: NORMAL H
TME LAST DOSE: NORMAL H
WBC # BLD AUTO: 4.1 10E3/UL (ref 4–11)

## 2022-12-16 PROCEDURE — 82150 ASSAY OF AMYLASE: CPT

## 2022-12-16 PROCEDURE — 83690 ASSAY OF LIPASE: CPT

## 2022-12-16 PROCEDURE — 36415 COLL VENOUS BLD VENIPUNCTURE: CPT

## 2022-12-16 PROCEDURE — 83735 ASSAY OF MAGNESIUM: CPT

## 2022-12-16 PROCEDURE — 85027 COMPLETE CBC AUTOMATED: CPT

## 2022-12-16 PROCEDURE — 80048 BASIC METABOLIC PNL TOTAL CA: CPT

## 2022-12-16 PROCEDURE — 80180 DRUG SCRN QUAN MYCOPHENOLATE: CPT

## 2022-12-16 PROCEDURE — 80197 ASSAY OF TACROLIMUS: CPT

## 2022-12-16 PROCEDURE — 84100 ASSAY OF PHOSPHORUS: CPT

## 2022-12-19 ENCOUNTER — LAB (OUTPATIENT)
Dept: LAB | Facility: CLINIC | Age: 52
End: 2022-12-19
Payer: COMMERCIAL

## 2022-12-19 DIAGNOSIS — Z94.83 PANCREAS REPLACED BY TRANSPLANT (H): ICD-10-CM

## 2022-12-19 DIAGNOSIS — Z94.0 KIDNEY REPLACED BY TRANSPLANT: ICD-10-CM

## 2022-12-19 DIAGNOSIS — Z79.899 ENCOUNTER FOR LONG-TERM CURRENT USE OF MEDICATION: ICD-10-CM

## 2022-12-19 DIAGNOSIS — Z48.298 AFTERCARE FOLLOWING ORGAN TRANSPLANT: ICD-10-CM

## 2022-12-19 LAB
AMYLASE SERPL-CCNC: 49 U/L (ref 28–100)
ANION GAP SERPL CALCULATED.3IONS-SCNC: 8 MMOL/L (ref 7–15)
BUN SERPL-MCNC: 23.8 MG/DL (ref 6–20)
CALCIUM SERPL-MCNC: 9.4 MG/DL (ref 8.6–10)
CHLORIDE SERPL-SCNC: 108 MMOL/L (ref 98–107)
CREAT SERPL-MCNC: 1.39 MG/DL (ref 0.51–0.95)
DEPRECATED HCO3 PLAS-SCNC: 24 MMOL/L (ref 22–29)
ERYTHROCYTE [DISTWIDTH] IN BLOOD BY AUTOMATED COUNT: 14.4 % (ref 10–15)
GFR SERPL CREATININE-BSD FRML MDRD: 45 ML/MIN/1.73M2
GLUCOSE SERPL-MCNC: 100 MG/DL (ref 70–99)
HCT VFR BLD AUTO: 33.1 % (ref 35–47)
HGB BLD-MCNC: 10.3 G/DL (ref 11.7–15.7)
LIPASE SERPL-CCNC: 32 U/L (ref 13–60)
MAGNESIUM SERPL-MCNC: 1.8 MG/DL (ref 1.7–2.3)
MCH RBC QN AUTO: 31.2 PG (ref 26.5–33)
MCHC RBC AUTO-ENTMCNC: 31.1 G/DL (ref 31.5–36.5)
MCV RBC AUTO: 100 FL (ref 78–100)
PHOSPHATE SERPL-MCNC: 3.3 MG/DL (ref 2.5–4.5)
PLATELET # BLD AUTO: 227 10E3/UL (ref 150–450)
POTASSIUM SERPL-SCNC: 4.9 MMOL/L (ref 3.4–5.3)
RBC # BLD AUTO: 3.3 10E6/UL (ref 3.8–5.2)
SODIUM SERPL-SCNC: 140 MMOL/L (ref 136–145)
TACROLIMUS BLD-MCNC: 9.4 UG/L (ref 5–15)
TME LAST DOSE: NORMAL H
TME LAST DOSE: NORMAL H
WBC # BLD AUTO: 4.1 10E3/UL (ref 4–11)

## 2022-12-19 PROCEDURE — 83735 ASSAY OF MAGNESIUM: CPT

## 2022-12-19 PROCEDURE — 82150 ASSAY OF AMYLASE: CPT

## 2022-12-19 PROCEDURE — 83690 ASSAY OF LIPASE: CPT

## 2022-12-19 PROCEDURE — 84100 ASSAY OF PHOSPHORUS: CPT

## 2022-12-19 PROCEDURE — 80197 ASSAY OF TACROLIMUS: CPT

## 2022-12-19 PROCEDURE — 87799 DETECT AGENT NOS DNA QUANT: CPT

## 2022-12-19 PROCEDURE — 80048 BASIC METABOLIC PNL TOTAL CA: CPT

## 2022-12-19 PROCEDURE — 80180 DRUG SCRN QUAN MYCOPHENOLATE: CPT

## 2022-12-19 PROCEDURE — 85027 COMPLETE CBC AUTOMATED: CPT

## 2022-12-19 PROCEDURE — 36415 COLL VENOUS BLD VENIPUNCTURE: CPT

## 2022-12-19 NOTE — TELEPHONE ENCOUNTER
Pt is requesting new rx for    Furosemide 40mg tabs    Did not see on active med list please verify and send new rx    [facility]  [phone]

## 2022-12-20 ENCOUNTER — TELEPHONE (OUTPATIENT)
Dept: TRANSPLANT | Facility: CLINIC | Age: 52
End: 2022-12-20

## 2022-12-20 DIAGNOSIS — Z94.83 PANCREAS REPLACED BY TRANSPLANT (H): ICD-10-CM

## 2022-12-20 DIAGNOSIS — Z94.0 KIDNEY REPLACED BY TRANSPLANT: ICD-10-CM

## 2022-12-20 LAB
BKV DNA # SPEC NAA+PROBE: NOT DETECTED COPIES/ML
MYCOPHENOLATE SERPL LC/MS/MS-MCNC: 9.68 MG/L (ref 1–3.5)
MYCOPHENOLATE-G SERPL LC/MS/MS-MCNC: 157.7 MG/L (ref 30–95)
TME LAST DOSE: ABNORMAL H
TME LAST DOSE: ABNORMAL H

## 2022-12-20 RX ORDER — MYCOPHENOLIC ACID 180 MG/1
540 TABLET, DELAYED RELEASE ORAL 2 TIMES DAILY
Qty: 180 TABLET | Refills: 11 | Status: SHIPPED | OUTPATIENT
Start: 2022-12-20 | End: 2023-03-15

## 2022-12-20 RX ORDER — TACROLIMUS 1 MG/1
2 CAPSULE ORAL 2 TIMES DAILY
Qty: 360 CAPSULE | Refills: 3 | Status: SHIPPED | OUTPATIENT
Start: 2022-12-20 | End: 2022-12-28

## 2022-12-20 RX ORDER — CLOTRIMAZOLE 10 MG/1
10 LOZENGE ORAL 4 TIMES DAILY
Qty: 308 LOZENGE | Refills: 0 | OUTPATIENT
Start: 2022-12-20

## 2022-12-20 NOTE — TELEPHONE ENCOUNTER
90 days post-transplant, ok to stop clotrimazole and Valcyte (CMV D-/R-). Currently on 1 mg tacrolimus, will increase to 2 mg bid. Repeat a level on Thursday.     Left detailed VM and sent Section 101 message. MAR updated.     ISSUE:  MPA 9.7 (goal 1-3.5)    Bertrand Moore MD Poucher, Jessica, RN  Now with increase in MPA level and recommend decreasing mycophenolic acid to 540 mg every 12 hours.

## 2022-12-23 ENCOUNTER — LAB (OUTPATIENT)
Dept: LAB | Facility: CLINIC | Age: 52
End: 2022-12-23
Payer: COMMERCIAL

## 2022-12-23 DIAGNOSIS — Z48.298 AFTERCARE FOLLOWING ORGAN TRANSPLANT: ICD-10-CM

## 2022-12-23 DIAGNOSIS — Z94.0 KIDNEY REPLACED BY TRANSPLANT: ICD-10-CM

## 2022-12-23 DIAGNOSIS — Z79.899 ENCOUNTER FOR LONG-TERM CURRENT USE OF MEDICATION: ICD-10-CM

## 2022-12-23 LAB
AMYLASE SERPL-CCNC: 46 U/L (ref 28–100)
ANION GAP SERPL CALCULATED.3IONS-SCNC: 10 MMOL/L (ref 7–15)
BUN SERPL-MCNC: 27.4 MG/DL (ref 6–20)
CALCIUM SERPL-MCNC: 9.3 MG/DL (ref 8.6–10)
CHLORIDE SERPL-SCNC: 108 MMOL/L (ref 98–107)
CREAT SERPL-MCNC: 1.49 MG/DL (ref 0.51–0.95)
DEPRECATED HCO3 PLAS-SCNC: 21 MMOL/L (ref 22–29)
ERYTHROCYTE [DISTWIDTH] IN BLOOD BY AUTOMATED COUNT: 14.2 % (ref 10–15)
GFR SERPL CREATININE-BSD FRML MDRD: 41 ML/MIN/1.73M2
GLUCOSE SERPL-MCNC: 111 MG/DL (ref 70–99)
HCT VFR BLD AUTO: 32.3 % (ref 35–47)
HGB BLD-MCNC: 10.1 G/DL (ref 11.7–15.7)
LIPASE SERPL-CCNC: 29 U/L (ref 13–60)
MAGNESIUM SERPL-MCNC: 1.7 MG/DL (ref 1.7–2.3)
MCH RBC QN AUTO: 31.1 PG (ref 26.5–33)
MCHC RBC AUTO-ENTMCNC: 31.3 G/DL (ref 31.5–36.5)
MCV RBC AUTO: 99 FL (ref 78–100)
PHOSPHATE SERPL-MCNC: 3.7 MG/DL (ref 2.5–4.5)
PLATELET # BLD AUTO: 209 10E3/UL (ref 150–450)
POTASSIUM SERPL-SCNC: 5.1 MMOL/L (ref 3.4–5.3)
RBC # BLD AUTO: 3.25 10E6/UL (ref 3.8–5.2)
SODIUM SERPL-SCNC: 139 MMOL/L (ref 136–145)
TACROLIMUS BLD-MCNC: 11.4 UG/L (ref 5–15)
TME LAST DOSE: NORMAL H
TME LAST DOSE: NORMAL H
WBC # BLD AUTO: 3.5 10E3/UL (ref 4–11)

## 2022-12-23 PROCEDURE — 85027 COMPLETE CBC AUTOMATED: CPT

## 2022-12-23 PROCEDURE — 80197 ASSAY OF TACROLIMUS: CPT

## 2022-12-23 PROCEDURE — 80048 BASIC METABOLIC PNL TOTAL CA: CPT

## 2022-12-23 PROCEDURE — 83690 ASSAY OF LIPASE: CPT

## 2022-12-23 PROCEDURE — 36415 COLL VENOUS BLD VENIPUNCTURE: CPT

## 2022-12-23 PROCEDURE — 87799 DETECT AGENT NOS DNA QUANT: CPT

## 2022-12-23 PROCEDURE — 82150 ASSAY OF AMYLASE: CPT

## 2022-12-23 PROCEDURE — 84100 ASSAY OF PHOSPHORUS: CPT

## 2022-12-23 PROCEDURE — 83735 ASSAY OF MAGNESIUM: CPT

## 2022-12-26 ENCOUNTER — LAB (OUTPATIENT)
Dept: LAB | Facility: CLINIC | Age: 52
End: 2022-12-26
Payer: COMMERCIAL

## 2022-12-26 DIAGNOSIS — Z48.298 AFTERCARE FOLLOWING ORGAN TRANSPLANT: ICD-10-CM

## 2022-12-26 DIAGNOSIS — Z94.0 KIDNEY REPLACED BY TRANSPLANT: ICD-10-CM

## 2022-12-26 DIAGNOSIS — Z79.899 ENCOUNTER FOR LONG-TERM CURRENT USE OF MEDICATION: ICD-10-CM

## 2022-12-26 LAB
AMYLASE SERPL-CCNC: 45 U/L (ref 30–110)
ANION GAP SERPL CALCULATED.3IONS-SCNC: 4 MMOL/L (ref 3–14)
BKV DNA # SPEC NAA+PROBE: NOT DETECTED COPIES/ML
BUN SERPL-MCNC: 30 MG/DL (ref 7–30)
CALCIUM SERPL-MCNC: 9.7 MG/DL (ref 8.5–10.1)
CHLORIDE BLD-SCNC: 111 MMOL/L (ref 94–109)
CO2 SERPL-SCNC: 25 MMOL/L (ref 20–32)
CREAT SERPL-MCNC: 1.41 MG/DL (ref 0.52–1.04)
ERYTHROCYTE [DISTWIDTH] IN BLOOD BY AUTOMATED COUNT: 13.6 % (ref 10–15)
GFR SERPL CREATININE-BSD FRML MDRD: 44 ML/MIN/1.73M2
GLUCOSE BLD-MCNC: 129 MG/DL (ref 70–99)
HCT VFR BLD AUTO: 34.2 % (ref 35–47)
HGB BLD-MCNC: 10.6 G/DL (ref 11.7–15.7)
LIPASE SERPL-CCNC: 139 U/L (ref 73–393)
MAGNESIUM SERPL-MCNC: 1.8 MG/DL (ref 1.6–2.3)
MCH RBC QN AUTO: 30.6 PG (ref 26.5–33)
MCHC RBC AUTO-ENTMCNC: 31 G/DL (ref 31.5–36.5)
MCV RBC AUTO: 99 FL (ref 78–100)
PHOSPHATE SERPL-MCNC: 3.5 MG/DL (ref 2.5–4.5)
PLATELET # BLD AUTO: 261 10E3/UL (ref 150–450)
POTASSIUM BLD-SCNC: 5.1 MMOL/L (ref 3.4–5.3)
RBC # BLD AUTO: 3.46 10E6/UL (ref 3.8–5.2)
SODIUM SERPL-SCNC: 140 MMOL/L (ref 133–144)
TACROLIMUS BLD-MCNC: 17.7 UG/L (ref 5–15)
TME LAST DOSE: ABNORMAL H
TME LAST DOSE: ABNORMAL H
WBC # BLD AUTO: 4.9 10E3/UL (ref 4–11)

## 2022-12-26 PROCEDURE — 83735 ASSAY OF MAGNESIUM: CPT

## 2022-12-26 PROCEDURE — 80197 ASSAY OF TACROLIMUS: CPT

## 2022-12-26 PROCEDURE — 82150 ASSAY OF AMYLASE: CPT

## 2022-12-26 PROCEDURE — 36415 COLL VENOUS BLD VENIPUNCTURE: CPT

## 2022-12-26 PROCEDURE — 83690 ASSAY OF LIPASE: CPT

## 2022-12-26 PROCEDURE — 85027 COMPLETE CBC AUTOMATED: CPT

## 2022-12-26 PROCEDURE — 84100 ASSAY OF PHOSPHORUS: CPT

## 2022-12-26 PROCEDURE — 80048 BASIC METABOLIC PNL TOTAL CA: CPT

## 2022-12-27 DIAGNOSIS — Z94.83 PANCREAS REPLACED BY TRANSPLANT (H): ICD-10-CM

## 2022-12-27 DIAGNOSIS — Z94.0 KIDNEY REPLACED BY TRANSPLANT: Primary | ICD-10-CM

## 2022-12-27 NOTE — TELEPHONE ENCOUNTER
Left message and sent Flipzu message to patient regarding:  Tacrolimus IR level 17.7 on 12/26, goal 8-10, dose 2 mg BID.     PLAN:   Please call patient and confirm this was an accurate 12-hour trough. Verify Tacrolimus IR dose 2 mg BID. Confirm no new medications or illness. Confirm no missed doses. If accurate trough and accurate dose, decrease Tacrolimus IR dose to 1.5 mg BID and repeat labs as scheduled

## 2022-12-27 NOTE — TELEPHONE ENCOUNTER
ISSUE:   Tacrolimus IR level 17.7 on 12/26, goal 8-10, dose 2 mg BID.    PLAN:   Please call patient and confirm this was an accurate 12-hour trough. Verify Tacrolimus IR dose 2 mg BID. Confirm no new medications or illness. Confirm no missed doses. If accurate trough and accurate dose, decrease Tacrolimus IR dose to 1.5 mg BID and repeat labs as scheduled.     Codie Kovacs RN    OUTCOME:   Spoke with patient, they confirm accurate trough level and current dose 2 mg BID. Patient confirmed dose change to 1.5 mg BID and to repeat labs in 1 weeks. Orders sent to preferred pharmacy for dose change and lab for repeat labs. Patient voiced understanding of plan.

## 2022-12-28 ENCOUNTER — TELEPHONE (OUTPATIENT)
Dept: NEPHROLOGY | Facility: CLINIC | Age: 52
End: 2022-12-28

## 2022-12-28 ENCOUNTER — TELEPHONE (OUTPATIENT)
Dept: TRANSPLANT | Facility: CLINIC | Age: 52
End: 2022-12-28

## 2022-12-28 NOTE — TELEPHONE ENCOUNTER
Pt is requesting new rx for    Tacrolimus 0.5mg caps  Sig take one q 12 hours    Did not see on active med list please verify and send new rx    [facility]  [phone]

## 2022-12-28 NOTE — TELEPHONE ENCOUNTER
Post Kidney and Pancreas Transplant Team Conference  Date: 12/28/2022  Transplant Coordinator: Giovanna Kovacs     Attendees:  [x]  Dr. Moore [x] Allie Dooley, RN [x] Maggie Cardenas LPN     []  Dr. Cruz [] Kiara Beth RN [] Arlene Coronado LPN   []  Dr. Fernandez [] Michelle Lott RN    [x]  Dr. Zuniga [] Denise Hamilton RN [] Miller Salas, PharmD   [] Dr. Gunderson [x] Francisca Camacho, ANATOLY    [] Dr. Gómez [x] Taran Ledesma RN    [x] Dr. Almonte [x] Fariha Gordon RN [] Ginna Caballero RN   [] Dr. Kirkland [] Nyasia More, ANATOYL    []  Dr. Felix [] Sondra Spears RN    [] Dr. Rader [x] Codie Kovacs RN    [] Sarah Islas NP [x] Martha Klein RN        Verbal Plan Read Back:   Continue current treatment plan    Routed to RN Coordinator   Maggie Cardenas LPN

## 2022-12-28 NOTE — TELEPHONE ENCOUNTER
Left message for patient for patient regarding:  Tacrolimus IR level 17.7 on 12/26, goal 8-10, dose 2 mg BID.     PLAN:   Please call patient and confirm this was an accurate 12-hour trough. Verify Tacrolimus IR dose 2 mg BID. Confirm no new medications or illness. Confirm no missed doses. If accurate trough and accurate dose, decrease Tacrolimus IR dose to 1.5 mg BID and repeat labs as scheduled

## 2022-12-29 ENCOUNTER — LAB (OUTPATIENT)
Dept: LAB | Facility: CLINIC | Age: 52
End: 2022-12-29
Payer: COMMERCIAL

## 2022-12-29 DIAGNOSIS — Z79.899 ENCOUNTER FOR LONG-TERM CURRENT USE OF MEDICATION: ICD-10-CM

## 2022-12-29 DIAGNOSIS — Z94.0 KIDNEY REPLACED BY TRANSPLANT: ICD-10-CM

## 2022-12-29 DIAGNOSIS — Z48.298 AFTERCARE FOLLOWING ORGAN TRANSPLANT: ICD-10-CM

## 2022-12-29 LAB
AMYLASE SERPL-CCNC: 37 U/L (ref 30–110)
ANION GAP SERPL CALCULATED.3IONS-SCNC: 4 MMOL/L (ref 3–14)
BUN SERPL-MCNC: 30 MG/DL (ref 7–30)
CALCIUM SERPL-MCNC: 9.7 MG/DL (ref 8.5–10.1)
CHLORIDE BLD-SCNC: 109 MMOL/L (ref 94–109)
CO2 SERPL-SCNC: 25 MMOL/L (ref 20–32)
CREAT SERPL-MCNC: 1.7 MG/DL (ref 0.52–1.04)
ERYTHROCYTE [DISTWIDTH] IN BLOOD BY AUTOMATED COUNT: 13.4 % (ref 10–15)
GFR SERPL CREATININE-BSD FRML MDRD: 35 ML/MIN/1.73M2
GLUCOSE BLD-MCNC: 110 MG/DL (ref 70–99)
HCT VFR BLD AUTO: 34.1 % (ref 35–47)
HGB BLD-MCNC: 10.7 G/DL (ref 11.7–15.7)
LIPASE SERPL-CCNC: 95 U/L (ref 73–393)
MCH RBC QN AUTO: 30.5 PG (ref 26.5–33)
MCHC RBC AUTO-ENTMCNC: 31.4 G/DL (ref 31.5–36.5)
MCV RBC AUTO: 97 FL (ref 78–100)
PLATELET # BLD AUTO: 264 10E3/UL (ref 150–450)
POTASSIUM BLD-SCNC: 5.3 MMOL/L (ref 3.4–5.3)
RBC # BLD AUTO: 3.51 10E6/UL (ref 3.8–5.2)
SODIUM SERPL-SCNC: 138 MMOL/L (ref 133–144)
TACROLIMUS BLD-MCNC: 14.8 UG/L (ref 5–15)
TME LAST DOSE: NORMAL H
TME LAST DOSE: NORMAL H
WBC # BLD AUTO: 3.7 10E3/UL (ref 4–11)

## 2022-12-29 PROCEDURE — 36415 COLL VENOUS BLD VENIPUNCTURE: CPT

## 2022-12-29 PROCEDURE — 85027 COMPLETE CBC AUTOMATED: CPT

## 2022-12-29 PROCEDURE — 82150 ASSAY OF AMYLASE: CPT

## 2022-12-29 PROCEDURE — 80048 BASIC METABOLIC PNL TOTAL CA: CPT

## 2022-12-29 PROCEDURE — 83690 ASSAY OF LIPASE: CPT

## 2022-12-29 PROCEDURE — 80197 ASSAY OF TACROLIMUS: CPT

## 2022-12-29 RX ORDER — TACROLIMUS 0.5 MG/1
0.5 CAPSULE ORAL 2 TIMES DAILY
Qty: 180 CAPSULE | Refills: 3 | Status: SHIPPED | OUTPATIENT
Start: 2022-12-29 | End: 2023-01-04

## 2022-12-29 RX ORDER — TACROLIMUS 1 MG/1
1 CAPSULE ORAL 2 TIMES DAILY
Qty: 180 CAPSULE | Refills: 3 | Status: SHIPPED | OUTPATIENT
Start: 2022-12-29 | End: 2023-01-04

## 2022-12-30 ENCOUNTER — TELEPHONE (OUTPATIENT)
Dept: TRANSPLANT | Facility: CLINIC | Age: 52
End: 2022-12-30

## 2022-12-30 NOTE — TELEPHONE ENCOUNTER
Issue-  Tac 14.8 on 12/29.  Dose reduced from 2 mg bid to 1.5 mg bid on 12/28.  Cr elevated.    Plan-  Call placed to Eden Hess, she verifies dose change earlier this week and 12 hour level. She denies any illness or diarrhea.     No dose change, will repeat labs next week.

## 2023-01-03 ENCOUNTER — TELEPHONE (OUTPATIENT)
Dept: TRANSPLANT | Facility: CLINIC | Age: 53
End: 2023-01-03

## 2023-01-03 ENCOUNTER — TELEPHONE (OUTPATIENT)
Dept: PHARMACY | Facility: CLINIC | Age: 53
End: 2023-01-03

## 2023-01-03 ENCOUNTER — LAB (OUTPATIENT)
Dept: LAB | Facility: CLINIC | Age: 53
End: 2023-01-03
Payer: COMMERCIAL

## 2023-01-03 DIAGNOSIS — Z48.298 AFTERCARE FOLLOWING ORGAN TRANSPLANT: ICD-10-CM

## 2023-01-03 DIAGNOSIS — Z94.83 PANCREAS REPLACED BY TRANSPLANT (H): ICD-10-CM

## 2023-01-03 DIAGNOSIS — Z94.0 KIDNEY REPLACED BY TRANSPLANT: ICD-10-CM

## 2023-01-03 DIAGNOSIS — Z94.83 PANCREAS REPLACED BY TRANSPLANT (H): Primary | ICD-10-CM

## 2023-01-03 DIAGNOSIS — Z79.899 ENCOUNTER FOR LONG-TERM CURRENT USE OF MEDICATION: ICD-10-CM

## 2023-01-03 LAB
AMYLASE SERPL-CCNC: 77 U/L (ref 28–100)
ANION GAP SERPL CALCULATED.3IONS-SCNC: 8 MMOL/L (ref 7–15)
BUN SERPL-MCNC: 30.7 MG/DL (ref 6–20)
CALCIUM SERPL-MCNC: 9.8 MG/DL (ref 8.6–10)
CHLORIDE SERPL-SCNC: 106 MMOL/L (ref 98–107)
CREAT SERPL-MCNC: 1.49 MG/DL (ref 0.51–0.95)
DEPRECATED HCO3 PLAS-SCNC: 24 MMOL/L (ref 22–29)
ERYTHROCYTE [DISTWIDTH] IN BLOOD BY AUTOMATED COUNT: 13.9 % (ref 10–15)
GFR SERPL CREATININE-BSD FRML MDRD: 41 ML/MIN/1.73M2
GLUCOSE SERPL-MCNC: 100 MG/DL (ref 70–99)
HCT VFR BLD AUTO: 36.7 % (ref 35–47)
HGB BLD-MCNC: 11.5 G/DL (ref 11.7–15.7)
LIPASE SERPL-CCNC: 96 U/L (ref 13–60)
MAGNESIUM SERPL-MCNC: 1.9 MG/DL (ref 1.7–2.3)
MCH RBC QN AUTO: 30.7 PG (ref 26.5–33)
MCHC RBC AUTO-ENTMCNC: 31.3 G/DL (ref 31.5–36.5)
MCV RBC AUTO: 98 FL (ref 78–100)
PHOSPHATE SERPL-MCNC: 3.7 MG/DL (ref 2.5–4.5)
PLATELET # BLD AUTO: 291 10E3/UL (ref 150–450)
POTASSIUM SERPL-SCNC: 6.1 MMOL/L (ref 3.4–5.3)
RBC # BLD AUTO: 3.75 10E6/UL (ref 3.8–5.2)
SODIUM SERPL-SCNC: 138 MMOL/L (ref 136–145)
TACROLIMUS BLD-MCNC: 15.7 UG/L (ref 5–15)
TME LAST DOSE: ABNORMAL H
TME LAST DOSE: ABNORMAL H
WBC # BLD AUTO: 5.5 10E3/UL (ref 4–11)

## 2023-01-03 PROCEDURE — 82150 ASSAY OF AMYLASE: CPT

## 2023-01-03 PROCEDURE — 36415 COLL VENOUS BLD VENIPUNCTURE: CPT

## 2023-01-03 PROCEDURE — 80048 BASIC METABOLIC PNL TOTAL CA: CPT

## 2023-01-03 PROCEDURE — 85027 COMPLETE CBC AUTOMATED: CPT

## 2023-01-03 PROCEDURE — 83690 ASSAY OF LIPASE: CPT

## 2023-01-03 PROCEDURE — 80197 ASSAY OF TACROLIMUS: CPT

## 2023-01-03 PROCEDURE — 80180 DRUG SCRN QUAN MYCOPHENOLATE: CPT

## 2023-01-03 PROCEDURE — 84100 ASSAY OF PHOSPHORUS: CPT

## 2023-01-03 PROCEDURE — 83735 ASSAY OF MAGNESIUM: CPT

## 2023-01-03 NOTE — TELEPHONE ENCOUNTER
DATE:  1/3/2023     TIME OF RECEIPT FROM LAB:  4:25    ORDERING PROVIDER: Dr. Moore    LAB TEST:  potassium    LAB VALUE:  6.1    RESULTS GIVEN WITH READ-BACK TO (PROVIDER):  Codie SERNA LAB VALUE REPORTED TO PROVIDER:   4:45

## 2023-01-03 NOTE — TELEPHONE ENCOUNTER
Clinical Pharmacy Consult:                                                      Transplant Specific: 3 month post transplant medication review  Date of Transplant: 09/23/2022  Type of Transplant: kidney and pancreas  First Transplant: yes  History of rejection: no    Immunosuppression Regimen   TAC 1.5mg qAM & 1.5mg qPM and Myforitic 720mg qAM & 720mg qPM  Patient specific goal: 8-10  Most recent level: 14.9 , date 12/28/22  Immunosuppressant Levels:  Supratherapeutic  Dose decreased  Pt adherent to lab draws: yes  Scr:   Lab Results   Component Value Date    CR 2.14 11/02/2022    CR 3.56 08/13/2020     Side effects: Joint pain and numbness in hands off and on    Prophylactic Medications  Antibacterial:  Bactrim ss 3 times a week  Scheduled Discontinue Date: Lifelong    Antifungal: Clotrimazole   Scheduled Discontinue Date: 3 months, therapy completed    Antiviral: CrCl 25 to 39 mL/minute: Valcyte 450 mg every other day   Scheduled Discontinue Date: 3 months, therapy completed    Acid Reducer: not on  Scheduled Reviewed Date: N/A    Thrombosis Prevention: Aspirin 325 mg PO daily  Scheduled Discontinue Date: managed by clinic    Blood Pressure Management  Frequency of home Blood Pressure checks: not checking, encouraged her to check a few times per week.  Most recent home BP:  Did not know last in clinic 159/76  Patient Blood pressure goal: <140/90  Patient blood pressure at goal:  Unknown    Hospitalizations/ER visits since last assessment: 0    Med rec/DUR performed: yes  Med Rec Discrepancies: no    Medication adherence flowsheet 12/2/2022   Patient medication administration: Responsible for own medications   Patient estimated adherence level: %   Pharmacist assessment of adherence: Good   Patient reported doses missed per week: 0-1   Pharmacy MPR: -   Facilitators to medication adherence  Cell phone;Medication dosing chart;Pill box;Schedule/routine   Patient reported barriers to medication adherence  -    Adherence intervention(s): -      Medication access flowsheet 12/2/2022   Number of pharmacies used: 2   Pharmacy: [facility];Other   Other pharmacy: local retail   Enrolled in [facility] pharmacy? Yes   Patient reported barriers to accessing medications: -   Medication access interventions: -        Eden reports feeling pretty well.  She is noticing some joint pain and numbness in her hands that last about 20 minute.  Both occur off and on. Diarrhea is going away now that she is incorporating more high fiber foods into her diet.     She uses a med list, pill box and alarms on phone to manage her meds.  Reports no missed doses.  Last tacro level was high. Dose decreased.    Not taking blood pressure.  Last in clinic was high.  Encouraged her to take a few times per week.  She will put her blood pressure monitor out where she can see it.      MTM visit next month.     Taniya Burciaga, PharmD  Specialty Pharmacist/Transplant  [facility]  [phone]

## 2023-01-04 ENCOUNTER — TELEPHONE (OUTPATIENT)
Dept: TRANSPLANT | Facility: CLINIC | Age: 53
End: 2023-01-04

## 2023-01-04 DIAGNOSIS — Z94.0 KIDNEY REPLACED BY TRANSPLANT: ICD-10-CM

## 2023-01-04 DIAGNOSIS — Z94.83 PANCREAS REPLACED BY TRANSPLANT (H): ICD-10-CM

## 2023-01-04 LAB
MYCOPHENOLATE SERPL LC/MS/MS-MCNC: 2.96 MG/L (ref 1–3.5)
MYCOPHENOLATE-G SERPL LC/MS/MS-MCNC: 115.5 MG/L (ref 30–95)
TME LAST DOSE: ABNORMAL H
TME LAST DOSE: ABNORMAL H

## 2023-01-04 RX ORDER — SULFAMETHOXAZOLE AND TRIMETHOPRIM 400; 80 MG/1; MG/1
1 TABLET ORAL
Qty: 13 TABLET | Refills: 11
Start: 2023-01-04 | End: 2023-02-06

## 2023-01-04 RX ORDER — TACROLIMUS 1 MG/1
1 CAPSULE ORAL 2 TIMES DAILY
Qty: 180 CAPSULE | Refills: 3
Start: 2023-01-04 | End: 2023-01-17

## 2023-01-04 RX ORDER — TACROLIMUS 0.5 MG/1
CAPSULE ORAL
Qty: 180 CAPSULE | Refills: 3
Start: 2023-01-04 | End: 2023-01-17

## 2023-01-04 NOTE — TELEPHONE ENCOUNTER
Post Kidney and Pancreas Transplant Team Conference  Date: 1/4/2023  Transplant Coordinator: Giovanna Kovacs     Attendees:  [x]  Dr. Moore [x] Allie Dooley, RN [x] Maggie Cardenas LPN     []  Dr. Cruz [x] Kiara Beth RN [] Arlene Coronado LPN   [x]  Dr. Fernandez [x] Michelle Lott, ANATOLY    [x]  Dr. Zuniga [] Denise Hamilton RN [x] Miller Salas, PharmD   [] Dr. Gunderson [] Francisca Camacho, ANATOLY    [] Dr. Gómez [] Taran Ledesma RN    [x] Dr. Almonte [] Fariha Gordon RN [] Ginna Caballero RN   [] Dr. Kirkland [] Nyasia More RN    []  Dr. Felix [] Sondra Spears RN    [] Dr. Rader [x] Codie Kovacs RN    [] Sarah Islas, NP [] Martha Klein RN        Verbal Plan Read Back:   Ok to reduce Tac to 1 mg BID  Stop Bactrim X 2 weeks    Routed to RN Coordinator   Maggie Cardenas LPN

## 2023-01-04 NOTE — TELEPHONE ENCOUNTER
ISSUE:  Potassium critically high, 6.1  Lipase elevated   Tac 15.7 (goal 8-10) on 1.5 mg bid     Reviewed with Dr. Moore:   -low potassium diet  -reduce tacrolimus to 1 mg bid   -hold Bactrim x2 weeks, resume is hyperkalemia has resolved   -bowel clean out     Spoke with Hermila, feels well but is having diarrhea. 1-2 loose/watery stools per day. Is not taking stool softeners. Denies abdominal pain or fevers.     Not measuring how much water she is drinking but doesn't think she's meeting the 2-3 liter per day goal. Offered IVF, declined. She is confident she can increase po intake.     Reviewed low potassium diet. Will repeat labs again tomorrow.     Reviewed importance of 12 hour trough. Says level of 15.7 drawn on 1/2 was ~10.5 hour trough. Will still reduce to 1 mg bid and repeat a 12 hour trough on Friday.

## 2023-01-06 ENCOUNTER — LAB (OUTPATIENT)
Dept: LAB | Facility: CLINIC | Age: 53
End: 2023-01-06
Payer: COMMERCIAL

## 2023-01-06 DIAGNOSIS — Z94.0 KIDNEY REPLACED BY TRANSPLANT: ICD-10-CM

## 2023-01-06 DIAGNOSIS — Z79.899 ENCOUNTER FOR LONG-TERM CURRENT USE OF MEDICATION: ICD-10-CM

## 2023-01-06 DIAGNOSIS — Z48.298 AFTERCARE FOLLOWING ORGAN TRANSPLANT: ICD-10-CM

## 2023-01-06 LAB
AMYLASE SERPL-CCNC: 52 U/L (ref 28–100)
ANION GAP SERPL CALCULATED.3IONS-SCNC: 12 MMOL/L (ref 7–15)
BUN SERPL-MCNC: 25.4 MG/DL (ref 6–20)
CALCIUM SERPL-MCNC: 9.4 MG/DL (ref 8.6–10)
CHLORIDE SERPL-SCNC: 106 MMOL/L (ref 98–107)
CREAT SERPL-MCNC: 1.48 MG/DL (ref 0.51–0.95)
DEPRECATED HCO3 PLAS-SCNC: 21 MMOL/L (ref 22–29)
GFR SERPL CREATININE-BSD FRML MDRD: 42 ML/MIN/1.73M2
GLUCOSE SERPL-MCNC: 90 MG/DL (ref 70–99)
LIPASE SERPL-CCNC: 27 U/L (ref 13–60)
POTASSIUM SERPL-SCNC: 4.9 MMOL/L (ref 3.4–5.3)
SODIUM SERPL-SCNC: 139 MMOL/L (ref 136–145)
TACROLIMUS BLD-MCNC: 13.5 UG/L (ref 5–15)
TME LAST DOSE: NORMAL H
TME LAST DOSE: NORMAL H

## 2023-01-06 PROCEDURE — 83690 ASSAY OF LIPASE: CPT

## 2023-01-06 PROCEDURE — 80048 BASIC METABOLIC PNL TOTAL CA: CPT

## 2023-01-06 PROCEDURE — 80197 ASSAY OF TACROLIMUS: CPT

## 2023-01-06 PROCEDURE — 82150 ASSAY OF AMYLASE: CPT

## 2023-01-06 PROCEDURE — 36415 COLL VENOUS BLD VENIPUNCTURE: CPT

## 2023-01-09 ENCOUNTER — LAB (OUTPATIENT)
Dept: LAB | Facility: CLINIC | Age: 53
End: 2023-01-09
Payer: COMMERCIAL

## 2023-01-09 DIAGNOSIS — Z48.298 AFTERCARE FOLLOWING ORGAN TRANSPLANT: ICD-10-CM

## 2023-01-09 DIAGNOSIS — Z94.0 KIDNEY REPLACED BY TRANSPLANT: ICD-10-CM

## 2023-01-09 DIAGNOSIS — Z79.899 ENCOUNTER FOR LONG-TERM CURRENT USE OF MEDICATION: ICD-10-CM

## 2023-01-09 LAB
AMYLASE SERPL-CCNC: 55 U/L (ref 28–100)
ANION GAP SERPL CALCULATED.3IONS-SCNC: 10 MMOL/L (ref 7–15)
BUN SERPL-MCNC: 32.6 MG/DL (ref 6–20)
CALCIUM SERPL-MCNC: 9.7 MG/DL (ref 8.6–10)
CHLORIDE SERPL-SCNC: 110 MMOL/L (ref 98–107)
CREAT SERPL-MCNC: 1.4 MG/DL (ref 0.51–0.95)
DEPRECATED HCO3 PLAS-SCNC: 23 MMOL/L (ref 22–29)
GFR SERPL CREATININE-BSD FRML MDRD: 44 ML/MIN/1.73M2
GLUCOSE SERPL-MCNC: 100 MG/DL (ref 70–99)
LIPASE SERPL-CCNC: 34 U/L (ref 13–60)
MAGNESIUM SERPL-MCNC: 1.8 MG/DL (ref 1.7–2.3)
PHOSPHATE SERPL-MCNC: 4.2 MG/DL (ref 2.5–4.5)
POTASSIUM SERPL-SCNC: 5.9 MMOL/L (ref 3.4–5.3)
SODIUM SERPL-SCNC: 143 MMOL/L (ref 136–145)
TACROLIMUS BLD-MCNC: 12.5 UG/L (ref 5–15)
TME LAST DOSE: NORMAL H
TME LAST DOSE: NORMAL H

## 2023-01-09 PROCEDURE — 80048 BASIC METABOLIC PNL TOTAL CA: CPT

## 2023-01-09 PROCEDURE — 83735 ASSAY OF MAGNESIUM: CPT

## 2023-01-09 PROCEDURE — 80197 ASSAY OF TACROLIMUS: CPT

## 2023-01-09 PROCEDURE — 80180 DRUG SCRN QUAN MYCOPHENOLATE: CPT

## 2023-01-09 PROCEDURE — 83690 ASSAY OF LIPASE: CPT

## 2023-01-09 PROCEDURE — 82150 ASSAY OF AMYLASE: CPT

## 2023-01-09 PROCEDURE — 36415 COLL VENOUS BLD VENIPUNCTURE: CPT

## 2023-01-09 PROCEDURE — 87799 DETECT AGENT NOS DNA QUANT: CPT

## 2023-01-09 PROCEDURE — 84100 ASSAY OF PHOSPHORUS: CPT

## 2023-01-10 ENCOUNTER — TELEPHONE (OUTPATIENT)
Dept: TRANSPLANT | Facility: CLINIC | Age: 53
End: 2023-01-10

## 2023-01-10 LAB
BKV DNA # SPEC NAA+PROBE: NOT DETECTED COPIES/ML
MYCOPHENOLATE SERPL LC/MS/MS-MCNC: 3.73 MG/L (ref 1–3.5)
MYCOPHENOLATE-G SERPL LC/MS/MS-MCNC: 53.5 MG/L (ref 30–95)
TME LAST DOSE: ABNORMAL H
TME LAST DOSE: ABNORMAL H

## 2023-01-13 ENCOUNTER — LAB (OUTPATIENT)
Dept: LAB | Facility: CLINIC | Age: 53
End: 2023-01-13
Payer: COMMERCIAL

## 2023-01-13 DIAGNOSIS — Z79.899 ENCOUNTER FOR LONG-TERM CURRENT USE OF MEDICATION: ICD-10-CM

## 2023-01-13 DIAGNOSIS — Z94.0 KIDNEY REPLACED BY TRANSPLANT: ICD-10-CM

## 2023-01-13 DIAGNOSIS — Z48.298 AFTERCARE FOLLOWING ORGAN TRANSPLANT: ICD-10-CM

## 2023-01-13 LAB
AMYLASE SERPL-CCNC: 55 U/L (ref 28–100)
ANION GAP SERPL CALCULATED.3IONS-SCNC: 13 MMOL/L (ref 7–15)
BUN SERPL-MCNC: 29.4 MG/DL (ref 6–20)
CALCIUM SERPL-MCNC: 9.7 MG/DL (ref 8.6–10)
CHLORIDE SERPL-SCNC: 105 MMOL/L (ref 98–107)
CREAT SERPL-MCNC: 1.32 MG/DL (ref 0.51–0.95)
DEPRECATED HCO3 PLAS-SCNC: 22 MMOL/L (ref 22–29)
GFR SERPL CREATININE-BSD FRML MDRD: 48 ML/MIN/1.73M2
GLUCOSE SERPL-MCNC: 93 MG/DL (ref 70–99)
LIPASE SERPL-CCNC: 47 U/L (ref 13–60)
POTASSIUM SERPL-SCNC: 4.8 MMOL/L (ref 3.4–5.3)
SODIUM SERPL-SCNC: 140 MMOL/L (ref 136–145)

## 2023-01-13 PROCEDURE — 36415 COLL VENOUS BLD VENIPUNCTURE: CPT

## 2023-01-13 PROCEDURE — 80048 BASIC METABOLIC PNL TOTAL CA: CPT

## 2023-01-13 PROCEDURE — 82150 ASSAY OF AMYLASE: CPT

## 2023-01-13 PROCEDURE — 83690 ASSAY OF LIPASE: CPT

## 2023-01-16 ENCOUNTER — LAB (OUTPATIENT)
Dept: LAB | Facility: CLINIC | Age: 53
End: 2023-01-16
Payer: COMMERCIAL

## 2023-01-16 DIAGNOSIS — Z79.899 ENCOUNTER FOR LONG-TERM CURRENT USE OF MEDICATION: ICD-10-CM

## 2023-01-16 DIAGNOSIS — Z48.298 AFTERCARE FOLLOWING ORGAN TRANSPLANT: ICD-10-CM

## 2023-01-16 DIAGNOSIS — Z94.0 KIDNEY REPLACED BY TRANSPLANT: ICD-10-CM

## 2023-01-16 LAB
AMYLASE SERPL-CCNC: 57 U/L (ref 28–100)
ANION GAP SERPL CALCULATED.3IONS-SCNC: 12 MMOL/L (ref 7–15)
BUN SERPL-MCNC: 25.8 MG/DL (ref 6–20)
CALCIUM SERPL-MCNC: 9.9 MG/DL (ref 8.6–10)
CHLORIDE SERPL-SCNC: 107 MMOL/L (ref 98–107)
CREAT SERPL-MCNC: 1.41 MG/DL (ref 0.51–0.95)
DEPRECATED HCO3 PLAS-SCNC: 22 MMOL/L (ref 22–29)
ERYTHROCYTE [DISTWIDTH] IN BLOOD BY AUTOMATED COUNT: 13.9 % (ref 10–15)
GFR SERPL CREATININE-BSD FRML MDRD: 44 ML/MIN/1.73M2
GLUCOSE SERPL-MCNC: 105 MG/DL (ref 70–99)
HCT VFR BLD AUTO: 39.2 % (ref 35–47)
HGB BLD-MCNC: 12.2 G/DL (ref 11.7–15.7)
LIPASE SERPL-CCNC: 41 U/L (ref 13–60)
MAGNESIUM SERPL-MCNC: 1.9 MG/DL (ref 1.7–2.3)
MCH RBC QN AUTO: 30 PG (ref 26.5–33)
MCHC RBC AUTO-ENTMCNC: 31.1 G/DL (ref 31.5–36.5)
MCV RBC AUTO: 96 FL (ref 78–100)
PHOSPHATE SERPL-MCNC: 3.4 MG/DL (ref 2.5–4.5)
PLATELET # BLD AUTO: 275 10E3/UL (ref 150–450)
POTASSIUM SERPL-SCNC: 5 MMOL/L (ref 3.4–5.3)
RBC # BLD AUTO: 4.07 10E6/UL (ref 3.8–5.2)
SODIUM SERPL-SCNC: 141 MMOL/L (ref 136–145)
TACROLIMUS BLD-MCNC: 14.2 UG/L (ref 5–15)
TME LAST DOSE: NORMAL H
TME LAST DOSE: NORMAL H
WBC # BLD AUTO: 5.1 10E3/UL (ref 4–11)

## 2023-01-16 PROCEDURE — 87799 DETECT AGENT NOS DNA QUANT: CPT

## 2023-01-16 PROCEDURE — 84100 ASSAY OF PHOSPHORUS: CPT

## 2023-01-16 PROCEDURE — 83690 ASSAY OF LIPASE: CPT

## 2023-01-16 PROCEDURE — 85027 COMPLETE CBC AUTOMATED: CPT

## 2023-01-16 PROCEDURE — 83735 ASSAY OF MAGNESIUM: CPT

## 2023-01-16 PROCEDURE — 80048 BASIC METABOLIC PNL TOTAL CA: CPT

## 2023-01-16 PROCEDURE — 36415 COLL VENOUS BLD VENIPUNCTURE: CPT

## 2023-01-16 PROCEDURE — 82150 ASSAY OF AMYLASE: CPT

## 2023-01-16 PROCEDURE — 80197 ASSAY OF TACROLIMUS: CPT

## 2023-01-16 PROCEDURE — 80180 DRUG SCRN QUAN MYCOPHENOLATE: CPT

## 2023-01-17 DIAGNOSIS — Z94.0 KIDNEY REPLACED BY TRANSPLANT: ICD-10-CM

## 2023-01-17 DIAGNOSIS — Z94.83 PANCREAS REPLACED BY TRANSPLANT (H): Primary | ICD-10-CM

## 2023-01-17 LAB
BKV DNA # SPEC NAA+PROBE: NOT DETECTED COPIES/ML
MYCOPHENOLATE SERPL LC/MS/MS-MCNC: 3.28 MG/L (ref 1–3.5)
MYCOPHENOLATE-G SERPL LC/MS/MS-MCNC: 71.4 MG/L (ref 30–95)
TME LAST DOSE: NORMAL H
TME LAST DOSE: NORMAL H

## 2023-01-17 RX ORDER — TACROLIMUS 1 MG/1
1 CAPSULE ORAL EVERY MORNING
Qty: 90 CAPSULE | Refills: 3 | Status: SHIPPED | OUTPATIENT
Start: 2023-01-17 | End: 2023-04-26

## 2023-01-17 RX ORDER — TACROLIMUS 0.5 MG/1
0.5 CAPSULE ORAL EVERY EVENING
Qty: 90 CAPSULE | Refills: 3 | Status: SHIPPED | OUTPATIENT
Start: 2023-01-17 | End: 2023-03-10

## 2023-01-17 NOTE — TELEPHONE ENCOUNTER
ISSUE:   Tacrolimus IR level 14.2 on 1/16, goal 8-10, dose 1 mg BID.    PLAN:   Please call patient and confirm this was an accurate 12-hour trough. Verify Tacrolimus IR dose 1 mg BID. Confirm no new medications or illness. Confirm no missed doses. If accurate trough and accurate dose, decrease Tacrolimus IR dose to 1/0.5 mg BID and repeat labs as scheduled.     Codie Kovacs RN    OUTCOME:   Spoke with patient, they confirm accurate trough level and current dose 1 mg BID. Patient confirmed dose change to 1/0.5 mg BID and to repeat labs in 1 weeks. Orders sent to preferred pharmacy for dose change and lab for repeat labs. Patient voiced understanding of plan.

## 2023-01-17 NOTE — TELEPHONE ENCOUNTER
Opiatalk message sent to patient regarding:  Tacrolimus IR level 14.2 on 1/16, goal 8-10, dose 1 mg BID.     PLAN:   Please call patient and confirm this was an accurate 12-hour trough. Verify Tacrolimus IR dose 1 mg BID. Confirm no new medications or illness. Confirm no missed doses. If accurate trough and accurate dose, decrease Tacrolimus IR dose to 1/0.5 mg BID and repeat labs as scheduled

## 2023-01-25 ENCOUNTER — LAB (OUTPATIENT)
Dept: LAB | Facility: CLINIC | Age: 53
End: 2023-01-25
Payer: COMMERCIAL

## 2023-01-25 DIAGNOSIS — Z94.0 KIDNEY REPLACED BY TRANSPLANT: ICD-10-CM

## 2023-01-25 DIAGNOSIS — Z48.298 AFTERCARE FOLLOWING ORGAN TRANSPLANT: ICD-10-CM

## 2023-01-25 DIAGNOSIS — Z79.899 ENCOUNTER FOR LONG-TERM CURRENT USE OF MEDICATION: ICD-10-CM

## 2023-01-25 LAB
AMYLASE SERPL-CCNC: 58 U/L (ref 28–100)
ANION GAP SERPL CALCULATED.3IONS-SCNC: 10 MMOL/L (ref 7–15)
BUN SERPL-MCNC: 25 MG/DL (ref 6–20)
CALCIUM SERPL-MCNC: 10 MG/DL (ref 8.6–10)
CHLORIDE SERPL-SCNC: 106 MMOL/L (ref 98–107)
CREAT SERPL-MCNC: 1.35 MG/DL (ref 0.51–0.95)
DEPRECATED HCO3 PLAS-SCNC: 24 MMOL/L (ref 22–29)
ERYTHROCYTE [DISTWIDTH] IN BLOOD BY AUTOMATED COUNT: 14.1 % (ref 10–15)
GFR SERPL CREATININE-BSD FRML MDRD: 46 ML/MIN/1.73M2
GLUCOSE SERPL-MCNC: 96 MG/DL (ref 70–99)
HCT VFR BLD AUTO: 38.7 % (ref 35–47)
HGB BLD-MCNC: 12.1 G/DL (ref 11.7–15.7)
LIPASE SERPL-CCNC: 39 U/L (ref 13–60)
MAGNESIUM SERPL-MCNC: 1.9 MG/DL (ref 1.7–2.3)
MCH RBC QN AUTO: 30 PG (ref 26.5–33)
MCHC RBC AUTO-ENTMCNC: 31.3 G/DL (ref 31.5–36.5)
MCV RBC AUTO: 96 FL (ref 78–100)
PHOSPHATE SERPL-MCNC: 3.3 MG/DL (ref 2.5–4.5)
PLATELET # BLD AUTO: 240 10E3/UL (ref 150–450)
POTASSIUM SERPL-SCNC: 5 MMOL/L (ref 3.4–5.3)
RBC # BLD AUTO: 4.04 10E6/UL (ref 3.8–5.2)
SODIUM SERPL-SCNC: 140 MMOL/L (ref 136–145)
TACROLIMUS BLD-MCNC: 10.2 UG/L (ref 5–15)
TME LAST DOSE: NORMAL H
TME LAST DOSE: NORMAL H
WBC # BLD AUTO: 5.7 10E3/UL (ref 4–11)

## 2023-01-25 PROCEDURE — 80197 ASSAY OF TACROLIMUS: CPT

## 2023-01-25 PROCEDURE — 83690 ASSAY OF LIPASE: CPT

## 2023-01-25 PROCEDURE — 83735 ASSAY OF MAGNESIUM: CPT

## 2023-01-25 PROCEDURE — 36415 COLL VENOUS BLD VENIPUNCTURE: CPT

## 2023-01-25 PROCEDURE — 82150 ASSAY OF AMYLASE: CPT

## 2023-01-25 PROCEDURE — 84100 ASSAY OF PHOSPHORUS: CPT

## 2023-01-25 PROCEDURE — 80048 BASIC METABOLIC PNL TOTAL CA: CPT

## 2023-01-25 PROCEDURE — 85027 COMPLETE CBC AUTOMATED: CPT

## 2023-01-25 PROCEDURE — 80180 DRUG SCRN QUAN MYCOPHENOLATE: CPT

## 2023-01-26 LAB
MYCOPHENOLATE SERPL LC/MS/MS-MCNC: 2.76 MG/L (ref 1–3.5)
MYCOPHENOLATE-G SERPL LC/MS/MS-MCNC: 90.8 MG/L (ref 30–95)
TME LAST DOSE: NORMAL H
TME LAST DOSE: NORMAL H

## 2023-01-31 ENCOUNTER — LAB (OUTPATIENT)
Dept: LAB | Facility: CLINIC | Age: 53
End: 2023-01-31
Payer: COMMERCIAL

## 2023-01-31 DIAGNOSIS — Z48.298 AFTERCARE FOLLOWING ORGAN TRANSPLANT: ICD-10-CM

## 2023-01-31 DIAGNOSIS — Z94.0 KIDNEY REPLACED BY TRANSPLANT: ICD-10-CM

## 2023-01-31 DIAGNOSIS — Z79.899 ENCOUNTER FOR LONG-TERM CURRENT USE OF MEDICATION: ICD-10-CM

## 2023-01-31 LAB
ALBUMIN MFR UR ELPH: 17.4 MG/DL (ref 1–14)
AMYLASE SERPL-CCNC: 57 U/L (ref 28–100)
ANION GAP SERPL CALCULATED.3IONS-SCNC: 12 MMOL/L (ref 7–15)
BUN SERPL-MCNC: 24.5 MG/DL (ref 6–20)
CALCIUM SERPL-MCNC: 9 MG/DL (ref 8.6–10)
CHLORIDE SERPL-SCNC: 104 MMOL/L (ref 98–107)
CREAT SERPL-MCNC: 1.19 MG/DL (ref 0.51–0.95)
CREAT UR-MCNC: 110 MG/DL
DEPRECATED HCO3 PLAS-SCNC: 20 MMOL/L (ref 22–29)
ERYTHROCYTE [DISTWIDTH] IN BLOOD BY AUTOMATED COUNT: 14 % (ref 10–15)
GFR SERPL CREATININE-BSD FRML MDRD: 54 ML/MIN/1.73M2
GLUCOSE SERPL-MCNC: 94 MG/DL (ref 70–99)
HCT VFR BLD AUTO: 37.4 % (ref 35–47)
HGB BLD-MCNC: 11.8 G/DL (ref 11.7–15.7)
LIPASE SERPL-CCNC: 38 U/L (ref 13–60)
MAGNESIUM SERPL-MCNC: 1.8 MG/DL (ref 1.7–2.3)
MCH RBC QN AUTO: 29.9 PG (ref 26.5–33)
MCHC RBC AUTO-ENTMCNC: 31.6 G/DL (ref 31.5–36.5)
MCV RBC AUTO: 95 FL (ref 78–100)
PHOSPHATE SERPL-MCNC: 3.4 MG/DL (ref 2.5–4.5)
PLATELET # BLD AUTO: 255 10E3/UL (ref 150–450)
POTASSIUM SERPL-SCNC: 4.8 MMOL/L (ref 3.4–5.3)
PROT/CREAT 24H UR: 0.16 MG/MG CR (ref 0–0.2)
PTH-INTACT SERPL-MCNC: 85 PG/ML (ref 15–65)
RBC # BLD AUTO: 3.95 10E6/UL (ref 3.8–5.2)
SODIUM SERPL-SCNC: 136 MMOL/L (ref 136–145)
WBC # BLD AUTO: 5.1 10E3/UL (ref 4–11)

## 2023-01-31 PROCEDURE — 86832 HLA CLASS I HIGH DEFIN QUAL: CPT

## 2023-01-31 PROCEDURE — 83690 ASSAY OF LIPASE: CPT | Performed by: FAMILY MEDICINE

## 2023-01-31 PROCEDURE — 36415 COLL VENOUS BLD VENIPUNCTURE: CPT

## 2023-01-31 PROCEDURE — 83970 ASSAY OF PARATHORMONE: CPT | Performed by: FAMILY MEDICINE

## 2023-01-31 PROCEDURE — 82150 ASSAY OF AMYLASE: CPT

## 2023-01-31 PROCEDURE — 84156 ASSAY OF PROTEIN URINE: CPT | Performed by: FAMILY MEDICINE

## 2023-01-31 PROCEDURE — 85027 COMPLETE CBC AUTOMATED: CPT

## 2023-01-31 PROCEDURE — 80048 BASIC METABOLIC PNL TOTAL CA: CPT | Performed by: FAMILY MEDICINE

## 2023-01-31 PROCEDURE — 86833 HLA CLASS II HIGH DEFIN QUAL: CPT

## 2023-01-31 PROCEDURE — 84100 ASSAY OF PHOSPHORUS: CPT | Performed by: FAMILY MEDICINE

## 2023-01-31 PROCEDURE — 83735 ASSAY OF MAGNESIUM: CPT

## 2023-02-01 LAB
DONOR IDENTIFICATION: NORMAL
DSA COMMENTS: NORMAL
DSA PRESENT: NO
DSA TEST METHOD: NORMAL
ORGAN: NORMAL
SA 1 CELL: NORMAL
SA 1 TEST METHOD: NORMAL
SA 2 CELL: NORMAL
SA 2 TEST METHOD: NORMAL
SA1 HI RISK ABY: NORMAL
SA1 MOD RISK ABY: NORMAL
SA2 HI RISK ABY: NORMAL
SA2 MOD RISK ABY: NORMAL
UNACCEPTABLE ANTIGENS: NORMAL
UNOS CPRA: 0
ZZZSA 1  COMMENTS: NORMAL
ZZZSA 2 COMMENTS: NORMAL

## 2023-02-06 ENCOUNTER — VIRTUAL VISIT (OUTPATIENT)
Dept: PHARMACY | Facility: CLINIC | Age: 53
End: 2023-02-06
Payer: COMMERCIAL

## 2023-02-06 ENCOUNTER — OFFICE VISIT (OUTPATIENT)
Dept: NEPHROLOGY | Facility: CLINIC | Age: 53
End: 2023-02-06
Attending: INTERNAL MEDICINE
Payer: COMMERCIAL

## 2023-02-06 ENCOUNTER — LAB (OUTPATIENT)
Dept: LAB | Facility: CLINIC | Age: 53
End: 2023-02-06
Payer: COMMERCIAL

## 2023-02-06 VITALS
WEIGHT: 154.5 LBS | SYSTOLIC BLOOD PRESSURE: 139 MMHG | TEMPERATURE: 97.6 F | HEART RATE: 74 BPM | BODY MASS INDEX: 29.19 KG/M2 | DIASTOLIC BLOOD PRESSURE: 78 MMHG | OXYGEN SATURATION: 98 %

## 2023-02-06 DIAGNOSIS — Z94.0 KIDNEY REPLACED BY TRANSPLANT: ICD-10-CM

## 2023-02-06 DIAGNOSIS — N18.31 CHRONIC KIDNEY DISEASE, STAGE 3A (H): ICD-10-CM

## 2023-02-06 DIAGNOSIS — Z79.899 ENCOUNTER FOR LONG-TERM CURRENT USE OF MEDICATION: ICD-10-CM

## 2023-02-06 DIAGNOSIS — Z48.298 AFTERCARE FOLLOWING ORGAN TRANSPLANT: ICD-10-CM

## 2023-02-06 DIAGNOSIS — E83.42 HYPOMAGNESEMIA: ICD-10-CM

## 2023-02-06 DIAGNOSIS — Z94.0 KIDNEY TRANSPLANT STATUS: ICD-10-CM

## 2023-02-06 DIAGNOSIS — N25.81 SECONDARY RENAL HYPERPARATHYROIDISM (H): ICD-10-CM

## 2023-02-06 DIAGNOSIS — Z94.0 HISTORY OF SIMULTANEOUS KIDNEY AND PANCREAS TRANSPLANT (H): Primary | ICD-10-CM

## 2023-02-06 DIAGNOSIS — I15.1 HTN, KIDNEY TRANSPLANT RELATED: ICD-10-CM

## 2023-02-06 DIAGNOSIS — Z94.0 HTN, KIDNEY TRANSPLANT RELATED: ICD-10-CM

## 2023-02-06 DIAGNOSIS — R19.7 DIARRHEA, UNSPECIFIED TYPE: ICD-10-CM

## 2023-02-06 DIAGNOSIS — Z94.83 PANCREAS REPLACED BY TRANSPLANT (H): Primary | ICD-10-CM

## 2023-02-06 DIAGNOSIS — Z94.83 HISTORY OF SIMULTANEOUS KIDNEY AND PANCREAS TRANSPLANT (H): Primary | ICD-10-CM

## 2023-02-06 DIAGNOSIS — D84.9 IMMUNOSUPPRESSION (H): ICD-10-CM

## 2023-02-06 DIAGNOSIS — E55.9 VITAMIN D DEFICIENCY: ICD-10-CM

## 2023-02-06 PROBLEM — N18.9 ANEMIA IN CHRONIC RENAL DISEASE: Status: ACTIVE | Noted: 2018-02-28

## 2023-02-06 PROBLEM — D63.1 ANEMIA IN CHRONIC RENAL DISEASE: Status: ACTIVE | Noted: 2018-02-28

## 2023-02-06 LAB
AMYLASE SERPL-CCNC: 61 U/L (ref 28–100)
HOLD SPECIMEN: NORMAL
LIPASE SERPL-CCNC: 45 U/L (ref 13–60)

## 2023-02-06 PROCEDURE — G0463 HOSPITAL OUTPT CLINIC VISIT: HCPCS | Performed by: INTERNAL MEDICINE

## 2023-02-06 PROCEDURE — 83690 ASSAY OF LIPASE: CPT | Performed by: PATHOLOGY

## 2023-02-06 PROCEDURE — 99207 PR NO CHARGE LOS: CPT | Performed by: PHARMACIST

## 2023-02-06 PROCEDURE — 82150 ASSAY OF AMYLASE: CPT | Performed by: PATHOLOGY

## 2023-02-06 PROCEDURE — 99214 OFFICE O/P EST MOD 30 MIN: CPT | Mod: GC | Performed by: INTERNAL MEDICINE

## 2023-02-06 PROCEDURE — 36415 COLL VENOUS BLD VENIPUNCTURE: CPT | Performed by: PATHOLOGY

## 2023-02-06 RX ORDER — MAGNESIUM OXIDE 400 MG/1
400 TABLET ORAL DAILY
Qty: 30 TABLET | Refills: 11 | Status: SHIPPED | OUTPATIENT
Start: 2023-02-06 | End: 2023-04-04

## 2023-02-06 RX ORDER — SULFAMETHOXAZOLE AND TRIMETHOPRIM 400; 80 MG/1; MG/1
1 TABLET ORAL DAILY
Qty: 13 TABLET | Refills: 11 | Status: SHIPPED | OUTPATIENT
Start: 2023-02-06 | End: 2023-04-04

## 2023-02-06 ASSESSMENT — PAIN SCALES - GENERAL: PAINLEVEL: NO PAIN (0)

## 2023-02-06 NOTE — NURSING NOTE
Chief Complaint   Patient presents with     RECHECK       /78   Pulse 74   Temp 97.6  F (36.4  C) (Oral)   Wt 70.1 kg (154 lb 8 oz)   SpO2 98%   BMI 29.19 kg/m      Arron Clemente on 2/6/2023 at 9:48 AM

## 2023-02-06 NOTE — PROGRESS NOTES
TRANSPLANT NEPHROLOGY EARLY POST TRANSPLANT VISIT    Assessment & Plan   # DDKT (SPK): Trend down to stable.   - Baseline Creatinine: ~ 1.2-1.4.    - Proteinuria: Minimal (0.2-0.5 grams)   - Date DSA Last Checked: Jan/2023      Latest DSA: No   - BK Viremia: No   - Kidney Tx Biopsy: No   - Transplant Ureteral Stent: No     # Pancreas Tx (SPK):    - Pancreatic Exocrine Drainage: Enteric drained     - Blood glucose: Near euglycemia      On insulin: No   - HbA1c: Stable      Latest HbA1c: 6%   - Pancreatic enzymes: Trend down   - Date DSA Last Checked: Jan/2023  Latest DSA: No   - Pancreas Tx Biopsy: No    # Immunosuppression: Tacrolimus immediate release (goal 8-10) and Mycophenolic acid (dose 540 mg every 12 hours)   - Induction with Recent Transplant:  Intermediate Intensity   - Continue with intensive monitoring of immunosuppression for efficacy and toxicity.   - Changes: Not at this time, but with ongoing diarrhea, may look to change mycophenolic acid to mTORi.    # Infection Prophylaxis:   - PJP: Sulfa/TMP (Bactrim) daily. Was off for 2 weeks in Jan due to hyperkalemia, this has resolved now. Resume Bactrim today  - CMV: None, prophylaxis completed; CMV IgG Ab recipient and donor negative     # Hypertension: Controlled;  Goal BP: < 130/80   - Volume status: Euvolemic   - Changes: No    # Anemia in Chronic Renal Disease: Hgb: Stable      SHANEKA: No   - Iron studies: Unknown at this time, was low during last check in Oct 22    # Mineral Bone Disorder:   - Secondary renal hyperparathyroidism; PTH level: Minimally elevated ( pg/ml)        On treatment: None  - Vitamin D; level: High normal        On supplement: Yes  - Calcium; level: Normal        On supplement: Yes  - Phosphorus; level: Normal        On supplement: No    # Electrolytes:   - Potassium; level: Normal        On supplement: No  - Magnesium; level: Normal        On supplement: No  - Bicarbonate; level: Normal        On supplement: No    # Hx of C.diff  colitis: neg on last check   -Diagnosed 10/7/22   -completed prolonged PO vanc taper due to relapse after completion of 14d course of PO vanc     # Diarrhea: Loose stools daily. Use fibers daily   - Check enteric pathogen panel, C Diff, CMV   - Consider changing off mycophenolic acid to mTORi.    # GERD: Occasional symptoms     # H/o TIA (2017): No residual deficits.    # Medical Compliance: Yes    # COVID-19 Virus Review: Discussed COVID-19 virus and the potential medical risks.  Reviewed preventative health recommendations, including wearing a mask where appropriate.  Recommended COVID vaccination should be up to date with either an initial vaccination or booster shot when appropriate.  Asked the patient to inform the transplant center if they are exposed or diagnosed with this virus.    # COVID Vaccination Up To Date: No, due for next dose at 3m post txp 12/23, reminded, received evusheld    # Transplant History:  Etiology of Kidney Failure: Diabetes mellitus type 1  Tx: SPK  Transplant: 9/23/2022 (Kidney / Pancreas)  Donor Type: Donation after Brain Death Donor Class: Standard Criteria Donor  Crossmatch at time of Tx: negative  DSA at time of Tx: No  Significant changes in immunosuppression: None  CMV IgG Ab High Risk Discordance (D+/R-): No  EBV IgG Ab High Risk Discordance (D+/R-): No  Significant transplant-related complications: None    Transplant Office Phone Number: [phone]    Assessment and plan was discussed with the patient and she voiced her understanding and agreement.    Return visit: Return for 6 month post transplant visit.     Roberto Esquivel MD     Attestation:  This patient has been seen and evaluated by me, Bertrand Moore MD.  I have reviewed the note and agree with plan of care as documented by the fellow.       Chief Complaint   Ms. Hess is a 52 year old here for kidney transplant, pancreas transplant and immunosuppression management.    History of Present Illness    Feels good  overall  Daily 3-4 loose stools  Energy level is good, no fevers, chills  No cough, sob, leg swelling  No graft pain, or urinary symptoms  Denies any Tremors/headache    Current IS: FK 1/0.5, /540  Home BP: 120-130s/80s    Problem List   Patient Active Problem List   Diagnosis     Major depression in complete remission (H)     Diabetes mellitus type 1 (H)     Anemia in chronic renal disease     TIA (transient ischemic attack)     HTN, kidney transplant related     Anxiety and depression     (HFpEF) heart failure with preserved ejection fraction (H)     Pancreas replaced by transplant (H)     Kidney replaced by transplant     Immunosuppression (H)     Aftercare following organ transplant     Chronic kidney disease, stage 3a (H)     Vitamin D deficiency     Secondary renal hyperparathyroidism (H)       Allergies   Allergies   Allergen Reactions     Amlodipine      Other reaction(s): Edema,generalized       Medications   Current Outpatient Medications   Medication Sig     aspirin (ASA) 325 MG EC tablet Take 1 tablet (325 mg) by mouth daily     atorvastatin (LIPITOR) 20 MG tablet Take 1 tablet (20 mg) by mouth every evening     calcium carbonate-vitamin D (OS-BARBARA WITH D) 500-200 MG-UNIT tablet Take 1 tablet by mouth 2 times daily (with meals)     carvedilol (COREG) 12.5 MG tablet Take 1 tablet (12.5 mg) by mouth 2 times daily (with meals)     magnesium oxide (MAG-OX) 400 MG tablet Take 1 tablet (400 mg) by mouth 2 times daily     mycophenolic acid (GENERIC EQUIVALENT) 180 MG EC tablet Take 3 tablets (540 mg) by mouth 2 times daily Take in place of mycophenolate.     tacrolimus (GENERIC EQUIVALENT) 0.5 MG capsule Take 1 capsule (0.5 mg) by mouth every evening Total dose = 1 mg in the AM and 0.5 mg in the PM     tacrolimus (GENERIC EQUIVALENT) 1 MG capsule Take 1 capsule (1 mg) by mouth every morning Total dose = 1 mg in the AM and 0.5 mg in the PM     venlafaxine (EFFEXOR XR) 150 MG 24 hr capsule Take 1 capsule  (150 mg) by mouth daily     acetaminophen (TYLENOL) 325 MG tablet Take 2 tablets (650 mg) by mouth every 4 hours as needed for pain (Patient not taking: Reported on 2/6/2023)     sulfamethoxazole-trimethoprim (BACTRIM) 400-80 MG tablet Take 1 tablet by mouth Every Mon, Wed, Fri Morning HELD on 1/4 for hyperkalemia (Patient not taking: Reported on 2/6/2023)     Wheat Dextrin (BENEFIBER DRINK MIX PO) Take 1 teaspoonful by mouth 2 times daily (Patient not taking: Reported on 2/6/2023)     No current facility-administered medications for this visit.     There are no discontinued medications.    Physical Exam   Vital Signs: /78   Pulse 74   Temp 97.6  F (36.4  C) (Oral)   Wt 70.1 kg (154 lb 8 oz)   SpO2 98%   BMI 29.19 kg/m      GENERAL APPEARANCE: alert and no distress, some periorbital edema  HENT: mouth without ulcers or lesions  LYMPHATICS: no cervical or supraclavicular nodes  RESP: lungs clear to auscultation - no rales, rhonchi or wheezes  CV: regular rhythm, normal rate, no rub, no murmur  EDEMA: no LE edema bilaterally  ABDOMEN: soft, nondistended, nontender, bowel sounds normal  MS: extremities normal - no gross deformities noted, no evidence of inflammation in joints, no muscle tenderness  SKIN: no rash  NEURO: normal strength and tone, sensory exam grossly normal, mentation intact and speech normal  PSYCH: mentation appears normal and affect normal/bright  TX KIDNEY: normal  DIALYSIS ACCESS:  RUE AV graft with good thrill    Data     Renal Latest Ref Rng & Units 1/31/2023 1/25/2023 1/16/2023   Na 136 - 145 mmol/L 136 140 141   K 3.4 - 5.3 mmol/L 4.8 5.0 5.0   Cl 98 - 107 mmol/L 104 106 107   CO2 22 - 29 mmol/L 20(L) 24 22   BUN 6.0 - 20.0 mg/dL 24.5(H) 25.0(H) 25.8(H)   Cr 0.51 - 0.95 mg/dL 1.19(H) 1.35(H) 1.41(H)   Glucose 70 - 99 mg/dL 94 96 105(H)   Ca  8.6 - 10.0 mg/dL 9.0 10.0 9.9   Mg 1.7 - 2.3 mg/dL 1.8 1.9 1.9     Bone Health Latest Ref Rng & Units 1/31/2023 1/25/2023 1/16/2023   Phos 2.5 -  4.5 mg/dL 3.4 3.3 3.4   PTHi 15 - 65 pg/mL 85(H) - -   Vit D Def 20 - 75 ug/L - - -     Heme Latest Ref Rng & Units 1/31/2023 1/25/2023 1/16/2023   WBC 4.0 - 11.0 10e3/uL 5.1 5.7 5.1   Hgb 11.7 - 15.7 g/dL 11.8 12.1 12.2   Plt 150 - 450 10e3/uL 255 240 275   ABSOLUTE NEUTROPHIL 1.6 - 8.3 10e3/uL - - -   ABSOLUTE LYMPHOCYTES 0.8 - 5.3 10e3/uL - - -   ABSOLUTE MONOCYTES 0.0 - 1.3 10e3/uL - - -   ABSOLUTE EOSINOPHILS 0.0 - 0.7 10e3/uL - - -   ABSOLUTE BASOPHILS 0.0 - 0.2 10e9/L - - -   ABS IMMATURE GRANULOCYTES 0 - 0.4 10e9/L - - -   ABSOLUTE NUCLEATED RBC - - - -     Liver Latest Ref Rng & Units 10/13/2022 9/22/2022 8/13/2020   AP 40 - 150 U/L 161(H) 89 143   TBili 0.2 - 1.3 mg/dL 0.3 0.3 0.5   DBili 0.0 - 0.2 mg/dL <0.1 - -   ALT 0 - 50 U/L 40 18 31   AST 0 - 45 U/L 21 29 28   Tot Protein 6.8 - 8.8 g/dL 6.6(L) 6.9 7.0   Albumin 3.4 - 5.0 g/dL 3.3(L) 4.1 3.0(L)     Pancreas Latest Ref Rng & Units 2/6/2023 1/31/2023 1/25/2023   A1C 0.0 - 5.6 % - - -   Amylase 28 - 100 U/L 61 57 58   Lipase 73 - 393 U/L - - -   Lipase (Roche) 13 - 60 U/L 45 38 39     Iron studies Latest Ref Rng & Units 10/3/2022   Iron 37 - 145 ug/dL 34(L)   Iron sat 15 - 46 % 17   Ferritin 11 - 328 ng/mL 923(H)     [facility] Txp Virology Latest Ref Rng & Units 11/28/2022 9/22/2022 8/13/2020   CMV QUANT IU/ML Not Detected IU/mL Not Detected Not Detected -   EBV CAPSID ANTIBODY IGG No detectable antibody. - Positive(A) >8.0(H)   Hep B Core NR:Nonreactive - - Nonreactive        Recent Labs   Lab Test 01/09/23  0919 01/16/23  0851 01/25/23  1044   DOSTAC 1/8/2023 1/15/2023 1/24/2023   TACROL 12.5 14.2 10.2     Recent Labs   Lab Test 01/09/23 0919 01/16/23  0851 01/25/23  1044   DOSMPA 1/8/2023   9:15 AM 1/15/2023   8:00 PM  --    MPACID 3.73* 3.28 2.76   MPAG 53.5 71.4 90.8

## 2023-02-06 NOTE — LETTER
2/6/2023       RE: Eden Hess  [address]  [place] [address]     Dear Colleague,    Thank you for referring your patient, Eden Hess, to the [facility] [place] at [facility]. Please see a copy of my visit note below.    TRANSPLANT NEPHROLOGY EARLY POST TRANSPLANT VISIT    Assessment & Plan   # DDKT (SPK): Trend down to stable.   - Baseline Creatinine: ~ 1.2-1.4.    - Proteinuria: Minimal (0.2-0.5 grams)   - Date DSA Last Checked: Jan/2023      Latest DSA: No   - BK Viremia: No   - Kidney Tx Biopsy: No   - Transplant Ureteral Stent: No     # Pancreas Tx (SPK):    - Pancreatic Exocrine Drainage: Enteric drained     - Blood glucose: Near euglycemia      On insulin: No   - HbA1c: Stable      Latest HbA1c: 6%   - Pancreatic enzymes: Trend down   - Date DSA Last Checked: Jan/2023  Latest DSA: No   - Pancreas Tx Biopsy: No    # Immunosuppression: Tacrolimus immediate release (goal 8-10) and Mycophenolic acid (dose 540 mg every 12 hours)   - Induction with Recent Transplant:  Intermediate Intensity   - Continue with intensive monitoring of immunosuppression for efficacy and toxicity.   - Changes: Not at this time, but with ongoing diarrhea, may look to change mycophenolic acid to mTORi.    # Infection Prophylaxis:   - PJP: Sulfa/TMP (Bactrim) daily. Was off for 2 weeks in Jan due to hyperkalemia, this has resolved now. Resume Bactrim today  - CMV: None, prophylaxis completed; CMV IgG Ab recipient and donor negative     # Hypertension: Controlled;  Goal BP: < 130/80   - Volume status: Euvolemic   - Changes: No    # Anemia in Chronic Renal Disease: Hgb: Stable      SHANEKA: No   - Iron studies: Unknown at this time, was low during last check in Oct 22    # Mineral Bone Disorder:   - Secondary renal hyperparathyroidism; PTH level: Minimally elevated ( pg/ml)        On treatment: None  - Vitamin D; level: High normal        On  supplement: Yes  - Calcium; level: Normal        On supplement: Yes  - Phosphorus; level: Normal        On supplement: No    # Electrolytes:   - Potassium; level: Normal        On supplement: No  - Magnesium; level: Normal        On supplement: No  - Bicarbonate; level: Normal        On supplement: No    # Hx of C.diff colitis: neg on last check   -Diagnosed 10/7/22   -completed prolonged PO vanc taper due to relapse after completion of 14d course of PO vanc     # Diarrhea: Loose stools daily. Use fibers daily   - Check enteric pathogen panel, C Diff, CMV   - Consider changing off mycophenolic acid to mTORi.    # GERD: Occasional symptoms     # H/o TIA (2017): No residual deficits.    # Medical Compliance: Yes    # COVID-19 Virus Review: Discussed COVID-19 virus and the potential medical risks.  Reviewed preventative health recommendations, including wearing a mask where appropriate.  Recommended COVID vaccination should be up to date with either an initial vaccination or booster shot when appropriate.  Asked the patient to inform the transplant center if they are exposed or diagnosed with this virus.    # COVID Vaccination Up To Date: No, due for next dose at 3m post txp 12/23, reminded, received evusheld    # Transplant History:  Etiology of Kidney Failure: Diabetes mellitus type 1  Tx: SPK  Transplant: 9/23/2022 (Kidney / Pancreas)  Donor Type: Donation after Brain Death Donor Class: Standard Criteria Donor  Crossmatch at time of Tx: negative  DSA at time of Tx: No  Significant changes in immunosuppression: None  CMV IgG Ab High Risk Discordance (D+/R-): No  EBV IgG Ab High Risk Discordance (D+/R-): No  Significant transplant-related complications: None    Transplant Office Phone Number: [phone]    Assessment and plan was discussed with the patient and she voiced her understanding and agreement.    Return visit: Return for 6 month post transplant visit.     Roberto Esquivel MD     Attestation:  This patient has  been seen and evaluated by me, Bertrand Moore MD.  I have reviewed the note and agree with plan of care as documented by the fellow.       Chief Complaint   Ms. Hess is a 52 year old here for kidney transplant, pancreas transplant and immunosuppression management.    History of Present Illness    Feels good overall  Daily 3-4 loose stools  Energy level is good, no fevers, chills  No cough, sob, leg swelling  No graft pain, or urinary symptoms  Denies any Tremors/headache    Current IS: FK 1/0.5, /540  Home BP: 120-130s/80s    Problem List   Patient Active Problem List   Diagnosis     Major depression in complete remission (H)     Diabetes mellitus type 1 (H)     Anemia in chronic renal disease     TIA (transient ischemic attack)     HTN, kidney transplant related     Anxiety and depression     (HFpEF) heart failure with preserved ejection fraction (H)     Pancreas replaced by transplant (H)     Kidney replaced by transplant     Immunosuppression (H)     Aftercare following organ transplant     Chronic kidney disease, stage 3a (H)     Vitamin D deficiency     Secondary renal hyperparathyroidism (H)       Allergies   Allergies   Allergen Reactions     Amlodipine      Other reaction(s): Edema,generalized       Medications   Current Outpatient Medications   Medication Sig     aspirin (ASA) 325 MG EC tablet Take 1 tablet (325 mg) by mouth daily     atorvastatin (LIPITOR) 20 MG tablet Take 1 tablet (20 mg) by mouth every evening     calcium carbonate-vitamin D (OS-BARBARA WITH D) 500-200 MG-UNIT tablet Take 1 tablet by mouth 2 times daily (with meals)     carvedilol (COREG) 12.5 MG tablet Take 1 tablet (12.5 mg) by mouth 2 times daily (with meals)     magnesium oxide (MAG-OX) 400 MG tablet Take 1 tablet (400 mg) by mouth 2 times daily     mycophenolic acid (GENERIC EQUIVALENT) 180 MG EC tablet Take 3 tablets (540 mg) by mouth 2 times daily Take in place of mycophenolate.     tacrolimus (GENERIC EQUIVALENT) 0.5  MG capsule Take 1 capsule (0.5 mg) by mouth every evening Total dose = 1 mg in the AM and 0.5 mg in the PM     tacrolimus (GENERIC EQUIVALENT) 1 MG capsule Take 1 capsule (1 mg) by mouth every morning Total dose = 1 mg in the AM and 0.5 mg in the PM     venlafaxine (EFFEXOR XR) 150 MG 24 hr capsule Take 1 capsule (150 mg) by mouth daily     acetaminophen (TYLENOL) 325 MG tablet Take 2 tablets (650 mg) by mouth every 4 hours as needed for pain (Patient not taking: Reported on 2/6/2023)     sulfamethoxazole-trimethoprim (BACTRIM) 400-80 MG tablet Take 1 tablet by mouth Every Mon, Wed, Fri Morning HELD on 1/4 for hyperkalemia (Patient not taking: Reported on 2/6/2023)     Wheat Dextrin (BENEFIBER DRINK MIX PO) Take 1 teaspoonful by mouth 2 times daily (Patient not taking: Reported on 2/6/2023)     No current facility-administered medications for this visit.     There are no discontinued medications.    Physical Exam   Vital Signs: /78   Pulse 74   Temp 97.6  F (36.4  C) (Oral)   Wt 70.1 kg (154 lb 8 oz)   SpO2 98%   BMI 29.19 kg/m      GENERAL APPEARANCE: alert and no distress, some periorbital edema  HENT: mouth without ulcers or lesions  LYMPHATICS: no cervical or supraclavicular nodes  RESP: lungs clear to auscultation - no rales, rhonchi or wheezes  CV: regular rhythm, normal rate, no rub, no murmur  EDEMA: no LE edema bilaterally  ABDOMEN: soft, nondistended, nontender, bowel sounds normal  MS: extremities normal - no gross deformities noted, no evidence of inflammation in joints, no muscle tenderness  SKIN: no rash  NEURO: normal strength and tone, sensory exam grossly normal, mentation intact and speech normal  PSYCH: mentation appears normal and affect normal/bright  TX KIDNEY: normal  DIALYSIS ACCESS:  RUE AV graft with good thrill    Data     Renal Latest Ref Rng & Units 1/31/2023 1/25/2023 1/16/2023   Na 136 - 145 mmol/L 136 140 141   K 3.4 - 5.3 mmol/L 4.8 5.0 5.0   Cl 98 - 107 mmol/L 104 106 107    CO2 22 - 29 mmol/L 20(L) 24 22   BUN 6.0 - 20.0 mg/dL 24.5(H) 25.0(H) 25.8(H)   Cr 0.51 - 0.95 mg/dL 1.19(H) 1.35(H) 1.41(H)   Glucose 70 - 99 mg/dL 94 96 105(H)   Ca  8.6 - 10.0 mg/dL 9.0 10.0 9.9   Mg 1.7 - 2.3 mg/dL 1.8 1.9 1.9     Bone Health Latest Ref Rng & Units 1/31/2023 1/25/2023 1/16/2023   Phos 2.5 - 4.5 mg/dL 3.4 3.3 3.4   PTHi 15 - 65 pg/mL 85(H) - -   Vit D Def 20 - 75 ug/L - - -     Heme Latest Ref Rng & Units 1/31/2023 1/25/2023 1/16/2023   WBC 4.0 - 11.0 10e3/uL 5.1 5.7 5.1   Hgb 11.7 - 15.7 g/dL 11.8 12.1 12.2   Plt 150 - 450 10e3/uL 255 240 275   ABSOLUTE NEUTROPHIL 1.6 - 8.3 10e3/uL - - -   ABSOLUTE LYMPHOCYTES 0.8 - 5.3 10e3/uL - - -   ABSOLUTE MONOCYTES 0.0 - 1.3 10e3/uL - - -   ABSOLUTE EOSINOPHILS 0.0 - 0.7 10e3/uL - - -   ABSOLUTE BASOPHILS 0.0 - 0.2 10e9/L - - -   ABS IMMATURE GRANULOCYTES 0 - 0.4 10e9/L - - -   ABSOLUTE NUCLEATED RBC - - - -     Liver Latest Ref Rng & Units 10/13/2022 9/22/2022 8/13/2020   AP 40 - 150 U/L 161(H) 89 143   TBili 0.2 - 1.3 mg/dL 0.3 0.3 0.5   DBili 0.0 - 0.2 mg/dL <0.1 - -   ALT 0 - 50 U/L 40 18 31   AST 0 - 45 U/L 21 29 28   Tot Protein 6.8 - 8.8 g/dL 6.6(L) 6.9 7.0   Albumin 3.4 - 5.0 g/dL 3.3(L) 4.1 3.0(L)     Pancreas Latest Ref Rng & Units 2/6/2023 1/31/2023 1/25/2023   A1C 0.0 - 5.6 % - - -   Amylase 28 - 100 U/L 61 57 58   Lipase 73 - 393 U/L - - -   Lipase (Roche) 13 - 60 U/L 45 38 39     Iron studies Latest Ref Rng & Units 10/3/2022   Iron 37 - 145 ug/dL 34(L)   Iron sat 15 - 46 % 17   Ferritin 11 - 328 ng/mL 923(H)     [facility] Txp Virology Latest Ref Rng & Units 11/28/2022 9/22/2022 8/13/2020   CMV QUANT IU/ML Not Detected IU/mL Not Detected Not Detected -   EBV CAPSID ANTIBODY IGG No detectable antibody. - Positive(A) >8.0(H)   Hep B Core NR:Nonreactive - - Nonreactive        Recent Labs   Lab Test 01/09/23  0919 01/16/23  0851 01/25/23  1044   DOSTAC 1/8/2023 1/15/2023 1/24/2023   TACROL 12.5 14.2 10.2     Recent Labs   Lab Test 01/09/23  0919  01/16/23  0851 01/25/23  1044   DOSMPA 1/8/2023   9:15 AM 1/15/2023   8:00 PM  --    MPACID 3.73* 3.28 2.76   MPAG 53.5 71.4 90.8       Again, thank you for allowing me to participate in the care of your patient.      Sincerely,    Bertrand Moore MD

## 2023-02-06 NOTE — LETTER
2/6/2023      RE: Eden Hess  [address]  [place] [address]       TRANSPLANT NEPHROLOGY EARLY POST TRANSPLANT VISIT    Assessment & Plan   # DDKT (SPK): Trend down to stable.   - Baseline Creatinine: ~ 1.2-1.4.    - Proteinuria: Minimal (0.2-0.5 grams)   - Date DSA Last Checked: Jan/2023      Latest DSA: No   - BK Viremia: No   - Kidney Tx Biopsy: No   - Transplant Ureteral Stent: No     # Pancreas Tx (SPK):    - Pancreatic Exocrine Drainage: Enteric drained     - Blood glucose: Near euglycemia      On insulin: No   - HbA1c: Stable      Latest HbA1c: 6%   - Pancreatic enzymes: Trend down   - Date DSA Last Checked: Jan/2023  Latest DSA: No   - Pancreas Tx Biopsy: No    # Immunosuppression: Tacrolimus immediate release (goal 8-10) and Mycophenolic acid (dose 540 mg every 12 hours)   - Induction with Recent Transplant:  Intermediate Intensity   - Continue with intensive monitoring of immunosuppression for efficacy and toxicity.   - Changes: Not at this time, but with ongoing diarrhea, may look to change mycophenolic acid to mTORi.    # Infection Prophylaxis:   - PJP: Sulfa/TMP (Bactrim) daily. Was off for 2 weeks in Jan due to hyperkalemia, this has resolved now. Resume Bactrim today  - CMV: None, prophylaxis completed; CMV IgG Ab recipient and donor negative     # Hypertension: Controlled;  Goal BP: < 130/80   - Volume status: Euvolemic   - Changes: No    # Anemia in Chronic Renal Disease: Hgb: Stable      SHANEKA: No   - Iron studies: Unknown at this time, was low during last check in Oct 22    # Mineral Bone Disorder:   - Secondary renal hyperparathyroidism; PTH level: Minimally elevated ( pg/ml)        On treatment: None  - Vitamin D; level: High normal        On supplement: Yes  - Calcium; level: Normal        On supplement: Yes  - Phosphorus; level: Normal        On supplement: No    # Electrolytes:   - Potassium; level: Normal        On supplement: No  - Magnesium; level: Normal        On  supplement: No  - Bicarbonate; level: Normal        On supplement: No    # Hx of C.diff colitis: neg on last check   -Diagnosed 10/7/22   -completed prolonged PO vanc taper due to relapse after completion of 14d course of PO vanc     # Diarrhea: Loose stools daily. Use fibers daily   - Check enteric pathogen panel, C Diff, CMV   - Consider changing off mycophenolic acid to mTORi.    # GERD: Occasional symptoms     # H/o TIA (2017): No residual deficits.    # Medical Compliance: Yes    # COVID-19 Virus Review: Discussed COVID-19 virus and the potential medical risks.  Reviewed preventative health recommendations, including wearing a mask where appropriate.  Recommended COVID vaccination should be up to date with either an initial vaccination or booster shot when appropriate.  Asked the patient to inform the transplant center if they are exposed or diagnosed with this virus.    # COVID Vaccination Up To Date: No, due for next dose at 3m post txp 12/23, reminded, received evusheld    # Transplant History:  Etiology of Kidney Failure: Diabetes mellitus type 1  Tx: SPK  Transplant: 9/23/2022 (Kidney / Pancreas)  Donor Type: Donation after Brain Death Donor Class: Standard Criteria Donor  Crossmatch at time of Tx: negative  DSA at time of Tx: No  Significant changes in immunosuppression: None  CMV IgG Ab High Risk Discordance (D+/R-): No  EBV IgG Ab High Risk Discordance (D+/R-): No  Significant transplant-related complications: None    Transplant Office Phone Number: [phone]    Assessment and plan was discussed with the patient and she voiced her understanding and agreement.    Return visit: Return for 6 month post transplant visit.     Roberto Esquivel MD     Attestation:  This patient has been seen and evaluated by me, Bertrand Moore MD.  I have reviewed the note and agree with plan of care as documented by the fellow.       Chief Complaint   Ms. Hess is a 52 year old here for kidney transplant, pancreas  transplant and immunosuppression management.    History of Present Illness    Feels good overall  Daily 3-4 loose stools  Energy level is good, no fevers, chills  No cough, sob, leg swelling  No graft pain, or urinary symptoms  Denies any Tremors/headache    Current IS: FK 1/0.5, /540  Home BP: 120-130s/80s    Problem List   Patient Active Problem List   Diagnosis     Major depression in complete remission (H)     Diabetes mellitus type 1 (H)     Anemia in chronic renal disease     TIA (transient ischemic attack)     HTN, kidney transplant related     Anxiety and depression     (HFpEF) heart failure with preserved ejection fraction (H)     Pancreas replaced by transplant (H)     Kidney replaced by transplant     Immunosuppression (H)     Aftercare following organ transplant     Chronic kidney disease, stage 3a (H)     Vitamin D deficiency     Secondary renal hyperparathyroidism (H)       Allergies   Allergies   Allergen Reactions     Amlodipine      Other reaction(s): Edema,generalized       Medications   Current Outpatient Medications   Medication Sig     aspirin (ASA) 325 MG EC tablet Take 1 tablet (325 mg) by mouth daily     atorvastatin (LIPITOR) 20 MG tablet Take 1 tablet (20 mg) by mouth every evening     calcium carbonate-vitamin D (OS-BARBARA WITH D) 500-200 MG-UNIT tablet Take 1 tablet by mouth 2 times daily (with meals)     carvedilol (COREG) 12.5 MG tablet Take 1 tablet (12.5 mg) by mouth 2 times daily (with meals)     magnesium oxide (MAG-OX) 400 MG tablet Take 1 tablet (400 mg) by mouth 2 times daily     mycophenolic acid (GENERIC EQUIVALENT) 180 MG EC tablet Take 3 tablets (540 mg) by mouth 2 times daily Take in place of mycophenolate.     tacrolimus (GENERIC EQUIVALENT) 0.5 MG capsule Take 1 capsule (0.5 mg) by mouth every evening Total dose = 1 mg in the AM and 0.5 mg in the PM     tacrolimus (GENERIC EQUIVALENT) 1 MG capsule Take 1 capsule (1 mg) by mouth every morning Total dose = 1 mg in the AM  and 0.5 mg in the PM     venlafaxine (EFFEXOR XR) 150 MG 24 hr capsule Take 1 capsule (150 mg) by mouth daily     acetaminophen (TYLENOL) 325 MG tablet Take 2 tablets (650 mg) by mouth every 4 hours as needed for pain (Patient not taking: Reported on 2/6/2023)     sulfamethoxazole-trimethoprim (BACTRIM) 400-80 MG tablet Take 1 tablet by mouth Every Mon, Wed, Fri Morning HELD on 1/4 for hyperkalemia (Patient not taking: Reported on 2/6/2023)     Wheat Dextrin (BENEFIBER DRINK MIX PO) Take 1 teaspoonful by mouth 2 times daily (Patient not taking: Reported on 2/6/2023)     No current facility-administered medications for this visit.     There are no discontinued medications.    Physical Exam   Vital Signs: /78   Pulse 74   Temp 97.6  F (36.4  C) (Oral)   Wt 70.1 kg (154 lb 8 oz)   SpO2 98%   BMI 29.19 kg/m      GENERAL APPEARANCE: alert and no distress, some periorbital edema  HENT: mouth without ulcers or lesions  LYMPHATICS: no cervical or supraclavicular nodes  RESP: lungs clear to auscultation - no rales, rhonchi or wheezes  CV: regular rhythm, normal rate, no rub, no murmur  EDEMA: no LE edema bilaterally  ABDOMEN: soft, nondistended, nontender, bowel sounds normal  MS: extremities normal - no gross deformities noted, no evidence of inflammation in joints, no muscle tenderness  SKIN: no rash  NEURO: normal strength and tone, sensory exam grossly normal, mentation intact and speech normal  PSYCH: mentation appears normal and affect normal/bright  TX KIDNEY: normal  DIALYSIS ACCESS:  RUE AV graft with good thrill    Data     Renal Latest Ref Rng & Units 1/31/2023 1/25/2023 1/16/2023   Na 136 - 145 mmol/L 136 140 141   K 3.4 - 5.3 mmol/L 4.8 5.0 5.0   Cl 98 - 107 mmol/L 104 106 107   CO2 22 - 29 mmol/L 20(L) 24 22   BUN 6.0 - 20.0 mg/dL 24.5(H) 25.0(H) 25.8(H)   Cr 0.51 - 0.95 mg/dL 1.19(H) 1.35(H) 1.41(H)   Glucose 70 - 99 mg/dL 94 96 105(H)   Ca  8.6 - 10.0 mg/dL 9.0 10.0 9.9   Mg 1.7 - 2.3 mg/dL 1.8 1.9  1.9     Bone Health Latest Ref Rng & Units 1/31/2023 1/25/2023 1/16/2023   Phos 2.5 - 4.5 mg/dL 3.4 3.3 3.4   PTHi 15 - 65 pg/mL 85(H) - -   Vit D Def 20 - 75 ug/L - - -     Heme Latest Ref Rng & Units 1/31/2023 1/25/2023 1/16/2023   WBC 4.0 - 11.0 10e3/uL 5.1 5.7 5.1   Hgb 11.7 - 15.7 g/dL 11.8 12.1 12.2   Plt 150 - 450 10e3/uL 255 240 275   ABSOLUTE NEUTROPHIL 1.6 - 8.3 10e3/uL - - -   ABSOLUTE LYMPHOCYTES 0.8 - 5.3 10e3/uL - - -   ABSOLUTE MONOCYTES 0.0 - 1.3 10e3/uL - - -   ABSOLUTE EOSINOPHILS 0.0 - 0.7 10e3/uL - - -   ABSOLUTE BASOPHILS 0.0 - 0.2 10e9/L - - -   ABS IMMATURE GRANULOCYTES 0 - 0.4 10e9/L - - -   ABSOLUTE NUCLEATED RBC - - - -     Liver Latest Ref Rng & Units 10/13/2022 9/22/2022 8/13/2020   AP 40 - 150 U/L 161(H) 89 143   TBili 0.2 - 1.3 mg/dL 0.3 0.3 0.5   DBili 0.0 - 0.2 mg/dL <0.1 - -   ALT 0 - 50 U/L 40 18 31   AST 0 - 45 U/L 21 29 28   Tot Protein 6.8 - 8.8 g/dL 6.6(L) 6.9 7.0   Albumin 3.4 - 5.0 g/dL 3.3(L) 4.1 3.0(L)     Pancreas Latest Ref Rng & Units 2/6/2023 1/31/2023 1/25/2023   A1C 0.0 - 5.6 % - - -   Amylase 28 - 100 U/L 61 57 58   Lipase 73 - 393 U/L - - -   Lipase (Roche) 13 - 60 U/L 45 38 39     Iron studies Latest Ref Rng & Units 10/3/2022   Iron 37 - 145 ug/dL 34(L)   Iron sat 15 - 46 % 17   Ferritin 11 - 328 ng/mL 923(H)     [facility] Txp Virology Latest Ref Rng & Units 11/28/2022 9/22/2022 8/13/2020   CMV QUANT IU/ML Not Detected IU/mL Not Detected Not Detected -   EBV CAPSID ANTIBODY IGG No detectable antibody. - Positive(A) >8.0(H)   Hep B Core NR:Nonreactive - - Nonreactive        Recent Labs   Lab Test 01/09/23  0919 01/16/23  0851 01/25/23  1044   DOSTAC 1/8/2023 1/15/2023 1/24/2023   TACROL 12.5 14.2 10.2     Recent Labs   Lab Test 01/09/23  0919 01/16/23  0851 01/25/23  1044   DOSMPA 1/8/2023   9:15 AM 1/15/2023   8:00 PM  --    MPACID 3.73* 3.28 2.76   MPAG 53.5 71.4 90.8       Bertrand Moore MD

## 2023-02-06 NOTE — PATIENT INSTRUCTIONS
"Recommendations from today's MTM visit:                                                       1. Due now COVID Bivalent booster and Flu shot.  2. Due at 6 months: Shingrix, tetanus, and Pneumovax 23.   3. Increase Fiber to 2 gummies daily. See if this improves loose stools.  4. Reduce Magnesium to 400mg once daily.      Follow-up: as needed    It was great speaking with you today.  I value your experience and would be very thankful for your time in providing feedback in our clinic survey. In the next few days, you may receive an email or text message from [employer] with a link to a survey related to your  clinical pharmacist.\"     To schedule another MTM appointment, please call the clinic directly or you may call the MTM scheduling line at [phone] or toll-free at [phone].     My Clinical Pharmacist's contact information:                                                      Please feel free to contact me with any questions or concerns you have.      Miller Salas, PharmD  MTM Pharmacist    Phone: [phone]     "

## 2023-02-06 NOTE — PROGRESS NOTES
Disease State Management Encounter:                          Eden Hess is a 52 year old female called for a follow-up visit.  Today's visit is a follow-up visit from 11/2022     Reason for visit: 4 months post transplant.     Allergies/ADRs: Reviewed in chart  Past Medical History: Reviewed in chart  Tobacco: She reports that she quit smoking about 25 years ago. Her smoking use included cigarettes. She has never used smokeless tobacco.  Alcohol: not currently using  CBD/THC: None     Medication Adherence/Access: Per patient, misses medication 0 times per week.     Renal/ Pancreas Transplant:  Current immunosuppressants include Tacrolimus 1mg every morning and 0.5mg every evening, Mycophenolic acid 540mg twice daily.  Pt reports diarrhea.  Transplant date: 9/23/22  Estimated Creatinine Clearance: 48.4 mL/min (A) (based on SCr of 1.19 mg/dL (H)).  Vascular prophylaxis: Aspirin 325mg daily. Denies gastrointestinal bleed sx.   CMV prophylaxis: Completed  PJP prophylaxis: restarted Bactrim S S daily today.   Supplements: Mag Oxide 400mg twice daily ( 2 hours from MMF), Calcium Carbonate/D twice daily,  Tx Coordinator: Codie Kovacs, Using Med Card: Yes  Immunizations: annual flu shot 2021; Pneumovax 23:  2010, 2020; Prevnar 13/15/20: 2020; TDaP:  2010; Shingrix: unknown, HBV: immunity per last titer, COVID: Moderna x3  Lab Results   Component Value Date    MAG 1.8 01/31/2023    MAG 1.9 01/25/2023    MAG 1.9 01/16/2023   Stools: Using a fiber gummy daily. Having Loose stools a few times a week. Goes 4-6x daily. On a bread and water diet.     Today's Vitals: There were no vitals taken for this visit.    Assessment/Plan:  With continued loose stools, recommended patient increase to 2 fiber gummies daily (recommended serving size).  Patient's magnesium levels have been stable for over 1 month, recommended she decrease her magnesium to 400 mg once daily. Now that the patient is over 3 months post-transplant,  she is due for her COVID bivalent and annual Flu vaccines, recommended that patient get these. Also reminded patient that she is due for Shingrix, Tetanus and Pneumovax 23 at 6 months post-transplant.    1. Due now COVID Bivalent booster and Flu shot.  2. Due at 6 months: Shingrix, tetanus, and Pneumovax 23.   3. Increase Fiber to 2 gummies daily. See if this improves loose stools.  4. Reduce Magnesium to 400mg once daily.     Follow-up:as needed    I spent 10 minutes with this patient today. All changes were made via collaborative practice agreement with Dr. Moore. A copy of the visit note was provided to the patient's provider(s).    A summary of these recommendations was sent via [employer].    Miller Salas, PharmD  [facility] Pharmacist    Phone: [phone]      Medication Therapy Recommendations  Hypomagnesemia    Current Medication: magnesium oxide (MAG-OX) 400 MG tablet   Rationale: Dose too high - Dosage too high - Safety   Recommendation: Decrease Dose   Status: Accepted per CPA         Kidney replaced by transplant    Current Medication: FIBER ADULT GUMMIES PO   Rationale: Dose too low - Dosage too low - Effectiveness   Recommendation: Increase Dose   Status: Accepted - no CPA Needed          Rationale: Preventive therapy - Needs additional medication therapy - Indication   Recommendation: Order Vaccine - PFIZER COVID-19 VAC BIVALENT IM   Status: Patient Agreed - Adherence/Education

## 2023-02-22 ENCOUNTER — LAB (OUTPATIENT)
Dept: LAB | Facility: CLINIC | Age: 53
End: 2023-02-22
Payer: COMMERCIAL

## 2023-02-22 DIAGNOSIS — Z79.899 ENCOUNTER FOR LONG-TERM CURRENT USE OF MEDICATION: ICD-10-CM

## 2023-02-22 DIAGNOSIS — Z94.0 KIDNEY REPLACED BY TRANSPLANT: ICD-10-CM

## 2023-02-22 DIAGNOSIS — Z48.298 AFTERCARE FOLLOWING ORGAN TRANSPLANT: ICD-10-CM

## 2023-02-22 DIAGNOSIS — Z94.0 KIDNEY TRANSPLANT STATUS: ICD-10-CM

## 2023-02-22 DIAGNOSIS — R19.7 DIARRHEA, UNSPECIFIED TYPE: ICD-10-CM

## 2023-02-22 LAB
AMYLASE SERPL-CCNC: 60 U/L (ref 28–100)
ANION GAP SERPL CALCULATED.3IONS-SCNC: 11 MMOL/L (ref 7–15)
BUN SERPL-MCNC: 26.1 MG/DL (ref 6–20)
CALCIUM SERPL-MCNC: 10 MG/DL (ref 8.6–10)
CHLORIDE SERPL-SCNC: 102 MMOL/L (ref 98–107)
CREAT SERPL-MCNC: 1.5 MG/DL (ref 0.51–0.95)
DEPRECATED HCO3 PLAS-SCNC: 24 MMOL/L (ref 22–29)
ERYTHROCYTE [DISTWIDTH] IN BLOOD BY AUTOMATED COUNT: 14.6 % (ref 10–15)
GFR SERPL CREATININE-BSD FRML MDRD: 41 ML/MIN/1.73M2
GLUCOSE SERPL-MCNC: 89 MG/DL (ref 70–99)
HCT VFR BLD AUTO: 38.5 % (ref 35–47)
HGB BLD-MCNC: 12.1 G/DL (ref 11.7–15.7)
LIPASE SERPL-CCNC: 41 U/L (ref 13–60)
MAGNESIUM SERPL-MCNC: 1.9 MG/DL (ref 1.7–2.3)
MCH RBC QN AUTO: 30 PG (ref 26.5–33)
MCHC RBC AUTO-ENTMCNC: 31.4 G/DL (ref 31.5–36.5)
MCV RBC AUTO: 95 FL (ref 78–100)
PHOSPHATE SERPL-MCNC: 3.8 MG/DL (ref 2.5–4.5)
PLATELET # BLD AUTO: 290 10E3/UL (ref 150–450)
POTASSIUM SERPL-SCNC: 5.2 MMOL/L (ref 3.4–5.3)
RBC # BLD AUTO: 4.04 10E6/UL (ref 3.8–5.2)
SODIUM SERPL-SCNC: 137 MMOL/L (ref 136–145)
WBC # BLD AUTO: 5.8 10E3/UL (ref 4–11)

## 2023-02-22 PROCEDURE — 83735 ASSAY OF MAGNESIUM: CPT

## 2023-02-22 PROCEDURE — 83690 ASSAY OF LIPASE: CPT

## 2023-02-22 PROCEDURE — 80180 DRUG SCRN QUAN MYCOPHENOLATE: CPT

## 2023-02-22 PROCEDURE — 80048 BASIC METABOLIC PNL TOTAL CA: CPT

## 2023-02-22 PROCEDURE — 85027 COMPLETE CBC AUTOMATED: CPT

## 2023-02-22 PROCEDURE — 80197 ASSAY OF TACROLIMUS: CPT

## 2023-02-22 PROCEDURE — 82150 ASSAY OF AMYLASE: CPT

## 2023-02-22 PROCEDURE — 36415 COLL VENOUS BLD VENIPUNCTURE: CPT

## 2023-02-22 PROCEDURE — 84100 ASSAY OF PHOSPHORUS: CPT

## 2023-02-23 ENCOUNTER — TELEPHONE (OUTPATIENT)
Dept: TRANSPLANT | Facility: CLINIC | Age: 53
End: 2023-02-23
Payer: COMMERCIAL

## 2023-02-23 LAB
CMV DNA SPEC NAA+PROBE-ACNC: NOT DETECTED IU/ML
TACROLIMUS BLD-MCNC: 9.1 UG/L (ref 5–15)
TME LAST DOSE: NORMAL H
TME LAST DOSE: NORMAL H

## 2023-02-23 NOTE — TELEPHONE ENCOUNTER
ISSUE:  Creatinine 1.5 (baseline 1.2-1.4)  Tac at goal 8-10     Bertrand Moore MD Poucher, Jessica, RN  Slightly elevated serum creatinine and recommend usual assessment for fever, recent illness, pain over kidney allograft, blood pressure and volume status, as well as would consider IV fluids and repeat labs.    Left VM for Hermila, sent [employer] message.

## 2023-02-27 LAB
MYCOPHENOLATE SERPL LC/MS/MS-MCNC: 3.49 MG/L (ref 1–3.5)
MYCOPHENOLATE-G SERPL LC/MS/MS-MCNC: 84 MG/L (ref 30–95)
TME LAST DOSE: NORMAL H
TME LAST DOSE: NORMAL H

## 2023-03-01 ENCOUNTER — LAB (OUTPATIENT)
Dept: LAB | Facility: CLINIC | Age: 53
End: 2023-03-01
Payer: COMMERCIAL

## 2023-03-01 DIAGNOSIS — Z79.899 ENCOUNTER FOR LONG-TERM CURRENT USE OF MEDICATION: ICD-10-CM

## 2023-03-01 DIAGNOSIS — Z94.0 KIDNEY REPLACED BY TRANSPLANT: ICD-10-CM

## 2023-03-01 DIAGNOSIS — Z48.298 AFTERCARE FOLLOWING ORGAN TRANSPLANT: ICD-10-CM

## 2023-03-01 LAB
AMYLASE SERPL-CCNC: 85 U/L (ref 28–100)
ANION GAP SERPL CALCULATED.3IONS-SCNC: 10 MMOL/L (ref 7–15)
BUN SERPL-MCNC: 28.5 MG/DL (ref 6–20)
C COLI+JEJUNI+LARI FUSA STL QL NAA+PROBE: NOT DETECTED
C DIFF GDH STL QL IA: POSITIVE
C DIFF TOX A+B STL QL IA: POSITIVE
C DIFF TOX B STL QL: POSITIVE
CALCIUM SERPL-MCNC: 9.9 MG/DL (ref 8.6–10)
CHLORIDE SERPL-SCNC: 109 MMOL/L (ref 98–107)
CREAT SERPL-MCNC: 1.34 MG/DL (ref 0.51–0.95)
DEPRECATED HCO3 PLAS-SCNC: 23 MMOL/L (ref 22–29)
EC STX1 GENE STL QL NAA+PROBE: NOT DETECTED
EC STX2 GENE STL QL NAA+PROBE: NOT DETECTED
GFR SERPL CREATININE-BSD FRML MDRD: 47 ML/MIN/1.73M2
GLUCOSE SERPL-MCNC: 91 MG/DL (ref 70–99)
LIPASE SERPL-CCNC: 76 U/L (ref 13–60)
MAGNESIUM SERPL-MCNC: 2.1 MG/DL (ref 1.7–2.3)
NOROV GI+II ORF1-ORF2 JNC STL QL NAA+PR: NOT DETECTED
PHOSPHATE SERPL-MCNC: 4.4 MG/DL (ref 2.5–4.5)
POTASSIUM SERPL-SCNC: 4.7 MMOL/L (ref 3.4–5.3)
RVA NSP5 STL QL NAA+PROBE: NOT DETECTED
SALMONELLA SP RPOD STL QL NAA+PROBE: NOT DETECTED
SHIGELLA SP+EIEC IPAH STL QL NAA+PROBE: NOT DETECTED
SODIUM SERPL-SCNC: 142 MMOL/L (ref 136–145)
V CHOL+PARA RFBL+TRKH+TNAA STL QL NAA+PR: NOT DETECTED
Y ENTERO RECN STL QL NAA+PROBE: NOT DETECTED

## 2023-03-01 PROCEDURE — 82150 ASSAY OF AMYLASE: CPT

## 2023-03-01 PROCEDURE — 80048 BASIC METABOLIC PNL TOTAL CA: CPT

## 2023-03-01 PROCEDURE — 84100 ASSAY OF PHOSPHORUS: CPT

## 2023-03-01 PROCEDURE — 83735 ASSAY OF MAGNESIUM: CPT

## 2023-03-01 PROCEDURE — 80180 DRUG SCRN QUAN MYCOPHENOLATE: CPT

## 2023-03-01 PROCEDURE — 87506 IADNA-DNA/RNA PROBE TQ 6-11: CPT

## 2023-03-01 PROCEDURE — 87799 DETECT AGENT NOS DNA QUANT: CPT

## 2023-03-01 PROCEDURE — 83690 ASSAY OF LIPASE: CPT

## 2023-03-01 PROCEDURE — 36415 COLL VENOUS BLD VENIPUNCTURE: CPT

## 2023-03-01 PROCEDURE — 87493 C DIFF AMPLIFIED PROBE: CPT

## 2023-03-01 PROCEDURE — 87324 CLOSTRIDIUM AG IA: CPT

## 2023-03-02 ENCOUNTER — TELEPHONE (OUTPATIENT)
Dept: TRANSPLANT | Facility: CLINIC | Age: 53
End: 2023-03-02
Payer: COMMERCIAL

## 2023-03-02 DIAGNOSIS — A49.8 CLOSTRIDIUM DIFFICILE INFECTION: Primary | ICD-10-CM

## 2023-03-02 LAB
BKV DNA # SPEC NAA+PROBE: NOT DETECTED COPIES/ML
MYCOPHENOLATE SERPL LC/MS/MS-MCNC: 1.92 MG/L (ref 1–3.5)
MYCOPHENOLATE-G SERPL LC/MS/MS-MCNC: 54.7 MG/L (ref 30–95)
TME LAST DOSE: NORMAL H
TME LAST DOSE: NORMAL H

## 2023-03-03 RX ORDER — VANCOMYCIN HYDROCHLORIDE 125 MG/1
CAPSULE ORAL
Qty: 105 CAPSULE | Refills: 0 | Status: SHIPPED | OUTPATIENT
Start: 2023-03-03 | End: 2023-03-06

## 2023-03-03 NOTE — TELEPHONE ENCOUNTER
ISSUE:  +c diff   Second c diff infection since transplant    Bertrand Moore MD Poucher, Jessica, RN  Yes, oral vancomycin 125 mg qid x 2 weeks, possibly 4 if diarrhea persists.  Would then do bid x 2 weeks, then daily x 2 weeks and then every other day x 2 weeks.     Left detailed VM, sent [email or web address] message.

## 2023-03-06 DIAGNOSIS — A49.8 CLOSTRIDIUM DIFFICILE INFECTION: ICD-10-CM

## 2023-03-06 RX ORDER — VANCOMYCIN HYDROCHLORIDE 125 MG/1
CAPSULE ORAL
Qty: 105 CAPSULE | Refills: 0 | Status: SHIPPED | OUTPATIENT
Start: 2023-03-06 | End: 2023-05-01

## 2023-03-07 ENCOUNTER — LAB (OUTPATIENT)
Dept: LAB | Facility: CLINIC | Age: 53
End: 2023-03-07
Payer: COMMERCIAL

## 2023-03-07 DIAGNOSIS — Z48.298 AFTERCARE FOLLOWING ORGAN TRANSPLANT: ICD-10-CM

## 2023-03-07 DIAGNOSIS — Z94.0 KIDNEY REPLACED BY TRANSPLANT: ICD-10-CM

## 2023-03-07 DIAGNOSIS — Z79.899 ENCOUNTER FOR LONG-TERM CURRENT USE OF MEDICATION: ICD-10-CM

## 2023-03-07 LAB
AMYLASE SERPL-CCNC: 73 U/L (ref 28–100)
ANION GAP SERPL CALCULATED.3IONS-SCNC: 12 MMOL/L (ref 7–15)
BUN SERPL-MCNC: 24.2 MG/DL (ref 6–20)
CALCIUM SERPL-MCNC: 9.6 MG/DL (ref 8.6–10)
CHLORIDE SERPL-SCNC: 104 MMOL/L (ref 98–107)
CREAT SERPL-MCNC: 1.3 MG/DL (ref 0.51–0.95)
DEPRECATED HCO3 PLAS-SCNC: 22 MMOL/L (ref 22–29)
ERYTHROCYTE [DISTWIDTH] IN BLOOD BY AUTOMATED COUNT: 14.6 % (ref 10–15)
GFR SERPL CREATININE-BSD FRML MDRD: 49 ML/MIN/1.73M2
GLUCOSE SERPL-MCNC: 93 MG/DL (ref 70–99)
HCT VFR BLD AUTO: 38 % (ref 35–47)
HGB BLD-MCNC: 12 G/DL (ref 11.7–15.7)
LIPASE SERPL-CCNC: 49 U/L (ref 13–60)
MCH RBC QN AUTO: 30.1 PG (ref 26.5–33)
MCHC RBC AUTO-ENTMCNC: 31.6 G/DL (ref 31.5–36.5)
MCV RBC AUTO: 95 FL (ref 78–100)
PLATELET # BLD AUTO: 285 10E3/UL (ref 150–450)
POTASSIUM SERPL-SCNC: 5 MMOL/L (ref 3.4–5.3)
RBC # BLD AUTO: 3.99 10E6/UL (ref 3.8–5.2)
SODIUM SERPL-SCNC: 138 MMOL/L (ref 136–145)
TACROLIMUS BLD-MCNC: 9.1 UG/L (ref 5–15)
TME LAST DOSE: NORMAL H
TME LAST DOSE: NORMAL H
WBC # BLD AUTO: 4.4 10E3/UL (ref 4–11)

## 2023-03-07 PROCEDURE — 36415 COLL VENOUS BLD VENIPUNCTURE: CPT

## 2023-03-07 PROCEDURE — 80180 DRUG SCRN QUAN MYCOPHENOLATE: CPT

## 2023-03-07 PROCEDURE — 82150 ASSAY OF AMYLASE: CPT

## 2023-03-07 PROCEDURE — 87799 DETECT AGENT NOS DNA QUANT: CPT

## 2023-03-07 PROCEDURE — 80048 BASIC METABOLIC PNL TOTAL CA: CPT

## 2023-03-07 PROCEDURE — 80197 ASSAY OF TACROLIMUS: CPT

## 2023-03-07 PROCEDURE — 83690 ASSAY OF LIPASE: CPT

## 2023-03-07 PROCEDURE — 85027 COMPLETE CBC AUTOMATED: CPT

## 2023-03-08 ENCOUNTER — TEAM CONFERENCE (OUTPATIENT)
Dept: TRANSPLANT | Facility: CLINIC | Age: 53
End: 2023-03-08
Payer: COMMERCIAL

## 2023-03-08 DIAGNOSIS — A49.8 CLOSTRIDIUM DIFFICILE INFECTION: Primary | ICD-10-CM

## 2023-03-08 LAB — BKV DNA # SPEC NAA+PROBE: NOT DETECTED COPIES/ML

## 2023-03-08 NOTE — TELEPHONE ENCOUNTER
Post Kidney and Pancreas Transplant Team Conference  Date: 3/8/2023  Transplant Coordinator: Codie Kovacs     Attendees:  []  Dr. Moore [] Allie Dooley, RN [] Maggie Cardenas LPN     []  Dr. Cruz [] Kiara Beth RN [] Arlene Coronado LPN   []  Dr. Fernandez [] Michelle Lott RN    []  Dr. Zuniga [] Denise Hamilton RN [] Miller Salas, PharmD   [] Dr. Gunderson [] Francisca Camacho RN    [] Dr. Gómez [] Taran Ledesma RN    [] Dr. Almonte [] Fariha Gordon RN [] Ginna Caballero RN   [] Dr. Kirkland [] Nyasia More RN    []  Dr. Felix [] Sondra Spears RN    [] Dr. Rader [] Codie Kovacs RN    [] Sarah Islas, NP [] Martha Klein RN        Verbal Plan Read Back:   IGG level with next lab draw    Routed to RN Coordinator   Arlene Coronado LPN

## 2023-03-09 LAB
MYCOPHENOLATE SERPL LC/MS/MS-MCNC: 16.56 MG/L (ref 1–3.5)
MYCOPHENOLATE-G SERPL LC/MS/MS-MCNC: 93.3 MG/L (ref 30–95)
TME LAST DOSE: ABNORMAL H
TME LAST DOSE: ABNORMAL H

## 2023-03-10 ENCOUNTER — TELEPHONE (OUTPATIENT)
Dept: TRANSPLANT | Facility: CLINIC | Age: 53
End: 2023-03-10
Payer: COMMERCIAL

## 2023-03-10 DIAGNOSIS — Z48.298 AFTERCARE FOLLOWING ORGAN TRANSPLANT: Primary | ICD-10-CM

## 2023-03-10 DIAGNOSIS — Z94.83 PANCREAS REPLACED BY TRANSPLANT (H): ICD-10-CM

## 2023-03-10 DIAGNOSIS — Z94.0 KIDNEY REPLACED BY TRANSPLANT: ICD-10-CM

## 2023-03-10 RX ORDER — TACROLIMUS 0.5 MG/1
CAPSULE ORAL
Qty: 270 CAPSULE | Refills: 3 | Status: SHIPPED | OUTPATIENT
Start: 2023-03-10 | End: 2023-04-26

## 2023-03-10 NOTE — TELEPHONE ENCOUNTER
ISSUE:  MPA elevated     Spoke with Hermila, diarrhea is controlled since started po vancomycin for c diff infection. Has 1-2 soft stools per day. Denies N/V, no GERD symptoms.     confirmed this was a 12 hour trough. Confirmed she is taking 540 mg bid. Will plan to repeat MPA level, reviewed with Dr. Moore.

## 2023-03-14 ENCOUNTER — LAB (OUTPATIENT)
Dept: LAB | Facility: CLINIC | Age: 53
End: 2023-03-14
Payer: COMMERCIAL

## 2023-03-14 DIAGNOSIS — A49.8 CLOSTRIDIUM DIFFICILE INFECTION: ICD-10-CM

## 2023-03-14 DIAGNOSIS — Z48.298 AFTERCARE FOLLOWING ORGAN TRANSPLANT: ICD-10-CM

## 2023-03-14 PROCEDURE — 80180 DRUG SCRN QUAN MYCOPHENOLATE: CPT

## 2023-03-14 PROCEDURE — 36415 COLL VENOUS BLD VENIPUNCTURE: CPT

## 2023-03-14 PROCEDURE — 82784 ASSAY IGA/IGD/IGG/IGM EACH: CPT

## 2023-03-15 ENCOUNTER — TELEPHONE (OUTPATIENT)
Dept: TRANSPLANT | Facility: CLINIC | Age: 53
End: 2023-03-15
Payer: COMMERCIAL

## 2023-03-15 ENCOUNTER — TEAM CONFERENCE (OUTPATIENT)
Dept: TRANSPLANT | Facility: CLINIC | Age: 53
End: 2023-03-15
Payer: COMMERCIAL

## 2023-03-15 DIAGNOSIS — Z94.0 KIDNEY REPLACED BY TRANSPLANT: ICD-10-CM

## 2023-03-15 DIAGNOSIS — Z94.83 PANCREAS REPLACED BY TRANSPLANT (H): ICD-10-CM

## 2023-03-15 LAB — IGG SERPL-MCNC: 1070 MG/DL (ref 610–1616)

## 2023-03-15 RX ORDER — MYCOPHENOLIC ACID 180 MG/1
360 TABLET, DELAYED RELEASE ORAL 2 TIMES DAILY
Qty: 120 TABLET | Refills: 11 | Status: SHIPPED | OUTPATIENT
Start: 2023-03-15 | End: 2023-03-24

## 2023-03-15 NOTE — TELEPHONE ENCOUNTER
DATE:  3/15/2023     TIME OF RECEIPT FROM LAB:  1435    ORDERING PROVIDER:     LAB TEST:  Mycophenolic Acid     LAB VALUE:  13.14    RESULTS GIVEN WITH READ-BACK TO (PROVIDER):  Codie Kovacs RN     TIME LAB VALUE REPORTED TO PROVIDER:   1437    MIKE Shah LPN   Pre-Liver/TPIAT Transplant   Phone: [phone]  Fax: [phone]

## 2023-03-15 NOTE — TELEPHONE ENCOUNTER
ISSUE:  MPA 13+ on recheck     Hermila confirmed accurate 12 hour trough.     Reviewed with pancreas committee on 3/15, ok to reduce MPA to 360 mg bid. recheck MPA level in 2 weeks.

## 2023-03-15 NOTE — TELEPHONE ENCOUNTER
Post Kidney and Pancreas Transplant Team Conference  Date: 3/15/2023  Transplant Coordinator: Codie Kovacs     Attendees:  []  Dr. Moore [] Allie Dooley, RN [] Maggie Cardenas LPN     []  Dr. Cruz [] Kiara Beth RN [] Arlene Coronado LPN   []  Dr. Fernandez [] Michelle Lott RN    []  Dr. Zuniga [] Denise Hamilton RN [] Miller Salas, PharmD   [] Dr. Gunderson [] Francisca Camacho RN    [] Dr. Gómez [] Taran Ledesma RN    [] Dr. Almonte [] Fariha Gordon RN [] Ginna Caballero RN   [] Dr. Kirkland [] Nyasia More RN    []  Dr. Felix [] Sondra Spears RN    [] Dr. Rader [] Codie Kovacs RN    [] Sarah Islas, NP [] Martha Klein RN        Verbal Plan Read Back:   Mycophenolate 360 mg bid  Repeat level in 2 weeks    Routed to RN Coordinator   Arlene Coronado LPN

## 2023-03-16 LAB
MYCOPHENOLATE SERPL LC/MS/MS-MCNC: 13.96 MG/L (ref 1–3.5)
MYCOPHENOLATE-G SERPL LC/MS/MS-MCNC: 31.5 MG/L (ref 30–95)
TME LAST DOSE: ABNORMAL H
TME LAST DOSE: ABNORMAL H

## 2023-03-22 ENCOUNTER — LAB (OUTPATIENT)
Dept: LAB | Facility: CLINIC | Age: 53
End: 2023-03-22
Payer: COMMERCIAL

## 2023-03-22 DIAGNOSIS — Z79.899 ENCOUNTER FOR LONG-TERM CURRENT USE OF MEDICATION: ICD-10-CM

## 2023-03-22 DIAGNOSIS — Z48.298 AFTERCARE FOLLOWING ORGAN TRANSPLANT: ICD-10-CM

## 2023-03-22 DIAGNOSIS — Z94.0 KIDNEY REPLACED BY TRANSPLANT: ICD-10-CM

## 2023-03-22 LAB
AMYLASE SERPL-CCNC: 76 U/L (ref 28–100)
ANION GAP SERPL CALCULATED.3IONS-SCNC: 13 MMOL/L (ref 7–15)
BUN SERPL-MCNC: 31.5 MG/DL (ref 6–20)
CALCIUM SERPL-MCNC: 9.8 MG/DL (ref 8.6–10)
CHLORIDE SERPL-SCNC: 105 MMOL/L (ref 98–107)
CREAT SERPL-MCNC: 1.33 MG/DL (ref 0.51–0.95)
DEPRECATED HCO3 PLAS-SCNC: 20 MMOL/L (ref 22–29)
ERYTHROCYTE [DISTWIDTH] IN BLOOD BY AUTOMATED COUNT: 14.9 % (ref 10–15)
GFR SERPL CREATININE-BSD FRML MDRD: 47 ML/MIN/1.73M2
GLUCOSE SERPL-MCNC: 87 MG/DL (ref 70–99)
HCT VFR BLD AUTO: 39.5 % (ref 35–47)
HGB BLD-MCNC: 12.7 G/DL (ref 11.7–15.7)
LIPASE SERPL-CCNC: 51 U/L (ref 13–60)
MCH RBC QN AUTO: 30.8 PG (ref 26.5–33)
MCHC RBC AUTO-ENTMCNC: 32.2 G/DL (ref 31.5–36.5)
MCV RBC AUTO: 96 FL (ref 78–100)
PLATELET # BLD AUTO: 296 10E3/UL (ref 150–450)
POTASSIUM SERPL-SCNC: 5 MMOL/L (ref 3.4–5.3)
RBC # BLD AUTO: 4.12 10E6/UL (ref 3.8–5.2)
SODIUM SERPL-SCNC: 138 MMOL/L (ref 136–145)
WBC # BLD AUTO: 3.3 10E3/UL (ref 4–11)

## 2023-03-22 PROCEDURE — 36415 COLL VENOUS BLD VENIPUNCTURE: CPT

## 2023-03-22 PROCEDURE — 80048 BASIC METABOLIC PNL TOTAL CA: CPT

## 2023-03-22 PROCEDURE — 85027 COMPLETE CBC AUTOMATED: CPT

## 2023-03-22 PROCEDURE — 83690 ASSAY OF LIPASE: CPT

## 2023-03-22 PROCEDURE — 80180 DRUG SCRN QUAN MYCOPHENOLATE: CPT

## 2023-03-22 PROCEDURE — 82150 ASSAY OF AMYLASE: CPT

## 2023-03-23 LAB
MYCOPHENOLATE SERPL LC/MS/MS-MCNC: 0.57 MG/L (ref 1–3.5)
MYCOPHENOLATE-G SERPL LC/MS/MS-MCNC: 25.3 MG/L (ref 30–95)
TME LAST DOSE: ABNORMAL H
TME LAST DOSE: ABNORMAL H

## 2023-03-24 ENCOUNTER — TELEPHONE (OUTPATIENT)
Dept: TRANSPLANT | Facility: CLINIC | Age: 53
End: 2023-03-24
Payer: COMMERCIAL

## 2023-03-24 DIAGNOSIS — Z94.83 PANCREAS REPLACED BY TRANSPLANT (H): ICD-10-CM

## 2023-03-24 DIAGNOSIS — Z94.0 KIDNEY REPLACED BY TRANSPLANT: ICD-10-CM

## 2023-03-24 RX ORDER — MYCOPHENOLIC ACID 180 MG/1
540 TABLET, DELAYED RELEASE ORAL 2 TIMES DAILY
Qty: 180 TABLET | Refills: 11 | Status: SHIPPED | OUTPATIENT
Start: 2023-03-24 | End: 2024-04-08

## 2023-03-24 NOTE — TELEPHONE ENCOUNTER
ISSUE:  Low MPA level   Level reduced last week for critically high MPA levels. Currently on  mg bid.     Bertrand Moore MD Poucher, Jessica, RN  Yes, let's increase it again.     [employer] message sent to Hermila to increase back to 540 mg bid.

## 2023-03-31 ENCOUNTER — TELEPHONE (OUTPATIENT)
Dept: PHARMACY | Facility: CLINIC | Age: 53
End: 2023-03-31
Payer: COMMERCIAL

## 2023-03-31 NOTE — TELEPHONE ENCOUNTER
Clinical Pharmacy Consult:                                                      Transplant Specific: 6 month post transplant medication review  Date of Transplant: 09/23/2022  Type of Transplant: kidney and pancreas  First Transplant: yes  History of rejection: no    Immunosuppression Regimen   TAC 1.0mg qAM & 0.5mg qPM and Myforitic 540mg qAM & 540mg qPM  Patient specific goal: 8-10  Most recent level: 9.1 , date:  3/7/23  Immunosuppressant Levels: therapeutic    Pt adherent to lab draws: yes  Scr:   Lab Results   Component Value Date    CR 2.14 11/02/2022    CR 3.56 08/13/2020     Side effects: diarrhea mostly under control    Prophylactic Medications  Antibacterial:  Bactrim ss once daily  Scheduled Discontinue Date: Lifelong    Antifungal: Clotrimazole   Scheduled Discontinue Date: 3 months, therapy completed    Antiviral: CrCl 25 to 39 mL/minute: Valcyte 450 mg every other day   Scheduled Discontinue Date: 3 months, therapy completed    Acid Reducer: not on  Scheduled Reviewed Date: N/A    Thrombosis Prevention: Aspirin 325 mg PO daily  Scheduled Discontinue Date: managed by clinic    Blood Pressure Management  Frequency of home Blood Pressure checks: not checking, encouraged her to check a few times per week.  Most recent home BP:  Did not know last in clinic 129/77  Patient Blood pressure goal: <140/90  Patient blood pressure at goal:  Unknown    Hospitalizations/ER visits since last assessment: 0    Med rec/DUR performed: yes  Med Rec Discrepancies: no    Medication adherence flowsheet 3/31/2023   Patient medication administration: Responsible for own medications   Patient estimated adherence level: %   Pharmacist assessment of adherence: Good   Patient reported doses missed per week: 0-1   Pharmacy MPR: -   Facilitators to medication adherence  Cell phone;Medication dosing chart;Pill box   Patient reported barriers to medication adherence  -   Adherence intervention(s): -      Medication access  flowsheet 3/31/2023   Number of pharmacies used: 2   Pharmacy: [facility];Other   Other pharmacy: local retail   Enrolled in [facility] pharmacy? Yes   Patient reported barriers to accessing medications: -   Medication access interventions: -        Hermila reports feeling very well.   Diarrhea is improving now that she is incorporating more high fiber foods into her diet.     She uses a med list, pill box and alarms on phone to manage her meds.  Reports no missed doses.  Last tacro level was at goal.    Not taking blood pressure.  Last in clinic was at goal.  Encouraged her to take a few times per week.      Follow up in 6 months    Nathan Leos [facility]  Specialty Pharmacist [phone]

## 2023-04-03 ENCOUNTER — LAB (OUTPATIENT)
Dept: LAB | Facility: CLINIC | Age: 53
End: 2023-04-03
Payer: COMMERCIAL

## 2023-04-03 DIAGNOSIS — Z79.899 ENCOUNTER FOR LONG-TERM CURRENT USE OF MEDICATION: ICD-10-CM

## 2023-04-03 DIAGNOSIS — Z48.298 AFTERCARE FOLLOWING ORGAN TRANSPLANT: ICD-10-CM

## 2023-04-03 DIAGNOSIS — Z94.0 KIDNEY REPLACED BY TRANSPLANT: ICD-10-CM

## 2023-04-03 LAB
ALBUMIN MFR UR ELPH: 11 MG/DL (ref 1–14)
ALBUMIN SERPL BCG-MCNC: 4.2 G/DL (ref 3.5–5.2)
ALP SERPL-CCNC: 91 U/L (ref 35–104)
ALT SERPL W P-5'-P-CCNC: 48 U/L (ref 10–35)
AMYLASE SERPL-CCNC: 71 U/L (ref 28–100)
ANION GAP SERPL CALCULATED.3IONS-SCNC: 13 MMOL/L (ref 7–15)
AST SERPL W P-5'-P-CCNC: 51 U/L (ref 10–35)
BILIRUB DIRECT SERPL-MCNC: <0.2 MG/DL (ref 0–0.3)
BILIRUB SERPL-MCNC: 0.4 MG/DL
BUN SERPL-MCNC: 33.2 MG/DL (ref 6–20)
CALCIUM SERPL-MCNC: 9.7 MG/DL (ref 8.6–10)
CHLORIDE SERPL-SCNC: 103 MMOL/L (ref 98–107)
CHOLEST SERPL-MCNC: 155 MG/DL
CREAT SERPL-MCNC: 1.45 MG/DL (ref 0.51–0.95)
CREAT UR-MCNC: 61.3 MG/DL
DEPRECATED HCO3 PLAS-SCNC: 22 MMOL/L (ref 22–29)
ERYTHROCYTE [DISTWIDTH] IN BLOOD BY AUTOMATED COUNT: 14.8 % (ref 10–15)
GFR SERPL CREATININE-BSD FRML MDRD: 43 ML/MIN/1.73M2
GLUCOSE SERPL-MCNC: 97 MG/DL (ref 70–99)
HBA1C MFR BLD: 5.2 % (ref 0–5.6)
HCT VFR BLD AUTO: 40.5 % (ref 35–47)
HDLC SERPL-MCNC: 99 MG/DL
HGB BLD-MCNC: 12.9 G/DL (ref 11.7–15.7)
LDLC SERPL CALC-MCNC: 42 MG/DL
LIPASE SERPL-CCNC: 41 U/L (ref 13–60)
MCH RBC QN AUTO: 30.7 PG (ref 26.5–33)
MCHC RBC AUTO-ENTMCNC: 31.9 G/DL (ref 31.5–36.5)
MCV RBC AUTO: 96 FL (ref 78–100)
NONHDLC SERPL-MCNC: 56 MG/DL
PLATELET # BLD AUTO: 286 10E3/UL (ref 150–450)
POTASSIUM SERPL-SCNC: 5.6 MMOL/L (ref 3.4–5.3)
PROT SERPL-MCNC: 6.9 G/DL (ref 6.4–8.3)
PROT/CREAT 24H UR: 0.18 MG/MG CR (ref 0–0.2)
RBC # BLD AUTO: 4.2 10E6/UL (ref 3.8–5.2)
SODIUM SERPL-SCNC: 138 MMOL/L (ref 136–145)
TRIGL SERPL-MCNC: 70 MG/DL
URATE SERPL-MCNC: 6.9 MG/DL (ref 2.4–5.7)
WBC # BLD AUTO: 3.9 10E3/UL (ref 4–11)

## 2023-04-03 PROCEDURE — 80061 LIPID PANEL: CPT

## 2023-04-03 PROCEDURE — 80053 COMPREHEN METABOLIC PANEL: CPT

## 2023-04-03 PROCEDURE — 82150 ASSAY OF AMYLASE: CPT

## 2023-04-03 PROCEDURE — 36415 COLL VENOUS BLD VENIPUNCTURE: CPT

## 2023-04-03 PROCEDURE — 84550 ASSAY OF BLOOD/URIC ACID: CPT

## 2023-04-03 PROCEDURE — 83690 ASSAY OF LIPASE: CPT

## 2023-04-03 PROCEDURE — 80180 DRUG SCRN QUAN MYCOPHENOLATE: CPT

## 2023-04-03 PROCEDURE — 83036 HEMOGLOBIN GLYCOSYLATED A1C: CPT

## 2023-04-03 PROCEDURE — 84156 ASSAY OF PROTEIN URINE: CPT

## 2023-04-03 PROCEDURE — 85027 COMPLETE CBC AUTOMATED: CPT

## 2023-04-03 PROCEDURE — 82248 BILIRUBIN DIRECT: CPT

## 2023-04-04 ENCOUNTER — OFFICE VISIT (OUTPATIENT)
Dept: NEPHROLOGY | Facility: CLINIC | Age: 53
End: 2023-04-04
Attending: INTERNAL MEDICINE
Payer: COMMERCIAL

## 2023-04-04 VITALS
BODY MASS INDEX: 30.19 KG/M2 | HEART RATE: 80 BPM | SYSTOLIC BLOOD PRESSURE: 141 MMHG | WEIGHT: 159.8 LBS | DIASTOLIC BLOOD PRESSURE: 71 MMHG | OXYGEN SATURATION: 99 % | TEMPERATURE: 98 F

## 2023-04-04 DIAGNOSIS — Z48.298 AFTERCARE FOLLOWING ORGAN TRANSPLANT: ICD-10-CM

## 2023-04-04 DIAGNOSIS — D84.9 IMMUNOSUPPRESSION (H): ICD-10-CM

## 2023-04-04 DIAGNOSIS — Z94.83 PANCREAS REPLACED BY TRANSPLANT (H): ICD-10-CM

## 2023-04-04 DIAGNOSIS — Z94.0 HTN, KIDNEY TRANSPLANT RELATED: ICD-10-CM

## 2023-04-04 DIAGNOSIS — Z94.0 KIDNEY REPLACED BY TRANSPLANT: Primary | ICD-10-CM

## 2023-04-04 DIAGNOSIS — I15.1 HTN, KIDNEY TRANSPLANT RELATED: ICD-10-CM

## 2023-04-04 DIAGNOSIS — E87.5 HYPERKALEMIA: ICD-10-CM

## 2023-04-04 PROBLEM — N18.31 ANEMIA IN STAGE 3A CHRONIC KIDNEY DISEASE (H): Status: ACTIVE | Noted: 2018-02-28

## 2023-04-04 LAB
MYCOPHENOLATE SERPL LC/MS/MS-MCNC: 1.99 MG/L (ref 1–3.5)
MYCOPHENOLATE-G SERPL LC/MS/MS-MCNC: 50.6 MG/L (ref 30–95)
TME LAST DOSE: NORMAL H
TME LAST DOSE: NORMAL H

## 2023-04-04 PROCEDURE — G0463 HOSPITAL OUTPT CLINIC VISIT: HCPCS | Performed by: INTERNAL MEDICINE

## 2023-04-04 PROCEDURE — 99214 OFFICE O/P EST MOD 30 MIN: CPT | Performed by: INTERNAL MEDICINE

## 2023-04-04 RX ORDER — SULFAMETHOXAZOLE AND TRIMETHOPRIM 400; 80 MG/1; MG/1
1 TABLET ORAL
Qty: 13 TABLET | Refills: 11 | COMMUNITY
Start: 2023-04-05 | End: 2023-04-26

## 2023-04-04 ASSESSMENT — PAIN SCALES - GENERAL: PAINLEVEL: NO PAIN (0)

## 2023-04-04 NOTE — LETTER
4/4/2023       RE: Eden Hess  [address]  [place] [address]     Dear Colleague,    Thank you for referring your patient, Eden Hess, to the [facility] [place] at [facility]. Please see a copy of my visit note below.    TRANSPLANT NEPHROLOGY CHRONIC POST TRANSPLANT VISIT    Assessment & Plan   # DDKT (SPK): Stable   - Baseline Creatinine:  ~ 1.3-1.5   - Proteinuria: Normal (<0.2 grams)   - Date DSA Last Checked: Jan/2023      Latest DSA: No   - BK Viremia: No   - Kidney Tx Biopsy: No    -Recheck labs on Monday 4/10     # Pancreas Tx (SPK):    - Pancreatic Exocrine Drainage: Enteric drained     - Blood glucose: Euglycemia      On insulin: No   - HbA1c: Trend down      Latest HbA1c: 5.2%   - Pancreatic enzymes: Stable   - Date DSA Last Checked: Jan/2023  Latest DSA: No   - Pancreas Tx Biopsy: No    -Decrease ASA to 81mg daily     # Immunosuppression: Tacrolimus immediate release (goal 8-10) and Mycophenolic acid (dose 540 mg every 12 hours)   - Continue with intensive monitoring of immunosuppression for efficacy and toxicity.   - Changes: Not at this time    # Infection Prophylaxis:   - PJP: Sulfa/TMP (Bactrim), hold until Monday 4/10 then restart MWF. Obtain T cell subset and G6PD    # Hypertension: Controlled;  Goal BP: < 130/80   - Changes: Not at this time. Continue coreg 12.5mg bid    # Anemia in Chronic Renal Disease: Hgb: Stable      SHANEKA: No   - Iron studies: Replete    # Mineral Bone Disorder:   - Secondary renal hyperparathyroidism; PTH level: Minimally elevated ( pg/ml)        On treatment: None  - Vitamin D; level: Normal        On supplement: No  - Calcium; level: Normal        On supplement: No  - Phosphorus; level: Normal        On supplement: No    # Electrolytes:   - Potassium; level: High        On supplement: No  - Magnesium; level: Normal        On supplement: Yes  - Bicarbonate; level: Normal         On supplement: No      # Hx of C.diff colitis:               -Diagnosed 10/7/22 initially and received prolonged PO vanc taper, diagnosed again 3/2/23, prescribed PO vanc 125mg qid x2 weeks, BID x2 weeks, daily x2 weeks, then every other day x2 weeks. She is currently taking PO vanc daily.    -She thinks diarrhea is currently controlled   -IgG level 1070 3/2023     # Hyperkalemia:   -Hold bactrim, restart MWF on 4/10    # Elevated LFTs:   -Recheck on Monday 4/10/23 as we will be holding bactrim      # H/o TIA (2017): No residual deficits.    # Skin Cancer Risk:    - Discussed sun protection and recommend regular follow up with Dermatology.    # Medical Compliance: Yes    # COVID-19 Virus Review: Discussed COVID-19 virus and the potential medical risks.  Reviewed preventative health recommendations, including wearing a mask where appropriate.  Recommended COVID vaccination should be up to date with either an initial vaccination or booster shot when appropriate.  Asked the patient to inform the transplant center if they are exposed or diagnosed with this virus.    # COVID Vaccination Up To Date: No, due for next dose    # Transplant History:  Etiology of Kidney Failure: Diabetes mellitus type 1  Tx: SPK  Transplant: 9/23/2022 (Kidney / Pancreas)  Significant changes in immunosuppression: None  Significant transplant-related complications: None    Transplant Office Phone Number: [phone]    Assessment and plan was discussed with the patient and she voiced her understanding and agreement.    Return visit: Return in about 3 months (around 7/4/2023).    Rodo Zuniga MD    Chief Complaint   Ms. Hess is a 52 year old here for kidney transplant, pancreas transplant and immunosuppression management.    History of Present Illness    The patient overall feels well. She denies any recent hospitalizations. She denies nausea, vomiting, diarrhea, fever, chills, shortness of breath, chest pain, LE edema, unintentional  weight loss, nights sweats, dysuria, hematuria.       Home BP: Not checked    Problem List   Patient Active Problem List   Diagnosis     Major depression in complete remission (H)     Diabetes mellitus type 1 (H)     Anemia in chronic renal disease     TIA (transient ischemic attack)     HTN, kidney transplant related     Anxiety and depression     (HFpEF) heart failure with preserved ejection fraction (H)     Pancreas replaced by transplant (H)     Kidney replaced by transplant     Immunosuppression (H)     Aftercare following organ transplant     Chronic kidney disease, stage 3a (H)     Vitamin D deficiency     Secondary renal hyperparathyroidism (H)       Allergies   Allergies   Allergen Reactions     Amlodipine      Other reaction(s): Edema,generalized       Medications   Current Outpatient Medications   Medication Sig     aspirin (ASA) 81 MG EC tablet Take 1 tablet (81 mg) by mouth daily for 90 days     atorvastatin (LIPITOR) 20 MG tablet Take 1 tablet (20 mg) by mouth every evening     calcium carbonate-vitamin D (OS-BARBARA WITH D) 500-200 MG-UNIT tablet Take 1 tablet by mouth 2 times daily (with meals)     carvedilol (COREG) 12.5 MG tablet Take 1 tablet (12.5 mg) by mouth 2 times daily (with meals)     FIBER ADULT GUMMIES PO Take 1 Gum by mouth daily     mycophenolic acid (GENERIC EQUIVALENT) 180 MG EC tablet Take 3 tablets (540 mg) by mouth 2 times daily     [START ON 4/5/2023] sulfamethoxazole-trimethoprim (BACTRIM) 400-80 MG tablet Take 1 tablet by mouth three times a week     tacrolimus (GENERIC EQUIVALENT) 0.5 MG capsule Take 2 capsules (1 mg) by mouth every morning AND 1 capsule (0.5 mg) every evening. Total dose = 1 mg in the AM and 0.5 mg in the PM     tacrolimus (GENERIC EQUIVALENT) 1 MG capsule Take 1 capsule (1 mg) by mouth every morning Total dose = 1 mg in the AM and 0.5 mg in the PM     vancomycin (VANCOCIN) 125 MG capsule Take 1 capsule (125 mg) by mouth 4 times daily for 14 days, THEN 1 capsule  (125 mg) 2 times daily for 14 days, THEN 1 capsule (125 mg) daily for 14 days, THEN 1 capsule (125 mg) every other day for 14 days.     venlafaxine (EFFEXOR XR) 150 MG 24 hr capsule Take 1 capsule (150 mg) by mouth daily     No current facility-administered medications for this visit.     Medications Discontinued During This Encounter   Medication Reason     sulfamethoxazole-trimethoprim (BACTRIM) 400-80 MG tablet      magnesium oxide (MAG-OX) 400 MG tablet      aspirin (ASA) 325 MG EC tablet        Physical Exam   Vital Signs: BP (!) 141/71   Pulse 80   Temp 98  F (36.7  C) (Oral)   Wt 72.5 kg (159 lb 12.8 oz)   SpO2 99%   BMI 30.19 kg/m      GENERAL APPEARANCE: alert and no distress  HENT: mouth without ulcers or lesions  LYMPHATICS: no cervical or supraclavicular nodes  RESP: lungs clear to auscultation - no rales, rhonchi or wheezes  CV: regular rhythm, normal rate, no rub, no murmur  EDEMA: no LE edema bilaterally  ABDOMEN: soft, nondistended, nontender, bowel sounds normal  MS: extremities normal - no gross deformities noted, no evidence of inflammation in joints, no muscle tenderness  SKIN: no rash  NEURO: normal strength and tone, sensory exam grossly normal, mentation intact and speech normal  PSYCH: mentation appears normal and affect normal/bright  TX KIDNEY: normal  DIALYSIS ACCESS:  RUE AV fistula with good thrill      Data         Latest Ref Rng & Units 4/3/2023    10:01 AM 3/22/2023    11:01 AM 3/7/2023     9:59 AM   Renal   Sodium 136 - 145 mmol/L 138   138   138     K 3.4 - 5.3 mmol/L 5.6   5.0   5.0     Cl 98 - 107 mmol/L 103   105   104     Cl (external) 98 - 107 mmol/L 103   105   104     CO2 22 - 29 mmol/L 22   20   22     Urea Nitrogen 6.0 - 20.0 mg/dL 33.2   31.5   24.2     Creatinine 0.51 - 0.95 mg/dL 1.45   1.33   1.30     Glucose 70 - 99 mg/dL 97   87   93     Calcium 8.6 - 10.0 mg/dL 9.7   9.8   9.6           Latest Ref Rng & Units 3/1/2023     9:07 AM 2/22/2023    10:03 AM 1/31/2023      9:57 AM   Bone Health   Phosphorus 2.5 - 4.5 mg/dL 4.4   3.8   3.4     Parathyroid Hormone Intact 15 - 65 pg/mL   85           Latest Ref Rng & Units 4/3/2023    10:01 AM 3/22/2023    11:01 AM 3/7/2023     9:59 AM   Heme   WBC 4.0 - 11.0 10e3/uL 3.9   3.3   4.4     Hgb 11.7 - 15.7 g/dL 12.9   12.7   12.0     Plt 150 - 450 10e3/uL 286   296   285           Latest Ref Rng & Units 4/3/2023    10:01 AM 10/13/2022    11:02 AM 9/22/2022     4:29 PM   Liver   AP 35 - 104 U/L 91   161   89     TBili <=1.2 mg/dL 0.4   0.3   0.3     Bilirubin Direct 0.0 - 0.2 mg/dL  <0.1      Bilirubin Direct 0.00 - 0.30 mg/dL <0.20       ALT 10 - 35 U/L 48   40   18     AST 10 - 35 U/L 51   21   29     Tot Protein 6.4 - 8.3 g/dL 6.9   6.6   6.9     Albumin 3.4 - 5.0 g/dL  3.3      Albumin 3.5 - 5.2 g/dL 4.2    4.1           Latest Ref Rng & Units 4/3/2023    10:01 AM 3/22/2023    11:01 AM 3/7/2023     9:59 AM   Pancreas   A1C 0.0 - 5.6 % 5.2       Amylase 28 - 100 U/L 71   76   73     Lipase (Roche) 13 - 60 U/L 41   51   49           Latest Ref Rng & Units 10/3/2022     8:09 AM   Iron studies   Iron 37 - 145 ug/dL 34     Iron Sat Index 15 - 46 % 17     Ferritin 11 - 328 ng/mL 923           Latest Ref Rng & Units 2/22/2023    10:03 AM 11/28/2022     8:30 AM 9/22/2022     4:29 PM   [facility] Txp Virology   CMV QUANT IU/ML Not Detected IU/mL Not Detected   Not Detected   Not Detected     EBV CAPSID ANTIBODY IGG No detectable antibody.   Positive          Recent Labs   Lab Test 01/25/23  1044 02/22/23  1003 03/07/23  0959   DOSTAC 1/24/2023 2/21/2023 3/6/2023   TACROL 10.2 9.1 9.1     Recent Labs   Lab Test 03/07/23  0959 03/14/23  1024 03/22/23  1101   DOSMPA 3/6/2023  10:30 PM 3/13/2023  10:00 PM 3/21/2023  11:15 PM   MPACID 16.56* 13.96* 0.57*   MPAG 93.3 31.5 25.3*       Again, thank you for allowing me to participate in the care of your patient.      Sincerely,    Rodo Zuniga MD

## 2023-04-04 NOTE — PROGRESS NOTES
TRANSPLANT NEPHROLOGY CHRONIC POST TRANSPLANT VISIT    Assessment & Plan   # DDKT (SPK): Stable   - Baseline Creatinine:  ~ 1.3-1.5   - Proteinuria: Normal (<0.2 grams)   - Date DSA Last Checked: Jan/2023      Latest DSA: No   - BK Viremia: No   - Kidney Tx Biopsy: No    -Recheck labs on Monday 4/10     # Pancreas Tx (SPK):    - Pancreatic Exocrine Drainage: Enteric drained     - Blood glucose: Euglycemia      On insulin: No   - HbA1c: Trend down      Latest HbA1c: 5.2%   - Pancreatic enzymes: Stable   - Date DSA Last Checked: Jan/2023  Latest DSA: No   - Pancreas Tx Biopsy: No    -Decrease ASA to 81mg daily     # Immunosuppression: Tacrolimus immediate release (goal 8-10) and Mycophenolic acid (dose 540 mg every 12 hours)   - Continue with intensive monitoring of immunosuppression for efficacy and toxicity.   - Changes: Not at this time    # Infection Prophylaxis:   - PJP: Sulfa/TMP (Bactrim), hold until Monday 4/10 then restart MWF. Obtain T cell subset and G6PD    # Hypertension: Controlled;  Goal BP: < 130/80   - Changes: Not at this time. Continue coreg 12.5mg bid    # Anemia in Chronic Renal Disease: Hgb: Stable      SHANEKA: No   - Iron studies: Replete    # Mineral Bone Disorder:   - Secondary renal hyperparathyroidism; PTH level: Minimally elevated ( pg/ml)        On treatment: None  - Vitamin D; level: Normal        On supplement: No  - Calcium; level: Normal        On supplement: No  - Phosphorus; level: Normal        On supplement: No    # Electrolytes:   - Potassium; level: High        On supplement: No  - Magnesium; level: Normal        On supplement: Yes  - Bicarbonate; level: Normal        On supplement: No      # Hx of C.diff colitis:               -Diagnosed 10/7/22 initially and received prolonged PO vanc taper, diagnosed again 3/2/23, prescribed PO vanc 125mg qid x2 weeks, BID x2 weeks, daily x2 weeks, then every other day x2 weeks. She is currently taking PO vanc daily.    -She thinks diarrhea  is currently controlled   -IgG level 1070 3/2023     # Hyperkalemia:   -Hold bactrim, restart MWF on 4/10    # Elevated LFTs:   -Recheck on Monday 4/10/23 as we will be holding bactrim      # H/o TIA (2017): No residual deficits.    # Skin Cancer Risk:    - Discussed sun protection and recommend regular follow up with Dermatology.    # Medical Compliance: Yes    # COVID-19 Virus Review: Discussed COVID-19 virus and the potential medical risks.  Reviewed preventative health recommendations, including wearing a mask where appropriate.  Recommended COVID vaccination should be up to date with either an initial vaccination or booster shot when appropriate.  Asked the patient to inform the transplant center if they are exposed or diagnosed with this virus.    # COVID Vaccination Up To Date: No, due for next dose    # Transplant History:  Etiology of Kidney Failure: Diabetes mellitus type 1  Tx: SPK  Transplant: 9/23/2022 (Kidney / Pancreas)  Significant changes in immunosuppression: None  Significant transplant-related complications: None    Transplant Office Phone Number: [phone]    Assessment and plan was discussed with the patient and she voiced her understanding and agreement.    Return visit: Return in about 3 months (around 7/4/2023).    Rodo Zuniga MD    Chief Complaint   Ms. Hess is a 52 year old here for kidney transplant, pancreas transplant and immunosuppression management.    History of Present Illness    The patient overall feels well. She denies any recent hospitalizations. She denies nausea, vomiting, diarrhea, fever, chills, shortness of breath, chest pain, LE edema, unintentional weight loss, nights sweats, dysuria, hematuria.       Home BP: Not checked    Problem List   Patient Active Problem List   Diagnosis     Major depression in complete remission (H)     Diabetes mellitus type 1 (H)     Anemia in chronic renal disease     TIA (transient ischemic attack)     HTN, kidney transplant related      Anxiety and depression     (HFpEF) heart failure with preserved ejection fraction (H)     Pancreas replaced by transplant (H)     Kidney replaced by transplant     Immunosuppression (H)     Aftercare following organ transplant     Chronic kidney disease, stage 3a (H)     Vitamin D deficiency     Secondary renal hyperparathyroidism (H)       Allergies   Allergies   Allergen Reactions     Amlodipine      Other reaction(s): Edema,generalized       Medications   Current Outpatient Medications   Medication Sig     aspirin (ASA) 81 MG EC tablet Take 1 tablet (81 mg) by mouth daily for 90 days     atorvastatin (LIPITOR) 20 MG tablet Take 1 tablet (20 mg) by mouth every evening     calcium carbonate-vitamin D (OS-BARBARA WITH D) 500-200 MG-UNIT tablet Take 1 tablet by mouth 2 times daily (with meals)     carvedilol (COREG) 12.5 MG tablet Take 1 tablet (12.5 mg) by mouth 2 times daily (with meals)     FIBER ADULT GUMMIES PO Take 1 Gum by mouth daily     mycophenolic acid (GENERIC EQUIVALENT) 180 MG EC tablet Take 3 tablets (540 mg) by mouth 2 times daily     [START ON 4/5/2023] sulfamethoxazole-trimethoprim (BACTRIM) 400-80 MG tablet Take 1 tablet by mouth three times a week     tacrolimus (GENERIC EQUIVALENT) 0.5 MG capsule Take 2 capsules (1 mg) by mouth every morning AND 1 capsule (0.5 mg) every evening. Total dose = 1 mg in the AM and 0.5 mg in the PM     tacrolimus (GENERIC EQUIVALENT) 1 MG capsule Take 1 capsule (1 mg) by mouth every morning Total dose = 1 mg in the AM and 0.5 mg in the PM     vancomycin (VANCOCIN) 125 MG capsule Take 1 capsule (125 mg) by mouth 4 times daily for 14 days, THEN 1 capsule (125 mg) 2 times daily for 14 days, THEN 1 capsule (125 mg) daily for 14 days, THEN 1 capsule (125 mg) every other day for 14 days.     venlafaxine (EFFEXOR XR) 150 MG 24 hr capsule Take 1 capsule (150 mg) by mouth daily     No current facility-administered medications for this visit.     Medications Discontinued During  This Encounter   Medication Reason     sulfamethoxazole-trimethoprim (BACTRIM) 400-80 MG tablet      magnesium oxide (MAG-OX) 400 MG tablet      aspirin (ASA) 325 MG EC tablet        Physical Exam   Vital Signs: BP (!) 141/71   Pulse 80   Temp 98  F (36.7  C) (Oral)   Wt 72.5 kg (159 lb 12.8 oz)   SpO2 99%   BMI 30.19 kg/m      GENERAL APPEARANCE: alert and no distress  HENT: mouth without ulcers or lesions  LYMPHATICS: no cervical or supraclavicular nodes  RESP: lungs clear to auscultation - no rales, rhonchi or wheezes  CV: regular rhythm, normal rate, no rub, no murmur  EDEMA: no LE edema bilaterally  ABDOMEN: soft, nondistended, nontender, bowel sounds normal  MS: extremities normal - no gross deformities noted, no evidence of inflammation in joints, no muscle tenderness  SKIN: no rash  NEURO: normal strength and tone, sensory exam grossly normal, mentation intact and speech normal  PSYCH: mentation appears normal and affect normal/bright  TX KIDNEY: normal  DIALYSIS ACCESS:  RUE AV fistula with good thrill      Data         Latest Ref Rng & Units 4/3/2023    10:01 AM 3/22/2023    11:01 AM 3/7/2023     9:59 AM   Renal   Sodium 136 - 145 mmol/L 138   138   138     K 3.4 - 5.3 mmol/L 5.6   5.0   5.0     Cl 98 - 107 mmol/L 103   105   104     Cl (external) 98 - 107 mmol/L 103   105   104     CO2 22 - 29 mmol/L 22   20   22     Urea Nitrogen 6.0 - 20.0 mg/dL 33.2   31.5   24.2     Creatinine 0.51 - 0.95 mg/dL 1.45   1.33   1.30     Glucose 70 - 99 mg/dL 97   87   93     Calcium 8.6 - 10.0 mg/dL 9.7   9.8   9.6           Latest Ref Rng & Units 3/1/2023     9:07 AM 2/22/2023    10:03 AM 1/31/2023     9:57 AM   Bone Health   Phosphorus 2.5 - 4.5 mg/dL 4.4   3.8   3.4     Parathyroid Hormone Intact 15 - 65 pg/mL   85           Latest Ref Rng & Units 4/3/2023    10:01 AM 3/22/2023    11:01 AM 3/7/2023     9:59 AM   Heme   WBC 4.0 - 11.0 10e3/uL 3.9   3.3   4.4     Hgb 11.7 - 15.7 g/dL 12.9   12.7   12.0     Plt 150  - 450 10e3/uL 286   296   285           Latest Ref Rng & Units 4/3/2023    10:01 AM 10/13/2022    11:02 AM 9/22/2022     4:29 PM   Liver   AP 35 - 104 U/L 91   161   89     TBili <=1.2 mg/dL 0.4   0.3   0.3     Bilirubin Direct 0.0 - 0.2 mg/dL  <0.1      Bilirubin Direct 0.00 - 0.30 mg/dL <0.20       ALT 10 - 35 U/L 48   40   18     AST 10 - 35 U/L 51   21   29     Tot Protein 6.4 - 8.3 g/dL 6.9   6.6   6.9     Albumin 3.4 - 5.0 g/dL  3.3      Albumin 3.5 - 5.2 g/dL 4.2    4.1           Latest Ref Rng & Units 4/3/2023    10:01 AM 3/22/2023    11:01 AM 3/7/2023     9:59 AM   Pancreas   A1C 0.0 - 5.6 % 5.2       Amylase 28 - 100 U/L 71   76   73     Lipase (Roche) 13 - 60 U/L 41   51   49           Latest Ref Rng & Units 10/3/2022     8:09 AM   Iron studies   Iron 37 - 145 ug/dL 34     Iron Sat Index 15 - 46 % 17     Ferritin 11 - 328 ng/mL 923           Latest Ref Rng & Units 2/22/2023    10:03 AM 11/28/2022     8:30 AM 9/22/2022     4:29 PM   [facility] Txp Virology   CMV QUANT IU/ML Not Detected IU/mL Not Detected   Not Detected   Not Detected     EBV CAPSID ANTIBODY IGG No detectable antibody.   Positive          Recent Labs   Lab Test 01/25/23  1044 02/22/23  1003 03/07/23  0959   DOSTAC 1/24/2023 2/21/2023 3/6/2023   TACROL 10.2 9.1 9.1     Recent Labs   Lab Test 03/07/23  0959 03/14/23  1024 03/22/23  1101   DOSMPA 3/6/2023  10:30 PM 3/13/2023  10:00 PM 3/21/2023  11:15 PM   MPACID 16.56* 13.96* 0.57*   MPAG 93.3 31.5 25.3*

## 2023-04-04 NOTE — NURSING NOTE
Chief Complaint   Patient presents with     RECHECK       BP (!) 141/71   Pulse 80   Temp 98  F (36.7  C) (Oral)   Wt 72.5 kg (159 lb 12.8 oz)   SpO2 99%   BMI 30.19 kg/m      Arron Clemente on 4/4/2023 at 3:32 PM

## 2023-04-04 NOTE — PATIENT INSTRUCTIONS
Patient Recommendations:  -Hold bactrim until Monday 4/10 and then restart it on Monday, Wednesday, and Friday.   -Stop magnesium  -Repeat labs on Monday April 10th  -Change aspirin to 81mg daily   -Please check your blood pressure daily and log it. Your blood pressure goal is less than 130/80    Transplant Patient Information  Your Post Transplant Coordinator is: Giovanna Kovacs  For non urgent items, we encourage you to contact your coordinator/care team online via [place]  You and your care team can also contact your transplant coordinator Monday - Friday, 8am - 5pm at [phone] (Option 2 to reach the coordinator or Option 4 to schedule an appointment).  After hours for urgent matters, please call [facility] at [phone].

## 2023-04-05 DIAGNOSIS — Z48.298 AFTERCARE FOLLOWING ORGAN TRANSPLANT: Primary | ICD-10-CM

## 2023-04-23 ENCOUNTER — HEALTH MAINTENANCE LETTER (OUTPATIENT)
Age: 53
End: 2023-04-23

## 2023-04-25 ENCOUNTER — LAB (OUTPATIENT)
Dept: LAB | Facility: CLINIC | Age: 53
End: 2023-04-25
Payer: COMMERCIAL

## 2023-04-25 DIAGNOSIS — Z48.298 AFTERCARE FOLLOWING ORGAN TRANSPLANT: ICD-10-CM

## 2023-04-25 LAB
AMYLASE SERPL-CCNC: 70 U/L (ref 28–100)
ANION GAP SERPL CALCULATED.3IONS-SCNC: 12 MMOL/L (ref 7–15)
BUN SERPL-MCNC: 25.6 MG/DL (ref 6–20)
CALCIUM SERPL-MCNC: 9.6 MG/DL (ref 8.6–10)
CHLORIDE SERPL-SCNC: 103 MMOL/L (ref 98–107)
CREAT SERPL-MCNC: 1.21 MG/DL (ref 0.51–0.95)
DEPRECATED HCO3 PLAS-SCNC: 23 MMOL/L (ref 22–29)
ERYTHROCYTE [DISTWIDTH] IN BLOOD BY AUTOMATED COUNT: 14.7 % (ref 10–15)
GFR SERPL CREATININE-BSD FRML MDRD: 53 ML/MIN/1.73M2
GLUCOSE SERPL-MCNC: 100 MG/DL (ref 70–99)
HCT VFR BLD AUTO: 40 % (ref 35–47)
HGB BLD-MCNC: 12.7 G/DL (ref 11.7–15.7)
LIPASE SERPL-CCNC: 38 U/L (ref 13–60)
MCH RBC QN AUTO: 30.8 PG (ref 26.5–33)
MCHC RBC AUTO-ENTMCNC: 31.8 G/DL (ref 31.5–36.5)
MCV RBC AUTO: 97 FL (ref 78–100)
PLATELET # BLD AUTO: 301 10E3/UL (ref 150–450)
POTASSIUM SERPL-SCNC: 4.4 MMOL/L (ref 3.4–5.3)
RBC # BLD AUTO: 4.12 10E6/UL (ref 3.8–5.2)
SODIUM SERPL-SCNC: 138 MMOL/L (ref 136–145)
TACROLIMUS BLD-MCNC: 9 UG/L (ref 5–15)
TME LAST DOSE: NORMAL H
TME LAST DOSE: NORMAL H
WBC # BLD AUTO: 5.2 10E3/UL (ref 4–11)

## 2023-04-25 PROCEDURE — 80048 BASIC METABOLIC PNL TOTAL CA: CPT

## 2023-04-25 PROCEDURE — 86359 T CELLS TOTAL COUNT: CPT

## 2023-04-25 PROCEDURE — 83690 ASSAY OF LIPASE: CPT

## 2023-04-25 PROCEDURE — 86360 T CELL ABSOLUTE COUNT/RATIO: CPT

## 2023-04-25 PROCEDURE — 36415 COLL VENOUS BLD VENIPUNCTURE: CPT

## 2023-04-25 PROCEDURE — 82150 ASSAY OF AMYLASE: CPT

## 2023-04-25 PROCEDURE — 82955 ASSAY OF G6PD ENZYME: CPT | Mod: 90

## 2023-04-25 PROCEDURE — 80197 ASSAY OF TACROLIMUS: CPT

## 2023-04-25 PROCEDURE — 99000 SPECIMEN HANDLING OFFICE-LAB: CPT

## 2023-04-25 PROCEDURE — 85027 COMPLETE CBC AUTOMATED: CPT

## 2023-04-26 DIAGNOSIS — Z94.0 KIDNEY REPLACED BY TRANSPLANT: ICD-10-CM

## 2023-04-26 DIAGNOSIS — Z94.83 PANCREAS REPLACED BY TRANSPLANT (H): ICD-10-CM

## 2023-04-26 LAB
CD3 CELLS # BLD: 237 CELLS/UL (ref 603–2990)
CD3 CELLS NFR BLD: 50 % (ref 49–84)
CD3+CD4+ CELLS # BLD: 147 CELLS/UL (ref 441–2156)
CD3+CD4+ CELLS NFR BLD: 31 % (ref 28–63)
CD3+CD4+ CELLS/CD3+CD8+ CLL BLD: 1.77 % (ref 1.4–2.6)
CD3+CD8+ CELLS # BLD: 83 CELLS/UL (ref 125–1312)
CD3+CD8+ CELLS NFR BLD: 18 % (ref 10–40)
G6PD RBC-CCNT: 16.3 U/G HB
T CELL COMMENT: ABNORMAL

## 2023-04-26 RX ORDER — TACROLIMUS 0.5 MG/1
CAPSULE ORAL
Qty: 270 CAPSULE | Refills: 3 | Status: SHIPPED | OUTPATIENT
Start: 2023-04-26 | End: 2023-07-27

## 2023-04-26 RX ORDER — SULFAMETHOXAZOLE AND TRIMETHOPRIM 400; 80 MG/1; MG/1
1 TABLET ORAL
Qty: 45 TABLET | Refills: 3 | Status: SHIPPED | OUTPATIENT
Start: 2023-04-26 | End: 2024-06-27

## 2023-05-11 ENCOUNTER — LAB (OUTPATIENT)
Dept: LAB | Facility: CLINIC | Age: 53
End: 2023-05-11
Payer: COMMERCIAL

## 2023-05-11 DIAGNOSIS — Z94.0 KIDNEY REPLACED BY TRANSPLANT: ICD-10-CM

## 2023-05-11 DIAGNOSIS — Z79.899 ENCOUNTER FOR LONG-TERM CURRENT USE OF MEDICATION: ICD-10-CM

## 2023-05-11 DIAGNOSIS — Z48.298 AFTERCARE FOLLOWING ORGAN TRANSPLANT: ICD-10-CM

## 2023-05-11 LAB
ERYTHROCYTE [DISTWIDTH] IN BLOOD BY AUTOMATED COUNT: 13.4 % (ref 10–15)
HCT VFR BLD AUTO: 39 % (ref 35–47)
HGB BLD-MCNC: 13.1 G/DL (ref 11.7–15.7)
MCH RBC QN AUTO: 31.7 PG (ref 26.5–33)
MCHC RBC AUTO-ENTMCNC: 33.6 G/DL (ref 31.5–36.5)
MCV RBC AUTO: 94 FL (ref 78–100)
PLATELET # BLD AUTO: 279 10E3/UL (ref 150–450)
RBC # BLD AUTO: 4.13 10E6/UL (ref 3.8–5.2)
WBC # BLD AUTO: 5.5 10E3/UL (ref 4–11)

## 2023-05-11 PROCEDURE — 86832 HLA CLASS I HIGH DEFIN QUAL: CPT

## 2023-05-11 PROCEDURE — 80048 BASIC METABOLIC PNL TOTAL CA: CPT

## 2023-05-11 PROCEDURE — 83735 ASSAY OF MAGNESIUM: CPT

## 2023-05-11 PROCEDURE — 36415 COLL VENOUS BLD VENIPUNCTURE: CPT

## 2023-05-11 PROCEDURE — 80180 DRUG SCRN QUAN MYCOPHENOLATE: CPT

## 2023-05-11 PROCEDURE — 85027 COMPLETE CBC AUTOMATED: CPT

## 2023-05-11 PROCEDURE — 82150 ASSAY OF AMYLASE: CPT

## 2023-05-11 PROCEDURE — 84100 ASSAY OF PHOSPHORUS: CPT

## 2023-05-11 PROCEDURE — 86833 HLA CLASS II HIGH DEFIN QUAL: CPT

## 2023-05-11 PROCEDURE — 87799 DETECT AGENT NOS DNA QUANT: CPT

## 2023-05-11 PROCEDURE — 83690 ASSAY OF LIPASE: CPT

## 2023-05-12 LAB
AMYLASE SERPL-CCNC: 53 U/L (ref 28–100)
ANION GAP SERPL CALCULATED.3IONS-SCNC: 11 MMOL/L (ref 7–15)
BKV DNA # SPEC NAA+PROBE: NOT DETECTED COPIES/ML
BUN SERPL-MCNC: 20.1 MG/DL (ref 6–20)
CALCIUM SERPL-MCNC: 9 MG/DL (ref 8.6–10)
CHLORIDE SERPL-SCNC: 105 MMOL/L (ref 98–107)
CREAT SERPL-MCNC: 1.15 MG/DL (ref 0.51–0.95)
DEPRECATED HCO3 PLAS-SCNC: 21 MMOL/L (ref 22–29)
GFR SERPL CREATININE-BSD FRML MDRD: 56 ML/MIN/1.73M2
GLUCOSE SERPL-MCNC: 101 MG/DL (ref 70–99)
LIPASE SERPL-CCNC: 31 U/L (ref 13–60)
MAGNESIUM SERPL-MCNC: 1.4 MG/DL (ref 1.7–2.3)
MYCOPHENOLATE SERPL LC/MS/MS-MCNC: 1.58 MG/L (ref 1–3.5)
MYCOPHENOLATE-G SERPL LC/MS/MS-MCNC: 50.4 MG/L (ref 30–95)
PHOSPHATE SERPL-MCNC: 3.4 MG/DL (ref 2.5–4.5)
POTASSIUM SERPL-SCNC: 4.8 MMOL/L (ref 3.4–5.3)
SODIUM SERPL-SCNC: 137 MMOL/L (ref 136–145)
TME LAST DOSE: NORMAL H
TME LAST DOSE: NORMAL H

## 2023-05-15 PROBLEM — A49.8 CLOSTRIDIUM DIFFICILE INFECTION: Status: ACTIVE | Noted: 2022-10-07

## 2023-06-21 ENCOUNTER — TELEPHONE (OUTPATIENT)
Dept: TRANSPLANT | Facility: CLINIC | Age: 53
End: 2023-06-21
Payer: COMMERCIAL

## 2023-06-21 DIAGNOSIS — Z48.298 AFTERCARE FOLLOWING ORGAN TRANSPLANT: Primary | ICD-10-CM

## 2023-06-21 NOTE — TELEPHONE ENCOUNTER
ISSUE:  Overdue for labs     Left detailed VM, sent Njuicehart message.     Overdue for follow up, request sent.

## 2023-06-27 ENCOUNTER — LAB (OUTPATIENT)
Dept: LAB | Facility: CLINIC | Age: 53
End: 2023-06-27
Payer: COMMERCIAL

## 2023-06-27 DIAGNOSIS — Z79.899 ENCOUNTER FOR LONG-TERM CURRENT USE OF MEDICATION: ICD-10-CM

## 2023-06-27 DIAGNOSIS — Z48.298 AFTERCARE FOLLOWING ORGAN TRANSPLANT: ICD-10-CM

## 2023-06-27 DIAGNOSIS — Z94.0 KIDNEY REPLACED BY TRANSPLANT: ICD-10-CM

## 2023-06-27 LAB
ERYTHROCYTE [DISTWIDTH] IN BLOOD BY AUTOMATED COUNT: 12.8 % (ref 10–15)
HCT VFR BLD AUTO: 40 % (ref 35–47)
HGB BLD-MCNC: 13.4 G/DL (ref 11.7–15.7)
MCH RBC QN AUTO: 31.2 PG (ref 26.5–33)
MCHC RBC AUTO-ENTMCNC: 33.5 G/DL (ref 31.5–36.5)
MCV RBC AUTO: 93 FL (ref 78–100)
PLATELET # BLD AUTO: 314 10E3/UL (ref 150–450)
RBC # BLD AUTO: 4.29 10E6/UL (ref 3.8–5.2)
WBC # BLD AUTO: 4.3 10E3/UL (ref 4–11)

## 2023-06-27 PROCEDURE — 82150 ASSAY OF AMYLASE: CPT

## 2023-06-27 PROCEDURE — 85027 COMPLETE CBC AUTOMATED: CPT

## 2023-06-27 PROCEDURE — 80048 BASIC METABOLIC PNL TOTAL CA: CPT

## 2023-06-27 PROCEDURE — 80180 DRUG SCRN QUAN MYCOPHENOLATE: CPT

## 2023-06-27 PROCEDURE — 36415 COLL VENOUS BLD VENIPUNCTURE: CPT

## 2023-06-27 PROCEDURE — 84156 ASSAY OF PROTEIN URINE: CPT

## 2023-06-27 PROCEDURE — 83690 ASSAY OF LIPASE: CPT

## 2023-06-28 LAB
ALBUMIN MFR UR ELPH: 7.4 MG/DL
AMYLASE SERPL-CCNC: 66 U/L (ref 28–100)
ANION GAP SERPL CALCULATED.3IONS-SCNC: 9 MMOL/L (ref 7–15)
BUN SERPL-MCNC: 19.4 MG/DL (ref 6–20)
CALCIUM SERPL-MCNC: 9.6 MG/DL (ref 8.6–10)
CHLORIDE SERPL-SCNC: 107 MMOL/L (ref 98–107)
CREAT SERPL-MCNC: 1.25 MG/DL (ref 0.51–0.95)
CREAT UR-MCNC: 78.9 MG/DL
DEPRECATED HCO3 PLAS-SCNC: 24 MMOL/L (ref 22–29)
GFR SERPL CREATININE-BSD FRML MDRD: 51 ML/MIN/1.73M2
GLUCOSE SERPL-MCNC: 92 MG/DL (ref 70–99)
LIPASE SERPL-CCNC: 49 U/L (ref 13–60)
POTASSIUM SERPL-SCNC: 4.5 MMOL/L (ref 3.4–5.3)
PROT/CREAT 24H UR: 0.09 MG/MG CR (ref 0–0.2)
SODIUM SERPL-SCNC: 140 MMOL/L (ref 136–145)

## 2023-06-29 LAB
MYCOPHENOLATE SERPL LC/MS/MS-MCNC: 3.33 MG/L (ref 1–3.5)
MYCOPHENOLATE-G SERPL LC/MS/MS-MCNC: 65.5 MG/L (ref 30–95)
TME LAST DOSE: NORMAL H
TME LAST DOSE: NORMAL H

## 2023-07-11 ENCOUNTER — LAB (OUTPATIENT)
Dept: LAB | Facility: CLINIC | Age: 53
End: 2023-07-11
Payer: COMMERCIAL

## 2023-07-11 DIAGNOSIS — Z48.298 AFTERCARE FOLLOWING ORGAN TRANSPLANT: ICD-10-CM

## 2023-07-11 DIAGNOSIS — Z94.0 KIDNEY REPLACED BY TRANSPLANT: ICD-10-CM

## 2023-07-11 DIAGNOSIS — Z79.899 ENCOUNTER FOR LONG-TERM CURRENT USE OF MEDICATION: ICD-10-CM

## 2023-07-11 LAB
ERYTHROCYTE [DISTWIDTH] IN BLOOD BY AUTOMATED COUNT: 12.5 % (ref 10–15)
HCT VFR BLD AUTO: 39.3 % (ref 35–47)
HGB BLD-MCNC: 12.8 G/DL (ref 11.7–15.7)
MCH RBC QN AUTO: 31.1 PG (ref 26.5–33)
MCHC RBC AUTO-ENTMCNC: 32.6 G/DL (ref 31.5–36.5)
MCV RBC AUTO: 96 FL (ref 78–100)
PLATELET # BLD AUTO: 261 10E3/UL (ref 150–450)
RBC # BLD AUTO: 4.11 10E6/UL (ref 3.8–5.2)
WBC # BLD AUTO: 5.4 10E3/UL (ref 4–11)

## 2023-07-11 PROCEDURE — 80048 BASIC METABOLIC PNL TOTAL CA: CPT

## 2023-07-11 PROCEDURE — 80180 DRUG SCRN QUAN MYCOPHENOLATE: CPT

## 2023-07-11 PROCEDURE — 85027 COMPLETE CBC AUTOMATED: CPT

## 2023-07-11 PROCEDURE — 83690 ASSAY OF LIPASE: CPT

## 2023-07-11 PROCEDURE — 82150 ASSAY OF AMYLASE: CPT

## 2023-07-11 PROCEDURE — 36415 COLL VENOUS BLD VENIPUNCTURE: CPT

## 2023-07-12 LAB
AMYLASE SERPL-CCNC: 54 U/L (ref 28–100)
ANION GAP SERPL CALCULATED.3IONS-SCNC: 9 MMOL/L (ref 7–15)
BUN SERPL-MCNC: 23.1 MG/DL (ref 6–20)
CALCIUM SERPL-MCNC: 9.7 MG/DL (ref 8.6–10)
CHLORIDE SERPL-SCNC: 105 MMOL/L (ref 98–107)
CREAT SERPL-MCNC: 1.34 MG/DL (ref 0.51–0.95)
DEPRECATED HCO3 PLAS-SCNC: 24 MMOL/L (ref 22–29)
GFR SERPL CREATININE-BSD FRML MDRD: 47 ML/MIN/1.73M2
GLUCOSE SERPL-MCNC: 89 MG/DL (ref 70–99)
LIPASE SERPL-CCNC: 29 U/L (ref 13–60)
POTASSIUM SERPL-SCNC: 4.4 MMOL/L (ref 3.4–5.3)
SODIUM SERPL-SCNC: 138 MMOL/L (ref 136–145)

## 2023-07-14 LAB
MYCOPHENOLATE SERPL LC/MS/MS-MCNC: 3.13 MG/L (ref 1–3.5)
MYCOPHENOLATE-G SERPL LC/MS/MS-MCNC: 58.5 MG/L (ref 30–95)
TME LAST DOSE: NORMAL H
TME LAST DOSE: NORMAL H

## 2023-07-16 ENCOUNTER — HEALTH MAINTENANCE LETTER (OUTPATIENT)
Age: 53
End: 2023-07-16

## 2023-07-25 ENCOUNTER — LAB (OUTPATIENT)
Dept: LAB | Facility: CLINIC | Age: 53
End: 2023-07-25
Payer: COMMERCIAL

## 2023-07-25 DIAGNOSIS — Z94.0 KIDNEY REPLACED BY TRANSPLANT: ICD-10-CM

## 2023-07-25 DIAGNOSIS — Z48.298 AFTERCARE FOLLOWING ORGAN TRANSPLANT: ICD-10-CM

## 2023-07-25 DIAGNOSIS — Z79.899 ENCOUNTER FOR LONG-TERM CURRENT USE OF MEDICATION: ICD-10-CM

## 2023-07-25 LAB
ERYTHROCYTE [DISTWIDTH] IN BLOOD BY AUTOMATED COUNT: 12.7 % (ref 10–15)
HCT VFR BLD AUTO: 40.7 % (ref 35–47)
HGB BLD-MCNC: 13.1 G/DL (ref 11.7–15.7)
MCH RBC QN AUTO: 30.6 PG (ref 26.5–33)
MCHC RBC AUTO-ENTMCNC: 32.2 G/DL (ref 31.5–36.5)
MCV RBC AUTO: 95 FL (ref 78–100)
PLATELET # BLD AUTO: 308 10E3/UL (ref 150–450)
RBC # BLD AUTO: 4.28 10E6/UL (ref 3.8–5.2)
WBC # BLD AUTO: 4.6 10E3/UL (ref 4–11)

## 2023-07-25 PROCEDURE — 83735 ASSAY OF MAGNESIUM: CPT

## 2023-07-25 PROCEDURE — 36415 COLL VENOUS BLD VENIPUNCTURE: CPT

## 2023-07-25 PROCEDURE — 85027 COMPLETE CBC AUTOMATED: CPT

## 2023-07-25 PROCEDURE — 83690 ASSAY OF LIPASE: CPT

## 2023-07-25 PROCEDURE — 80197 ASSAY OF TACROLIMUS: CPT

## 2023-07-25 PROCEDURE — 80180 DRUG SCRN QUAN MYCOPHENOLATE: CPT

## 2023-07-25 PROCEDURE — 82150 ASSAY OF AMYLASE: CPT

## 2023-07-25 PROCEDURE — 80048 BASIC METABOLIC PNL TOTAL CA: CPT

## 2023-07-25 PROCEDURE — 84100 ASSAY OF PHOSPHORUS: CPT

## 2023-07-26 ENCOUNTER — TELEPHONE (OUTPATIENT)
Dept: TRANSPLANT | Facility: CLINIC | Age: 53
End: 2023-07-26
Payer: COMMERCIAL

## 2023-07-26 DIAGNOSIS — Z48.298 AFTERCARE FOLLOWING ORGAN TRANSPLANT: Primary | ICD-10-CM

## 2023-07-26 LAB
AMYLASE SERPL-CCNC: 68 U/L (ref 28–100)
ANION GAP SERPL CALCULATED.3IONS-SCNC: 11 MMOL/L (ref 7–15)
BUN SERPL-MCNC: 20 MG/DL (ref 6–20)
CALCIUM SERPL-MCNC: 9.9 MG/DL (ref 8.6–10)
CHLORIDE SERPL-SCNC: 106 MMOL/L (ref 98–107)
CREAT SERPL-MCNC: 1.24 MG/DL (ref 0.51–0.95)
DEPRECATED HCO3 PLAS-SCNC: 24 MMOL/L (ref 22–29)
GFR SERPL CREATININE-BSD FRML MDRD: 51 ML/MIN/1.73M2
GLUCOSE SERPL-MCNC: 102 MG/DL (ref 70–99)
LIPASE SERPL-CCNC: 45 U/L (ref 13–60)
MAGNESIUM SERPL-MCNC: 1.5 MG/DL (ref 1.7–2.3)
MYCOPHENOLATE SERPL LC/MS/MS-MCNC: 3.71 MG/L (ref 1–3.5)
MYCOPHENOLATE-G SERPL LC/MS/MS-MCNC: 69 MG/L (ref 30–95)
PHOSPHATE SERPL-MCNC: 3.9 MG/DL (ref 2.5–4.5)
POTASSIUM SERPL-SCNC: 4.5 MMOL/L (ref 3.4–5.3)
SODIUM SERPL-SCNC: 141 MMOL/L (ref 136–145)
TME LAST DOSE: ABNORMAL H
TME LAST DOSE: ABNORMAL H

## 2023-07-26 NOTE — TELEPHONE ENCOUNTER
Post Kidney and Pancreas Transplant Team Conference  Date: 7/26/2023  Transplant Coordinator: Codie Kovacs     Attendees:  []  Dr. Moore [] Kiara Beth, ANATOLY [] Maggie Cardenas LPN     []  Dr. Fernandez [x] Allie Dooley, ANATOLY [x] Olena Coronado LPN    [x] Dr. Zuniga [x] Giovanna Kovacs RN    [] Dr. Girard [x] Malgorzata Nguyen, RN [x] Ginna Caballero RN   [] Dr. Almonte [] Fariha Gordon, ANATOLY    [x] Dr. Rader [x] Martha Klein, ANATOLY    [x]  Dr. Felix [] Joselin Hart RN    [] Dr. Gómez [x] Taran Ledesma RN    [x] Sarah Islas NP [x] Francisca Camacho RN    [] Jacqueline Garcia NP [] Sondra Crawley RN        Verbal Plan Read Back:   Overdue for BK    Routed to RN Coordinator   Codie Kovacs RN

## 2023-07-27 ENCOUNTER — TELEPHONE (OUTPATIENT)
Dept: TRANSPLANT | Facility: CLINIC | Age: 53
End: 2023-07-27
Payer: COMMERCIAL

## 2023-07-27 DIAGNOSIS — Z94.83 PANCREAS REPLACED BY TRANSPLANT (H): ICD-10-CM

## 2023-07-27 DIAGNOSIS — Z94.0 KIDNEY REPLACED BY TRANSPLANT: ICD-10-CM

## 2023-07-27 LAB
TACROLIMUS BLD-MCNC: 6.5 UG/L (ref 5–15)
TME LAST DOSE: NORMAL H
TME LAST DOSE: NORMAL H

## 2023-07-27 RX ORDER — TACROLIMUS 0.5 MG/1
1 CAPSULE ORAL 2 TIMES DAILY
Qty: 360 CAPSULE | Refills: 3 | Status: SHIPPED | OUTPATIENT
Start: 2023-07-27 | End: 2023-10-21

## 2023-07-27 NOTE — TELEPHONE ENCOUNTER
ISSUE:   Tacrolimus IR level 6.5 on 7/26, goal 8-10, dose 1/0.5 mg BID.    PLAN:   Please call patient and confirm this was an accurate 12-hour trough. Verify Tacrolimus IR dose 1/0.5 mg BID. Confirm no new medications or illness. Confirm no missed doses. If accurate trough and accurate dose, increase Tacrolimus IR dose to 1 mg BID and repeat labs in 1 weeks    OUTCOME:   Spoke with patient, they confirm accurate trough level and current dose 1/0.5 mg BID. Patient confirmed dose change to 1 mg BID and to repeat labs in 1 weeks. Orders sent to preferred pharmacy for dose change and lab for repeat labs. Patient voiced understanding of plan.

## 2023-08-14 ENCOUNTER — OFFICE VISIT (OUTPATIENT)
Dept: URGENT CARE | Facility: URGENT CARE | Age: 53
End: 2023-08-14
Payer: COMMERCIAL

## 2023-08-14 ENCOUNTER — LAB (OUTPATIENT)
Dept: LAB | Facility: CLINIC | Age: 53
End: 2023-08-14
Payer: COMMERCIAL

## 2023-08-14 VITALS
OXYGEN SATURATION: 96 % | DIASTOLIC BLOOD PRESSURE: 72 MMHG | WEIGHT: 171.6 LBS | TEMPERATURE: 98.3 F | HEART RATE: 78 BPM | BODY MASS INDEX: 32.42 KG/M2 | SYSTOLIC BLOOD PRESSURE: 133 MMHG

## 2023-08-14 DIAGNOSIS — S60.511A CAT SCRATCH OF RIGHT HAND, INITIAL ENCOUNTER: Primary | ICD-10-CM

## 2023-08-14 DIAGNOSIS — Z79.899 ENCOUNTER FOR LONG-TERM CURRENT USE OF MEDICATION: ICD-10-CM

## 2023-08-14 DIAGNOSIS — Z94.0 KIDNEY REPLACED BY TRANSPLANT: ICD-10-CM

## 2023-08-14 DIAGNOSIS — L03.011 CELLULITIS OF FINGER OF RIGHT HAND: ICD-10-CM
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
## 2023-08-14 DIAGNOSIS — Z48.298 AFTERCARE FOLLOWING ORGAN TRANSPLANT: ICD-10-CM

## 2023-08-14 LAB
AMYLASE SERPL-CCNC: 48 U/L (ref 28–100)
ANION GAP SERPL CALCULATED.3IONS-SCNC: 10 MMOL/L (ref 7–15)
BUN SERPL-MCNC: 14.1 MG/DL (ref 6–20)
CALCIUM SERPL-MCNC: 9.8 MG/DL (ref 8.6–10)
CHLORIDE SERPL-SCNC: 105 MMOL/L (ref 98–107)
CREAT SERPL-MCNC: 0.97 MG/DL (ref 0.51–0.95)
DEPRECATED HCO3 PLAS-SCNC: 23 MMOL/L (ref 22–29)
ERYTHROCYTE [DISTWIDTH] IN BLOOD BY AUTOMATED COUNT: 12.5 % (ref 10–15)
GFR SERPL CREATININE-BSD FRML MDRD: 69 ML/MIN/1.73M2
GLUCOSE SERPL-MCNC: 103 MG/DL (ref 70–99)
HCT VFR BLD AUTO: 39.1 % (ref 35–47)
HGB BLD-MCNC: 13 G/DL (ref 11.7–15.7)
LIPASE SERPL-CCNC: 21 U/L (ref 13–60)
MCH RBC QN AUTO: 31 PG (ref 26.5–33)
MCHC RBC AUTO-ENTMCNC: 33.2 G/DL (ref 31.5–36.5)
MCV RBC AUTO: 93 FL (ref 78–100)
PLATELET # BLD AUTO: 280 10E3/UL (ref 150–450)
POTASSIUM SERPL-SCNC: 4.6 MMOL/L (ref 3.4–5.3)
RBC # BLD AUTO: 4.2 10E6/UL (ref 3.8–5.2)
SODIUM SERPL-SCNC: 138 MMOL/L (ref 136–145)
WBC # BLD AUTO: 7 10E3/UL (ref 4–11)

## 2023-08-14 PROCEDURE — 82150 ASSAY OF AMYLASE: CPT

## 2023-08-14 PROCEDURE — 80048 BASIC METABOLIC PNL TOTAL CA: CPT

## 2023-08-14 PROCEDURE — 80180 DRUG SCRN QUAN MYCOPHENOLATE: CPT

## 2023-08-14 PROCEDURE — 87799 DETECT AGENT NOS DNA QUANT: CPT

## 2023-08-14 PROCEDURE — 83690 ASSAY OF LIPASE: CPT

## 2023-08-14 PROCEDURE — 36415 COLL VENOUS BLD VENIPUNCTURE: CPT

## 2023-08-14 PROCEDURE — 99213 OFFICE O/P EST LOW 20 MIN: CPT | Performed by: EMERGENCY MEDICINE

## 2023-08-14 PROCEDURE — 80197 ASSAY OF TACROLIMUS: CPT

## 2023-08-14 PROCEDURE — 85027 COMPLETE CBC AUTOMATED: CPT

## 2023-08-14 NOTE — PATIENT INSTRUCTIONS
Follow up with any available provider in 2-3 days for recheck if worsening. If red streaks, worsening pain or cannot move finger or wrist go to the ER.

## 2023-08-14 NOTE — PROGRESS NOTES
Assessment & Plan     Diagnosis:    ICD-10-CM    1. Cat scratch of right hand, initial encounter  S60.511A amoxicillin-clavulanate (AUGMENTIN) 875-125 MG tablet    W55.03XA       2. Cellulitis of finger of right hand  L03.011 amoxicillin-clavulanate (AUGMENTIN) 875-125 MG tablet          Medical Decision Making:  Eden Hess is a 52 year old female who presents for evaluation of a cat bite and multiple scratches to the right hand two days ago. There are signs of cellulitis.  The workup here in the ED shows no signs of compartment syndrome, significant lacerations, tendon or bone injury.  No signs of foreign body or cat teeth in wound.  Cat is known and up to date on rabies series; so will have them observe for signs of rabies. The wounds are old and scabbed over without signs of abscess. No signs of tenosynovitis at this juncture.   Will start augmentin and have them observe for signs of wowrsening infection (pain, redness, warmth, red streaks, etc).  Patient go to the ER if this occurs.  Questions answered.    Jose Guadalupe Flores PA-C  [facility]    Subjective     Eden Hess is a 52 year old female who presents to clinic today for the following health issues:  Chief Complaint   Patient presents with    Hand Injury     PT C/O CAR BITE AND SCRATCHES ON RIGHT HAND, SWOLLEN, PAIN WHEN MOVING HAND, HAPPENED SATURDAY AROUND 7 PM, PT HAS USED PEROXIDE, BACITRACIN AND BANDAGE       HPI  Patient reports that 2 days ago around 7 PM she was scratched by her friend's cat on the right hand, there is multiple scratches, unclear if it was also bitten or not.  States that she cleaned the wounds out with soapy water and peroxide, was doing okay but she is now having more pain over the right hand; particularly near the middle finger.  Denies any fevers, red streaks going into the wrist, pain moving the wrist or inability to move the finger.  She states that her friend's cat is up-to-date on all rabies  vaccinations.    Review of Systems    See HPI    Objective      Vitals: /72 (BP Location: Left arm, Patient Position: Sitting, Cuff Size: Adult Regular)   Pulse 78   Temp 98.3  F (36.8  C) (Tympanic)   Wt 77.8 kg (171 lb 9.6 oz)   SpO2 96%   BMI 32.42 kg/m        Patient Vitals for the past 24 hrs:   BP Temp Temp src Pulse SpO2 Weight   08/14/23 1122 133/72 98.3  F (36.8  C) Tympanic 78 96 % 77.8 kg (171 lb 9.6 oz)       Vital signs reviewed by: Jose Guadalupe Flores PA-C    Physical Exam   Constitutional: Patient is alert and cooperative. No acute distress.  Cardiovascular: Regular rate and rhythm  Pulmonary/Chest: Lungs are clear to auscultation throughout. Effort normal. No respiratory distress. No wheezes, rales or rhonchi.  Neurological: Alert and oriented x3.   MSK/Skin: Right hand with multiple superficial scratches, 1 near the dorsum of the hand at the the third MCP joint with erythema and swelling surrounding this.  No fluctuance or areas of pointing.  No lymphangitis.  Compartments are soft throughout the hand and wrist.  Normal range of motion at the wrist and fingers.  Psychiatric:The patient has a normal mood and affect.       Jose Guadalupe Flores PA-C, August 14, 2023

## 2023-08-15 LAB
BKV DNA # SPEC NAA+PROBE: NOT DETECTED COPIES/ML
TACROLIMUS BLD-MCNC: 10.8 UG/L (ref 5–15)
TME LAST DOSE: NORMAL H
TME LAST DOSE: NORMAL H

## 2023-08-16 LAB
MYCOPHENOLATE SERPL LC/MS/MS-MCNC: 1.28 MG/L (ref 1–3.5)
MYCOPHENOLATE-G SERPL LC/MS/MS-MCNC: 50.5 MG/L (ref 30–95)
TME LAST DOSE: NORMAL H
TME LAST DOSE: NORMAL H

## 2023-08-17 ENCOUNTER — APPOINTMENT (OUTPATIENT)
Dept: GENERAL RADIOLOGY | Facility: CLINIC | Age: 53
DRG: 603 | End: 2023-08-17
Attending: EMERGENCY MEDICINE
Payer: COMMERCIAL

## 2023-08-17 ENCOUNTER — HOSPITAL ENCOUNTER (INPATIENT)
Facility: CLINIC | Age: 53
LOS: 4 days | Discharge: HOME HEALTH | DRG: 603 | End: 2023-08-21
Attending: EMERGENCY MEDICINE | Admitting: INTERNAL MEDICINE
Payer: COMMERCIAL

## 2023-08-17 ENCOUNTER — OFFICE VISIT (OUTPATIENT)
Dept: URGENT CARE | Facility: URGENT CARE | Age: 53
End: 2023-08-17
Payer: COMMERCIAL

## 2023-08-17 VITALS
TEMPERATURE: 98.1 F | OXYGEN SATURATION: 97 % | BODY MASS INDEX: 31.93 KG/M2 | SYSTOLIC BLOOD PRESSURE: 144 MMHG | HEART RATE: 77 BPM | DIASTOLIC BLOOD PRESSURE: 76 MMHG | RESPIRATION RATE: 17 BRPM | WEIGHT: 169 LBS

## 2023-08-17 DIAGNOSIS — Z23 NEED FOR PROPHYLACTIC VACCINATION AND INOCULATION AGAINST CHOLERA ALONE: ICD-10-CM

## 2023-08-17 DIAGNOSIS — S61.451A CAT BITE OF HAND, RIGHT, INITIAL ENCOUNTER: Primary | ICD-10-CM

## 2023-08-17 DIAGNOSIS — D84.9 IMMUNOCOMPROMISED PATIENT (H): ICD-10-CM

## 2023-08-17 DIAGNOSIS — Z23 NEED FOR TD VACCINE: ICD-10-CM

## 2023-08-17 DIAGNOSIS — S60.511A CAT SCRATCH OF RIGHT HAND, INITIAL ENCOUNTER: ICD-10-CM

## 2023-08-17 DIAGNOSIS — L03.011 CELLULITIS OF FINGER OF RIGHT HAND: ICD-10-CM

## 2023-08-17 DIAGNOSIS — Z94.83 PANCREAS REPLACED BY TRANSPLANT (H): ICD-10-CM

## 2023-08-17 DIAGNOSIS — Z94.0 KIDNEY REPLACED BY TRANSPLANT: ICD-10-CM
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
## 2023-08-17 DIAGNOSIS — L03.113 CELLULITIS OF RIGHT UPPER EXTREMITY: Primary | ICD-10-CM
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
## 2023-08-17 LAB
ANION GAP SERPL CALCULATED.3IONS-SCNC: 11 MMOL/L (ref 7–15)
BASOPHILS # BLD AUTO: 0.1 10E3/UL (ref 0–0.2)
BASOPHILS NFR BLD AUTO: 1 %
BUN SERPL-MCNC: 19.3 MG/DL (ref 6–20)
CALCIUM SERPL-MCNC: 10 MG/DL (ref 8.6–10)
CHLORIDE SERPL-SCNC: 104 MMOL/L (ref 98–107)
CREAT SERPL-MCNC: 1.15 MG/DL (ref 0.51–0.95)
DEPRECATED HCO3 PLAS-SCNC: 21 MMOL/L (ref 22–29)
EOSINOPHIL # BLD AUTO: 0.2 10E3/UL (ref 0–0.7)
EOSINOPHIL NFR BLD AUTO: 2 %
ERYTHROCYTE [DISTWIDTH] IN BLOOD BY AUTOMATED COUNT: 12.6 % (ref 10–15)
GFR SERPL CREATININE-BSD FRML MDRD: 56 ML/MIN/1.73M2
GLUCOSE SERPL-MCNC: 89 MG/DL (ref 70–99)
HCT VFR BLD AUTO: 42.2 % (ref 35–47)
HGB BLD-MCNC: 13.2 G/DL (ref 11.7–15.7)
HOLD SPECIMEN: NORMAL
IMM GRANULOCYTES # BLD: 0 10E3/UL
IMM GRANULOCYTES NFR BLD: 0 %
LYMPHOCYTES # BLD AUTO: 0.7 10E3/UL (ref 0.8–5.3)
LYMPHOCYTES NFR BLD AUTO: 9 %
MAGNESIUM SERPL-MCNC: 1.4 MG/DL (ref 1.7–2.3)
MCH RBC QN AUTO: 30.1 PG (ref 26.5–33)
MCHC RBC AUTO-ENTMCNC: 31.3 G/DL (ref 31.5–36.5)
MCV RBC AUTO: 96 FL (ref 78–100)
MONOCYTES # BLD AUTO: 1 10E3/UL (ref 0–1.3)
MONOCYTES NFR BLD AUTO: 13 %
MRSA DNA SPEC QL NAA+PROBE: NEGATIVE
NEUTROPHILS # BLD AUTO: 5.8 10E3/UL (ref 1.6–8.3)
NEUTROPHILS NFR BLD AUTO: 75 %
NRBC # BLD AUTO: 0 10E3/UL
NRBC BLD AUTO-RTO: 0 /100
PLATELET # BLD AUTO: 308 10E3/UL (ref 150–450)
POTASSIUM SERPL-SCNC: 4.8 MMOL/L (ref 3.4–5.3)
RBC # BLD AUTO: 4.38 10E6/UL (ref 3.8–5.2)
SA TARGET DNA: NEGATIVE
SODIUM SERPL-SCNC: 136 MMOL/L (ref 136–145)
WBC # BLD AUTO: 7.7 10E3/UL (ref 4–11)

## 2023-08-17 PROCEDURE — 250N000012 HC RX MED GY IP 250 OP 636 PS 637: Performed by: PHYSICIAN ASSISTANT

## 2023-08-17 PROCEDURE — 80048 BASIC METABOLIC PNL TOTAL CA: CPT | Performed by: EMERGENCY MEDICINE

## 2023-08-17 PROCEDURE — 99284 EMERGENCY DEPT VISIT MOD MDM: CPT | Performed by: EMERGENCY MEDICINE

## 2023-08-17 PROCEDURE — 258N000003 HC RX IP 258 OP 636: Performed by: EMERGENCY MEDICINE

## 2023-08-17 PROCEDURE — 120N000011 HC R&B TRANSPLANT UMMC

## 2023-08-17 PROCEDURE — 83735 ASSAY OF MAGNESIUM: CPT | Performed by: PHYSICIAN ASSISTANT

## 2023-08-17 PROCEDURE — 99207 PR APP CREDIT; MD BILLING SHARED VISIT: CPT | Performed by: INTERNAL MEDICINE

## 2023-08-17 PROCEDURE — 90471 IMMUNIZATION ADMIN: CPT | Performed by: EMERGENCY MEDICINE

## 2023-08-17 PROCEDURE — 90715 TDAP VACCINE 7 YRS/> IM: CPT | Performed by: EMERGENCY MEDICINE

## 2023-08-17 PROCEDURE — 73130 X-RAY EXAM OF HAND: CPT | Mod: RT

## 2023-08-17 PROCEDURE — 87641 MR-STAPH DNA AMP PROBE: CPT | Performed by: PHYSICIAN ASSISTANT

## 2023-08-17 PROCEDURE — 36415 COLL VENOUS BLD VENIPUNCTURE: CPT | Performed by: EMERGENCY MEDICINE

## 2023-08-17 PROCEDURE — 99223 1ST HOSP IP/OBS HIGH 75: CPT | Mod: AI | Performed by: PHYSICIAN ASSISTANT

## 2023-08-17 PROCEDURE — 73130 X-RAY EXAM OF HAND: CPT | Mod: 26 | Performed by: RADIOLOGY

## 2023-08-17 PROCEDURE — 99285 EMERGENCY DEPT VISIT HI MDM: CPT | Mod: 25 | Performed by: EMERGENCY MEDICINE

## 2023-08-17 PROCEDURE — 99214 OFFICE O/P EST MOD 30 MIN: CPT | Performed by: PHYSICIAN ASSISTANT

## 2023-08-17 PROCEDURE — 250N000011 HC RX IP 250 OP 636: Mod: JZ | Performed by: PHYSICIAN ASSISTANT

## 2023-08-17 PROCEDURE — 85025 COMPLETE CBC W/AUTO DIFF WBC: CPT | Performed by: EMERGENCY MEDICINE

## 2023-08-17 PROCEDURE — 258N000003 HC RX IP 258 OP 636: Performed by: PHYSICIAN ASSISTANT

## 2023-08-17 PROCEDURE — 250N000011 HC RX IP 250 OP 636: Performed by: EMERGENCY MEDICINE

## 2023-08-17 PROCEDURE — 250N000013 HC RX MED GY IP 250 OP 250 PS 637: Performed by: PHYSICIAN ASSISTANT

## 2023-08-17 RX ORDER — VANCOMYCIN HYDROCHLORIDE 125 MG/1
125 CAPSULE ORAL DAILY
Status: DISCONTINUED | OUTPATIENT
Start: 2023-08-18 | End: 2023-08-21 | Stop reason: HOSPADM

## 2023-08-17 RX ORDER — SULFAMETHOXAZOLE AND TRIMETHOPRIM 400; 80 MG/1; MG/1
1 TABLET ORAL
Status: DISCONTINUED | OUTPATIENT
Start: 2023-08-18 | End: 2023-08-19

## 2023-08-17 RX ORDER — LIDOCAINE 40 MG/G
CREAM TOPICAL
Status: DISCONTINUED | OUTPATIENT
Start: 2023-08-17 | End: 2023-08-21 | Stop reason: HOSPADM

## 2023-08-17 RX ORDER — ATORVASTATIN CALCIUM 20 MG/1
20 TABLET, FILM COATED ORAL EVERY EVENING
Status: DISCONTINUED | OUTPATIENT
Start: 2023-08-17 | End: 2023-08-21 | Stop reason: HOSPADM

## 2023-08-17 RX ORDER — SODIUM CHLORIDE 9 MG/ML
INJECTION, SOLUTION INTRAVENOUS CONTINUOUS
Status: DISCONTINUED | OUTPATIENT
Start: 2023-08-17 | End: 2023-08-18

## 2023-08-17 RX ORDER — ONDANSETRON 2 MG/ML
4 INJECTION INTRAMUSCULAR; INTRAVENOUS EVERY 6 HOURS PRN
Status: DISCONTINUED | OUTPATIENT
Start: 2023-08-17 | End: 2023-08-21 | Stop reason: HOSPADM

## 2023-08-17 RX ORDER — CARVEDILOL 12.5 MG/1
12.5 TABLET ORAL 2 TIMES DAILY WITH MEALS
Status: DISCONTINUED | OUTPATIENT
Start: 2023-08-17 | End: 2023-08-21 | Stop reason: HOSPADM

## 2023-08-17 RX ORDER — ONDANSETRON 4 MG/1
4 TABLET, ORALLY DISINTEGRATING ORAL EVERY 6 HOURS PRN
Status: DISCONTINUED | OUTPATIENT
Start: 2023-08-17 | End: 2023-08-21 | Stop reason: HOSPADM

## 2023-08-17 RX ORDER — VANCOMYCIN HYDROCHLORIDE 125 MG/1
125 CAPSULE ORAL 2 TIMES DAILY
Status: DISCONTINUED | OUTPATIENT
Start: 2023-08-17 | End: 2023-08-17

## 2023-08-17 RX ORDER — VENLAFAXINE HYDROCHLORIDE 75 MG/1
150 CAPSULE, EXTENDED RELEASE ORAL DAILY
Status: DISCONTINUED | OUTPATIENT
Start: 2023-08-18 | End: 2023-08-21 | Stop reason: HOSPADM

## 2023-08-17 RX ORDER — ACETAMINOPHEN 325 MG/1
975 TABLET ORAL EVERY 8 HOURS
Status: DISCONTINUED | OUTPATIENT
Start: 2023-08-17 | End: 2023-08-21 | Stop reason: HOSPADM

## 2023-08-17 RX ORDER — TACROLIMUS 1 MG/1
1 CAPSULE ORAL
Status: DISCONTINUED | OUTPATIENT
Start: 2023-08-17 | End: 2023-08-21 | Stop reason: HOSPADM

## 2023-08-17 RX ORDER — PIPERACILLIN SODIUM, TAZOBACTAM SODIUM 3; .375 G/15ML; G/15ML
3.38 INJECTION, POWDER, LYOPHILIZED, FOR SOLUTION INTRAVENOUS EVERY 6 HOURS
Status: DISCONTINUED | OUTPATIENT
Start: 2023-08-17 | End: 2023-08-21 | Stop reason: HOSPADM

## 2023-08-17 RX ADMIN — VANCOMYCIN HYDROCHLORIDE 1500 MG: 10 INJECTION, POWDER, LYOPHILIZED, FOR SOLUTION INTRAVENOUS at 19:06

## 2023-08-17 RX ADMIN — ACETAMINOPHEN 975 MG: 325 TABLET, FILM COATED ORAL at 21:46

## 2023-08-17 RX ADMIN — TACROLIMUS 1 MG: 1 CAPSULE ORAL at 21:50

## 2023-08-17 RX ADMIN — ATORVASTATIN CALCIUM 20 MG: 20 TABLET, FILM COATED ORAL at 21:48

## 2023-08-17 RX ADMIN — CLOSTRIDIUM TETANI TOXOID ANTIGEN (FORMALDEHYDE INACTIVATED), CORYNEBACTERIUM DIPHTHERIAE TOXOID ANTIGEN (FORMALDEHYDE INACTIVATED), BORDETELLA PERTUSSIS TOXOID ANTIGEN (GLUTARALDEHYDE INACTIVATED), BORDETELLA PERTUSSIS FILAMENTOUS HEMAGGLUTININ ANTIGEN (FORMALDEHYDE INACTIVATED), BORDETELLA PERTUSSIS PERTACTIN ANTIGEN, AND BORDETELLA PERTUSSIS FIMBRIAE 2/3 ANTIGEN 0.5 ML: 5; 2; 2.5; 5; 3; 5 INJECTION, SUSPENSION INTRAMUSCULAR at 17:24

## 2023-08-17 RX ADMIN — SODIUM CHLORIDE, PRESERVATIVE FREE: 5 INJECTION INTRAVENOUS at 21:52

## 2023-08-17 RX ADMIN — MYCOPHENOLIC ACID 540 MG: 360 TABLET, DELAYED RELEASE ORAL at 21:49

## 2023-08-17 RX ADMIN — PIPERACILLIN AND TAZOBACTAM 3.38 G: 3; .375 INJECTION, POWDER, LYOPHILIZED, FOR SOLUTION INTRAVENOUS at 21:53

## 2023-08-17 ASSESSMENT — ENCOUNTER SYMPTOMS
MYALGIAS: 1
CONSTITUTIONAL NEGATIVE: 1
CHEST TIGHTNESS: 0
SHORTNESS OF BREATH: 0
CHILLS: 0
WOUND: 1
PALPITATIONS: 0
RESPIRATORY NEGATIVE: 1
NECK STIFFNESS: 0
WHEEZING: 0
COLOR CHANGE: 1
FATIGUE: 0
FEVER: 0
NECK PAIN: 0
BACK PAIN: 0
ARTHRALGIAS: 0
JOINT SWELLING: 1
CARDIOVASCULAR NEGATIVE: 1
COUGH: 0

## 2023-08-17 ASSESSMENT — ACTIVITIES OF DAILY LIVING (ADL)
ADLS_ACUITY_SCORE: 35
ADLS_ACUITY_SCORE: 35
ADLS_ACUITY_SCORE: 33
ADLS_ACUITY_SCORE: 35

## 2023-08-17 NOTE — ED TRIAGE NOTES
Presents to ED from urgent care. Was cat sitting for a friend and sustained a cat bite to the top of the right hand on Saturday. Was seen at clinic and started on Augmentin on Monday. Since then swelling and redness have not improved.      Triage Assessment       Row Name 08/17/23 3376       Triage Assessment (Adult)    Airway WDL WDL       Respiratory WDL    Respiratory WDL WDL       Skin Circulation/Temperature WDL    Skin Circulation/Temperature WDL X;all    Skin Circulation --  redness to right hand.    Skin Temperature warm       Cardiac WDL    Cardiac WDL WDL       Peripheral/Neurovascular WDL    Peripheral Neurovascular WDL WDL       Cognitive/Neuro/Behavioral WDL    Cognitive/Neuro/Behavioral WDL WDL

## 2023-08-17 NOTE — PHARMACY-VANCOMYCIN DOSING SERVICE
Pharmacy Vancomycin Initial Note  Date of Service 2023  Patient's  1970  52 year old, female    Indication: Skin and Soft Tissue Infection    Current estimated CrCl = Estimated Creatinine Clearance: 62.2 mL/min (A) (based on SCr of 0.97 mg/dL (H)).    Creatinine for last 3 days  No results found for requested labs within last 3 days.    Recent Vancomycin Level(s) for last 3 days  No results found for requested labs within last 3 days.      Vancomycin IV Administrations (past 72 hours)        No vancomycin orders with administrations in past 72 hours.                    Nephrotoxins and other renal medications (From now, onward)      None            Contrast Orders - past 72 hours (72h ago, onward)      None            [employer] Prediction of Planned Initial Vancomycin Regimen    Loading dose: N/A  Regimen: 1500 mg IV every 24 hours.  Start time: 17:23 on 2023  Exposure target: AUC24 (range)400-600 mg/L.hr   AUC24,ss: 438 mg/L.hr  Probability of AUC24 > 400: 61 %  Ctrough,ss: 11.8 mg/L  Probability of Ctrough,ss > 20: 10 %  Probability of nephrotoxicity (Lodise HUONG ): 7 %        Plan:  Start vancomycin 1500 mg IV q24h.   Vancomycin monitoring method: AUC  Vancomycin therapeutic monitoring goal: 400-600 mg*h/L  Pharmacy will check vancomycin levels as appropriate in 1-3 Days.    Serum creatinine levels will be ordered daily for the first week of therapy and at least twice weekly for subsequent weeks.      Indra Hess, Pharm.D., R.Ph., PGY1 Resident

## 2023-08-17 NOTE — PROGRESS NOTES
Subjective   Hermila is a 52 year old, presenting for the following health issues:  Cat Bite (Or clawed but not getting better right hand)    HPI   Concern - cat bite  Onset: 5days  Description:  Sustained a cat scratch/bite to her R hand 5days ago while cat sitting.  Was initially seen in urgent care here 4days ago and was started on augmentin with minimal relief.  The cat is UTD on its vaccination.  And she is due to her Td shot as well.  Intensity: moderate  Progression of Symptoms:  same  Accompanying Signs & Symptoms: Continues to have moderate pain, redness and swelling of her R hand.  No drainage or fevers.  She is a transplant patient as well.  Previous history of similar problem: none  Precipitating factors:        Worsened by: none  Alleviating factors:        Improved by: none  Therapies tried and outcome: augmentin, cool compresses with minimal relief    Patient Active Problem List   Diagnosis    Diabetes mellitus type 1 (H)    Anemia in stage 3a chronic kidney disease (H)    TIA (transient ischemic attack)    HTN, kidney transplant related    Anxiety and depression    (HFpEF) heart failure with preserved ejection fraction (H)    Pancreas replaced by transplant (H)    Kidney replaced by transplant    Immunosuppression (H)    Aftercare following organ transplant    Chronic kidney disease, stage 3a (H)    Vitamin D deficiency    Secondary renal hyperparathyroidism (H)    Hyperkalemia    Clostridium difficile infection     Current Outpatient Medications   Medication    amoxicillin-clavulanate (AUGMENTIN) 875-125 MG tablet    atorvastatin (LIPITOR) 20 MG tablet    calcium carbonate-vitamin D (OS-BARBARA WITH D) 500-200 MG-UNIT tablet    carvedilol (COREG) 12.5 MG tablet    FIBER ADULT GUMMIES PO    mycophenolic acid (GENERIC EQUIVALENT) 180 MG EC tablet    sulfamethoxazole-trimethoprim (BACTRIM) 400-80 MG tablet    tacrolimus (GENERIC EQUIVALENT) 0.5 MG capsule    venlafaxine (EFFEXOR XR) 150 MG 24 hr capsule      No current facility-administered medications for this visit.        Allergies   Allergen Reactions    Amlodipine      Other reaction(s): Edema,generalized     Review of Systems   Constitutional: Negative.  Negative for chills, fatigue and fever.   Respiratory: Negative.  Negative for cough, chest tightness, shortness of breath and wheezing.    Cardiovascular: Negative.  Negative for chest pain, palpitations and peripheral edema.   Musculoskeletal:  Positive for joint swelling and myalgias. Negative for arthralgias, back pain, gait problem, neck pain and neck stiffness.   Skin:  Positive for color change, rash and wound. Negative for pallor.   All other systems reviewed and are negative.           Objective    BP (!) 144/76   Pulse 77   Temp 98.1  F (36.7  C) (Tympanic)   Resp 17   Wt 76.7 kg (169 lb)   SpO2 97%   BMI 31.93 kg/m    Body mass index is 31.93 kg/m .  Physical Exam  Vitals and nursing note reviewed.   Constitutional:       General: She is not in acute distress.     Appearance: Normal appearance. She is well-developed and normal weight. She is not ill-appearing.   Musculoskeletal:      Right hand: Swelling (erythema with abrasion present over the dorsal hand.  no drainage) and tenderness present. No deformity, lacerations or bony tenderness. Decreased range of motion. Normal strength. Normal sensation. There is no disruption of two-point discrimination. Normal capillary refill. Normal pulse.      Left hand: Normal.   Skin:     General: Skin is warm and dry.      Capillary Refill: Capillary refill takes less than 2 seconds.      Findings: Abrasion, erythema and rash present.   Neurological:      Mental Status: She is alert and oriented to person, place, and time.      Sensory: Sensation is intact.      Motor: Motor function is intact.      Gait: Gait is intact.      Deep Tendon Reflexes: Reflexes are normal and symmetric.   Psychiatric:         Mood and Affect: Mood normal.         Behavior:  Behavior normal.         Thought Content: Thought content normal.         Judgment: Judgment normal.            Assessment/Plan:  Cat bite of hand, right, initial encounter:  No response to augmentin.  Along with her immunocompromised status, need for Td.  H&P is concerning for worsening infection vs osteomyelitis.  Recommend further evaluation and management in the ER.  Will most likely need further workup with labs, IV antibiotic and/or imaging.  Patient has declined transportation via ambulance and will have family drive her/him.  Understands risks and benefits of ambulance transfer and s/he has declined.  Call 911 if worsening symptoms.  S/he plans to go to [facility] ER.  S/he left in stable condition with AVS in hand.  F/u with PCP after ER visit.     Need for Td vaccine:  Td is not covered in the clinic here.  Her insurance recommends that she get the Td shot at the her pharmacy which she will do so.    Immunocompromised patient (H)        Love Levy PA-C

## 2023-08-17 NOTE — ED PROVIDER NOTES
[facility] EMERGENCY DEPARTMENT ([facility])    23       ED PROVIDER NOTE          History     Chief Complaint   Patient presents with    Cat Bite     HPI  Eden Hess is a 52 year old female with a past medical history significant for type 1 diabetes, ESRD s/p kidney and pancreas transplant () who presents to the Emergency Department for evaluation of a hand injury. Patient reports that she had a cat scratch to the right hand on the dorsal aspect on . She states that she was seen in urgent care a couple days ago due to redness at that site and was prescribed augmentin. She has been taking these but her redness and pain have been worsening so she went back to urgent care today and was referred to the ED. No numbness/weakness.     Per Chart Review: Patient was seen at [facility]  after a cat bite and scratch injury to her right hand two days ago. Cat was known and up to date on rabies vaccinations. Patient was started on Augmentin for possible infection.       Past Medical History  Past Medical History:   Diagnosis Date    (HFpEF) heart failure with preserved ejection fraction (H)     Anxiety and depression     [facility]    Diabetes mellitus type 1 (H)     Diagnosed at age 2 years old     End stage kidney disease (H)     Hypertension     Retinopathy     TIA (transient ischemic attack)      Past Surgical History:   Procedure Laterality Date    BENCH KIDNEY Left 2022    Procedure: Bench kidney;  Surgeon: Yousif Gómez MD;  Location: [facility] OR    BENCH PANCREAS N/A 2022    Procedure: Bench pancreas;  Surgeon: Yousif Gómez MD;  Location: [facility] OR     SECTION      x2    CREATE FISTULA ARTERIOVENOUS UPPER EXTREMITY      dialysis port, fistula-R arm    TRANSPLANT PANCREAS, KIDNEY  DONOR, COMBINED N/A 2022    Procedure: TRANSPLANT, KIDNEY AND PANCREAS,  DONOR;  Surgeon: Yousif Gómez MD;   "Location: [facility] OR     amoxicillin-clavulanate (AUGMENTIN) 875-125 MG tablet  atorvastatin (LIPITOR) 20 MG tablet  calcium carbonate-vitamin D (OS-BARBARA WITH D) 500-200 MG-UNIT tablet  carvedilol (COREG) 12.5 MG tablet  FIBER ADULT GUMMIES PO  mycophenolic acid (GENERIC EQUIVALENT) 180 MG EC tablet  sulfamethoxazole-trimethoprim (BACTRIM) 400-80 MG tablet  tacrolimus (GENERIC EQUIVALENT) 0.5 MG capsule  venlafaxine (EFFEXOR XR) 150 MG 24 hr capsule      Allergies   Allergen Reactions    Amlodipine      Other reaction(s): Edema,generalized     Family History  Family History   Problem Relation Age of Onset    Diabetes Type 1 Father     Hypertension Father     Cerebrovascular Disease Father      Social History   Social History     Tobacco Use    Smoking status: Former     Types: Cigarettes     Quit date: 1997     Years since quittin.6    Smokeless tobacco: Never   Substance Use Topics    Alcohol use: Yes     Alcohol/week: 0.0 standard drinks of alcohol    Drug use: No      Past medical history, past surgical history, medications, allergies, family history, and social history were reviewed with the patient. No additional pertinent items.      A medically appropriate review of systems was performed with pertinent positives and negatives noted in the HPI, and all other systems negative.    Physical Exam   BP: (!) 153/82  Pulse: 78  Temp: 98.2  F (36.8  C)  Resp: 15  Height: 154.9 cm (5' 1\")  SpO2: 98 %  Physical Exam  Constitutional:       General: She is not in acute distress.     Appearance: Normal appearance. She is not toxic-appearing.   HENT:      Head: Normocephalic and atraumatic.      Nose: Nose normal. No congestion.      Mouth/Throat:      Mouth: Mucous membranes are moist.      Pharynx: Oropharynx is clear.   Eyes:      Extraocular Movements: Extraocular movements intact.      Pupils: Pupils are equal, round, and reactive to light.   Cardiovascular:      Rate and Rhythm: Normal rate and regular rhythm.    "   Heart sounds: No murmur heard.  Pulmonary:      Effort: Pulmonary effort is normal. No respiratory distress.      Breath sounds: No wheezing.   Musculoskeletal:         General: Normal range of motion.      Cervical back: Normal range of motion.      Comments: There is an area of erythema with no induration or fluctuance to the dorsal aspect of the right hand at the base of the 3rd digit.   There is active and passive ROM of the hand in all joints.   Patient can adduct and abduct her fingers fully.   Sensation is intact in all dermatomes distally  2+ radial pulse   Skin:     General: Skin is warm and dry.   Neurological:      General: No focal deficit present.      Mental Status: She is alert and oriented to person, place, and time.           ED Course, Procedures, & Data      Procedures                      Results for orders placed or performed during the hospital encounter of 08/17/23   XR Hand Right G/E 3 Views     Status: None    Narrative    EXAM: XR HAND RIGHT G/E 3 VIEWS  LOCATION: [facility]  DATE: 8/17/2023    INDICATION: Right hand catheter bite. Right hand pain.  COMPARISON: None.      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 Remaining joint spaces are preserved and normally aligned. No fracture. No radiopaque foreign body. No evidence of osteomyelitis. Vascular calcifications.   Basic metabolic panel     Status: Abnormal   Result Value Ref Range    Sodium 136 136 - 145 mmol/L    Potassium 4.8 3.4 - 5.3 mmol/L    Chloride 104 98 - 107 mmol/L    Carbon Dioxide (CO2) 21 (L) 22 - 29 mmol/L    Anion Gap 11 7 - 15 mmol/L    Urea Nitrogen 19.3 6.0 - 20.0 mg/dL    Creatinine 1.15 (H) 0.51 - 0.95 mg/dL    Calcium 10.0 8.6 - 10.0 mg/dL    Glucose 89 70 - 99 mg/dL    GFR Estimate 56 (L) >60 mL/min/1.73m2   CBC with platelets and differential     Status: Abnormal   Result Value Ref Range    WBC Count 7.7 4.0  - 11.0 10e3/uL    RBC Count 4.38 3.80 - 5.20 10e6/uL    Hemoglobin 13.2 11.7 - 15.7 g/dL    Hematocrit 42.2 35.0 - 47.0 %    MCV 96 78 - 100 fL    MCH 30.1 26.5 - 33.0 pg    MCHC 31.3 (L) 31.5 - 36.5 g/dL    RDW 12.6 10.0 - 15.0 %    Platelet Count 308 150 - 450 10e3/uL    % Neutrophils 75 %    % Lymphocytes 9 %    % Monocytes 13 %    % Eosinophils 2 %    % Basophils 1 %    % Immature Granulocytes 0 %    NRBCs per 100 WBC 0 <1 /100    Absolute Neutrophils 5.8 1.6 - 8.3 10e3/uL    Absolute Lymphocytes 0.7 (L) 0.8 - 5.3 10e3/uL    Absolute Monocytes 1.0 0.0 - 1.3 10e3/uL    Absolute Eosinophils 0.2 0.0 - 0.7 10e3/uL    Absolute Basophils 0.1 0.0 - 0.2 10e3/uL    Absolute Immature Granulocytes 0.0 <=0.4 10e3/uL    Absolute NRBCs 0.0 10e3/uL   [facility] Draw     Status: None (In process)    Narrative    The following orders were created for panel order [facility] Draw.  Procedure                               Abnormality         Status                     ---------                               -----------         ------                     Extra Blood Culture Bottle[[id number]]                       In process                   Please view results for these tests on the individual orders.   CBC with platelets differential     Status: Abnormal    Narrative    The following orders were created for panel order CBC with platelets differential.  Procedure                               Abnormality         Status                     ---------                               -----------         ------                     CBC with platelets and d...[[id number]]  Abnormal            Final result                 Please view results for these tests on the individual orders.     Medications   vancomycin (VANCOCIN) 1,500 mg in 0.9% NaCl 250 mL intermittent infusion (1,500 mg Intravenous $New Bag 8/17/23 8451)   vancomycin (VANCOCIN) capsule 125 mg (has no administration in time range)   Tdap (tetanus-diphtheria-acell pertussis) (ADACEL)  injection 0.5 mL (0.5 mLs Intramuscular $Given 8/17/23 7524)     Labs Ordered and Resulted from Time of ED Arrival to Time of ED Departure   BASIC METABOLIC PANEL - Abnormal       Result Value    Sodium 136      Potassium 4.8      Chloride 104      Carbon Dioxide (CO2) 21 (*)     Anion Gap 11      Urea Nitrogen 19.3      Creatinine 1.15 (*)     Calcium 10.0      Glucose 89      GFR Estimate 56 (*)    CBC WITH PLATELETS AND DIFFERENTIAL - Abnormal    WBC Count 7.7      RBC Count 4.38      Hemoglobin 13.2      Hematocrit 42.2      MCV 96      MCH 30.1      MCHC 31.3 (*)     RDW 12.6      Platelet Count 308      % Neutrophils 75      % Lymphocytes 9      % Monocytes 13      % Eosinophils 2      % Basophils 1      % Immature Granulocytes 0      NRBCs per 100 WBC 0      Absolute Neutrophils 5.8      Absolute Lymphocytes 0.7 (*)     Absolute Monocytes 1.0      Absolute Eosinophils 0.2      Absolute Basophils 0.1      Absolute Immature Granulocytes 0.0      Absolute NRBCs 0.0       XR Hand Right G/E 3 Views   Final Result   IMPRESSION: Mild degenerative arthritis of the first CMC, first IP, and the DIP joints. Remaining joint spaces are preserved and normally aligned. No fracture. No radiopaque foreign body. No evidence of osteomyelitis. Vascular calcifications.             Critical care was not performed.     Medical Decision Making  The patient's presentation was of moderate complexity (an acute complicated injury).    The patient's evaluation involved:  ordering and/or review of 3+ test(s) in this encounter (see separate area of note for details)  discussion of management or test interpretation with another health professional (see separate area of note for details)    The patient's management necessitated high risk (a decision regarding hospitalization).    Assessment & Plan    52-year-old female with history of kidney pancreas transplant presenting with a hand injury and concern for cellulitis.  Patient been on  Augmentin as outpatient for cellulitis but has not had improvement.  Her presentation today is concerning for failed outpatient management.  She had active and passive range of motion in the fingers and hand and the joint was not irritable.    she was afebrile here.  White blood cell count normal.  BMP nonconcerning. a x-ray was done which did not show signs of fracture or signs of osteomyelitis.    I spoke with the transplant nephrology team who recommended oral vancomycin in addition to the IV vancomycin which I have ordered.  Discussed with hospitalist who accepted the patient for admission.    I have reviewed the nursing notes. I have reviewed the findings, diagnosis, plan and need for follow up with the patient.    New Prescriptions    No medications on file       Final diagnoses:   Cellulitis of right upper extremity         [facility] EMERGENCY DEPARTMENT  8/17/2023     Oc Babcock MD  08/17/23 1924

## 2023-08-18 ENCOUNTER — HOME INFUSION (PRE-WILLOW HOME INFUSION) (OUTPATIENT)
Dept: PHARMACY | Facility: CLINIC | Age: 53
End: 2023-08-18

## 2023-08-18 ENCOUNTER — APPOINTMENT (OUTPATIENT)
Dept: ULTRASOUND IMAGING | Facility: CLINIC | Age: 53
DRG: 603 | End: 2023-08-18
Attending: INTERNAL MEDICINE
Payer: COMMERCIAL

## 2023-08-18 LAB
ALBUMIN SERPL BCG-MCNC: 3.6 G/DL (ref 3.5–5.2)
ALBUMIN UR-MCNC: NEGATIVE MG/DL
ALP SERPL-CCNC: 77 U/L (ref 35–104)
ALT SERPL W P-5'-P-CCNC: 12 U/L (ref 0–50)
ANION GAP SERPL CALCULATED.3IONS-SCNC: 11 MMOL/L (ref 7–15)
APPEARANCE UR: CLEAR
AST SERPL W P-5'-P-CCNC: 16 U/L (ref 0–45)
BASOPHILS # BLD AUTO: 0.1 10E3/UL (ref 0–0.2)
BASOPHILS NFR BLD AUTO: 1 %
BILIRUB SERPL-MCNC: 0.5 MG/DL
BILIRUB UR QL STRIP: NEGATIVE
BUN SERPL-MCNC: 19.7 MG/DL (ref 6–20)
CALCIUM SERPL-MCNC: 9.4 MG/DL (ref 8.6–10)
CHLORIDE SERPL-SCNC: 109 MMOL/L (ref 98–107)
COLOR UR AUTO: ABNORMAL
CREAT SERPL-MCNC: 1.28 MG/DL (ref 0.51–0.95)
DEPRECATED HCO3 PLAS-SCNC: 21 MMOL/L (ref 22–29)
EOSINOPHIL # BLD AUTO: 0.2 10E3/UL (ref 0–0.7)
EOSINOPHIL NFR BLD AUTO: 3 %
ERYTHROCYTE [DISTWIDTH] IN BLOOD BY AUTOMATED COUNT: 12.5 % (ref 10–15)
GFR SERPL CREATININE-BSD FRML MDRD: 50 ML/MIN/1.73M2
GLUCOSE SERPL-MCNC: 96 MG/DL (ref 70–99)
GLUCOSE UR STRIP-MCNC: NEGATIVE MG/DL
HCT VFR BLD AUTO: 38.5 % (ref 35–47)
HGB BLD-MCNC: 12.2 G/DL (ref 11.7–15.7)
HGB UR QL STRIP: NEGATIVE
IMM GRANULOCYTES # BLD: 0 10E3/UL
IMM GRANULOCYTES NFR BLD: 0 %
KETONES UR STRIP-MCNC: NEGATIVE MG/DL
LEUKOCYTE ESTERASE UR QL STRIP: NEGATIVE
LIPASE SERPL-CCNC: 34 U/L (ref 13–60)
LYMPHOCYTES # BLD AUTO: 0.6 10E3/UL (ref 0.8–5.3)
LYMPHOCYTES NFR BLD AUTO: 9 %
MCH RBC QN AUTO: 30.4 PG (ref 26.5–33)
MCHC RBC AUTO-ENTMCNC: 31.7 G/DL (ref 31.5–36.5)
MCV RBC AUTO: 96 FL (ref 78–100)
MONOCYTES # BLD AUTO: 1 10E3/UL (ref 0–1.3)
MONOCYTES NFR BLD AUTO: 15 %
MUCOUS THREADS #/AREA URNS LPF: PRESENT /LPF
NEUTROPHILS # BLD AUTO: 5 10E3/UL (ref 1.6–8.3)
NEUTROPHILS NFR BLD AUTO: 72 %
NITRATE UR QL: NEGATIVE
NRBC # BLD AUTO: 0 10E3/UL
NRBC BLD AUTO-RTO: 0 /100
PH UR STRIP: 5.5 [PH] (ref 5–7)
PLATELET # BLD AUTO: 271 10E3/UL (ref 150–450)
POTASSIUM SERPL-SCNC: 4.2 MMOL/L (ref 3.4–5.3)
PROT SERPL-MCNC: 6.1 G/DL (ref 6.4–8.3)
RBC # BLD AUTO: 4.01 10E6/UL (ref 3.8–5.2)
RBC URINE: <1 /HPF
SODIUM SERPL-SCNC: 141 MMOL/L (ref 136–145)
SP GR UR STRIP: 1.01 (ref 1–1.03)
SQUAMOUS EPITHELIAL: 2 /HPF
UROBILINOGEN UR STRIP-MCNC: NORMAL MG/DL
WBC # BLD AUTO: 6.8 10E3/UL (ref 4–11)
WBC URINE: 0 /HPF

## 2023-08-18 PROCEDURE — 80053 COMPREHEN METABOLIC PANEL: CPT | Performed by: PHYSICIAN ASSISTANT

## 2023-08-18 PROCEDURE — 250N000012 HC RX MED GY IP 250 OP 636 PS 637: Performed by: PHYSICIAN ASSISTANT

## 2023-08-18 PROCEDURE — 258N000003 HC RX IP 258 OP 636: Performed by: INTERNAL MEDICINE

## 2023-08-18 PROCEDURE — 250N000013 HC RX MED GY IP 250 OP 250 PS 637: Performed by: INTERNAL MEDICINE

## 2023-08-18 PROCEDURE — 250N000011 HC RX IP 250 OP 636: Mod: JZ | Performed by: INTERNAL MEDICINE

## 2023-08-18 PROCEDURE — 36415 COLL VENOUS BLD VENIPUNCTURE: CPT | Performed by: PHYSICIAN ASSISTANT

## 2023-08-18 PROCEDURE — 93010 ELECTROCARDIOGRAM REPORT: CPT | Performed by: INTERNAL MEDICINE

## 2023-08-18 PROCEDURE — 81001 URINALYSIS AUTO W/SCOPE: CPT | Performed by: INTERNAL MEDICINE

## 2023-08-18 PROCEDURE — 85025 COMPLETE CBC W/AUTO DIFF WBC: CPT | Performed by: PHYSICIAN ASSISTANT

## 2023-08-18 PROCEDURE — 76776 US EXAM K TRANSPL W/DOPPLER: CPT

## 2023-08-18 PROCEDURE — 120N000011 HC R&B TRANSPLANT UMMC

## 2023-08-18 PROCEDURE — 250N000011 HC RX IP 250 OP 636: Mod: JZ | Performed by: PHYSICIAN ASSISTANT

## 2023-08-18 PROCEDURE — 250N000013 HC RX MED GY IP 250 OP 250 PS 637: Performed by: PHYSICIAN ASSISTANT

## 2023-08-18 PROCEDURE — 99232 SBSQ HOSP IP/OBS MODERATE 35: CPT | Performed by: INTERNAL MEDICINE

## 2023-08-18 PROCEDURE — 99255 IP/OBS CONSLTJ NEW/EST HI 80: CPT | Mod: GC | Performed by: INTERNAL MEDICINE

## 2023-08-18 PROCEDURE — 83690 ASSAY OF LIPASE: CPT | Performed by: INTERNAL MEDICINE

## 2023-08-18 PROCEDURE — 76776 US EXAM K TRANSPL W/DOPPLER: CPT | Mod: 26 | Performed by: RADIOLOGY

## 2023-08-18 PROCEDURE — 99254 IP/OBS CNSLTJ NEW/EST MOD 60: CPT | Performed by: STUDENT IN AN ORGANIZED HEALTH CARE EDUCATION/TRAINING PROGRAM

## 2023-08-18 RX ORDER — ENOXAPARIN SODIUM 100 MG/ML
40 INJECTION SUBCUTANEOUS EVERY 24 HOURS
Status: DISCONTINUED | OUTPATIENT
Start: 2023-08-18 | End: 2023-08-21 | Stop reason: HOSPADM

## 2023-08-18 RX ORDER — SODIUM CHLORIDE 9 MG/ML
INJECTION, SOLUTION INTRAVENOUS CONTINUOUS
Status: DISCONTINUED | OUTPATIENT
Start: 2023-08-18 | End: 2023-08-18

## 2023-08-18 RX ORDER — SODIUM CHLORIDE, SODIUM LACTATE, POTASSIUM CHLORIDE, CALCIUM CHLORIDE 600; 310; 30; 20 MG/100ML; MG/100ML; MG/100ML; MG/100ML
INJECTION, SOLUTION INTRAVENOUS CONTINUOUS
Status: DISCONTINUED | OUTPATIENT
Start: 2023-08-18 | End: 2023-08-21 | Stop reason: HOSPADM

## 2023-08-18 RX ADMIN — VENLAFAXINE HYDROCHLORIDE 150 MG: 75 CAPSULE, EXTENDED RELEASE ORAL at 08:58

## 2023-08-18 RX ADMIN — ATORVASTATIN CALCIUM 20 MG: 20 TABLET, FILM COATED ORAL at 20:02

## 2023-08-18 RX ADMIN — MYCOPHENOLIC ACID 540 MG: 360 TABLET, DELAYED RELEASE ORAL at 18:09

## 2023-08-18 RX ADMIN — SODIUM CHLORIDE, POTASSIUM CHLORIDE, SODIUM LACTATE AND CALCIUM CHLORIDE: 600; 310; 30; 20 INJECTION, SOLUTION INTRAVENOUS at 22:40

## 2023-08-18 RX ADMIN — PIPERACILLIN AND TAZOBACTAM 3.38 G: 3; .375 INJECTION, POWDER, LYOPHILIZED, FOR SOLUTION INTRAVENOUS at 15:04

## 2023-08-18 RX ADMIN — CARVEDILOL 12.5 MG: 12.5 TABLET, FILM COATED ORAL at 18:09

## 2023-08-18 RX ADMIN — TACROLIMUS 1 MG: 1 CAPSULE ORAL at 08:59

## 2023-08-18 RX ADMIN — ENOXAPARIN SODIUM 40 MG: 40 INJECTION SUBCUTANEOUS at 16:47

## 2023-08-18 RX ADMIN — ACETAMINOPHEN 975 MG: 325 TABLET, FILM COATED ORAL at 22:00

## 2023-08-18 RX ADMIN — TACROLIMUS 1 MG: 1 CAPSULE ORAL at 18:09

## 2023-08-18 RX ADMIN — VANCOMYCIN HYDROCHLORIDE 125 MG: 125 CAPSULE ORAL at 08:58

## 2023-08-18 RX ADMIN — SODIUM CHLORIDE, POTASSIUM CHLORIDE, SODIUM LACTATE AND CALCIUM CHLORIDE: 600; 310; 30; 20 INJECTION, SOLUTION INTRAVENOUS at 10:13

## 2023-08-18 RX ADMIN — PIPERACILLIN AND TAZOBACTAM 3.38 G: 3; .375 INJECTION, POWDER, LYOPHILIZED, FOR SOLUTION INTRAVENOUS at 20:02

## 2023-08-18 RX ADMIN — PSYLLIUM HUSK 1 PACKET: 3.4 POWDER ORAL at 18:08

## 2023-08-18 RX ADMIN — ACETAMINOPHEN 975 MG: 325 TABLET, FILM COATED ORAL at 06:10

## 2023-08-18 RX ADMIN — PIPERACILLIN AND TAZOBACTAM 3.38 G: 3; .375 INJECTION, POWDER, LYOPHILIZED, FOR SOLUTION INTRAVENOUS at 08:59

## 2023-08-18 RX ADMIN — SULFAMETHOXAZOLE AND TRIMETHOPRIM 1 TABLET: 400; 80 TABLET ORAL at 08:58

## 2023-08-18 RX ADMIN — CARVEDILOL 12.5 MG: 12.5 TABLET, FILM COATED ORAL at 08:58

## 2023-08-18 RX ADMIN — PIPERACILLIN AND TAZOBACTAM 3.38 G: 3; .375 INJECTION, POWDER, LYOPHILIZED, FOR SOLUTION INTRAVENOUS at 03:44

## 2023-08-18 RX ADMIN — MYCOPHENOLIC ACID 540 MG: 360 TABLET, DELAYED RELEASE ORAL at 08:59

## 2023-08-18 ASSESSMENT — ACTIVITIES OF DAILY LIVING (ADL)
ADLS_ACUITY_SCORE: 35
CHANGE_IN_FUNCTIONAL_STATUS_SINCE_ONSET_OF_CURRENT_ILLNESS/INJURY: NO
ADLS_ACUITY_SCORE: 35
DEPENDENT_IADLS:: INDEPENDENT
ADLS_ACUITY_SCORE: 35
FALL_HISTORY_WITHIN_LAST_SIX_MONTHS: NO

## 2023-08-18 NOTE — CONSULTS
[facility]  Transplant Nephrology Consult  Date of Admission:  8/17/2023  Today's Date: 08/18/2023  Requesting physician: Malvin Hills MD    Recommendations:  - No changes to immunosuppression  - Replace Mg  - Tac level tomorrow AM (ordered)  - Agree with transplant ID consult  - Would recommend PO vancomycin prophylaxis for history of C Diff with 125mg BID while on abx + 3 days  - Consideration of MRI of R hand to evaluate for tenosynovitis, abscess, osteomyelitis  -Recommend scheduling psyllium fiber daily    Assessment & Plan   # DDKT (SPK): Stable   - Baseline Creatinine: ~ 1.1-1.3   - Proteinuria: Normal (<0.2 grams)   - Date DSA Last Checked: May/2023      Latest DSA: No   - BK Viremia: No   - Kidney Tx Biopsy: No    # Pancreas Tx (SPK):    - Pancreatic Exocrine Drainage: Enteric drained     - Blood glucose: Euglycemia      On insulin: No   - HbA1c: Stable, near normal      Latest HbA1c: 5.2%   - Pancreatic enzymes: Stable   - Date DSA Last Checked: May/2023  Latest DSA: No   - Pancreas Tx Biopsy: No    # Immunosuppression: Tacrolimus immediate release (goal 8-10) and Mycophenolic acid (dose 540 mg every 12 hours)   - Patient is in an immunosuppressed state and will continue to monitor for efficacy and toxicity of immunosuppression medications.   - Changes: Not at this time    # Infection Prophylaxis:   - PJP: Sulfa/TMP (Bactrim) (last CD4 147 4/25/23)    # Hypertension: Borderline control;  Goal BP: < 140/90 (Hospitalization goal)   - Volume status: Euvolemic    - Changes: Not at this time, continue carvedilol 12.5mg BID    # Anemia in Chronic Renal Disease: Hgb: Stable      SHANEKA: No   - Iron studies: Replete    # Mineral Bone Disorder:    - Secondary renal hyperparathyroidism; PTH level: Minimally elevated ( pg/ml)        On treatment: None   - Vitamin D; level: Normal        On supplement: No   - Calcium; level: Normal        On supplement: No   -  Phosphorus; level: Not checked recently, but was normal last check        On supplement: No    # Electrolytes:   - Potassium; level: Normal        On supplement: No  - Magnesium; level: Low        On supplement: Yes, replace with 2g IV  - Bicarbonate; level: Low normal        On supplement: No  - Sodium; level: Normal    # Hx of C.diff colitis:               -Diagnosed 10/7/22 initially and received prolonged PO vanc taper, diagnosed again 3/2/23. She has now completed treatment regiment              -She thinks diarrhea is currently controlled, although tenuous since her transplant   - Would consider prophylactic PO vancomycin 125mg BID while on abx + 3 days.     # Loose stools:    -Recommend scheduling psyllium fiber daily    #R hand Cellulitis from cat bite:   -Presented to urgent care 8/14 with swollen red finger after cat bite. Prescribed Augmentin, but symptoms continued to progress. Now admitted 8/18. Transplant ID consulted. Would consider R hand MRI to further evaluate depth of injury, rule out tenosynovitis, osteomytelitis.    -Agree with zosyn for now, transplant ID consultation.     # Transplant History:  Etiology of Kidney Failure: Diabetes mellitus type 1  Tx: SPK  Transplant: 9/23/2022 (Kidney / Pancreas)  Significant changes in immunosuppression: None  Significant transplant-related complications: None    Recommendations were communicated to the primary team verbally.    Seen and discussed with Dr. Efrain Chacon MD  Division of Renal Disease and Hypertension  [facility]  [employer]    Physician Attestation   I, Rodo Zuniga MD, personally examined and evaluated this patient.  I discussed the patient with the resident/fellow and care team, and agree with the assessment and plan of care as documented in the note on 08/18/23 .      I personally reviewed vital signs, medications, labs, and imaging.  Rodo Zuniga MD  Date of Service (when I saw the patient): 08/18/23         REASON FOR CONSULT   Hx kidney transplant    History of Present Illness   Eden Hess is a 52 year old female with history of HTN, HLD, DMI and diabetic nephropathy now s/p SPK 22. She presented to urgent care on  after developing a red swollen finger after cat bite/scratch. Was prescribed augmentin. She continued to have progression of symptoms and presented to the ED. Started on zosyn for broader coverage and admitted for subsequent monitoring. Currently, kidney function is at baseline. No issues with her immunosuppression.     States that diarrhea waxes and wanes. She has completed treatment vancomycin. Has maybe noticed some change since starting Augmentin. Limited range of motion in her affected hand before tension and pain.     Review of Systems    The 10 point Review of Systems is negative other than noted in the HPI or here.      Past Medical History    I have reviewed this patient's medical history and updated it with pertinent information if needed.   Past Medical History:   Diagnosis Date     (HFpEF) heart failure with preserved ejection fraction (H)      Anxiety and depression     [facility]     Diabetes mellitus type 1 (H)     Diagnosed at age 2 years old      End stage kidney disease (H)      Hypertension      Retinopathy      TIA (transient ischemic attack)        Past Surgical History   I have reviewed this patient's surgical history and updated it with pertinent information if needed.  Past Surgical History:   Procedure Laterality Date     BENCH KIDNEY Left 2022    Procedure: Bench kidney;  Surgeon: Yousif Gómez MD;  Location: [facility] OR     BENCH PANCREAS N/A 2022    Procedure: Bench pancreas;  Surgeon: Yousif Gómez MD;  Location: [facility] OR      SECTION      x2     CREATE FISTULA ARTERIOVENOUS UPPER EXTREMITY      dialysis port, fistula-R arm     TRANSPLANT PANCREAS, KIDNEY  DONOR, COMBINED N/A 2022  "   Procedure: TRANSPLANT, KIDNEY AND PANCREAS,  DONOR;  Surgeon: Yousif Gómez MD;  Location: [facility] OR       Family History   I have reviewed this patient's family history and updated it with pertinent information if needed.   Family History   Problem Relation Age of Onset     Diabetes Type 1 Father      Hypertension Father      Cerebrovascular Disease Father         Social History   I have reviewed this patient's social history and updated it with pertinent information if needed. Eden Hess  reports that she quit smoking about 25 years ago. Her smoking use included cigarettes. She has never used smokeless tobacco. She reports current alcohol use. She reports that she does not use drugs.    Allergies   Allergies   Allergen Reactions     Amlodipine      Other reaction(s): Edema,generalized     Prior to Admission Medications     acetaminophen  975 mg Oral Q8H     atorvastatin  20 mg Oral QPM     carvedilol  12.5 mg Oral BID w/meals     mycophenolic acid  540 mg Oral BID IS     piperacillin-tazobactam  3.375 g Intravenous Q6H     sodium chloride (PF)  3 mL Intracatheter Q8H     sulfamethoxazole-trimethoprim  1 tablet Oral Once per day on      tacrolimus  1 mg Oral BID IS     vancomycin  1,500 mg Intravenous Q24H     vancomycin  125 mg Oral Daily     venlafaxine  150 mg Oral Daily       sodium chloride 100 mL/hr at 23 0500       Physical Exam   Temp  Av.3  F (36.8  C)  Min: 98.1  F (36.7  C)  Max: 98.7  F (37.1  C)      Pulse  Av  Min: 72  Max: 78 Resp  Av.5  Min: 15  Max: 18  SpO2  Av.3 %  Min: 97 %  Max: 98 %     /68   Pulse 73   Temp 98.7  F (37.1  C) (Oral)   Resp 18   Ht 1.549 m (5' 1\")   SpO2 97%   BMI 31.93 kg/m      Admit       GENERAL APPEARANCE: alert and no distress  HENT: mouth without ulcers or lesions  RESP: breathing comfortably on RA  CV: regular rhythm, normal rate  EDEMA: no LE edema bilaterally  ABDOMEN: soft, nondistended, " nontender  MS: Right hand with demarcated erythema and swelling. Noted to have limited ROM before pain.  SKIN: no rash    Data   CMP  Recent Labs   Lab 08/18/23  0550 08/17/23  1720 08/14/23  0958    136 138   POTASSIUM 4.2 4.8 4.6   CHLORIDE 109* 104 105   CO2 21* 21* 23   ANIONGAP 11 11 10   GLC 96 89 103*   BUN 19.7 19.3 14.1   CR 1.28* 1.15* 0.97*   GFRESTIMATED 50* 56* 69   BARBARA 9.4 10.0 9.8   MAG  --  1.4*  --    PROTTOTAL 6.1*  --   --    ALBUMIN 3.6  --   --    BILITOTAL 0.5  --   --    ALKPHOS 77  --   --    AST 16  --   --    ALT 12  --   --      CBC  Recent Labs   Lab 08/18/23  0550 08/17/23  1720 08/14/23  0958   HGB 12.2 13.2 13.0   WBC 6.8 7.7 7.0   RBC 4.01 4.38 4.20   HCT 38.5 42.2 39.1   MCV 96 96 93   MCH 30.4 30.1 31.0   MCHC 31.7 31.3* 33.2   RDW 12.5 12.6 12.5    308 280     INRNo lab results found in last 7 days.  ABGNo lab results found in last 7 days.   Urine Studies  Recent Labs   Lab Test 10/07/22  1213 09/22/22  1714 08/13/20  1110   COLOR Yellow Light Yellow Yellow   APPEARANCE Clear Clear Slightly Cloudy   URINEGLC Negative 300* >499*   URINEBILI Negative Negative Negative   URINEKETONE Negative Negative 5*   SG 1.015 1.011 1.016   UBLD Negative Small* Small*   URINEPH 7.0 8.0* 5.0   PROTEIN Negative 200* >499*   UROBILINOGEN 0.2  --   --    NITRITE Negative Negative Negative   LEUKEST Negative Negative Negative   RBCU 0-2 1 3*   WBCU 0-5 6* 3     No lab results found.  PTH  Recent Labs   Lab Test 01/31/23  0957 10/13/22  1102 10/03/22  0808   PTHI 85* 259* 268*     Iron Studies  Recent Labs   Lab Test 10/03/22  0809   IRON 34*   *   IRONSAT 17   LAUREN 923*       IMAGING:  All imaging studies reviewed by me.

## 2023-08-18 NOTE — PLAN OF CARE
"/68   Pulse 73   Temp 98.7  F (37.1  C) (Oral)   Resp 18   Ht 1.549 m (5' 1\")   SpO2 97%   BMI 31.93 kg/m      Shift: 0896-0380  Isolation Status: N/a  VS: VVR on RA, afebrile  Neuro: Aox4  Behaviors: calm and cooperative   BG: N/a  Labs: AM labs pending   Respiratory: WDL  Cardiac: WDL  Pain/Nausea: Denies pain and nausea   PRN: N/a  Diet: Regular   IV Access: L PIV  Infusion(s): NS at 100ml/hr  Lines/Drains: N/a  GI/: voiding not saving . No BM this shift  Skin: R hand cellulitis cause by cat bite on the 13th   Mobility: indep  Events/Education: N/a  Plan:  consider MRI for further clarification.       "

## 2023-08-18 NOTE — PROGRESS NOTES
[facility]    Medicine Progress Note - Hospitalist Service, GOLD TEAM 10    Date of Admission:  8/17/2023  Eden Hess is a 52 year old female with a past medical history of DM1 s/p SPK in 09/2022, HTN, anemia admitted on 8/17/2023 for failed OP treatment of cellulitis.     Changes today  No clear indication to be on IV vancomycin from medicine stand point with negative MRSA. Will defer to Transplant ID.   Renal US, UA, Fena  Agree with Vancomycin PO for C diff ppx  MRI hand to r/o deeper infection/ foreign body. 2 clear puncture marks from cats incisors      #Cellulitis s/p cat scratch: Scratched or bit by cat Saturday evening. Developed pain, redness, swelling over weekend. Started Augmentin Monday evening. No change in cellulitic appearance since initiation of antibiotics. Hand XR w/o e/o osteo. Afebrile. Normal WBC count.   -Antibiotics w/ Zosyn overnight, had dose of vanc at 1900  -IVF w/ NS at 100 ml/hour  -Consult transplant nephrology  -Consult transplant ID  -Elevate arm  -MRSA nasal swab     #S/P SPK 09/2022  ##DM1  Labs w/ creat of 1.15 (BL ~1.2), CO2 of 21, K normal, glucose normal.   -Continue IS w/ tacro (goal 8-10), mycophenolic acid 540 mg Q12  -Continue infx ppx w/ bactrim  -Consider timed draw tacro level 08/19   -Transplant nephrology consult     #H/O cdiff: Now w/ 4 loose stools/day while on Augmentin. No fevers, abdominal pain.  -Continue BID PO vanc for now, reduced to qday, would discuss continued use w/ transplant ID and nephrology tomorrow  -Transplant ID consult     #HTN: PTA maintained on coreg. Goal BP <130/80. BP in 150s/70s.   -Continue Coreg w/ hold parameters     #Anemia in chronic renal disease: Normal hgb. Management per nephrology.  #Abnormal LFTs: In 4/2023- ALT of 48, AST Of 51. Normal t bili and alk phos. Repeat in AM.   #Hypomagnesemia: Mag of 1.5 in the past. Repeat and replete PRN.      Diet:  Regular diet  DVT Prophylaxis:  "Pneumatic Compression Devices and Ambulate every shift  Glez Catheter: Not present  Lines: None     Cardiac Monitoring: None  Code Status:  Full     Clinically Significant Risk Factors Present on Admission                   # Hypertension: Noted on problem list      # Obesity: Estimated body mass index is 31.93 kg/m  as calculated from the following:    Height as of this encounter: 1.549 m (5' 1\").    Weight as of an earlier encounter on 8/17/23: 76.7 kg (169 lb).            Diet: Combination Diet Regular Diet Adult    DVT Prophylaxis: Enoxaparin (Lovenox) SQ  Glez Catheter: Not present  Lines: None     Cardiac Monitoring: None  Code Status: Full Code      Clinically Significant Risk Factors Present on Admission            # Hypomagnesemia: Lowest Mg = 1.4 mg/dL in last 2 days, will replace as needed       # Hypertension: Noted on problem list      # Obesity: Estimated body mass index is 32.27 kg/m  as calculated from the following:    Height as of this encounter: 1.549 m (5' 1\").    Weight as of this encounter: 77.5 kg (170 lb 12.8 oz).              Disposition Plan      Expected Discharge Date: 08/18/2023,  3:00 PM    Destination: home with family  Discharge Comments: late today vs Saturday          Malvin Hills MD  Hospitalist Service, GOLD TEAM 10  [facility]  Securely message with [employer] (more info)  Text page via [employer] Paging/Directory   See signed in provider for up to date coverage information  ______________________________________________________________________    Interval History   Improved redness and pain   Unable to close fist  Denies tracking outside of erythema   No bowel or bladder issues   No fever or chills  No N/V    Physical Exam   Vital Signs: Temp: 97.5  F (36.4  C) Temp src: Oral BP: (!) 156/74 Pulse: 75   Resp: 18 SpO2: 100 % O2 Device: None (Room air)    Weight: 170 lbs 12.8 oz    Physical Exam      Medical Decision Making       **CLEAR ALL " SELECTIONS**      Data     I have personally reviewed the following data over the past 24 hrs:    6.8  \   12.2   / 271     141 109 (H) 19.7 /  96   4.2 21 (L) 1.28 (H) \     ALT: 12 AST: 16 AP: 77 TBILI: 0.5   ALB: 3.6 TOT PROTEIN: 6.1 (L) LIPASE: 34

## 2023-08-18 NOTE — H&P
[facility]    History and Physical - Hospitalist Service, GOLD TEAM        Date of Admission:  8/17/2023    Assessment & Plan      Eden Hess is a 52 year old female with a past medical history of DM1 s/p SPK in 09/2022, HTN, anemia admitted on 8/17/2023 for failed OP treatment of cellulitis.     #Cellulitis s/p cat scratch: Scratched or bit by cat Saturday evening. Developed pain, redness, swelling over weekend. Started Augmentin Monday evening. No change in cellulitic appearance since initiation of antibiotics. Hand XR w/o e/o osteo. Afebrile. Normal WBC count.   -Antibiotics w/ Zosyn overnight, had dose of vanc at 1900  -IVF w/ NS at 100 ml/hour  -Consult transplant nephrology  -Consult transplant ID  -Elevate arm  -MRSA nasal swab    #S/P SPK 09/2022  ##DM1  Labs w/ creat of 1.15 (BL ~1.2), CO2 of 21, K normal, glucose normal.   -Continue IS w/ tacro (goal 8-10), mycophenolic acid 540 mg Q12  -Continue infx ppx w/ bactrim  -Consider timed draw tacro level 08/19   -Transplant nephrology consult    #H/O cdiff: Now w/ 4 loose stools/day while on Augmentin. No fevers, abdominal pain.  -Continue BID PO vanc for now, reduced to qday, would discuss continued use w/ transplant ID and nephrology tomorrow  -Transplant ID consult     #HTN: PTA maintained on coreg. Goal BP <130/80. BP in 150s/70s.   -Continue Coreg w/ hold parameters    #Anemia in chronic renal disease: Normal hgb. Management per nephrology.  #Abnormal LFTs: In 4/2023- ALT of 48, AST Of 51. Normal t bili and alk phos. Repeat in AM.   #Hypomagnesemia: Mag of 1.5 in the past. Repeat and replete PRN.        Diet:  Regular diet  DVT Prophylaxis: Pneumatic Compression Devices and Ambulate every shift  Glez Catheter: Not present  Lines: None     Cardiac Monitoring: None  Code Status:  Full    Clinically Significant Risk Factors Present on Admission                  # Hypertension: Noted on problem list      #  "Obesity: Estimated body mass index is 31.93 kg/m  as calculated from the following:    Height as of this encounter: 1.549 m (5' 1\").    Weight as of an earlier encounter on 8/17/23: 76.7 kg (169 lb).              Disposition Plan      Expected Discharge Date: 08/18/2023                The patient's care was discussed with the Attending Physician, Dr. Suh and Patient.    Emily Mae PA-C  Hospitalist Service, [facility]  Securely message with [employer] (more info)  Text page via [facility] Paging/Directory   See signed in provider for up to date coverage information    ______________________________________________________________________    Chief Complaint   Skin infection    History is obtained from the patient    History of Present Illness   Eden Hess is a 52 year old female with a past medical history of DM1 s/p SPK in 09/2022, HTN, anemia admitted on 8/17/2023 for failed OP treatment of cellulitis.      The patient notes she was cat sitting her friends cat. Went to pet it on Saturday evening, and it swatted and bit at her. She believes the claws made contact, unsure if the cat bit her. She notes that she noted pain, redness and swelling on Sunday. Went to have routine labs drawn Monday and nurse recommended she present to . She notes she was started on augmentin Monday, and has taken it twice daily without any significant change in redness, swelling or pain. She denies objective fevers. No drainage. She notes pain at site and with flexing fingers due to swelling. She denies any headache, chest pain, dyspnea, abdominal pain, nausea/vomiting, urinary issues. No focal numbness/tingling/weakness. She notes her friend is Faith with caring for cats and they are UTD on immunizations. She received TDAP in ED. She notes some loose BMs since starting augmentin, stooling 4 x/day, no abdominal cramping or fevers. She has a h/o c diff. Unsure if " this feels like that, maybe?       Past Medical History    Past Medical History:   Diagnosis Date    (HFpEF) heart failure with preserved ejection fraction (H)     Anxiety and depression     [facility]    Diabetes mellitus type 1 (H)     Diagnosed at age 2 years old     End stage kidney disease (H)     Hypertension     Retinopathy     TIA (transient ischemic attack)        Past Surgical History   Past Surgical History:   Procedure Laterality Date    BENCH KIDNEY Left 2022    Procedure: Bench kidney;  Surgeon: Yousif Gómez MD;  Location: [facility] OR    BENCH PANCREAS N/A 2022    Procedure: Bench pancreas;  Surgeon: Yousif Gómez MD;  Location: [facility] OR     SECTION      x2    CREATE FISTULA ARTERIOVENOUS UPPER EXTREMITY      dialysis port, fistula-R arm    TRANSPLANT PANCREAS, KIDNEY  DONOR, COMBINED N/A 2022    Procedure: TRANSPLANT, KIDNEY AND PANCREAS,  DONOR;  Surgeon: Yousif Gómez MD;  Location: [facility] OR       Prior to Admission Medications   Prior to Admission Medications   Prescriptions Last Dose Informant Patient Reported? Taking?   FIBER ADULT GUMMIES PO   Yes No   Sig: Take 1 Gum by mouth daily   amoxicillin-clavulanate (AUGMENTIN) 875-125 MG tablet   No No   Sig: Take 1 tablet by mouth 2 times daily for 7 days   atorvastatin (LIPITOR) 20 MG tablet   No No   Sig: Take 1 tablet (20 mg) by mouth every evening   calcium carbonate-vitamin D (OS-BARBARA WITH D) 500-200 MG-UNIT tablet   No No   Sig: Take 1 tablet by mouth 2 times daily (with meals)   carvedilol (COREG) 12.5 MG tablet   No No   Sig: Take 1 tablet (12.5 mg) by mouth 2 times daily (with meals)   mycophenolic acid (GENERIC EQUIVALENT) 180 MG EC tablet   No No   Sig: Take 3 tablets (540 mg) by mouth 2 times daily   sulfamethoxazole-trimethoprim (BACTRIM) 400-80 MG tablet   No No   Sig: Take 1 tablet by mouth three times a week   tacrolimus (GENERIC EQUIVALENT) 0.5 MG  capsule   No No   Sig: Take 2 capsules (1 mg) by mouth 2 times daily   venlafaxine (EFFEXOR XR) 150 MG 24 hr capsule   No No   Sig: Take 1 capsule (150 mg) by mouth daily      Facility-Administered Medications: None        Review of Systems    The 10 point Review of Systems is negative other than noted in the HPI or here.     Social History   I have reviewed this patient's social history and updated it with pertinent information if needed.  Social History     Tobacco Use    Smoking status: Former     Types: Cigarettes     Quit date: 1997     Years since quittin.6    Smokeless tobacco: Never   Substance Use Topics    Alcohol use: Yes     Alcohol/week: 0.0 standard drinks of alcohol    Drug use: No    Lives in [place]  and son in home. No alcohol use, no tobacco, no illicit drug use.    Physical Exam   Vital Signs: Temp: 98.1  F (36.7  C) Temp src: Oral BP: (!) 150/7 Pulse: 72   Resp: 16 SpO2: 97 % O2 Device: None (Room air)    Weight: 0 lbs 0 oz  GENERAL: Alert and oriented x 3. Ambulating around unit independently.   HEENT: Anicteric sclera. Mucous membranes moist and without lesions.   CV: RRR. S1, S2. No murmurs appreciated.   RESPIRATORY: Effort normal on RA. Lungs CTAB with no wheezing, rales, rhonchi.   GI: Abdomen soft and non distended, bowel sounds present. No tenderness, rebound, guarding.   MUSCULOSKELETAL: Moves all extremities.   NEUROLOGICAL: No focal deficits. S  EXTREMITIES: No peripheral edema. Intact bilateral pedal pulses.   SKIN: No jaundice. Dorsum of hand- Right 3rd MCP joint with 2 puncture wounds, 1.5 cm area of redness and warmth, no drainage.       Medical Decision Making       75 MINUTES SPENT BY ME on the date of service doing chart review, history, exam, documentation & further activities per the note.      Data     I have personally reviewed the following data over the past 24 hrs:    7.7  \   13.2   / 308     136 104 19.3 /  89   4.8 21 (L) 1.15 (H) \        Imaging results reviewed over the past 24 hrs:   Recent Results (from the past 24 hour(s))   XR Hand Right G/E 3 Views    Narrative    EXAM: XR HAND RIGHT G/E 3 VIEWS  LOCATION: [facility]  DATE: 8/17/2023    INDICATION: Right hand catheter bite. Right hand pain.  COMPARISON: None.      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 Remaining joint spaces are preserved and normally aligned. No fracture. No radiopaque foreign body. No evidence of osteomyelitis. Vascular calcifications.

## 2023-08-18 NOTE — PLAN OF CARE
Problem: Pain Acute  Goal: Optimal Pain Control and Function  Outcome: Progressing   Goal Outcome Evaluation: managed with scheduled tylenol

## 2023-08-18 NOTE — PLAN OF CARE
"Goal Outcome Evaluation:          BP (!) 149/66 (BP Location: Left arm)   Pulse 67   Temp 98.4  F (36.9  C) (Oral)   Resp 17   Ht 1.549 m (5' 1\")   Wt 77.5 kg (170 lb 12.8 oz)   SpO2 96%   BMI 32.27 kg/m      6676-0409  S/P  Right hand cellulitis, improving with Zosyn.  Neuro: A&Ox4.   Cardiac: SR. VSS.   Respiratory: Sating 96% on RA.  GI/: Adequate urine output. BM X1  Diet/appetite: Tolerating regular diet. Eating well.  Activity: Independent  up to chair and in halls.  Pain: At acceptable level on current regimen.   Skin: No new deficits noted.  LDA's:PIV infusing LR @ 100 ml/hr.  Plan: Continue with POC. Notify primary team with changes.    "

## 2023-08-18 NOTE — PROGRESS NOTES
Admitted/transferred from:   Time of arrival on unit 2300  2 RN full  skin assessment completed by Kamilah TERRAZAS RN & Zhane ARRIETA RN  Skin assessment finding: issues found on right hand. Cat bite. Bruising and slightly swollen      Interventions/actions: skin interventions marked borders.       Will continue to monitor.

## 2023-08-18 NOTE — PROVIDER NOTIFICATION
Gold paged:     Patient states that PO vancomycin was a temporary medication that she no longer takes. could you clarify?

## 2023-08-18 NOTE — CONSULTS
[facility]  Transplant Infectious Disease Consult Note - New Patient     Patient:  Eden Hess, Date of birth 1970, Medical record number [id number]  Date of Visit:  08/18/2023  Consult requested by Dr. Malvin Hills for evaluation of cellulitis         Assessment and Recommendations:   Recommendations:  - Stop IV vancomycin  - Continue IV Zosyn for now  - Trend CRP  - If clinical improvement, will likely de-escalate to Unasyn and then to PO antibiotics  - Continue Bactrim ppx  - Continue PO Vancomycin 125mg daily for C.diff prophylaxis while on systemic antibiotics  - If lack of response and particularly if she develops tender lymphadenopathy, will consider adding Azithro to cover for Bartonella     Thank you very much for this consultation. Transplant Infectious Disease will continue to follow with you.    Dr. Lorenzo (Staff, pager [phone]) will cover the SOT ID service over the weekend, but will not see this patient unless called with questions.   I will resume coverage of service starting Monday, 8/21/23     Assessment:  51 y/o lady, PMHx DM1 s/p SPK 9/2022 (CMV -/-, EBV +/+), who is being evaluated for right hand cellulitis that failed outpatient PO antibiotic treatment    #Right hand cellulitis after cat bite/scratch:  Patient with cat scratch and possible bite while attempting to pet friend's pet cat this past Saturday (8/12/23). Developed redness, swelling and edema 8/13, started Augmentin BID 8/14 but failed to have clinical improvement despite 4 days of antibiotic therapy. Was not however noted to have clinical worsening. No spreading erythema. No noted regional lymphadenopathy on exam. Would cover for Pasteurella species, Bartonella (cat-scratch) on differential, however clinical syndrome does not resemble the same. MRSA nares negative, ok to stop systemic Vancomycin. Continue IV Zosyn for now, with improvement, might be able to de-escalate to Unasyn and then PO.  Trend inflammatory markers    #Hx C.diff:  Tested positive 3/14/23 s/p PO Vancomycin. OK to have on Vanco prophylaxis while on systemic antibiotics    Other Infectious Disease issues include:  - QTc interval: 482 msec 9/24/22  - Pneumocystis prophylaxis: Bactrim  - Viral serostatus & prophylaxis: CMV -/-, EBV +/+, none indicated at present  - Risk factors to suggest check of Toxoplasma, Strongy, or Schisto serology?: None  - Immunization status: COVID x 2, last 3/6/21  - Gamma globulin status: 1070 3/14/23  - Isolation status: Good hand hygiene.    CORNELL Poe  Staff Physician, Infectious Diseases  Pager [phone]          History of Infectious Disease Illness:     53 y/o lady, PMHx DM1 s/p SPK 9/2022 (CMV -/-, EBV +/+), who is being evaluated for right hand cellulitis that failed outpatient PO antibiotic treatment    Patient was cat sitting her friend's cat this past weekend. On Saturday 8/12, she went to pet it and the cat swatted/bit at her. Feels she got scratched but unsure if she was actually bit. Sunday, she noted significant pain, swelling and redness. Seen in urgent care Monday and was started on Augmentin - which she took twice a day. However, despite 4 days of antibiotic, she observed no significant change in pain, redness or swelling. Although the rash was not spreading, it didn't improve. Does have local pain and difficulty flexing fingers due to swelling. Denies swollen lymph nodes    No fevers, no drainage. No headaches, visual changes, swollen lymph nodes. Cat is reportedly up to date on vaccines. Patient denies nausea, but since starting Augmentin has had diarrhea    In ER, started on Vanc and Zosyn. PO Vanc started for C.diff prophylaxis  Nasal MRSA swab negative  X ray right hand showed no evidence of osteomyelitis      Transplants:  9/23/2022 (Kidney / Pancreas), Postoperative day:  329.  Coordinator Codie Kovacs    Review of Systems:  Remaining systems reviewed and  negative    Past Medical History:   Diagnosis Date    (HFpEF) heart failure with preserved ejection fraction (H)     Anxiety and depression     [facility] [place] [facility]    Diabetes mellitus type 1 (H)     Diagnosed at age 2 years old     End stage kidney disease (H)     Hypertension     Retinopathy     TIA (transient ischemic attack)        Past Surgical History:   Procedure Laterality Date    BENCH KIDNEY Left 2022    Procedure: Bench kidney;  Surgeon: Yousif Gómez MD;  Location: [facility] OR    BENCH PANCREAS N/A 2022    Procedure: Bench pancreas;  Surgeon: Yousif Gómez MD;  Location: [facility] OR     SECTION      x2    CREATE FISTULA ARTERIOVENOUS UPPER EXTREMITY      dialysis port, fistula-R arm    TRANSPLANT PANCREAS, KIDNEY  DONOR, COMBINED N/A 2022    Procedure: TRANSPLANT, KIDNEY AND PANCREAS,  DONOR;  Surgeon: Yousif Gómez MD;  Location: [facility] OR       Family History   Problem Relation Age of Onset    Diabetes Type 1 Father     Hypertension Father     Cerebrovascular Disease Father        Social History     Social History Narrative    Not on file     Social History     Tobacco Use    Smoking status: Former     Types: Cigarettes     Quit date: 1997     Years since quittin.6    Smokeless tobacco: Never   Substance Use Topics    Alcohol use: Yes     Alcohol/week: 0.0 standard drinks of alcohol    Drug use: No       Immunization History   Administered Date(s) Administered    COVID-19 Monovalent 18+ (Moderna) 2021, 2021, 2021    HepB, Unspecified 2009, 2009, 2009, 2010    Hepatitis B, Adult 2020, 2020, 2020, 10/19/2020    Influenza (intradermal) 10/29/2012, 2013, 2016, 10/05/2018, 2020    Influenza Vaccine >6 months (Alfuria,Fluzone) 2016, 2021    Pneumo Conj 13-V (2010&after) 2020    Pneumococcal 23 valent 2010    Pneumococcal,  Unspecified 05/04/2010, 03/03/2020    TDAP (Adacel,Boostrix) 08/17/2023    Tdap (Adult) Unspecified Formulation 05/04/2010            Current Medications & Allergies:      acetaminophen  975 mg Oral Q8H    atorvastatin  20 mg Oral QPM    carvedilol  12.5 mg Oral BID w/meals    mycophenolic acid  540 mg Oral BID IS    piperacillin-tazobactam  3.375 g Intravenous Q6H    sodium chloride (PF)  3 mL Intracatheter Q8H    sulfamethoxazole-trimethoprim  1 tablet Oral Once per day on Mon Wed Fri    tacrolimus  1 mg Oral BID IS    vancomycin  1,500 mg Intravenous Q24H    vancomycin  125 mg Oral Daily    venlafaxine  150 mg Oral Daily       Infusions/Drips:     lactated ringers         Allergies   Allergen Reactions    Amlodipine      Other reaction(s): Edema,generalized            Physical Exam:     Ranges for vital signs:  Temp:  [97.5  F (36.4  C)-98.7  F (37.1  C)] 97.5  F (36.4  C)  Pulse:  [72-78] 75  Resp:  [15-18] 18  BP: (133-180)/(68-82) 180/77  SpO2:  [97 %-100 %] 100 %  Vitals:    08/18/23 0845   Weight: 77.5 kg (170 lb 12.8 oz)       Physical Examination:  GENERAL:  well-developed, well-nourished, in bed in no acute distress.  HEAD:  Head is normocephalic, atraumatic   EYES:  Eyes have anicteric sclerae without conjunctival injection   ENT:  Oropharynx is moist without exudates or ulcers. Tongue is midline  NECK:  Supple. No cervical lymphadenopathy  LUNGS:  Clear to auscultation bilateral. On room air, no use of accessory muscles  CARDIOVASCULAR:  Regular rate and rhythm with no murmurs, gallops or rubs.  ABDOMEN:  Normal bowel sounds, soft, nontender. No appreciable hepatosplenomegaly.  SKIN:  Right hand edematous, limited ROM due to edema. Erythema and warmth over middle finger MCP joint, 2 puncture marks seen. No drainage. Tender to palpation +  No palpable lymphadenopathy - around right elbow or right axilla  NEUROLOGIC:  Grossly nonfocal. Active x4 extremities             Laboratory Data:     Absolute CD4,  [place] T Cells   Date Value Ref Range Status   04/25/2023 147 (L) 441 - 2,156 cells/uL Final       Inflammatory Markers    Recent Labs   Lab Test 04/25/23  1058   G6PD 16.3       Immune Globulin Studies     Recent Labs   Lab Test 03/14/23  1029   IGG 1,070       Metabolic Studies       Recent Labs   Lab Test 08/18/23  0550 08/17/23  1720 08/14/23  0958 07/25/23  1017 04/25/23  1058 04/03/23  1001 09/24/22  1404 09/24/22  1400 09/23/22  1815 09/23/22  1753    136   < > 141   < > 138   < >  --    < > 135   POTASSIUM 4.2 4.8   < > 4.5   < > 5.6*   < > 4.4   < > 3.4*   CHLORIDE 109* 104   < > 106   < > 103   < >  --    < >  --    CO2 21* 21*   < > 24   < > 22   < >  --    < >  --    ANIONGAP 11 11   < > 11   < > 13   < >  --    < >  --    BUN 19.7 19.3   < > 20.0   < > 33.2*   < >  --    < >  --    CR 1.28* 1.15*   < > 1.24*   < > 1.45*   < >  --    < >  --    GFRESTIMATED 50* 56*   < > 51*   < > 43*   < >  --    < >  --    GLC 96 89   < > 102*   < > 97   < >  --    < > 79   A1C  --   --   --   --   --  5.2   < >  --   --   --    BARBARA 9.4 10.0   < > 9.9   < > 9.7   < >  --    < >  --    PHOS  --   --   --  3.9   < >  --    < >  --    < >  --    MAG  --  1.4*  --  1.5*   < >  --    < >  --    < >  --    LACT  --   --   --   --   --   --   --  0.5*  --  3.2*    < > = values in this interval not displayed.       Hepatic Studies    Recent Labs   Lab Test 08/18/23  0550 04/03/23  1001 10/13/22  1102 09/24/22  0514   BILITOTAL 0.5 0.4   < >  --    DBIL  --  <0.20   < >  --    ALKPHOS 77 91   < >  --    PROTTOTAL 6.1* 6.9   < >  --    ALBUMIN 3.6 4.2   < >  --    AST 16 51*   < >  --    ALT 12 48*   < >  --    LDH  --   --   --  327*    < > = values in this interval not displayed.     Hematology Studies   Recent Labs   Lab Test 08/18/23  0550 08/17/23  1720 08/14/23  0958 07/25/23  1017 10/02/22  0543 10/01/22  0550 09/30/22  0540   WBC 6.8 7.7 7.0 4.6   < > 10.2 7.9   ANEU  --   --   --   --   --  9.2* 7.0   ANEUTAUTO  5.0 5.8  --   --    < >  --   --    ALYM  --   --   --   --   --  0.0* 0.0*   ALYMPAUTO 0.6* 0.7*  --   --    < >  --   --    CAMILA  --   --   --   --   --  0.6 0.7   AMONOAUTO 1.0 1.0  --   --    < >  --   --    AEOS  --   --   --   --   --  0.4 0.1   AEOSAUTO 0.2 0.2  --   --    < >  --   --    ABSBASO 0.1 0.1  --   --    < >  --   --    HGB 12.2 13.2 13.0 13.1   < > 8.5* 7.9*   HCT 38.5 42.2 39.1 40.7   < > 26.2* 23.8*    308 280 308   < > 268 221    < > = values in this interval not displayed.       Clotting Studies    Recent Labs   Lab Test 09/29/22  0602 09/24/22  1400 09/23/22  1820 09/22/22  1629 09/22/22  1629 08/13/20  1057   INR  --   --  1.09  --  0.91 0.96   PTT 39*   < > 27   < > 26 28    < > = values in this interval not displayed.     Urine Studies     Recent Labs   Lab Test 10/07/22  1213 09/22/22  1714 08/13/20  1110   URINEPH 7.0 8.0* 5.0   NITRITE Negative Negative Negative   LEUKEST Negative Negative Negative   WBCU 0-5 6* 3       Medication levels    Recent Labs   Lab Test 08/14/23  0958   TACROL 10.8   MPACID 1.28   MPAG 50.5       Microbiology:  Fungal testing  No lab results found.    Invalid input(s): HIFUN, FUNGL    Last Culture results   No results found for: BASTPT, PJRDFA, RSS, CULTURE, CULT, ECOLISP      Last checks of Clostridioides difficile testing  Recent Labs   Lab Test 03/01/23  0909 10/07/22  1212   CDBPCT Positive* Positive*   CDIFFGDH Positive*  --    CDIFFTOX Positive*  --        Syphilis Testing    Treponema Antibodies   Date Value Ref Range Status   08/13/2020 Nonreactive NR^Nonreactive Final     Comment:     Methodology Change: Test performed on the [employer] Liaison XL by Treponema   pallidum Total Antibodies Assay as of 3.17.2020.         Quantiferon testing   Recent Labs   Lab Test 08/18/23  0550 08/17/23  1720 09/22/22  1629 08/13/20  1057   TBRST  --   --   --  Negative   LYMPH 9 9   < > 15.9    < > = values in this interval not displayed.        Virology:  Coronavirus-19 testing    Recent Labs   Lab Test 09/22/22  1708 09/11/22  1051 07/11/20  1300   [id number] Negative Negative  --    COVIDPCREXT  --   --  Negative     CMV viral loads    Recent Labs   Lab Test 02/22/23  1003 11/28/22  0830 09/22/22  1629   CMVQNT Not Detected Not Detected Not Detected         BK Virus DNA copies/mL   Date Value Ref Range Status   08/14/2023 Not Detected Not Detected copies/mL Final   05/11/2023 Not Detected Not Detected copies/mL Final   03/07/2023 Not Detected Not Detected copies/mL Final   03/01/2023 Not Detected Not Detected copies/mL Final   01/16/2023 Not Detected Not Detected copies/mL Final   01/09/2023 Not Detected Not Detected copies/mL Final   12/23/2022 Not Detected Not Detected copies/mL Final   12/19/2022 Not Detected Not Detected copies/mL Final   12/13/2022 Not Detected Not Detected copies/mL Final   12/05/2022 Not Detected Not Detected copies/mL Final   11/21/2022 Not Detected Not Detected copies/mL Final   11/04/2022 Not Detected Not Detected copies/mL Final   10/27/2022 Not Detected Not Detected copies/mL Final       Hepatitis B Testing     Recent Labs   Lab Test 09/22/22  1629 08/13/20  1057   AUSAB 508.82 >1,000.00*   HBCAB Nonreactive Nonreactive   HEPBANG Nonreactive Nonreactive        Hepatitis C Antibody   Date Value Ref Range Status   09/22/2022 Nonreactive Nonreactive Final   08/13/2020 Nonreactive NR^Nonreactive Final     Comment:     Assay performance characteristics have not been established for newborns,   infants, and children         CMV Antibody IgG   Date Value Ref Range Status   09/22/2022 No detectable antibody. No detectable antibody.  Final   08/13/2020 <0.2 0.0 - 0.8 AI Final     Comment:     Negative  Antibody index (AI) values reflect qualitative changes in antibody   concentration that cannot be directly associated with clinical condition or   disease state.       Varicella Zoster Virus Antibody IgG   Date Value Ref Range  Status   08/13/2020 6.9 (H) 0.0 - 0.8 AI Final     Comment:     Positive, suggests prev. exposure and probable immunity  Antibody index (AI) values reflect qualitative changes in antibody   concentration that cannot be directly associated with clinical condition or   disease state.       EBV Capsid Antibody IgG   Date Value Ref Range Status   09/22/2022 Positive (A) No detectable antibody. Final     Comment:     Suggests recent or past exposure.   08/13/2020 >8.0 (H) 0.0 - 0.8 AI Final     Comment:     Positive, suggests recent or past exposure  Antibody index (AI) values reflect qualitative changes in antibody   concentration that cannot be directly associated with clinical condition or   disease state.       EBV Capsid Antibody IgM   Date Value Ref Range Status   09/22/2022 No detectable antibody. No detectable antibody. Final       Imaging:  Recent Results (from the past 48 hour(s))   XR Hand Right G/E 3 Views    Narrative    EXAM: XR HAND RIGHT G/E 3 VIEWS  LOCATION: [facility]  DATE: 8/17/2023    INDICATION: Right hand catheter bite. Right hand pain.  COMPARISON: None.      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 Remaining joint spaces are preserved and normally aligned. No fracture. No radiopaque foreign body. No evidence of osteomyelitis. Vascular calcifications.

## 2023-08-18 NOTE — CONSULTS
Care Management Initial Consult    General Information  Assessment completed with: Patient, Care Team Member, -chart review,    Type of CM/SW Visit: Initial Assessment    Primary Care Provider verified and updated as needed: Yes (Pt requesting a new PCP through [facility])   Readmission within the last 30 days:        Reason for Consult: discharge planning  Advance Care Planning: Advance Care Planning Reviewed: no concerns identified          Communication Assessment  Patient's communication style: spoken language (English or Bilingual)             Cognitive  Cognitive/Neuro/Behavioral: WDL                      Living Environment:   People in home: spouse     Current living Arrangements: house      Able to return to prior arrangements: yes       Family/Social Support:  Care provided by: self  Provides care for: no one  Marital Status:              Description of Support System:           Current Resources:   Patient receiving home care services: No     Community Resources: None  Equipment currently used at home:    Supplies currently used at home: None    Employment/Financial:  Employment Status: disabled        Financial Concerns: No concerns identified           Does the patient's insurance plan have a 3 day qualifying hospital stay waiver?  No    Lifestyle & Psychosocial Needs:  Social Determinants of Health     Tobacco Use: Medium Risk (8/17/2023)    Patient History     Smoking Tobacco Use: Former     Smokeless Tobacco Use: Never     Passive Exposure: Not on file   Alcohol Use: Unknown (6/22/2020)    AUDIT-C     Frequency of Alcohol Consumption: 4 or more times a week     Average Number of Drinks: 1 or 2     Frequency of Binge Drinking: Not on file   Financial Resource Strain: Not on file   Food Insecurity: Not on file   Transportation Needs: Not on file   Physical Activity: Not on file   Stress: Not on file   Social Connections: Not on file   Intimate Partner Violence: Not on file   Depression:  Not at risk (2/28/2022)    PHQ-2     PHQ-2 Score: 2   Housing Stability: Not on file       Functional Status:  Prior to admission patient needed assistance:   Dependent ADLs:: Independent  Dependent IADLs:: Independent       Mental Health Status:  Mental Health Status:  (Pt notes feeling more isolated d/t COVID and since her transplant in 2022.  Pt notes good family support which has helped in addressing feelings of isolation)       Chemical Dependency Status:  Chemical Dependency Status: No Current Concerns             Values/Beliefs:  Spiritual, Cultural Beliefs, Druze Practices, Values that affect care: no               Additional Information:  Per H & P patient with a past medical history of DM1 s/p SPK in 09/2022, HTN, anemia admitted on 8/17/2023 for failed OP treatment of cellulitis.  CMA consulted for discharge planning.  Final antibiotic plan pending. Transplant Nephrology and Transplant ID consulted.    Met with pt.  Introduced RNCC role.  Prior to hospitalization pt was independent with her own care needs.  Pt notes no concerns with her ability to return home.  Pt notes no concerns should team recommend IV antibiotics for discharge.    Pt would like to establish a new PCP within [facility].  Per pt she has her transplant labs drawn at [facility].  Pt notes that [facility], [facility] and [facility] would be good options for establishing a PCP.  Pt notes no concerns or questions at this time.    [facility].    [facility] request for a PCP placed.    RNCC will continue to monitor.    Final plan for discharge pending medical clearance and antibiotic plan.     [facility] Benefit Check: Pt has coverage for iv abx through their [employer] plan. Pt has a deductible of $500 (met $500). She has an 85/15 coverage. OOP is $2500 (met $590.00). Once OOP has been met pt should be covered at 100%.    Hermila Archer RN BSN, PHN, ACM-RN  7A RN Care  Coordinator  Phone: [phone]  Pager [phone]    To contact the weekend CC  [place] (0800 - 1630) Saturday and Sunday    Units: 5A,5B,5C [phone]    Units: 6A, [phone]    Units 6B, 6C, 6D [phone]    Units: 7A, 7B, 7C, 7D, [phone]    [facility] ([phone]) Saturday and Sunday    Units: 5 Ortho, 8A, 10 ICU, & Pediatric Units-Pager 4: [phone]    8/18/2023 11:15 AM

## 2023-08-18 NOTE — PROGRESS NOTES
Pt has coverage for iv abx through their UMR plan. Pt has a deductible of $500 (met $500). She has an 85/15 coverage. OOP is $2500 (met $590.00). Once OOP has been met pt should be covered at 100%.  FYI: Pt will need to update their coordination of benefits (COB).     ([facility]) In reference to admission date 08/17/2023.    Please contact Intake with any questions, [phone] or In Basket pool, [facility] ([phone]).

## 2023-08-19 ENCOUNTER — APPOINTMENT (OUTPATIENT)
Dept: MRI IMAGING | Facility: CLINIC | Age: 53
DRG: 603 | End: 2023-08-19
Attending: INTERNAL MEDICINE
Payer: COMMERCIAL

## 2023-08-19 LAB
ANION GAP SERPL CALCULATED.3IONS-SCNC: 13 MMOL/L (ref 7–15)
BUN SERPL-MCNC: 17.1 MG/DL (ref 6–20)
CALCIUM SERPL-MCNC: 9.3 MG/DL (ref 8.6–10)
CHLORIDE SERPL-SCNC: 108 MMOL/L (ref 98–107)
CREAT SERPL-MCNC: 1.13 MG/DL (ref 0.51–0.95)
CREAT SERPL-MCNC: 1.19 MG/DL (ref 0.51–0.95)
CREAT SERPL-MCNC: 1.21 MG/DL (ref 0.51–0.95)
CREAT UR-MCNC: 19 MG/DL
CRP SERPL-MCNC: 4.43 MG/L
DEPRECATED HCO3 PLAS-SCNC: 18 MMOL/L (ref 22–29)
ERYTHROCYTE [DISTWIDTH] IN BLOOD BY AUTOMATED COUNT: 12.6 % (ref 10–15)
GFR SERPL CREATININE-BSD FRML MDRD: 53 ML/MIN/1.73M2
GFR SERPL CREATININE-BSD FRML MDRD: 54 ML/MIN/1.73M2
GLUCOSE SERPL-MCNC: 90 MG/DL (ref 70–99)
HCT VFR BLD AUTO: 37.1 % (ref 35–47)
HGB BLD-MCNC: 12 G/DL (ref 11.7–15.7)
LIPASE SERPL-CCNC: 27 U/L (ref 13–60)
MCH RBC QN AUTO: 30.8 PG (ref 26.5–33)
MCHC RBC AUTO-ENTMCNC: 32.3 G/DL (ref 31.5–36.5)
MCV RBC AUTO: 95 FL (ref 78–100)
PLATELET # BLD AUTO: 251 10E3/UL (ref 150–450)
POTASSIUM SERPL-SCNC: 4.1 MMOL/L (ref 3.4–5.3)
RBC # BLD AUTO: 3.9 10E6/UL (ref 3.8–5.2)
SODIUM SERPL-SCNC: 136 MMOL/L (ref 136–145)
SODIUM SERPL-SCNC: 138 MMOL/L (ref 136–145)
SODIUM SERPL-SCNC: 139 MMOL/L (ref 136–145)
SODIUM UR-SCNC: 103 MMOL/L
TACROLIMUS BLD-MCNC: 10.8 UG/L (ref 5–15)
TME LAST DOSE: NORMAL H
TME LAST DOSE: NORMAL H
WBC # BLD AUTO: 5.9 10E3/UL (ref 4–11)

## 2023-08-19 PROCEDURE — G1010 CDSM STANSON: HCPCS

## 2023-08-19 PROCEDURE — 120N000011 HC R&B TRANSPLANT UMMC

## 2023-08-19 PROCEDURE — 250N000011 HC RX IP 250 OP 636: Mod: JZ | Performed by: INTERNAL MEDICINE

## 2023-08-19 PROCEDURE — 99233 SBSQ HOSP IP/OBS HIGH 50: CPT | Mod: 24 | Performed by: NURSE PRACTITIONER

## 2023-08-19 PROCEDURE — 80048 BASIC METABOLIC PNL TOTAL CA: CPT | Performed by: STUDENT IN AN ORGANIZED HEALTH CARE EDUCATION/TRAINING PROGRAM

## 2023-08-19 PROCEDURE — 84295 ASSAY OF SERUM SODIUM: CPT | Performed by: STUDENT IN AN ORGANIZED HEALTH CARE EDUCATION/TRAINING PROGRAM

## 2023-08-19 PROCEDURE — A9585 GADOBUTROL INJECTION: HCPCS | Mod: JZ | Performed by: INTERNAL MEDICINE

## 2023-08-19 PROCEDURE — 84300 ASSAY OF URINE SODIUM: CPT | Performed by: STUDENT IN AN ORGANIZED HEALTH CARE EDUCATION/TRAINING PROGRAM

## 2023-08-19 PROCEDURE — 250N000011 HC RX IP 250 OP 636: Mod: JZ | Performed by: PHYSICIAN ASSISTANT

## 2023-08-19 PROCEDURE — 255N000002 HC RX 255 OP 636: Mod: JZ | Performed by: INTERNAL MEDICINE

## 2023-08-19 PROCEDURE — 85027 COMPLETE CBC AUTOMATED: CPT | Performed by: INTERNAL MEDICINE

## 2023-08-19 PROCEDURE — 250N000013 HC RX MED GY IP 250 OP 250 PS 637: Performed by: PHYSICIAN ASSISTANT

## 2023-08-19 PROCEDURE — 36415 COLL VENOUS BLD VENIPUNCTURE: CPT | Performed by: INTERNAL MEDICINE

## 2023-08-19 PROCEDURE — 73220 MRI UPPR EXTREMITY W/O&W/DYE: CPT | Mod: 26 | Performed by: RADIOLOGY

## 2023-08-19 PROCEDURE — 258N000003 HC RX IP 258 OP 636: Performed by: INTERNAL MEDICINE

## 2023-08-19 PROCEDURE — 99232 SBSQ HOSP IP/OBS MODERATE 35: CPT | Performed by: INTERNAL MEDICINE

## 2023-08-19 PROCEDURE — 999N000128 HC STATISTIC PERIPHERAL IV START W/O US GUIDANCE

## 2023-08-19 PROCEDURE — G1010 CDSM STANSON: HCPCS | Performed by: RADIOLOGY

## 2023-08-19 PROCEDURE — 73220 MRI UPPR EXTREMITY W/O&W/DYE: CPT | Mod: RT,MG

## 2023-08-19 PROCEDURE — 80197 ASSAY OF TACROLIMUS: CPT | Performed by: INTERNAL MEDICINE

## 2023-08-19 PROCEDURE — 82565 ASSAY OF CREATININE: CPT | Performed by: INTERNAL MEDICINE

## 2023-08-19 PROCEDURE — 84295 ASSAY OF SERUM SODIUM: CPT | Performed by: INTERNAL MEDICINE

## 2023-08-19 PROCEDURE — 250N000012 HC RX MED GY IP 250 OP 636 PS 637: Performed by: PHYSICIAN ASSISTANT

## 2023-08-19 PROCEDURE — 36415 COLL VENOUS BLD VENIPUNCTURE: CPT | Performed by: STUDENT IN AN ORGANIZED HEALTH CARE EDUCATION/TRAINING PROGRAM

## 2023-08-19 PROCEDURE — 82570 ASSAY OF URINE CREATININE: CPT | Performed by: STUDENT IN AN ORGANIZED HEALTH CARE EDUCATION/TRAINING PROGRAM

## 2023-08-19 PROCEDURE — 86140 C-REACTIVE PROTEIN: CPT | Performed by: STUDENT IN AN ORGANIZED HEALTH CARE EDUCATION/TRAINING PROGRAM

## 2023-08-19 PROCEDURE — 83690 ASSAY OF LIPASE: CPT | Performed by: INTERNAL MEDICINE

## 2023-08-19 RX ORDER — GADOBUTROL 604.72 MG/ML
0.1 INJECTION INTRAVENOUS ONCE
Status: COMPLETED | OUTPATIENT
Start: 2023-08-19 | End: 2023-08-19

## 2023-08-19 RX ORDER — SULFAMETHOXAZOLE AND TRIMETHOPRIM 400; 80 MG/1; MG/1
1 TABLET ORAL DAILY
Status: DISCONTINUED | OUTPATIENT
Start: 2023-08-20 | End: 2023-08-21 | Stop reason: HOSPADM

## 2023-08-19 RX ORDER — HYDRALAZINE HYDROCHLORIDE 20 MG/ML
10 INJECTION INTRAMUSCULAR; INTRAVENOUS EVERY 6 HOURS PRN
Status: DISCONTINUED | OUTPATIENT
Start: 2023-08-19 | End: 2023-08-21 | Stop reason: HOSPADM

## 2023-08-19 RX ADMIN — ENOXAPARIN SODIUM 40 MG: 40 INJECTION SUBCUTANEOUS at 15:37

## 2023-08-19 RX ADMIN — PIPERACILLIN AND TAZOBACTAM 3.38 G: 3; .375 INJECTION, POWDER, LYOPHILIZED, FOR SOLUTION INTRAVENOUS at 08:00

## 2023-08-19 RX ADMIN — CARVEDILOL 12.5 MG: 12.5 TABLET, FILM COATED ORAL at 07:58

## 2023-08-19 RX ADMIN — SODIUM CHLORIDE, POTASSIUM CHLORIDE, SODIUM LACTATE AND CALCIUM CHLORIDE: 600; 310; 30; 20 INJECTION, SOLUTION INTRAVENOUS at 12:42

## 2023-08-19 RX ADMIN — PIPERACILLIN AND TAZOBACTAM 3.38 G: 3; .375 INJECTION, POWDER, LYOPHILIZED, FOR SOLUTION INTRAVENOUS at 15:37

## 2023-08-19 RX ADMIN — TACROLIMUS 1 MG: 1 CAPSULE ORAL at 07:59

## 2023-08-19 RX ADMIN — ATORVASTATIN CALCIUM 20 MG: 20 TABLET, FILM COATED ORAL at 20:33

## 2023-08-19 RX ADMIN — ACETAMINOPHEN 975 MG: 325 TABLET, FILM COATED ORAL at 14:02

## 2023-08-19 RX ADMIN — PIPERACILLIN AND TAZOBACTAM 3.38 G: 3; .375 INJECTION, POWDER, LYOPHILIZED, FOR SOLUTION INTRAVENOUS at 20:34

## 2023-08-19 RX ADMIN — TACROLIMUS 1 MG: 1 CAPSULE ORAL at 17:57

## 2023-08-19 RX ADMIN — MYCOPHENOLIC ACID 540 MG: 360 TABLET, DELAYED RELEASE ORAL at 17:57

## 2023-08-19 RX ADMIN — MYCOPHENOLIC ACID 540 MG: 360 TABLET, DELAYED RELEASE ORAL at 07:58

## 2023-08-19 RX ADMIN — VANCOMYCIN HYDROCHLORIDE 125 MG: 125 CAPSULE ORAL at 07:58

## 2023-08-19 RX ADMIN — PIPERACILLIN AND TAZOBACTAM 3.38 G: 3; .375 INJECTION, POWDER, LYOPHILIZED, FOR SOLUTION INTRAVENOUS at 03:28

## 2023-08-19 RX ADMIN — GADOBUTROL 7.7 ML: 604.72 INJECTION INTRAVENOUS at 14:12

## 2023-08-19 RX ADMIN — CARVEDILOL 12.5 MG: 12.5 TABLET, FILM COATED ORAL at 17:57

## 2023-08-19 RX ADMIN — ACETAMINOPHEN 975 MG: 325 TABLET, FILM COATED ORAL at 05:54

## 2023-08-19 RX ADMIN — VENLAFAXINE HYDROCHLORIDE 150 MG: 75 CAPSULE, EXTENDED RELEASE ORAL at 07:58

## 2023-08-19 ASSESSMENT — ACTIVITIES OF DAILY LIVING (ADL)
ADLS_ACUITY_SCORE: 20
ADLS_ACUITY_SCORE: 35
ADLS_ACUITY_SCORE: 20
ADLS_ACUITY_SCORE: 35

## 2023-08-19 NOTE — PROVIDER NOTIFICATION
"Paged MD:    \"0T [id number] Deshawn GRIFFIN  Pt c/o of heart fluttering/palpitations. Has been occurring around 2200 most evenings for approx 4 weeks. Asymptomatic not SOB\"    EKG was ordered  "

## 2023-08-19 NOTE — PROGRESS NOTES
Care Management Follow Up    Length of Stay (days): 2    Expected Discharge Date: 08/19/2023     Concerns to be Addressed: discharge planning     Patient plan of care discussed at interdisciplinary rounds: Yes    Anticipated Discharge Disposition:  Home with home infusion for IV abx     Anticipated Discharge Services:  Home infusion for IV abx  Anticipated Discharge DME:      Patient/family educated on Medicare website which has current facility and service quality ratings:    Education Provided on the Discharge Plan:    Patient/Family in Agreement with the Plan: yes    Referrals Placed by CM/SW:  Home infusion   Private pay costs discussed:  IV abx    Additional Information:  Per team, pt will likely need IV abx. Duration of course dependent on MRI results which will be interpreted Sunday or Monday.     [facility] following.     Lisa Vasquez RNCC    SEARCHABLE in [facility] - search CARE COORDINATOR      [place] (0800-1630) Saturday & Sunday; (0800-1630) [facility] Recognized Holidays    Weekend Pager--  Units: 5A, 5B & 5C  Pager: [phone]  Units: 6B, 6C & 6D    Pager: [phone]  Units: 7A, 7B, 7C & 7D    Pager: [phone]  Units: 6A & ICU   Pager: [phone]  Units: 5 Ortho, 5MS & WB ED Pager: [phone]  Units: 6MS, 8A & 10 ICU  Pager [phone]

## 2023-08-19 NOTE — PROGRESS NOTES
[facility]    Medicine Progress Note - Hospitalist Service, GOLD TEAM 10    Date of Admission:  8/17/2023  Eden Hess is a 52 year old female with a past medical history of DM1 s/p SPK in 09/2022, HTN, anemia admitted on 8/17/2023 for failed OP treatment of cellulitis.     Changes today  Improved renal fucntion  Pending MRI  Appreciate Infectious disease recommendations   Agree with Vancomycin PO for C diff ppx  MRI hand pending   HTN- discussed with patient. She feels BP could be related to anxiety from her car being left in the [facility] parking lot. PRN hydarlazine started. Monitor for persistent elevation. Re-eval in AM     #Cellulitis s/p cat scratch: Scratched or bit by cat Saturday evening. Developed pain, redness, swelling over weekend. Started Augmentin Monday evening. No change in cellulitic appearance since initiation of antibiotics. Hand XR w/o e/o osteo. Afebrile. Normal WBC count.   -Antibiotics w/ Zosyn overnight, had dose of vanc at 1900  -IVF w/ NS at 100 ml/hour  -Consult transplant nephrology  -Consult transplant ID  -Elevate arm  -MRSA nasal swab     #S/P SPK 09/2022  ##DM1  Labs w/ creat of 1.15 (BL ~1.2), CO2 of 21, K normal, glucose normal.   -Continue IS w/ tacro (goal 8-10), mycophenolic acid 540 mg Q12  -Continue infx ppx w/ bactrim  -Consider timed draw tacro level 08/19   -Transplant nephrology consult     #H/O cdiff: Now w/ 4 loose stools/day while on Augmentin. No fevers, abdominal pain.  -Continue BID PO vanc for now, reduced to qday, would discuss continued use w/ transplant ID and nephrology tomorrow  -Transplant ID consult     #HTN: PTA maintained on coreg. Goal BP <130/80. BP in 150s/70s.   -Continue Coreg w/ hold parameters     #Anemia in chronic renal disease: Normal hgb. Management per nephrology.  #Abnormal LFTs: In 4/2023- ALT of 48, AST Of 51. Normal t bili and alk phos. Repeat in AM.   #Hypomagnesemia: Mag of 1.5 in the past.  "Repeat and replete PRN.      Diet:  Regular diet  DVT Prophylaxis: Pneumatic Compression Devices and Ambulate every shift  Glez Catheter: Not present  Lines: None     Cardiac Monitoring: None  Code Status:  Full     Clinically Significant Risk Factors Present on Admission                   # Hypertension: Noted on problem list      # Obesity: Estimated body mass index is 31.93 kg/m  as calculated from the following:    Height as of this encounter: 1.549 m (5' 1\").    Weight as of an earlier encounter on 8/17/23: 76.7 kg (169 lb).            Diet: Combination Diet Regular Diet Adult    DVT Prophylaxis: Enoxaparin (Lovenox) SQ  Glez Catheter: Not present  Lines: None     Cardiac Monitoring: None  Code Status: Full Code      Clinically Significant Risk Factors            # Hypomagnesemia: Lowest Mg = 1.4 mg/dL in last 2 days, will replace as needed         # Hypertension: Noted on problem list        # Obesity: Estimated body mass index is 32.27 kg/m  as calculated from the following:    Height as of this encounter: 1.549 m (5' 1\").    Weight as of this encounter: 77.5 kg (170 lb 12.8 oz).  , PRESENT ON ADMISSION            Disposition Plan     Expected Discharge Date: 08/19/2023,  3:00 PM    Destination: home with family  Discharge Comments: late today vs Saturday          Malvin Hills MD  Hospitalist Service, GOLD TEAM 10  [facility]  Securely message with [employer] (more info)  Text page via [facility] Paging/Directory   See signed in provider for up to date coverage information  ______________________________________________________________________    Interval History   Improved redness and pain   Stable pain   Denies tracking outside of erythema   No bowel or bladder issues   No fever or chills  No N/V    Physical Exam   Vital Signs: Temp: 98.6  F (37  C) Temp src: Oral BP: (!) 175/74 Pulse: 73   Resp: 16 SpO2: 97 % O2 Device: None (Room air)    Weight: 170 lbs 12.8 " oz    Physical Exam      Medical Decision Making       **CLEAR ALL SELECTIONS**      Data     I have personally reviewed the following data over the past 24 hrs:    5.9  \   12.0   / 251     138 108 (H) 17.1 /  90   4.1 18 (L) 1.13 (H) \     ALT: N/A AST: N/A AP: N/A TBILI: N/A   ALB: N/A TOT PROTEIN: N/A LIPASE: 27     Procal: N/A CRP: 4.43 Lactic Acid: N/A

## 2023-08-19 NOTE — PROGRESS NOTES
[facility]   Transplant Nephrology Progress Note  Date of Admission:  8/17/2023  Today's Date: 08/19/2023    Recommendations:  - No changes to immunosuppression  - Appreciate transplant ID recs  - Would recommend PO vancomycin prophylaxis for history of C Diff with 125mg BID while on abx + 3 days  - Consideration of MRI of R hand to evaluate for tenosynovitis, abscess, osteomyelitis  - Recommend scheduling psyllium fiber daily  - Recommend starting sodium bicarb 650mg BID    Assessment & Plan   # DDKT (SPK): Stable   - Baseline Creatinine: ~ 1.1-1.3   - Proteinuria: Normal (<0.2 grams)   - Date DSA Last Checked: May/2023      Latest DSA: No   - BK Viremia: No   - Kidney Tx Biopsy: No    # Pancreas Tx (SPK):    - Pancreatic Exocrine Drainage: Enteric drained     - Blood glucose: Euglycemia      On insulin: No   - HbA1c: Stable      Latest HbA1c: 5.2%   - Pancreatic enzymes: Stable   - Date DSA Last Checked: May/2023  Latest DSA: No   - Pancreas Tx Biopsy: No    # Immunosuppression: Tacrolimus immediate release (goal 8-10) and Mycophenolic acid (dose 540 mg every 12 hours)   - Patient is in an immunosuppressed state and will continue to monitor for efficacy and toxicity of immunosuppression medications.   - Changes: Not at this time    # Infection Prophylaxis:   - PJP: Sulfa/TMP (Bactrim)    # Hypertension: Borderline control;  Goal BP: < 140/90 (Hospitalization goal)   - Volume status: Euvolemic     - Changes: Not at this time    # Anemia in Chronic Renal Disease: Hgb: Stable      SHANEKA: No   - Iron studies: Not checked recently    # Mineral Bone Disorder:   - Secondary renal hyperparathyroidism; PTH level: Minimally elevated ( pg/ml)        On treatment: None  - Vitamin D; level: Normal        On supplement: No  - Calcium; level: Normal        On supplement: No  - Phosphorus; level: Not checked recently        On supplement: No    # Electrolytes:   - Potassium;  level: Normal        On supplement: No  - Magnesium; level: Not checked recently        On supplement: No  - Bicarbonate; level: Low        On supplement: No.  Recommend starting sodium bicarb 650mg BID  - Sodium; level: Normal    # Hx of C.diff colitis:               -Diagnosed 10/7/22 initially and received prolonged PO vanc taper, diagnosed again 3/2/23. She has now completed treatment regiment              -She thinks diarrhea is currently controlled, although tenuous since her transplant              - Would consider prophylactic PO vancomycin 125mg BID while on abx + 3 days.      # Loose stools:                       -Recommend scheduling psyllium fiber daily     #R hand Cellulitis from cat bite:              -Presented to urgent care 8/14 with swollen red finger after cat bite. Prescribed Augmentin, but symptoms continued to progress. Now admitted 8/18. Transplant ID consulted. Would consider R hand MRI to further evaluate depth of injury, rule out tenosynovitis, osteomytelitis.               Per transplant ID: - Stop IV vancomycin  - Continue IV Zosyn for now  - Trend CRP  - If clinical improvement, will likely de-escalate to Unasyn and then to PO antibiotics    # Transplant History:  Etiology of Kidney Failure: Diabetes mellitus type 1  Tx: DDKT (SPK)  Transplant: 9/23/2022 (Kidney / Pancreas)  Significant changes in immunosuppression: None  Significant transplant-related complications: None    Recommendations were communicated to the primary team via this note.    Seen and discussed with Dr. Teresa Islas, NP   Pager: [phone]    Physician Attestation     I saw and evaluated Eden Hess as part of a shared APRN/PA visit.     I personally reviewed the vital signs, medications, and labs.    I personally performed the substantive portion of the medical decision making for this visit - please see the SANDEEP's documentation for full details.    Key management decisions made by me and carried out  "under my direction: Would continue present immunosuppression.  Appreciate input from Transplant ID.  Consideration of MRI of R hand to evaluate for tenosynovitis, abscess, osteomyelitis.  Recommend starting sodium bicarbonate supplement.    Bertrand Moore MD  Date of Service (when I saw the patient): 23    Interval History   Ms. Hess's creatinine is 1.13 (820); Stable.  Good urine output.  Other significant labs/tests/vitals: VSS  No events overnight.  No chest pain or shortness of breath.  No leg swelling.  No nausea and vomiting.  Bowel movements are loose, but improving.  No fever, sweats or chills.     Review of Systems   4 point ROS was obtained and negative except as noted in the Interval History.    MEDICATIONS:   acetaminophen  975 mg Oral Q8H    atorvastatin  20 mg Oral QPM    carvedilol  12.5 mg Oral BID w/meals    enoxaparin ANTICOAGULANT  40 mg Subcutaneous Q24H    mycophenolic acid  540 mg Oral BID IS    piperacillin-tazobactam  3.375 g Intravenous Q6H    psyllium  1 packet Oral Daily    sodium chloride (PF)  3 mL Intracatheter Q8H    sulfamethoxazole-trimethoprim  1 tablet Oral Once per day on     tacrolimus  1 mg Oral BID IS    vancomycin  125 mg Oral Daily    venlafaxine  150 mg Oral Daily      lactated ringers 100 mL/hr at 23 0900       Physical Exam   Temp  Av.3  F (36.8  C)  Min: 97.5  F (36.4  C)  Max: 99.1  F (37.3  C)      Pulse  Av.7  Min: 67  Max: 78 Resp  Av.9  Min: 15  Max: 18  SpO2  Av.5 %  Min: 96 %  Max: 100 %     BP (!) 175/74 (BP Location: Left arm)   Pulse 73   Temp 98.6  F (37  C) (Oral)   Resp 16   Ht 1.549 m (5' 1\")   Wt 77.5 kg (170 lb 12.8 oz)   SpO2 97%   BMI 32.27 kg/m      Admit Weight: 77.5 kg (170 lb 12.8 oz)     GENERAL APPEARANCE: alert and no distress  HENT: mouth without ulcers or lesions  RESP: lungs clear to auscultation - no rales, rhonchi or wheezes  CV: regular rhythm, normal rate, no rub, no " murmur  EDEMA: no LE edema bilaterally  ABDOMEN: soft, nondistended, nontender, bowel sounds normal  MS: Right hand with demarcated erythema and swelling. Noted to have limited ROM before pain.   SKIN: no rash    Data   All labs reviewed by me.  CMP  Recent Labs   Lab 08/19/23  0820 08/19/23  0533 08/19/23  0225 08/18/23  0550 08/17/23  1720 08/14/23  0958    139 136 141 136 138   POTASSIUM  --  4.1  --  4.2 4.8 4.6   CHLORIDE  --  108*  --  109* 104 105   CO2  --  18*  --  21* 21* 23   ANIONGAP  --  13  --  11 11 10   GLC  --  90  --  96 89 103*   BUN  --  17.1  --  19.7 19.3 14.1   CR 1.13* 1.19* 1.21* 1.28* 1.15* 0.97*   GFRESTIMATED  --  54* 53* 50* 56* 69   BARBARA  --  9.3  --  9.4 10.0 9.8   MAG  --   --   --   --  1.4*  --    PROTTOTAL  --   --   --  6.1*  --   --    ALBUMIN  --   --   --  3.6  --   --    BILITOTAL  --   --   --  0.5  --   --    ALKPHOS  --   --   --  77  --   --    AST  --   --   --  16  --   --    ALT  --   --   --  12  --   --      CBC  Recent Labs   Lab 08/19/23  0820 08/18/23  0550 08/17/23  1720 08/14/23  0958   HGB 12.0 12.2 13.2 13.0   WBC 5.9 6.8 7.7 7.0   RBC 3.90 4.01 4.38 4.20   HCT 37.1 38.5 42.2 39.1   MCV 95 96 96 93   MCH 30.8 30.4 30.1 31.0   MCHC 32.3 31.7 31.3* 33.2   RDW 12.6 12.5 12.6 12.5    271 308 280     INRNo lab results found in last 7 days.  ABGNo lab results found in last 7 days.   Urine Studies  Recent Labs   Lab Test 08/18/23  2013 10/07/22  1213 09/22/22  1714 08/13/20  1110   COLOR Straw Yellow Light Yellow Yellow   APPEARANCE Clear Clear Clear Slightly Cloudy   URINEGLC Negative Negative 300* >499*   URINEBILI Negative Negative Negative Negative   URINEKETONE Negative Negative Negative 5*   SG 1.007 1.015 1.011 1.016   UBLD Negative Negative Small* Small*   URINEPH 5.5 7.0 8.0* 5.0   PROTEIN Negative Negative 200* >499*   UROBILINOGEN  --  0.2  --   --    NITRITE Negative Negative Negative Negative   LEUKEST Negative Negative Negative Negative    RBCU <1 0-2 1 3*   WBCU 0 0-5 6* 3     No lab results found.  PTH  Recent Labs   Lab Test 01/31/23  0957 10/13/22  1102 10/03/22  0808   PTHI 85* 259* 268*     Iron Studies  Recent Labs   Lab Test 10/03/22  0809   IRON 34*   *   IRONSAT 17   LAUREN 923*       IMAGING:  All imaging studies reviewed by me.

## 2023-08-19 NOTE — PLAN OF CARE
"Goal Outcome Evaluation:       BP (!) 153/67 (BP Location: Left arm)   Pulse 73   Temp 99.1  F (37.3  C) (Oral)   Resp 18   Ht 1.549 m (5' 1\")   Wt 77.5 kg (170 lb 12.8 oz)   SpO2 98%   BMI 32.27 kg/m      Shift: 3761-5184    VS: Hypertension systolic 150s  Neuro: WDL  Cardio: Hypertension   Respiratory: WDL on RA  GI: Voiding adequately  : Adequate urine output  Skin: WDL  Diet: Regular diet  Labs: Na+ 136, Creatinine 1.21  BG: none  LDA: PIV   Infusions: LR @100ml/hr  Mobility: UAL  Pain/Nausea: minimal pain in hand  PRN medications: none given                   "

## 2023-08-20 LAB
ANION GAP SERPL CALCULATED.3IONS-SCNC: 10 MMOL/L (ref 7–15)
BUN SERPL-MCNC: 16.2 MG/DL (ref 6–20)
CALCIUM SERPL-MCNC: 8.7 MG/DL (ref 8.6–10)
CHLORIDE SERPL-SCNC: 108 MMOL/L (ref 98–107)
CREAT SERPL-MCNC: 1.15 MG/DL (ref 0.51–0.95)
CRP SERPL-MCNC: <3 MG/L
DEPRECATED HCO3 PLAS-SCNC: 19 MMOL/L (ref 22–29)
ERYTHROCYTE [DISTWIDTH] IN BLOOD BY AUTOMATED COUNT: 12.8 % (ref 10–15)
GFR SERPL CREATININE-BSD FRML MDRD: 56 ML/MIN/1.73M2
GLUCOSE SERPL-MCNC: 83 MG/DL (ref 70–99)
HCT VFR BLD AUTO: 33.4 % (ref 35–47)
HGB BLD-MCNC: 10.3 G/DL (ref 11.7–15.7)
LIPASE SERPL-CCNC: 30 U/L (ref 13–60)
MCH RBC QN AUTO: 30.1 PG (ref 26.5–33)
MCHC RBC AUTO-ENTMCNC: 30.8 G/DL (ref 31.5–36.5)
MCV RBC AUTO: 98 FL (ref 78–100)
PLATELET # BLD AUTO: 245 10E3/UL (ref 150–450)
POTASSIUM SERPL-SCNC: 4.4 MMOL/L (ref 3.4–5.3)
RBC # BLD AUTO: 3.42 10E6/UL (ref 3.8–5.2)
SODIUM SERPL-SCNC: 137 MMOL/L (ref 136–145)
WBC # BLD AUTO: 5.2 10E3/UL (ref 4–11)

## 2023-08-20 PROCEDURE — 250N000011 HC RX IP 250 OP 636: Mod: JZ | Performed by: PHYSICIAN ASSISTANT

## 2023-08-20 PROCEDURE — 120N000011 HC R&B TRANSPLANT UMMC

## 2023-08-20 PROCEDURE — 86140 C-REACTIVE PROTEIN: CPT | Performed by: STUDENT IN AN ORGANIZED HEALTH CARE EDUCATION/TRAINING PROGRAM

## 2023-08-20 PROCEDURE — 83690 ASSAY OF LIPASE: CPT | Performed by: INTERNAL MEDICINE

## 2023-08-20 PROCEDURE — 85027 COMPLETE CBC AUTOMATED: CPT | Performed by: INTERNAL MEDICINE

## 2023-08-20 PROCEDURE — 36415 COLL VENOUS BLD VENIPUNCTURE: CPT | Performed by: INTERNAL MEDICINE

## 2023-08-20 PROCEDURE — 250N000011 HC RX IP 250 OP 636: Mod: JZ | Performed by: INTERNAL MEDICINE

## 2023-08-20 PROCEDURE — 258N000003 HC RX IP 258 OP 636: Performed by: INTERNAL MEDICINE

## 2023-08-20 PROCEDURE — 99232 SBSQ HOSP IP/OBS MODERATE 35: CPT | Performed by: INTERNAL MEDICINE

## 2023-08-20 PROCEDURE — 250N000013 HC RX MED GY IP 250 OP 250 PS 637: Performed by: INTERNAL MEDICINE

## 2023-08-20 PROCEDURE — 99233 SBSQ HOSP IP/OBS HIGH 50: CPT | Mod: 24 | Performed by: NURSE PRACTITIONER

## 2023-08-20 PROCEDURE — 250N000013 HC RX MED GY IP 250 OP 250 PS 637: Performed by: PHYSICIAN ASSISTANT

## 2023-08-20 PROCEDURE — 80048 BASIC METABOLIC PNL TOTAL CA: CPT | Performed by: INTERNAL MEDICINE

## 2023-08-20 PROCEDURE — 250N000012 HC RX MED GY IP 250 OP 636 PS 637: Performed by: PHYSICIAN ASSISTANT

## 2023-08-20 RX ORDER — SODIUM BICARBONATE 650 MG/1
650 TABLET ORAL 2 TIMES DAILY
Status: DISCONTINUED | OUTPATIENT
Start: 2023-08-20 | End: 2023-08-21 | Stop reason: HOSPADM

## 2023-08-20 RX ADMIN — TACROLIMUS 1 MG: 1 CAPSULE ORAL at 08:34

## 2023-08-20 RX ADMIN — CARVEDILOL 12.5 MG: 12.5 TABLET, FILM COATED ORAL at 17:55

## 2023-08-20 RX ADMIN — PIPERACILLIN AND TAZOBACTAM 3.38 G: 3; .375 INJECTION, POWDER, LYOPHILIZED, FOR SOLUTION INTRAVENOUS at 20:50

## 2023-08-20 RX ADMIN — SODIUM BICARBONATE 650 MG TABLET 650 MG: at 19:35

## 2023-08-20 RX ADMIN — ATORVASTATIN CALCIUM 20 MG: 20 TABLET, FILM COATED ORAL at 19:35

## 2023-08-20 RX ADMIN — ACETAMINOPHEN 975 MG: 325 TABLET, FILM COATED ORAL at 06:33

## 2023-08-20 RX ADMIN — SULFAMETHOXAZOLE AND TRIMETHOPRIM 1 TABLET: 400; 80 TABLET ORAL at 08:34

## 2023-08-20 RX ADMIN — ENOXAPARIN SODIUM 40 MG: 40 INJECTION SUBCUTANEOUS at 15:02

## 2023-08-20 RX ADMIN — TACROLIMUS 1 MG: 1 CAPSULE ORAL at 17:55

## 2023-08-20 RX ADMIN — MYCOPHENOLIC ACID 540 MG: 360 TABLET, DELAYED RELEASE ORAL at 17:55

## 2023-08-20 RX ADMIN — PIPERACILLIN AND TAZOBACTAM 3.38 G: 3; .375 INJECTION, POWDER, LYOPHILIZED, FOR SOLUTION INTRAVENOUS at 15:02

## 2023-08-20 RX ADMIN — SODIUM CHLORIDE, POTASSIUM CHLORIDE, SODIUM LACTATE AND CALCIUM CHLORIDE: 600; 310; 30; 20 INJECTION, SOLUTION INTRAVENOUS at 04:04

## 2023-08-20 RX ADMIN — VANCOMYCIN HYDROCHLORIDE 125 MG: 125 CAPSULE ORAL at 08:34

## 2023-08-20 RX ADMIN — PIPERACILLIN AND TAZOBACTAM 3.38 G: 3; .375 INJECTION, POWDER, LYOPHILIZED, FOR SOLUTION INTRAVENOUS at 08:30

## 2023-08-20 RX ADMIN — ACETAMINOPHEN 975 MG: 325 TABLET, FILM COATED ORAL at 13:52

## 2023-08-20 RX ADMIN — CARVEDILOL 12.5 MG: 12.5 TABLET, FILM COATED ORAL at 08:34

## 2023-08-20 RX ADMIN — PIPERACILLIN AND TAZOBACTAM 3.38 G: 3; .375 INJECTION, POWDER, LYOPHILIZED, FOR SOLUTION INTRAVENOUS at 03:01

## 2023-08-20 RX ADMIN — MYCOPHENOLIC ACID 540 MG: 360 TABLET, DELAYED RELEASE ORAL at 08:34

## 2023-08-20 RX ADMIN — VENLAFAXINE HYDROCHLORIDE 150 MG: 75 CAPSULE, EXTENDED RELEASE ORAL at 08:34

## 2023-08-20 ASSESSMENT — ACTIVITIES OF DAILY LIVING (ADL)
ADLS_ACUITY_SCORE: 20

## 2023-08-20 NOTE — PROGRESS NOTES
[facility]   Transplant Nephrology Progress Note  Date of Admission:  8/17/2023  Today's Date: 08/20/2023    Recommendations:  - No changes to immunosuppression  - Appreciate transplant ID recs  - Recommend scheduling psyllium fiber daily  - Recommend starting sodium bicarb 650mg BID    Assessment & Plan   # DDKT (SPK): Stable   - Baseline Creatinine: ~ 1.1-1.3   - Proteinuria: Normal (<0.2 grams)   - Date DSA Last Checked: May/2023      Latest DSA: No   - BK Viremia: No   - Kidney Tx Biopsy: No    # Pancreas Tx (SPK):    - Pancreatic Exocrine Drainage: Enteric drained     - Blood glucose: Euglycemia      On insulin: No   - HbA1c: Stable      Latest HbA1c: 5.2%   - Pancreatic enzymes: Stable   - Date DSA Last Checked: May/2023  Latest DSA: No   - Pancreas Tx Biopsy: No    # Immunosuppression: Tacrolimus immediate release (goal 8-10) and Mycophenolic acid (dose 540 mg every 12 hours)   - Patient is in an immunosuppressed state and will continue to monitor for efficacy and toxicity of immunosuppression medications.   - Changes: Not at this time    # Infection Prophylaxis:   - PJP: Sulfa/TMP (Bactrim)    # Hypertension: Borderline control;  Goal BP: < 140/90 (Hospitalization goal)   - Volume status: Euvolemic     - Changes: Not at this time    # Anemia in Chronic Renal Disease: Hgb: Stable      SHANEKA: No   - Iron studies: Not checked recently    # Mineral Bone Disorder:   - Secondary renal hyperparathyroidism; PTH level: Minimally elevated ( pg/ml)        On treatment: None  - Vitamin D; level: Normal        On supplement: No  - Calcium; level: Normal        On supplement: No  - Phosphorus; level: Not checked recently        On supplement: No    # Electrolytes:   - Potassium; level: Normal        On supplement: No  - Magnesium; level: Not checked recently        On supplement: No  - Bicarbonate; level: Low        On supplement: Yes.    - Sodium; level: Normal    # Hx of  C.diff colitis:               -Diagnosed 10/7/22 initially and received prolonged PO vanc taper, diagnosed again 3/2/23. She has now completed treatment regiment              -She thinks diarrhea is currently controlled, although tenuous since her transplant              - Would consider prophylactic PO vancomycin 125mg BID while on abx + 3 days.      # Loose stools:                       -Recommend scheduling psyllium fiber daily     #R hand Cellulitis from cat bite:              -Presented to urgent care 8/14 with swollen red finger after cat bite. Prescribed Augmentin, but symptoms continued to progress. Now admitted 8/18. Transplant ID consulted.              Per transplant ID: - Stop IV vancomycin  - Continue IV Zosyn for now  - Trend CRP  - If clinical improvement, will likely de-escalate to Unasyn and then to PO antibiotics  -Ortho consulted and no intervention recommended   MR of hand: 8/19/2023: IMPRESSION:  1.  Right dorsal hand and third finger cellulitis.  2.  Mild third proximal phalanx base, minimal third metacarpal head, and minimal fourth proximal phalanx base osteitis. This is a nonspecific finding, with differential considerations including early infection, reactive edema, and contusion.   Additionally, the edema about the third MCP joint raises the possibility of early septic arthritis. Of note, there is no definitive finding to suggest osteomyelitis or evidence of significant joint effusion.  3.  Mild fourth flexor tenosynovitis, nonspecific.    # Transplant History:  Etiology of Kidney Failure: Diabetes mellitus type 1  Tx: DDKT (SPK)  Transplant: 9/23/2022 (Kidney / Pancreas)  Significant changes in immunosuppression: None  Significant transplant-related complications: None    Recommendations were communicated to the primary team via this note.    Seen and discussed with Dr. Teresa Islas NP   Pager: [phone]    Physician Attestation     I saw and evaluated Eden Hess as  "part of a shared APRN/PA visit.     I personally reviewed the vital signs, medications, and labs.    I personally performed the substantive portion of the medical decision making for this visit - please see the SANDEEP's documentation for full details.    Key management decisions made by me and carried out under my direction: Would make no changes in immunosuppression.  Appreciate Transplant ID input for antibiotics.  Recommend starting sodium bicarbonate 650 mg bid.    Bertrand Moore MD  Date of Service (when I saw the patient): 23    Interval History   Ms. Hess's creatinine is 1.15 ( 0541); Stable.  Good urine output.  Other significant labs/tests/vitals: VSS  No events overnight.  No chest pain or shortness of breath.  No leg swelling.  No nausea and vomiting.  Bowel movements are soft.  No fever, sweats or chills.      Review of Systems   4 point ROS was obtained and negative except as noted in the Interval History.    MEDICATIONS:   acetaminophen  975 mg Oral Q8H    atorvastatin  20 mg Oral QPM    carvedilol  12.5 mg Oral BID w/meals    enoxaparin ANTICOAGULANT  40 mg Subcutaneous Q24H    mycophenolic acid  540 mg Oral BID IS    piperacillin-tazobactam  3.375 g Intravenous Q6H    psyllium  1 packet Oral Daily    sodium chloride (PF)  3 mL Intracatheter Q8H    sulfamethoxazole-trimethoprim  1 tablet Oral Daily    tacrolimus  1 mg Oral BID IS    vancomycin  125 mg Oral Daily    venlafaxine  150 mg Oral Daily      lactated ringers 100 mL/hr at 23 0404       Physical Exam   Temp  Av.3  F (36.8  C)  Min: 97.5  F (36.4  C)  Max: 99.1  F (37.3  C)      Pulse  Av.7  Min: 67  Max: 78 Resp  Av.9  Min: 15  Max: 18  SpO2  Av.5 %  Min: 96 %  Max: 100 %     BP (!) 147/71   Pulse 72   Temp 98  F (36.7  C) (Oral)   Resp 16   Ht 1.549 m (5' 1\")   Wt 77.5 kg (170 lb 12.8 oz)   SpO2 98%   BMI 32.27 kg/m      Admit Weight: 77.5 kg (170 lb 12.8 oz)     GENERAL APPEARANCE: alert and no " distress  HENT: mouth without ulcers or lesions  RESP: lungs clear to auscultation - no rales, rhonchi or wheezes  CV: regular rhythm, normal rate, no rub, no murmur  EDEMA: no LE edema bilaterally  ABDOMEN: soft, nondistended, nontender, bowel sounds normal  MS: Right hand with demarcated erythema and swelling. Noted to have limited ROM before pain.   SKIN: no rash    Data   All labs reviewed by me.  CMP  Recent Labs   Lab 08/20/23  0541 08/19/23  0820 08/19/23  0533 08/19/23  0225 08/18/23  0550 08/17/23  1720    138 139 136 141 136   POTASSIUM 4.4  --  4.1  --  4.2 4.8   CHLORIDE 108*  --  108*  --  109* 104   CO2 19*  --  18*  --  21* 21*   ANIONGAP 10  --  13  --  11 11   GLC 83  --  90  --  96 89   BUN 16.2  --  17.1  --  19.7 19.3   CR 1.15* 1.13* 1.19* 1.21* 1.28* 1.15*   GFRESTIMATED 56*  --  54* 53* 50* 56*   BARBARA 8.7  --  9.3  --  9.4 10.0   MAG  --   --   --   --   --  1.4*   PROTTOTAL  --   --   --   --  6.1*  --    ALBUMIN  --   --   --   --  3.6  --    BILITOTAL  --   --   --   --  0.5  --    ALKPHOS  --   --   --   --  77  --    AST  --   --   --   --  16  --    ALT  --   --   --   --  12  --      CBC  Recent Labs   Lab 08/20/23  0541 08/19/23  0820 08/18/23  0550 08/17/23  1720   HGB 10.3* 12.0 12.2 13.2   WBC 5.2 5.9 6.8 7.7   RBC 3.42* 3.90 4.01 4.38   HCT 33.4* 37.1 38.5 42.2   MCV 98 95 96 96   MCH 30.1 30.8 30.4 30.1   MCHC 30.8* 32.3 31.7 31.3*   RDW 12.8 12.6 12.5 12.6    251 271 308     INRNo lab results found in last 7 days.  ABGNo lab results found in last 7 days.   Urine Studies  Recent Labs   Lab Test 08/18/23  2013 10/07/22  1213 09/22/22  1714 08/13/20  1110   COLOR Straw Yellow Light Yellow Yellow   APPEARANCE Clear Clear Clear Slightly Cloudy   URINEGLC Negative Negative 300* >499*   URINEBILI Negative Negative Negative Negative   URINEKETONE Negative Negative Negative 5*   SG 1.007 1.015 1.011 1.016   UBLD Negative Negative Small* Small*   URINEPH 5.5 7.0 8.0* 5.0    PROTEIN Negative Negative 200* >499*   UROBILINOGEN  --  0.2  --   --    NITRITE Negative Negative Negative Negative   LEUKEST Negative Negative Negative Negative   RBCU <1 0-2 1 3*   WBCU 0 0-5 6* 3     No lab results found.  PTH  Recent Labs   Lab Test 01/31/23  0957 10/13/22  1102 10/03/22  0808   PTHI 85* 259* 268*     Iron Studies  Recent Labs   Lab Test 10/03/22  0809   IRON 34*   *   IRONSAT 17   LAUREN 923*       IMAGING:  All imaging studies reviewed by me.

## 2023-08-20 NOTE — PROGRESS NOTES
[facility]    Medicine Progress Note - Hospitalist Service, GOLD TEAM 10    Date of Admission:  8/17/2023  Eden Hess is a 52 year old female with a past medical history of DM1 s/p SPK in 09/2022, HTN, anemia admitted on 8/17/2023 for failed OP treatment of cellulitis.     Changes today  MRI showing osteitis consistent with possible early infection/septic arthritis . Orthopedic consult. Outpatient follow up with hand wound clinic.   - Appreicate ID recommendations on abx and duration      #Cellulitis s/p cat scratch: Scratched or bit by cat Saturday evening. Developed pain, redness, swelling over weekend. Started Augmentin Monday evening. No change in cellulitic appearance since initiation of antibiotics. Hand XR w/o e/o osteo. Afebrile. Normal WBC count.   -Antibiotics w/ Zosyn   , -IVF w/ NS at 100 ml/hour  -Consult transplant nephrology  -Consult transplant ID     #S/P SPK 09/2022  ##DM1  Labs w/ creat of 1.15 (BL ~1.2), CO2 of 21, K normal, glucose normal.   -Continue IS w/ tacro (goal 8-10), mycophenolic acid 540 mg Q12  -Continue infx ppx w/ bactrim  -Consider timed draw tacro level 08/19   -Transplant nephrology consult     #H/O cdiff: Now w/ 4 loose stools/day while on Augmentin. No fevers, abdominal pain.  -Continue BID PO vanc for now, reduced to qday, would discuss continued use w/ transplant ID and nephrology tomorrow  -Transplant ID consult     #HTN: PTA maintained on coreg. Goal BP <130/80. BP in 150s/70s.   -Continue Coreg w/ hold parameters     #Anemia in chronic renal disease: Normal hgb. Management per nephrology.  #Abnormal LFTs: In 4/2023- ALT of 48, AST Of 51. Normal t bili and alk phos. Repeat in AM.   #Hypomagnesemia: Mag of 1.5 in the past. Repeat and replete PRN.      Diet:  Regular diet  DVT Prophylaxis: Pneumatic Compression Devices and Ambulate every shift  Glez Catheter: Not present  Lines: None     Cardiac Monitoring: None  Code  "Status:  Full     Clinically Significant Risk Factors Present on Admission                   # Hypertension: Noted on problem list      # Obesity: Estimated body mass index is 31.93 kg/m  as calculated from the following:    Height as of this encounter: 1.549 m (5' 1\").    Weight as of an earlier encounter on 8/17/23: 76.7 kg (169 lb).            Diet: Combination Diet Regular Diet Adult    DVT Prophylaxis: Enoxaparin (Lovenox) SQ  Glez Catheter: Not present  Lines: None     Cardiac Monitoring: None  Code Status: Full Code      Clinically Significant Risk Factors                    # Hypertension: Noted on problem list        # Obesity: Estimated body mass index is 32.27 kg/m  as calculated from the following:    Height as of this encounter: 1.549 m (5' 1\").    Weight as of this encounter: 77.5 kg (170 lb 12.8 oz).  , PRESENT ON ADMISSION            Disposition Plan      Expected Discharge Date: 08/20/2023,  3:00 PM    Destination: home with family  Discharge Comments: 8/19: MRI          Malvin Hills MD  Hospitalist Service, GOLD TEAM 10  [facility]  Securely message with [employer] (more info)  Text page via [facility] Paging/Directory   See signed in provider for up to date coverage information  ______________________________________________________________________    Interval History   Improved redness and pain on right hand  Denies pain tracking outside of erythema   No bowel or bladder issues   No fever or chills  No N/V    Physical Exam   Vital Signs: Temp: 97.8  F (36.6  C) Temp src: Oral BP: 137/60 Pulse: 73   Resp: 16 SpO2: 98 % O2 Device: None (Room air)    Weight: 170 lbs 12.8 oz    Physical Exam  Constitutional:       General: She is not in acute distress.     Appearance: Normal appearance. She is not toxic-appearing.   Cardiovascular:      Rate and Rhythm: Normal rate and regular rhythm.      Heart sounds: No murmur heard.     No friction rub. No gallop. "   Pulmonary:      Effort: Pulmonary effort is normal. No respiratory distress.      Breath sounds: No stridor. No wheezing.   Abdominal:      General: Abdomen is flat. There is no distension.      Palpations: There is no mass.      Tenderness: There is no abdominal tenderness.   Musculoskeletal:      Right lower leg: No edema.      Left lower leg: No edema.   Skin:     Coloration: Skin is not jaundiced.      Findings: Erythema and lesion present. No bruising.   Neurological:      General: No focal deficit present.      Mental Status: She is alert and oriented to person, place, and time.      Cranial Nerves: No cranial nerve deficit.      Sensory: No sensory deficit.      Motor: No weakness.       .phy    Medical Decision Making       **CLEAR ALL SELECTIONS**      Data     I have personally reviewed the following data over the past 24 hrs:    5.2  \   10.3 (L)   / 245     137 108 (H) 16.2 /  83   4.4 19 (L) 1.15 (H) \     ALT: N/A AST: N/A AP: N/A TBILI: N/A   ALB: N/A TOT PROTEIN: N/A LIPASE: 30     Procal: N/A CRP: <3.00 Lactic Acid: N/A

## 2023-08-20 NOTE — PLAN OF CARE
"Goal Outcome Evaluation:       BP (!) 147/71   Pulse 72   Temp 98  F (36.7  C) (Oral)   Resp 16   Ht 1.549 m (5' 1\")   Wt 77.5 kg (170 lb 12.8 oz)   SpO2 98%   BMI 32.27 kg/m      Shift: 0978-9558    VSS  Neuro: WDL  Cardio: Hypertension, systolic 140s  Respiratory: WDL on RA  GI: LBM 8/19  : Voiding adequately  Skin: R hand wound  Diet: Regular, good appetite  Labs: none overnight  BG: none  LDA: L PIV  Infusions: LR @100ml/hr  Mobility: UAL   Pain/Nausea: minimal pain in hand  PRN medications: none given                   "

## 2023-08-20 NOTE — PLAN OF CARE
"/60 (BP Location: Left arm)   Pulse 73   Temp 97.8  F (36.6  C) (Oral)   Resp 16   Ht 1.549 m (5' 1\")   Wt 77.5 kg (170 lb 12.8 oz)   SpO2 98%   BMI 32.27 kg/m      Shift: 9979-7287  Isolation Status: None  VS: VSS on RA, afebrile  Neuro: Aox4  Behaviors: Calm, pleasant. Resting for most of shift  BG: None  Labs: Cr 1.15  Respiratory: WDL  Cardiac: WDL  Pain/Nausea: Denies pain and nausea  PRN: None  Diet: Combo Regular, tolerating well  IV Access: L PIV  Infusion(s): LR at 100ml/hr, continuous  Lines/Drains: None  GI/: Voiding without difficulty, Bmx1 this shift  Skin: Cellulitis in R hand, redness decreased  Mobility: UAL  Events/Education: n/a  Plan: Possible PICC placement, continue to monitor    "

## 2023-08-20 NOTE — CONSULTS
"[facility]  ORTHOPAEDIC SURGERY CONSULT - HISTORY AND PHYSICAL    DATE OF CONSULT: 8/20/2023 9:56 AM    REQUESTING PROVIDER: Malvin Hills MD, MD - [facility] Medicine Staff.    CC: Cat bite to dorsal right third MCP joint on 8/14, failed outpatient Augmentin, improving on IV antibiotics since 8/17    DATE OF INJURY: 8/12/2023    ASSESSEMENT:   Eden \"Hermila\" Deshawn is a 52 year old female with a past medical history of DM1 s/p simultaneous pancreas/kidney transplant in 09/2022, HTN, anemia who sustained a cat bite (also noted a scratch in other notes but stated bite to me) on 8/12/2023 admitted on 8/17/2023 for failed OP treatment of cellulitis on Augmentin that was started on 8/14/2023.  There is a delay in starting Augmentin by approximately 2 days due to the weekend.      Her vitals since admission have been stable.  She has been afebrile.  On exam, compared to photos from 8/17/2023, there is interval improvement in erythema.  There is no palpable collection.  She does note tenderness to palpation over the dorsal third MCP.  There is no erythema. She has pain with extremes of flexion and extension but is able to move her finger within those ranges without pain. She has sensation in the third digit and is neurovascular intact in the right hand.  Her labs are notable for a CRP that has normalized this morning to within normal limits from a high of 4.4 on admission.  She feels better but had hoped that this would have been resolved by now.  On MRI, there is no drainable fluid collection.  There was noted osteitis which is to be expected.    Overall, Hermila seems to be improving since starting broad-spectrum antibiotics. On exam, no concern for septic arthritis, only pain with extremes of passive motion. On MRI, there is no concerning fluid collection.  We would recommend continued broad-spectrum antibiotics and narrowing per ID.  There is no indication for urgent operative intervention.  Please continue to " "monitor exam and reach out if there it is significantly increased pain or any formation of a concerning abscess or fluid collection. She should follow up next week (or the week of discharge) with one of our hand surgeons.    The MRI as well as the assessment and plan were reviewed with senior resident Dr. Rhys Medina and attending orthopedic hand surgeon on-call Dr. Fitch.    RECOMMENDATIONS:   - Plan for OR: No plan for operative intervention  - Anticoagulation/DVT Prophylaxis: Per primary  - Antibiotics/Tetanus: Per primary/ID  - X-rays/Imaging: X-rays and MRI completed  - Bracing/Splinting: None  - Elevation: Elevate right hand  - Activity: As tolerated  - Weight bearing: As tolerated  - Pain control: Per primary  - Diet: Per primary  - Cultures: None, MRSA negative  - Consults Appreciated: None  - Disposition: TBD  - Follow-up: 1 week with orthopedic hand surgeon, schedulers messaged    HISTORY OF PRESENT ILLNESS:   The orthopaedic surgery service was consulted by Malvin Lynch MD for evaluation and treatment recommendations of MRI findings of osteitis in a patient who sustained a cat bite on 8/12/2023 and is improving on IV antibiotics.    Eden \"Hermila\" Deshawn is a 52 year old female with a past medical history of DM1 s/p simultaneous pancreas/kidney transplant in 09/2022, HTN, anemia who sustained a cat bite (also noted a scratch in other notes but stated bite to me) on 8/12/2023 admitted on 8/17/2023 for failed OP treatment of cellulitis on Augmentin that was started on 8/14/2023.  There is a delay in starting Augmentin by approximately 2 days due to the weekend.  The bite occurred on Saturday and she did not go to the urgent care until Monday. This was the neighbors cat.  She stated that this was a bite to me, but in previous notes it has been suggested that this could have been a scratch or bite.    She feels like she is improving since her admission.  However, she was hoping that it would be " completely resolved.  When looking at photos of how she was when she presented, she agreed that she does feel like she is improving.    Denies numbness, tingling, or weakness to the affected extremities.  Denies fevers, chills, nausea, vomiting, diarrhea, constipation, chest pain, shortness of breath.    PAST MEDICAL HISTORY:  Past Medical History:   Diagnosis Date    (HFpEF) heart failure with preserved ejection fraction (H)     Anxiety and depression     Adrienne and Contreras, SHERMAN Pop    Diabetes mellitus type 1 (H)     Diagnosed at age 2 years old     End stage kidney disease (H)     Hypertension     Retinopathy     TIA (transient ischemic attack)      [Patient denies any personal history of bleeding disorders, clotting disorders, or adverse reactions to anesthesia].    PAST SURGICAL HISTORY:   Past Surgical History:   Procedure Laterality Date    BENCH KIDNEY Left 2022    Procedure: Bench kidney;  Surgeon: Yousif Gómez MD;  Location: [facility] OR    BENCH PANCREAS N/A 2022    Procedure: Bench pancreas;  Surgeon: Yousif Gómez MD;  Location: [facility] OR     SECTION      x2    CREATE FISTULA ARTERIOVENOUS UPPER EXTREMITY      dialysis port, fistula-R arm    TRANSPLANT PANCREAS, KIDNEY  DONOR, COMBINED N/A 2022    Procedure: TRANSPLANT, KIDNEY AND PANCREAS,  DONOR;  Surgeon: Yousif Gómez MD;  Location: [facility] OR       MEDICATIONS:   Prior to Admission medications    Medication Sig Last Dose Taking? Auth Provider Long Term End Date   amoxicillin-clavulanate (AUGMENTIN) 875-125 MG tablet Take 1 tablet by mouth 2 times daily for 7 days   Jose Guadalupe Flores PA-C  23   atorvastatin (LIPITOR) 20 MG tablet Take 1 tablet (20 mg) by mouth every evening   Sanam Renee APRN CNP Yes    calcium carbonate-vitamin D (OS-BARBARA WITH D) 500-200 MG-UNIT tablet Take 1 tablet by mouth 2 times daily (with meals)   Sanam Renee APRN CNP Yes    carvedilol  (COREG) 12.5 MG tablet Take 1 tablet (12.5 mg) by mouth 2 times daily (with meals)   Sanam Renee APRN CNP Yes    FIBER ADULT GUMMIES PO Take 1 Gum by mouth daily   Reported, Patient     mycophenolic acid (GENERIC EQUIVALENT) 180 MG EC tablet Take 3 tablets (540 mg) by mouth 2 times daily   Bertrand Moore MD     sulfamethoxazole-trimethoprim (BACTRIM) 400-80 MG tablet Take 1 tablet by mouth three times a week   Bertrand Moore MD Yes    tacrolimus (GENERIC EQUIVALENT) 0.5 MG capsule Take 2 capsules (1 mg) by mouth 2 times daily   Bertrand Moore MD Yes    venlafaxine (EFFEXOR XR) 150 MG 24 hr capsule Take 1 capsule (150 mg) by mouth daily   Jacqueline Garcia APRN CNP Yes    CALCITRIOL PO Take 0.25 mcg by mouth   Reported, Patient Yes 10/2/22   Glucagon 3 MG/DOSE POWD Spray 1 spray in nostril  Patient not taking: Reported on 2022   Reported, Patient Yes 10/2/22   LANTUS VIAL 100 UNIT/ML soln 30 Units At Bedtime   Patient not taking: Reported on 2022   Reported, Patient Yes 10/2/22   NOVOLOG VIAL 100 UNIT/ML soln Inject by subcutaneous route via pump: 00: 0.4 units/hour, 06:00-12:00 0.6 units/hour, 12:00-00:00 0.8 units/hour. Addn'l bolus dosin.75 units/15g carbs. Max 50 units/day   Reported, Patient Yes 10/2/22       ALLERGIES:   Amlodipine    SOCIAL HISTORY:   Social History     Socioeconomic History    Marital status:      Spouse name: Not on file    Number of children: Not on file    Years of education: Not on file    Highest education level: Not on file   Occupational History    Not on file   Tobacco Use    Smoking status: Former     Types: Cigarettes     Quit date: 1997     Years since quittin.6    Smokeless tobacco: Never   Substance and Sexual Activity    Alcohol use: Yes     Alcohol/week: 0.0 standard drinks of alcohol    Drug use: No    Sexual activity: Yes     Partners: Male     Birth control/protection: Male Surgical   Other Topics  Concern    Parent/sibling w/ CABG, MI or angioplasty before 65F 55M? Not Asked   Social History Narrative    Not on file     Social Determinants of Health     Financial Resource Strain: Not on file   Food Insecurity: Not on file   Transportation Needs: Not on file   Physical Activity: Not on file   Stress: Not on file   Social Connections: Not on file   Intimate Partner Violence: Not on file   Housing Stability: Not on file     Living situation: Patient lives in a house in [place], has support at home  Tobacco: Non-smoker    FAMILY HISTORY:  Family History   Problem Relation Age of Onset    Diabetes Type 1 Father     Hypertension Father     Cerebrovascular Disease Father        Patient denies known family history of bleeding, clotting, or anesthesia related complications.     REVIEW OF SYSTEMS:   Otherwise a 10-point reviews of systems was negative except as noted above in the HPI.     PHYSICAL EXAM:   Vitals:    08/19/23 1547 08/19/23 1759 08/19/23 2122 08/20/23 0623   BP: (!) 146/67 (!) 141/63 139/67 (!) 147/71   BP Location: Left arm Left arm Left arm    Patient Position: Sitting      Cuff Size: Adult Large Adult Large     Pulse: 78  76 72   Resp:   16 16   Temp:   98.6  F (37  C) 98  F (36.7  C)   TempSrc:   Oral Oral   SpO2: 94%  96% 98%   Weight:       Height:         General: Awake, alert, appropriate, following commands, NAD.  Lungs: Breathing comfortably and nonlabored, no wheezes or stridor noted.  Heart/Cardiovascular: Regular pulse, no peripheral cyanosis.  Back: Nontender to palpation throughout, no step-offs or deformities appreciated. Bilateral SI joints non-tender.   Pelvis: Stable to AP and Lateral compression, non-tender.  Right Upper Extremity: Erythema on dorsum of right hand at the third MCP.  No appreciated warmth or fluid collection.   Tender to palpation on the dorsum. Able to move third digit.  Sensation intact.  Pain with extremes of extension and flexion, more severe with flexion.   No tenderness at all along flexor tendons on the volar side. No significant tenderness to palpation over clavicle, AC joint, shoulder, arm, elbow, forearm, wrist. Normal ROM shoulder, elbow, wrist without pain. Motor intact distally with finger flexion/extension/intrinsics/EPL, OK sign 5/5 strength. SILT ax/m/r/u nerve distributions. Radial pulse palpable, 2+.            LABS:  Recent Labs   Lab 08/20/23  0541 08/19/23  0820 08/18/23  0550   WBC 5.2 5.9 6.8   HGB 10.3* 12.0 12.2    251 271     INR   Date Value Ref Range Status   09/23/2022 1.09 0.85 - 1.15 Final   08/13/2020 0.96 0.86 - 1.14 Final     Creatinine   Date Value Ref Range Status   08/20/2023 1.15 (H) 0.51 - 0.95 mg/dL Final   08/13/2020 3.56 (H) 0.52 - 1.04 mg/dL Final     Glucose   Date Value Ref Range Status   08/20/2023 83 70 - 99 mg/dL Final   12/29/2022 110 (H) 70 - 99 mg/dL Final   08/13/2020 232 (H) 70 - 99 mg/dL Final       No results found for: SED  CRP Inflammation   Date Value Ref Range Status   08/20/2023 <3.00 <5.00 mg/L Final     7-Day Micro Results       Collected Updated Procedure Result Status      08/17/2023 2210 08/17/2023 2332 MRSA MSSA PCR, Nasal Swab [[id number]]    Swab from Nares, Bilateral    Final result Component Value   MRSA Target DNA Negative   SA Target DNA Negative            08/14/2023 0958 08/15/2023 1131 BK Virus Quantitative, PCR [[id number]]    Blood from Arm, Left    Final result Component Value Units   BK Virus DNA copies/mL Not Detected copies/mL                   IMAGING:  MRI and x-ray of the right hand were reviewed.  No drainable fluid collection identified. No concerns about the osteitis noted.  Recent Results (from the past 48 hour(s))   MR Hand Right w/o & w Contrast    Narrative    EXAM: MR HAND RIGHT WITHOUT AND WITH CONTRAST  LOCATION: [facility]  DATE: 08/19/2023    INDICATION: 52-year-old patient with right hand cellulitis and  swelling.  COMPARISON: 08/17/2023 radiographs.  TECHNIQUE: Routine. Additional postgadolinium T1 sequences were obtained.  IV CONTRAST: Gadolinium-based.    FINDINGS:     TENDONS:   -Extensor tendons: No tendon tear, tendinopathy, or tenosynovitis.  -Flexor tendons: There is a trace amount of fluid within the fourth finger flexor tendon sheath, with mild thickening and enhancement of the tendon sheath. The tendon sheaths are otherwise unremarkable. No tendon tear or tendinopathy.    LIGAMENTS:  -Visualized collateral and capsular ligaments: Normal.    JOINTS AND BONES:   -There is mild edema-type signal and enhancement in the third proximal phalanx base (most marked dorsally). Subtle foci of edema in the medial aspect of the third metacarpal head and in the fourth proximal phalanx base. No confluent decreased T1 signal   or associated fracture.   -No significant joint effusion.    MUSCLES AND SOFT TISSUES:   -There is subcutaneous edema and enhancement in the dorsal hand and third finger.  -No discrete soft tissue fluid collection      Impression    IMPRESSION:  1.  Right dorsal hand and third finger cellulitis.  2.  Mild third proximal phalanx base, minimal third metacarpal head, and minimal fourth proximal phalanx base osteitis. This is a nonspecific finding, with differential considerations including early infection, reactive edema, and contusion.   Additionally, the edema about the third MCP joint raises the possibility of early septic arthritis. Of note, there is no definitive finding to suggest osteomyelitis or evidence of significant joint effusion.  3.  Mild fourth flexor tenosynovitis, nonspecific.         --  Louis Casillas MD, PhD  Orthopedic Surgery PGY-1  [phone]

## 2023-08-20 NOTE — PLAN OF CARE
"  /67 (BP Location: Left arm)   Pulse 76   Temp 98.6  F (37  C) (Oral)   Resp 16   Ht 1.549 m (5' 1\")   Wt 77.5 kg (170 lb 12.8 oz)   SpO2 96%   BMI 32.27 kg/m      Shift: 9661-2692  VS: Stable on RA, afebrile.  Neuro: AOx4  BG: N/A  Labs: WDL  Respiratory: WDL  Pain/Nausea/PRN: Denies pain this shift.  Diet: Regular, good appetite.  LDA: L PIV - running MIVF, LR at 100/hr. R arm dialysis AVF.   GI/: Independent to bathroom, BM on day shift.  Skin: Right hand wound VICTORINA. MRI today.  Mobility: UAL  Plan: Possible PICC placement to discharge on IV abx. Continue plan of care, notify primary team with changes.    Handoff given to following RN.    "

## 2023-08-21 VITALS
TEMPERATURE: 98.1 F | WEIGHT: 170.8 LBS | HEIGHT: 61 IN | BODY MASS INDEX: 32.25 KG/M2 | HEART RATE: 74 BPM | RESPIRATION RATE: 18 BRPM | OXYGEN SATURATION: 99 % | SYSTOLIC BLOOD PRESSURE: 156 MMHG | DIASTOLIC BLOOD PRESSURE: 69 MMHG

## 2023-08-21 LAB
ANION GAP SERPL CALCULATED.3IONS-SCNC: 10 MMOL/L (ref 7–15)
BUN SERPL-MCNC: 16.2 MG/DL (ref 6–20)
CALCIUM SERPL-MCNC: 8.7 MG/DL (ref 8.6–10)
CHLORIDE SERPL-SCNC: 111 MMOL/L (ref 98–107)
CREAT SERPL-MCNC: 1.16 MG/DL (ref 0.51–0.95)
CRP SERPL-MCNC: <3 MG/L
DEPRECATED HCO3 PLAS-SCNC: 20 MMOL/L (ref 22–29)
ERYTHROCYTE [DISTWIDTH] IN BLOOD BY AUTOMATED COUNT: 12.8 % (ref 10–15)
GFR SERPL CREATININE-BSD FRML MDRD: 56 ML/MIN/1.73M2
GLUCOSE SERPL-MCNC: 89 MG/DL (ref 70–99)
HCT VFR BLD AUTO: 36 % (ref 35–47)
HGB BLD-MCNC: 11.4 G/DL (ref 11.7–15.7)
LIPASE SERPL-CCNC: 26 U/L (ref 13–60)
MCH RBC QN AUTO: 30.2 PG (ref 26.5–33)
MCHC RBC AUTO-ENTMCNC: 31.7 G/DL (ref 31.5–36.5)
MCV RBC AUTO: 96 FL (ref 78–100)
PLATELET # BLD AUTO: 258 10E3/UL (ref 150–450)
POTASSIUM SERPL-SCNC: 4.2 MMOL/L (ref 3.4–5.3)
RBC # BLD AUTO: 3.77 10E6/UL (ref 3.8–5.2)
SODIUM SERPL-SCNC: 141 MMOL/L (ref 136–145)
TACROLIMUS BLD-MCNC: 9 UG/L (ref 5–15)
TME LAST DOSE: NORMAL H
TME LAST DOSE: NORMAL H
WBC # BLD AUTO: 5 10E3/UL (ref 4–11)

## 2023-08-21 PROCEDURE — 250N000013 HC RX MED GY IP 250 OP 250 PS 637: Performed by: PHYSICIAN ASSISTANT

## 2023-08-21 PROCEDURE — 258N000003 HC RX IP 258 OP 636: Performed by: INTERNAL MEDICINE

## 2023-08-21 PROCEDURE — 80048 BASIC METABOLIC PNL TOTAL CA: CPT | Performed by: INTERNAL MEDICINE

## 2023-08-21 PROCEDURE — 250N000011 HC RX IP 250 OP 636: Mod: JZ | Performed by: INTERNAL MEDICINE

## 2023-08-21 PROCEDURE — 36415 COLL VENOUS BLD VENIPUNCTURE: CPT | Performed by: INTERNAL MEDICINE

## 2023-08-21 PROCEDURE — 80197 ASSAY OF TACROLIMUS: CPT | Performed by: INTERNAL MEDICINE

## 2023-08-21 PROCEDURE — 85014 HEMATOCRIT: CPT | Performed by: INTERNAL MEDICINE

## 2023-08-21 PROCEDURE — 250N000012 HC RX MED GY IP 250 OP 636 PS 637: Performed by: PHYSICIAN ASSISTANT

## 2023-08-21 PROCEDURE — 86140 C-REACTIVE PROTEIN: CPT | Performed by: STUDENT IN AN ORGANIZED HEALTH CARE EDUCATION/TRAINING PROGRAM

## 2023-08-21 PROCEDURE — 250N000011 HC RX IP 250 OP 636: Mod: JZ | Performed by: PHYSICIAN ASSISTANT

## 2023-08-21 PROCEDURE — 250N000013 HC RX MED GY IP 250 OP 250 PS 637: Performed by: INTERNAL MEDICINE

## 2023-08-21 PROCEDURE — 99233 SBSQ HOSP IP/OBS HIGH 50: CPT | Mod: 24 | Performed by: INTERNAL MEDICINE

## 2023-08-21 PROCEDURE — 99239 HOSP IP/OBS DSCHRG MGMT >30: CPT | Performed by: INTERNAL MEDICINE

## 2023-08-21 PROCEDURE — 83690 ASSAY OF LIPASE: CPT | Performed by: INTERNAL MEDICINE

## 2023-08-21 PROCEDURE — 99232 SBSQ HOSP IP/OBS MODERATE 35: CPT | Performed by: STUDENT IN AN ORGANIZED HEALTH CARE EDUCATION/TRAINING PROGRAM

## 2023-08-21 RX ORDER — SULFAMETHOXAZOLE AND TRIMETHOPRIM 400; 80 MG/1; MG/1
1 TABLET ORAL DAILY
Qty: 7 TABLET | Refills: 0 | Status: SHIPPED | OUTPATIENT
Start: 2023-08-22 | End: 2023-09-14

## 2023-08-21 RX ORDER — VANCOMYCIN HYDROCHLORIDE 125 MG/1
125 CAPSULE ORAL DAILY
Qty: 10 CAPSULE | Refills: 0 | Status: SHIPPED | OUTPATIENT
Start: 2023-08-22 | End: 2023-09-01

## 2023-08-21 RX ADMIN — VANCOMYCIN HYDROCHLORIDE 125 MG: 125 CAPSULE ORAL at 08:29

## 2023-08-21 RX ADMIN — PIPERACILLIN AND TAZOBACTAM 3.38 G: 3; .375 INJECTION, POWDER, LYOPHILIZED, FOR SOLUTION INTRAVENOUS at 09:44

## 2023-08-21 RX ADMIN — CARVEDILOL 12.5 MG: 12.5 TABLET, FILM COATED ORAL at 17:47

## 2023-08-21 RX ADMIN — ENOXAPARIN SODIUM 40 MG: 40 INJECTION SUBCUTANEOUS at 14:11

## 2023-08-21 RX ADMIN — PSYLLIUM HUSK 1 PACKET: 3.4 POWDER ORAL at 08:30

## 2023-08-21 RX ADMIN — MYCOPHENOLIC ACID 540 MG: 360 TABLET, DELAYED RELEASE ORAL at 17:47

## 2023-08-21 RX ADMIN — PIPERACILLIN AND TAZOBACTAM 3.38 G: 3; .375 INJECTION, POWDER, LYOPHILIZED, FOR SOLUTION INTRAVENOUS at 03:24

## 2023-08-21 RX ADMIN — CARVEDILOL 12.5 MG: 12.5 TABLET, FILM COATED ORAL at 09:03

## 2023-08-21 RX ADMIN — PIPERACILLIN AND TAZOBACTAM 3.38 G: 3; .375 INJECTION, POWDER, LYOPHILIZED, FOR SOLUTION INTRAVENOUS at 16:23

## 2023-08-21 RX ADMIN — VENLAFAXINE HYDROCHLORIDE 150 MG: 75 CAPSULE, EXTENDED RELEASE ORAL at 08:29

## 2023-08-21 RX ADMIN — TACROLIMUS 1 MG: 1 CAPSULE ORAL at 08:29

## 2023-08-21 RX ADMIN — MYCOPHENOLIC ACID 540 MG: 360 TABLET, DELAYED RELEASE ORAL at 08:29

## 2023-08-21 RX ADMIN — SODIUM CHLORIDE, POTASSIUM CHLORIDE, SODIUM LACTATE AND CALCIUM CHLORIDE: 600; 310; 30; 20 INJECTION, SOLUTION INTRAVENOUS at 11:58

## 2023-08-21 RX ADMIN — TACROLIMUS 1 MG: 1 CAPSULE ORAL at 17:48

## 2023-08-21 RX ADMIN — ACETAMINOPHEN 975 MG: 325 TABLET, FILM COATED ORAL at 05:37

## 2023-08-21 RX ADMIN — SODIUM BICARBONATE 650 MG TABLET 650 MG: at 08:30

## 2023-08-21 RX ADMIN — SULFAMETHOXAZOLE AND TRIMETHOPRIM 1 TABLET: 400; 80 TABLET ORAL at 08:30

## 2023-08-21 ASSESSMENT — ACTIVITIES OF DAILY LIVING (ADL)
ADLS_ACUITY_SCORE: 20

## 2023-08-21 NOTE — PLAN OF CARE
"BP (!) 149/64 (BP Location: Left arm)   Pulse 73   Temp 98.7  F (37.1  C) (Oral)   Resp 16   Ht 1.549 m (5' 1\")   Wt 77.5 kg (170 lb 12.8 oz)   SpO2 96%   BMI 32.27 kg/m      Shift: 5180-6105  VS: Mildly hypertensive, afebrile.  Neuro: Aox4  BG: none  Respiratory: RA  Pain/Nausea: Denies pain and nausea.   Diet: Regular   IV Access: PIV LR @ 100 mL/hr   GI/: Voiding not saving. No BM.   Skin: R hand wound related to cath bite.   Mobility: UAL  Plan: Continue with POC and notify team of any changes.   "

## 2023-08-21 NOTE — PLAN OF CARE
"BP (!) 149/64 (BP Location: Left arm)   Pulse 73   Temp 98.7  F (37.1  C) (Oral)   Resp 16   Ht 1.549 m (5' 1\")   Wt 77.5 kg (170 lb 12.8 oz)   SpO2 96%   BMI 32.27 kg/m      Shift: 1383-4962  VS: Slight HTN on RA, afebrile. Did not meet parameters for BP intervention.  Neuro: AOx4  BG: N/A  Labs: WDL  Respiratory: WDL  Pain/Nausea/PRN: Denies pain this shift.  Diet: Combo regular, good appetite.  LDA: L PIV - running MIVF LR at 100/hr. Patient also on Zosyn Q6 hours.   GI/: Up independently to bathroom. Voiding, not saving, BM today.  Skin: R hand wound from cat scratch, VICTORINA, red, warm, taut skin at site.   Mobility: UAL   Plan: Continue POC, Q6 hour antibiotics.     Handoff given to following RN.    "

## 2023-08-21 NOTE — DISCHARGE SUMMARY
"[facility]  Hospitalist Discharge Summary      Date of Admission:  8/17/2023  Date of Discharge:  No discharge date for patient encounter.  Discharging Provider: Malvni Hills MD  Discharge Service: Hospitalist Service, GOLD TEAM 10    Discharge Diagnoses   Cellulitis secondary to cat bite  Kidney pancreas transplant 2022  History of C. difficile  Hypertension  Anemia of chronic renal disease  Hypomagnesemia    Clinically Significant Risk Factors     # Obesity: Estimated body mass index is 32.27 kg/m  as calculated from the following:    Height as of this encounter: 1.549 m (5' 1\").    Weight as of this encounter: 77.5 kg (170 lb 12.8 oz).       Follow-ups Needed After Discharge   Follow-up Appointments     Adult [facility] Follow-up and recommended labs and tests      Wound clinic in 1 week    Appointments on [place] and/or [facility] (with [facility] or [facility]   provider or service). Call [phone] if you haven't heard regarding   these appointments within 7 days of discharge.            Unresulted Labs Ordered in the Past 30 Days of this Admission       No orders found from 7/18/2023 to 8/18/2023.        These results will be followed up by PCP    Discharge Disposition   Discharged to home  Condition at discharge: Stable    Hospital Course   This is a 52-year-old female with past medical history of type 1 diabetes status post kidney pancreas transplant in 2022.  Patient sustained an injury secondary to a cat bite on her right hand.  Patient was being treated with outpatient Augmentin worsening picture patient noted erythema swelling and inability to make a fist patient was admitted secondary to failure of outpatient antibiotic therapy.  Patient was started on Zosyn and vancomycin.  MRSA nares is negative and patient was de-escalated to Zosyn.  Demarcated erythema showed no evidence of pain outside of demarcated region making necrotizing fasciitis less likely.  There " was a functional decrease in range of motion and MRI was obtained to ensure of osteomyelitis or tenosynovitis.  MRI showed evidence of osteitis and was concerning for possible early septic arthritis.  Orthopedics was consulted for further evaluation who recommended no intervention on this hospital stay but would like to see the patient in the wound clinic in approximately 1 week.   transplant infectious disease team was consulted for ongoing antibiotics.  Patient was de-escalated from Zosyn to Augmentin and double strength Bactrim for 7 days to return to prophylactic dose of Bactrim thereafter.    Consultations This Hospital Stay   PHARMACY TO DOSE VANCO  NEPHROLOGY KIDNEY/PANCREAS TRANSPLANT ADULT IP CONSULT  INFECTIOUS DISEASE TRANSPLANT SOT ADULT IP CONSULT  CARE MANAGEMENT / SOCIAL WORK IP CONSULT  NURSING TO CONSULT FOR VASCULAR ACCESS CARE IP CONSULT  ORTHOPAEDIC SURGERY ADULT/PEDS IP CONSULT    Code Status   Full Code    Time Spent on this Encounter   I, Malvin Hills MD, personally saw the patient today and spent greater than 30 minutes discharging this patient.       Malvin Hills MD  [facility] [address] [address]  Phone: [phone]  ______________________________________________________________________    Physical Exam   Vital Signs: Temp: 98.1  F (36.7  C) Temp src: Oral BP: (!) 156/69 Pulse: 74   Resp: 18 SpO2: 99 % O2 Device: None (Room air)    Weight: 170 lbs 12.8 oz  Physical Exam  Constitutional:       General: Hermila Hess is not in acute distress.     Appearance: Hermila Hess is not ill-appearing or toxic-appearing.   HENT:      Head: Normocephalic.      Mouth/Throat:      Mouth: Mucous membranes are moist.   Eyes:      Pupils: Pupils are equal, round, and reactive to light.   Cardiovascular:      Rate and Rhythm: Normal rate.      Heart sounds: No murmur heard.     No friction rub. No gallop.   Pulmonary:      Effort: Pulmonary effort  is normal. No respiratory distress.      Breath sounds: No stridor. No wheezing.   Abdominal:      General: Abdomen is flat. There is no distension.      Palpations: There is no mass.      Tenderness: There is no abdominal tenderness.   Musculoskeletal:         General: Swelling and tenderness present.   Skin:     Findings: Erythema present.   Neurological:      General: No focal deficit present.      Mental Status: Hermila Hess is alert and oriented to person, place, and time.      Cranial Nerves: No cranial nerve deficit.      Sensory: No sensory deficit.   Psychiatric:         Mood and Affect: Mood normal.         Behavior: Behavior normal.              Primary Care Physician   Kurt King    Discharge Orders      Reason for your hospital stay    Cellulitis that failed outpatient therapy     Activity    Your activity upon discharge: activity as tolerated     Adult [facility] Follow-up and recommended labs and tests    Wound clinic in 1 week    Appointments on [place] and/or [facility] (with [facility] or [facility] provider or service). Call [phone] if you haven't heard regarding these appointments within 7 days of discharge.     Diet    Follow this diet upon discharge: Orders Placed This Encounter      Combination Diet Regular Diet Adult       Significant Results and Procedures   Results for orders placed or performed during the hospital encounter of 08/17/23   XR Hand Right G/E 3 Views    Narrative    EXAM: XR HAND RIGHT G/E 3 VIEWS  LOCATION: [facility]  DATE: 8/17/2023    INDICATION: Right hand catheter bite. Right hand pain.  COMPARISON: None.      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 Remaining joint spaces are preserved and normally aligned. No fracture. No radiopaque foreign body. No evidence of osteomyelitis. Vascular calcifications.   US Renal Transplant with Doppler    Narrative    EXAMINATION:  US RENAL TRANSPLANT,  8/18/2023 9:50 AM     COMPARISON: Renal transplant ultrasound 10/6/2022, 9/29/2022,  9/24/2022    HISTORY: MEERA    TECHNIQUE:  Grey-scale, color Doppler and spectral flow analysis.    FINDINGS:  The transplant kidney is located in the left lower quadrant, and  measures 10.8 cm. Parenchyma is of normal thickness and echogenicity.  No focal lesions. Mild central pelviectasis, grossly similar in  comparison to 9/24/2022. No hydronephrosis. Previously visualized  perinephric fluid collection is not seen today.    Renal artery flow:   94 cm/sec peak systolic at hilum.  118 peak systolic at anastomosis.  Arcuate artery resistive indices (upper to lower): 0.84, 0.94, 0.94    Renal Vein Flow:  17at hilum.   59 at anastomosis.    Iliac artery flow:  173 peak systolic above anastomosis.  152 peak systolic below anastomosis.    Iliac vein flow:  Patent above and below the anastomosis.      Impression    IMPRESSION:   1. Normal sonographic appearance of the renal transplant with patent  Doppler evaluation.  2. Persistently elevated arcuate artery and renal artery resistive  indices, slightly increased compared to 10/6/2022, without  poststenotic waveform.    I have personally reviewed the examination and initial interpretation  and I agree with the findings.    ASCENCION WILBURN MD         SYSTEM ID:  [id number]   MR Hand Right w/o & w Contrast    Narrative    EXAM: MR HAND RIGHT WITHOUT AND WITH CONTRAST  LOCATION: [facility]  DATE: 08/19/2023    INDICATION: 52-year-old patient with right hand cellulitis and swelling.  COMPARISON: 08/17/2023 radiographs.  TECHNIQUE: Routine. Additional postgadolinium T1 sequences were obtained.  IV CONTRAST: Gadolinium-based.    FINDINGS:     TENDONS:   -Extensor tendons: No tendon tear, tendinopathy, or tenosynovitis.  -Flexor tendons: There is a trace amount of fluid within the fourth finger flexor tendon sheath, with mild  thickening and enhancement of the tendon sheath. The tendon sheaths are otherwise unremarkable. No tendon tear or tendinopathy.    LIGAMENTS:  -Visualized collateral and capsular ligaments: Normal.    JOINTS AND BONES:   -There is mild edema-type signal and enhancement in the third proximal phalanx base (most marked dorsally). Subtle foci of edema in the medial aspect of the third metacarpal head and in the fourth proximal phalanx base. No confluent decreased T1 signal   or associated fracture.   -No significant joint effusion.    MUSCLES AND SOFT TISSUES:   -There is subcutaneous edema and enhancement in the dorsal hand and third finger.  -No discrete soft tissue fluid collection      Impression    IMPRESSION:  1.  Right dorsal hand and third finger cellulitis.  2.  Mild third proximal phalanx base, minimal third metacarpal head, and minimal fourth proximal phalanx base osteitis. This is a nonspecific finding, with differential considerations including early infection, reactive edema, and contusion.   Additionally, the edema about the third MCP joint raises the possibility of early septic arthritis. Of note, there is no definitive finding to suggest osteomyelitis or evidence of significant joint effusion.  3.  Mild fourth flexor tenosynovitis, nonspecific.           Discharge Medications   Current Discharge Medication List        START taking these medications    Details   vancomycin (VANCOCIN) 125 MG capsule Take 1 capsule (125 mg) by mouth daily for 10 days  Qty: 10 capsule, Refills: 0    Associated Diagnoses: Cellulitis of right upper extremity           CONTINUE these medications which have CHANGED    Details   amoxicillin-clavulanate (AUGMENTIN) 875-125 MG tablet Take 1 tablet by mouth 2 times daily for 7 days  Qty: 14 tablet, Refills: 0    Associated Diagnoses: Cellulitis of finger of right hand; Cat scratch of right hand, initial encounter      !! sulfamethoxazole-trimethoprim (BACTRIM) 400-80 MG tablet Take  1 tablet by mouth daily for 7 days  Qty: 7 tablet, Refills: 0    Comments: Please resume bacrtim PPX on MWF after course is completed  Associated Diagnoses: Cellulitis of right upper extremity       !! - Potential duplicate medications found. Please discuss with provider.        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 (H)      calcium carbonate-vitamin D (OS-BARBARA WITH D) 500-200 MG-UNIT tablet Take 1 tablet by mouth 2 times daily (with meals)  Qty: 60 tablet, Refills: 11    Associated Diagnoses: Pancreas replaced by transplant (H)      carvedilol (COREG) 12.5 MG tablet Take 1 tablet (12.5 mg) by mouth 2 times daily (with meals)  Qty: 60 tablet, Refills: 11    Associated Diagnoses: Primary hypertension      FIBER ADULT GUMMIES PO Take 1 Gum by mouth daily      mycophenolic acid (GENERIC EQUIVALENT) 180 MG EC tablet Take 3 tablets (540 mg) by mouth 2 times daily  Qty: 180 tablet, Refills: 11    Comments: TXP DT 9/23/2022 (Kidney / Pancreas) TXP Dischg DT 10/2/2022 DX Kidney replaced by transplant Z94.0 [facility] ([place], [place])  Associated Diagnoses: Pancreas replaced by transplant (H); Kidney replaced by transplant      !! sulfamethoxazole-trimethoprim (BACTRIM) 400-80 MG tablet Take 1 tablet by mouth three times a week  Qty: 45 tablet, Refills: 3    Associated Diagnoses: Pancreas replaced by transplant (H); Kidney replaced by transplant      tacrolimus (GENERIC EQUIVALENT) 0.5 MG capsule Take 2 capsules (1 mg) by mouth 2 times daily  Qty: 360 capsule, Refills: 3    Comments: TXP DT 9/23/2022 (Kidney / Pancreas) TXP Dischg DT 10/2/2022 DX Kidney replaced by transplant Z94.0 [facility]  ([place], [place])  Associated Diagnoses: Kidney replaced by transplant; Pancreas replaced by transplant (H)      venlafaxine (EFFEXOR XR) 150 MG 24 hr capsule Take 1 capsule (150 mg) by mouth daily  Qty: 30 capsule, Refills: 0    Associated Diagnoses: Anxiety and depression       !! - Potential duplicate medications found. Please discuss with provider.        Allergies   Allergies   Allergen Reactions    Amlodipine      Other reaction(s): Edema,generalized

## 2023-08-21 NOTE — PROGRESS NOTES
Care Management Follow Up    Length of Stay (days): 4    Expected Discharge Date: 08/20/2023     Concerns to be Addressed: discharge planning     Patient plan of care discussed at interdisciplinary rounds: Yes    Anticipated Discharge Disposition: Home, Home Infusion     Anticipated Discharge Services: None  Anticipated Discharge DME: None    Patient/family educated on Medicare website which has current facility and service quality ratings: no  Education Provided on the Discharge Plan: Yes  Patient/Family in Agreement with the Plan: yes    Referrals Placed by CM/SW: External Care Coordination, Home Infusion  Private pay costs discussed: Not applicable    Additional Information:  Paged provider requesting return call regarding anticipated plan for discharge.    Hermila Archer RN BSN, PHN, ACM-RN  7A RN Care Coordinator  Phone: [phone]  Pager [phone]    To contact the weekend RNCC  [facility] (0800 [phone] 1630) Saturday and Sunday    Units: 5A,5B,5C [phone]    Units: 6A, [phone]    Units 6B, 6C, 6D [phone]    Units: 7A, 7B, 7C, 7D, [phone]    [facility] [place] ([phone]) Saturday and Sunday    Units: 5 Ortho, 8A, 10 ICU, & Pediatric Units-Pager 4: [phone]    8/21/2023 9:51 AM

## 2023-08-21 NOTE — TELEPHONE ENCOUNTER
0DIAGNOSIS: Cellulitis RT Upper Extremity   APPOINTMENT DATE: 08/28/2023   NOTES STATUS DETAILS   OFFICE NOTE from other specialist Internal 08/17/2023 - [facility]   DISCHARGE REPORT from the ER Internal 08/17/2023 - [facility]   MEDICATION LIST Internal    PHOTOGRAPHS Media Tab    MRI PACS Internal:  08/19/2023 - RT Hand

## 2023-08-21 NOTE — PLAN OF CARE
"Vitals: BP (!) 160/80 (BP Location: Right leg)   Pulse 76   Temp 97.5  F (36.4  C) (Oral)   Resp 18   Ht 1.549 m (5' 1\")   Wt 77.5 kg (170 lb 12.8 oz)   SpO2 95%   BMI 32.27 kg/m      Endocrine: n/a  Labs: Stable labs.  Pain: Denies pain.  PRN's: n/a  Diet: Regular diet, good appetite.  LDA: PIV LR at 100cc/hr.  GI: Loose BM's.  : Voids not saving.  Skin: Pale, right hand/swollen and red.  Neuro: Alert and oriented.  Mobility: Up ad ricci.  Education: n/a  Plan: Possible discharge home on oral antibiotics.                          "

## 2023-08-21 NOTE — PHARMACY-ADMISSION MEDICATION HISTORY
Pharmacist Admission Medication History    Admission medication history is complete. The information provided in this note is only as accurate as the sources available at the time of the update.    Medication reconciliation/reorder completed by provider prior to medication history? Yes    Information Source(s): Clinic records, Hospital records, and CareEverywhere/SureScripts via N/A    Pertinent Information: patient fill history used as primary information gathering    Changes made to PTA medication list:  Added: None  Deleted: None  Changed: None    Allergies reviewed with patient and updates made in EHR: no    Medication History Completed By: Rhys Nuno RPH 8/21/2023 10:53 AM    Prior to Admission medications    Medication Sig Last Dose Taking? Auth Provider Long Term End Date   amoxicillin-clavulanate (AUGMENTIN) 875-125 MG tablet Take 1 tablet by mouth 2 times daily for 7 days Past Week Yes Jose Guadalupe Flores PA-C  8/21/23   atorvastatin (LIPITOR) 20 MG tablet Take 1 tablet (20 mg) by mouth every evening Past Week Yes Sanam Renee APRN CNP Yes    calcium carbonate-vitamin D (OS-BARBARA WITH D) 500-200 MG-UNIT tablet Take 1 tablet by mouth 2 times daily (with meals) Past Week Yes Sanam Renee APRN CNP Yes    carvedilol (COREG) 12.5 MG tablet Take 1 tablet (12.5 mg) by mouth 2 times daily (with meals)  Patient taking differently: Take 12.5 mg by mouth 2 times daily Past Week Yes Sanam Renee APRN CNP Yes    FIBER ADULT GUMMIES PO Take 1 Gum by mouth daily Past Week Yes Reported, Patient     mycophenolic acid (GENERIC EQUIVALENT) 180 MG EC tablet Take 3 tablets (540 mg) by mouth 2 times daily Past Week Yes Bertrand Moore MD     sulfamethoxazole-trimethoprim (BACTRIM) 400-80 MG tablet Take 1 tablet by mouth three times a week  Patient taking differently: Take 1 tablet by mouth daily Past Week Yes Bertrand Moore MD Yes    tacrolimus (GENERIC EQUIVALENT) 0.5 MG capsule Take 2  capsules (1 mg) by mouth 2 times daily Past Week Yes Bertrand Moore MD Yes    venlafaxine (EFFEXOR XR) 150 MG 24 hr capsule Take 1 capsule (150 mg) by mouth daily Past Week Yes Jacqueline Garcia APRN CNP Yes    CALCITRIOL PO Take 0.25 mcg by mouth   Reported, Patient Yes 10/2/22   Glucagon 3 MG/DOSE POWD Spray 1 spray in nostril  Patient not taking: Reported on 2022   Reported, Patient Yes 10/2/22   LANTUS VIAL 100 UNIT/ML soln 30 Units At Bedtime   Patient not taking: Reported on 2022   Reported, Patient Yes 10/2/22   NOVOLOG VIAL 100 UNIT/ML soln Inject by subcutaneous route via pump: 00: 0.4 units/hour, 06:00-12:00 0.6 units/hour, 12:00-00:00 0.8 units/hour. Addn'l bolus dosin.75 units/15g carbs. Max 50 units/day   Reported, Patient Yes 10/2/22

## 2023-08-21 NOTE — PROGRESS NOTES
[facility]  Transplant Infectious Disease Progress Note     Patient:  Eden Hess, Date of birth 1970, Medical record number [id number]  Date of Visit:  08/21/2023         Assessment and Recommendations:   Recommendations:  - Stop Zosyn  - Transition to PO Augmentin 875-125mg q12h along with PO Bactrim 1DS q12h for a 7 day course  - After completion of course, transition back to 1 400-80mg tablet daily for PJP prophylaxis  - Given C.diff history, continue PO Vancomycin 125 mg daily as prophylaxis while on systemic antibiotics  - Outpatient Ortho follow up    Transplant Infectious Disease will sign off as patient is planned for discharge today    Assessment:  51 y/o lady, PMHx DM1 s/p SPK 9/2022 (CMV -/-, EBV +/+), who is being evaluated for right hand cellulitis that failed outpatient PO antibiotic treatment     #Right hand cellulitis after cat bite/scratch:  Patient with cat scratch and possible bite while attempting to pet friend's pet cat this past Saturday (8/12/23). Developed redness, swelling and edema 8/13, started Augmentin BID 8/14 but failed to have initial clinical improvement despite 4 days of antibiotic therapy. Switched to Zosyn on admission with clinical improvement noted. CRP has remained normal. Would cover for Pasteurella and Bartonella species. Clinical syndrome does not however resemble cat-scratch disease. MRSA nares negative  Feel lack of improvement was perhaps because patient needed IV antibiotics up front rather than infection with gram negative that isn't covered by Augmentin. Given improvement, reasonable to transition back to PO Augmentin for 7 more days of treatment. Would add Bactrim 1DS q12h (given limited initial response to Augmentin, this would provide alternative/additional Pasteurella and Bartonella coverage, does provide MRSA coverage although do not feel patient needs it)     #Hx C.diff:  Tested positive 3/14/23 s/p PO Vancomycin. OK to  have on Vanco prophylaxis while on systemic antibiotics     Other Infectious Disease issues include:  - QTc interval: 482 msec 9/24/22  - Pneumocystis prophylaxis: Bactrim  - Viral serostatus & prophylaxis: CMV -/-, EBV +/+, none indicated at present  - Risk factors to suggest check of Toxoplasma, Strongy, or Schisto serology?: None  - Immunization status: COVID x 2, last 3/6/21  - Gamma globulin status: 1070 3/14/23  - Isolation status: Good hand hygiene.    CORNELL Poe  Staff Physician, Infectious Diseases  Pager [phone]          Interval History:   Hermila was last seen by ID 8/18/23. Over the weekend, she has remained on Zosyn. Has been afebrile, stable vitals and on room air. Reports improvement in right hand redness and swelling, less painful and less tender. Is able to move right hand to make a fist better. Puncture sites appear to be scabbing over. No drainage  CRP 4.4 - <3 - <3  Underwent MRI 8/19 which showed cellulitis and osteitis around third and fourth proximal phalanx base and third metacarpal head. Ortho consulted - osteitis to be expected. In addition, no abscess or fluid collection noted. No operative intervention indicated, patient to follow up in 1 week as outpatient      Transplants:  9/23/2022 (Kidney / Pancreas), Postoperative day:  332.  Coordinator Codie Kovacs    Review of Systems:  Remaining systems reviewed and negative         Current Medications & Allergies:      acetaminophen  975 mg Oral Q8H    atorvastatin  20 mg Oral QPM    carvedilol  12.5 mg Oral BID w/meals    enoxaparin ANTICOAGULANT  40 mg Subcutaneous Q24H    mycophenolic acid  540 mg Oral BID IS    piperacillin-tazobactam  3.375 g Intravenous Q6H    psyllium  1 packet Oral Daily    sodium bicarbonate  650 mg Oral BID    sodium chloride (PF)  3 mL Intracatheter Q8H    sulfamethoxazole-trimethoprim  1 tablet Oral Daily    tacrolimus  1 mg Oral BID IS    vancomycin  125 mg Oral Daily    venlafaxine  150 mg Oral  Daily       Infusions/Drips:   lactated ringers 100 mL/hr at 08/21/23 1158       Allergies   Allergen Reactions    Amlodipine      Other reaction(s): Edema,generalized            Physical Exam:     Ranges for vital signs:  Temp:  [97.5  F (36.4  C)-98.7  F (37.1  C)] 98.1  F (36.7  C)  Pulse:  [72-76] 74  Resp:  [16-18] 18  BP: (149-160)/(64-80) 156/69  SpO2:  [95 %-99 %] 99 %  Vitals:    08/18/23 0845   Weight: 77.5 kg (170 lb 12.8 oz)       Physical Examination:  GENERAL:  well-developed, well-nourished, resting in bed in no acute distress.  HEAD:  Head is normocephalic, atraumatic   EYES:  Eyes have anicteric sclerae without conjunctival injection   ENT:  Oropharynx is moist without exudates or ulcers. Tongue is midline  NECK:  Supple.   LUNGS:  Clear to auscultation bilateral. On room air, no use of accessory muscles  CARDIOVASCULAR:  Regular rate and rhythm with no murmur  ABDOMEN:  Normal bowel sounds, soft, nontender.   SKIN:  Right hand erythema and edema considerably improved, ROM improved, less pain, less tenderness. Puncture sites with scabs forming, no drainage  NEUROLOGIC:  Grossly nonfocal. Active x4 extremities         Laboratory Data:     Absolute CD4, Perry T Cells   Date Value Ref Range Status   04/25/2023 147 (L) 441 - 2,156 cells/uL Final       Inflammatory Markers    Recent Labs   Lab Test 08/21/23  0622 08/20/23  0541 08/19/23  0533 04/25/23  1058   CRPI <3.00 <3.00   < >  --    G6PD  --   --   --  16.3    < > = values in this interval not displayed.       Immune Globulin Studies     Recent Labs   Lab Test 03/14/23  1029   IGG 1,070       Metabolic Studies       Recent Labs   Lab Test 08/21/23  0622 08/20/23  0541 08/18/23  0550 08/17/23  1720 08/14/23  0958 07/25/23  1017 04/25/23  1058 04/03/23  1001 09/24/22  1404 09/24/22  1400 09/23/22  1815 09/23/22  1753    137   < > 136   < > 141   < > 138   < >  --    < > 135   POTASSIUM 4.2 4.4   < > 4.8   < > 4.5   < > 5.6*   < > 4.4   < >  3.4*   CHLORIDE 111* 108*   < > 104   < > 106   < > 103   < >  --    < >  --    CO2 20* 19*   < > 21*   < > 24   < > 22   < >  --    < >  --    ANIONGAP 10 10   < > 11   < > 11   < > 13   < >  --    < >  --    BUN 16.2 16.2   < > 19.3   < > 20.0   < > 33.2*   < >  --    < >  --    CR 1.16* 1.15*   < > 1.15*   < > 1.24*   < > 1.45*   < >  --    < >  --    GFRESTIMATED 56* 56*   < > 56*   < > 51*   < > 43*   < >  --    < >  --    GLC 89 83   < > 89   < > 102*   < > 97   < >  --    < > 79   A1C  --   --   --   --   --   --   --  5.2   < >  --   --   --    BARBARA 8.7 8.7   < > 10.0   < > 9.9   < > 9.7   < >  --    < >  --    PHOS  --   --   --   --   --  3.9   < >  --    < >  --    < >  --    MAG  --   --   --  1.4*  --  1.5*   < >  --    < >  --    < >  --    LACT  --   --   --   --   --   --   --   --   --  0.5*  --  3.2*    < > = values in this interval not displayed.       Hepatic Studies    Recent Labs   Lab Test 08/18/23  0550 04/03/23  1001 10/13/22  1102 09/24/22  0514   BILITOTAL 0.5 0.4 0.3  --    DBIL  --  <0.20 <0.1  --    ALKPHOS 77 91 161*  --    PROTTOTAL 6.1* 6.9 6.6*  --    ALBUMIN 3.6 4.2 3.3*  --    AST 16 51* 21  --    ALT 12 48* 40  --    LDH  --   --   --  327*       Hematology Studies   Recent Labs   Lab Test 08/21/23  0622 08/20/23  0541 08/19/23  0820 08/18/23  0550 08/17/23  1720 10/02/22  0543 10/01/22  0550 09/30/22  0540   WBC 5.0 5.2 5.9 6.8 7.7   < > 10.2 7.9   ANEU  --   --   --   --   --   --  9.2* 7.0   ANEUTAUTO  --   --   --  5.0 5.8   < >  --   --    ALYM  --   --   --   --   --   --  0.0* 0.0*   ALYMPAUTO  --   --   --  0.6* 0.7*   < >  --   --    CAMILA  --   --   --   --   --   --  0.6 0.7   AMONOAUTO  --   --   --  1.0 1.0   < >  --   --    AEOS  --   --   --   --   --   --  0.4 0.1   AEOSAUTO  --   --   --  0.2 0.2   < >  --   --    ABSBASO  --   --   --  0.1 0.1   < >  --   --    HGB 11.4* 10.3* 12.0 12.2 13.2   < > 8.5* 7.9*   HCT 36.0 33.4* 37.1 38.5 42.2   < > 26.2* 23.8*   PLT  258 245 251 271 308   < > 268 221    < > = values in this interval not displayed.     Urine Studies     Recent Labs   Lab Test 08/18/23  2013 10/07/22  1213 09/22/22  1714 08/13/20  1110   URINEPH 5.5 7.0 8.0* 5.0   NITRITE Negative Negative Negative Negative   LEUKEST Negative Negative Negative Negative   WBCU 0 0-5 6* 3     Medication levels    Recent Labs   Lab Test 08/21/23  0622 08/19/23  0533 08/14/23  0958   TACROL 9.0   < > 10.8   MPACID  --   --  1.28   MPAG  --   --  50.5    < > = values in this interval not displayed.         Microbiology:    Last Culture results   No results found for: PJRDFA, RSS, CULTURE, CULT, ECOLISP        Last checks of Clostridioides difficile testing  Recent Labs   Lab Test 03/01/23  0909 10/07/22  1212   CDBPCT Positive* Positive*   CDIFFGDH Positive*  --    CDIFFTOX Positive*  --        Virology:  Coronavirus-19 testing    Recent Labs   Lab Test 09/22/22  1708 09/11/22  1051 07/11/20  1300   [id number] Negative Negative  --    COVIDPCREXT  --   --  Negative     CMV viral loads    Recent Labs   Lab Test 02/22/23  1003 11/28/22  0830   CMVQNT Not Detected Not Detected       BK Virus Testing     Recent Labs   Lab Test 08/14/23  0958 05/11/23  1057   BKRES Not Detected Not Detected       Imaging:  No results found for this or any previous visit (from the past 48 hour(s)).     MRI Right Hand 8/19/23:  IMPRESSION:  1.  Right dorsal hand and third finger cellulitis.  2.  Mild third proximal phalanx base, minimal third metacarpal head, and minimal fourth proximal phalanx base osteitis. This is a nonspecific finding, with differential considerations including early infection, reactive edema, and contusion.   Additionally, the edema about the third MCP joint raises the possibility of early septic arthritis. Of note, there is no definitive finding to suggest osteomyelitis or evidence of significant joint effusion.  3.  Mild fourth flexor tenosynovitis, nonspecific.     X Ray R Hand  8/17/23:  IMPRESSION: Mild degenerative arthritis of the first CMC, first IP, and the DIP joints. Remaining joint spaces are preserved and normally aligned. No fracture. No radiopaque foreign body. No evidence of osteomyelitis. Vascular calcifications.

## 2023-08-21 NOTE — PROGRESS NOTES
DISCHARGE:  Patient with orders to discharge to home.     Education Provided:   Med Card na  Lab Book na  Handouts na  Specialty Pharmacy na  LDAs na    Discharge instructions, medications & follow ups reviewed with patient. Copy of discharge summary given to patient. PIV removed.     Patient in stable condition. AVSS. Patient had no further questions regarding discharge instructions and medications. Patient transferred out by self & left with brother.

## 2023-08-21 NOTE — PROGRESS NOTES
[facility]   Transplant Nephrology Progress Note  Date of Admission:  8/17/2023  Today's Date: 08/21/2023    Recommendations:  - Would continue on present immunosuppression.  - Appreciate Transplant ID recommendations for when antibiotics could be changed to an oral one.  - Recommend furosemide 20 mg once or twice today for mild volume up.  - Please recheck serum magnesium level with next labs.    Assessment & Plan   # DDKT (SPK): Stable   - Baseline Creatinine: ~ 1.1-1.3   - Proteinuria: Normal (<0.2 grams)   - Date DSA Last Checked: May/2023      Latest DSA: No   - BK Viremia: No   - Kidney Tx Biopsy: No    # Pancreas Tx (SPK):    - Pancreatic Exocrine Drainage: Enteric drained     - Blood glucose: Near euglycemia      On insulin: No   - HbA1c: Stable      Latest HbA1c: 5.2%   - Pancreatic enzymes: Stable   - Date DSA Last Checked: May/2023  Latest DSA: No   - Pancreas Tx Biopsy: No    # Immunosuppression: Tacrolimus immediate release (goal 8-10) and Mycophenolic acid (dose 540 mg every 12 hours)   - Patient is in an immunosuppressed state and will continue to monitor for efficacy and toxicity of immunosuppression medications.   - Changes: Not at this time    # Infection Prophylaxis:   Last CD4 Level: 147 (Apr/2023)  - PJP: Sulfa/TMP (Bactrim)    # Hypertension: Borderline control;  Goal BP: < 140/90 (Hospitalization goal)   - Volume status: Mildly hypervolemic     - Changes: Yes - Recommend furosemide 20 mg once or twice today for mild edema.    # Anemia in Chronic Renal Disease: Hgb: Stable      SHANEKA: No   - Iron studies: Not checked recently    # Mineral Bone Disorder:   - Vitamin D; level: Normal        On supplement: No  - Calcium; level: Normal        On supplement: No    # Electrolytes:   - Potassium; level: Normal        On supplement: No  - Magnesium; level: Low        On supplement: No  - Bicarbonate; level: Low        On supplement: Yes     # R Hand  Cellulitis from Cat Bite: Patient presented to urgent care 8/14 with swollen red finger after cat bite. Prescribed Augmentin, but symptoms continued to progress. Now admitted 8/18.  Slowly improving on broad spectrum antibiotics with piperacillin/tazobactam.  MRI of hand showed no tenosynovitis or osteomyelitis.  Now less than detectable CRP inflammation.  Transplant ID following.  Ortho saw patient and recommended follow up in [facility] in a week following discharge.    # H/o Clostridium difficile Colitis: Patient previously diagnosed 10/2022 initially and received prolonged oral vancomycin taper.  She was again diagnosed 3/2023 and treated with oral vancomycin.  Now on prophylactic oral vancomycin with present antibiotic exposure.     # Chronic Loose Stools: Stable symptoms at patient's baseline.  Now on additional fiber.    # Transplant History:  Etiology of Kidney Failure: Diabetes mellitus type 1  Tx: DDKT (SPK)  Transplant: 9/23/2022 (Kidney / Pancreas)  Significant changes in immunosuppression: None  Significant transplant-related complications: None    Recommendations were communicated to the primary team via this note.    Bertrand Moore MD   Pager: [phone]    Interval History   Ms. Hess's creatinine is 1.16 (08/21 0622); Stable.  Good urine output.  Other significant labs/tests/vitals: Stable electrolytes.  No new events overnight.  Decreased redness and pain of right middle finger  No chest pain or shortness of breath.  Slight leg swelling.  No nausea and vomiting.  Bowel movements are soft to loose.  No fever, sweats or chills.    Review of Systems   4 point ROS was obtained and negative except as noted in the Interval History.    MEDICATIONS:   acetaminophen  975 mg Oral Q8H    atorvastatin  20 mg Oral QPM    carvedilol  12.5 mg Oral BID w/meals    enoxaparin ANTICOAGULANT  40 mg Subcutaneous Q24H    mycophenolic acid  540 mg Oral BID IS    piperacillin-tazobactam  3.375 g Intravenous  "Q6H    psyllium  1 packet Oral Daily    sodium bicarbonate  650 mg Oral BID    sodium chloride (PF)  3 mL Intracatheter Q8H    sulfamethoxazole-trimethoprim  1 tablet Oral Daily    tacrolimus  1 mg Oral BID IS    vancomycin  125 mg Oral Daily    venlafaxine  150 mg Oral Daily      lactated ringers 100 mL/hr at 23 0700       Physical Exam   Temp  Av.3  F (36.8  C)  Min: 97.5  F (36.4  C)  Max: 99.1  F (37.3  C)      Pulse  Av.7  Min: 67  Max: 78 Resp  Av.9  Min: 15  Max: 18  SpO2  Av.5 %  Min: 96 %  Max: 100 %     BP (!) 160/80 (BP Location: Right leg)   Pulse 76   Temp 97.5  F (36.4  C) (Oral)   Resp 18   Ht 1.549 m (5' 1\")   Wt 77.5 kg (170 lb 12.8 oz)   SpO2 95%   BMI 32.27 kg/m      Admit Weight: 77.5 kg (170 lb 12.8 oz)     GENERAL APPEARANCE: alert and no distress  HENT: mouth without ulcers or lesions  RESP: lungs clear to auscultation - no rales, rhonchi or wheezes  CV: regular rhythm, normal rate, no rub, no murmur  EDEMA: trace LE edema bilaterally  ABDOMEN: soft, nondistended, nontender, bowel sounds normal  MS: right middle finger with proximal erythema, warmth and TTP, but overall decreased signs.  Otherwise, no gross deformities noted, no evidence of inflammation in joints, no muscle tenderness  SKIN: no rash  TX KIDNEY: normal  DIALYSIS ACCESS:  RUE AV graft with good thrill    Data   All labs reviewed by me.  CMP  Recent Labs   Lab 23  0622 23  0541 23  0820 23  0533 23  0225 23  0550 23  1720    137 138 139 136 141 136   POTASSIUM 4.2 4.4  --  4.1  --  4.2 4.8   CHLORIDE 111* 108*  --  108*  --  109* 104   CO2 20* 19*  --  18*  --  21* 21*   ANIONGAP 10 10  --  13  --  11 11   GLC 89 83  --  90  --  96 89   BUN 16.2 16.2  --  17.1  --  19.7 19.3   CR 1.16* 1.15* 1.13* 1.19* 1.21* 1.28* 1.15*   GFRESTIMATED 56* 56*  --  54* 53* 50* 56*   BARBARA 8.7 8.7  --  9.3  --  9.4 10.0   MAG  --   --   --   --   --   --  1.4* "   PROTTOTAL  --   --   --   --   --  6.1*  --    ALBUMIN  --   --   --   --   --  3.6  --    BILITOTAL  --   --   --   --   --  0.5  --    ALKPHOS  --   --   --   --   --  77  --    AST  --   --   --   --   --  16  --    ALT  --   --   --   --   --  12  --      CBC  Recent Labs   Lab 08/21/23  0622 08/20/23  0541 08/19/23  0820 08/18/23  0550   HGB 11.4* 10.3* 12.0 12.2   WBC 5.0 5.2 5.9 6.8   RBC 3.77* 3.42* 3.90 4.01   HCT 36.0 33.4* 37.1 38.5   MCV 96 98 95 96   MCH 30.2 30.1 30.8 30.4   MCHC 31.7 30.8* 32.3 31.7   RDW 12.8 12.8 12.6 12.5    245 251 271     INRNo lab results found in last 7 days.  ABGNo lab results found in last 7 days.   Urine Studies  Recent Labs   Lab Test 08/18/23  2013 10/07/22  1213 09/22/22  1714 08/13/20  1110   COLOR Straw Yellow Light Yellow Yellow   APPEARANCE Clear Clear Clear Slightly Cloudy   URINEGLC Negative Negative 300* >499*   URINEBILI Negative Negative Negative Negative   URINEKETONE Negative Negative Negative 5*   SG 1.007 1.015 1.011 1.016   UBLD Negative Negative Small* Small*   URINEPH 5.5 7.0 8.0* 5.0   PROTEIN Negative Negative 200* >499*   UROBILINOGEN  --  0.2  --   --    NITRITE Negative Negative Negative Negative   LEUKEST Negative Negative Negative Negative   RBCU <1 0-2 1 3*   WBCU 0 0-5 6* 3     No lab results found.  PTH  Recent Labs   Lab Test 01/31/23  0957 10/13/22  1102 10/03/22  0808   PTHI 85* 259* 268*     Iron Studies  Recent Labs   Lab Test 10/03/22  0809   IRON 34*   *   IRONSAT 17   LAUREN 923*       IMAGING:  All imaging studies reviewed by me.

## 2023-08-22 LAB
ATRIAL RATE - MUSE: 75 BPM
DIASTOLIC BLOOD PRESSURE - MUSE: NORMAL MMHG
INTERPRETATION ECG - MUSE: NORMAL
P AXIS - MUSE: 55 DEGREES
PR INTERVAL - MUSE: 160 MS
QRS DURATION - MUSE: 86 MS
QT - MUSE: 386 MS
QTC - MUSE: 431 MS
R AXIS - MUSE: 2 DEGREES
SYSTOLIC BLOOD PRESSURE - MUSE: NORMAL MMHG
T AXIS - MUSE: 22 DEGREES
VENTRICULAR RATE- MUSE: 75 BPM

## 2023-08-28 ENCOUNTER — PRE VISIT (OUTPATIENT)
Dept: ORTHOPEDICS | Facility: CLINIC | Age: 53
End: 2023-08-28

## 2023-09-06 ENCOUNTER — LAB (OUTPATIENT)
Dept: LAB | Facility: CLINIC | Age: 53
End: 2023-09-06
Payer: COMMERCIAL

## 2023-09-06 DIAGNOSIS — Z94.0 KIDNEY REPLACED BY TRANSPLANT: ICD-10-CM

## 2023-09-06 DIAGNOSIS — Z48.298 AFTERCARE FOLLOWING ORGAN TRANSPLANT: ICD-10-CM

## 2023-09-06 DIAGNOSIS — Z79.899 ENCOUNTER FOR LONG-TERM CURRENT USE OF MEDICATION: ICD-10-CM

## 2023-09-06 LAB
ERYTHROCYTE [DISTWIDTH] IN BLOOD BY AUTOMATED COUNT: 13.2 % (ref 10–15)
HCT VFR BLD AUTO: 38.7 % (ref 35–47)
HGB BLD-MCNC: 12.6 G/DL (ref 11.7–15.7)
MCH RBC QN AUTO: 31 PG (ref 26.5–33)
MCHC RBC AUTO-ENTMCNC: 32.6 G/DL (ref 31.5–36.5)
MCV RBC AUTO: 95 FL (ref 78–100)
PLATELET # BLD AUTO: 326 10E3/UL (ref 150–450)
RBC # BLD AUTO: 4.06 10E6/UL (ref 3.8–5.2)
WBC # BLD AUTO: 6.8 10E3/UL (ref 4–11)

## 2023-09-06 PROCEDURE — 82150 ASSAY OF AMYLASE: CPT

## 2023-09-06 PROCEDURE — 80197 ASSAY OF TACROLIMUS: CPT

## 2023-09-06 PROCEDURE — 36415 COLL VENOUS BLD VENIPUNCTURE: CPT

## 2023-09-06 PROCEDURE — 83690 ASSAY OF LIPASE: CPT

## 2023-09-06 PROCEDURE — 80048 BASIC METABOLIC PNL TOTAL CA: CPT

## 2023-09-06 PROCEDURE — 85027 COMPLETE CBC AUTOMATED: CPT

## 2023-09-06 PROCEDURE — 80180 DRUG SCRN QUAN MYCOPHENOLATE: CPT

## 2023-09-07 ENCOUNTER — LAB (OUTPATIENT)
Dept: LAB | Facility: CLINIC | Age: 53
End: 2023-09-07
Payer: COMMERCIAL

## 2023-09-07 DIAGNOSIS — A04.72 CLOSTRIDIUM DIFFICILE DIARRHEA: ICD-10-CM

## 2023-09-07 DIAGNOSIS — R19.7 DIARRHEA: ICD-10-CM

## 2023-09-07 DIAGNOSIS — A04.72 CLOSTRIDIUM DIFFICILE DIARRHEA: Primary | ICD-10-CM

## 2023-09-07 LAB
AMYLASE SERPL-CCNC: 58 U/L (ref 28–100)
ANION GAP SERPL CALCULATED.3IONS-SCNC: 12 MMOL/L (ref 7–15)
BUN SERPL-MCNC: 17.5 MG/DL (ref 6–20)
CALCIUM SERPL-MCNC: 9.6 MG/DL (ref 8.6–10)
CHLORIDE SERPL-SCNC: 108 MMOL/L (ref 98–107)
CREAT SERPL-MCNC: 1.1 MG/DL (ref 0.51–0.95)
DEPRECATED HCO3 PLAS-SCNC: 20 MMOL/L (ref 22–29)
EGFRCR SERPLBLD CKD-EPI 2021: 59 ML/MIN/1.73M2
GLUCOSE SERPL-MCNC: 109 MG/DL (ref 70–99)
LIPASE SERPL-CCNC: 30 U/L (ref 13–60)
MYCOPHENOLATE SERPL LC/MS/MS-MCNC: 1.73 MG/L (ref 1–3.5)
MYCOPHENOLATE-G SERPL LC/MS/MS-MCNC: 44.4 MG/L (ref 30–95)
POTASSIUM SERPL-SCNC: 4.7 MMOL/L (ref 3.4–5.3)
SODIUM SERPL-SCNC: 140 MMOL/L (ref 136–145)
TACROLIMUS BLD-MCNC: 10.3 UG/L (ref 5–15)
TME LAST DOSE: NORMAL H

## 2023-09-07 PROCEDURE — 87493 C DIFF AMPLIFIED PROBE: CPT

## 2023-09-08 LAB — C DIFF TOX B STL QL: NEGATIVE

## 2023-09-14 ENCOUNTER — TELEPHONE (OUTPATIENT)
Dept: TRANSPLANT | Facility: CLINIC | Age: 53
End: 2023-09-14
Payer: COMMERCIAL

## 2023-09-14 DIAGNOSIS — R19.7 DIARRHEA: Primary | ICD-10-CM

## 2023-09-14 NOTE — TELEPHONE ENCOUNTER
Spoke with Hermila, diarrhea has improved a little bit but she is very conscious of her diet. Eating bland, easy to digest foods.     Recommended adding in a daily fiber supplement. If diarrhea persists, then will workup diarrhea further. Will check CMV with next labs, orders entered.     Denies fevers, chills, or night sweats. No sick contacts.

## 2023-09-20 DIAGNOSIS — N18.6 TYPE 1 DIABETES MELLITUS WITH CHRONIC KIDNEY DISEASE ON CHRONIC DIALYSIS (H): ICD-10-CM

## 2023-09-20 DIAGNOSIS — Z94.83 PANCREAS REPLACED BY TRANSPLANT (H): ICD-10-CM

## 2023-09-20 DIAGNOSIS — Z99.2 TYPE 1 DIABETES MELLITUS WITH CHRONIC KIDNEY DISEASE ON CHRONIC DIALYSIS (H): ICD-10-CM

## 2023-09-20 DIAGNOSIS — I10 PRIMARY HYPERTENSION: ICD-10-CM

## 2023-09-20 DIAGNOSIS — E10.22 TYPE 1 DIABETES MELLITUS WITH CHRONIC KIDNEY DISEASE ON CHRONIC DIALYSIS (H): ICD-10-CM

## 2023-09-20 RX ORDER — CARVEDILOL 12.5 MG/1
12.5 TABLET ORAL 2 TIMES DAILY WITH MEALS
Qty: 60 TABLET | Refills: 11 | Status: SHIPPED | OUTPATIENT
Start: 2023-09-20

## 2023-09-20 RX ORDER — ATORVASTATIN CALCIUM 20 MG/1
20 TABLET, FILM COATED ORAL EVERY EVENING
Qty: 30 TABLET | Refills: 11 | Status: SHIPPED | OUTPATIENT
Start: 2023-09-20 | End: 2024-09-24

## 2023-09-26 ENCOUNTER — TELEPHONE (OUTPATIENT)
Dept: TRANSPLANT | Facility: CLINIC | Age: 53
End: 2023-09-26
Payer: COMMERCIAL

## 2023-09-26 NOTE — TELEPHONE ENCOUNTER
Voicemail    Date/Time: 9/26/2023 @ 3:01PM     Reason for call: patient called of having a new letter to my donor's family. Patient would like to get the correct address to send card off.

## 2023-10-04 ENCOUNTER — TELEPHONE (OUTPATIENT)
Dept: PHARMACY | Facility: CLINIC | Age: 53
End: 2023-10-04
Payer: COMMERCIAL

## 2023-10-04 NOTE — TELEPHONE ENCOUNTER
Clinical Pharmacy Consult:                                                      Transplant Specific: 12 month post transplant medication review  Date of Transplant: 09/23/2022  Type of Transplant: kidney and pancreas  First Transplant: yes  History of rejection: no    Immunosuppression Regimen   TAC 1mg qAM & 1mg qPM and Myforitic 540mg qAM & 540mg qPM  Patient specific goal: 8-10  Most recent level: 10.3 , date: 09/06/23  Immunosuppressant Levels: therapeutic     Pt adherent to lab draws: yes  Scr:   Lab Results   Component Value Date    CR 2.14 11/02/2022    CR 3.56 08/13/2020     Side effects: unable to contact pt    Prophylactic Medications  Antibacterial:  Bactrim ss once daily  Scheduled Discontinue Date: Lifelong    Antifungal: Clotrimazole   Scheduled Discontinue Date: 3 months, therapy completed    Antiviral: CrCl 25 to 39 mL/minute: Valcyte 450 mg every other day   Scheduled Discontinue Date: 3 months, therapy completed    Acid Reducer: not on  Scheduled Reviewed Date: N/A    Thrombosis Prevention: Aspirin 325 mg PO daily  Scheduled Discontinue Date:  managed by clinic    Blood Pressure Management  Frequency of home Blood Pressure checks: unable to contact pt  Most recent home BP:  Last clinic /76 on 8/17/23  Patient Blood pressure goal: <140/90  Patient blood pressure at goal:  Unknown    Hospitalizations/ER visits since last assessment: Unable to contact pt    Med rec/DUR performed: yes  Med Rec Discrepancies: unknown - unable to contact pt    Unable to contact Hermila for 12 month post-transplant medication review.  Appears compliant per refill history. Last tacro dose was at goal. Unknown if blood pressure is at goal. Blood pressure was slightly elevated last time it was taken in clinic.    No questions or concerns today.  Next follow up in 1 year.     Ling Lagunas, PharmD  [facility]  Specialty - [phone]  Transplant - [phone]

## 2023-10-16 ENCOUNTER — DOCUMENTATION ONLY (OUTPATIENT)
Dept: TRANSPLANT | Facility: CLINIC | Age: 53
End: 2023-10-16
Payer: COMMERCIAL

## 2023-10-16 NOTE — PROGRESS NOTES
Received writing to your  donor letter from the living  donor group.   Forward letter to [employer]. Attn;  Laury GONZALEZ,

## 2023-10-20 ENCOUNTER — LAB (OUTPATIENT)
Dept: LAB | Facility: CLINIC | Age: 53
End: 2023-10-20
Payer: COMMERCIAL

## 2023-10-20 DIAGNOSIS — Z48.298 AFTERCARE FOLLOWING ORGAN TRANSPLANT: ICD-10-CM

## 2023-10-20 DIAGNOSIS — R19.7 DIARRHEA: ICD-10-CM

## 2023-10-20 LAB
TACROLIMUS BLD-MCNC: 17.3 UG/L (ref 5–15)
TME LAST DOSE: ABNORMAL H
TME LAST DOSE: ABNORMAL H

## 2023-10-20 PROCEDURE — 36415 COLL VENOUS BLD VENIPUNCTURE: CPT

## 2023-10-20 PROCEDURE — 80197 ASSAY OF TACROLIMUS: CPT

## 2023-10-21 ENCOUNTER — TELEPHONE (OUTPATIENT)
Dept: TRANSPLANT | Facility: CLINIC | Age: 53
End: 2023-10-21
Payer: COMMERCIAL

## 2023-10-21 DIAGNOSIS — Z94.0 KIDNEY REPLACED BY TRANSPLANT: ICD-10-CM

## 2023-10-21 DIAGNOSIS — Z94.83 PANCREAS REPLACED BY TRANSPLANT (H): ICD-10-CM

## 2023-10-21 LAB — CMV DNA SPEC NAA+PROBE-ACNC: NOT DETECTED IU/ML

## 2023-10-21 RX ORDER — TACROLIMUS 0.5 MG/1
0.5 CAPSULE ORAL 2 TIMES DAILY
Qty: 180 CAPSULE | Refills: 3 | Status: SHIPPED | OUTPATIENT
Start: 2023-10-21

## 2023-10-21 NOTE — TELEPHONE ENCOUNTER
ISSUE:   Tacrolimus IR level 17.3 on 10/20, goal 8-10, dose 1 mg BID.    PLAN:   Please call patient and confirm this was an accurate 12-hour trough. Verify Tacrolimus IR dose 1 mg BID. Confirm no new medications or illness. Confirm no missed doses. If accurate trough and accurate dose, decrease Tacrolimus IR dose to 0.5 mg BID and repeat labs in 1 week.    OUTCOME:   Attempted to reach patient 10/21/23 at 752 am. No answer. Left urgent message to return call to discuss tacro.       Spoke with patient, they confirm accurate trough level and current dose 1 mg BID. Patient confirmed dose change to 0.5 mg BID and to repeat labs in within 7 days. Orders sent to preferred pharmacy for dose change and lab for repeat labs. Patient voiced understanding of plan.

## 2023-10-23 ENCOUNTER — OFFICE VISIT (OUTPATIENT)
Dept: TRANSPLANT | Facility: CLINIC | Age: 53
End: 2023-10-23
Attending: INTERNAL MEDICINE
Payer: COMMERCIAL

## 2023-10-23 VITALS
BODY MASS INDEX: 31.99 KG/M2 | DIASTOLIC BLOOD PRESSURE: 65 MMHG | SYSTOLIC BLOOD PRESSURE: 139 MMHG | TEMPERATURE: 97.9 F | HEART RATE: 68 BPM | OXYGEN SATURATION: 98 % | WEIGHT: 169.3 LBS

## 2023-10-23 DIAGNOSIS — Z94.83 PANCREAS REPLACED BY TRANSPLANT (H): ICD-10-CM

## 2023-10-23 DIAGNOSIS — Z48.298 AFTERCARE FOLLOWING ORGAN TRANSPLANT: Primary | ICD-10-CM

## 2023-10-23 DIAGNOSIS — A04.72 CLOSTRIDIUM DIFFICILE DIARRHEA: ICD-10-CM

## 2023-10-23 DIAGNOSIS — R74.8 ELEVATED LIPASE: ICD-10-CM

## 2023-10-23 DIAGNOSIS — I15.1 HTN, KIDNEY TRANSPLANT RELATED: ICD-10-CM

## 2023-10-23 DIAGNOSIS — Z94.0 HTN, KIDNEY TRANSPLANT RELATED: ICD-10-CM

## 2023-10-23 DIAGNOSIS — Z94.0 KIDNEY REPLACED BY TRANSPLANT: ICD-10-CM

## 2023-10-23 DIAGNOSIS — Z94.0 STATUS POST SIMULTANEOUS KIDNEY AND PANCREAS TRANSPLANT (H): ICD-10-CM

## 2023-10-23 DIAGNOSIS — N18.31 CHRONIC KIDNEY DISEASE, STAGE 3A (H): ICD-10-CM

## 2023-10-23 DIAGNOSIS — Z94.83 STATUS POST SIMULTANEOUS KIDNEY AND PANCREAS TRANSPLANT (H): ICD-10-CM

## 2023-10-23 DIAGNOSIS — I50.32 CHRONIC HEART FAILURE WITH PRESERVED EJECTION FRACTION (H): ICD-10-CM

## 2023-10-23 DIAGNOSIS — N25.81 SECONDARY RENAL HYPERPARATHYROIDISM (H): ICD-10-CM

## 2023-10-23 DIAGNOSIS — D84.9 IMMUNOSUPPRESSION (H): ICD-10-CM

## 2023-10-23 PROCEDURE — 99214 OFFICE O/P EST MOD 30 MIN: CPT | Performed by: INTERNAL MEDICINE

## 2023-10-23 PROCEDURE — 99213 OFFICE O/P EST LOW 20 MIN: CPT | Performed by: INTERNAL MEDICINE

## 2023-10-23 ASSESSMENT — PAIN SCALES - GENERAL: PAINLEVEL: NO PAIN (0)

## 2023-10-23 NOTE — PROGRESS NOTES
TRANSPLANT NEPHROLOGY EARLY POST TRANSPLANT VISIT    Assessment & Plan   # DDKT (SPK): Stable   - Baseline Creatinine: ~ 1.1-1.3; tyree-0.9   - Proteinuria: Normal (<0.2 grams)   - Date DSA Last Checked: /2023      Latest DSA: No, due for repeat now at 13 month   - BK Viremia: No, due for repeat now   - Kidney Tx Biopsy: No    # Pancreas Tx (SPK):    - Pancreatic Exocrine Drainage: Enteric drained     - Blood glucose: Near euglycemia      On insulin: No   - HbA1c: Stable      Latest HbA1c: 5.2%   - Pancreatic enzymes: Stable   - Date DSA Last Checked: May/2023  Latest DSA: No   - Pancreas Tx Biopsy: No    # Immunosuppression: Tacrolimus immediate release (goal 8-10) and Mycophenolic acid (dose 540 mg every 12 hours)   - Patient is in an immunosuppressed state and will continue to monitor for efficacy and toxicity of immunosuppression medications.   - Changes: yes reduce FK goal to 5-8, recent FK supra-therapeutic and the dose was reduced, repeat labs this week    # Infection Prophylaxis:   Last CD4 Level: 147 (Apr/2023)  - PJP: Sulfa/TMP (Bactrim)    # Hypertension: acceptable control;  Goal BP: < 130/80    - Volume status: euvolemic; furosemide 20 mg once or twice today for mild edema.    - Changes: no     # Anemia in Chronic Renal Disease: Hgb: Stable      SHANEKA: No   - Iron studies: Not checked recently    # Mineral Bone Disorder:   - Vitamin D; level: Normal        On supplement: No  - Calcium; level: Normal        On supplement: No    # Electrolytes:   - Potassium; level: Normal        On supplement: No  - Magnesium; level: Low        On supplement: No  - Bicarbonate; level: Low        On supplement: no, if remains low on next check start sodium bicarb 650 mg po bid     # hx of R Hand Cellulitis from Cat Bite: Patient presented to urgent care 8/14 with swollen red finger after cat bite. Prescribed Augmentin, but symptoms continued to progress. Now admitted 8/18.  Slowly improving on broad spectrum antibiotics  with piperacillin/tazobactam.  MRI of hand showed no tenosynovitis or osteomyelitis. Seen by [facility] and [facility].    # H/o Clostridium difficile Colitis: Patient previously diagnosed 10/2022 initially and received prolonged oral vancomycin taper.  She was again diagnosed 3/2023 and treated with oral vancomycin. on prophylactic oral vancomycin with antibiotic.     # Chronic Loose Stools: Stable symptoms at patient's baseline.  on fiber.    # Skin Cancer: New lesions: none   - Discussed sun protection and recommend regular follow up with Dermatology.     # Transplant History:  Etiology of Kidney Failure: Diabetes mellitus type 1  Tx: DDKT (SPK)  Transplant: 9/23/2022 (Kidney / Pancreas)  Significant changes in immunosuppression: None  Significant transplant-related complications: None    Transplant Office Phone Number: [phone]    Return visit: No follow-ups on file.    Leida Fernandez MD    Chief Complaint     Deshawn is a 53 year old here for kidney transplant, pancreas transplant, and immunosuppression management.     History of Present Illness     Feels good overall. Energy level is good. Denies any fevers, chills, weight loss, night sweats. No nausea, vomiting, abdominal pain, diarrhea. No chest pain, sob, leg swelling. No recent illness or hospitalization.   Labs show stable Cr lipase and supratherapeutic Fk -17 drawn yesterday, dose was reduced to 0.5 mg po bid. She denies any tremors, headaches,    Current IS FK 0.5/0,5 /540  Ppx: bactrim  Vaccines COVID FLU RSV  Home BP: Not checked    Problem List   Patient Active Problem List   Diagnosis    Diabetes mellitus type 1 (H)    Anemia in stage 3a chronic kidney disease (H)    TIA (transient ischemic attack)    HTN, kidney transplant related    Anxiety and depression    (HFpEF) heart failure with preserved ejection fraction (H)    Pancreas replaced by transplant (H)    Kidney replaced by transplant    Immunosuppression (H24)    Aftercare following  organ transplant    Chronic kidney disease, stage 3a (H)    Vitamin D deficiency    Secondary renal hyperparathyroidism (H24)    Hyperkalemia    Clostridium difficile infection    Cellulitis of right upper extremity       Allergies   Allergies   Allergen Reactions    Amlodipine      Other reaction(s): Edema,generalized       Medications   Current Outpatient Medications   Medication Sig    atorvastatin (LIPITOR) 20 MG tablet Take 1 tablet (20 mg) by mouth every evening    calcium carbonate-vitamin D (OSCAL) 500-5 MG-MCG tablet Take 1 tablet by mouth 2 times daily (with meals)    carvedilol (COREG) 12.5 MG tablet Take 1 tablet (12.5 mg) by mouth 2 times daily (with meals)    FIBER ADULT GUMMIES PO Take 1 Gum by mouth daily    mycophenolic acid (GENERIC EQUIVALENT) 180 MG EC tablet Take 3 tablets (540 mg) by mouth 2 times daily    sulfamethoxazole-trimethoprim (BACTRIM) 400-80 MG tablet Take 1 tablet by mouth three times a week (Patient taking differently: Take 1 tablet by mouth daily)    tacrolimus (GENERIC) 0.5 MG capsule Take 1 capsule (0.5 mg) by mouth 2 times daily    venlafaxine (EFFEXOR XR) 150 MG 24 hr capsule Take 1 capsule (150 mg) by mouth daily     No current facility-administered medications for this visit.     There are no discontinued medications.    Physical Exam   Vital Signs: There were no vitals taken for this visit.    GENERAL APPEARANCE: alert and no distress  HENT: mouth without ulcers or lesions  RESP: lungs clear to auscultation - no rales, rhonchi or wheezes  CV: regular rhythm, normal rate, no rub, no murmur  EDEMA: no LE edema bilaterally  ABDOMEN: soft, nondistended, nontender, bowel sounds normal  MS: extremities normal - no gross deformities noted, no evidence of inflammation in joints, no muscle tenderness  SKIN: no rash  Access RUE AVF +thrill/bruit    Data         Latest Ref Rng & Units 9/6/2023    10:29 AM 8/21/2023     6:22 AM 8/20/2023     5:41 AM   Renal   Sodium 136 - 145 mmol/L  140  141  137    K 3.4 - 5.3 mmol/L 4.7  4.2  4.4    Cl 98 - 107 mmol/L 108  111  108    Cl (external) 98 - 107 mmol/L 108  111  108    CO2 22 - 29 mmol/L 20  20  19    Urea Nitrogen 6.0 - 20.0 mg/dL 17.5  16.2  16.2    Creatinine 0.51 - 0.95 mg/dL 1.10  1.16  1.15    Glucose 70 - 99 mg/dL 109  89  83    Calcium 8.6 - 10.0 mg/dL 9.6  8.7  8.7          Latest Ref Rng & Units 7/25/2023    10:17 AM 5/11/2023    10:57 AM 3/1/2023     9:07 AM   Bone Health   Phosphorus 2.5 - 4.5 mg/dL 3.9  3.4  4.4          Latest Ref Rng & Units 9/6/2023    10:29 AM 8/21/2023     6:22 AM 8/20/2023     5:41 AM   Heme   WBC 4.0 - 11.0 10e3/uL 6.8  5.0  5.2    Hgb 11.7 - 15.7 g/dL 12.6  11.4  10.3    Plt 150 - 450 10e3/uL 326  258  245          Latest Ref Rng & Units 8/18/2023     5:50 AM 4/3/2023    10:01 AM 10/13/2022    11:02 AM   Liver   AP 35 - 104 U/L 77  91  161    TBili <=1.2 mg/dL 0.5  0.4  0.3    Bilirubin Direct 0.0 - 0.2 mg/dL   <0.1    Bilirubin Direct 0.00 - 0.30 mg/dL  <0.20     ALT 0 - 50 U/L 12  48  40    AST 0 - 45 U/L 16  51  21    Tot Protein 6.4 - 8.3 g/dL 6.1  6.9  6.6    Albumin 3.4 - 5.0 g/dL   3.3    Albumin 3.5 - 5.2 g/dL 3.6  4.2           Latest Ref Rng & Units 9/6/2023    10:29 AM 8/21/2023     6:22 AM 8/20/2023     5:41 AM   Pancreas   Amylase 28 - 100 U/L 58      Lipase (Roche) 13 - 60 U/L 30  26  30          Latest Ref Rng & Units 10/3/2022     8:09 AM   Iron studies   Iron 37 - 145 ug/dL 34    Iron Sat Index 15 - 46 % 17    Ferritin 11 - 328 ng/mL 923          Latest Ref Rng & Units 2/22/2023    10:03 AM 11/28/2022     8:30 AM 9/22/2022     4:29 PM   [facility] Txp Virology   CMV QUANT IU/ML Not Detected IU/mL Not Detected  Not Detected  Not Detected    EBV CAPSID ANTIBODY IGG No detectable antibody.   Positive        Recent Labs   Lab Test 08/14/23  0958 08/19/23  0533 08/21/23  0622 09/06/23  1029 10/20/23  1052   DOSTAC 8/13/2023  --   --  9/5/2023 10/19/2023   TACROL 10.8   < > 9.0 10.3 17.3*    < > = values  in this interval not displayed.     Recent Labs   Lab Test 07/25/23  1017 08/14/23  0958 09/06/23  1029   DOSMPA 7/24/2023  10:00 PM 8/13/2023  10:00 PM  --    MPACID 3.71* 1.28 1.73   MPAG 69.0 50.5 44.4

## 2023-10-23 NOTE — NURSING NOTE
Chief Complaint   Patient presents with    RECHECK   /65 (BP Location: Left arm, Patient Position: Sitting, Cuff Size: Adult Large)   Pulse 68   Temp 97.9  F (36.6  C) (Oral)   Wt 76.8 kg (169 lb 4.8 oz)   SpO2 98%   BMI 31.99 kg/m   Shruthi Mittal on 10/23/2023 at 3:56 PM

## 2023-10-23 NOTE — LETTER
10/23/2023         RE: Eden Hess  [address]  [place] [address]        Dear Colleague,    Thank you for referring your patient, Eden Hess, to the [facility]. Please see a copy of my visit note below.    TRANSPLANT NEPHROLOGY EARLY POST TRANSPLANT VISIT    Assessment & Plan  # DDKT (SPK): Stable   - Baseline Creatinine: ~ 1.1-1.3; tyree-0.9   - Proteinuria: Normal (<0.2 grams)   - Date DSA Last Checked: /2023      Latest DSA: No, due for repeat now at 13 month   - BK Viremia: No, due for repeat now   - Kidney Tx Biopsy: No    # Pancreas Tx (SPK):    - Pancreatic Exocrine Drainage: Enteric drained     - Blood glucose: Near euglycemia      On insulin: No   - HbA1c: Stable      Latest HbA1c: 5.2%   - Pancreatic enzymes: Stable   - Date DSA Last Checked: May/2023  Latest DSA: No   - Pancreas Tx Biopsy: No    # Immunosuppression: Tacrolimus immediate release (goal 8-10) and Mycophenolic acid (dose 540 mg every 12 hours)   - Patient is in an immunosuppressed state and will continue to monitor for efficacy and toxicity of immunosuppression medications.   - Changes: yes reduce FK goal to 5-8, recent FK supra-therapeutic and the dose was reduced, repeat labs this week    # Infection Prophylaxis:   Last CD4 Level: 147 (Apr/2023)  - PJP: Sulfa/TMP (Bactrim)    # Hypertension: acceptable control;  Goal BP: < 130/80    - Volume status: euvolemic; furosemide 20 mg once or twice today for mild edema.    - Changes: no     # Anemia in Chronic Renal Disease: Hgb: Stable      SHANEKA: No   - Iron studies: Not checked recently    # Mineral Bone Disorder:   - Vitamin D; level: Normal        On supplement: No  - Calcium; level: Normal        On supplement: No    # Electrolytes:   - Potassium; level: Normal        On supplement: No  - Magnesium; level: Low        On supplement: No  - Bicarbonate; level: Low        On supplement: no, if remains low on next check start sodium bicarb 650 mg  po bid     # hx of R Hand Cellulitis from Cat Bite: Patient presented to urgent care 8/14 with swollen red finger after cat bite. Prescribed Augmentin, but symptoms continued to progress. Now admitted 8/18.  Slowly improving on broad spectrum antibiotics with piperacillin/tazobactam.  MRI of hand showed no tenosynovitis or osteomyelitis. Seen by ID and [facility].    # H/o Clostridium difficile Colitis: Patient previously diagnosed 10/2022 initially and received prolonged oral vancomycin taper.  She was again diagnosed 3/2023 and treated with oral vancomycin. on prophylactic oral vancomycin with antibiotic.     # Chronic Loose Stools: Stable symptoms at patient's baseline.  on fiber.    # Skin Cancer: New lesions: none   - Discussed sun protection and recommend regular follow up with Dermatology.     # Transplant History:  Etiology of Kidney Failure: Diabetes mellitus type 1  Tx: DDKT (SPK)  Transplant: 9/23/2022 (Kidney / Pancreas)  Significant changes in immunosuppression: None  Significant transplant-related complications: None    Transplant Office Phone Number: [phone]    Return visit: No follow-ups on file.    Leida Fernandez MD    Chief Complaint    Deshawn is a 53 year old here for kidney transplant, pancreas transplant, and immunosuppression management.     History of Present Illness    Feels good overall. Energy level is good. Denies any fevers, chills, weight loss, night sweats. No nausea, vomiting, abdominal pain, diarrhea. No chest pain, sob, leg swelling. No recent illness or hospitalization.   Labs show stable Cr lipase and supratherapeutic Fk -17 drawn yesterday, dose was reduced to 0.5 mg po bid. She denies any tremors, headaches,    Current IS FK 0.5/0,5 /540  Ppx: bactrim  Vaccines COVID FLU RSV  Home BP: Not checked    Problem List  Patient Active Problem List   Diagnosis    Diabetes mellitus type 1 (H)    Anemia in stage 3a chronic kidney disease (H)    TIA (transient ischemic  attack)    HTN, kidney transplant related    Anxiety and depression    (HFpEF) heart failure with preserved ejection fraction (H)    Pancreas replaced by transplant (H)    Kidney replaced by transplant    Immunosuppression (H24)    Aftercare following organ transplant    Chronic kidney disease, stage 3a (H)    Vitamin D deficiency    Secondary renal hyperparathyroidism (H24)    Hyperkalemia    Clostridium difficile infection    Cellulitis of right upper extremity       Allergies  Allergies   Allergen Reactions    Amlodipine      Other reaction(s): Edema,generalized       Medications  Current Outpatient Medications   Medication Sig    atorvastatin (LIPITOR) 20 MG tablet Take 1 tablet (20 mg) by mouth every evening    calcium carbonate-vitamin D (OSCAL) 500-5 MG-MCG tablet Take 1 tablet by mouth 2 times daily (with meals)    carvedilol (COREG) 12.5 MG tablet Take 1 tablet (12.5 mg) by mouth 2 times daily (with meals)    FIBER ADULT GUMMIES PO Take 1 Gum by mouth daily    mycophenolic acid (GENERIC EQUIVALENT) 180 MG EC tablet Take 3 tablets (540 mg) by mouth 2 times daily    sulfamethoxazole-trimethoprim (BACTRIM) 400-80 MG tablet Take 1 tablet by mouth three times a week (Patient taking differently: Take 1 tablet by mouth daily)    tacrolimus (GENERIC) 0.5 MG capsule Take 1 capsule (0.5 mg) by mouth 2 times daily    venlafaxine (EFFEXOR XR) 150 MG 24 hr capsule Take 1 capsule (150 mg) by mouth daily     No current facility-administered medications for this visit.     There are no discontinued medications.    Physical Exam  Vital Signs: There were no vitals taken for this visit.    GENERAL APPEARANCE: alert and no distress  HENT: mouth without ulcers or lesions  RESP: lungs clear to auscultation - no rales, rhonchi or wheezes  CV: regular rhythm, normal rate, no rub, no murmur  EDEMA: no LE edema bilaterally  ABDOMEN: soft, nondistended, nontender, bowel sounds normal  MS: extremities normal - no gross deformities  noted, no evidence of inflammation in joints, no muscle tenderness  SKIN: no rash  Access RUE AVF +thrill/bruit    Data        Latest Ref Rng & Units 9/6/2023    10:29 AM 8/21/2023     6:22 AM 8/20/2023     5:41 AM   Renal   Sodium 136 - 145 mmol/L 140  141  137    K 3.4 - 5.3 mmol/L 4.7  4.2  4.4    Cl 98 - 107 mmol/L 108  111  108    Cl (external) 98 - 107 mmol/L 108  111  108    CO2 22 - 29 mmol/L 20  20  19    Urea Nitrogen 6.0 - 20.0 mg/dL 17.5  16.2  16.2    Creatinine 0.51 - 0.95 mg/dL 1.10  1.16  1.15    Glucose 70 - 99 mg/dL 109  89  83    Calcium 8.6 - 10.0 mg/dL 9.6  8.7  8.7          Latest Ref Rng & Units 7/25/2023    10:17 AM 5/11/2023    10:57 AM 3/1/2023     9:07 AM   Bone Health   Phosphorus 2.5 - 4.5 mg/dL 3.9  3.4  4.4          Latest Ref Rng & Units 9/6/2023    10:29 AM 8/21/2023     6:22 AM 8/20/2023     5:41 AM   Heme   WBC 4.0 - 11.0 10e3/uL 6.8  5.0  5.2    Hgb 11.7 - 15.7 g/dL 12.6  11.4  10.3    Plt 150 - 450 10e3/uL 326  258  245          Latest Ref Rng & Units 8/18/2023     5:50 AM 4/3/2023    10:01 AM 10/13/2022    11:02 AM   Liver   AP 35 - 104 U/L 77  91  161    TBili <=1.2 mg/dL 0.5  0.4  0.3    Bilirubin Direct 0.0 - 0.2 mg/dL   <0.1    Bilirubin Direct 0.00 - 0.30 mg/dL  <0.20     ALT 0 - 50 U/L 12  48  40    AST 0 - 45 U/L 16  51  21    Tot Protein 6.4 - 8.3 g/dL 6.1  6.9  6.6    Albumin 3.4 - 5.0 g/dL   3.3    Albumin 3.5 - 5.2 g/dL 3.6  4.2           Latest Ref Rng & Units 9/6/2023    10:29 AM 8/21/2023     6:22 AM 8/20/2023     5:41 AM   Pancreas   Amylase 28 - 100 U/L 58      Lipase (Roche) 13 - 60 U/L 30  26  30          Latest Ref Rng & Units 10/3/2022     8:09 AM   Iron studies   Iron 37 - 145 ug/dL 34    Iron Sat Index 15 - 46 % 17    Ferritin 11 - 328 ng/mL 923          Latest Ref Rng & Units 2/22/2023    10:03 AM 11/28/2022     8:30 AM 9/22/2022     4:29 PM   [facility] Txp Virology   CMV QUANT IU/ML Not Detected IU/mL Not Detected  Not Detected  Not Detected    EBV CAPSID  ANTIBODY IGG No detectable antibody.   Positive        Recent Labs   Lab Test 08/14/23  0958 08/19/23  0533 08/21/23  0622 09/06/23  1029 10/20/23  1052   DOSTAC 8/13/2023  --   --  9/5/2023 10/19/2023   TACROL 10.8   < > 9.0 10.3 17.3*    < > = values in this interval not displayed.     Recent Labs   Lab Test 07/25/23  1017 08/14/23  0958 09/06/23  1029   DOSMPA 7/24/2023  10:00 PM 8/13/2023  10:00 PM  --    MPACID 3.71* 1.28 1.73   MPAG 69.0 50.5 44.4         Again, thank you for allowing me to participate in the care of your patient.        Sincerely,        Leida Fernandez MD

## 2023-10-23 NOTE — PATIENT INSTRUCTIONS
Labs Thurs      You're due for FLU COVID and RSV    Transplant Patient Information  Your Post Transplant Coordinator is: Giovanna Kovacs  You and your care team can contact your transplant coordinator Monday - Friday, 8am - 5pm at [phone] (Option 2 to reach the coordinator or Option 4 to schedule an appointment).  You can also reach your care team online via [employer].  After hours for urgent matters, please call [facility] at [phone].

## 2023-10-31 ENCOUNTER — HOSPITAL ENCOUNTER (OUTPATIENT)
Dept: RESEARCH | Facility: CLINIC | Age: 53
Discharge: HOME | End: 2023-10-31
Attending: INTERNAL MEDICINE
Payer: COMMERCIAL

## 2023-10-31 DIAGNOSIS — Z00.6 EXAMINATION OF PARTICIPANT OR CONTROL IN CLINICAL RESEARCH: Primary | ICD-10-CM

## 2023-11-10 ENCOUNTER — TELEPHONE (OUTPATIENT)
Dept: TRANSPLANT | Facility: CLINIC | Age: 53
End: 2023-11-10
Payer: COMMERCIAL

## 2023-11-10 ASSESSMENT — ENCOUNTER SYMPTOMS: NEW SYMPTOMS OF CORONARY ARTERY DISEASE: 0

## 2023-11-20 ENCOUNTER — HOSPITAL ENCOUNTER (OUTPATIENT)
Dept: RESEARCH | Facility: CLINIC | Age: 53
Discharge: HOME | End: 2023-11-20
Attending: INTERNAL MEDICINE | Admitting: INTERNAL MEDICINE
Payer: COMMERCIAL

## 2023-11-20 DIAGNOSIS — Z00.6 CLINICAL TRIAL PARTICIPANT: Primary | ICD-10-CM

## 2023-11-20 PROCEDURE — 510N000017 HC CRU PATIENT CARE, PER 15 MIN

## 2023-11-20 PROCEDURE — 510N000009 HC RESEARCH FACILITY, PER 15 MIN

## 2023-11-21 NOTE — ADDENDUM NOTE
Encounter addended by: Merlyn Marquis on: 11/21/2023 11:00 AM   Actions taken: Charge Capture section accepted

## 2023-12-03 ENCOUNTER — HEALTH MAINTENANCE LETTER (OUTPATIENT)
Age: 53
End: 2023-12-03

## 2023-12-14 DIAGNOSIS — Z94.83 PANCREAS REPLACED BY TRANSPLANT (H): ICD-10-CM

## 2023-12-18 ENCOUNTER — HOSPITAL ENCOUNTER (OUTPATIENT)
Dept: RESEARCH | Facility: CLINIC | Age: 53
Discharge: HOME | End: 2023-12-18
Attending: INTERNAL MEDICINE
Payer: COMMERCIAL

## 2023-12-18 DIAGNOSIS — Z00.6 EXAMINATION OF PARTICIPANT OR CONTROL IN CLINICAL RESEARCH: Primary | ICD-10-CM

## 2023-12-18 PROCEDURE — 510N000009 HC RESEARCH FACILITY, PER 15 MIN

## 2023-12-18 PROCEDURE — 510N000017 HC CRU PATIENT CARE, PER 15 MIN

## 2024-01-15 DIAGNOSIS — R30.0 DYSURIA: Primary | ICD-10-CM

## 2024-01-16 DIAGNOSIS — Z98.890 OTHER SPECIFIED POSTPROCEDURAL STATES: ICD-10-CM

## 2024-01-16 DIAGNOSIS — Z94.0 KIDNEY REPLACED BY TRANSPLANT: ICD-10-CM

## 2024-01-16 DIAGNOSIS — Z94.83 PANCREAS REPLACED BY TRANSPLANT (H): Primary | ICD-10-CM

## 2024-01-26 ENCOUNTER — LAB (OUTPATIENT)
Dept: LAB | Facility: CLINIC | Age: 54
End: 2024-01-26
Payer: COMMERCIAL

## 2024-01-26 DIAGNOSIS — R30.0 DYSURIA: ICD-10-CM

## 2024-01-26 DIAGNOSIS — Z94.83 PANCREAS REPLACED BY TRANSPLANT (H): ICD-10-CM

## 2024-01-26 DIAGNOSIS — Z98.890 OTHER SPECIFIED POSTPROCEDURAL STATES: ICD-10-CM

## 2024-01-26 DIAGNOSIS — Z94.0 KIDNEY REPLACED BY TRANSPLANT: ICD-10-CM

## 2024-01-26 LAB
ALBUMIN UR-MCNC: NEGATIVE MG/DL
AMYLASE SERPL-CCNC: 61 U/L (ref 28–100)
ANION GAP SERPL CALCULATED.3IONS-SCNC: 9 MMOL/L (ref 7–15)
APPEARANCE UR: CLEAR
BACTERIA #/AREA URNS HPF: ABNORMAL /HPF
BILIRUB UR QL STRIP: NEGATIVE
BUN SERPL-MCNC: 25.1 MG/DL (ref 6–20)
CALCIUM SERPL-MCNC: 9.9 MG/DL (ref 8.6–10)
CHLORIDE SERPL-SCNC: 103 MMOL/L (ref 98–107)
COLOR UR AUTO: YELLOW
CREAT SERPL-MCNC: 1.08 MG/DL (ref 0.51–0.95)
DEPRECATED HCO3 PLAS-SCNC: 25 MMOL/L (ref 22–29)
EGFRCR SERPLBLD CKD-EPI 2021: 60 ML/MIN/1.73M2
ERYTHROCYTE [DISTWIDTH] IN BLOOD BY AUTOMATED COUNT: 12.9 % (ref 10–15)
GLUCOSE SERPL-MCNC: 97 MG/DL (ref 70–99)
GLUCOSE UR STRIP-MCNC: NEGATIVE MG/DL
HCT VFR BLD AUTO: 42.9 % (ref 35–47)
HGB BLD-MCNC: 13.6 G/DL (ref 11.7–15.7)
HGB UR QL STRIP: ABNORMAL
KETONES UR STRIP-MCNC: NEGATIVE MG/DL
LEUKOCYTE ESTERASE UR QL STRIP: NEGATIVE
LIPASE SERPL-CCNC: 40 U/L (ref 13–60)
MCH RBC QN AUTO: 30.2 PG (ref 26.5–33)
MCHC RBC AUTO-ENTMCNC: 31.7 G/DL (ref 31.5–36.5)
MCV RBC AUTO: 95 FL (ref 78–100)
NITRATE UR QL: NEGATIVE
PH UR STRIP: 5.5 [PH] (ref 5–7)
PLATELET # BLD AUTO: 284 10E3/UL (ref 150–450)
POTASSIUM SERPL-SCNC: 5.1 MMOL/L (ref 3.4–5.3)
RBC # BLD AUTO: 4.5 10E6/UL (ref 3.8–5.2)
RBC #/AREA URNS AUTO: ABNORMAL /HPF
SODIUM SERPL-SCNC: 137 MMOL/L (ref 135–145)
SP GR UR STRIP: 1.02 (ref 1–1.03)
TACROLIMUS BLD-MCNC: 8 UG/L (ref 5–15)
TME LAST DOSE: NORMAL H
TME LAST DOSE: NORMAL H
UROBILINOGEN UR STRIP-ACNC: 0.2 E.U./DL
WBC # BLD AUTO: 4.4 10E3/UL (ref 4–11)
WBC #/AREA URNS AUTO: ABNORMAL /HPF

## 2024-01-26 PROCEDURE — 36415 COLL VENOUS BLD VENIPUNCTURE: CPT

## 2024-01-26 PROCEDURE — 80197 ASSAY OF TACROLIMUS: CPT

## 2024-01-26 PROCEDURE — 87799 DETECT AGENT NOS DNA QUANT: CPT

## 2024-01-26 PROCEDURE — 86832 HLA CLASS I HIGH DEFIN QUAL: CPT

## 2024-01-26 PROCEDURE — 83690 ASSAY OF LIPASE: CPT

## 2024-01-26 PROCEDURE — 80048 BASIC METABOLIC PNL TOTAL CA: CPT

## 2024-01-26 PROCEDURE — 82150 ASSAY OF AMYLASE: CPT

## 2024-01-26 PROCEDURE — 81001 URINALYSIS AUTO W/SCOPE: CPT

## 2024-01-26 PROCEDURE — 85027 COMPLETE CBC AUTOMATED: CPT

## 2024-01-26 PROCEDURE — 86833 HLA CLASS II HIGH DEFIN QUAL: CPT

## 2024-01-28 LAB
BK VIRUS SPECIMEN TYPE: NORMAL
BKV DNA # SPEC NAA+PROBE: NOT DETECTED IU/ML

## 2024-01-31 ENCOUNTER — OFFICE VISIT (OUTPATIENT)
Dept: DERMATOLOGY | Facility: CLINIC | Age: 54
End: 2024-01-31
Payer: COMMERCIAL

## 2024-01-31 DIAGNOSIS — L81.4 LENTIGO: ICD-10-CM

## 2024-01-31 DIAGNOSIS — Z48.298 AFTERCARE FOLLOWING ORGAN TRANSPLANT: ICD-10-CM

## 2024-01-31 DIAGNOSIS — D18.01 CHERRY ANGIOMA: ICD-10-CM

## 2024-01-31 DIAGNOSIS — D22.9 MULTIPLE BENIGN NEVI: ICD-10-CM

## 2024-01-31 DIAGNOSIS — L82.1 SEBORRHEIC KERATOSIS: Primary | ICD-10-CM

## 2024-01-31 PROCEDURE — 99203 OFFICE O/P NEW LOW 30 MIN: CPT | Performed by: PHYSICIAN ASSISTANT

## 2024-01-31 NOTE — PROGRESS NOTES
Eden Hess is an extremely pleasant 53 year old year old female patient here today for skin check. She has history of kidney and pancreas transplant 2022, currently on tacrolimus and mycophenolic acid. She notes more brown spots on legs. She also has very dry skin on feet. She notes that she has scratch marks from her cat on her arms.  Patient has no other skin complaints today.  Remainder of the HPI, Meds, PMH, Allergies, FH, and SH was reviewed in chart.    Pertinent Hx:   No personal history of skin cancer   Past Medical History:   Diagnosis Date    (HFpEF) heart failure with preserved ejection fraction (H)     Anxiety and depression     Adrienne and Contreras, SHERMAN Pop    Diabetes mellitus type 1 (H)     Diagnosed at age 2 years old     End stage kidney disease (H)     Hypertension     Retinopathy     TIA (transient ischemic attack)        Past Surgical History:   Procedure Laterality Date    BENCH KIDNEY Left 2022    Procedure: Bench kidney;  Surgeon: Yousif Gómez MD;  Location: [facility] OR    BENCH PANCREAS N/A 2022    Procedure: Bench pancreas;  Surgeon: Yousif Gómez MD;  Location: [facility] OR     SECTION      x2    CREATE FISTULA ARTERIOVENOUS UPPER EXTREMITY      dialysis port, fistula-R arm    TRANSPLANT PANCREAS, KIDNEY  DONOR, COMBINED N/A 2022    Procedure: TRANSPLANT, KIDNEY AND PANCREAS,  DONOR;  Surgeon: Yousif Gómez MD;  Location: [facility] OR        Family History   Problem Relation Age of Onset    Diabetes Type 1 Father     Hypertension Father     Cerebrovascular Disease Father     Skin Cancer Brother        Social History     Socioeconomic History    Marital status:      Spouse name: Not on file    Number of children: Not on file    Years of education: Not on file    Highest education level: Not on file   Occupational History    Not on file   Tobacco Use    Smoking status: Former     Types: Cigarettes     Quit date:  1997     Years since quittin.1    Smokeless tobacco: Never   Vaping Use    Vaping Use: Never used   Substance and Sexual Activity    Alcohol use: Yes     Alcohol/week: 0.0 standard drinks of alcohol    Drug use: No    Sexual activity: Yes     Partners: Male     Birth control/protection: Male Surgical   Other Topics Concern    Parent/sibling w/ CABG, MI or angioplasty before 65F 55M? Not Asked   Social History Narrative    Not on file     Social Determinants of Health     Financial Resource Strain: Not on file   Food Insecurity: Not on file   Transportation Needs: Not on file   Physical Activity: Not on file   Stress: Not on file   Social Connections: Not on file   Interpersonal Safety: Not on file   Housing Stability: Not on file       Outpatient Encounter Medications as of 2024   Medication Sig Dispense Refill    atorvastatin (LIPITOR) 20 MG tablet Take 1 tablet (20 mg) by mouth every evening 30 tablet 11    calcium carbonate-vitamin D (OSCAL) 500-5 MG-MCG tablet Take 1 tablet by mouth 2 times daily (with meals) 60 tablet 0    carvedilol (COREG) 12.5 MG tablet Take 1 tablet (12.5 mg) by mouth 2 times daily (with meals) 60 tablet 11    FIBER ADULT GUMMIES PO Take 1 Gum by mouth daily      mycophenolic acid (GENERIC EQUIVALENT) 180 MG EC tablet Take 3 tablets (540 mg) by mouth 2 times daily 180 tablet 11    sulfamethoxazole-trimethoprim (BACTRIM) 400-80 MG tablet Take 1 tablet by mouth three times a week (Patient taking differently: Take 1 tablet by mouth daily) 45 tablet 3    tacrolimus (GENERIC) 0.5 MG capsule Take 1 capsule (0.5 mg) by mouth 2 times daily 180 capsule 3    venlafaxine (EFFEXOR XR) 150 MG 24 hr capsule Take 1 capsule (150 mg) by mouth daily 30 capsule 0    [DISCONTINUED] CALCITRIOL PO Take 0.25 mcg by mouth      [DISCONTINUED] Glucagon 3 MG/DOSE POWD Spray 1 spray in nostril (Patient not taking: Reported on 2022)      [DISCONTINUED] LANTUS VIAL 100 UNIT/ML soln 30 Units At  Bedtime  (Patient not taking: Reported on 2022)  4    [DISCONTINUED] NOVOLOG VIAL 100 UNIT/ML soln Inject by subcutaneous route via pump: 00: 0.4 units/hour, 06:00-12:00 0.6 units/hour, 12:00-00:00 0.8 units/hour. Addn'l bolus dosin.75 units/15g carbs. Max 50 units/day  1     No facility-administered encounter medications on file as of 2024.             O:   NAD, WDWN, Alert & Oriented, Mood & Affect wnl, Vitals stable   Here today alone   There were no vitals taken for this visit.   General appearance normal   Vitals stable   Alert, oriented and in no acute distress      Brown macules, brown stuck on papules on face   Stuck on papules and brown macules on trunk and ext   Red papules on trunk  Brown papules and macules with regular pigment network and borders on torso and extremities      The remainder of skin exam is normal       Eyes: Conjunctivae/lids:Normal     ENT: Lips: normal    MSK:Normal    Cardiovascular: peripheral edema none    Pulm: Breathing Normal    Neuro/Psych: Orientation:Alert and Orientedx3 ; Mood/Affect:normal     A/P:  1. Seborrheic keratosis, lentigo, angioma, benign nevi, history of organ transplant   It was a pleasure speaking to Eden Hess today.  BENIGN LESIONS DISCUSSED WITH PATIENT:  I discussed the specifics of tumor, prognosis, and genetics of benign lesions.  I explained that treatment of these lesions would be purely cosmetic and not medically neccessary.  I discussed with patient different removal options including excision, cautery and /or laser.      Nature and genetics of benign skin lesions dicussed with patient.  Signs and Symptoms of skin cancer discussed with patient.  ABCDEs of melanoma reviewed with patient.  Patient encouraged to perform monthly skin exams.  UV precautions reviewed with patient.  Risks of non-melanoma skin cancer discussed with patient   Return to clinic in one year or sooner if needed.

## 2024-01-31 NOTE — LETTER
2024         RE: Eden Hess  [address]  [place] [address]        Dear Colleague,    Thank you for referring your patient, Eden Hess, to the [facility]. Please see a copy of my visit note below.    Eden Hess is an extremely pleasant 53 year old year old female patient here today for skin check. She has history of kidney and pancreas transplant 2022, currently on tacrolimus and mycophenolic acid. She notes more brown spots on legs. She also has very dry skin on feet. She notes that she has scratch marks from her cat on her arms.  Patient has no other skin complaints today.  Remainder of the HPI, Meds, PMH, Allergies, FH, and SH was reviewed in chart.    Pertinent Hx:   No personal history of skin cancer   Past Medical History:   Diagnosis Date     (HFpEF) heart failure with preserved ejection fraction (H)      Anxiety and depression     Adrienne and Contreras, [place] SHERMAN Beebe     Diabetes mellitus type 1 (H)     Diagnosed at age 2 years old      End stage kidney disease (H)      Hypertension      Retinopathy      TIA (transient ischemic attack)        Past Surgical History:   Procedure Laterality Date     BENCH KIDNEY Left 2022    Procedure: Bench kidney;  Surgeon: Yousif Gómez MD;  Location: [facility] OR     BENCH PANCREAS N/A 2022    Procedure: Bench pancreas;  Surgeon: Yousif Gómez MD;  Location: [facility] OR      SECTION      x2     CREATE FISTULA ARTERIOVENOUS UPPER EXTREMITY      dialysis port, fistula-R arm     TRANSPLANT PANCREAS, KIDNEY  DONOR, COMBINED N/A 2022    Procedure: TRANSPLANT, KIDNEY AND PANCREAS,  DONOR;  Surgeon: Yousif Gómez MD;  Location: [facility] OR        Family History   Problem Relation Age of Onset     Diabetes Type 1 Father      Hypertension Father      Cerebrovascular Disease Father      Skin Cancer Brother        Social History     Socioeconomic History     Marital  status:      Spouse name: Not on file     Number of children: Not on file     Years of education: Not on file     Highest education level: Not on file   Occupational History     Not on file   Tobacco Use     Smoking status: Former     Types: Cigarettes     Quit date: 1997     Years since quittin.1     Smokeless tobacco: Never   Vaping Use     Vaping Use: Never used   Substance and Sexual Activity     Alcohol use: Yes     Alcohol/week: 0.0 standard drinks of alcohol     Drug use: No     Sexual activity: Yes     Partners: Male     Birth control/protection: Male Surgical   Other Topics Concern     Parent/sibling w/ CABG, MI or angioplasty before 65F 55M? Not Asked   Social History Narrative     Not on file     Social Determinants of Health     Financial Resource Strain: Not on file   Food Insecurity: Not on file   Transportation Needs: Not on file   Physical Activity: Not on file   Stress: Not on file   Social Connections: Not on file   Interpersonal Safety: Not on file   Housing Stability: Not on file       Outpatient Encounter Medications as of 2024   Medication Sig Dispense Refill     atorvastatin (LIPITOR) 20 MG tablet Take 1 tablet (20 mg) by mouth every evening 30 tablet 11     calcium carbonate-vitamin D (OSCAL) 500-5 MG-MCG tablet Take 1 tablet by mouth 2 times daily (with meals) 60 tablet 0     carvedilol (COREG) 12.5 MG tablet Take 1 tablet (12.5 mg) by mouth 2 times daily (with meals) 60 tablet 11     FIBER ADULT GUMMIES PO Take 1 Gum by mouth daily       mycophenolic acid (GENERIC EQUIVALENT) 180 MG EC tablet Take 3 tablets (540 mg) by mouth 2 times daily 180 tablet 11     sulfamethoxazole-trimethoprim (BACTRIM) 400-80 MG tablet Take 1 tablet by mouth three times a week (Patient taking differently: Take 1 tablet by mouth daily) 45 tablet 3     tacrolimus (GENERIC) 0.5 MG capsule Take 1 capsule (0.5 mg) by mouth 2 times daily 180 capsule 3     venlafaxine (EFFEXOR XR) 150 MG 24 hr  capsule Take 1 capsule (150 mg) by mouth daily 30 capsule 0     [DISCONTINUED] CALCITRIOL PO Take 0.25 mcg by mouth       [DISCONTINUED] Glucagon 3 MG/DOSE POWD Spray 1 spray in nostril (Patient not taking: Reported on 2022)       [DISCONTINUED] LANTUS VIAL 100 UNIT/ML soln 30 Units At Bedtime  (Patient not taking: Reported on 2022)  4     [DISCONTINUED] NOVOLOG VIAL 100 UNIT/ML soln Inject by subcutaneous route via pump: 00: 0.4 units/hour, 06:00-12:00 0.6 units/hour, 12:00-00:00 0.8 units/hour. Addn'l bolus dosin.75 units/15g carbs. Max 50 units/day  1     No facility-administered encounter medications on file as of 2024.             O:   NAD, WDWN, Alert & Oriented, Mood & Affect wnl, Vitals stable   Here today alone   There were no vitals taken for this visit.   General appearance normal   Vitals stable   Alert, oriented and in no acute distress      Brown macules, brown stuck on papules on face   Stuck on papules and brown macules on trunk and ext   Red papules on trunk  Brown papules and macules with regular pigment network and borders on torso and extremities      The remainder of skin exam is normal       Eyes: Conjunctivae/lids:Normal     ENT: Lips: normal    MSK:Normal    Cardiovascular: peripheral edema none    Pulm: Breathing Normal    Neuro/Psych: Orientation:Alert and Orientedx3 ; Mood/Affect:normal     A/P:  1. Seborrheic keratosis, lentigo, angioma, benign nevi, history of organ transplant   It was a pleasure speaking to Eden Hess today.  BENIGN LESIONS DISCUSSED WITH PATIENT:  I discussed the specifics of tumor, prognosis, and genetics of benign lesions.  I explained that treatment of these lesions would be purely cosmetic and not medically neccessary.  I discussed with patient different removal options including excision, cautery and /or laser.      Nature and genetics of benign skin lesions dicussed with patient.  Signs and Symptoms of skin cancer discussed with  patient.  ABCDEs of melanoma reviewed with patient.  Patient encouraged to perform monthly skin exams.  UV precautions reviewed with patient.  Risks of non-melanoma skin cancer discussed with patient   Return to clinic in one year or sooner if needed.       Again, thank you for allowing me to participate in the care of your patient.        Sincerely,        Minda Enamorado PA-C

## 2024-02-07 ENCOUNTER — IMMUNIZATION (OUTPATIENT)
Dept: FAMILY MEDICINE | Facility: CLINIC | Age: 54
End: 2024-02-07
Payer: COMMERCIAL

## 2024-02-07 DIAGNOSIS — Z23 NEED FOR PROPHYLACTIC VACCINATION AGAINST HEPATITIS A AND HEPATITIS B IN ADULT: ICD-10-CM

## 2024-02-07 DIAGNOSIS — Z23 ENCOUNTER FOR IMMUNIZATION: Primary | ICD-10-CM

## 2024-02-07 DIAGNOSIS — Z23 NEED FOR SHINGLES VACCINE: ICD-10-CM

## 2024-02-07 PROCEDURE — 99207 PR NO CHARGE NURSE ONLY: CPT

## 2024-02-07 PROCEDURE — 91320 SARSCV2 VAC 30MCG TRS-SUC IM: CPT

## 2024-02-07 PROCEDURE — 90480 ADMN SARSCOV2 VAC 1/ONLY CMP: CPT

## 2024-02-07 NOTE — PROGRESS NOTES
Prior to immunization administration, verified patients identity using patient s name and date of birth. Please see Immunization Activity for additional information.     Screening Questionnaire for Adult Immunization    Are you sick today?   No   Do you have allergies to medications, food, a vaccine component or latex?   No   Have you ever had a serious reaction after receiving a vaccination?   No   Do you have a long-term health problem with heart, lung, kidney, or metabolic disease (e.g., diabetes), asthma, a blood disorder, no spleen, complement component deficiency, a cochlear implant, or a spinal fluid leak?  Are you on long-term aspirin therapy?   No   Do you have cancer, leukemia, HIV/AIDS, or any other immune system problem?   No   Do you have a parent, brother, or sister with an immune system problem?   No   In the past 3 months, have you taken medications that affect  your immune system, such as prednisone, other steroids, or anticancer drugs; drugs for the treatment of rheumatoid arthritis, Crohn s disease, or psoriasis; or have you had radiation treatments?   No   Have you had a seizure, or a brain or other nervous system problem?   No   During the past year, have you received a transfusion of blood or blood    products, or been given immune (gamma) globulin or antiviral drug?   No   For women: Are you pregnant or is there a chance you could become       pregnant during the next month?   No   Have you received any vaccinations in the past 4 weeks?   No     Immunization questionnaire answers were all negative.    I have reviewed the following standing orders:   This patient is due and qualifies for the Covid-19 vaccine.     Click here for COVID-19 Standing Order    I have reviewed the vaccines inclusion and exclusion criteria; No concerns regarding eligibility.     Patient instructed to remain in clinic for 15 minutes afterwards, and to report any adverse reactions.     Screening performed by April Brooks  CMA on 2/7/2024 at 9:02 AM.

## 2024-02-21 ENCOUNTER — LAB (OUTPATIENT)
Dept: LAB | Facility: CLINIC | Age: 54
End: 2024-02-21
Payer: COMMERCIAL

## 2024-02-21 DIAGNOSIS — Z94.0 KIDNEY REPLACED BY TRANSPLANT: ICD-10-CM

## 2024-02-21 DIAGNOSIS — Z98.890 OTHER SPECIFIED POSTPROCEDURAL STATES: ICD-10-CM

## 2024-02-21 DIAGNOSIS — Z94.83 PANCREAS REPLACED BY TRANSPLANT (H): ICD-10-CM

## 2024-02-21 LAB
AMYLASE SERPL-CCNC: 63 U/L (ref 28–100)
ANION GAP SERPL CALCULATED.3IONS-SCNC: 8 MMOL/L (ref 7–15)
BUN SERPL-MCNC: 25.3 MG/DL (ref 6–20)
CALCIUM SERPL-MCNC: 9.8 MG/DL (ref 8.6–10)
CHLORIDE SERPL-SCNC: 104 MMOL/L (ref 98–107)
CREAT SERPL-MCNC: 1.12 MG/DL (ref 0.51–0.95)
DEPRECATED HCO3 PLAS-SCNC: 26 MMOL/L (ref 22–29)
EGFRCR SERPLBLD CKD-EPI 2021: 58 ML/MIN/1.73M2
ERYTHROCYTE [DISTWIDTH] IN BLOOD BY AUTOMATED COUNT: 12.9 % (ref 10–15)
GLUCOSE SERPL-MCNC: 121 MG/DL (ref 70–99)
HCT VFR BLD AUTO: 41.8 % (ref 35–47)
HGB BLD-MCNC: 13.7 G/DL (ref 11.7–15.7)
LIPASE SERPL-CCNC: 43 U/L (ref 13–60)
MCH RBC QN AUTO: 30.1 PG (ref 26.5–33)
MCHC RBC AUTO-ENTMCNC: 32.8 G/DL (ref 31.5–36.5)
MCV RBC AUTO: 92 FL (ref 78–100)
PLATELET # BLD AUTO: 313 10E3/UL (ref 150–450)
POTASSIUM SERPL-SCNC: 5.1 MMOL/L (ref 3.4–5.3)
RBC # BLD AUTO: 4.55 10E6/UL (ref 3.8–5.2)
SODIUM SERPL-SCNC: 138 MMOL/L (ref 135–145)
TACROLIMUS BLD-MCNC: 8.6 UG/L (ref 5–15)
TME LAST DOSE: NORMAL H
TME LAST DOSE: NORMAL H
WBC # BLD AUTO: 5.9 10E3/UL (ref 4–11)

## 2024-02-21 PROCEDURE — 80197 ASSAY OF TACROLIMUS: CPT

## 2024-02-21 PROCEDURE — 86833 HLA CLASS II HIGH DEFIN QUAL: CPT

## 2024-02-21 PROCEDURE — 82150 ASSAY OF AMYLASE: CPT

## 2024-02-21 PROCEDURE — 36415 COLL VENOUS BLD VENIPUNCTURE: CPT

## 2024-02-21 PROCEDURE — 83690 ASSAY OF LIPASE: CPT

## 2024-02-21 PROCEDURE — 80048 BASIC METABOLIC PNL TOTAL CA: CPT

## 2024-02-21 PROCEDURE — 86832 HLA CLASS I HIGH DEFIN QUAL: CPT

## 2024-02-21 PROCEDURE — 85027 COMPLETE CBC AUTOMATED: CPT

## 2024-02-21 PROCEDURE — 87799 DETECT AGENT NOS DNA QUANT: CPT

## 2024-02-23 LAB
BK VIRUS SPECIMEN TYPE: NORMAL
BKV DNA # SPEC NAA+PROBE: NOT DETECTED IU/ML

## 2024-03-05 ENCOUNTER — HOSPITAL ENCOUNTER (OUTPATIENT)
Dept: RESEARCH | Facility: CLINIC | Age: 54
Discharge: HOME | End: 2024-03-05
Attending: INTERNAL MEDICINE | Admitting: INTERNAL MEDICINE
Payer: COMMERCIAL

## 2024-03-05 PROCEDURE — 510N000009 HC RESEARCH FACILITY, PER 15 MIN

## 2024-03-05 PROCEDURE — 510N000017 HC CRU PATIENT CARE, PER 15 MIN

## 2024-04-08 ENCOUNTER — NURSE TRIAGE (OUTPATIENT)
Dept: NURSING | Facility: CLINIC | Age: 54
End: 2024-04-08
Payer: COMMERCIAL

## 2024-04-08 DIAGNOSIS — Z94.83 PANCREAS REPLACED BY TRANSPLANT (H): ICD-10-CM

## 2024-04-08 DIAGNOSIS — Z94.0 KIDNEY REPLACED BY TRANSPLANT: Primary | ICD-10-CM

## 2024-04-08 RX ORDER — MYCOPHENOLIC ACID 180 MG/1
540 TABLET, DELAYED RELEASE ORAL 2 TIMES DAILY
Qty: 180 TABLET | Refills: 0 | Status: SHIPPED | OUTPATIENT
Start: 2024-04-08 | End: 2024-04-26

## 2024-04-09 ENCOUNTER — VIRTUAL VISIT (OUTPATIENT)
Dept: FAMILY MEDICINE | Facility: CLINIC | Age: 54
End: 2024-04-09
Payer: COMMERCIAL

## 2024-04-09 ENCOUNTER — LAB (OUTPATIENT)
Dept: LAB | Facility: CLINIC | Age: 54
End: 2024-04-09
Attending: PHYSICIAN ASSISTANT
Payer: COMMERCIAL

## 2024-04-09 DIAGNOSIS — R05.1 ACUTE COUGH: ICD-10-CM

## 2024-04-09 DIAGNOSIS — N18.4 STAGE 4 CHRONIC KIDNEY DISEASE (H): Primary | ICD-10-CM

## 2024-04-09 DIAGNOSIS — J02.9 SORE THROAT: ICD-10-CM

## 2024-04-09 DIAGNOSIS — E10.11 DIABETIC KETOACIDOSIS WITH COMA ASSOCIATED WITH TYPE 1 DIABETES MELLITUS (H): ICD-10-CM

## 2024-04-09 DIAGNOSIS — J10.1 INFLUENZA B: Primary | ICD-10-CM

## 2024-04-09 PROBLEM — R78.81 BACTEREMIA DUE TO METHICILLIN RESISTANT STAPHYLOCOCCUS AUREUS: Status: ACTIVE | Noted: 2021-04-06

## 2024-04-09 PROBLEM — I10 ESSENTIAL HYPERTENSION: Status: ACTIVE | Noted: 2019-12-24

## 2024-04-09 PROBLEM — E53.1 VITAMIN B6 DEFICIENCY: Status: ACTIVE | Noted: 2021-04-13

## 2024-04-09 PROBLEM — B95.62 BACTEREMIA DUE TO METHICILLIN RESISTANT STAPHYLOCOCCUS AUREUS: Status: ACTIVE | Noted: 2021-04-06

## 2024-04-09 PROBLEM — D50.9 IRON DEFICIENCY ANEMIA, UNSPECIFIED: Status: ACTIVE | Noted: 2020-04-18

## 2024-04-09 LAB
DEPRECATED S PYO AG THROAT QL EIA: NEGATIVE
FLUAV AG SPEC QL IA: NEGATIVE
FLUBV AG SPEC QL IA: POSITIVE
GROUP A STREP BY PCR: NOT DETECTED
SARS-COV-2 RNA RESP QL NAA+PROBE: NEGATIVE

## 2024-04-09 PROCEDURE — 87804 INFLUENZA ASSAY W/OPTIC: CPT | Performed by: PHYSICIAN ASSISTANT

## 2024-04-09 PROCEDURE — 99442 PR PHYSICIAN TELEPHONE EVALUATION 11-20 MIN: CPT | Performed by: PHYSICIAN ASSISTANT

## 2024-04-09 PROCEDURE — 87651 STREP A DNA AMP PROBE: CPT | Performed by: PHYSICIAN ASSISTANT

## 2024-04-09 PROCEDURE — 87635 SARS-COV-2 COVID-19 AMP PRB: CPT

## 2024-04-09 ASSESSMENT — ANXIETY QUESTIONNAIRES
7. FEELING AFRAID AS IF SOMETHING AWFUL MIGHT HAPPEN: NOT AT ALL
1. FEELING NERVOUS, ANXIOUS, OR ON EDGE: NOT AT ALL
2. NOT BEING ABLE TO STOP OR CONTROL WORRYING: NOT AT ALL
GAD7 TOTAL SCORE: 0
8. IF YOU CHECKED OFF ANY PROBLEMS, HOW DIFFICULT HAVE THESE MADE IT FOR YOU TO DO YOUR WORK, TAKE CARE OF THINGS AT HOME, OR GET ALONG WITH OTHER PEOPLE?: NOT DIFFICULT AT ALL
4. TROUBLE RELAXING: NOT AT ALL
6. BECOMING EASILY ANNOYED OR IRRITABLE: NOT AT ALL
3. WORRYING TOO MUCH ABOUT DIFFERENT THINGS: NOT AT ALL
5. BEING SO RESTLESS THAT IT IS HARD TO SIT STILL: NOT AT ALL
IF YOU CHECKED OFF ANY PROBLEMS ON THIS QUESTIONNAIRE, HOW DIFFICULT HAVE THESE PROBLEMS MADE IT FOR YOU TO DO YOUR WORK, TAKE CARE OF THINGS AT HOME, OR GET ALONG WITH OTHER PEOPLE: NOT DIFFICULT AT ALL
7. FEELING AFRAID AS IF SOMETHING AWFUL MIGHT HAPPEN: NOT AT ALL
GAD7 TOTAL SCORE: 0
GAD7 TOTAL SCORE: 0

## 2024-04-09 NOTE — PROGRESS NOTES
Hermila is a 53 year old who is being evaluated via a billable telephone visit.    What phone number would you like to be contacted at? [phone]   How would you like to obtain your AVS? Vivek  Originating Location (pt. Location): Home    Distant Location (provider location):  On-site    Assessment & Plan     Influenza B  Patient positive for influenza B. Discussed treatment with tamiflu today. Side effects discussed. Patient has declined starting medication. I recommend lots of fluids, rest, humidifier in bedroom, steam showers, otc fever relievers for fevers. Warning signs to go to ER educated to parent today. Symptoms should improve over the next 3-4 days. If symptoms do not improve or worsen please follow up sooner. Parent agree's with this plan and has no further questions      Sore throat  Strep negative  - Streptococcus A Rapid Screen w/Reflex to PCR - Clinic Collect  - Group A Streptococcus PCR Throat Swab    Acute cough  - Influenza A & B Antigen - Clinic Collect  - Asymptomatic COVID-19 Virus (Coronavirus) by PCR; Future              Subjective   Hermila is a 53 year old, presenting for the following health issues:  Mental Health Problem    History of Present Illness       Mental Health Follow-up:  Patient presents to follow-up on Depression & Anxiety.Patient's depression since last visit has been:  No change  The patient is not having other symptoms associated with depression.  Patient's anxiety since last visit has been:  No change  The patient is not having other symptoms associated with anxiety.  Any significant life events: No  Patient is not feeling anxious or having panic attacks.  Patient has no concerns about alcohol or drug use.    Hermila Hess eats 0-1 servings of fruits and vegetables daily.Hermila Hess consumes 0 sweetened beverage(s) daily.Hermila Hess exercises with enough effort to increase Hermila Hess's heart rate 9 or less minutes per day.  Hermila Hess exercises with enough effort  to increase Hermila Hess's heart rate 3 or less days per week.   Hermila Hess is taking medications regularly.     URI - Symptoms started 4 days ago. She is having a sore throat and cough. The symptoms have been worsening. It hurts to swallow. White spots in back of throat. No Chills, fever, or night sweats. Some body aches in her chest from coughing. The cough is productive. Sometime she has wheezing at night. No SOB or difficulties breathing. Some nasal congestion. No sinus pain. She has been taking tylenol and lozenges.   Has not done an at home COVID test. She was exposed to COVID.     Social History     Tobacco Use    Smoking status: Former     Types: Cigarettes     Quit date: 1997     Years since quittin.2    Smokeless tobacco: Never   Vaping Use    Vaping Use: Never used   Substance Use Topics    Alcohol use: Yes     Alcohol/week: 0.0 standard drinks of alcohol    Drug use: No         2017    12:32 PM 2019     8:15 AM   PHQ   PHQ-9 Total Score 5    Q9: Thoughts of better off dead/self-harm past 2 weeks Not at all    PHQ-9 External Data  6         2017    12:32 PM 2024    10:26 AM   ALBERTO-7 SCORE   Total Score  0 (minimal anxiety)   Total Score 0 0         Review of Systems  Constitutional, HEENT, cardiovascular, pulmonary, gi and gu systems are negative, except as otherwise noted.      Objective           Vitals:  No vitals were obtained today due to virtual visit.    Physical Exam   General: Alert and no distress //Respiratory: No audible wheeze, cough, or shortness of breath // Psychiatric:  Appropriate affect, tone, and pace of words      Results for orders placed or performed in visit on 24   Streptococcus A Rapid Screen w/Reflex to PCR - Clinic Collect     Status: Normal    Specimen: Throat; Swab   Result Value Ref Range    Group A Strep antigen Negative Negative   Influenza A & B Antigen - Clinic Collect     Status: Abnormal    Specimen: Nasopharyngeal; Swab   Result  Value Ref Range    Influenza A antigen Negative Negative    Influenza B antigen Positive (A) Negative    Narrative    Test results must be correlated with clinical data. If necessary, results should be confirmed by a molecular assay or viral culture.         Phone call duration: 11 minutes    Signed Electronically by: SHERMAN Gallagher

## 2024-04-09 NOTE — TELEPHONE ENCOUNTER
Call from patient who says she might strep and is asking to be tested and if positive then ordered an antibiotic.    Advised she will have to be seen. Options: VV or in person.    Patient was transferred to .      David John RN, BSN  Triage Nurse Advisor        Reason for Disposition   [1] Caller requesting NON-URGENT health information AND [2] PCP's office is the best resource    Protocols used: Information Only Call-A-

## 2024-04-26 DIAGNOSIS — Z94.83 PANCREAS REPLACED BY TRANSPLANT (H): ICD-10-CM

## 2024-04-26 DIAGNOSIS — Z94.0 KIDNEY REPLACED BY TRANSPLANT: Primary | ICD-10-CM

## 2024-04-26 RX ORDER — MYCOPHENOLIC ACID 180 MG/1
540 TABLET, DELAYED RELEASE ORAL 2 TIMES DAILY
Qty: 180 TABLET | Refills: 0 | Status: SHIPPED | OUTPATIENT
Start: 2024-04-26 | End: 2024-05-24

## 2024-05-23 DIAGNOSIS — Z94.83 PANCREAS REPLACED BY TRANSPLANT (H): ICD-10-CM

## 2024-05-23 DIAGNOSIS — Z94.0 KIDNEY REPLACED BY TRANSPLANT: ICD-10-CM

## 2024-05-24 DIAGNOSIS — Z94.83 PANCREAS REPLACED BY TRANSPLANT (H): Primary | ICD-10-CM

## 2024-05-24 RX ORDER — MYCOPHENOLIC ACID 180 MG/1
540 TABLET, DELAYED RELEASE ORAL 2 TIMES DAILY
Qty: 180 TABLET | Refills: 0 | Status: SHIPPED | OUTPATIENT
Start: 2024-05-24 | End: 2024-06-28

## 2024-05-31 ENCOUNTER — LAB (OUTPATIENT)
Dept: LAB | Facility: CLINIC | Age: 54
End: 2024-05-31
Payer: COMMERCIAL

## 2024-05-31 DIAGNOSIS — Z94.83 PANCREAS REPLACED BY TRANSPLANT (H): ICD-10-CM

## 2024-05-31 DIAGNOSIS — Z98.890 OTHER SPECIFIED POSTPROCEDURAL STATES: ICD-10-CM

## 2024-05-31 DIAGNOSIS — Z94.0 KIDNEY REPLACED BY TRANSPLANT: ICD-10-CM

## 2024-05-31 LAB
ERYTHROCYTE [DISTWIDTH] IN BLOOD BY AUTOMATED COUNT: 13.7 % (ref 10–15)
HCT VFR BLD AUTO: 41.8 % (ref 35–47)
HGB BLD-MCNC: 12.9 G/DL (ref 11.7–15.7)
MCH RBC QN AUTO: 29.7 PG (ref 26.5–33)
MCHC RBC AUTO-ENTMCNC: 30.9 G/DL (ref 31.5–36.5)
MCV RBC AUTO: 96 FL (ref 78–100)
PLATELET # BLD AUTO: 240 10E3/UL (ref 150–450)
RBC # BLD AUTO: 4.34 10E6/UL (ref 3.8–5.2)
TACROLIMUS BLD-MCNC: 9.2 UG/L (ref 5–15)
TME LAST DOSE: NORMAL H
TME LAST DOSE: NORMAL H
WBC # BLD AUTO: 4.8 10E3/UL (ref 4–11)

## 2024-05-31 PROCEDURE — 83690 ASSAY OF LIPASE: CPT

## 2024-05-31 PROCEDURE — 80048 BASIC METABOLIC PNL TOTAL CA: CPT

## 2024-05-31 PROCEDURE — 87799 DETECT AGENT NOS DNA QUANT: CPT

## 2024-05-31 PROCEDURE — 36415 COLL VENOUS BLD VENIPUNCTURE: CPT

## 2024-05-31 PROCEDURE — 82150 ASSAY OF AMYLASE: CPT

## 2024-05-31 PROCEDURE — 85027 COMPLETE CBC AUTOMATED: CPT

## 2024-05-31 PROCEDURE — 80197 ASSAY OF TACROLIMUS: CPT

## 2024-06-01 LAB
AMYLASE SERPL-CCNC: 57 U/L (ref 28–100)
ANION GAP SERPL CALCULATED.3IONS-SCNC: 10 MMOL/L (ref 7–15)
BUN SERPL-MCNC: 17.2 MG/DL (ref 6–20)
CALCIUM SERPL-MCNC: 9.5 MG/DL (ref 8.6–10)
CHLORIDE SERPL-SCNC: 105 MMOL/L (ref 98–107)
CREAT SERPL-MCNC: 1.04 MG/DL (ref 0.51–0.95)
DEPRECATED HCO3 PLAS-SCNC: 21 MMOL/L (ref 22–29)
EGFRCR SERPLBLD CKD-EPI 2021: 63 ML/MIN/1.73M2
GLUCOSE SERPL-MCNC: 103 MG/DL (ref 70–99)
LIPASE SERPL-CCNC: 38 U/L (ref 13–60)
POTASSIUM SERPL-SCNC: 4.8 MMOL/L (ref 3.4–5.3)
SODIUM SERPL-SCNC: 136 MMOL/L (ref 135–145)

## 2024-06-02 LAB
BK VIRUS SPECIMEN TYPE: NORMAL
BKV DNA # SPEC NAA+PROBE: NOT DETECTED IU/ML

## 2024-06-03 ENCOUNTER — TELEPHONE (OUTPATIENT)
Dept: TRANSPLANT | Facility: CLINIC | Age: 54
End: 2024-06-03
Payer: COMMERCIAL

## 2024-06-03 NOTE — TELEPHONE ENCOUNTER
ISSUE:   Tacrolimus IR level 9.2 on 5/31, goal 5-8, dose 0.5 mg BID.    PLAN:   Call Patient and confirm this was an accurate 12-hour trough.   Verify Tacrolimus IR dose 0.5 mg BID.   Confirm no new medications or or missed doses.   Confirm no new illness / infection / diarrhea.   If accurate trough and accurate dose, stay on the same dosee.     Repeat labs in 1-2 weeks.  *If > 50% change in immunosuppression dose, repeat labs in 1 week.     OUTCOME:   Spoke with Patient, they confirm this was 11 hour trough and current dose 0.5 mg BID.   Patient confirmed dose no dose change.  Patient agreed to repeat labs  Orders sent to preferred pharmacy for dose change and lab for repeat labs.   Patient voiced understanding of plan.

## 2024-06-24 ENCOUNTER — LAB (OUTPATIENT)
Dept: LAB | Facility: CLINIC | Age: 54
End: 2024-06-24
Payer: COMMERCIAL

## 2024-06-24 DIAGNOSIS — Z94.83 PANCREAS REPLACED BY TRANSPLANT (H): ICD-10-CM

## 2024-06-24 DIAGNOSIS — Z98.890 OTHER SPECIFIED POSTPROCEDURAL STATES: ICD-10-CM

## 2024-06-24 DIAGNOSIS — Z94.0 KIDNEY REPLACED BY TRANSPLANT: ICD-10-CM

## 2024-06-24 DIAGNOSIS — E10.11 DIABETIC KETOACIDOSIS WITH COMA ASSOCIATED WITH TYPE 1 DIABETES MELLITUS (H): ICD-10-CM

## 2024-06-24 DIAGNOSIS — N18.4 STAGE 4 CHRONIC KIDNEY DISEASE (H): ICD-10-CM

## 2024-06-24 LAB
AMYLASE SERPL-CCNC: 67 U/L (ref 28–100)
ANION GAP SERPL CALCULATED.3IONS-SCNC: 8 MMOL/L (ref 7–15)
BUN SERPL-MCNC: 21.4 MG/DL (ref 6–20)
CALCIUM SERPL-MCNC: 10.1 MG/DL (ref 8.6–10)
CHLORIDE SERPL-SCNC: 105 MMOL/L (ref 98–107)
CHOLEST SERPL-MCNC: 149 MG/DL
CREAT SERPL-MCNC: 1.08 MG/DL (ref 0.51–0.95)
CREAT UR-MCNC: 40 MG/DL
DEPRECATED HCO3 PLAS-SCNC: 26 MMOL/L (ref 22–29)
EGFRCR SERPLBLD CKD-EPI 2021: 60 ML/MIN/1.73M2
ERYTHROCYTE [DISTWIDTH] IN BLOOD BY AUTOMATED COUNT: 13.3 % (ref 10–15)
FASTING STATUS PATIENT QL REPORTED: NO
GLUCOSE SERPL-MCNC: 104 MG/DL (ref 70–99)
HBA1C MFR BLD: 5.4 % (ref 0–5.6)
HCT VFR BLD AUTO: 42.4 % (ref 35–47)
HDLC SERPL-MCNC: 82 MG/DL
HGB BLD-MCNC: 13.8 G/DL (ref 11.7–15.7)
LDLC SERPL CALC-MCNC: 47 MG/DL
LIPASE SERPL-CCNC: 40 U/L (ref 13–60)
MCH RBC QN AUTO: 30.5 PG (ref 26.5–33)
MCHC RBC AUTO-ENTMCNC: 32.5 G/DL (ref 31.5–36.5)
MCV RBC AUTO: 94 FL (ref 78–100)
MICROALBUMIN UR-MCNC: 20.3 MG/L
MICROALBUMIN/CREAT UR: 50.75 MG/G CR (ref 0–25)
NONHDLC SERPL-MCNC: 67 MG/DL
PLATELET # BLD AUTO: 297 10E3/UL (ref 150–450)
POTASSIUM SERPL-SCNC: 4.7 MMOL/L (ref 3.4–5.3)
RBC # BLD AUTO: 4.53 10E6/UL (ref 3.8–5.2)
SODIUM SERPL-SCNC: 139 MMOL/L (ref 135–145)
TACROLIMUS BLD-MCNC: 7.9 UG/L (ref 5–15)
TME LAST DOSE: NORMAL H
TME LAST DOSE: NORMAL H
TRIGL SERPL-MCNC: 99 MG/DL
TSH SERPL DL<=0.005 MIU/L-ACNC: 1.87 UIU/ML (ref 0.3–4.2)
WBC # BLD AUTO: 4.5 10E3/UL (ref 4–11)

## 2024-06-24 PROCEDURE — 80061 LIPID PANEL: CPT

## 2024-06-24 PROCEDURE — 82043 UR ALBUMIN QUANTITATIVE: CPT

## 2024-06-24 PROCEDURE — 83036 HEMOGLOBIN GLYCOSYLATED A1C: CPT

## 2024-06-24 PROCEDURE — 82570 ASSAY OF URINE CREATININE: CPT

## 2024-06-24 PROCEDURE — 82150 ASSAY OF AMYLASE: CPT

## 2024-06-24 PROCEDURE — 80048 BASIC METABOLIC PNL TOTAL CA: CPT

## 2024-06-24 PROCEDURE — 85027 COMPLETE CBC AUTOMATED: CPT

## 2024-06-24 PROCEDURE — 80197 ASSAY OF TACROLIMUS: CPT

## 2024-06-24 PROCEDURE — 83690 ASSAY OF LIPASE: CPT

## 2024-06-24 PROCEDURE — 87799 DETECT AGENT NOS DNA QUANT: CPT

## 2024-06-24 PROCEDURE — 36415 COLL VENOUS BLD VENIPUNCTURE: CPT

## 2024-06-24 PROCEDURE — 84443 ASSAY THYROID STIM HORMONE: CPT

## 2024-06-25 ENCOUNTER — OFFICE VISIT (OUTPATIENT)
Dept: TRANSPLANT | Facility: CLINIC | Age: 54
End: 2024-06-25
Attending: INTERNAL MEDICINE
Payer: COMMERCIAL

## 2024-06-25 VITALS
HEART RATE: 72 BPM | SYSTOLIC BLOOD PRESSURE: 151 MMHG | WEIGHT: 183.6 LBS | OXYGEN SATURATION: 99 % | DIASTOLIC BLOOD PRESSURE: 75 MMHG | TEMPERATURE: 98.3 F | BODY MASS INDEX: 34.66 KG/M2 | HEIGHT: 61 IN

## 2024-06-25 DIAGNOSIS — Z94.83 PANCREAS REPLACED BY TRANSPLANT (H): ICD-10-CM

## 2024-06-25 LAB
BK VIRUS SPECIMEN TYPE: NORMAL
BKV DNA # SPEC NAA+PROBE: NOT DETECTED IU/ML

## 2024-06-25 PROCEDURE — 99214 OFFICE O/P EST MOD 30 MIN: CPT | Performed by: NURSE PRACTITIONER

## 2024-06-25 PROCEDURE — 99213 OFFICE O/P EST LOW 20 MIN: CPT | Performed by: NURSE PRACTITIONER

## 2024-06-25 RX ORDER — ASPIRIN 81 MG/1
81 TABLET ORAL DAILY
COMMUNITY

## 2024-06-25 NOTE — NURSING NOTE
"Chief Complaint   Patient presents with    Follow Up     Kidney pancreas transplant follow-up     Vital signs:  Temp: 98.3  F (36.8  C) Temp src: Oral BP: (!) 151/75 Pulse: 72     SpO2: 99 %     Height: 156 cm (5' 1.42\") Weight: 83.3 kg (183 lb 9.6 oz)  Estimated body mass index is 34.22 kg/m  as calculated from the following:    Height as of this encounter: 1.56 m (5' 1.42\").    Weight as of this encounter: 83.3 kg (183 lb 9.6 oz).      Cornelio Cruz RN on 6/25/2024 at 2:23 PM    "

## 2024-06-25 NOTE — PROGRESS NOTES
TRANSPLANT NEPHROLOGY EARLY POST TRANSPLANT VISIT    Recommendations:  - consider weight loss management referral - patient wants to think about it  - stop calcium carbonate/vitamin D supp  - start checking blood pressures at home more frequently     Assessment & Plan   # DDKT (SPK): Stable   - Baseline Creatinine: ~ 1.1-1.3; tyree-0.9   - Proteinuria: Normal (<0.2 grams)   - Date DSA Last Checked: /2023      Latest DSA: No, due for repeat now at 13 month   - BK Viremia: No, due for repeat now   - Kidney Tx Biopsy: No    # Pancreas Tx (SPK):    - Pancreatic Exocrine Drainage: Enteric drained     - Blood glucose: Near euglycemia      On insulin: No   - HbA1c: Stable      Latest HbA1c: 5.4%   - Pancreatic enzymes: Stable   - Date DSA Last Checked: May/2023  Latest DSA: No   - Pancreas Tx Biopsy: No    # Immunosuppression: Tacrolimus immediate release (goal 5-8) and Mycophenolic acid (dose 540 mg every 12 hours)   - Patient is in an immunosuppressed state and will continue to monitor for efficacy and toxicity of immunosuppression medications.   - Changes: No, last level 7.9 on 6/24.     # Infection Prophylaxis:   Last CD4 Level: 147 (Apr/2023)  - PJP: Sulfa/TMP (Bactrim)    # Hypertension: acceptable control;  Goal BP: < 130/80    - Volume status: euvolemic;     - Changes: no, cont carvedilol 12.5 mg BID     Home BPs: not checking at home     # Anemia in Chronic Renal Disease: Hgb: Stable      SHANEKA: No   - Iron studies: Not checked recently    # Mineral Bone Disorder:   - Vitamin D; level: Normal        On supplement: Yes  - Calcium; level: High         On supplement: Yes, can stop calcium carbonate/vitamin D supp    # Electrolytes:   - Potassium; level: Normal        On supplement: No  - Magnesium; level: Low        On supplement: No  - Bicarbonate; level: Low        On supplement: no, if remains low on next check start sodium bicarb 650 mg po bid      # Chronic Loose Stools: Stable symptoms at patient's baseline.  on  fiber.    #Obesity: has had some weight gain since last visit, up 14 lbs with a current BMI of 34. Offered weight loss management, but patient would like to think about it.     # Skin Cancer: New lesions: none   - Discussed sun protection and recommend regular follow up with Dermatology.     # Transplant History:  Etiology of Kidney Failure: Diabetes mellitus type 1  Tx: DDKT (SPK)  Transplant: 9/23/2022 (Kidney / Pancreas)  Significant changes in immunosuppression: None  Significant transplant-related complications: None    Transplant Office Phone Number: [phone]    Return visit: No follow-ups on file.    MELINDA Wiggins CNP    Chief Complaint     Deshawn is a 53 year old here for kidney transplant, pancreas transplant, and immunosuppression management.     History of Present Illness   Since she was last seen by Dr. Salo De Jesus in 102023, she denies any illnesses, admits, ED visits. Overall, has been feeling well. Complains on chronic stable loose stools. No nausea or vomiting. Has had some weight gain since last visit, up 14 lbs with a current BMI of 34.  No fevers, sweats, chills. Energy is OK. Appetite is good. No chest pains or shortness of breath.       Current IS FK 0.5/0,5 /540  Ppx: bactrim  Vaccines COVID FLU RSV  Home BP: Not checked    Problem List   Patient Active Problem List   Diagnosis    Diabetes mellitus type 1 (H)    Anemia in stage 3a chronic kidney disease (H)    TIA (transient ischemic attack)    HTN, kidney transplant related    Anxiety and depression    (HFpEF) heart failure with preserved ejection fraction (H)    Pancreas replaced by transplant (H)    Kidney replaced by transplant    Immunosuppression (H24)    Aftercare following organ transplant    Chronic kidney disease, stage 3a (H)    Vitamin D deficiency    Secondary renal hyperparathyroidism (H24)    Hyperkalemia    Clostridium difficile infection    Cellulitis of right upper extremity    Hyperlipidemia    Diabetic  ketoacidosis with coma associated with type 1 diabetes mellitus (H)    Essential hypertension    Iron deficiency anemia, unspecified    Moderate episode of recurrent major depressive disorder (H)    Stage 4 chronic kidney disease (H)    Vitamin B6 deficiency    Bacteremia due to methicillin resistant Staphylococcus aureus    Nephrotic syndrome in diseases classified elsewhere       Allergies   Allergies   Allergen Reactions    Amlodipine      Other reaction(s): Edema,generalized       Medications   Current Outpatient Medications   Medication Sig Dispense Refill    atorvastatin (LIPITOR) 20 MG tablet Take 1 tablet (20 mg) by mouth every evening 30 tablet 11    calcium carbonate-vitamin D (OSCAL) 500-5 MG-MCG tablet Take 1 tablet by mouth 2 times daily (with meals) (Patient not taking: Reported on 4/9/2024) 60 tablet 0    carvedilol (COREG) 12.5 MG tablet Take 1 tablet (12.5 mg) by mouth 2 times daily (with meals) 60 tablet 11    FIBER ADULT GUMMIES PO Take 1 Gum by mouth daily      mycophenolic acid (GENERIC EQUIVALENT) 180 MG EC tablet TAKE 3 TABLETS (540 MG) BY MOUTH 2 TIMES DAILY 180 tablet 0    sulfamethoxazole-trimethoprim (BACTRIM) 400-80 MG tablet Take 1 tablet by mouth three times a week (Patient not taking: Reported on 4/9/2024) 45 tablet 3    tacrolimus (GENERIC) 0.5 MG capsule Take 1 capsule (0.5 mg) by mouth 2 times daily 180 capsule 3    venlafaxine (EFFEXOR XR) 150 MG 24 hr capsule Take 1 capsule (150 mg) by mouth daily 30 capsule 0     No current facility-administered medications for this visit.     There are no discontinued medications.    Physical Exam   Vital Signs: There were no vitals taken for this visit.    GENERAL APPEARANCE: alert and no distress  HENT: mouth without ulcers or lesions  RESP: lungs clear to auscultation - no rales, rhonchi or wheezes  CV: regular rhythm, normal rate, no rub, no murmur  EDEMA: no LE edema bilaterally  ABDOMEN: soft, nondistended, nontender, bowel sounds  normal  MS: extremities normal - no gross deformities noted, no evidence of inflammation in joints, no muscle tenderness  SKIN: no rash  Access RUE AVF +thrill/bruit    Data         Latest Ref Rng & Units 6/24/2024     9:59 AM 5/31/2024    10:55 AM 2/21/2024    10:42 AM   Renal   Sodium 135 - 145 mmol/L 139  136  138    K 3.4 - 5.3 mmol/L 4.7  4.8  5.1    Cl 98 - 107 mmol/L 105  105  104    Cl (external) 98 - 107 mmol/L 105  105  104    CO2 22 - 29 mmol/L 26  21  26    Urea Nitrogen 6.0 - 20.0 mg/dL 21.4  17.2  25.3    Creatinine 0.51 - 0.95 mg/dL 1.08  1.04  1.12    Glucose 70 - 99 mg/dL 104  103  121    Calcium 8.6 - 10.0 mg/dL 10.1  9.5  9.8          Latest Ref Rng & Units 7/25/2023    10:17 AM 5/11/2023    10:57 AM 3/1/2023     9:07 AM   Bone Health   Phosphorus 2.5 - 4.5 mg/dL 3.9  3.4  4.4          Latest Ref Rng & Units 6/24/2024     5:18 PM 5/31/2024    10:55 AM 2/21/2024    10:42 AM   Heme   WBC 4.0 - 11.0 10e3/uL 4.5  4.8  5.9    Hgb 11.7 - 15.7 g/dL 13.8  12.9  13.7    Plt 150 - 450 10e3/uL 297  240  313          Latest Ref Rng & Units 8/18/2023     5:50 AM 4/3/2023    10:01 AM 10/13/2022    11:02 AM   Liver   AP 35 - 104 U/L 77  91  161    TBili <=1.2 mg/dL 0.5  0.4  0.3    Bilirubin Direct 0.0 - 0.2 mg/dL   <0.1    Bilirubin Direct 0.00 - 0.30 mg/dL  <0.20     ALT 0 - 50 U/L 12  48  40    AST 0 - 45 U/L 16  51  21    Tot Protein 6.4 - 8.3 g/dL 6.1  6.9  6.6    Albumin 3.4 - 5.0 g/dL   3.3    Albumin 3.5 - 5.2 g/dL 3.6  4.2           Latest Ref Rng & Units 6/24/2024     9:59 AM 6/24/2024     9:58 AM 5/31/2024    10:55 AM   Pancreas   A1C 0.0 - 5.6 %  5.4     Amylase 28 - 100 U/L 67   57    Lipase (Roche) 13 - 60 U/L 40   38          Latest Ref Rng & Units 10/3/2022     8:09 AM   Iron studies   Iron 37 - 145 ug/dL 34    Iron Sat Index 15 - 46 % 17    Ferritin 11 - 328 ng/mL 923          Latest Ref Rng & Units 2/22/2023    10:03 AM 11/28/2022     8:30 AM 9/22/2022     4:29 PM   [facility] T[facility] Virology   CMV QUANT  IU/ML Not Detected IU/mL Not Detected  Not Detected  Not Detected    EBV CAPSID ANTIBODY IGG No detectable antibody.   Positive        Recent Labs   Lab Test 01/26/24  1100 02/21/24  1042 05/31/24  1055 06/24/24  0959   DOSTAC 1/25/2024 2/20/2024 5/31/2024  --    TACROL 8.0 8.6 9.2 7.9     Recent Labs   Lab Test 07/25/23  1017 08/14/23  0958 09/06/23  1029   DOSMPA 7/24/2023  10:00 PM 8/13/2023  10:00 PM  --    MPACID 3.71* 1.28 1.73   MPAG 69.0 50.5 44.4

## 2024-06-25 NOTE — LETTER
6/25/2024      Eden Hess  [address]  [place] [address]      Dear Colleague,    Thank you for referring your patient, Eden Hess, to the [facility]. Please see a copy of my visit note below.    TRANSPLANT NEPHROLOGY EARLY POST TRANSPLANT VISIT    Recommendations:  - consider weight loss management referral - patient wants to think about it  - stop calcium carbonate/vitamin D supp  - start checking blood pressures at home more frequently     Assessment & Plan  # DDKT (SPK): Stable   - Baseline Creatinine: ~ 1.1-1.3; tyree-0.9   - Proteinuria: Normal (<0.2 grams)   - Date DSA Last Checked: /2023      Latest DSA: No, due for repeat now at 13 month   - BK Viremia: No, due for repeat now   - Kidney Tx Biopsy: No    # Pancreas Tx (SPK):    - Pancreatic Exocrine Drainage: Enteric drained     - Blood glucose: Near euglycemia      On insulin: No   - HbA1c: Stable      Latest HbA1c: 5.4%   - Pancreatic enzymes: Stable   - Date DSA Last Checked: May/2023  Latest DSA: No   - Pancreas Tx Biopsy: No    # Immunosuppression: Tacrolimus immediate release (goal 5-8) and Mycophenolic acid (dose 540 mg every 12 hours)   - Patient is in an immunosuppressed state and will continue to monitor for efficacy and toxicity of immunosuppression medications.   - Changes: No, last level 7.9 on 6/24.     # Infection Prophylaxis:   Last CD4 Level: 147 (Apr/2023)  - PJP: Sulfa/TMP (Bactrim)    # Hypertension: acceptable control;  Goal BP: < 130/80    - Volume status: euvolemic;     - Changes: no, cont carvedilol 12.5 mg BID     Home BPs: not checking at home     # Anemia in Chronic Renal Disease: Hgb: Stable      SHANEKA: No   - Iron studies: Not checked recently    # Mineral Bone Disorder:   - Vitamin D; level: Normal        On supplement: Yes  - Calcium; level: High         On supplement: Yes, can stop calcium carbonate/vitamin D supp    # Electrolytes:   - Potassium; level: Normal        On  supplement: No  - Magnesium; level: Low        On supplement: No  - Bicarbonate; level: Low        On supplement: no, if remains low on next check start sodium bicarb 650 mg po bid      # Chronic Loose Stools: Stable symptoms at patient's baseline.  on fiber.    #Obesity: has had some weight gain since last visit, up 14 lbs with a current BMI of 34. Offered weight loss management, but patient would like to think about it.     # Skin Cancer: New lesions: none   - Discussed sun protection and recommend regular follow up with Dermatology.     # Transplant History:  Etiology of Kidney Failure: Diabetes mellitus type 1  Tx: DDKT (SPK)  Transplant: 9/23/2022 (Kidney / Pancreas)  Significant changes in immunosuppression: None  Significant transplant-related complications: None    Transplant Office Phone Number: [phone]    Return visit: No follow-ups on file.    MELINDA Wiggins CNP    Chief Complaint    Deshawn is a 53 year old here for kidney transplant, pancreas transplant, and immunosuppression management.     History of Present Illness  Since she was last seen by Dr. Salo De Jesus in 102023, she denies any illnesses, admits, ED visits. Overall, has been feeling well. Complains on chronic stable loose stools. No nausea or vomiting. Has had some weight gain since last visit, up 14 lbs with a current BMI of 34.  No fevers, sweats, chills. Energy is OK. Appetite is good. No chest pains or shortness of breath.       Current IS FK 0.5/0,5 /540  Ppx: bactrim  Vaccines COVID FLU RSV  Home BP: Not checked    Problem List  Patient Active Problem List   Diagnosis     Diabetes mellitus type 1 (H)     Anemia in stage 3a chronic kidney disease (H)     TIA (transient ischemic attack)     HTN, kidney transplant related     Anxiety and depression     (HFpEF) heart failure with preserved ejection fraction (H)     Pancreas replaced by transplant (H)     Kidney replaced by transplant     Immunosuppression (H24)     Aftercare  following organ transplant     Chronic kidney disease, stage 3a (H)     Vitamin D deficiency     Secondary renal hyperparathyroidism (H24)     Hyperkalemia     Clostridium difficile infection     Cellulitis of right upper extremity     Hyperlipidemia     Diabetic ketoacidosis with coma associated with type 1 diabetes mellitus (H)     Essential hypertension     Iron deficiency anemia, unspecified     Moderate episode of recurrent major depressive disorder (H)     Stage 4 chronic kidney disease (H)     Vitamin B6 deficiency     Bacteremia due to methicillin resistant Staphylococcus aureus     Nephrotic syndrome in diseases classified elsewhere       Allergies  Allergies   Allergen Reactions     Amlodipine      Other reaction(s): Edema,generalized       Medications  Current Outpatient Medications   Medication Sig Dispense Refill     atorvastatin (LIPITOR) 20 MG tablet Take 1 tablet (20 mg) by mouth every evening 30 tablet 11     calcium carbonate-vitamin D (OSCAL) 500-5 MG-MCG tablet Take 1 tablet by mouth 2 times daily (with meals) (Patient not taking: Reported on 4/9/2024) 60 tablet 0     carvedilol (COREG) 12.5 MG tablet Take 1 tablet (12.5 mg) by mouth 2 times daily (with meals) 60 tablet 11     FIBER ADULT GUMMIES PO Take 1 Gum by mouth daily       mycophenolic acid (GENERIC EQUIVALENT) 180 MG EC tablet TAKE 3 TABLETS (540 MG) BY MOUTH 2 TIMES DAILY 180 tablet 0     sulfamethoxazole-trimethoprim (BACTRIM) 400-80 MG tablet Take 1 tablet by mouth three times a week (Patient not taking: Reported on 4/9/2024) 45 tablet 3     tacrolimus (GENERIC) 0.5 MG capsule Take 1 capsule (0.5 mg) by mouth 2 times daily 180 capsule 3     venlafaxine (EFFEXOR XR) 150 MG 24 hr capsule Take 1 capsule (150 mg) by mouth daily 30 capsule 0     No current facility-administered medications for this visit.     There are no discontinued medications.    Physical Exam  Vital Signs: There were no vitals taken for this visit.    GENERAL  APPEARANCE: alert and no distress  HENT: mouth without ulcers or lesions  RESP: lungs clear to auscultation - no rales, rhonchi or wheezes  CV: regular rhythm, normal rate, no rub, no murmur  EDEMA: no LE edema bilaterally  ABDOMEN: soft, nondistended, nontender, bowel sounds normal  MS: extremities normal - no gross deformities noted, no evidence of inflammation in joints, no muscle tenderness  SKIN: no rash  Access RUE AVF +thrill/bruit    Data        Latest Ref Rng & Units 6/24/2024     9:59 AM 5/31/2024    10:55 AM 2/21/2024    10:42 AM   Renal   Sodium 135 - 145 mmol/L 139  136  138    K 3.4 - 5.3 mmol/L 4.7  4.8  5.1    Cl 98 - 107 mmol/L 105  105  104    Cl (external) 98 - 107 mmol/L 105  105  104    CO2 22 - 29 mmol/L 26  21  26    Urea Nitrogen 6.0 - 20.0 mg/dL 21.4  17.2  25.3    Creatinine 0.51 - 0.95 mg/dL 1.08  1.04  1.12    Glucose 70 - 99 mg/dL 104  103  121    Calcium 8.6 - 10.0 mg/dL 10.1  9.5  9.8          Latest Ref Rng & Units 7/25/2023    10:17 AM 5/11/2023    10:57 AM 3/1/2023     9:07 AM   Bone Health   Phosphorus 2.5 - 4.5 mg/dL 3.9  3.4  4.4          Latest Ref Rng & Units 6/24/2024     5:18 PM 5/31/2024    10:55 AM 2/21/2024    10:42 AM   Heme   WBC 4.0 - 11.0 10e3/uL 4.5  4.8  5.9    Hgb 11.7 - 15.7 g/dL 13.8  12.9  13.7    Plt 150 - 450 10e3/uL 297  240  313          Latest Ref Rng & Units 8/18/2023     5:50 AM 4/3/2023    10:01 AM 10/13/2022    11:02 AM   Liver   AP 35 - 104 U/L 77  91  161    TBili <=1.2 mg/dL 0.5  0.4  0.3    Bilirubin Direct 0.0 - 0.2 mg/dL   <0.1    Bilirubin Direct 0.00 - 0.30 mg/dL  <0.20     ALT 0 - 50 U/L 12  48  40    AST 0 - 45 U/L 16  51  21    Tot Protein 6.4 - 8.3 g/dL 6.1  6.9  6.6    Albumin 3.4 - 5.0 g/dL   3.3    Albumin 3.5 - 5.2 g/dL 3.6  4.2           Latest Ref Rng & Units 6/24/2024     9:59 AM 6/24/2024     9:58 AM 5/31/2024    10:55 AM   Pancreas   A1C 0.0 - 5.6 %  5.4     Amylase 28 - 100 U/L 67   57    Lipase (Roche) 13 - 60 U/L 40   38           Latest Ref Rng & Units 10/3/2022     8:09 AM   Iron studies   Iron 37 - 145 ug/dL 34    Iron Sat Index 15 - 46 % 17    Ferritin 11 - 328 ng/mL 923          Latest Ref Rng & Units 2/22/2023    10:03 AM 11/28/2022     8:30 AM 9/22/2022     4:29 PM   [facility] Txp Virology   CMV QUANT IU/ML Not Detected IU/mL Not Detected  Not Detected  Not Detected    EBV CAPSID ANTIBODY IGG No detectable antibody.   Positive        Recent Labs   Lab Test 01/26/24  1100 02/21/24  1042 05/31/24  1055 06/24/24  0959   DOSTAC 1/25/2024 2/20/2024 5/31/2024  --    TACROL 8.0 8.6 9.2 7.9     Recent Labs   Lab Test 07/25/23  1017 08/14/23  0958 09/06/23  1029   DOSMPA 7/24/2023  10:00 PM 8/13/2023  10:00 PM  --    MPACID 3.71* 1.28 1.73   MPAG 69.0 50.5 44.4         Again, thank you for allowing me to participate in the care of your patient.        Sincerely,        Chris Grant, MELINDA CNP

## 2024-06-27 DIAGNOSIS — Z94.0 KIDNEY REPLACED BY TRANSPLANT: Primary | ICD-10-CM

## 2024-06-27 DIAGNOSIS — Z94.83 PANCREAS REPLACED BY TRANSPLANT (H): ICD-10-CM

## 2024-06-27 RX ORDER — SULFAMETHOXAZOLE AND TRIMETHOPRIM 400; 80 MG/1; MG/1
1 TABLET ORAL
Qty: 45 TABLET | Refills: 3 | Status: SHIPPED | OUTPATIENT
Start: 2024-06-28

## 2024-06-28 ENCOUNTER — TELEPHONE (OUTPATIENT)
Dept: INFUSION THERAPY | Facility: CLINIC | Age: 54
End: 2024-06-28
Payer: COMMERCIAL

## 2024-06-28 DIAGNOSIS — Z94.83 PANCREAS REPLACED BY TRANSPLANT (H): Primary | ICD-10-CM

## 2024-06-28 DIAGNOSIS — Z94.0 KIDNEY REPLACED BY TRANSPLANT: ICD-10-CM

## 2024-06-28 DIAGNOSIS — I10 PRIMARY HYPERTENSION: ICD-10-CM

## 2024-06-28 RX ORDER — MYCOPHENOLIC ACID 180 MG/1
540 TABLET, DELAYED RELEASE ORAL 2 TIMES DAILY
Qty: 180 TABLET | Refills: 11 | Status: SHIPPED | OUTPATIENT
Start: 2024-06-28 | End: 2024-09-18

## 2024-06-29 ENCOUNTER — OFFICE VISIT (OUTPATIENT)
Dept: URGENT CARE | Facility: URGENT CARE | Age: 54
End: 2024-06-29
Payer: COMMERCIAL

## 2024-06-29 VITALS
DIASTOLIC BLOOD PRESSURE: 72 MMHG | SYSTOLIC BLOOD PRESSURE: 140 MMHG | OXYGEN SATURATION: 96 % | TEMPERATURE: 97.1 F | HEART RATE: 70 BPM | RESPIRATION RATE: 20 BRPM

## 2024-06-29 DIAGNOSIS — H81.13 BPV (BENIGN POSITIONAL VERTIGO), BILATERAL: Primary | ICD-10-CM

## 2024-06-29 PROCEDURE — 99213 OFFICE O/P EST LOW 20 MIN: CPT | Performed by: FAMILY MEDICINE

## 2024-06-29 RX ORDER — MECLIZINE HYDROCHLORIDE 25 MG/1
25 TABLET ORAL 3 TIMES DAILY PRN
Qty: 30 TABLET | Refills: 0 | Status: SHIPPED | OUTPATIENT
Start: 2024-06-29

## 2024-06-29 NOTE — PROGRESS NOTES
Assessment & Plan     (H81.13) BPV (benign positional vertigo), bilateral  (primary encounter diagnosis)  Comment:   Plan: meclizine (ANTIVERT) 25 MG tablet        Advised patient on supportive care, increase fluid intake.  Advised patient with daily maneuvers and exercise.  Information, provided.    Advised patient to follow-up with her primary care physician if symptoms worsen.       Yoana Lafleur MD  [facility] [place]    Subjective     Hermila is a 53 year old female who presents to clinic today for the following health issues:  Chief Complaint   Patient presents with    Urgent Care     Dizziness and nauseous that started this morning.   (Denies chest pain, back pain, sob, etc).      Patient reports yesterday and morning and this morning she had an episode of dizziness, she felt spinning, after she changed her position.  Stomach upset.  No vomiting.  She has no history of fever or chills.  No changes in vision  Yesterday morning she has an episode that was brief.  This morning lasted longer, she felt dizzy, spinning after she changes her position.    As she has no headache, no fever, no recent sickness.  No change in her vision.  No chest pain no shortness of breath,  No heart palpitation.      Review of Systems  Constitutional, HEENT, cardiovascular, pulmonary, GI, , musculoskeletal, neuro, skin, endocrine and psych systems are negative, except as otherwise noted.      Objective    BP (!) 150/75   Pulse 70   Temp 97.1  F (36.2  C) (Tympanic)   Resp 20   SpO2 96%   Physical Exam   GENERAL: alert and no distress  EYES: Eyes grossly normal to inspection, PERRL and conjunctivae and sclerae normal  HENT: ear canals and TM's normal, nose and mouth without ulcers or lesions  NECK: no adenopathy, no asymmetry, masses, or scars  RESP: lungs clear to auscultation - no rales, rhonchi or wheezes  CV: regular rate and rhythm, normal S1 S2, no S3 or S4, no murmur, click or rub, no peripheral  edema  ABDOMEN: soft, nontender, no hepatosplenomegaly, no masses and bowel sounds normal  MS: no gross musculoskeletal defects noted, no edema  NEURO: Normal strength and tone, mentation intact and speech normal

## 2024-07-09 ENCOUNTER — PATIENT OUTREACH (OUTPATIENT)
Dept: FAMILY MEDICINE | Facility: CLINIC | Age: 54
End: 2024-07-09
Payer: COMMERCIAL

## 2024-07-09 NOTE — LETTER
Dear Eden,    We care about your health and have reviewed your health plan. We have reviewed your medical conditions, medication list, and lab results and are making recommendations based on this review, to better manage your health.    You are in particular need of attention regarding:  - Depression  - Diabetes Care   - Diabetes Care - Eye Exam. If this has been done within the past 12 months, please respond with the date of exam, where it was done, and the result. If you would like to schedule an appointment with [facility], please call: [phone].  - Scheduling an Annual Physical / Wellness Visit with your primary care provider    Here is a list of Health Maintenance topics that are due now or due soon:  Health Maintenance Due   Topic Date Due    HF ACTION PLAN  Never done    DIABETIC FOOT EXAM  Never done    ADVANCE CARE PLANNING  Never done    DEPRESSION ACTION PLAN  Never done    EYE EXAM  Never done    ZOSTER IMMUNIZATION (1 of 2) Never done    HEPATITIS A IMMUNIZATION (1 of 2 - Risk 2-dose series) Never done    MEDICARE ANNUAL WELLNESS VISIT  01/16/2010    PHQ-9  03/22/2021    HEPATITIS B IMMUNIZATION (5 of 5 - Risk Dialysis 4-dose series) 10/19/2021    PAP  03/03/2023    COVID-19 Vaccine (5 - 2023-24 season) 04/03/2024    MAMMO SCREENING  06/01/2024       Please call us at [phone] (or use [facility]) to address the above recommendations.     Thank you for trusting [facility] with your healthcare needs. We appreciate the opportunity to serve you and look forward to supporting you in the future.    Healthy Regards,    Your Care Team

## 2024-07-09 NOTE — TELEPHONE ENCOUNTER
Patient Quality Outreach    Patient is due for the following:   Diabetes -  Eye Exam and Foot Exam  Depression  -  PHQ-9 needed and Depression follow-up visit  Physical Annual Wellness Visit      Topic Date Due    Zoster (Shingles) Vaccine (1 of 2) Never done    Hepatitis A Vaccine (1 of 2 - Risk 2-dose series) Never done    Hepatitis B Vaccine (5 of 5 - Risk Dialysis 4-dose series) 10/19/2021    COVID-19 Vaccine (5 - 2023-24 season) 04/03/2024       Next Steps:   Schedule a Annual Wellness Visit    Type of outreach:    Sent letter.      Questions for provider review:    None         [id number], MEDICAL ASSISTANT   Closing Encounter

## 2024-09-08 ENCOUNTER — HEALTH MAINTENANCE LETTER (OUTPATIENT)
Age: 54
End: 2024-09-08

## 2024-09-17 ENCOUNTER — TELEPHONE (OUTPATIENT)
Dept: INFUSION THERAPY | Facility: CLINIC | Age: 54
End: 2024-09-17
Payer: COMMERCIAL

## 2024-09-17 ENCOUNTER — TELEPHONE (OUTPATIENT)
Dept: TRANSPLANT | Facility: CLINIC | Age: 54
End: 2024-09-17
Payer: COMMERCIAL

## 2024-09-17 DIAGNOSIS — Z94.0 KIDNEY REPLACED BY TRANSPLANT: Primary | ICD-10-CM

## 2024-09-17 NOTE — TELEPHONE ENCOUNTER
[facility] Prior Authorization Team   Phone: [phone]  Fax: [phone]    PA Initiation    Medication: MYCOPHENOLIC ACID (GENERIC EQUIV) 180 MG PO TBEC  Insurance Company: [employer] - Phone [phone] Fax [phone]  Pharmacy Filling the Rx: [facility] - [place], [place] - [facility] [address]  Filling Pharmacy Phone: [phone]  Filling Pharmacy Fax: [phone]  Start Date: 9/17/2024

## 2024-09-17 NOTE — TELEPHONE ENCOUNTER
Prior Authorization Approval    Medication: MYCOPHENOLIC ACID (GENERIC EQUIV) 180 MG PO TBEC  Authorization Effective Date: 9/17/2024  Authorization Expiration Date: 9/17/2099  Approved Dose/Quantity: 180 per 30 days  Reference #:     Insurance Company: [employer] - Phone [phone] Fax [phone]  Expected CoPay: $ 25  CoPay Card Available: No    Financial Assistance Needed: No  Which Pharmacy is filling the prescription: [facility] - [place], [place] - [address]  Pharmacy Notified: Yes  Patient Notified: Yes

## 2024-09-18 ENCOUNTER — TELEPHONE (OUTPATIENT)
Dept: TRANSPLANT | Facility: CLINIC | Age: 54
End: 2024-09-18
Payer: COMMERCIAL

## 2024-09-18 DIAGNOSIS — Z94.0 KIDNEY REPLACED BY TRANSPLANT: ICD-10-CM

## 2024-09-18 DIAGNOSIS — Z94.83 PANCREAS REPLACED BY TRANSPLANT (H): ICD-10-CM

## 2024-09-18 RX ORDER — MYCOPHENOLIC ACID 180 MG/1
540 TABLET, DELAYED RELEASE ORAL 2 TIMES DAILY
Qty: 180 TABLET | Refills: 11 | Status: SHIPPED | OUTPATIENT
Start: 2024-09-18

## 2024-09-18 NOTE — TELEPHONE ENCOUNTER
[facility] Call Center    Phone Message    May a detailed message be left on voicemail: yes     Reason for Call: Other: Contacted pt to schedule RKT follow up appt. Pt is scheduled on 11/20. Pt would like a call back please in regards to a medication that needs to be refilled and wanting to pick this up and not have it mailed out. No further info given. Thanks!

## 2024-09-24 DIAGNOSIS — E10.22 TYPE 1 DIABETES MELLITUS WITH CHRONIC KIDNEY DISEASE ON CHRONIC DIALYSIS (H): ICD-10-CM

## 2024-09-24 DIAGNOSIS — N18.6 TYPE 1 DIABETES MELLITUS WITH CHRONIC KIDNEY DISEASE ON CHRONIC DIALYSIS (H): ICD-10-CM

## 2024-09-24 DIAGNOSIS — Z99.2 TYPE 1 DIABETES MELLITUS WITH CHRONIC KIDNEY DISEASE ON CHRONIC DIALYSIS (H): ICD-10-CM

## 2024-09-25 RX ORDER — ATORVASTATIN CALCIUM 20 MG/1
20 TABLET, FILM COATED ORAL EVERY EVENING
Qty: 30 TABLET | Refills: 11 | Status: SHIPPED | OUTPATIENT
Start: 2024-09-25

## 2024-10-04 ENCOUNTER — TELEPHONE (OUTPATIENT)
Dept: PHARMACY | Facility: CLINIC | Age: 54
End: 2024-10-04
Payer: COMMERCIAL

## 2024-10-04 NOTE — TELEPHONE ENCOUNTER
Clinical Pharmacy Consult:                                                      Transplant Specific: 24 month post transplant medication review **unable to contact pt**  Date of Transplant: 09/23/2022  Type of Transplant: kidney and pancreas  First Transplant: yes  History of rejection: no    Immunosuppression Regimen   TAC 0.5mg qAM & 0.5mg qPM and Myforitic 540mg qAM & 540mg qPM  Patient specific goal: 5-8  Most recent level: 7.9 , date: 06/24/24  Immunosuppressant Levels: therapeutic     Pt adherent to lab draws: yes  Scr:   Lab Results   Component Value Date    CR 2.14 11/02/2022    CR 3.56 08/13/2020     Side effects: unable to contact pt    Prophylactic Medications  Antibacterial:  Bactrim ss once daily  Scheduled Discontinue Date: Lifelong    Antifungal: Clotrimazole   Scheduled Discontinue Date: 3 months, therapy completed    Antiviral: CrCl 25 to 39 mL/minute: Valcyte 450 mg every other day   Scheduled Discontinue Date: 3 months, therapy completed    Acid Reducer: not on  Scheduled Reviewed Date: N/A    Thrombosis Prevention: Aspirin 325 mg PO daily  Scheduled Discontinue Date:  managed by clinic    Blood Pressure Management  Frequency of home Blood Pressure checks: unable to contact pt  Most recent home BP:  unknown  Patient Blood pressure goal: <140/90  Patient blood pressure at goal:  Unknown    Hospitalizations/ER visits since last assessment: Unable to contact pt    Med rec/DUR performed: yes  Med Rec Discrepancies: unknown - unable to contact pt    Unable to contact Hermila for 24 month post-transplant medication review.  Appears compliant per refill history. Last tacro dose was at goal.     No questions or concerns today.  Will attempt follow up again in 1 year.     Ling Lagunas, Ratna  [facility]  Specialty - [phone]  Transplant - [phone]

## 2024-10-07 ENCOUNTER — MYC REFILL (OUTPATIENT)
Dept: NEPHROLOGY | Facility: CLINIC | Age: 54
End: 2024-10-07
Payer: COMMERCIAL

## 2024-10-07 DIAGNOSIS — Z94.0 KIDNEY REPLACED BY TRANSPLANT: ICD-10-CM

## 2024-10-07 DIAGNOSIS — Z94.83 PANCREAS REPLACED BY TRANSPLANT (H): ICD-10-CM

## 2024-10-07 DIAGNOSIS — I10 PRIMARY HYPERTENSION: ICD-10-CM

## 2024-10-07 RX ORDER — TACROLIMUS 0.5 MG/1
0.5 CAPSULE ORAL 2 TIMES DAILY
Qty: 180 CAPSULE | Refills: 3 | Status: SHIPPED | OUTPATIENT
Start: 2024-10-07

## 2024-10-07 RX ORDER — CARVEDILOL 12.5 MG/1
12.5 TABLET ORAL 2 TIMES DAILY WITH MEALS
Qty: 60 TABLET | Refills: 11 | Status: SHIPPED | OUTPATIENT
Start: 2024-10-07

## 2024-11-19 ENCOUNTER — LAB (OUTPATIENT)
Dept: LAB | Facility: HOSPITAL | Age: 54
End: 2024-11-19
Payer: COMMERCIAL

## 2024-11-19 DIAGNOSIS — E78.5 HYPERLIPIDEMIA: ICD-10-CM

## 2024-11-19 DIAGNOSIS — N18.4 STAGE 4 CHRONIC KIDNEY DISEASE (H): ICD-10-CM

## 2024-11-19 LAB
ALT SERPL W P-5'-P-CCNC: 22 U/L (ref 0–50)
ANION GAP SERPL CALCULATED.3IONS-SCNC: 10 MMOL/L (ref 7–15)
BUN SERPL-MCNC: 16.7 MG/DL (ref 6–20)
CALCIUM SERPL-MCNC: 9.4 MG/DL (ref 8.8–10.4)
CHLORIDE SERPL-SCNC: 101 MMOL/L (ref 98–107)
CREAT SERPL-MCNC: 1.17 MG/DL (ref 0.51–0.95)
EGFRCR SERPLBLD CKD-EPI 2021: 54 ML/MIN/1.73M2
GLUCOSE SERPL-MCNC: 99 MG/DL (ref 70–99)
HCO3 SERPL-SCNC: 23 MMOL/L (ref 22–29)
HGB BLD-MCNC: 13.3 G/DL (ref 11.7–15.7)
POTASSIUM SERPL-SCNC: 4.4 MMOL/L (ref 3.4–5.3)
SODIUM SERPL-SCNC: 134 MMOL/L (ref 135–145)

## 2024-11-19 PROCEDURE — 36415 COLL VENOUS BLD VENIPUNCTURE: CPT

## 2024-11-19 PROCEDURE — 84460 ALANINE AMINO (ALT) (SGPT): CPT

## 2024-11-19 PROCEDURE — 80048 BASIC METABOLIC PNL TOTAL CA: CPT

## 2024-11-19 PROCEDURE — 85018 HEMOGLOBIN: CPT

## 2024-11-19 PROCEDURE — 82374 ASSAY BLOOD CARBON DIOXIDE: CPT

## 2024-11-20 ENCOUNTER — VIRTUAL VISIT (OUTPATIENT)
Dept: TRANSPLANT | Facility: CLINIC | Age: 54
End: 2024-11-20
Attending: INTERNAL MEDICINE
Payer: COMMERCIAL

## 2024-11-20 DIAGNOSIS — I15.1 HTN, KIDNEY TRANSPLANT RELATED: ICD-10-CM

## 2024-11-20 DIAGNOSIS — Z48.298 AFTERCARE FOLLOWING ORGAN TRANSPLANT: Primary | ICD-10-CM

## 2024-11-20 DIAGNOSIS — D84.9 IMMUNOSUPPRESSION (H): ICD-10-CM

## 2024-11-20 DIAGNOSIS — N18.31 CKD STAGE 3A, GFR 45-59 ML/MIN (H): ICD-10-CM

## 2024-11-20 DIAGNOSIS — Z94.83 PANCREAS REPLACED BY TRANSPLANT (H): ICD-10-CM

## 2024-11-20 DIAGNOSIS — Z94.0 KIDNEY REPLACED BY TRANSPLANT: ICD-10-CM

## 2024-11-20 DIAGNOSIS — Z94.0 HTN, KIDNEY TRANSPLANT RELATED: ICD-10-CM

## 2024-11-20 RX ORDER — BUPROPION HYDROCHLORIDE 75 MG/1
75 TABLET ORAL DAILY
COMMUNITY

## 2024-11-20 RX ORDER — MYCOPHENOLIC ACID 180 MG/1
540 TABLET, DELAYED RELEASE ORAL 2 TIMES DAILY
Qty: 180 TABLET | Refills: 11 | Status: SHIPPED | OUTPATIENT
Start: 2024-11-20

## 2024-11-20 NOTE — NURSING NOTE
Current patient location: [address] [place] [address] [address]  [place] [address]    Is the patient currently in the state of [place]? YES    Visit mode:VIDEO    If the visit is dropped, the patient can be reconnected by:VIDEO VISIT: Text to cell phone:   Telephone Information:   Mobile [phone]   Mobile [phone]   Mobile Not on file.       Will anyone else be joining the visit? NO  (If patient encounters technical issues they should call [phone]    Are changes needed to the allergy or medication list? No    Are refills needed on medications prescribed by this physician? YES    Rooming Documentation:  Questionnaire(s) completed    Reason for visit: RECHECK    Wilber HAILE

## 2024-11-20 NOTE — PROGRESS NOTES
Virtual Visit Details    Type of service:  telephone Visit   Start 10:43  Stop 11:05  Contact time : 22 minutes    TRANSPLANT NEPHROLOGY EARLY POST TRANSPLANT VISIT    Assessment & Plan   # DDKT (SPK): Stable. Reminded about doing labs regularly (gap between Jun and Nov)   - CKD stage 3a A1 Baseline Creatinine: ~ 1.1-1.3   - Proteinuria: Normal (<0.2 grams), overdue for a check   - Date DSA Last Checked: /2023      Latest DSA: No   - BK Viremia: No   - Kidney Tx Biopsy: No    # Pancreas Tx (SPK):    - Pancreatic Exocrine Drainage: Enteric drained     - Blood glucose: Near euglycemia      On insulin: No   - HbA1c: Stable      Latest HbA1c: 5.4% 6/2024   - Pancreatic enzymes: Stable, overdue for a check last in Jun   - Date DSA Last Checked: Feb 2024  Latest DSA: No   - Pancreas Tx Biopsy: No    # Immunosuppression: Tacrolimus immediate release (goal 5-8) and Mycophenolic acid (dose 540 mg every 12 hours)   - Patient is in an immunosuppressed state and will continue to monitor for efficacy and toxicity of immunosuppression medications.   - Changes: not at this time    # Infection Prophylaxis:   Last CD4 Level: 147 (Apr/2023)  - PJP: Sulfa/TMP (Bactrim)    # Hypertension: unknown control;  Goal BP: < 130/80    - Changes: discussed checking BP daily x 2 weeks,   - if BP remains above goal , can increase coreg to 25 mg po bid +_add another BP med as she yet has to re-establish care with a PCP    # Anemia in Chronic Renal Disease: Hgb: near normal      SHANEKA: No   - Iron studies: Not checked recently    # Mineral Bone Disorder:   - Vitamin D; level: Normal        On supplement: No  - Calcium; level: Normal        On supplement: No    # Electrolytes:   - Potassium; level: Normal        On supplement: No  - Magnesium; level: Low        On supplement: No  - Bicarbonate; level: normal        On supplement: no     # hx of R Hand Cellulitis 2/2 Cat Bite: initially prescribed Augmentin, but symptoms progressed, admitted and  switched to zosyn. MRI of hand showed no tenosynovitis or osteomyelitis. Seen by [facility] and [facility].    # H/o Clostridium difficile Colitis: Patient previously diagnosed 10/2022 initially and received prolonged oral vancomycin taper.  She was again diagnosed 3/2023 and treated with oral vancomycin. on prophylactic oral vancomycin with antibiotic.     # Chronic Loose Stools: Stable symptoms at patient's baseline. on fiber prn.    # Depression:   - on venlafaxine+ wellbutrin recently added   - continue to follow up with psych and therapy    # Skin Cancer: New lesions: none   - Discussed sun protection and recommend regular follow up with Dermatology.     # Transplant History:  Etiology of Kidney Failure: Diabetes mellitus type 1  Tx: DDKT (SPK)  Transplant: 9/23/2022 (Kidney / Pancreas)  Significant changes in immunosuppression: None  Significant transplant-related complications: None    Transplant Office Phone Number: [phone]    Return visit: Return in about 6 months (around 5/20/2025).    Leida Fernandez MD    Chief Complaint     Deshawn is a 54 year old here for kidney transplant, pancreas transplant, and immunosuppression management.     History of Present Illness     Mrs. Hess feels ok overall, main issues discussed today:  - recent increase in anti-depressants recently, wellbutrin added with plan to uptitrate.  feels down, not motivated, denies any SI. Lives with her  and 19 yo son  - high BP readings on recent checks at psych and at home but doesn't check regularly and has not yet established care with PCP  - reports some decreased exercise tolerance: took on a hike yesterday, stoppied frequently due to dyspnea, no chest pain or leg swelling. Discussed following up with PCP and further cardiac w/up if persistent symptoms  - due for pap and mammogram     Current IS FK 0.5/0,5 /540  Ppx: bactrim  Vaccines due for COVID FLU   Home BP:  not checked , high BP readings ~160s but checked only  once at psych. No PCP    Problem List   Patient Active Problem List   Diagnosis    Diabetes mellitus type 1 (H)    Anemia in stage 3a chronic kidney disease (H)    TIA (transient ischemic attack)    HTN, kidney transplant related    Anxiety and depression    (HFpEF) heart failure with preserved ejection fraction (H)    Pancreas replaced by transplant (H)    Kidney replaced by transplant    Immunosuppression (H)    Aftercare following organ transplant    Chronic kidney disease, stage 3a (H)    Vitamin D deficiency    Secondary renal hyperparathyroidism (H)    Hyperkalemia    Clostridium difficile infection    Cellulitis of right upper extremity    Hyperlipidemia    Diabetic ketoacidosis with coma associated with type 1 diabetes mellitus (H)    Essential hypertension    Iron deficiency anemia, unspecified    Moderate episode of recurrent major depressive disorder (H)    Stage 4 chronic kidney disease (H)    Vitamin B6 deficiency    Bacteremia due to methicillin resistant Staphylococcus aureus    Nephrotic syndrome in diseases classified elsewhere       Allergies   Allergies   Allergen Reactions    Amlodipine      Other reaction(s): Edema,generalized       Medications   Current Outpatient Medications   Medication Sig Dispense Refill    aspirin 81 MG EC tablet Take 81 mg by mouth daily      atorvastatin (LIPITOR) 20 MG tablet Take 1 tablet (20 mg) by mouth every evening. 30 tablet 11    carvedilol (COREG) 12.5 MG tablet Take 1 tablet (12.5 mg) by mouth 2 times daily (with meals). 60 tablet 11    FIBER ADULT GUMMIES PO Take 1 Gum by mouth daily      meclizine (ANTIVERT) 25 MG tablet Take 1 tablet (25 mg) by mouth 3 times daily as needed for dizziness 30 tablet 0    mycophenolic acid (GENERIC EQUIVALENT) 180 MG EC tablet Take 3 tablets (540 mg) by mouth 2 times daily. 180 tablet 11    sulfamethoxazole-trimethoprim (BACTRIM) 400-80 MG tablet Take 1 tablet by mouth three times a week 45 tablet 3    tacrolimus (GENERIC  EQUIVALENT) 0.5 MG capsule Take 1 capsule (0.5 mg) by mouth 2 times daily. 180 capsule 3    venlafaxine (EFFEXOR XR) 150 MG 24 hr capsule Take 1 capsule (150 mg) by mouth daily 30 capsule 0     No current facility-administered medications for this visit.     There are no discontinued medications.    Physical Exam   Vital Signs: There were no vitals taken for this visit.  Deferred telephone visit  Data         Latest Ref Rng & Units 11/19/2024     9:50 AM 6/24/2024     9:59 AM 5/31/2024    10:55 AM   Renal   Sodium 135 - 145 mmol/L 134  139  136    K 3.4 - 5.3 mmol/L 4.4  4.7  4.8    Cl 98 - 107 mmol/L 101  105  105    Cl (external) 98 - 107 mmol/L 101  105  105    CO2 22 - 29 mmol/L 23  26  21    Urea Nitrogen 6.0 - 20.0 mg/dL 16.7  21.4  17.2    Creatinine 0.51 - 0.95 mg/dL 1.17  1.08  1.04    Glucose 70 - 99 mg/dL 99  104  103    Calcium 8.8 - 10.4 mg/dL 9.4  10.1  9.5          Latest Ref Rng & Units 7/25/2023    10:17 AM 5/11/2023    10:57 AM 3/1/2023     9:07 AM   Bone Health   Phosphorus 2.5 - 4.5 mg/dL 3.9  3.4  4.4          Latest Ref Rng & Units 11/19/2024     9:50 AM 6/24/2024     5:18 PM 5/31/2024    10:55 AM   Heme   WBC 4.0 - 11.0 10e3/uL  4.5  4.8    Hgb 11.7 - 15.7 g/dL 13.3  13.8  12.9    Plt 150 - 450 10e3/uL  297  240          Latest Ref Rng & Units 11/19/2024     9:50 AM 8/18/2023     5:50 AM 4/3/2023    10:01 AM   Liver   AP 35 - 104 U/L  77  91    TBili <=1.2 mg/dL  0.5  0.4    Bilirubin Direct 0.00 - 0.30 mg/dL   <0.20    ALT 0 - 50 U/L 22  12  48    AST 0 - 45 U/L  16  51    Tot Protein 6.4 - 8.3 g/dL  6.1  6.9    Albumin 3.5 - 5.2 g/dL  3.6  4.2          Latest Ref Rng & Units 6/24/2024     9:59 AM 6/24/2024     9:58 AM 5/31/2024    10:55 AM   Pancreas   A1C 0.0 - 5.6 %  5.4     Amylase 28 - 100 U/L 67   57    Lipase (Roche) 13 - 60 U/L 40   38          Latest Ref Rng & Units 10/3/2022     8:09 AM   Iron studies   Iron 37 - 145 ug/dL 34    Iron Sat Index 15 - 46 % 17    Ferritin 11 - 328 ng/mL  923          Latest Ref Rng & Units 2/22/2023    10:03 AM 11/28/2022     8:30 AM 9/22/2022     4:29 PM   [facility] Txp Virology   CMV QUANT IU/ML Not Detected IU/mL Not Detected  Not Detected  Not Detected    EBV CAPSID ANTIBODY IGG No detectable antibody.   Positive        Recent Labs   Lab Test 01/26/24  1100 02/21/24  1042 05/31/24  1055 06/24/24  0959   DOSTAC 1/25/2024 2/20/2024 5/31/2024  --    TACROL 8.0 8.6 9.2 7.9     Recent Labs   Lab Test 07/25/23  1017 08/14/23  0958 09/06/23  1029   DOSMPA 7/24/2023  10:00 PM 8/13/2023  10:00 PM  --    MPACID 3.71* 1.28 1.73   MPAG 69.0 50.5 44.4

## 2024-11-20 NOTE — LETTER
11/20/2024      Eden Hess  [address]  [place] [address]      Dear Colleague,    Thank you for referring your patient, Eden Hess, to the [facility]. Please see a copy of my visit note below.    Virtual Visit Details    Type of service:  telephone Visit   Start 10:43  Stop 11:05  Contact time : 22 minutes    TRANSPLANT NEPHROLOGY EARLY POST TRANSPLANT VISIT    Assessment & Plan  # DDKT (SPK): Stable. Reminded about doing labs regularly (gap between Jun and Nov)   - CKD stage 3a A1 Baseline Creatinine: ~ 1.1-1.3   - Proteinuria: Normal (<0.2 grams), overdue for a check   - Date DSA Last Checked: /2023      Latest DSA: No   - BK Viremia: No   - Kidney Tx Biopsy: No    # Pancreas Tx (SPK):    - Pancreatic Exocrine Drainage: Enteric drained     - Blood glucose: Near euglycemia      On insulin: No   - HbA1c: Stable      Latest HbA1c: 5.4% 6/2024   - Pancreatic enzymes: Stable, overdue for a check last in Jun   - Date DSA Last Checked: Feb 2024  Latest DSA: No   - Pancreas Tx Biopsy: No    # Immunosuppression: Tacrolimus immediate release (goal 5-8) and Mycophenolic acid (dose 540 mg every 12 hours)   - Patient is in an immunosuppressed state and will continue to monitor for efficacy and toxicity of immunosuppression medications.   - Changes: not at this time    # Infection Prophylaxis:   Last CD4 Level: 147 (Apr/2023)  - PJP: Sulfa/TMP (Bactrim)    # Hypertension: unknown control;  Goal BP: < 130/80    - Changes: discussed checking BP daily x 2 weeks,   - if BP remains above goal , can increase coreg to 25 mg po bid +_add another BP med as she yet has to re-establish care with a PCP    # Anemia in Chronic Renal Disease: Hgb: near normal      SHANEKA: No   - Iron studies: Not checked recently    # Mineral Bone Disorder:   - Vitamin D; level: Normal        On supplement: No  - Calcium; level: Normal        On supplement: No    # Electrolytes:   - Potassium; level: Normal         On supplement: No  - Magnesium; level: Low        On supplement: No  - Bicarbonate; level: normal        On supplement: no     # hx of R Hand Cellulitis 2/2 Cat Bite: initially prescribed Augmentin, but symptoms progressed, admitted and switched to zosyn. MRI of hand showed no tenosynovitis or osteomyelitis. Seen by ID and [facility].    # H/o Clostridium difficile Colitis: Patient previously diagnosed 10/2022 initially and received prolonged oral vancomycin taper.  She was again diagnosed 3/2023 and treated with oral vancomycin. on prophylactic oral vancomycin with antibiotic.     # Chronic Loose Stools: Stable symptoms at patient's baseline. on fiber prn.    # Depression:   - on venlafaxine+ wellbutrin recently added   - continue to follow up with psych and therapy    # Skin Cancer: New lesions: none   - Discussed sun protection and recommend regular follow up with Dermatology.     # Transplant History:  Etiology of Kidney Failure: Diabetes mellitus type 1  Tx: DDKT (SPK)  Transplant: 9/23/2022 (Kidney / Pancreas)  Significant changes in immunosuppression: None  Significant transplant-related complications: None    Transplant Office Phone Number: [phone]    Return visit: Return in about 6 months (around 5/20/2025).    Leida Fernandez MD    Chief Complaint    Deshawn is a 54 year old here for kidney transplant, pancreas transplant, and immunosuppression management.     History of Present Illness    Mrs. Hess feels ok overall, main issues discussed today:  - recent increase in anti-depressants recently, wellbutrin added with plan to uptitrate.  feels down, not motivated, denies any SI. Lives with her  and 21 yo son  - high BP readings on recent checks at psych and at home but doesn't check regularly and has not yet established care with PCP  - reports some decreased exercise tolerance: took on a hike yesterday, stoppied frequently due to dyspnea, no chest pain or leg swelling. Discussed following  up with PCP and further cardiac w/up if persistent symptoms  - due for pap and mammogram     Current IS FK 0.5/0,5 /540  Ppx: bactrim  Vaccines due for COVID FLU   Home BP:  not checked , high BP readings ~160s but checked only once at psych. No PCP    Problem List  Patient Active Problem List   Diagnosis     Diabetes mellitus type 1 (H)     Anemia in stage 3a chronic kidney disease (H)     TIA (transient ischemic attack)     HTN, kidney transplant related     Anxiety and depression     (HFpEF) heart failure with preserved ejection fraction (H)     Pancreas replaced by transplant (H)     Kidney replaced by transplant     Immunosuppression (H)     Aftercare following organ transplant     Chronic kidney disease, stage 3a (H)     Vitamin D deficiency     Secondary renal hyperparathyroidism (H)     Hyperkalemia     Clostridium difficile infection     Cellulitis of right upper extremity     Hyperlipidemia     Diabetic ketoacidosis with coma associated with type 1 diabetes mellitus (H)     Essential hypertension     Iron deficiency anemia, unspecified     Moderate episode of recurrent major depressive disorder (H)     Stage 4 chronic kidney disease (H)     Vitamin B6 deficiency     Bacteremia due to methicillin resistant Staphylococcus aureus     Nephrotic syndrome in diseases classified elsewhere       Allergies  Allergies   Allergen Reactions     Amlodipine      Other reaction(s): Edema,generalized       Medications  Current Outpatient Medications   Medication Sig Dispense Refill     aspirin 81 MG EC tablet Take 81 mg by mouth daily       atorvastatin (LIPITOR) 20 MG tablet Take 1 tablet (20 mg) by mouth every evening. 30 tablet 11     carvedilol (COREG) 12.5 MG tablet Take 1 tablet (12.5 mg) by mouth 2 times daily (with meals). 60 tablet 11     FIBER ADULT GUMMIES PO Take 1 Gum by mouth daily       meclizine (ANTIVERT) 25 MG tablet Take 1 tablet (25 mg) by mouth 3 times daily as needed for dizziness 30 tablet 0      mycophenolic acid (GENERIC EQUIVALENT) 180 MG EC tablet Take 3 tablets (540 mg) by mouth 2 times daily. 180 tablet 11     sulfamethoxazole-trimethoprim (BACTRIM) 400-80 MG tablet Take 1 tablet by mouth three times a week 45 tablet 3     tacrolimus (GENERIC EQUIVALENT) 0.5 MG capsule Take 1 capsule (0.5 mg) by mouth 2 times daily. 180 capsule 3     venlafaxine (EFFEXOR XR) 150 MG 24 hr capsule Take 1 capsule (150 mg) by mouth daily 30 capsule 0     No current facility-administered medications for this visit.     There are no discontinued medications.    Physical Exam  Vital Signs: There were no vitals taken for this visit.  Deferred telephone visit  Data        Latest Ref Rng & Units 11/19/2024     9:50 AM 6/24/2024     9:59 AM 5/31/2024    10:55 AM   Renal   Sodium 135 - 145 mmol/L 134  139  136    K 3.4 - 5.3 mmol/L 4.4  4.7  4.8    Cl 98 - 107 mmol/L 101  105  105    Cl (external) 98 - 107 mmol/L 101  105  105    CO2 22 - 29 mmol/L 23  26  21    Urea Nitrogen 6.0 - 20.0 mg/dL 16.7  21.4  17.2    Creatinine 0.51 - 0.95 mg/dL 1.17  1.08  1.04    Glucose 70 - 99 mg/dL 99  104  103    Calcium 8.8 - 10.4 mg/dL 9.4  10.1  9.5          Latest Ref Rng & Units 7/25/2023    10:17 AM 5/11/2023    10:57 AM 3/1/2023     9:07 AM   Bone Health   Phosphorus 2.5 - 4.5 mg/dL 3.9  3.4  4.4          Latest Ref Rng & Units 11/19/2024     9:50 AM 6/24/2024     5:18 PM 5/31/2024    10:55 AM   Heme   WBC 4.0 - 11.0 10e3/uL  4.5  4.8    Hgb 11.7 - 15.7 g/dL 13.3  13.8  12.9    Plt 150 - 450 10e3/uL  297  240          Latest Ref Rng & Units 11/19/2024     9:50 AM 8/18/2023     5:50 AM 4/3/2023    10:01 AM   Liver   AP 35 - 104 U/L  77  91    TBili <=1.2 mg/dL  0.5  0.4    Bilirubin Direct 0.00 - 0.30 mg/dL   <0.20    ALT 0 - 50 U/L 22  12  48    AST 0 - 45 U/L  16  51    Tot Protein 6.4 - 8.3 g/dL  6.1  6.9    Albumin 3.5 - 5.2 g/dL  3.6  4.2          Latest Ref Rng & Units 6/24/2024     9:59 AM 6/24/2024     9:58 AM 5/31/2024    10:55  AM   Pancreas   A1C 0.0 - 5.6 %  5.4     Amylase 28 - 100 U/L 67   57    Lipase (Roche) 13 - 60 U/L 40   38          Latest Ref Rng & Units 10/3/2022     8:09 AM   Iron studies   Iron 37 - 145 ug/dL 34    Iron Sat Index 15 - 46 % 17    Ferritin 11 - 328 ng/mL 923          Latest Ref Rng & Units 2/22/2023    10:03 AM 11/28/2022     8:30 AM 9/22/2022     4:29 PM   [facility] Txp Virology   CMV QUANT IU/ML Not Detected IU/mL Not Detected  Not Detected  Not Detected    EBV CAPSID ANTIBODY IGG No detectable antibody.   Positive        Recent Labs   Lab Test 01/26/24  1100 02/21/24  1042 05/31/24  1055 06/24/24  0959   DOSTAC 1/25/2024 2/20/2024 5/31/2024  --    TACROL 8.0 8.6 9.2 7.9     Recent Labs   Lab Test 07/25/23  1017 08/14/23  0958 09/06/23  1029   DOSMPA 7/24/2023  10:00 PM 8/13/2023  10:00 PM  --    MPACID 3.71* 1.28 1.73   MPAG 69.0 50.5 44.4         Again, thank you for allowing me to participate in the care of your patient.        Sincerely,        Leida Fernandez MD

## 2024-11-20 NOTE — PATIENT INSTRUCTIONS
Please check blood pressure and pulse daily for 2 weeks, if home blood pressures are persistently high (>130/80), we could either increase coreg or add another BP med. Send your readings and we can adjust medications till you are able to establish care with a new primary     Recommend labs every 2 months    Tacrolimus and lipase was not checked, will confirm with lab and let you know if you need to go for labs again    Transplant Patient Information  Your Post Transplant Coordinator is: Giovanna Kovacs  You and your care team can contact your transplant coordinator Monday - Friday, 8am - 5pm at [phone] (Option 2 to reach the coordinator or Option 4 to schedule an appointment).  You can also reach your care team online via [employer].  After hours for urgent matters, please call [facility] at [phone].

## 2024-11-21 ENCOUNTER — TELEPHONE (OUTPATIENT)
Dept: TRANSPLANT | Facility: CLINIC | Age: 54
End: 2024-11-21
Payer: COMMERCIAL

## 2024-11-21 DIAGNOSIS — Z48.298 AFTERCARE FOLLOWING ORGAN TRANSPLANT: Primary | ICD-10-CM

## 2024-11-21 DIAGNOSIS — I15.1 HTN, KIDNEY TRANSPLANT RELATED: ICD-10-CM

## 2024-11-21 DIAGNOSIS — Z94.0 HTN, KIDNEY TRANSPLANT RELATED: ICD-10-CM

## 2024-11-21 NOTE — TELEPHONE ENCOUNTER
Unable to add on transplant labs to blood drawn earlier this week, Hermila asked to go back to lab for re-draw. Orders active.

## 2024-11-22 ENCOUNTER — ANCILLARY ORDERS (OUTPATIENT)
Dept: FAMILY MEDICINE | Facility: CLINIC | Age: 54
End: 2024-11-22

## 2024-11-22 DIAGNOSIS — Z12.31 VISIT FOR SCREENING MAMMOGRAM: Primary | ICD-10-CM

## 2024-11-26 ENCOUNTER — LAB (OUTPATIENT)
Dept: LAB | Facility: CLINIC | Age: 54
End: 2024-11-26
Payer: COMMERCIAL

## 2024-11-26 DIAGNOSIS — Z94.0 KIDNEY REPLACED BY TRANSPLANT: ICD-10-CM

## 2024-11-26 DIAGNOSIS — Z94.83 PANCREAS REPLACED BY TRANSPLANT (H): ICD-10-CM

## 2024-11-26 DIAGNOSIS — Z98.890 OTHER SPECIFIED POSTPROCEDURAL STATES: ICD-10-CM

## 2024-11-26 LAB
AMYLASE SERPL-CCNC: 58 U/L (ref 28–100)
ANION GAP SERPL CALCULATED.3IONS-SCNC: 8 MMOL/L (ref 7–15)
BUN SERPL-MCNC: 23.7 MG/DL (ref 6–20)
CALCIUM SERPL-MCNC: 9.8 MG/DL (ref 8.8–10.4)
CHLORIDE SERPL-SCNC: 102 MMOL/L (ref 98–107)
CREAT SERPL-MCNC: 1.14 MG/DL (ref 0.51–0.95)
EGFRCR SERPLBLD CKD-EPI 2021: 56 ML/MIN/1.73M2
ERYTHROCYTE [DISTWIDTH] IN BLOOD BY AUTOMATED COUNT: 13.6 % (ref 10–15)
GLUCOSE SERPL-MCNC: 93 MG/DL (ref 70–99)
HCO3 SERPL-SCNC: 25 MMOL/L (ref 22–29)
HCT VFR BLD AUTO: 41.2 % (ref 35–47)
HGB BLD-MCNC: 13.5 G/DL (ref 11.7–15.7)
LIPASE SERPL-CCNC: 34 U/L (ref 13–60)
MCH RBC QN AUTO: 30.9 PG (ref 26.5–33)
MCHC RBC AUTO-ENTMCNC: 32.8 G/DL (ref 31.5–36.5)
MCV RBC AUTO: 94 FL (ref 78–100)
PLATELET # BLD AUTO: 305 10E3/UL (ref 150–450)
POTASSIUM SERPL-SCNC: 4.5 MMOL/L (ref 3.4–5.3)
RBC # BLD AUTO: 4.37 10E6/UL (ref 3.8–5.2)
SODIUM SERPL-SCNC: 135 MMOL/L (ref 135–145)
TACROLIMUS BLD-MCNC: 5.9 UG/L (ref 5–15)
TME LAST DOSE: NORMAL H
TME LAST DOSE: NORMAL H
WBC # BLD AUTO: 5.7 10E3/UL (ref 4–11)

## 2024-11-26 PROCEDURE — 87799 DETECT AGENT NOS DNA QUANT: CPT

## 2024-11-26 PROCEDURE — 82150 ASSAY OF AMYLASE: CPT

## 2024-11-26 PROCEDURE — 85027 COMPLETE CBC AUTOMATED: CPT

## 2024-11-26 PROCEDURE — 80048 BASIC METABOLIC PNL TOTAL CA: CPT

## 2024-11-26 PROCEDURE — 80197 ASSAY OF TACROLIMUS: CPT

## 2024-11-26 PROCEDURE — 36415 COLL VENOUS BLD VENIPUNCTURE: CPT

## 2024-11-26 PROCEDURE — 83690 ASSAY OF LIPASE: CPT

## 2024-11-27 LAB
BK VIRUS SPECIMEN TYPE: NORMAL
BKV DNA # SPEC NAA+PROBE: NOT DETECTED IU/ML

## 2024-12-12 ENCOUNTER — PATIENT OUTREACH (OUTPATIENT)
Dept: FAMILY MEDICINE | Facility: CLINIC | Age: 54
End: 2024-12-12
Payer: COMMERCIAL

## 2024-12-12 NOTE — TELEPHONE ENCOUNTER
Patient Quality Outreach    Patient is due for the following:   Breast Cancer Screening - Mammogram    Action(s) Taken:   Patient due for Mammogram.     Type of outreach:    Sent letter.    Questions for provider review:    None           [id number], MEDICAL ASSISTANT

## 2024-12-12 NOTE — LETTER
December 12, 2024      Eden J Olson  [address]  [place] [address]      Your healthcare team cares about your health. To provide you with the best care, we have reviewed your chart and based on our findings, we see that you are due to:     Schedule Annual MAMMOGRAPHY. [facility] scheduling number is [phone] or schedule in MyChart (self referral).  1 in 8 women will develop invasive breast cancer during her lifetime and it is the most common non-skin cancer in [place]. EARLY detection, new treatments, and a better understanding of the disease have increased survival rates- the 5 year survival rate in the 1960's was 63% and today it is close to 90%.  If you are under/uninsured, we recommend you contact the [facility]. They offer mammograms at no charge or on a sliding fee charge. You can schedule with them at [phone]. Please have them send us the results.     If you have already completed these items, please contact the clinic via phone or [employer] so your care team can review and update your records.  Thank you for choosing [facility] for your healthcare needs. For any questions, concerns, or to schedule an appointment please contact the clinic.     Healthy Regards,    Your [facility] Care Team

## 2024-12-19 ENCOUNTER — OFFICE VISIT (OUTPATIENT)
Dept: FAMILY MEDICINE | Facility: CLINIC | Age: 54
End: 2024-12-19
Payer: COMMERCIAL

## 2024-12-19 ENCOUNTER — ANCILLARY PROCEDURE (OUTPATIENT)
Dept: MAMMOGRAPHY | Facility: CLINIC | Age: 54
End: 2024-12-19
Attending: FAMILY MEDICINE
Payer: COMMERCIAL

## 2024-12-19 VITALS
WEIGHT: 190.4 LBS | DIASTOLIC BLOOD PRESSURE: 75 MMHG | TEMPERATURE: 97.9 F | HEART RATE: 101 BPM | RESPIRATION RATE: 16 BRPM | BODY MASS INDEX: 35.95 KG/M2 | SYSTOLIC BLOOD PRESSURE: 130 MMHG | HEIGHT: 61 IN | OXYGEN SATURATION: 96 %

## 2024-12-19 DIAGNOSIS — F33.0 MILD EPISODE OF RECURRENT MAJOR DEPRESSIVE DISORDER (H): ICD-10-CM

## 2024-12-19 DIAGNOSIS — N89.8 VAGINAL DISCHARGE: ICD-10-CM

## 2024-12-19 DIAGNOSIS — Z78.9 ALCOHOL USE: Primary | ICD-10-CM

## 2024-12-19 DIAGNOSIS — Z12.31 VISIT FOR SCREENING MAMMOGRAM: ICD-10-CM

## 2024-12-19 DIAGNOSIS — Z01.419 ENCOUNTER FOR GYNECOLOGICAL EXAMINATION (GENERAL) (ROUTINE) WITHOUT ABNORMAL FINDINGS: ICD-10-CM

## 2024-12-19 DIAGNOSIS — Z12.4 CERVICAL CANCER SCREENING: ICD-10-CM

## 2024-12-19 PROCEDURE — 77067 SCR MAMMO BI INCL CAD: CPT | Mod: TC | Performed by: RADIOLOGY

## 2024-12-19 PROCEDURE — 77063 BREAST TOMOSYNTHESIS BI: CPT | Mod: TC | Performed by: RADIOLOGY

## 2024-12-19 RX ORDER — BUPROPION HYDROCHLORIDE 150 MG/1
150 TABLET ORAL EVERY MORNING
COMMUNITY
Start: 2024-11-15

## 2024-12-19 RX ORDER — VENLAFAXINE HYDROCHLORIDE 75 MG/1
225 CAPSULE, EXTENDED RELEASE ORAL DAILY
COMMUNITY
Start: 2024-11-15

## 2024-12-19 ASSESSMENT — PATIENT HEALTH QUESTIONNAIRE - PHQ9
10. IF YOU CHECKED OFF ANY PROBLEMS, HOW DIFFICULT HAVE THESE PROBLEMS MADE IT FOR YOU TO DO YOUR WORK, TAKE CARE OF THINGS AT HOME, OR GET ALONG WITH OTHER PEOPLE: SOMEWHAT DIFFICULT
SUM OF ALL RESPONSES TO PHQ QUESTIONS 1-9: 4
SUM OF ALL RESPONSES TO PHQ QUESTIONS 1-9: 4

## 2024-12-19 NOTE — PROGRESS NOTES
"  Assessment & Plan     (Z78.9) Alcohol use  (primary encounter diagnosis)  Comment: the patient consumes alcohol daily but does not meet criteria for alcohol use disorder based on her self-report. Occasional feelings of guilt may indicate a need for further exploration of her relationship with alcohol.  Plan: REVIEW OF HEALTH MAINTENANCE PROTOCOL ORDERS  - Educated the patient on the potential risks of daily alcohol use and encourage self-reflection regarding her feelings of guilt.  -Encouraged the patient to monitor her alcohol consumption and consider reducing intake if she experiences any negative consequences.      (F33.0) Mild episode of recurrent major depressive disorder (H)  Comment: Stable on the current medication  Plan: REVIEW OF HEALTH MAINTENANCE PROTOCOL ORDERS  -Provided a written Depression Action Plan to the patient.   -The current medical regimen is effective;  continue present plan and medications.       (Z12.4) Cervical cancer screening  Comment: The patient is aware that her insurance may not cover the recent Pap smear, but she still wishes to proceed, knowing she might incur out-of-pocket expenses.  Plan: REVIEW OF HEALTH MAINTENANCE PROTOCOL ORDERS,         HPV and Gynecologic Cytology Panel -         Recommended Age 30 - 65 Years    (Z01.419) Encounter for gynecological examination (general) (routine) without abnormal findings  Plan: HPV and Gynecologic Cytology Panel -         Recommended Age 30 - 65 Years    (N89.8) Vaginal discharge  Comment: Vaginal discharge noted during the vaginal exam  Plan: Wet prep - lab collect             BMI  Estimated body mass index is 35.49 kg/m  as calculated from the following:    Height as of this encounter: 1.56 m (5' 1.42\").    Weight as of this encounter: 86.4 kg (190 lb 6.4 oz).   Weight management plan: Discussed healthy diet and exercise guidelines          Nohelia Cleaning is a 54 year old, presenting for the following health issues:  PAP        " 12/19/2024     4:03 PM   Additional Questions   Roomed by Janna VALLEJO CMA         12/19/2024     4:03 PM   Patient Reported Additional Medications   Patient reports taking the following new medications None     Via the Health Maintenance questionnaire, the patient has reported the following services have been completed -Mammogram: [facility] 2024-12-19, this information has not been sent to the abstraction team.  History of Present Illness       Reason for visit:  Female exam    Hermila eats 2-3 servings of fruits and vegetables daily.Hermila consumes 0 sweetened beverage(s) daily.Hermila exercises with enough effort to increase Hermila's heart rate 9 or less minutes per day.  Hermila exercises with enough effort to increase Hermila's heart rate 3 or less days per week.   Hermila is taking medications regularly.  Eden Hess is a 54-year-old female who presents for a routine Pap smear. The patient is aware that her insurance may not cover the recent Pap smear, but she still wishes to proceed, knowing she might incur out-of-pocket expenses.    Depression/alcohol use  During the visit, she was asked about her mental health status since her last appointment. She reports that her depression has improved with the current medication, Effexor, at a dosage of 225 mg. The patient completed the PHQ-9 questionnaire, scoring a 4, indicating minimal depressive symptoms. She denies experiencing any significant life events that may have contributed to her mental health status. Additionally, she reports no feelings of anxiety or panic attacks.    During the discussion regarding her alcohol consumption, she acknowledges drinking alcohol but does not consider it a problem. She reports consuming alcohol approximately daily, typically two whiskeys and one glass of wine per day. The patient denies being unable to stop drinking once she starts and states that her alcohol use is not affecting her daily life or responsibilities. She does not  "consume alcohol in the morning as an \"eye opener.\" However, she does report occasional feelings of guilt or remorse regarding her drinking habits. The patient denies smoking cigarette            Social History     Tobacco Use    Smoking status: Former     Current packs/day: 0.00     Types: Cigarettes     Quit date: 1997     Years since quittin.9    Smokeless tobacco: Never   Vaping Use    Vaping status: Never Used   Substance Use Topics    Alcohol use: Yes     Comment: 3/week    Drug use: No         2017    12:32 PM 2019     8:15 AM 2024     3:54 PM   PHQ   PHQ-9 Total Score 5  4    Q9: Thoughts of better off dead/self-harm past 2 weeks Not at all  Not at all   PHQ-9 External Data  6        Patient-reported         2017    12:32 PM 2024    10:26 AM   ALBERTO-7 SCORE   Total Score  0 (minimal anxiety)   Total Score 0 0         Suicide Assessment Five-step Evaluation and Treatment (SAFE-T)          Review of Systems  Constitutional, neuro, ENT, endocrine, pulmonary, cardiac, gastrointestinal, genitourinary, musculoskeletal, integument and psychiatric systems are negative, except as otherwise noted.  Constitutional, HEENT, cardiovascular, pulmonary, GI, , musculoskeletal, neuro, skin, endocrine and psych systems are negative, except as otherwise noted.      Objective    /75   Pulse 101   Temp 97.9  F (36.6  C) (Temporal)   Resp 16   Ht 1.56 m (5' 1.42\")   Wt 86.4 kg (190 lb 6.4 oz)   LMP 10/01/2023   SpO2 96%   BMI 35.49 kg/m    Body mass index is 35.49 kg/m .  Physical Exam     GENERAL: alert and no distress  NECK: no adenopathy, no asymmetry, masses, or scars  RESP: lungs clear to auscultation - no rales, rhonchi or wheezes  CV: regular rate and rhythm, normal S1 S2, no S3 or S4, no murmur, click or rub, no peripheral edema  ABDOMEN: soft, nontender, no hepatosplenomegaly, no masses and bowel sounds normal   (female): White vaginal discharge noted during the vaginal " exam.  Otherwise, normal female external genitalia, normal urethral meatus, normal vaginal mucosa  MS: no gross musculoskeletal defects noted, no edema  SKIN: no suspicious lesions or rashes  NEURO: Normal strength and tone, mentation intact and speech normal  PSYCH: mentation appears normal, affect normal/bright              Signed Electronically by: MELINDA Green CNP

## 2024-12-19 NOTE — LETTER
My Depression Action Plan  Name: Eden Hess   Date of Birth 1970  Date: 12/19/2024    My doctor: Kurt King   [facility] clinic: [facility]  [address] [address]  [phone]            GREEN    ZONE   Good Control    What it looks like:   Things are going generally well. You have normal ups and downs. You may even feel depressed from time to time, but bad moods usually last less than a day.   What you need to do:  Continue to care for yourself (see self care plan)  Check your depression survival kit and update it as needed  Follow your physician s recommendations including any medication.  Do not stop taking medication unless you consult with your physician first.             YELLOW         ZONE Getting Worse    What it looks like:   Depression is starting to interfere with your life.   It may be hard to get out of bed; you may be starting to isolate yourself from others.  Symptoms of depression are starting to last most all day and this has happened for several days.   You may have suicidal thoughts but they are not constant.   What you need to do:     Call your care team. Your response to treatment will improve if you keep your care team informed of your progress. Yellow periods are signs an adjustment may need to be made.     Continue your self-care.  Just get dressed and ready for the day.  Don't give yourself time to talk yourself out of it.    Talk to someone in your support network.    Open up your Depression Self-Care Plan/Wellness Kit.             RED    ZONE Medical Alert - Get Help    What it looks like:   Depression is seriously interfering with your life.   You may experience these or other symptoms: You can t get out of bed most days, can t work or engage in other necessary activities, you have trouble taking care of basic hygiene, or basic responsibilities, thoughts of suicide or death that will not go away, self-injurious behavior.      What you need to do:  Call your care team and request a same-day appointment. If they are not available (weekends or after hours) call your local crisis line, emergency room or 911.          Depression Self-Care Plan / Wellness Kit    Many people find that medication and therapy are helpful treatments for managing depression. In addition, making small changes to your everyday life can help to boost your mood and improve your wellbeing. Below are some tips for you to consider. Be sure to talk with your medical provider and/or behavioral health consultant if your symptoms are worsening or not improving.     Sleep   Sleep hygiene  means all of the habits that support good, restful sleep. It includes maintaining a consistent bedtime and wake time, using your bedroom only for sleeping or sex, and keeping the bedroom dark and free of distractions like a computer, smartphone, or television.     Develop a Healthy Routine  Maintain good hygiene. Get out of bed in the morning, make your bed, brush your teeth, take a shower, and get dressed. Don t spend too much time viewing media that makes you feel stressed. Find time to relax each day.    Exercise  Get some form of exercise every day. This will help reduce pain and release endorphins, the  feel good  chemicals in your brain. It can be as simple as just going for a walk or doing some gardening, anything that will get you moving.      Diet  Strive to eat healthy foods, including fruits and vegetables. Drink plenty of water. Avoid excessive sugar, caffeine, alcohol, and other mood-altering substances.     Stay Connected with Others  Stay in touch with friends and family members.    Manage Your Mood  Try deep breathing, massage therapy, biofeedback, or meditation. Take part in fun activities when you can. Try to find something to smile about each day.     Psychotherapy  Be open to working with a therapist if your provider recommends it.     Medication  Be sure to take your  medication as prescribed. Most anti-depressants need to be taken every day. It usually takes several weeks for medications to work. Not all medicines work for all people. It is important to follow-up with your provider to make sure you have a treatment plan that is working for you. Do not stop your medication abruptly without first discussing it with your provider.    Crisis Resources   These hotlines are for both adults and children. They and are open 24 hours a day, 7 days a week unless noted otherwise.    National Suicide Prevention Lifeline   988 or 3-407-421-EFDT (0747)    Crisis Text Line    www.crisistextline.org  Text HOME to 324761 from anywhere in the [place], anytime, about any type of crisis. A live, trained crisis counselor will receive the text and respond quickly.    [facility] Lifeline for LGBTQ Youth  A national crisis intervention and suicide lifeline for LGBTQ youth under 25. Provides a safe place to talk without judgement. Call 1-181.979.1192; text START to 259345 or visit www.the[facility]project.org to talk to a trained counselor.    For On license of [facility] crisis numbers, visit the [facility] website at:  https://[place].gov/dhs/people-we-serve/adults/health-care/mental-health/resources/crisis-contacts.jsp

## 2024-12-20 ENCOUNTER — ANCILLARY ORDERS (OUTPATIENT)
Dept: RADIOLOGY | Facility: CLINIC | Age: 54
End: 2024-12-20

## 2024-12-26 LAB
HPV HR 12 DNA CVX QL NAA+PROBE: NEGATIVE
HPV16 DNA CVX QL NAA+PROBE: POSITIVE
HPV18 DNA CVX QL NAA+PROBE: NEGATIVE
HUMAN PAPILLOMA VIRUS FINAL DIAGNOSIS: ABNORMAL

## 2024-12-27 PROBLEM — R87.810 CERVICAL HIGH RISK HPV (HUMAN PAPILLOMAVIRUS) TEST POSITIVE: Status: ACTIVE | Noted: 2024-12-19

## 2025-01-05 ENCOUNTER — HEALTH MAINTENANCE LETTER (OUTPATIENT)
Age: 55
End: 2025-01-05

## 2025-01-21 ENCOUNTER — LAB (OUTPATIENT)
Dept: LAB | Facility: CLINIC | Age: 55
End: 2025-01-21
Payer: COMMERCIAL

## 2025-01-21 ENCOUNTER — OFFICE VISIT (OUTPATIENT)
Dept: FAMILY MEDICINE | Facility: CLINIC | Age: 55
End: 2025-01-21
Payer: COMMERCIAL

## 2025-01-21 VITALS
WEIGHT: 190.3 LBS | DIASTOLIC BLOOD PRESSURE: 92 MMHG | OXYGEN SATURATION: 95 % | HEART RATE: 68 BPM | RESPIRATION RATE: 17 BRPM | TEMPERATURE: 97.7 F | HEIGHT: 62 IN | BODY MASS INDEX: 35.02 KG/M2 | SYSTOLIC BLOOD PRESSURE: 170 MMHG

## 2025-01-21 DIAGNOSIS — Z94.83 PANCREAS REPLACED BY TRANSPLANT (H): Chronic | ICD-10-CM

## 2025-01-21 DIAGNOSIS — N18.31 CHRONIC KIDNEY DISEASE, STAGE 3A (H): ICD-10-CM

## 2025-01-21 DIAGNOSIS — Z94.0 KIDNEY REPLACED BY TRANSPLANT: Chronic | ICD-10-CM

## 2025-01-21 DIAGNOSIS — Z94.0 KIDNEY REPLACED BY TRANSPLANT: ICD-10-CM

## 2025-01-21 DIAGNOSIS — R87.810 CERVICAL HIGH RISK HPV (HUMAN PAPILLOMAVIRUS) TEST POSITIVE: ICD-10-CM

## 2025-01-21 DIAGNOSIS — R06.09 DYSPNEA ON EXERTION: ICD-10-CM

## 2025-01-21 DIAGNOSIS — Z00.00 ENCOUNTER FOR MEDICARE ANNUAL WELLNESS EXAM: Primary | ICD-10-CM

## 2025-01-21 DIAGNOSIS — I10 HYPERTENSION GOAL BP (BLOOD PRESSURE) < 140/90: ICD-10-CM

## 2025-01-21 DIAGNOSIS — E10.11 DIABETIC KETOACIDOSIS WITH COMA ASSOCIATED WITH TYPE 1 DIABETES MELLITUS (H): Primary | ICD-10-CM

## 2025-01-21 DIAGNOSIS — Z98.890 OTHER SPECIFIED POSTPROCEDURAL STATES: ICD-10-CM

## 2025-01-21 DIAGNOSIS — Z48.298 AFTERCARE FOLLOWING ORGAN TRANSPLANT: ICD-10-CM

## 2025-01-21 DIAGNOSIS — Z94.83 PANCREAS REPLACED BY TRANSPLANT (H): ICD-10-CM

## 2025-01-21 LAB
AMYLASE SERPL-CCNC: 68 U/L (ref 28–100)
ANION GAP SERPL CALCULATED.3IONS-SCNC: 10 MMOL/L (ref 7–15)
BUN SERPL-MCNC: 21.6 MG/DL (ref 6–20)
CALCIUM SERPL-MCNC: 9.9 MG/DL (ref 8.8–10.4)
CHLORIDE SERPL-SCNC: 102 MMOL/L (ref 98–107)
CREAT SERPL-MCNC: 1.28 MG/DL (ref 0.51–0.95)
EGFRCR SERPLBLD CKD-EPI 2021: 49 ML/MIN/1.73M2
ERYTHROCYTE [DISTWIDTH] IN BLOOD BY AUTOMATED COUNT: 12.8 % (ref 10–15)
EST. AVERAGE GLUCOSE BLD GHB EST-MCNC: 114 MG/DL
GLUCOSE SERPL-MCNC: 89 MG/DL (ref 70–99)
HBA1C MFR BLD: 5.6 % (ref 0–5.6)
HCO3 SERPL-SCNC: 26 MMOL/L (ref 22–29)
HCT VFR BLD AUTO: 43 % (ref 35–47)
HGB BLD-MCNC: 13.8 G/DL (ref 11.7–15.7)
LIPASE SERPL-CCNC: 54 U/L (ref 13–60)
MCH RBC QN AUTO: 30.8 PG (ref 26.5–33)
MCHC RBC AUTO-ENTMCNC: 32.1 G/DL (ref 31.5–36.5)
MCV RBC AUTO: 96 FL (ref 78–100)
NT-PROBNP SERPL-MCNC: 559 PG/ML (ref 0–900)
PLATELET # BLD AUTO: 319 10E3/UL (ref 150–450)
POTASSIUM SERPL-SCNC: 4.7 MMOL/L (ref 3.4–5.3)
RBC # BLD AUTO: 4.48 10E6/UL (ref 3.8–5.2)
SODIUM SERPL-SCNC: 138 MMOL/L (ref 135–145)
TACROLIMUS BLD-MCNC: 7.9 UG/L (ref 5–15)
TME LAST DOSE: NORMAL H
TME LAST DOSE: NORMAL H
TSH SERPL DL<=0.005 MIU/L-ACNC: 2.69 UIU/ML (ref 0.3–4.2)
WBC # BLD AUTO: 6.5 10E3/UL (ref 4–11)

## 2025-01-21 PROCEDURE — 80197 ASSAY OF TACROLIMUS: CPT

## 2025-01-21 PROCEDURE — 87799 DETECT AGENT NOS DNA QUANT: CPT

## 2025-01-21 PROCEDURE — 80048 BASIC METABOLIC PNL TOTAL CA: CPT

## 2025-01-21 PROCEDURE — 93000 ELECTROCARDIOGRAM COMPLETE: CPT | Performed by: FAMILY MEDICINE

## 2025-01-21 PROCEDURE — 83036 HEMOGLOBIN GLYCOSYLATED A1C: CPT

## 2025-01-21 PROCEDURE — 84443 ASSAY THYROID STIM HORMONE: CPT

## 2025-01-21 PROCEDURE — 85027 COMPLETE CBC AUTOMATED: CPT

## 2025-01-21 PROCEDURE — 83880 ASSAY OF NATRIURETIC PEPTIDE: CPT

## 2025-01-21 PROCEDURE — 99396 PREV VISIT EST AGE 40-64: CPT | Performed by: FAMILY MEDICINE

## 2025-01-21 PROCEDURE — 99214 OFFICE O/P EST MOD 30 MIN: CPT | Mod: 25 | Performed by: FAMILY MEDICINE

## 2025-01-21 PROCEDURE — 36415 COLL VENOUS BLD VENIPUNCTURE: CPT

## 2025-01-21 PROCEDURE — 82150 ASSAY OF AMYLASE: CPT

## 2025-01-21 PROCEDURE — 83690 ASSAY OF LIPASE: CPT

## 2025-01-21 RX ORDER — FUROSEMIDE 20 MG/1
20 TABLET ORAL DAILY
Qty: 30 TABLET | Refills: 1 | Status: SHIPPED | OUTPATIENT
Start: 2025-01-21

## 2025-01-21 SDOH — HEALTH STABILITY: PHYSICAL HEALTH: ON AVERAGE, HOW MANY DAYS PER WEEK DO YOU ENGAGE IN MODERATE TO STRENUOUS EXERCISE (LIKE A BRISK WALK)?: 1 DAY

## 2025-01-21 ASSESSMENT — PATIENT HEALTH QUESTIONNAIRE - PHQ9
SUM OF ALL RESPONSES TO PHQ QUESTIONS 1-9: 4
SUM OF ALL RESPONSES TO PHQ QUESTIONS 1-9: 4
10. IF YOU CHECKED OFF ANY PROBLEMS, HOW DIFFICULT HAVE THESE PROBLEMS MADE IT FOR YOU TO DO YOUR WORK, TAKE CARE OF THINGS AT HOME, OR GET ALONG WITH OTHER PEOPLE: SOMEWHAT DIFFICULT

## 2025-01-21 ASSESSMENT — SOCIAL DETERMINANTS OF HEALTH (SDOH): HOW OFTEN DO YOU GET TOGETHER WITH FRIENDS OR RELATIVES?: NEVER

## 2025-01-21 NOTE — PATIENT INSTRUCTIONS
Patient Education   Preventive Care Advice   This is general advice given by our system to help you stay healthy. However, your care team may have specific advice just for you. Please talk to your care team about your preventive care needs.  Nutrition  Eat 5 or more servings of fruits and vegetables each day.  Try wheat bread, brown rice and whole grain pasta (instead of white bread, rice, and pasta).  Get enough calcium and vitamin D. Check the label on foods and aim for 100% of the RDA (recommended daily allowance).  Lifestyle  Exercise at least 150 minutes each week  (30 minutes a day, 5 days a week).  Do muscle strengthening activities 2 days a week. These help control your weight and prevent disease.  No smoking.  Wear sunscreen to prevent skin cancer.  Have a dental exam and cleaning every 6 months.  Yearly exams  See your health care team every year to talk about:  Any changes in your health.  Any medicines your care team has prescribed.  Preventive care, family planning, and ways to prevent chronic diseases.  Shots (vaccines)   HPV shots (up to age 26), if you've never had them before.  Hepatitis B shots (up to age 59), if you've never had them before.  COVID-19 shot: Get this shot when it's due.  Flu shot: Get a flu shot every year.  Tetanus shot: Get a tetanus shot every 10 years.  Pneumococcal, hepatitis A, and RSV shots: Ask your care team if you need these based on your risk.  Shingles shot (for age 50 and up)  General health tests  Diabetes screening:  Starting at age 35, Get screened for diabetes at least every 3 years.  If you are younger than age 35, ask your care team if you should be screened for diabetes.  Cholesterol test: At age 39, start having a cholesterol test every 5 years, or more often if advised.  Bone density scan (DEXA): At age 50, ask your care team if you should have this scan for osteoporosis (brittle bones).  Hepatitis C: Get tested at least once in your life.  STIs (sexually  transmitted infections)  Before age 24: Ask your care team if you should be screened for STIs.  After age 24: Get screened for STIs if you're at risk. You are at risk for STIs (including HIV) if:  You are sexually active with more than one person.  You don't use condoms every time.  You or a partner was diagnosed with a sexually transmitted infection.  If you are at risk for HIV, ask about PrEP medicine to prevent HIV.  Get tested for HIV at least once in your life, whether you are at risk for HIV or not.  Cancer screening tests  Cervical cancer screening: If you have a cervix, begin getting regular cervical cancer screening tests starting at age 21.  Breast cancer scan (mammogram): If you've ever had breasts, begin having regular mammograms starting at age 40. This is a scan to check for breast cancer.  Colon cancer screening: It is important to start screening for colon cancer at age 45.  Have a colonoscopy test every 10 years (or more often if you're at risk) Or, ask your provider about stool tests like a FIT test every year or Cologuard test every 3 years.  To learn more about your testing options, visit:   .  For help making a decision, visit:   https://bit.ly/hd47643.  Prostate cancer screening test: If you have a prostate, ask your care team if a prostate cancer screening test (PSA) at age 55 is right for you.  Lung cancer screening: If you are a current or former smoker ages 50 to 80, ask your care team if ongoing lung cancer screenings are right for you.  For informational purposes only. Not to replace the advice of your health care provider. Copyright   2023 [facility]. All rights reserved. Clinically reviewed by the [facility] Transitions Program. Triage 235552 - REV 01/24.  Learning About Activities of Daily Living  What are activities of daily living?     Activities of daily living (ADLs) are the basic self-care tasks you do every day. These include eating, bathing, dressing,  and moving around.  As you age, and if you have health problems, you may find that it's harder to do some of these tasks. If so, your doctor can suggest ideas that may help.  To measure what kind of help you may need, your doctor will ask how well you are able to do ADLs. Let your doctor know if there are any tasks that you are having trouble doing. This is an important first step to getting help. And when you have the help you need, you can stay as independent as possible.  How will a doctor assess your ADLs?  Asking about ADLs is part of a routine health checkup your doctor will likely do as you age. Your health check might be done in a doctor's office, in your home, or at a hospital. The goal is to find out if you are having any problems that could make it hard to care for yourself or that make it unsafe for you to be on your own.  To measure your ADLs, your doctor will ask how hard it is for you to do routine tasks. Your doctor may also want to know if you have changed the way you do a task because of a health problem. Your doctor may watch how you:  Walk back and forth.  Keep your balance while you stand or walk.  Move from sitting to standing or from a bed to a chair.  Button or unbutton a shirt or sweater.  Remove and put on your shoes.  It's common to feel a little worried or anxious if you find you can't do all the things you used to be able to do. Talking with your doctor about ADLs is a way to make sure you're as safe as possible and able to care for yourself as well as you can. You may want to bring a caregiver, friend, or family member to your checkup. They can help you talk to your doctor.  Follow-up care is a key part of your treatment and safety. Be sure to make and go to all appointments, and call your doctor if you are having problems. It's also a good idea to know your test results and keep a list of the medicines you take.  Current as of: October 24, 2023  Content Version: 14.3    2024 [facility]  [employer].   Care instructions adapted under license by your healthcare professional. If you have questions about a medical condition or this instruction, always ask your healthcare professional. [facility] [employer] disclaims any warranty or liability for your use of this information.    Preventing Falls: Care Instructions  Injuries and health problems such as trouble walking or poor eyesight can increase your risk of falling. So can some medicines. But there are things you can do to help prevent falls. You can exercise to get stronger. You can also arrange your home to make it safer.    Talk to your doctor about the medicines you take. Ask if any of them increase the risk of falls and whether they can be changed or stopped.   Try to exercise regularly. It can help improve your strength and balance. This can help lower your risk of falling.         Practice fall safety and prevention.   Wear low-heeled shoes that fit well and give your feet good support. Talk to your doctor if you have foot problems that make this hard.  Carry a cellphone or wear a medical alert device that you can use to call for help.  Use stepladders instead of chairs to reach high objects. Don't climb if you're at risk for falls. Ask for help, if needed.  Wear the correct eyeglasses, if you need them.        Make your home safer.   Remove rugs, cords, clutter, and furniture from walkways.  Keep your house well lit. Use night-lights in hallways and bathrooms.  Install and use sturdy handrails on stairways.  Wear nonskid footwear, even inside. Don't walk barefoot or in socks without shoes.        Be safe outside.   Use handrails, curb cuts, and ramps whenever possible.  Keep your hands free by using a shoulder bag or backpack.  Try to walk in well-lit areas. Watch out for uneven ground, changes in pavement, and debris.  Be careful in the winter. Walk on the grass or gravel when sidewalks are slippery. Use de-icer on steps and walkways.  "Add non-slip devices to shoes.    Put grab bars and nonskid mats in your shower or tub and near the toilet. Try to use a shower chair or bath bench when bathing.   Get into a tub or shower by putting in your weaker leg first. Get out with your strong side first. Have a phone or medical alert device in the bathroom with you.   Where can you learn more?  Go to https://www.[employer]/patiented  Enter G117 in the search box to learn more about \"Preventing Falls: Care Instructions.\"  Current as of: July 31, 2024  Content Version: 14.3    2024 [employer].   Care instructions adapted under license by your healthcare professional. If you have questions about a medical condition or this instruction, always ask your healthcare professional. [employer] disclaims any warranty or liability for your use of this information.    Hearing Loss: Care Instructions  Overview     Hearing loss is a sudden or slow decrease in how well you hear. It can range from slight to profound. Permanent hearing loss can occur with aging. It also can happen when you are exposed long-term to loud noise. Examples include listening to loud music, riding motorcycles, or being around other loud machines.  Hearing loss can affect your work and home life. It can make you feel lonely or depressed. You may feel that you have lost your independence. But hearing aids and other devices can help you hear better and feel connected to others.  Follow-up care is a key part of your treatment and safety. Be sure to make and go to all appointments, and call your doctor if you are having problems. It's also a good idea to know your test results and keep a list of the medicines you take.  How can you care for yourself at home?  Avoid loud noises whenever possible. This helps keep your hearing from getting worse.  Always wear hearing protection around loud noises.  Wear a hearing aid as directed.  A professional can help you pick a hearing aid " "that will work best for you.  You can also get hearing aids over the counter for mild to moderate hearing loss.  Have hearing tests as your doctor suggests. They can show whether your hearing has changed. Your hearing aid may need to be adjusted.  Use other devices as needed. These may include:  Telephone amplifiers and hearing aids that can connect to a television, stereo, radio, or microphone.  Devices that use lights or vibrations. These alert you to the doorbell, a ringing telephone, or a baby monitor.  Television closed-captioning. This shows the words at the bottom of the screen. Most new TVs can do this.  TTY (text telephone). This lets you type messages back and forth on the telephone instead of talking or listening. These devices are also called TDD. When messages are typed on the keyboard, they are sent over the phone line to a receiving TTY. The message is shown on a monitor.  Use text messaging, social media, and email if it is hard for you to communicate by telephone.  Try to learn a listening technique called speechreading. It is not lipreading. You pay attention to people's gestures, expressions, posture, and tone of voice. These clues can help you understand what a person is saying. Face the person you are talking to, and have them face you. Make sure the lighting is good. You need to see the other person's face clearly.  Think about counseling if you need help to adjust to your hearing loss.  When should you call for help?  Watch closely for changes in your health, and be sure to contact your doctor if:    You think your hearing is getting worse.     You have new symptoms, such as dizziness or nausea.   Where can you learn more?  Go to https://www.[employer].net/patiented  Enter R798 in the search box to learn more about \"Hearing Loss: Care Instructions.\"  Current as of: September 27, 2023  Content Version: 14.3    2024 [employer].   Care instructions adapted under license by your " healthcare professional. If you have questions about a medical condition or this instruction, always ask your healthcare professional. [employer] disclaims any warranty or liability for your use of this information.    Learning About Sleeping Well  What does sleeping well mean?     Sleeping well means getting enough sleep to feel good and stay healthy. How much sleep is enough varies among people.  The number of hours you sleep and how you feel when you wake up are both important. If you do not feel refreshed, you probably need more sleep. Another sign of not getting enough sleep is feeling tired during the day.  Experts recommend that adults get at least 7 or more hours of sleep per day. Children and older adults need more sleep.  Why is getting enough sleep important?  Getting enough quality sleep is a basic part of good health. When your sleep suffers, your physical health, mood, and your thoughts can suffer too. You may find yourself feeling more grumpy or stressed. Not getting enough sleep also can lead to serious problems, including injury, accidents, anxiety, and depression.  What might cause poor sleeping?  Many things can cause sleep problems, including:  Changes to your sleep schedule.  Stress. Stress can be caused by fear about a single event, such as giving a speech. Or you may have ongoing stress, such as worry about work or school.  Depression, anxiety, and other mental or emotional conditions.  Changes in your sleep habits or surroundings. This includes changes that happen where you sleep, such as noise, light, or sleeping in a different bed. It also includes changes in your sleep pattern, such as having jet lag or working a late shift.  Health problems, such as pain, breathing problems, and restless legs syndrome.  Lack of regular exercise.  Using alcohol, nicotine, or caffeine before bed.  How can you help yourself?  Here are some tips that may help you sleep more soundly and wake up  "feeling more refreshed.  Your sleeping area   Use your bedroom only for sleeping and sex. A bit of light reading may help you fall asleep. But if it doesn't, do your reading elsewhere in the house. Try not to use your TV, computer, smartphone, or tablet while you are in bed.  Be sure your bed is big enough to stretch out comfortably, especially if you have a sleep partner.  Keep your bedroom quiet, dark, and cool. Use curtains, blinds, or a sleep mask to block out light. To block out noise, use earplugs, soothing music, or a \"white noise\" machine.  Your evening and bedtime routine   Create a relaxing bedtime routine. You might want to take a warm shower or bath, or listen to soothing music.  Go to bed at the same time every night. And get up at the same time every morning, even if you feel tired.  What to avoid   Limit caffeine (coffee, tea, caffeinated sodas) during the day, and don't have any for at least 6 hours before bedtime.  Avoid drinking alcohol before bedtime. Alcohol can cause you to wake up more often during the night.  Try not to smoke or use tobacco, especially in the evening. Nicotine can keep you awake.  Limit naps during the day, especially close to bedtime.  Avoid lying in bed awake for too long. If you can't fall asleep or if you wake up in the middle of the night and can't get back to sleep within about 20 minutes, get out of bed and go to another room until you feel sleepy.  Avoid taking medicine right before bed that may keep you awake or make you feel hyper or energized. Your doctor can tell you if your medicine may do this and if you can take it earlier in the day.  If you can't sleep   Imagine yourself in a peaceful, pleasant scene. Focus on the details and feelings of being in a place that is relaxing.  Get up and do a quiet or boring activity until you feel sleepy.  Avoid drinking any liquids before going to bed to help prevent waking up often to use the bathroom.  Where can you learn " "more?  Go to https://www.[employer].net/patiented  Enter J942 in the search box to learn more about \"Learning About Sleeping Well.\"  Current as of: July 31, 2024  Content Version: 14.3    2024 [employer].   Care instructions adapted under license by your healthcare professional. If you have questions about a medical condition or this instruction, always ask your healthcare professional. [employer] disclaims any warranty or liability for your use of this information.       "

## 2025-01-22 LAB
BK VIRUS SPECIMEN TYPE: NORMAL
BKV DNA # SPEC NAA+PROBE: NOT DETECTED IU/ML

## 2025-01-30 DIAGNOSIS — Z94.0 KIDNEY REPLACED BY TRANSPLANT: ICD-10-CM

## 2025-01-30 DIAGNOSIS — Z94.83 PANCREAS REPLACED BY TRANSPLANT (H): ICD-10-CM

## 2025-01-30 DIAGNOSIS — E10.22 TYPE 1 DIABETES MELLITUS WITH CHRONIC KIDNEY DISEASE ON CHRONIC DIALYSIS (H): ICD-10-CM

## 2025-01-30 DIAGNOSIS — Z99.2 TYPE 1 DIABETES MELLITUS WITH CHRONIC KIDNEY DISEASE ON CHRONIC DIALYSIS (H): ICD-10-CM

## 2025-01-30 DIAGNOSIS — N18.6 TYPE 1 DIABETES MELLITUS WITH CHRONIC KIDNEY DISEASE ON CHRONIC DIALYSIS (H): ICD-10-CM

## 2025-01-30 NOTE — TELEPHONE ENCOUNTER
Medication Question or Refill    Contacts       Contact Date/Time Type Contact Phone/Fax    01/30/2025 04:15 PM CST Phone (Incoming) HessHermila larson (Self) [phone] (M)     Requesting refill of medications            What medication are you calling about (include dose and sig)?: sulfamethoxazole-trimethoprim (BACTRIM) 400-80 MG tablet       atorvastatin (LIPITOR) 20 MG tablet       Preferred Pharmacy:     [facility] - [place] [address]       Controlled Substance Agreement on file:   CSA -- Patient Level:    CSA: None found at the patient level.       Who prescribed the medication?: Leida Moody MD     Do you need a refill? Yes    When did you use the medication last? 1/29/25    Patient offered an appointment? No    Do you have any questions or concerns?  No      Could we send this information to you in [facility] or would you prefer to receive a phone call?:   Patient would prefer a phone call   Okay to leave a detailed message?: Yes at Cell number on file:    Telephone Information:   Mobile [phone]   Mobile [phone]   Mobile Not on file.

## 2025-01-31 NOTE — TELEPHONE ENCOUNTER
Routing to TEAM to please call pt    Patient should have refills of both on file and he should call pharmacy    If needing further clarification, these are managed by his infusion/transplant provider - Dr. Salo GRIFFIN, BSN  Otter Triage RN  [facility]

## 2025-02-04 RX ORDER — ATORVASTATIN CALCIUM 20 MG/1
20 TABLET, FILM COATED ORAL EVERY EVENING
Qty: 30 TABLET | Refills: 11 | Status: SHIPPED | OUTPATIENT
Start: 2025-02-04

## 2025-02-04 RX ORDER — SULFAMETHOXAZOLE AND TRIMETHOPRIM 400; 80 MG/1; MG/1
1 TABLET ORAL
Qty: 12 TABLET | Refills: 11 | Status: SHIPPED | OUTPATIENT
Start: 2025-02-05

## 2025-02-12 PROBLEM — R87.810 CERVICAL HIGH RISK HPV (HUMAN PAPILLOMAVIRUS) TEST POSITIVE: Status: ACTIVE | Noted: 2024-12-19

## 2025-02-24 ENCOUNTER — OFFICE VISIT (OUTPATIENT)
Dept: FAMILY MEDICINE | Facility: CLINIC | Age: 55
End: 2025-02-24
Payer: COMMERCIAL

## 2025-02-24 ENCOUNTER — ANCILLARY PROCEDURE (OUTPATIENT)
Dept: GENERAL RADIOLOGY | Facility: CLINIC | Age: 55
End: 2025-02-24
Attending: FAMILY MEDICINE
Payer: COMMERCIAL

## 2025-02-24 VITALS
DIASTOLIC BLOOD PRESSURE: 64 MMHG | RESPIRATION RATE: 18 BRPM | HEIGHT: 61 IN | WEIGHT: 188.6 LBS | TEMPERATURE: 97 F | BODY MASS INDEX: 35.61 KG/M2 | SYSTOLIC BLOOD PRESSURE: 132 MMHG | OXYGEN SATURATION: 94 % | HEART RATE: 76 BPM

## 2025-02-24 DIAGNOSIS — M79.675 PAIN OF TOE OF LEFT FOOT: ICD-10-CM

## 2025-02-24 DIAGNOSIS — N18.32 TYPE 1 DIABETES MELLITUS WITH STAGE 3B CHRONIC KIDNEY DISEASE (H): ICD-10-CM

## 2025-02-24 DIAGNOSIS — E10.59 TYPE 1 DIABETES MELLITUS WITH OTHER CIRCULATORY COMPLICATIONS (H): ICD-10-CM

## 2025-02-24 DIAGNOSIS — I10 HYPERTENSION GOAL BP (BLOOD PRESSURE) < 140/90: Primary | ICD-10-CM

## 2025-02-24 DIAGNOSIS — S92.415A CLOSED NONDISPLACED FRACTURE OF PROXIMAL PHALANX OF LEFT GREAT TOE, INITIAL ENCOUNTER: ICD-10-CM

## 2025-02-24 DIAGNOSIS — E10.22 TYPE 1 DIABETES MELLITUS WITH STAGE 3B CHRONIC KIDNEY DISEASE (H): ICD-10-CM

## 2025-02-24 DIAGNOSIS — M79.674 PAIN IN RIGHT TOE(S): ICD-10-CM

## 2025-02-24 PROCEDURE — 1125F AMNT PAIN NOTED PAIN PRSNT: CPT | Performed by: FAMILY MEDICINE

## 2025-02-24 PROCEDURE — 3078F DIAST BP <80 MM HG: CPT | Performed by: FAMILY MEDICINE

## 2025-02-24 PROCEDURE — 82607 VITAMIN B-12: CPT | Performed by: FAMILY MEDICINE

## 2025-02-24 PROCEDURE — 99207 PR FOOT EXAM NO CHARGE: CPT | Performed by: FAMILY MEDICINE

## 2025-02-24 PROCEDURE — 3075F SYST BP GE 130 - 139MM HG: CPT | Performed by: FAMILY MEDICINE

## 2025-02-24 PROCEDURE — 36415 COLL VENOUS BLD VENIPUNCTURE: CPT | Performed by: FAMILY MEDICINE

## 2025-02-24 PROCEDURE — 73660 X-RAY EXAM OF TOE(S): CPT | Mod: TC | Performed by: RADIOLOGY

## 2025-02-24 PROCEDURE — 99214 OFFICE O/P EST MOD 30 MIN: CPT | Performed by: FAMILY MEDICINE

## 2025-02-24 RX ORDER — FUROSEMIDE 20 MG/1
20 TABLET ORAL DAILY
Qty: 90 TABLET | Refills: 1 | Status: SHIPPED | OUTPATIENT
Start: 2025-02-24

## 2025-02-24 ASSESSMENT — PAIN SCALES - GENERAL: PAINLEVEL_OUTOF10: MILD PAIN (1)

## 2025-02-24 NOTE — PROGRESS NOTES
Assessment & Plan     Hermila was seen today for hypertension, kidney problem and heart failure.    Diagnoses and all orders for this visit:    Hypertension goal BP (blood pressure) < 140/90, controlled  -     Refill: furosemide (LASIX) 20 MG tablet; Take 1 tablet (20 mg) by mouth daily.    Pain of toe of left foot with recent trauma- ? fracture  -     FOOT EXAM  -     XR Toe Left G/E 2 Views; Future    Closed nondisplaced fracture of proximal phalanx of left great toe, initial encounter  -     Orthopedic  Referral; Future    Pain in right toe(s) with Type 1 diabetes mellitus with other circulatory complications (H)  -     US NASIMA Doppler No Exercise; Future  -     Vitamin B12    Type 1 diabetes mellitus with stage 3b chronic kidney disease (H)  -     Adult Eye  Referral; Future  -     Following with nephrology team following transplant        Return in about 2 weeks (around 3/10/2025), or if symptoms worsen or fail to improve.      Subjective   Hermila is a 54 year old, presenting for the following health issues:  Hypertension, Kidney Problem, and Heart Failure        2/24/2025     1:13 PM   Additional Questions   Roomed by Lamar LUCERO MA   Accompanied by n/a         2/24/2025     1:13 PM   Patient Reported Additional Medications   Patient reports taking the following new medications No Medications Missing     History of Present Illness       CKD: Hermila is not using over the counter pain medicine.     Heart Failure:  Hermila presents for follow up of heart failure. Hermila is experiencing shortness of breath with activity only, which is improved. Hermila is not experiencing any lower extremity edema.   Hermila denies orthopenea and is not coughing at night. Patient is not checking weight daily. Hermila has recently had a None.  Hermila has side effects from medications including other.  Hermila has had no other medical visits for heart failure since the last visit.    Hypertension: Hermila presents for follow up of  hypertension.  Hermila does check blood pressure  regularly outside of the clinic. Outside blood pressures have been over 140/90. Hermila follows a low salt diet.     Hermila eats 2-3 servings of fruits and vegetables daily.Hermila consumes 0 sweetened beverage(s) daily.Hermila exercises with enough effort to increase Hermila's heart rate 9 or less minutes per day.  Hermila exercises with enough effort to increase Hermila's heart rate 3 or less days per week.   Hermila is taking medications regularly.       Hypertension follow up.   Seen at the last office visit on 1/21/2025 for an annual wellness visit.  Noted to have uncontrolled hypertension.   Furosemide 20 mg/day was added to regimen. States that she is tolerating it well. No side effects reported.  Blood pressure is well controlled at this time.   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     Last kidney function tests-    Component      Latest Ref Rng 2/21/2025  9:09 AM   Urea Nitrogen      6.0 - 20.0 mg/dL 27.2 (H)    Creatinine      0.51 - 0.95 mg/dL 1.29 (H)    GFR Estimate      >60 mL/min/1.73m2 48 (L)        Additional Concern-    Joint Pain  Onset: 1 week  Description:   Location: left toe  Character: Sharp  Intensity: moderate  Progression of Symptoms: same  Accompanying Signs & Symptoms:  Other symptoms: swelling  History:   Previous similar pain: no     Precipitating factors:   Trauma or overuse: YES- trauma- hit toe on furniture at home   Alleviating factors:  Improved by: rest/inactivity    Therapies Tried and outcome: ice      Reports having a burning neuropathic pain of most of her toes.   Patient has an underlying history of Type 1 diabetes. Control  "improved with pancreas transplant in 2022.   Last A1c   Component      Latest Ref Rng 1/21/2025  8:17 AM   Estimated Average Glucose      <117 mg/dL 114    Hemoglobin A1C      0.0 - 5.6 % 5.6      Reported dyspnea on exertion- labs completed at last visit. Echo is still pending. Patient plans to schedule.       Review of Systems  Constitutional, HEENT, cardiovascular, pulmonary, gi and gu systems are negative, except as otherwise noted.      Objective    /64   Pulse 76   Temp 97  F (36.1  C) (Temporal)   Resp 18   Ht 1.545 m (5' 0.83\")   Wt 85.5 kg (188 lb 9.6 oz)   LMP 10/01/2023   SpO2 94%   BMI 35.84 kg/m    Body mass index is 35.84 kg/m .  Physical Exam   GENERAL: alert and no distress  Diabetic foot exam: normal DP and PT pulses, no trophic changes or ulcerative lesions, normal sensory exam, normal monofilament exam, dependent rubor present, and tenderness of the left 5th toe.     DATA  Results for orders placed or performed in visit on 02/24/25   XR Toe Left G/E 2 Views     Status: None    Narrative    EXAM: XR TOE LEFT G/E 2 VIEWS  LOCATION: [facility]  DATE: 2/24/2025    INDICATION:  Pain of toe of left foot  COMPARISON: None.      Impression    IMPRESSION: Acute, mildly impacted fracture of the fifth proximal phalangeal base. No definite intra-articular extension to the fifth MTP joint. Soft tissue swelling fifth toe. Mild scattered degenerative arthritis. Plantar calcaneal spur. Arterial   calcifications.   Results for orders placed or performed in visit on 02/24/25   Vitamin B12     Status: Normal   Result Value Ref Range    Vitamin B12 375 232 - 1,245 pg/mL             Signed Electronically by: Kiersten Spain MD    "

## 2025-02-25 LAB — VIT B12 SERPL-MCNC: 375 PG/ML (ref 232–1245)

## 2025-03-04 ENCOUNTER — ANCILLARY PROCEDURE (OUTPATIENT)
Dept: ULTRASOUND IMAGING | Facility: CLINIC | Age: 55
End: 2025-03-04
Attending: FAMILY MEDICINE
Payer: COMMERCIAL

## 2025-03-04 DIAGNOSIS — M79.674 PAIN IN RIGHT TOE(S): ICD-10-CM

## 2025-03-04 DIAGNOSIS — E10.59 TYPE 1 DIABETES MELLITUS WITH OTHER CIRCULATORY COMPLICATIONS (H): ICD-10-CM

## 2025-03-04 PROCEDURE — 93922 UPR/L XTREMITY ART 2 LEVELS: CPT | Performed by: RADIOLOGY

## 2025-03-06 NOTE — PATIENT INSTRUCTIONS
To Schedule an Appointment 24/7  Call: [phone]    If you have any questions regarding your visit, Please contact your care team.  [facility]: [phone]  [facility] HOURS TELEPHONE NUMBER   Cephas Agbeh, M.D.      Terrell Murrell-Surgery Scheduler  Val-Surgery Scheduler       Monday - [place]:    8:00 am-4:45 pm  Tuesday - [place]:   8:00 am-4:45 pm  Friday-[place]:       8:00 am-4:45 pm  Typical Surgery Day:  Wednesday [facility]   [address] [place] [address]   [phone]   Fax [phone]    Imaging Scheduling all locations  [phone]      [facility]   [address] [place] [address] [place] [address]   [phone]    [facility]  [address] [facility] [address]  [phone]  Fax [phone]   Urgent Care locations:  [facility] Monday-Friday                               10 am - 8 pm  Saturday and Sunday                      9 am - 5 pm  Monday-Friday                              10 am- 8 pm  Saturday and Sunday                      9 am - 5 pm    [phone]   **Surgeries** Our Surgery Schedulers will contact you to schedule. If you do not receive a call within 3 business days, please call [phone].  If you need a medication refill, please contact your pharmacy. Please allow 3 business days for your refill to be completed.  As always, Thank you for trusting us with your healthcare needs!  see additional instructions from your care team below

## 2025-03-10 ENCOUNTER — OFFICE VISIT (OUTPATIENT)
Dept: OBGYN | Facility: CLINIC | Age: 55
End: 2025-03-10
Payer: COMMERCIAL

## 2025-03-10 ENCOUNTER — TELEPHONE (OUTPATIENT)
Dept: VASCULAR SURGERY | Facility: CLINIC | Age: 55
End: 2025-03-10

## 2025-03-10 VITALS
WEIGHT: 193.4 LBS | SYSTOLIC BLOOD PRESSURE: 127 MMHG | BODY MASS INDEX: 36.75 KG/M2 | DIASTOLIC BLOOD PRESSURE: 68 MMHG | OXYGEN SATURATION: 96 % | HEART RATE: 73 BPM

## 2025-03-10 DIAGNOSIS — B97.7 HIGH RISK HPV INFECTION: Primary | ICD-10-CM

## 2025-03-10 DIAGNOSIS — E10.59 TYPE 1 DIABETES MELLITUS WITH OTHER CIRCULATORY COMPLICATIONS (H): ICD-10-CM

## 2025-03-10 DIAGNOSIS — R68.89 ABNORMAL ANKLE BRACHIAL INDEX (ABI): Primary | ICD-10-CM

## 2025-03-10 PROCEDURE — 88305 TISSUE EXAM BY PATHOLOGIST: CPT | Performed by: PATHOLOGY

## 2025-03-10 PROCEDURE — 3078F DIAST BP <80 MM HG: CPT | Performed by: OBSTETRICS & GYNECOLOGY

## 2025-03-10 PROCEDURE — 3074F SYST BP LT 130 MM HG: CPT | Performed by: OBSTETRICS & GYNECOLOGY

## 2025-03-10 PROCEDURE — 57456 ENDOCERV CURETTAGE W/SCOPE: CPT | Performed by: OBSTETRICS & GYNECOLOGY

## 2025-03-10 NOTE — TELEPHONE ENCOUNTER
Vascular Referral Intake    Appointment note (to be pasted into appt note. 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 Roopa Auguste    Visit Type: New    Time Frame: Next Available    Testing/Imaging Needed Before Consult: US lower extremity arterial     Lesly or bed needed: No    *Schedulers: Please send welcome letter to patient after appointment(s) scheduled*

## 2025-03-10 NOTE — PROGRESS NOTES
Patient Name: Eden Hess              Date: 3/10/2025   YOB: 1970                         Age: 54 year old   Phone: [phone] (home)   ________________________________________________________________________  I have been asked to see Eden in consultation by Dr. LOONEY  to discuss the pap smear, findings and possible further evaluation.  The patient's pap smear history is as noted:  Overview    2009, 2011, 2013 NIL paps  3/3/20 NIL pap, neg HPV  Above per CE  12/19/24 NIL pap, +HR HPV 16. Plan colp due before 3/19/25  12/27/24 Pt notified   2/12/25 Reminder mychart     Pt immunosuppressed s/p transplant        I attempted to ensure that the patient was educated regarding the nature of her findings and implications to date.  We reviewed the role of HPV, incidence in the population and the natural history of the infection, and its transmission.  We also reviewed ways to minimize her future risk, the effect of HPV on the cervix and treatment options available, should they be indicated.    The pathophysiology of the cervix, including a discussion of the squamous and columnar cells, metaplasia and dysplasia have been reviewed, drawings, sketches and the pamphlets were reviewed with her.      Patient's last menstrual period was 10/01/2023.Postmenopause.    Family History of  Cervical, Uterine or Vaginal Cancer?: No    Past Medical History:   Diagnosis Date    (HFpEF) heart failure with preserved ejection fraction (H)     Anxiety and depression     [facility]    Diabetes mellitus type 1 (H)     Diagnosed at age 2 years old     End stage kidney disease (H)     Hypertension     Retinopathy     TIA (transient ischemic attack) 2017       Past Surgical History:   Procedure Laterality Date    BENCH KIDNEY Left 9/23/2022    Procedure: Bench kidney;  Surgeon: Yousif Gómez MD;  Location: [facility] OR    BENCH PANCREAS N/A 9/23/2022    Procedure: Bench pancreas;   Surgeon: Yousif Gómez MD;  Location: [facility] OR     SECTION      x2    CREATE FISTULA ARTERIOVENOUS UPPER EXTREMITY      dialysis port, fistula-R arm    TRANSPLANT PANCREAS, KIDNEY  DONOR, COMBINED N/A 2022    Procedure: TRANSPLANT, KIDNEY AND PANCREAS,  DONOR;  Surgeon: Yousif Gómez MD;  Location: [facility] OR        Outpatient Encounter Medications as of 3/10/2025   Medication Sig Dispense Refill    aspirin 81 MG EC tablet Take 81 mg by mouth daily      atorvastatin (LIPITOR) 20 MG tablet Take 1 tablet (20 mg) by mouth every evening. 30 tablet 11    buPROPion (WELLBUTRIN XL) 150 MG 24 hr tablet Take 150 mg by mouth every morning.      carvedilol (COREG) 12.5 MG tablet Take 1 tablet (12.5 mg) by mouth 2 times daily (with meals). 60 tablet 11    FIBER ADULT GUMMIES PO Take 1 Gum by mouth daily      furosemide (LASIX) 20 MG tablet Take 1 tablet (20 mg) by mouth daily. 90 tablet 1    mycophenolic acid (GENERIC EQUIVALENT) 180 MG EC tablet Take 3 tablets (540 mg) by mouth 2 times daily. 180 tablet 11    sulfamethoxazole-trimethoprim (BACTRIM) 400-80 MG tablet Take 1 tablet by mouth three times a week. 12 tablet 11    tacrolimus (GENERIC EQUIVALENT) 0.5 MG capsule Take 1 capsule (0.5 mg) by mouth 2 times daily. 180 capsule 3    venlafaxine (EFFEXOR XR) 150 MG 24 hr capsule Take 1 capsule (150 mg) by mouth daily 30 capsule 0    venlafaxine (EFFEXOR XR) 75 MG 24 hr capsule Take 225 mg by mouth daily. (Patient not taking: Reported on 3/10/2025)      [DISCONTINUED] CALCITRIOL PO Take 0.25 mcg by mouth      [DISCONTINUED] Glucagon 3 MG/DOSE POWD Spray 1 spray in nostril (Patient not taking: Reported on 2022)      [DISCONTINUED] LANTUS VIAL 100 UNIT/ML soln 30 Units At Bedtime  (Patient not taking: Reported on 2022)  4    [DISCONTINUED] NOVOLOG VIAL 100 UNIT/ML soln Inject by subcutaneous route via pump: 00: 0.4 units/hour, 06:00-12:00 0.6 units/hour, 12:00-00:00 0.8 units/hour.  Addn'l bolus dosin.75 units/15g carbs. Max 50 units/day  1     No facility-administered encounter medications on file as of 3/10/2025.        Allergies as of 03/10/2025 - Reviewed 03/10/2025   Allergen Reaction Noted    Amlodipine  2020       Social History     Socioeconomic History    Marital status:      Spouse name: None    Number of children: None    Years of education: None    Highest education level: None   Tobacco Use    Smoking status: Former     Current packs/day: 0.00     Types: Cigarettes     Quit date: 1997     Years since quittin.2     Passive exposure: Never    Smokeless tobacco: Never   Vaping Use    Vaping status: Never Used   Substance and Sexual Activity    Alcohol use: Yes     Comment: 3/week    Drug use: No    Sexual activity: Yes     Partners: Male     Birth control/protection: Male Surgical, Post-menopausal     Social Drivers of Health     Financial Resource Strain: Low Risk  (2025)    Financial Resource Strain     Within the past 12 months, have you or your family members you live with been unable to get utilities (heat, electricity) when it was really needed?: No   Food Insecurity: Low Risk  (2025)    Food Insecurity     Within the past 12 months, did you worry that your food would run out before you got money to buy more?: No     Within the past 12 months, did the food you bought just not last and you didn t have money to get more?: No   Transportation Needs: Low Risk  (2025)    Transportation Needs     Within the past 12 months, has lack of transportation kept you from medical appointments, getting your medicines, non-medical meetings or appointments, work, or from getting things that you need?: No   Physical Activity: Unknown (2025)    Exercise Vital Sign     Days of Exercise per Week: 1 day   Stress: No Stress Concern Present (2025)    [employer] - Occupational Stress Questionnaire     Feeling of Stress :  "Not at all   Social Connections: Unknown (1/21/2025)    Social Connection and Isolation Panel [NHANES]     Frequency of Social Gatherings with Friends and Family: Never   Interpersonal Safety: Low Risk  (1/21/2025)    Interpersonal Safety     Do you feel physically and emotionally safe where you currently live?: Yes     Within the past 12 months, have you been hit, slapped, kicked or otherwise physically hurt by someone?: No     Within the past 12 months, have you been humiliated or emotionally abused in other ways by your partner or ex-partner?: No   Housing Stability: Low Risk  (1/21/2025)    Housing Stability     Do you have housing? : Yes     Are you worried about losing your housing?: No        Family History   Problem Relation Age of Onset    Diabetes Type 1 Father     Hypertension Father     Cerebrovascular Disease Father     Skin Cancer Brother          Review Of Systems  10 point ROS of systems including Constitutional, Eyes, Respiratory, Cardiovascular, Gastroenterology, Genitourinary, Integumentary, Muscularskeletal, Psychiatric were all negative except for pertinent positives noted in my HPI and in the PMH.      Exam:   Vital signs:      BP: 127/68 Pulse: 73     SpO2: 96 %       Weight: 87.7 kg (193 lb 6.4 oz)  Estimated body mass index is 36.75 kg/m  as calculated from the following:    Height as of 2/24/25: 1.545 m (5' 0.83\").    Weight as of this encounter: 87.7 kg (193 lb 6.4 oz).        LMP 10/01/2023   GENERAL:  WNWD female NAD  HEENT: NC/AT, EOMI  Lungs:  Good respiratory effort   SKIN: normal skin turgor  GAIT: Normal  NECK: Symmetrical, no masses noted   VULVA: Normal Genitalia  BUS: Normal  URETHRA:  No hypermobility noted  URETHRAL MEATUS:  No masses noted  VAGINA: Normal mucosa, no discharge  CERVIX: Closed, mobile, no discharge  PERIANAL:  No masses or lesions seen  EXTREMITIES: no clubbing, cyanosis, or edema      Plan:  Recommend to Proceed with Colpo  The details of the colposcopic " procedure were reviewed, the risks of missed diagnoses, pain, infection, and bleeding.              Procedure:  Procedure for colposcopy and biopsy has been explained to the patient and consent obtained.    Before the procedure, it was ensured that the patient was educated regarding the nature of her findings and implications to date.  We reviewed the role of HPV and the natural history of the infection.  We also reviewed ways to minimize her future risk, the effect of HPV on the cervix and treatment options available, should they be indicated.    The pathophysiology of the cervix, including a discussion of the squamous and columnar cells, metaplasia and dysplasia have been reviewed, drawings, sketches and the pamphlets were reviewed with her.  The details of the colposcopic procedure were reviewed, the risks of missed diagnoses, pain, infection, and bleeding.  Questions seemed to be answered before proceeding and the patient then consented to the procedure.     Speculum placed in vagina and excellent visualization of cervix achieved, cervix swabbed  with acetic acid solution.    biopsies takenECC  Hemostasis effected with Silver Nitrate.     Findings:    Cervix: no visible lesions  Vaginal inspection: no visible lesions.  Procedure Summary: Patient tolerated procedure well.      Assessment/Plan:    ICD-10-CM    1. High risk HPV infection  B97.7 Surgical Pathology Exam     COLP CERVIX/UPPER VAGINA W BX CERVIX/ENDOCERV CURETT          Specimens labelled and sent to pathology.  Will base further treatment on pathology findings.  Post biopsy instructions given to patient and call to discuss Pathology results.  Total time preparing to see patient with reviewing prior encounter and labs, face to face time, and coordinating care on the same calendar date: 30 mins.    CEPHAS AGBEH, MD.

## 2025-03-10 NOTE — TELEPHONE ENCOUNTER
Spoke with patient- states she will call back to schedule. [phone]  Send letter in 2 weeks if no call back.

## 2025-03-12 ENCOUNTER — PATIENT OUTREACH (OUTPATIENT)
Dept: OBGYN | Facility: CLINIC | Age: 55
End: 2025-03-12

## 2025-03-12 LAB
PATH REPORT.COMMENTS IMP SPEC: NORMAL
PATH REPORT.COMMENTS IMP SPEC: NORMAL
PATH REPORT.FINAL DX SPEC: NORMAL
PATH REPORT.GROSS SPEC: NORMAL
PATH REPORT.MICROSCOPIC SPEC OTHER STN: NORMAL
PATH REPORT.RELEVANT HX SPEC: NORMAL
PHOTO IMAGE: NORMAL

## 2025-04-21 ENCOUNTER — LAB (OUTPATIENT)
Dept: LAB | Facility: CLINIC | Age: 55
End: 2025-04-21
Payer: COMMERCIAL

## 2025-04-21 DIAGNOSIS — Z94.0 HTN, KIDNEY TRANSPLANT RELATED: ICD-10-CM

## 2025-04-21 DIAGNOSIS — Z94.83 PANCREAS REPLACED BY TRANSPLANT (H): ICD-10-CM

## 2025-04-21 DIAGNOSIS — Z20.828 CONTACT WITH AND (SUSPECTED) EXPOSURE TO OTHER VIRAL COMMUNICABLE DISEASES: ICD-10-CM

## 2025-04-21 DIAGNOSIS — Z94.0 KIDNEY REPLACED BY TRANSPLANT: ICD-10-CM

## 2025-04-21 DIAGNOSIS — Z98.890 OTHER SPECIFIED POSTPROCEDURAL STATES: ICD-10-CM

## 2025-04-21 DIAGNOSIS — I15.1 HTN, KIDNEY TRANSPLANT RELATED: ICD-10-CM

## 2025-04-21 LAB
ERYTHROCYTE [DISTWIDTH] IN BLOOD BY AUTOMATED COUNT: 13.3 % (ref 10–15)
HCT VFR BLD AUTO: 41.1 % (ref 35–47)
HGB BLD-MCNC: 13.5 G/DL (ref 11.7–15.7)
MCH RBC QN AUTO: 30.8 PG (ref 26.5–33)
MCHC RBC AUTO-ENTMCNC: 32.8 G/DL (ref 31.5–36.5)
MCV RBC AUTO: 94 FL (ref 78–100)
PLATELET # BLD AUTO: 300 10E3/UL (ref 150–450)
RBC # BLD AUTO: 4.38 10E6/UL (ref 3.8–5.2)
TACROLIMUS BLD-MCNC: 7.2 UG/L (ref 5–15)
TME LAST DOSE: NORMAL H
TME LAST DOSE: NORMAL H
WBC # BLD AUTO: 6.6 10E3/UL (ref 4–11)

## 2025-04-21 PROCEDURE — 82150 ASSAY OF AMYLASE: CPT

## 2025-04-21 PROCEDURE — 36415 COLL VENOUS BLD VENIPUNCTURE: CPT

## 2025-04-21 PROCEDURE — 80197 ASSAY OF TACROLIMUS: CPT

## 2025-04-21 PROCEDURE — 85027 COMPLETE CBC AUTOMATED: CPT

## 2025-04-21 PROCEDURE — 80048 BASIC METABOLIC PNL TOTAL CA: CPT

## 2025-04-21 PROCEDURE — 83690 ASSAY OF LIPASE: CPT

## 2025-04-22 LAB
AMYLASE SERPL-CCNC: 64 U/L (ref 28–100)
ANION GAP SERPL CALCULATED.3IONS-SCNC: 13 MMOL/L (ref 7–15)
BUN SERPL-MCNC: 25.6 MG/DL (ref 6–20)
CALCIUM SERPL-MCNC: 9.9 MG/DL (ref 8.8–10.4)
CHLORIDE SERPL-SCNC: 102 MMOL/L (ref 98–107)
CREAT SERPL-MCNC: 1.22 MG/DL (ref 0.51–0.95)
EGFRCR SERPLBLD CKD-EPI 2021: 52 ML/MIN/1.73M2
GLUCOSE SERPL-MCNC: 95 MG/DL (ref 70–99)
HCO3 SERPL-SCNC: 22 MMOL/L (ref 22–29)
LIPASE SERPL-CCNC: NORMAL U/L
POTASSIUM SERPL-SCNC: 5.3 MMOL/L (ref 3.4–5.3)
SODIUM SERPL-SCNC: 137 MMOL/L (ref 135–145)

## 2025-04-22 NOTE — PROGRESS NOTES
Chief Complaint   Patient presents with    Back Pain     Low     Tingling     toes    Joint Pain     Multiple sites. Right elbow, right knee, right shoulder     Chris Aguilera is a 50 y.o. female presenting to discuss joint pains.     History of Present Illness  She has been unable to work due to her symptoms at [employer] on 4/15, 4/17 and 4/19 and is seeking a note for her employer. Does not feel like meloxicam helps. She takes 2 otc naproxen when she starts feeling joint pain and that tends to help. She has some Tylenol Arthritis Pain at home and if she takes 2 of those, they put her to sleep.   She saw PENELOPE Huber for her right knee and right shoulder. MRI of shoulder showed partial thickness articular surface tear of the supraspinatus tendon 2023 and did physical therapy. She still has chronic pain of the shoulder at times. MRI of right knee revealed a tear in the medial meniscus and moderate joint narrowing. Physical therapy was initiated, which improved her mobility, but she continues to experience difficulty ascending stairs. She resumed part-time work approximately a month ago, working 4 to 6 hours a day, a few days a week. However, a recent increase in her workload, with 3 consecutive days of work, including a 9-hour and a 7-hour shift, exacerbated her symptoms, causing her to miss her last 3 shifts due to pain. She reports no erythema, warmth or swelling in her joints, except for occasional swelling in her right knee. She has been advised against knee and shoulder surgery in the past. She saw a podiatrist with [facility] and they diagnosed her with achilles tendinosis of left foot. She did stretches for this which helped but it exacerbates when she is on her feet for extended periods of time. She experiences tingling in the balls of her feet, numbness in last 3 toes and pain in 2nd toe. She also reports right elbow pain and lower back pain, all of which have intensify since her  Transplant Surgery  Inpatient Daily Progress Note  09/27/2022    Assessment & Plan: Eden Hess is a 52 year old female with a past medical history significant for end stage kidney disease secondary to diabetic nephropathy. Other past medical history includes DM1 c/b neuropathy and retinopathy, hypertension, non obstructive CAD, anxiety and depression, diastolic HFpEF (prior to starting HD, in setting of hypertensive urgency), and TIA (2017). She is now s/p SPK with no ureteral stent on 9/23/22 with Dr. Gómez.     S/p SPK 9/23/22: POD#4  Pancreas: Lipase 72 (<-78<-76<-289<-931). BG 99-120s, insulin gtt as needed (0.2 units/hour ). Continue Octreotide x 5 days.     POD 0 US:                                                              Impression:   1.  No evidence of arterial or venous stenosis. Stable, persistent  elevated resistive indices external to the pancreas transplant.  Attention on follow-up.  2.  No abnormal peripancreatic fluid collection.   POD 3 US  IMPRESSION:   1. Normal grayscale appearance of the renal transplant without fluid  collection or hydronephrosis.  2. Decreasing though persistently elevated resistive indices, up to  0.89 previously up to 1.0.  3. Patent renal artery with improved forward diastolic flow compared  to the previous study.    Kidney: Cr 3.1 (<-3.6<-4.1<-4.3<-4.0). Good UO.    POD 0 US:  1. Left lower quadrant renal transplant with increased arterial  resistive indices throughout, nonspecific, though can be seen with  rejection. Attention on follow-up.  2. Patent Doppler evaluation of the renal transplant vasculature.  POD 3 US:  IMPRESSION:   1. Normal grayscale appearance of the renal transplant without fluid  collection or hydronephrosis.  2. Decreasing though persistently elevated resistive indices, up to  0.89 previously up to 1.0.  3. Patent renal artery with improved forward diastolic flow compared  to the previous study.    Immunosuppression management:    Induction: via intermediate protocol (cPRA 0) with Thymoglobulin 2 mg/kg, basiliximab POD#1 and #5, and steroid pulse with four week taper.   Maintenance:  -MMF 1000 mg BID  -Tacrolimus  2 mg BID. Goal level 8-10. 9/26 level 9.6 (10 hour trough). Check level every 48 hours.     Neuro/Psych:   Acute post op pain: Scheduled Tylenol, Oxycodone prn and Dilaudid IV prn   Hx Depression: PTA Effexor, restarted.     Hematology:   Anemia of chronic disease: Tranfused 1 unit PRBC on 9/26. No signs of bleeding.  Check hemoglobin daily.  Vascular prophylaxis: Heparin gtt, continue 500units/hour x 5 days. ASA 81 mg daily, increase to 325 mg daily tomorrow.     Cardiorespiratory:   Hx CAD: PTA ASA 81 mg daily, atorvastatin 40 mg daily. ASA restarted. Start atorvastatin 20 mg daily.  HTN: SBP 150s-180s. Start carvedilol 6.25 mg BID. Will get lasix x 1 dose today.  Chest pressure: resolved without intervention. EKG WNL.     GI/Nutrition: NGT removed this MA. Start CLD. Bowel regimen: Senokot-S BID, Miralax daily.     Endocrine: See above.     Fluid/Electrolytes:    Hypervolemia: Wt up ~ 10 kg. Stop MIV. Start CLD. Lasix 40 mg IV x 1 today.   Hypokalemia: K 3.2. Replace KCL 60 mEq IV. Recheck after replacement  Hypomagnesemia: Mg Oxide 400 mg daily.     : Glez removed POD 3. PVR 0.    Infectious disease: No acute issues. Zosyn and Lisseth per protocol.     Prophylaxis: DVT (mechanical, heparin gtt), fall, GI, fungal (Micafungin), viral (Valcyte), pneumocystis (Bactrim), periop (Zosyn)    Disposition: 7A. Possible discharge end of this week.     Medical Decision Making: Medium  Subsequent visit 41132 (moderate level decision making)    SANDEEP/Fellow/Resident Provider: Ladi Lozano PA-C [id number]    Faculty: Caden Felix M.D.    Attestation: I saw and examined the patient with Ladi Lozano PA-C, and the transplant team. I independently reviewed all pertinent laboratory and imaging information and made independent management decisions  "recent work shifts.      The following portions of the patient's history were reviewed and updated as appropriate: allergies, current medications, past family history, past medical history, past social history, past surgical history and problem list.    Review of Systems   Constitutional:  Negative for chills, diaphoresis, fatigue and fever.   HENT:  Negative for congestion and sore throat.    Respiratory:  Negative for cough.    Cardiovascular:  Negative for chest pain.   Gastrointestinal:  Negative for abdominal pain, nausea and vomiting.   Genitourinary:  Negative for dysuria.   Musculoskeletal:  Positive for arthralgias, back pain and gait problem. Negative for myalgias and neck pain.   Skin:  Negative for rash.   Neurological:  Positive for numbness. Negative for weakness and headaches.   All other systems reviewed and are negative.      Objective   /82   Pulse 88   Temp 97.3 °F (36.3 °C)   Ht 170.2 cm (67.01\")   Wt 96.6 kg (213 lb)   SpO2 98%   BMI 33.35 kg/m²   Body mass index is 33.35 kg/m².       Physical Exam  Vitals and nursing note reviewed.   Constitutional:       Appearance: Normal appearance.   HENT:      Head: Normocephalic and atraumatic.   Eyes:      Extraocular Movements: Extraocular movements intact.      Pupils: Pupils are equal, round, and reactive to light.   Cardiovascular:      Rate and Rhythm: Normal rate and regular rhythm.   Pulmonary:      Effort: Pulmonary effort is normal.      Breath sounds: Normal breath sounds.   Musculoskeletal:         General: Tenderness present. No swelling. Normal range of motion.      Cervical back: Normal range of motion.      Right lower leg: No edema.      Left lower leg: No edema.      Comments: Tenderness to right shoulder, elbow, knee, left foot, low back   Skin:     General: Skin is dry.   Neurological:      Mental Status: She is alert and oriented to person, place, and time.   Psychiatric:         Mood and Affect: Mood normal. "       Imaging:  MRI Shoulder Right Without Contrast (06/13/2023 18:02)  XR Foot 3+ View Left (05/18/2023 19:01)  XR Knee 3 View Right (10/02/2024 13:09)  XR Knee 1 or 2 View Right (01/10/2025 09:42)  MRI Knee Right Without Contrast (02/12/2025 19:26)    Assessment & Plan   Dhaval Aguilera is here today and the following problems have been addressed:      Diagnoses and all orders for this visit:    1. Acute bilateral low back pain without sciatica (Primary)  -     celecoxib (CeleBREX) 200 MG capsule; Take 1 capsule PO BID x 3 days, then 1 capsule daily with food  Dispense: 30 capsule; Refill: 2  -     Uric acid  -     Rheumatoid Factor  -     BRITTANY With / DsDNA, RNP, Sjogrens A / B, Smith  -     Sedimentation rate, automated  -     C-reactive protein    2. Tingling of both feet  -     celecoxib (CeleBREX) 200 MG capsule; Take 1 capsule PO BID x 3 days, then 1 capsule daily with food  Dispense: 30 capsule; Refill: 2  -     Uric acid  -     Rheumatoid Factor  -     BRITTANY With / DsDNA, RNP, Sjogrens A / B, Smith  -     Sedimentation rate, automated  -     C-reactive protein    3. Right elbow pain  -     celecoxib (CeleBREX) 200 MG capsule; Take 1 capsule PO BID x 3 days, then 1 capsule daily with food  Dispense: 30 capsule; Refill: 2  -     Uric acid  -     Rheumatoid Factor  -     BRITTANY With / DsDNA, RNP, Sjogrens A / B, Smith  -     Sedimentation rate, automated  -     C-reactive protein    4. Chronic right shoulder pain  -     celecoxib (CeleBREX) 200 MG capsule; Take 1 capsule PO BID x 3 days, then 1 capsule daily with food  Dispense: 30 capsule; Refill: 2  -     Uric acid  -     Rheumatoid Factor  -     BRITTANY With / DsDNA, RNP, Sjogrens A / B, Smith  -     Sedimentation rate, automated  -     C-reactive protein    5. Tear of medial meniscus of left knee, current, unspecified tear type, sequela  -     celecoxib (CeleBREX) 200 MG capsule; Take 1 capsule PO BID x 3 days, then 1 capsule daily with food  Dispense: 30 capsule;  including immunosuppression adjustment. I agree with the findings and plan as documented in this note.  Caden Felix MD  _________________________________________________________________  Transplant History:   9/23/2022 (Kidney / Pancreas), Postoperative day: 4     Interval History: History is obtained from the patient  Overnight events: Uncomfortable due to edema. Denies nausea. +Flatus and BMs. Tolerating NGT clamped. Pain control adequate.     ROS:   A 10-point review of systems was negative except as noted above.    Meds:    acetaminophen  975 mg Oral Q8H     aspirin  81 mg Oral Daily     [START ON 9/28/2022] basiliximab (SIMULECT) infusion  20 mg Intravenous Once     calcium carbonate-vitamin D  1 tablet Oral BID w/meals     carvedilol  6.25 mg Oral BID w/meals     [START ON 9/30/2022] clotrimazole  10 mg Buccal 4x Daily     magnesium oxide  400 mg Oral Q24H     micafungin  100 mg Intravenous Q24H     mycophenolate  1,000 mg Oral BID IS     octreotide  100 mcg Subcutaneous Q12H     pantoprazole  40 mg Oral QAM AC     phosphorus tablet 250 mg  250 mg Oral BID     polyethylene glycol  17 g Oral Daily     potassium chloride  20 mEq Intravenous Q1H     [START ON 9/28/2022] predniSONE  40 mg Oral Daily    Followed by     [START ON 9/30/2022] predniSONE  20 mg Oral Daily    Followed by     [START ON 10/7/2022] predniSONE  15 mg Oral Daily    Followed by     [START ON 10/14/2022] predniSONE  10 mg Oral Daily    Followed by     [START ON 10/21/2022] predniSONE  5 mg Oral Daily     senna-docusate  1 tablet Oral BID     sodium chloride (PF)  3 mL Intravenous Q8H     sulfamethoxazole-trimethoprim  1 tablet Oral Once per day on Mon Wed Fri     tacrolimus  2 mg Oral BID IS     valGANciclovir  450 mg Oral Once per day on Mon Thu     venlafaxine  150 mg Oral Daily       Physical Exam:     Admit Weight: 69.8 kg (153 lb 14.1 oz)    Current vitals:   BP (!) 182/88 (BP Location: Left arm)   Pulse 87   Temp 97.4  F (36.3  " C) (Oral)   Resp 16   Ht 1.549 m (5' 1\")   Wt 80 kg (176 lb 4.8 oz)   LMP 09/18/2022   SpO2 96%   BMI 33.31 kg/m      Vital sign ranges:    Temp:  [97.4  F (36.3  C)-98.2  F (36.8  C)] 97.4  F (36.3  C)  Pulse:  [84-97] 87  Resp:  [16] 16  BP: (156-182)/(78-91) 182/88  SpO2:  [91 %-98 %] 96 %    General Appearance: in no apparent distress  Skin: Warm, perfused  Heart: RRR  Lungs: NLB on RA  Abdomen: soft, mildly ttp around surgical incision.  incision c/d/i.    : no angeles  Extremities: BLE edema, RLE slightly > LLE. BLE NT.   Neurologic: A&Ox3, tremor absent.     Data:   CMP  Recent Labs   Lab 09/27/22  0858 09/27/22  0741 09/27/22  0734 09/26/22  0411 09/26/22  0319 09/23/22  1815 09/23/22  1753 09/23/22  1703 09/22/22  2237 09/22/22  1629   NA  --   --  143  --  142   < > 135 132*   < > 132*   POTASSIUM  --   --  3.2*  --  3.6   < > 3.4* 3.8   < > 3.9   CHLORIDE  --   --  110*  --  110*   < >  --   --   --  93*   CO2  --   --  25  --  25   < >  --   --   --  29   GLC 85  85 92 97   < > 134*   < > 79 149*   < > 139*   BUN  --   --  35.2*  --  36.8*   < >  --   --   --  33.4*   CR  --   --  3.13*  --  3.61*   < >  --   --   --  4.00*   GFRESTIMATED  --   --  17*  --  14*   < >  --   --   --  13*   BARBARA  --   --  8.9  --  8.5*   < >  --   --   --  9.9   ICAW  --   --   --   --   --   --  4.6 5.0   < >  --    MAG  --   --  1.7  --  1.8   < >  --   --   --   --    PHOS  --   --  2.0*  --  2.7   < >  --   --   --   --    AMYLASE  --   --  121*  --  99   < >  --   --   --   --    LIPASE  --   --  72*  --  78*   < >  --   --   --   --    ALBUMIN  --   --   --   --   --   --   --   --   --  4.1   BILITOTAL  --   --   --   --   --   --   --   --   --  0.3   ALKPHOS  --   --   --   --   --   --   --   --   --  89   AST  --   --   --   --   --   --   --   --   --  29   ALT  --   --   --   --   --   --   --   --   --  18    < > = values in this interval not displayed.     CBC  Recent Labs   Lab 09/26/22  6338 " Refill: 2  -     Uric acid  -     Rheumatoid Factor  -     BRITTANY With / DsDNA, RNP, Sjogrens A / B, Smith  -     Sedimentation rate, automated  -     C-reactive protein    6. Arthralgia of right knee  -     celecoxib (CeleBREX) 200 MG capsule; Take 1 capsule PO BID x 3 days, then 1 capsule daily with food  Dispense: 30 capsule; Refill: 2  -     Uric acid  -     Rheumatoid Factor  -     BRITTANY With / DsDNA, RNP, Sjogrens A / B, Smith  -     Sedimentation rate, automated  -     C-reactive protein    7. Achilles tendonosis of left lower extremity  -     celecoxib (CeleBREX) 200 MG capsule; Take 1 capsule PO BID x 3 days, then 1 capsule daily with food  Dispense: 30 capsule; Refill: 2  -     Uric acid  -     Rheumatoid Factor  -     BRITTANY With / DsDNA, RNP, Sjogrens A / B, Smith  -     Sedimentation rate, automated  -     C-reactive protein      Assessment & Plan  Due to chronic joint pains; BRITTANY, RF, uric acid, inflammatory markers ordered  No signs of current erythema, warmth or inflammatory joints, likely more osteoarthritis   Meloxicam does not seem to help. 2 over the counter naproxens help when needed. Tylenol makes her sleepy.   Trial of Celebrex 200 mg has been issued, with instructions to take 1 capsule twice daily for 3 days, followed by 1 capsule daily. Notify if does not help.  She is advised to take this medication with food. She is instructed not to take naproxen concurrently with Celebrex. However, she may continue to use Tylenol.  The patient was advised that NSAID-type medications have two very important potential side effects: gastrointestinal irritation including hemorrhage and renal injuries. She was asked to take the medication with food and to stop if she experiences any GI upset.   She is encouraged to wear supportive shoes and insoles and to take breaks from prolonged standing when possible. A note for her employer has been provided. Will provide work accommodations if needed if that allows her to  09/26/22  0012 09/23/22  1252 09/22/22  1629   HGB 6.8* 6.9*   < > 10.9*   WBC 9.4 9.6   < > 5.5    191   < > 258   A1C  --   --   --  6.9*    < > = values in this interval not displayed.        continue working. If her condition deteriorates to the point where she is unable to work, she may consider applying for disability due to these chronic issues.     Follow Up   Physical, next available  Patient was given instructions and counseling regarding her condition or for health maintenance advice. Please see specific information pulled into the AVS if appropriate.     Lillie MCWILLIAMS  [facility]      Patient or patient representative verbalized consent for the use of Ambient Listening during the visit with  POPPY Gonsalez for chart documentation. 4/23/2025  00:10 EDT

## 2025-06-04 ENCOUNTER — LAB (OUTPATIENT)
Dept: LAB | Facility: CLINIC | Age: 55
End: 2025-06-04
Payer: COMMERCIAL

## 2025-06-04 ENCOUNTER — ANCILLARY PROCEDURE (OUTPATIENT)
Dept: GENERAL RADIOLOGY | Facility: CLINIC | Age: 55
End: 2025-06-04
Attending: PODIATRIST
Payer: COMMERCIAL

## 2025-06-04 ENCOUNTER — RESULTS FOLLOW-UP (OUTPATIENT)
Dept: TRANSPLANT | Facility: CLINIC | Age: 55
End: 2025-06-04

## 2025-06-04 ENCOUNTER — OFFICE VISIT (OUTPATIENT)
Dept: PODIATRY | Facility: CLINIC | Age: 55
End: 2025-06-04
Payer: COMMERCIAL

## 2025-06-04 VITALS — BODY MASS INDEX: 37.57 KG/M2 | HEIGHT: 61 IN | WEIGHT: 199 LBS

## 2025-06-04 DIAGNOSIS — Z94.0 HTN, KIDNEY TRANSPLANT RELATED: ICD-10-CM

## 2025-06-04 DIAGNOSIS — E10.22 TYPE 1 DIABETES MELLITUS WITH CHRONIC KIDNEY DISEASE ON CHRONIC DIALYSIS (H): ICD-10-CM

## 2025-06-04 DIAGNOSIS — I15.1 HTN, KIDNEY TRANSPLANT RELATED: ICD-10-CM

## 2025-06-04 DIAGNOSIS — Z99.2 TYPE 1 DIABETES MELLITUS WITH CHRONIC KIDNEY DISEASE ON CHRONIC DIALYSIS (H): ICD-10-CM

## 2025-06-04 DIAGNOSIS — E10.11 DIABETIC KETOACIDOSIS WITH COMA ASSOCIATED WITH TYPE 1 DIABETES MELLITUS (H): ICD-10-CM

## 2025-06-04 DIAGNOSIS — Z98.890 OTHER SPECIFIED POSTPROCEDURAL STATES: ICD-10-CM

## 2025-06-04 DIAGNOSIS — M72.2 PLANTAR FASCIITIS, RIGHT: Primary | ICD-10-CM

## 2025-06-04 DIAGNOSIS — N18.6 TYPE 1 DIABETES MELLITUS WITH CHRONIC KIDNEY DISEASE ON CHRONIC DIALYSIS (H): ICD-10-CM

## 2025-06-04 DIAGNOSIS — Z94.0 KIDNEY REPLACED BY TRANSPLANT: Chronic | ICD-10-CM

## 2025-06-04 DIAGNOSIS — Z13.6 SCREENING FOR CARDIOVASCULAR CONDITION: Primary | ICD-10-CM

## 2025-06-04 DIAGNOSIS — Z94.0 KIDNEY REPLACED BY TRANSPLANT: ICD-10-CM

## 2025-06-04 DIAGNOSIS — Z20.828 CONTACT WITH AND (SUSPECTED) EXPOSURE TO OTHER VIRAL COMMUNICABLE DISEASES: ICD-10-CM

## 2025-06-04 DIAGNOSIS — Z94.83 PANCREAS REPLACED BY TRANSPLANT (H): ICD-10-CM

## 2025-06-04 DIAGNOSIS — N18.4 STAGE 4 CHRONIC KIDNEY DISEASE (H): ICD-10-CM

## 2025-06-04 DIAGNOSIS — M72.2 PLANTAR FASCIITIS, RIGHT: ICD-10-CM

## 2025-06-04 DIAGNOSIS — Z94.83 PANCREAS REPLACED BY TRANSPLANT (H): Chronic | ICD-10-CM

## 2025-06-04 LAB
AMYLASE SERPL-CCNC: 52 U/L (ref 28–100)
ANION GAP SERPL CALCULATED.3IONS-SCNC: 12 MMOL/L (ref 7–15)
BUN SERPL-MCNC: 22.2 MG/DL (ref 6–20)
CALCIUM SERPL-MCNC: 9.8 MG/DL (ref 8.8–10.4)
CHLORIDE SERPL-SCNC: 103 MMOL/L (ref 98–107)
CHOLEST SERPL-MCNC: 168 MG/DL
CREAT SERPL-MCNC: 1.47 MG/DL (ref 0.51–0.95)
CREAT UR-MCNC: 102 MG/DL
EGFRCR SERPLBLD CKD-EPI 2021: 41 ML/MIN/1.73M2
ERYTHROCYTE [DISTWIDTH] IN BLOOD BY AUTOMATED COUNT: 13.7 % (ref 10–15)
FASTING STATUS PATIENT QL REPORTED: NO
FASTING STATUS PATIENT QL REPORTED: NO
GLUCOSE SERPL-MCNC: 108 MG/DL (ref 70–99)
HCO3 SERPL-SCNC: 26 MMOL/L (ref 22–29)
HCT VFR BLD AUTO: 39.8 % (ref 35–47)
HDLC SERPL-MCNC: 83 MG/DL
HGB BLD-MCNC: 12.9 G/DL (ref 11.7–15.7)
LDLC SERPL CALC-MCNC: 65 MG/DL
LIPASE SERPL-CCNC: 37 U/L (ref 13–60)
MCH RBC QN AUTO: 30.7 PG (ref 26.5–33)
MCHC RBC AUTO-ENTMCNC: 32.4 G/DL (ref 31.5–36.5)
MCV RBC AUTO: 95 FL (ref 78–100)
MICROALBUMIN UR-MCNC: 18.1 MG/L
MICROALBUMIN/CREAT UR: 17.75 MG/G CR (ref 0–25)
NONHDLC SERPL-MCNC: 85 MG/DL
PLATELET # BLD AUTO: 295 10E3/UL (ref 150–450)
POTASSIUM SERPL-SCNC: 4.4 MMOL/L (ref 3.4–5.3)
RBC # BLD AUTO: 4.2 10E6/UL (ref 3.8–5.2)
SODIUM SERPL-SCNC: 141 MMOL/L (ref 135–145)
TACROLIMUS BLD-MCNC: 6.9 UG/L (ref 5–15)
TME LAST DOSE: NORMAL H
TME LAST DOSE: NORMAL H
TRIGL SERPL-MCNC: 102 MG/DL
WBC # BLD AUTO: 6.5 10E3/UL (ref 4–11)

## 2025-06-04 PROCEDURE — 80048 BASIC METABOLIC PNL TOTAL CA: CPT

## 2025-06-04 PROCEDURE — 1125F AMNT PAIN NOTED PAIN PRSNT: CPT | Performed by: PODIATRIST

## 2025-06-04 PROCEDURE — 80197 ASSAY OF TACROLIMUS: CPT

## 2025-06-04 PROCEDURE — 82043 UR ALBUMIN QUANTITATIVE: CPT

## 2025-06-04 PROCEDURE — 80061 LIPID PANEL: CPT

## 2025-06-04 PROCEDURE — 73630 X-RAY EXAM OF FOOT: CPT | Mod: TC | Performed by: STUDENT IN AN ORGANIZED HEALTH CARE EDUCATION/TRAINING PROGRAM

## 2025-06-04 PROCEDURE — 82570 ASSAY OF URINE CREATININE: CPT

## 2025-06-04 PROCEDURE — 82150 ASSAY OF AMYLASE: CPT

## 2025-06-04 PROCEDURE — 83690 ASSAY OF LIPASE: CPT

## 2025-06-04 PROCEDURE — 99203 OFFICE O/P NEW LOW 30 MIN: CPT | Performed by: PODIATRIST

## 2025-06-04 PROCEDURE — 36415 COLL VENOUS BLD VENIPUNCTURE: CPT

## 2025-06-04 PROCEDURE — 85027 COMPLETE CBC AUTOMATED: CPT

## 2025-06-04 ASSESSMENT — ENCOUNTER SYMPTOMS: NEW SYMPTOMS OF CORONARY ARTERY DISEASE: 0

## 2025-06-04 ASSESSMENT — PAIN SCALES - GENERAL: PAINLEVEL_OUTOF10: MODERATE PAIN (5)

## 2025-06-04 NOTE — PATIENT INSTRUCTIONS
PLANTAR FASCIITIS  The  plantar fascia  is a thick fibrous layer of tissue that covers the bones on the bottom of your foot. It supports the foot bones in an arched position.  Plantar fasciitis  is a painful inflammation of the plantar fascia due to overuse. This can develop gradually or suddenly. It usually affects one foot at a time but can affect both feet. Heel pain can be sharp and feel like a knife sticking in the bottom of your foot. Pain may occur after exercising, long distance jogging, stair climbing, long periods of standing, or after getting up from a seated position.  Risk factors include arthritis, diabetes, obesity or recent weight gain, flat-foot, high arch, wearing high heels or loose shoes or shoes with poor arch support.  Sudden changes in activity or shoe gear may contribute to symptoms.  Foot pain from this condition is usually worse in the morning and improves with walking. By the end of the day there may be a dull aching. Treatment requires improved support of feet, short-term rest and controlling inflammation. It may take up to nine months before all symptoms go away with the measures described below.  A steroid injection into the foot, or surgery may be needed if this is becomes long standing or severe.  HOME CARE  If you are overweight, lose weight to promote healing.  Choose supportive shoes (stiff through the shank) with good arch support and shock absorbency. Replace athletic shoes when they become worn out. Don t walk or run barefoot.  Shoe inserts are an important part of treatment. You can buy off-the-shelf shoe inserts inexpensively such as [employer].  The best ones are custom molded to your foot with a prescription.  Night splints keep the plantar fascia gently stretched while you sleep and will eliminate morning pain. Wear it ALL NIGHT EVERY NIGHT, or any time you sit for a long time.  Reduce by 10% or more the activities that stress the feet: jogging, prolonged standing or  walking, high impact sports, etc.  Stretch your feet. Gently flex your ankle by leaning into a wall or counter or drop your heel from a step.  Stretch two minutes of every hour you are awake.  Icing or massage may help heel pain. Apply an ice pack or frozen water or Coke bottle to the heel for 10-20 minutes as a preventive or after an acute flare of symptoms. You may repeat this as needed.   Follow up with your Doctor in 3 weeks as instructed.      Reliable shoe stores: To maximize your experience and provide the best possible fit.  Be sure to show them your foot concerns and tell them Dr. Turner sent you.      Stores listed in bold have only athletic shoes, and stores that are not bold are mostly casual or variety of shoes    [employer]  [address] [place] [address]  [phone]     [employer] [facility]  [address] [place] [address]  [phone]     [email or web address] [facility]  [address] [place] [address]  [phone]    [employer]  [address] [facility] [address]  [phone]    [employer]  [address] [place], [place] [address] [phone]    [employer]  [address] [place] [address]  [phone]                         [employer] Locations:     [address] [place] [address]   [phone]     [address] [place] [address] [address] [place] [address]   [phone]     [address] [place] [address]   [phone]     [address] [facility] [address]   [phone]     [address] [place] [address]   [phone]     [address] [place] [address]   [phone]     [address] [facility] [address]   [place] [address]   [phone]     [address]   [place], [place] [id number] [phone]             How to find reasonable shoes          The correct width    Correct Fitting    Correct Length      Foot Distortion    Posture Distortion                           Torsional Rigidity      Grasp behind the heel and underneath the foot and twist      Bad    Excessive torsion/twist in midfoot     Less torsion/twist in midfoot is better                   Heel Counter Rigidity      Grasp just above   midsole and squeeze      Bad    Soft heel counter      Good    Rigid Heel Counter      Flexion Rigidity      Grasp shoe and bend from forefoot to rearfoot

## 2025-06-04 NOTE — NURSING NOTE
Dispensed 1 Dorsal (Anterior) Night Splint, Size S/M, with [facility] agreement signed by patient. Jina Alvarez CMA, June 4, 2025

## 2025-06-04 NOTE — PROGRESS NOTES
HPI:  Eden Hess is a 54 year old female who is seen in consultation at the request of self.    Pt presents for eval of:   (Onset, Location, L/R, Character, Treatments, Injury if yes)    XR Right foot 6/4/2025 9/23/2022 - kidney and pancreas transplant.     Onset mid May 2025, plantar medial aspect Right heel. No injury noted. History of plantar fasciitis.  Tightness with first steps after lying or sitting. Dull ache pain 3-7/10.  Has had a bump in the middle of plantar fascia for more than 6 months. Pain upon arising and worse with more walking. Better with rest, better shoes, stretching, tylenol helps.   DM type 1 and kidney and pancreas transplant in 2022.     Different shoes.    Homemaker.    ROS: 10 point ROS neg other than the symptoms noted above in the HPI.    Patient Active Problem List   Diagnosis    Diabetes mellitus type 1 (H)    Anemia in stage 3a chronic kidney disease (H)    TIA (transient ischemic attack)    HTN, kidney transplant related    Anxiety and depression    (HFpEF) heart failure with preserved ejection fraction (H)    Pancreas replaced by transplant (H)    Kidney replaced by transplant    Immunosuppression    Aftercare following organ transplant    Chronic kidney disease, stage 3a (H)    Vitamin D deficiency    Secondary renal hyperparathyroidism    Hyperkalemia    Clostridium difficile infection    Cellulitis of right upper extremity    Hyperlipidemia    Diabetic ketoacidosis with coma associated with type 1 diabetes mellitus (H)    Essential hypertension    Iron deficiency anemia, unspecified    Moderate episode of recurrent major depressive disorder (H)    Stage 4 chronic kidney disease (H)    Vitamin B6 deficiency    Bacteremia due to methicillin resistant Staphylococcus aureus    Nephrotic syndrome in diseases classified elsewhere    Cervical high risk HPV (human papillomavirus) test positive       PAST MEDICAL HISTORY:   Past Medical History:   Diagnosis Date    (HFpEF)  heart failure with preserved ejection fraction (H)     Anxiety and depression     [facility] [place] [facility]    Diabetes mellitus type 1 (H)     Diagnosed at age 2 years old     End stage kidney disease (H)     Hypertension     Retinopathy     TIA (transient ischemic attack)      PAST SURGICAL HISTORY:   Past Surgical History:   Procedure Laterality Date    BENCH KIDNEY Left 2022    Procedure: Bench kidney;  Surgeon: Yousif Gómez MD;  Location: [facility] OR    BENCH PANCREAS N/A 2022    Procedure: Bench pancreas;  Surgeon: Yousif Gómez MD;  Location: [facility] OR     SECTION      x2    CREATE FISTULA ARTERIOVENOUS UPPER EXTREMITY      dialysis port, fistula-R arm    TRANSPLANT PANCREAS, KIDNEY  DONOR, COMBINED N/A 2022    Procedure: TRANSPLANT, KIDNEY AND PANCREAS,  DONOR;  Surgeon: Yousif Gómez MD;  Location: [facility] OR     MEDICATIONS:   Current Outpatient Medications:     aspirin 81 MG EC tablet, Take 81 mg by mouth daily, Disp: , Rfl:     atorvastatin (LIPITOR) 20 MG tablet, Take 1 tablet (20 mg) by mouth every evening., Disp: 30 tablet, Rfl: 11    buPROPion (WELLBUTRIN XL) 150 MG 24 hr tablet, Take 150 mg by mouth every morning., Disp: , Rfl:     carvedilol (COREG) 12.5 MG tablet, Take 1 tablet (12.5 mg) by mouth 2 times daily (with meals)., Disp: 60 tablet, Rfl: 11    Cyanocobalamin (VITAMIN B-12 PO), Take by mouth., Disp: , Rfl:     FIBER ADULT GUMMIES PO, Take 1 Gum by mouth daily, Disp: , Rfl:     furosemide (LASIX) 20 MG tablet, Take 1 tablet (20 mg) by mouth daily., Disp: 90 tablet, Rfl: 1    mycophenolic acid (GENERIC EQUIVALENT) 180 MG EC tablet, Take 3 tablets (540 mg) by mouth 2 times daily., Disp: 180 tablet, Rfl: 11    sulfamethoxazole-trimethoprim (BACTRIM) 400-80 MG tablet, Take 1 tablet by mouth three times a week., Disp: 12 tablet, Rfl: 11    tacrolimus (GENERIC EQUIVALENT) 0.5 MG capsule, Take 1 capsule (0.5 mg) by mouth 2  times daily., Disp: 180 capsule, Rfl: 3    venlafaxine (EFFEXOR XR) 150 MG 24 hr capsule, Take 1 capsule (150 mg) by mouth daily, Disp: 30 capsule, Rfl: 0    venlafaxine (EFFEXOR XR) 75 MG 24 hr capsule, Take 225 mg by mouth daily., Disp: , Rfl:   ALLERGIES:    Allergies   Allergen Reactions    Amlodipine      Other reaction(s): Edema,generalized     SOCIAL HISTORY:   Social History     Socioeconomic History    Marital status:      Spouse name: Not on file    Number of children: Not on file    Years of education: Not on file    Highest education level: Not on file   Occupational History    Not on file   Tobacco Use    Smoking status: Former     Current packs/day: 0.00     Types: Cigarettes     Quit date: 1997     Years since quittin.4     Passive exposure: Never    Smokeless tobacco: Never   Vaping Use    Vaping status: Never Used   Substance and Sexual Activity    Alcohol use: Yes     Comment: 3/week    Drug use: No    Sexual activity: Yes     Partners: Male     Birth control/protection: Male Surgical, Post-menopausal   Other Topics Concern    Parent/sibling w/ CABG, MI or angioplasty before 65F 55M? Not Asked   Social History Narrative    Not on file     Social Drivers of Health     Financial Resource Strain: Low Risk  (2025)    Financial Resource Strain     Within the past 12 months, have you or your family members you live with been unable to get utilities (heat, electricity) when it was really needed?: No   Food Insecurity: Low Risk  (2025)    Food Insecurity     Within the past 12 months, did you worry that your food would run out before you got money to buy more?: No     Within the past 12 months, did the food you bought just not last and you didn t have money to get more?: No   Transportation Needs: Low Risk  (2025)    Transportation Needs     Within the past 12 months, has lack of transportation kept you from medical appointments, getting your medicines, non-medical meetings  "or appointments, work, or from getting things that you need?: No   Physical Activity: Unknown (1/21/2025)    Exercise Vital Sign     Days of Exercise per Week: 1 day     Minutes of Exercise per Session: Not on file   Stress: No Stress Concern Present (1/21/2025)    [employer] [place] [employer] - Occupational Stress Questionnaire     Feeling of Stress : Not at all   Social Connections: Unknown (1/21/2025)    Social Connection and Isolation Panel [NHANES]     Frequency of Communication with Friends and Family: Not on file     Frequency of Social Gatherings with Friends and Family: Never     Attends [employer] Services: Not on file     Active Member of Clubs or Organizations: Not on file     Attends Club or Organization Meetings: Not on file     Marital Status: Not on file   Interpersonal Safety: Low Risk  (1/21/2025)    Interpersonal Safety     Do you feel physically and emotionally safe where you currently live?: Yes     Within the past 12 months, have you been hit, slapped, kicked or otherwise physically hurt by someone?: No     Within the past 12 months, have you been humiliated or emotionally abused in other ways by your partner or ex-partner?: No   Housing Stability: Low Risk  (1/21/2025)    Housing Stability     Do you have housing? : Yes     Are you worried about losing your housing?: No     FAMILY HISTORY:   Family History   Problem Relation Age of Onset    Diabetes Type 1 Father     Hypertension Father     Cerebrovascular Disease Father     Skin Cancer Brother        EXAM:Vitals: Ht 1.545 m (5' 0.83\")   Wt 90.3 kg (199 lb)   LMP 10/01/2023   BMI 37.81 kg/m    BMI= Body mass index is 37.81 kg/m .    General appearance: Patient is alert and fully cooperative with history & exam.  No sign of distress is noted during the visit.     Psychiatric: Affect is pleasant & appropriate.  Patient appears motivated to improve health.     Respiratory: Breathing is regular & unlabored while sitting.     HEENT: " Hearing is intact to spoken word.  Speech is clear.  No gross evidence of visual impairment that would impact ambulation.     Vascular: DP & PT 2/4 & regular bilaterally.  No significant edema, rubor or varicosities noted.  CFT and skin temperature is normal to both lower extremities.       Neurologic: Lower extremity sensation is intact to light touch.  No evidence of weakness in the lower extremities.  No evidence of neuropathy and negative tinel sign.     Dermatologic: Skin is intact to both lower extremities without significant lesions, rash or abrasion.  Normal texture turgor and tone. No paronychia or evidence of soft tissue infection is noted.    Musculoskeletal: Patient is ambulatory without assistive device or brace.  Discomfort is noted with firm palpation along the medial band of the plantar fascia right foot most notably at the origination upon the calcaneus not through the arch.  She also has a firm nodule in the central medial band of the plantar fascia right foot only this is about 1 cm in greatest diameter.  Minimal symptoms noted with the plantar fibroma.  No pain with compression of the calcaneus medial to lateral or with palpation of the achilles, peroneal or posterior tibial tendons.  Slightly more than 0  of ankle joint dorsiflexion without crepitus or pain throughout the ankle, subtalar or midtarsal joints.  No pain or limitations throughout manual muscle strength testing plus 5/5 to all four quadrants bilateral.  No palpable edema noted.     Radiographs:   A small osteophyte isnoted about the plantar calcaneus consistent with plantar fasciitis.     ASSESSMENT:       ICD-10-CM    1. Plantar fasciitis, right  M72.2 XR Foot Right G/E 3 Views     Orthotics, Mastectomy and Custom Compression Orders Type: Orthotic; Orthotic Type Requested: Foot Orthotics; Foot Orthotics Laterality: Bilateral      2. Kidney replaced by transplant  Z94.0       3. Pancreas replaced by transplant (H)  Z94.83       4.  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      Treatments:  6/4/2025  Obtained and interpreted radiographs.   Discontinue barefoot walking or unsupported walking in shoes without shank.  Dispensed written instructions to obtain appropriate shoe gear and/or OTC inserts.  Dispensed anterior night splint to use all night every night.    No NSAIDs secondary to associated risk following kidney and pancreas transplant  Prescription for custom molded orthotics 6/4/2025  Follow up in 6-8 weeks       Gallo Turner DPM

## 2025-06-04 NOTE — LETTER
6/4/2025      Eden Hess  [address]  [place] [address]      Dear Colleague,    Thank you for referring your patient, Eden Hess, to the [facility]. Please see a copy of my visit note below.    HPI:  Eden Hess is a 54 year old female who is seen in consultation at the request of self.    Pt presents for eval of:   (Onset, Location, L/R, Character, Treatments, Injury if yes)    XR Right foot 6/4/2025 9/23/2022 - kidney and pancreas transplant.     Onset mid May 2025, plantar medial aspect Right heel. No injury noted. History of plantar fasciitis.  Tightness with first steps after lying or sitting. Dull ache pain 3-7/10.  Has had a bump in the middle of plantar fascia for more than 6 months. Pain upon arising and worse with more walking. Better with rest, better shoes, stretching, tylenol helps.   DM type 1 and kidney and pancreas transplant in 2022.     Different shoes.    Homemaker.    ROS: 10 point ROS neg other than the symptoms noted above in the HPI.    Patient Active Problem List   Diagnosis     Diabetes mellitus type 1 (H)     Anemia in stage 3a chronic kidney disease (H)     TIA (transient ischemic attack)     HTN, kidney transplant related     Anxiety and depression     (HFpEF) heart failure with preserved ejection fraction (H)     Pancreas replaced by transplant (H)     Kidney replaced by transplant     Immunosuppression     Aftercare following organ transplant     Chronic kidney disease, stage 3a (H)     Vitamin D deficiency     Secondary renal hyperparathyroidism     Hyperkalemia     Clostridium difficile infection     Cellulitis of right upper extremity     Hyperlipidemia     Diabetic ketoacidosis with coma associated with type 1 diabetes mellitus (H)     Essential hypertension     Iron deficiency anemia, unspecified     Moderate episode of recurrent major depressive disorder (H)     Stage 4 chronic kidney disease (H)     Vitamin B6 deficiency      Bacteremia due to methicillin resistant Staphylococcus aureus     Nephrotic syndrome in diseases classified elsewhere     Cervical high risk HPV (human papillomavirus) test positive       PAST MEDICAL HISTORY:   Past Medical History:   Diagnosis Date     (HFpEF) heart failure with preserved ejection fraction (H)      Anxiety and depression     [facility]     Diabetes mellitus type 1 (H)     Diagnosed at age 2 years old      End stage kidney disease (H)      Hypertension      Retinopathy      TIA (transient ischemic attack)      PAST SURGICAL HISTORY:   Past Surgical History:   Procedure Laterality Date     BENCH KIDNEY Left 2022    Procedure: Bench kidney;  Surgeon: Yousif Gómez MD;  Location: [facility] OR     BENCH PANCREAS N/A 2022    Procedure: Bench pancreas;  Surgeon: Yousif óGmez MD;  Location: [facility] OR      SECTION      x2     CREATE FISTULA ARTERIOVENOUS UPPER EXTREMITY      dialysis port, fistula-R arm     TRANSPLANT PANCREAS, KIDNEY  DONOR, COMBINED N/A 2022    Procedure: TRANSPLANT, KIDNEY AND PANCREAS,  DONOR;  Surgeon: Yousif Gómez MD;  Location: [facility] OR     MEDICATIONS:   Current Outpatient Medications:      aspirin 81 MG EC tablet, Take 81 mg by mouth daily, Disp: , Rfl:      atorvastatin (LIPITOR) 20 MG tablet, Take 1 tablet (20 mg) by mouth every evening., Disp: 30 tablet, Rfl: 11     buPROPion (WELLBUTRIN XL) 150 MG 24 hr tablet, Take 150 mg by mouth every morning., Disp: , Rfl:      carvedilol (COREG) 12.5 MG tablet, Take 1 tablet (12.5 mg) by mouth 2 times daily (with meals)., Disp: 60 tablet, Rfl: 11     Cyanocobalamin (VITAMIN B-12 PO), Take by mouth., Disp: , Rfl:      FIBER ADULT GUMMIES PO, Take 1 Gum by mouth daily, Disp: , Rfl:      furosemide (LASIX) 20 MG tablet, Take 1 tablet (20 mg) by mouth daily., Disp: 90 tablet, Rfl: 1     mycophenolic acid (GENERIC EQUIVALENT) 180 MG EC tablet, Take 3  tablets (540 mg) by mouth 2 times daily., Disp: 180 tablet, Rfl: 11     sulfamethoxazole-trimethoprim (BACTRIM) 400-80 MG tablet, Take 1 tablet by mouth three times a week., Disp: 12 tablet, Rfl: 11     tacrolimus (GENERIC EQUIVALENT) 0.5 MG capsule, Take 1 capsule (0.5 mg) by mouth 2 times daily., Disp: 180 capsule, Rfl: 3     venlafaxine (EFFEXOR XR) 150 MG 24 hr capsule, Take 1 capsule (150 mg) by mouth daily, Disp: 30 capsule, Rfl: 0     venlafaxine (EFFEXOR XR) 75 MG 24 hr capsule, Take 225 mg by mouth daily., Disp: , Rfl:   ALLERGIES:    Allergies   Allergen Reactions     Amlodipine      Other reaction(s): Edema,generalized     SOCIAL HISTORY:   Social History     Socioeconomic History     Marital status:      Spouse name: Not on file     Number of children: Not on file     Years of education: Not on file     Highest education level: Not on file   Occupational History     Not on file   Tobacco Use     Smoking status: Former     Current packs/day: 0.00     Types: Cigarettes     Quit date: 1997     Years since quittin.4     Passive exposure: Never     Smokeless tobacco: Never   Vaping Use     Vaping status: Never Used   Substance and Sexual Activity     Alcohol use: Yes     Comment: 3/week     Drug use: No     Sexual activity: Yes     Partners: Male     Birth control/protection: Male Surgical, Post-menopausal   Other Topics Concern     Parent/sibling w/ CABG, MI or angioplasty before 65F 55M? Not Asked   Social History Narrative     Not on file     Social Drivers of Health     Financial Resource Strain: Low Risk  (2025)    Financial Resource Strain      Within the past 12 months, have you or your family members you live with been unable to get utilities (heat, electricity) when it was really needed?: No   Food Insecurity: Low Risk  (2025)    Food Insecurity      Within the past 12 months, did you worry that your food would run out before you got money to buy more?: No      Within the  "past 12 months, did the food you bought just not last and you didn t have money to get more?: No   Transportation Needs: Low Risk  (1/21/2025)    Transportation Needs      Within the past 12 months, has lack of transportation kept you from medical appointments, getting your medicines, non-medical meetings or appointments, work, or from getting things that you need?: No   Physical Activity: Unknown (1/21/2025)    Exercise Vital Sign      Days of Exercise per Week: 1 day      Minutes of Exercise per Session: Not on file   Stress: No Stress Concern Present (1/21/2025)    [employer] [place] [employer] - Occupational Stress Questionnaire      Feeling of Stress : Not at all   Social Connections: Unknown (1/21/2025)    Social Connection and Isolation Panel [NHANES]      Frequency of Communication with Friends and Family: Not on file      Frequency of Social Gatherings with Friends and Family: Never      Attends [employer] Services: Not on file      Active Member of Clubs or Organizations: Not on file      Attends Club or Organization Meetings: Not on file      Marital Status: Not on file   Interpersonal Safety: Low Risk  (1/21/2025)    Interpersonal Safety      Do you feel physically and emotionally safe where you currently live?: Yes      Within the past 12 months, have you been hit, slapped, kicked or otherwise physically hurt by someone?: No      Within the past 12 months, have you been humiliated or emotionally abused in other ways by your partner or ex-partner?: No   Housing Stability: Low Risk  (1/21/2025)    Housing Stability      Do you have housing? : Yes      Are you worried about losing your housing?: No     FAMILY HISTORY:   Family History   Problem Relation Age of Onset     Diabetes Type 1 Father      Hypertension Father      Cerebrovascular Disease Father      Skin Cancer Brother        EXAM:Vitals: Ht 1.545 m (5' 0.83\")   Wt 90.3 kg (199 lb)   LMP 10/01/2023   BMI 37.81 kg/m    BMI= Body mass " index is 37.81 kg/m .    General appearance: Patient is alert and fully cooperative with history & exam.  No sign of distress is noted during the visit.     Psychiatric: Affect is pleasant & appropriate.  Patient appears motivated to improve health.     Respiratory: Breathing is regular & unlabored while sitting.     HEENT: Hearing is intact to spoken word.  Speech is clear.  No gross evidence of visual impairment that would impact ambulation.     Vascular: DP & PT 2/4 & regular bilaterally.  No significant edema, rubor or varicosities noted.  CFT and skin temperature is normal to both lower extremities.       Neurologic: Lower extremity sensation is intact to light touch.  No evidence of weakness in the lower extremities.  No evidence of neuropathy and negative tinel sign.     Dermatologic: Skin is intact to both lower extremities without significant lesions, rash or abrasion.  Normal texture turgor and tone. No paronychia or evidence of soft tissue infection is noted.    Musculoskeletal: Patient is ambulatory without assistive device or brace.  Discomfort is noted with firm palpation along the medial band of the plantar fascia right foot most notably at the origination upon the calcaneus not through the arch.  She also has a firm nodule in the central medial band of the plantar fascia right foot only this is about 1 cm in greatest diameter.  Minimal symptoms noted with the plantar fibroma.  No pain with compression of the calcaneus medial to lateral or with palpation of the achilles, peroneal or posterior tibial tendons.  Slightly more than 0  of ankle joint dorsiflexion without crepitus or pain throughout the ankle, subtalar or midtarsal joints.  No pain or limitations throughout manual muscle strength testing plus 5/5 to all four quadrants bilateral.  No palpable edema noted.     Radiographs:   A small osteophyte isnoted about the plantar calcaneus consistent with plantar fasciitis.     ASSESSMENT:        ICD-10-CM    1. Plantar fasciitis, right  M72.2 XR Foot Right G/E 3 Views     Orthotics, Mastectomy and Custom Compression Orders Type: Orthotic; Orthotic Type Requested: Foot Orthotics; Foot Orthotics Laterality: Bilateral      2. Kidney replaced by transplant  Z94.0       3. Pancreas replaced by transplant (H)  Z94.83       4. 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      Treatments:  6/4/2025  Obtained and interpreted radiographs.   Discontinue barefoot walking or unsupported walking in shoes without shank.  Dispensed written instructions to obtain appropriate shoe gear and/or OTC inserts.  Dispensed anterior night splint to use all night every night.    No NSAIDs secondary to associated risk following kidney and pancreas transplant  Prescription for custom molded orthotics 6/4/2025  Follow up in 6-8 weeks       Gallo Turner DPM      Again, thank you for allowing me to participate in the care of your patient.        Sincerely,        Gallo Turner DPM    Electronically signed

## 2025-06-05 ENCOUNTER — TELEPHONE (OUTPATIENT)
Dept: TRANSPLANT | Facility: CLINIC | Age: 55
End: 2025-06-05
Payer: COMMERCIAL

## 2025-06-05 DIAGNOSIS — Z94.0 HTN, KIDNEY TRANSPLANT RELATED: ICD-10-CM

## 2025-06-05 DIAGNOSIS — Z94.0 KIDNEY REPLACED BY TRANSPLANT: Primary | ICD-10-CM

## 2025-06-05 DIAGNOSIS — I15.1 HTN, KIDNEY TRANSPLANT RELATED: ICD-10-CM

## 2025-06-05 DIAGNOSIS — Z94.83 PANCREAS REPLACED BY TRANSPLANT (H): ICD-10-CM

## 2025-06-05 NOTE — TELEPHONE ENCOUNTER
ISSUE:  SCr 1.5 (baseline 1.1-1.3)     Due for follow up with Dr. Fernandez, request placed.     Spoke with Hermila who has been dealing with foot pain, saw a podiatrist yesterday who recommended orthotic. Uses Advil occasionally, recommended against this due to kidney transplant. Will take tylenol instead.     Has intermittent diarrhea, PCP recommended vit b12 which helps.   Afebrile   No abd pain or urinary symptoms.   Knows she doesn't drink enough water.   Denies dizziness.     She will increase po intake, repeat BMP in 1-2 weeks.

## 2025-06-24 NOTE — PROGRESS NOTES
TRANSPLANT NEPHROLOGY EARLY POST TRANSPLANT VISIT    Assessment & Plan   # DDKT (SPK): Trend down   - Baseline Creatinine: ~ tbd   - Proteinuria: Not checked post transplant   - Date DSA Last Checked: Sep/2022      Latest DSA: No DSA at time of transplant   - BK Viremia: Not checked recently   - Kidney Tx Biopsy: No   - Transplant Ureteral Stent: No      # Pancreas Tx (SPK):    - Pancreatic Exocrine Drainage: Enteric drained     - Blood glucose: Euglycemia      On insulin: No   - HbA1c: Last checked at time of transplant      Latest HbA1c: 6.9%   - Pancreatic enzymes: Increased    - Date DSA Last Checked: Sep/2022  Latest DSA: No DSA at time of transplant   - Pancreas Tx Biopsy: No    # Immunosuppression: Tacrolimus immediate release (goal 8-10), Mycophenolate mofetil (dose 1000 mg every 12 hours) and Prednisone (dose taper)   - Induction with Recent Transplant:  High Intensity   - Continue with intensive monitoring of immunosuppression for efficacy and toxicity.   - Changes: No    # Infection Prophylaxis:   - PJP: Sulfa/TMP (Bactrim)  - CMV: Valganciclovir (Valcyte)  - Fungal: Clotrimazole gene (Mycelex) and Micafungin (Mycamine)    # Hypertension: Controlled;  Goal BP: < 150/90   - Volume status: Mildly hypervolemic     - Changes: No changes, continue lasix.     # Diabetes: Controlled (HbA1c <7%) Last HbA1c: 6.9%   - Management as per primary team.    # Anemia in Chronic Renal Disease: Hgb: Increased  -asymptomatic.     SHANEKA: No   - Iron studies: Low iron saturation, but high ferritin    # Mineral Bone Disorder:   - Secondary renal hyperparathyroidism; PTH level: Mildly elevated (151-300 pg/ml)        On treatment: None  - Vitamin D; level: Normal        On supplement: No  - Calcium; level: Normal        On supplement: No  - Phosphorus; level: Normal        On supplement: Yes    # Electrolytes:   - Potassium; level: Normal        On supplement: No  - Magnesium; level: Normal        On supplement: Yes  -  Bicarbonate; level: Normal        On supplement: No  - Sodium; level: Normal    # GERD: Occasional symptoms on PPI.     # H/o TIA (2017): No residual deficients     # COVID-19 Virus Review: Discussed COVID-19 virus and the potential medical risks.  Reviewed preventative health recommendations, including wearing a mask where appropriate.  Recommended COVID vaccination should be up to date with either an initial vaccination or booster shot when appropriate.  Asked the patient to inform the transplant center if they are exposed or diagnosed with this virus.    # Transplant History:  Etiology of Kidney Failure: Diabetes mellitus type 1  Tx: DDKT (SPK)  Transplant: 9/23/2022 (Kidney / Pancreas)  Donor Type: Donation after Brain Death Donor Class: Standard Criteria Donor  Crossmatch at time of Tx: negative  DSA at time of Tx: No  Significant changes in immunosuppression: None  CMV IgG Ab High Risk Discordance (D+/R-): No  EBV IgG Ab High Risk Discordance (D+/R-): No  Significant transplant-related complications: None    Transplant Office Phone Number: [phone]    Assessment and plan was discussed with the patient and she voiced her understanding and agreement.    Return visit: Labs Thursday.   MELINDA Overton CNP    Chief Complaint   Ms. Hess is a 52 year old here for hospital follow up after kidney transplant.     History of Present Illness    Eden Hess is a 52 year old female with a past medical history significant for end stage kidney disease secondary to diabetic nephropathy. Other past medical history includes DM1 c/b neuropathy and retinopathy, hypertension, non obstructive CAD, anxiety and depression, diastolic HFpEF (prior to starting HD, in setting of hypertensive urgency), and TIA (2017). She is now s/p pancreas transplant, kidney transplant without ureteral stent, and open appendectomy on 9/23/22 with Dr. Gómez.     Doing well. Had 3 loose stools overnight but consistency improving. Drank  close to 2 liters of fluid yesterday. No F/C. No N/V. No urinary s/s.        Problem List   Patient Active Problem List   Diagnosis     Major depression in complete remission (H)     Diabetes mellitus type 1 (H)     Anemia of chronic disease     Insulin pump in place     TIA (transient ischemic attack)     Hypertension     End stage kidney disease (H)     Anxiety and depression     (HFpEF) heart failure with preserved ejection fraction (H)     Pancreas replaced by transplant (H)     Kidney replaced by transplant     Hypophosphatemia     Hypomagnesemia     Hypervolemia     Diarrhea     Immunosuppressed status (H)       Allergies   Allergies   Allergen Reactions     Amlodipine      Other reaction(s): Edema,generalized       Medications   Current Outpatient Medications   Medication Sig     venlafaxine (EFFEXOR XR) 150 MG 24 hr capsule Take 1 capsule (150 mg) by mouth daily     acetaminophen (TYLENOL) 325 MG tablet Take 2 tablets (650 mg) by mouth every 4 hours as needed for pain     aspirin (ASA) 325 MG EC tablet Take 1 tablet (325 mg) by mouth daily     atorvastatin (LIPITOR) 20 MG tablet Take 1 tablet (20 mg) by mouth every evening     calcium carbonate-vitamin D (OS-BARBARA WITH D) 500-200 MG-UNIT tablet Take 1 tablet by mouth 2 times daily (with meals)     carvedilol (COREG) 12.5 MG tablet Take 1 tablet (12.5 mg) by mouth 2 times daily (with meals)     clotrimazole (MYCELEX) 10 MG lozenge Place 1 lozenge (10 mg) inside cheek 4 times daily for 77 days     furosemide (LASIX) 40 MG tablet Take 1 tablet (40 mg) by mouth 2 times daily     Guar Gum (FIBER MODULAR, NUTRISOURCE FIBER,) packet Take 1 packet by mouth 2 times daily     Insulin Disposable Pump (OMNIPOD DASH 5 PACK PODS) MISC Inject 1 each Subcutaneous every 72 hours     magnesium oxide (MAG-OX) 400 MG tablet Take 1 tablet (400 mg) by mouth 2 times daily     mycophenolate (GENERIC EQUIVALENT) 250 MG capsule Take 4 capsules (1,000 mg) by mouth 2 times daily      pantoprazole (PROTONIX) 40 MG EC tablet Take 1 tablet (40 mg) by mouth every morning (before breakfast)     phosphorus tablet 250 mg (PHOSPHA 250 NEUTRAL) 250 MG per tablet Take 2 tablets (500 mg) by mouth 2 times daily     predniSONE (DELTASONE) 10 MG tablet Take 2 tablets (20 mg) by mouth daily for 4 days, THEN 1.5 tablets (15 mg) daily for 7 days, THEN 1 tablet (10 mg) daily for 7 days, THEN 0.5 tablets (5 mg) daily for 7 days.     study - methoxy PEG-epoetin beta, MIRCERA, (IDS# [id number]) 100 MCG/0.3ML injection 75 mcg     sulfamethoxazole-trimethoprim (BACTRIM) 400-80 MG tablet Take 1 tablet by mouth three times a week     tacrolimus (GENERIC EQUIVALENT) 0.5 MG capsule Take 1 cap by mouth twice daily as directed by Transplant Center for dose changes     tacrolimus (GENERIC EQUIVALENT) 1 MG capsule Take 1 capsule (1 mg) by mouth 2 times daily     valGANciclovir (VALCYTE) 450 MG tablet Take 450mg (1 tab) twice weekly. Increase dose to maximum 900mg daily as directed by Transplant Center.     Wheat Dextrin (BENEFIBER DRINK MIX PO) Take 1 teaspoonful by mouth as needed     No current facility-administered medications for this visit.     Medications Discontinued During This Encounter   Medication Reason     venlafaxine (EFFEXOR-XR) 150 MG 24 hr capsule Reorder       Physical Exam   Vital Signs: LMP 09/18/2022     GENERAL APPEARANCE: alert and no distress  HENT: mouth without ulcers or lesions  LYMPHATICS: no cervical or supraclavicular nodes  RESP: lungs clear to auscultation - no rales, rhonchi or wheezes  CV: regular rhythm, normal rate, no rub, no murmur  EDEMA: no LE edema bilaterally  ABDOMEN: soft, nondistended, nontender, bowel sounds normal  MS: extremities normal - no gross deformities noted, no evidence of inflammation in joints, no muscle tenderness  SKIN: no rash  SKIN: abdomen incision C/D/I. No erythema or drainage.     Data     Renal Latest Ref Rng & Units 10/4/2022 10/3/2022 10/2/2022   Na 136 - 145  mmol/L 138 141 -   K 3.4 - 5.3 mmol/L 3.9 3.7 -   Cl 98 - 107 mmol/L 102 105 -   CO2 22 - 29 mmol/L 25 25 -   BUN 6.0 - 20.0 mg/dL 56.0(H) 58.5(H) -   Cr 0.51 - 0.95 mg/dL 2.88(H) 2.95(H) -   Glucose 70 - 99 mg/dL 100(H) 93 112(H)   Ca  8.6 - 10.0 mg/dL 8.9 8.9 -   Mg 1.7 - 2.3 mg/dL 1.7 1.8 -     Bone Health Latest Ref Rng & Units 10/4/2022 10/3/2022 10/2/2022   Phos 2.5 - 4.5 mg/dL 2.7 2.7 1.9(L)   PTHi 15 - 65 pg/mL - 268(H) -   Vit D Def 20 - 75 ug/L - 60 -     Heme Latest Ref Rng & Units 10/4/2022 10/3/2022 10/2/2022   WBC 4.0 - 11.0 10e3/uL 12.6(H) 10.1 9.1   Hgb 11.7 - 15.7 g/dL 7.8(L) 7.4(L) 8.0(L)   Plt 150 - 450 10e3/uL 286 243 251   ABSOLUTE NEUTROPHIL 1.6 - 8.3 10e3/uL - - -   ABSOLUTE LYMPHOCYTES 0.8 - 5.3 10e3/uL - - -   ABSOLUTE MONOCYTES 0.0 - 1.3 10e3/uL - - -   ABSOLUTE EOSINOPHILS 0.0 - 0.7 10e3/uL - - -   ABSOLUTE BASOPHILS 0.0 - 0.2 10e9/L - - -   ABS IMMATURE GRANULOCYTES 0 - 0.4 10e9/L - - -   ABSOLUTE NUCLEATED RBC - - - -     Liver Latest Ref Rng & Units 9/22/2022 8/13/2020   AP 35 - 104 U/L 89 143   TBili <=1.2 mg/dL 0.3 0.5   ALT 10 - 35 U/L 18 31   AST 10 - 35 U/L 29 28   Tot Protein 6.4 - 8.3 g/dL 6.9 7.0   Albumin 3.5 - 5.2 g/dL 4.1 3.0(L)     Pancreas Latest Ref Rng & Units 10/4/2022 10/3/2022 10/2/2022   A1C <5.7 % - - -   Amylase 28 - 100 U/L 142(H) 136(H) 138(H)   Lipase (Roche) 13 - 60 U/L 134(H) 118(H) 95(H)     Iron studies Latest Ref Rng & Units 10/3/2022   Iron 37 - 145 ug/dL 34(L)   Iron sat 15 - 46 % 17   Ferritin 11 - 328 ng/mL 923(H)     [facility] T[facility] Virology Latest Ref Rng & Units 9/22/2022 8/13/2020   CMV QUANT IU/ML Not Detected IU/mL Not Detected -   EBV CAPSID ANTIBODY IGG No detectable antibody. Positive(A) >8.0(H)   Hep B Core NR:Nonreactive - Nonreactive        Recent Labs   Lab Test 09/26/22  0319 09/28/22  0623 10/01/22  0550 10/02/22  0543 10/03/22  0808   DOSTAC 9/25/2022  --   --   --   --    TACROL 9.6   < > 10.3 11.0 10.1    < > = values in this interval not  displayed.          94

## 2025-07-28 ENCOUNTER — LAB (OUTPATIENT)
Dept: LAB | Facility: CLINIC | Age: 55
End: 2025-07-28
Payer: COMMERCIAL

## 2025-07-28 DIAGNOSIS — Z94.0 HTN, KIDNEY TRANSPLANT RELATED: ICD-10-CM

## 2025-07-28 DIAGNOSIS — Z94.0 KIDNEY REPLACED BY TRANSPLANT: ICD-10-CM

## 2025-07-28 DIAGNOSIS — E10.11 DIABETIC KETOACIDOSIS WITH COMA ASSOCIATED WITH TYPE 1 DIABETES MELLITUS (H): Primary | ICD-10-CM

## 2025-07-28 DIAGNOSIS — Z20.828 CONTACT WITH AND (SUSPECTED) EXPOSURE TO OTHER VIRAL COMMUNICABLE DISEASES: ICD-10-CM

## 2025-07-28 DIAGNOSIS — I15.1 HTN, KIDNEY TRANSPLANT RELATED: ICD-10-CM

## 2025-07-28 DIAGNOSIS — Z94.83 PANCREAS REPLACED BY TRANSPLANT (H): ICD-10-CM

## 2025-07-28 DIAGNOSIS — Z98.890 OTHER SPECIFIED POSTPROCEDURAL STATES: ICD-10-CM

## 2025-07-28 LAB
AMYLASE SERPL-CCNC: 62 U/L (ref 28–100)
ANION GAP SERPL CALCULATED.3IONS-SCNC: 11 MMOL/L (ref 7–15)
BUN SERPL-MCNC: 21.8 MG/DL (ref 6–20)
CALCIUM SERPL-MCNC: 10 MG/DL (ref 8.8–10.4)
CHLORIDE SERPL-SCNC: 106 MMOL/L (ref 98–107)
CREAT SERPL-MCNC: 1.13 MG/DL (ref 0.51–0.95)
EGFRCR SERPLBLD CKD-EPI 2021: 57 ML/MIN/1.73M2
ERYTHROCYTE [DISTWIDTH] IN BLOOD BY AUTOMATED COUNT: 12.9 % (ref 10–15)
EST. AVERAGE GLUCOSE BLD GHB EST-MCNC: 111 MG/DL
GLUCOSE SERPL-MCNC: 110 MG/DL (ref 70–99)
HBA1C MFR BLD: 5.5 % (ref 0–5.6)
HCO3 SERPL-SCNC: 22 MMOL/L (ref 22–29)
HCT VFR BLD AUTO: 41.7 % (ref 35–47)
HGB BLD-MCNC: 13.3 G/DL (ref 11.7–15.7)
LIPASE SERPL-CCNC: 40 U/L (ref 13–60)
MCH RBC QN AUTO: 30.3 PG (ref 26.5–33)
MCHC RBC AUTO-ENTMCNC: 31.9 G/DL (ref 31.5–36.5)
MCV RBC AUTO: 95 FL (ref 78–100)
PLATELET # BLD AUTO: 281 10E3/UL (ref 150–450)
POTASSIUM SERPL-SCNC: 4.4 MMOL/L (ref 3.4–5.3)
RBC # BLD AUTO: 4.39 10E6/UL (ref 3.8–5.2)
SODIUM SERPL-SCNC: 139 MMOL/L (ref 135–145)
TACROLIMUS BLD-MCNC: 6.1 UG/L (ref 5–15)
TME LAST DOSE: NORMAL H
TME LAST DOSE: NORMAL H
WBC # BLD AUTO: 5.7 10E3/UL (ref 4–11)

## 2025-07-28 PROCEDURE — 85027 COMPLETE CBC AUTOMATED: CPT

## 2025-07-28 PROCEDURE — 83036 HEMOGLOBIN GLYCOSYLATED A1C: CPT

## 2025-07-28 PROCEDURE — 80197 ASSAY OF TACROLIMUS: CPT

## 2025-07-28 PROCEDURE — 80048 BASIC METABOLIC PNL TOTAL CA: CPT

## 2025-07-28 PROCEDURE — 82150 ASSAY OF AMYLASE: CPT

## 2025-07-28 PROCEDURE — 83690 ASSAY OF LIPASE: CPT

## 2025-07-28 PROCEDURE — 36415 COLL VENOUS BLD VENIPUNCTURE: CPT

## 2025-07-30 NOTE — PROGRESS NOTES
Virtual Visit Details    Type of service:  converted to telephone due to technical issues   Start 3:00 pm  Stop: 3:13 pm  Contact time: 13min  Originating Location (pt. Location): Home    Distant Location (provider location):  Off-site  Platform used for Video Visit: Job    TRANSPLANT NEPHROLOGY EARLY POST TRANSPLANT VISIT    Assessment & Plan   # DDKT (SPK): Stable   - CKD stage 3a A1 Baseline Creatinine: ~ 1.1-1.3   - Proteinuria: Normal    - DSA: No   - BK Viremia: No   - Kidney Tx Biopsy: No    # Pancreas Tx (SPK):    - Pancreatic Exocrine Drainage: Enteric drained     - Blood glucose: Near euglycemia      On insulin: No   - HbA1c: Stable      Latest HbA1c: 5.5%   - Pancreatic enzymes: Stable   - DSA: No   - Pancreas Tx Biopsy: No    # Immunosuppression: Tacrolimus immediate release (goal 5-8) and Mycophenolic acid (dose 540 mg every 12 hours)   - Patient is in an immunosuppressed state and will continue to monitor for efficacy and toxicity of immunosuppression medications.   - Changes: not at this time    # Infection Prophylaxis:   Last CD4 Level: 147 (Apr/2023)  - PJP: Sulfa/TMP (Bactrim)    # Hypertension: unknown control;  Goal BP: < 130/80    - Changes: no    # Mineral Bone Disorder:   - Vitamin D; level: Normal        On supplement: No  - Calcium; level: Normal        On supplement: No    # Electrolytes:   - Potassium; level: Normal        On supplement: No  - Magnesium; level: Low        On supplement: No  - Bicarbonate; level: normal        On supplement: no    # Other PMHx::  . hx of R Hand Cellulitis 2/2 Cat Bite: initially prescribed Augmentin, but symptoms progressed, admitted and switched to zosyn. MRI of hand showed no tenosynovitis or osteomyelitis. Seen by [facility].   . hx of Clostridium difficile Colitis: completed oral vancomycin. on prophylactic oral vancomycin with antibiotic.    . Chronic Loose Stools: improved . Uses fiber prn.  .  Depression:on venlafaxine.follow up with  psych and therapy    # Skin Cancer:    - Discussed sun protection and recommend regular follow up with Dermatology.     # Transplant History:  Etiology of Kidney Failure: Diabetes mellitus type 1  Tx: DDKT (SPK)  Transplant: 9/23/2022 (Kidney / Pancreas)  Significant changes in immunosuppression: None  Significant transplant-related complications: None    Transplant Office Phone Number: [phone]    Return visit: 1 year    Leida Fernandez MD    Chief Complaint     Deshawn is a 54 year old here for kidney transplant, pancreas transplant, and immunosuppression management.     History of Present Illness   Feels good overall, main concerns discussed today:   - c/o b/l feet pain upon walking and standing, following with podiatry, advised to wear orthotics   - gets winded easily on exertion over the past several years, has not yet seen cards in years. Denies any chest pain, leg swelling, weight gain  Advised to f/up pcp and cards, discuss echocardiogram and stress test    12 point ROS otherwise negative. Energy level is good. Denies any fevers, chills, weight loss, night sweats. No nausea, vomiting, abdominal pain, diarrhea. No chest pain, dyspnea, leg swelling. No recent hospitalization.   Denies missed immunosuppressive meds    Current IS FK 0.5/0.5 /540  PPx: bactrim MWF    Home BP: 120-140s systolic  PCP:     Problem List   Patient Active Problem List   Diagnosis    Diabetes mellitus type 1 (H)    Anemia in stage 3a chronic kidney disease (H)    TIA (transient ischemic attack)    HTN, kidney transplant related    Anxiety and depression    (HFpEF) heart failure with preserved ejection fraction (H)    Pancreas replaced by transplant (H)    Kidney replaced by transplant    Immunosuppression    Aftercare following organ transplant    Chronic kidney disease, stage 3a (H)    Vitamin D deficiency    Secondary renal hyperparathyroidism    Hyperkalemia    Clostridium difficile infection    Cellulitis of right  upper extremity    Hyperlipidemia    Diabetic ketoacidosis with coma associated with type 1 diabetes mellitus (H)    Essential hypertension    Iron deficiency anemia, unspecified    Moderate episode of recurrent major depressive disorder (H)    Stage 4 chronic kidney disease (H)    Vitamin B6 deficiency    Bacteremia due to methicillin resistant Staphylococcus aureus    Nephrotic syndrome in diseases classified elsewhere    Cervical high risk HPV (human papillomavirus) test positive       Allergies   Allergies   Allergen Reactions    Amlodipine      Other reaction(s): Edema,generalized       Medications   Current Outpatient Medications   Medication Sig Dispense Refill    aspirin 81 MG EC tablet Take 81 mg by mouth daily      atorvastatin (LIPITOR) 20 MG tablet Take 1 tablet (20 mg) by mouth every evening. 30 tablet 11    buPROPion (WELLBUTRIN XL) 150 MG 24 hr tablet Take 150 mg by mouth every morning.      carvedilol (COREG) 12.5 MG tablet Take 1 tablet (12.5 mg) by mouth 2 times daily (with meals). 60 tablet 11    Cyanocobalamin (VITAMIN B-12 PO) Take by mouth.      FIBER ADULT GUMMIES PO Take 1 Gum by mouth daily      furosemide (LASIX) 20 MG tablet Take 1 tablet (20 mg) by mouth daily. 90 tablet 1    mycophenolic acid (GENERIC EQUIVALENT) 180 MG EC tablet Take 3 tablets (540 mg) by mouth 2 times daily. 180 tablet 11    sulfamethoxazole-trimethoprim (BACTRIM) 400-80 MG tablet Take 1 tablet by mouth three times a week. 12 tablet 11    tacrolimus (GENERIC EQUIVALENT) 0.5 MG capsule Take 1 capsule (0.5 mg) by mouth 2 times daily. 180 capsule 3    venlafaxine (EFFEXOR XR) 150 MG 24 hr capsule Take 1 capsule (150 mg) by mouth daily 30 capsule 0    venlafaxine (EFFEXOR XR) 75 MG 24 hr capsule Take 225 mg by mouth daily.       No current facility-administered medications for this visit.     There are no discontinued medications.    Physical Exam   Vital Signs: LMP 10/01/2023   Deferred telephone visit  Data          Latest Ref Rng & Units 7/28/2025    10:35 AM 6/4/2025    10:04 AM 4/21/2025    10:03 AM   Renal   Sodium 135 - 145 mmol/L 139  141  137    K 3.4 - 5.3 mmol/L 4.4  4.4  5.3    Cl 98 - 107 mmol/L 106  103  102    Cl (external) 98 - 107 mmol/L 106  103  102    CO2 22 - 29 mmol/L 22  26  22    Urea Nitrogen 6.0 - 20.0 mg/dL 21.8  22.2  25.6    Creatinine 0.51 - 0.95 mg/dL 1.13  1.47  1.22    Glucose 70 - 99 mg/dL 110  108  95    Calcium 8.8 - 10.4 mg/dL 10.0  9.8  9.9          Latest Ref Rng & Units 7/25/2023    10:17 AM 5/11/2023    10:57 AM 3/1/2023     9:07 AM   Bone Health   Phosphorus 2.5 - 4.5 mg/dL 3.9  3.4  4.4          Latest Ref Rng & Units 7/28/2025    10:35 AM 6/4/2025    10:04 AM 4/21/2025    10:03 AM   Heme   WBC 4.0 - 11.0 10e3/uL 5.7  6.5  6.6    Hgb 11.7 - 15.7 g/dL 13.3  12.9  13.5    Plt 150 - 450 10e3/uL 281  295  300          Latest Ref Rng & Units 11/19/2024     9:50 AM 8/18/2023     5:50 AM 4/3/2023    10:01 AM   Liver   AP 35 - 104 U/L  77  91    TBili <=1.2 mg/dL  0.5  0.4    Bilirubin Direct 0.00 - 0.30 mg/dL   <0.20    ALT 0 - 50 U/L 22  12  48    AST 0 - 45 U/L  16  51    Tot Protein 6.4 - 8.3 g/dL  6.1  6.9    Albumin 3.5 - 5.2 g/dL  3.6  4.2          Latest Ref Rng & Units 7/28/2025    10:35 AM 6/4/2025    10:04 AM 4/21/2025    10:03 AM   Pancreas   A1C 0.0 - 5.6 % 5.5      Amylase 28 - 100 U/L 62  52  64    Lipase (Roche) 13 - 60 U/L 40  37  --          Latest Ref Rng & Units 10/3/2022     8:09 AM   Iron studies   Iron 37 - 145 ug/dL 34    Iron Sat Index 15 - 46 % 17    Ferritin 11 - 328 ng/mL 923          Latest Ref Rng & Units 2/22/2023    10:03 AM 11/28/2022     8:30 AM 9/22/2022     4:29 PM   [facility] Txp Virology   CMV QUANT IU/ML Not Detected IU/mL Not Detected  Not Detected  Not Detected    EBV CAPSID ANTIBODY IGG No detectable antibody.   Positive        Recent Labs   Lab Test 04/21/25  1003 06/04/25  1004 07/28/25  1035   DOSTAC 4/20/2025 6/3/2025 7/27/2025   TACROL 7.2 6.9 6.1      Recent Labs   Lab Test 07/25/23  1017 08/14/23  0958 09/06/23  1029   DOSMPA 7/24/2023  10:00 PM 8/13/2023  10:00 PM  --    MPACID 3.71* 1.28 1.73   MPAG 69.0 50.5 44.4

## 2025-07-31 ENCOUNTER — VIRTUAL VISIT (OUTPATIENT)
Dept: TRANSPLANT | Facility: CLINIC | Age: 55
End: 2025-07-31
Attending: INTERNAL MEDICINE
Payer: COMMERCIAL

## 2025-07-31 DIAGNOSIS — D84.9 IMMUNOSUPPRESSION: ICD-10-CM

## 2025-07-31 DIAGNOSIS — Z94.83 PANCREAS REPLACED BY TRANSPLANT (H): ICD-10-CM

## 2025-07-31 DIAGNOSIS — Z94.0 KIDNEY REPLACED BY TRANSPLANT: ICD-10-CM

## 2025-07-31 DIAGNOSIS — N18.31 CKD STAGE 3A, GFR 45-59 ML/MIN (H): ICD-10-CM

## 2025-07-31 DIAGNOSIS — Z94.83 STATUS POST SIMULTANEOUS KIDNEY AND PANCREAS TRANSPLANT (H): Primary | ICD-10-CM

## 2025-07-31 DIAGNOSIS — Z48.298 AFTERCARE FOLLOWING ORGAN TRANSPLANT: ICD-10-CM

## 2025-07-31 DIAGNOSIS — I15.1 HTN, KIDNEY TRANSPLANT RELATED: ICD-10-CM

## 2025-07-31 DIAGNOSIS — Z94.0 STATUS POST SIMULTANEOUS KIDNEY AND PANCREAS TRANSPLANT (H): Primary | ICD-10-CM

## 2025-07-31 DIAGNOSIS — Z94.0 HTN, KIDNEY TRANSPLANT RELATED: ICD-10-CM

## 2025-07-31 NOTE — LETTER
7/31/2025      Eden Hess  [address]  [place] [address]      Dear Colleague,    Thank you for referring your patient, Eden Hess, to the [facility]. Please see a copy of my visit note below.    Virtual Visit Details    Type of service:  converted to telephone due to technical issues   Start 3:00 pm  Stop: 3:13 pm  Contact time: 13min  Originating Location (pt. Location): Home    Distant Location (provider location):  Off-site  Platform used for Video Visit: [facility]    TRANSPLANT NEPHROLOGY EARLY POST TRANSPLANT VISIT    Assessment & Plan  # DDKT (SPK): Stable   - CKD stage 3a A1 Baseline Creatinine: ~ 1.1-1.3   - Proteinuria: Normal    - DSA: No   - BK Viremia: No   - Kidney Tx Biopsy: No    # Pancreas Tx (SPK):    - Pancreatic Exocrine Drainage: Enteric drained     - Blood glucose: Near euglycemia      On insulin: No   - HbA1c: Stable      Latest HbA1c: 5.5%   - Pancreatic enzymes: Stable   - DSA: No   - Pancreas Tx Biopsy: No    # Immunosuppression: Tacrolimus immediate release (goal 5-8) and Mycophenolic acid (dose 540 mg every 12 hours)   - Patient is in an immunosuppressed state and will continue to monitor for efficacy and toxicity of immunosuppression medications.   - Changes: not at this time    # Infection Prophylaxis:   Last CD4 Level: 147 (Apr/2023)  - PJP: Sulfa/TMP (Bactrim)    # Hypertension: unknown control;  Goal BP: < 130/80    - Changes: no    # Mineral Bone Disorder:   - Vitamin D; level: Normal        On supplement: No  - Calcium; level: Normal        On supplement: No    # Electrolytes:   - Potassium; level: Normal        On supplement: No  - Magnesium; level: Low        On supplement: No  - Bicarbonate; level: normal        On supplement: no    # Other PMHx::  . hx of R Hand Cellulitis 2/2 Cat Bite: initially prescribed Augmentin, but symptoms progressed, admitted and switched to zosyn. MRI of hand showed no tenosynovitis or osteomyelitis. Seen  [facility].   . hx of Clostridium difficile Colitis: completed oral vancomycin. on prophylactic oral vancomycin with antibiotic.    . Chronic Loose Stools: improved . Uses fiber prn.  .  Depression:on venlafaxine.follow up with psych and therapy    # Skin Cancer:    - Discussed sun protection and recommend regular follow up with Dermatology.     # Transplant History:  Etiology of Kidney Failure: Diabetes mellitus type 1  Tx: DDKT (SPK)  Transplant: 9/23/2022 (Kidney / Pancreas)  Significant changes in immunosuppression: None  Significant transplant-related complications: None    Transplant Office Phone Number: [phone]    Return visit: 1 year    Leida Fernandez MD    Chief Complaint    Deshawn is a 54 year old here for kidney transplant, pancreas transplant, and immunosuppression management.     History of Present Illness  Feels good overall, main concerns discussed today:   - c/o b/l feet pain upon walking and standing, following with podiatry, advised to wear orthotics   - gets winded easily on exertion over the past several years, has not yet seen cards in years. Denies any chest pain, leg swelling, weight gain  Advised to f/up pcp and cards, discuss echocardiogram and stress test    12 point ROS otherwise negative. Energy level is good. Denies any fevers, chills, weight loss, night sweats. No nausea, vomiting, abdominal pain, diarrhea. No chest pain, dyspnea, leg swelling. No recent hospitalization.   Denies missed immunosuppressive meds    Current IS FK 0.5/0.5 /540  PPx: bactrim MWF    Home BP: 120-140s systolic  PCP:     Problem List  Patient Active Problem List   Diagnosis     Diabetes mellitus type 1 (H)     Anemia in stage 3a chronic kidney disease (H)     TIA (transient ischemic attack)     HTN, kidney transplant related     Anxiety and depression     (HFpEF) heart failure with preserved ejection fraction (H)     Pancreas replaced by transplant (H)     Kidney replaced  by transplant     Immunosuppression     Aftercare following organ transplant     Chronic kidney disease, stage 3a (H)     Vitamin D deficiency     Secondary renal hyperparathyroidism     Hyperkalemia     Clostridium difficile infection     Cellulitis of right upper extremity     Hyperlipidemia     Diabetic ketoacidosis with coma associated with type 1 diabetes mellitus (H)     Essential hypertension     Iron deficiency anemia, unspecified     Moderate episode of recurrent major depressive disorder (H)     Stage 4 chronic kidney disease (H)     Vitamin B6 deficiency     Bacteremia due to methicillin resistant Staphylococcus aureus     Nephrotic syndrome in diseases classified elsewhere     Cervical high risk HPV (human papillomavirus) test positive       Allergies  Allergies   Allergen Reactions     Amlodipine      Other reaction(s): Edema,generalized       Medications  Current Outpatient Medications   Medication Sig Dispense Refill     aspirin 81 MG EC tablet Take 81 mg by mouth daily       atorvastatin (LIPITOR) 20 MG tablet Take 1 tablet (20 mg) by mouth every evening. 30 tablet 11     buPROPion (WELLBUTRIN XL) 150 MG 24 hr tablet Take 150 mg by mouth every morning.       carvedilol (COREG) 12.5 MG tablet Take 1 tablet (12.5 mg) by mouth 2 times daily (with meals). 60 tablet 11     Cyanocobalamin (VITAMIN B-12 PO) Take by mouth.       FIBER ADULT GUMMIES PO Take 1 Gum by mouth daily       furosemide (LASIX) 20 MG tablet Take 1 tablet (20 mg) by mouth daily. 90 tablet 1     mycophenolic acid (GENERIC EQUIVALENT) 180 MG EC tablet Take 3 tablets (540 mg) by mouth 2 times daily. 180 tablet 11     sulfamethoxazole-trimethoprim (BACTRIM) 400-80 MG tablet Take 1 tablet by mouth three times a week. 12 tablet 11     tacrolimus (GENERIC EQUIVALENT) 0.5 MG capsule Take 1 capsule (0.5 mg) by mouth 2 times daily. 180 capsule 3     venlafaxine (EFFEXOR XR) 150 MG 24 hr capsule Take 1 capsule (150 mg) by mouth daily 30 capsule 0      venlafaxine (EFFEXOR XR) 75 MG 24 hr capsule Take 225 mg by mouth daily.       No current facility-administered medications for this visit.     There are no discontinued medications.    Physical Exam  Vital Signs: [facility] 10/01/2023   Deferred telephone visit  Data        Latest Ref Rng & Units 7/28/2025    10:35 AM 6/4/2025    10:04 AM 4/21/2025    10:03 AM   Renal   Sodium 135 - 145 mmol/L 139  141  137    K 3.4 - 5.3 mmol/L 4.4  4.4  5.3    Cl 98 - 107 mmol/L 106  103  102    Cl (external) 98 - 107 mmol/L 106  103  102    CO2 22 - 29 mmol/L 22  26  22    Urea Nitrogen 6.0 - 20.0 mg/dL 21.8  22.2  25.6    Creatinine 0.51 - 0.95 mg/dL 1.13  1.47  1.22    Glucose 70 - 99 mg/dL 110  108  95    Calcium 8.8 - 10.4 mg/dL 10.0  9.8  9.9          Latest Ref Rng & Units 7/25/2023    10:17 AM 5/11/2023    10:57 AM 3/1/2023     9:07 AM   Bone Health   Phosphorus 2.5 - 4.5 mg/dL 3.9  3.4  4.4          Latest Ref Rng & Units 7/28/2025    10:35 AM 6/4/2025    10:04 AM 4/21/2025    10:03 AM   Heme   WBC 4.0 - 11.0 10e3/uL 5.7  6.5  6.6    Hgb 11.7 - 15.7 g/dL 13.3  12.9  13.5    Plt 150 - 450 10e3/uL 281  295  300          Latest Ref Rng & Units 11/19/2024     9:50 AM 8/18/2023     5:50 AM 4/3/2023    10:01 AM   Liver   AP 35 - 104 U/L  77  91    TBili <=1.2 mg/dL  0.5  0.4    Bilirubin Direct 0.00 - 0.30 mg/dL   <0.20    ALT 0 - 50 U/L 22  12  48    AST 0 - 45 U/L  16  51    Tot Protein 6.4 - 8.3 g/dL  6.1  6.9    Albumin 3.5 - 5.2 g/dL  3.6  4.2          Latest Ref Rng & Units 7/28/2025    10:35 AM 6/4/2025    10:04 AM 4/21/2025    10:03 AM   Pancreas   A1C 0.0 - 5.6 % 5.5      Amylase 28 - 100 U/L 62  52  64    Lipase (Roche) 13 - 60 U/L 40  37  --          Latest Ref Rng & Units 10/3/2022     8:09 AM   Iron studies   Iron 37 - 145 ug/dL 34    Iron Sat Index 15 - 46 % 17    Ferritin 11 - 328 ng/mL 923          Latest Ref Rng & Units 2/22/2023    10:03 AM 11/28/2022     8:30 AM 9/22/2022     4:29 PM   [facility] Virology   CMV  QUANT IU/ML Not Detected IU/mL Not Detected  Not Detected  Not Detected    EBV CAPSID ANTIBODY IGG No detectable antibody.   Positive        Recent Labs   Lab Test 04/21/25  1003 06/04/25  1004 07/28/25  1035   DOSTAC 4/20/2025 6/3/2025 7/27/2025   TACROL 7.2 6.9 6.1     Recent Labs   Lab Test 07/25/23  1017 08/14/23  0958 09/06/23  1029   DOSMPA 7/24/2023  10:00 PM 8/13/2023  10:00 PM  --    MPACID 3.71* 1.28 1.73   MPAG 69.0 50.5 44.4       Again, thank you for allowing me to participate in the care of your patient.        Sincerely,        Leida Fernandez MD    Electronically signed

## 2025-07-31 NOTE — NURSING NOTE
Current patient location: [address] [place] [address] [address]  [place] [address]    Is the patient currently in the state of [place]? YES    Visit mode: VIDEO    If the visit is dropped, the patient can be reconnected by:VIDEO VISIT: Text to cell phone:   Telephone Information:   Mobile [phone]   Mobile [phone]   Mobile Not on file.       Will anyone else be joining the visit? NO  (If patient encounters technical issues they should call [phone]    Are changes needed to the allergy or medication list? No    Are refills needed on medications prescribed by this physician? NO    Rooming Documentation:  Not applicable    Reason for visit: RECHECK    Jaye LUNDF

## 2025-07-31 NOTE — PATIENT INSTRUCTIONS
Transplant Patient Information  Your Post Transplant Coordinator is: Giovanna Kovacs  You and your care team can contact your transplant coordinator Monday - Friday, 8am - 5pm at [phone] (Option 2 to reach the coordinator or Option 4 to schedule an appointment).  You can also reach your care team online via [facility].  After hours for urgent matters, please call [facility] at [phone].

## (undated) DEVICE — BLANKET BAIR HUGGER LOWER BODY 42568

## (undated) DEVICE — SYR 50ML LL W/O NDL 309653

## (undated) DEVICE — SU SILK 2-0 TIE 12X30" A305H

## (undated) DEVICE — SU VICRYL 3-0 SH 27" UND J416H

## (undated) DEVICE — NDL COUNTER 20CT 31142493

## (undated) DEVICE — SU MONOCRYL 4-0 PS-2 18" UND Y496G

## (undated) DEVICE — SU SILK 3-0 SH CR 8X18" C013D

## (undated) DEVICE — SU PDS II 5-0 RB-1 27" Z303H

## (undated) DEVICE — NDL ANGIOCATH 14GA 1.25" 4048

## (undated) DEVICE — SURGICEL ABSORBABLE HEMOSTAT SNOW 4"X4" 2083

## (undated) DEVICE — DRAPE SHEET REV FOLD 3/4 9349

## (undated) DEVICE — SU SILK 0 TIE 6X30" A306H

## (undated) DEVICE — INSERT FOGARTY 61MM TRACTION HYDRAJAW HYDRA61

## (undated) DEVICE — SU PROLENE 5-0 RB-1DA 36"  8556H

## (undated) DEVICE — LINEN TOWEL PACK X30 5481

## (undated) DEVICE — SOL NACL 0.9% IRRIG 1000ML BOTTLE 2F7124

## (undated) DEVICE — TUBING IRRIG CYSTO/BLADDER SET 81" LF 2C4040

## (undated) DEVICE — SUTURE 24IN PRLN EVERPOINT 7-0 BV175-6 2 ARM MFL 4 STRN

## (undated) DEVICE — SU PROLENE 6-0 RB-2DA 30" 8711H

## (undated) DEVICE — PACK SET-UP STD 9102

## (undated) DEVICE — SU PROLENE 7-0 BV-1DA 30" 8703H

## (undated) DEVICE — SU PROLENE 6-0 RB-2DA 18" 8714H

## (undated) DEVICE — STPL SKIN 35W ROTATING HEAD PRW35

## (undated) DEVICE — DRAPE IOBAN INCISE 13X13" 6640EZ

## (undated) DEVICE — LABEL MEDICATION SYSTEM 3303-P

## (undated) DEVICE — SURGICEL HEMOSTAT 4X8" 1952

## (undated) DEVICE — SU SILK 4-0 TIE 12X30" A303H

## (undated) DEVICE — ESU PENCIL SMOKE EVAC W/ROCKER SWITCH 0703-047-000

## (undated) DEVICE — SU PDS II 6-0 RB-2DA 30" Z149H

## (undated) DEVICE — SUCTION TIP YANKAUER MEDI-VAC K82

## (undated) DEVICE — SU MONOCRYL 4-0 PS-2 27" UND Y426H

## (undated) DEVICE — SU SILK 1 TIE 6X30" A307H

## (undated) DEVICE — SU PDS II 4-0 RB-1 27" Z304H

## (undated) DEVICE — INSERT FOGARTY 33MM TRACTION HYDRAJAW HYDRA33

## (undated) DEVICE — SU PROLENE 4-0 SHDA 36" 8521H

## (undated) DEVICE — CATH PLUG W/CAP 000076

## (undated) DEVICE — ESU GROUND PAD ADULT W/CORD E7507

## (undated) DEVICE — DRAPE ISOLATION BAG 1003

## (undated) DEVICE — SYR 01ML 27GA 0.5" NDL TBC 309623

## (undated) DEVICE — SOL WATER IRRIG 1000ML BOTTLE 2F7114

## (undated) DEVICE — DRAPE FLUID WARMING 52 X 60" ORS-321

## (undated) DEVICE — CLIP HORIZON MED BLUE 002200

## (undated) DEVICE — SUCTION MANIFOLD NEPTUNE 2 SYS 4 PORT 0702-020-000

## (undated) DEVICE — PITCHER STERILE 1000ML  SSK9004A

## (undated) DEVICE — LINEN TOWEL PACK X6 WHITE 5487

## (undated) DEVICE — Device

## (undated) DEVICE — CATH FOLEY 3WAY 18FR 30ML LATEX 0167SI18

## (undated) DEVICE — SU PDS II 4-0 SH 27" Z315H

## (undated) DEVICE — BASIN SET SINGLE STERILE 13752-624

## (undated) DEVICE — PREP CHLORAPREP 26ML TINTED HI-LITE ORANGE 930815

## (undated) DEVICE — DECANTER BAG 2002S

## (undated) DEVICE — SU PDS II 0 TP-1 60" Z991G

## (undated) DEVICE — DEVICE CATH STABILIZATION STATLOCK FOLEY 3-WAY FOL0105

## (undated) DEVICE — WIPES FOLEY CARE SURESTEP PROVON DFC100

## (undated) DEVICE — SPONGE LAP 18X18" X8435

## (undated) DEVICE — BLADE CLIPPER SGL USE 9680

## (undated) DEVICE — SOL NACL 0.9% INJ 1000ML BAG 2B1324X

## (undated) DEVICE — SYR BULB IRRIG DOVER 60 ML LATEX FREE 67000

## (undated) DEVICE — SU SILK 3-0 TIE 12X30" A304H

## (undated) RX ORDER — FUROSEMIDE 10 MG/ML
INJECTION INTRAMUSCULAR; INTRAVENOUS
Status: DISPENSED
Start: 2022-09-23

## (undated) RX ORDER — ONDANSETRON 2 MG/ML
INJECTION INTRAMUSCULAR; INTRAVENOUS
Status: DISPENSED
Start: 2022-09-23

## (undated) RX ORDER — DOBUTAMINE HYDROCHLORIDE 200 MG/100ML
INJECTION INTRAVENOUS
Status: DISPENSED
Start: 2020-11-23

## (undated) RX ORDER — METOPROLOL TARTRATE 1 MG/ML
INJECTION, SOLUTION INTRAVENOUS
Status: DISPENSED
Start: 2020-11-23

## (undated) RX ORDER — VERAPAMIL HYDROCHLORIDE 2.5 MG/ML
INJECTION, SOLUTION INTRAVENOUS
Status: DISPENSED
Start: 2022-09-23

## (undated) RX ORDER — HYDROMORPHONE HYDROCHLORIDE 1 MG/ML
INJECTION, SOLUTION INTRAMUSCULAR; INTRAVENOUS; SUBCUTANEOUS
Status: DISPENSED
Start: 2022-09-23

## (undated) RX ORDER — CEFUROXIME SODIUM 1.5 G/16ML
INJECTION, POWDER, FOR SOLUTION INTRAVENOUS
Status: DISPENSED
Start: 2022-09-23

## (undated) RX ORDER — PAPAVERINE HYDROCHLORIDE 30 MG/ML
INJECTION INTRAMUSCULAR; INTRAVENOUS
Status: DISPENSED
Start: 2022-09-23

## (undated) RX ORDER — HEPARIN SODIUM 1000 [USP'U]/ML
INJECTION, SOLUTION INTRAVENOUS; SUBCUTANEOUS
Status: DISPENSED
Start: 2022-09-23

## (undated) RX ORDER — DEXTROSE, SODIUM CHLORIDE, SODIUM LACTATE, POTASSIUM CHLORIDE, AND CALCIUM CHLORIDE 5; .6; .31; .03; .02 G/100ML; G/100ML; G/100ML; G/100ML; G/100ML
INJECTION, SOLUTION INTRAVENOUS
Status: DISPENSED
Start: 2022-09-23

## (undated) RX ORDER — METOPROLOL TARTRATE 1 MG/ML
INJECTION, SOLUTION INTRAVENOUS
Status: DISPENSED
Start: 2022-07-15

## (undated) RX ORDER — ATROPINE SULFATE 0.4 MG/ML
AMPUL (ML) INJECTION
Status: DISPENSED
Start: 2020-11-23

## (undated) RX ORDER — DOBUTAMINE HYDROCHLORIDE 200 MG/100ML
INJECTION INTRAVENOUS
Status: DISPENSED
Start: 2022-07-15

## (undated) RX ORDER — CALCIUM CHLORIDE 100 MG/ML
INJECTION INTRAVENOUS; INTRAVENTRICULAR
Status: DISPENSED
Start: 2022-09-23

## (undated) RX ORDER — SODIUM CHLORIDE 450 MG/100ML
INJECTION, SOLUTION INTRAVENOUS
Status: DISPENSED
Start: 2022-09-23

## (undated) RX ORDER — FENTANYL CITRATE 50 UG/ML
INJECTION, SOLUTION INTRAMUSCULAR; INTRAVENOUS
Status: DISPENSED
Start: 2022-09-23

## (undated) RX ORDER — ATROPINE SULFATE 0.4 MG/ML
AMPUL (ML) INJECTION
Status: DISPENSED
Start: 2022-07-15